# Patient Record
Sex: FEMALE | Race: WHITE | NOT HISPANIC OR LATINO | URBAN - METROPOLITAN AREA
[De-identification: names, ages, dates, MRNs, and addresses within clinical notes are randomized per-mention and may not be internally consistent; named-entity substitution may affect disease eponyms.]

---

## 2017-03-07 NOTE — ASU PATIENT PROFILE, ADULT - LEARNING ASSESSMENT (PATIENT) ADDITIONAL COMMENTS
telephone interview Pt resides in assisted living . Information from chart & family . [facility] at [facility] .

## 2017-03-07 NOTE — ASU PATIENT PROFILE, ADULT - ABLE TO REACH PT
[facility] medical coordinator [phone] [facility] medical coordinator [phone]/yes

## 2017-03-08 RX ORDER — SODIUM CHLORIDE 9 MG/ML
3 INJECTION INTRAMUSCULAR; INTRAVENOUS; SUBCUTANEOUS ONCE
Qty: 0 | Refills: 0 | Status: DISCONTINUED | OUTPATIENT
Start: 2017-03-09 | End: 2017-03-09

## 2017-03-09 ENCOUNTER — INPATIENT (INPATIENT)
Facility: HOSPITAL | Age: 82
LOS: 19 days | Discharge: NURSING, REHAB OR LONG-TERM CARE | DRG: 240 | End: 2017-03-29
Attending: INTERNAL MEDICINE | Admitting: SPECIALIST
Payer: COMMERCIAL

## 2017-03-09 ENCOUNTER — TRANSCRIPTION ENCOUNTER (OUTPATIENT)
Age: 82
End: 2017-03-09

## 2017-03-09 VITALS
SYSTOLIC BLOOD PRESSURE: 178 MMHG | TEMPERATURE: 98 F | DIASTOLIC BLOOD PRESSURE: 50 MMHG | RESPIRATION RATE: 16 BRPM | HEART RATE: 78 BPM | OXYGEN SATURATION: 100 % | WEIGHT: 166.89 LBS

## 2017-03-09 DIAGNOSIS — I70.222 ATHEROSCLEROSIS OF NATIVE ARTERIES OF EXTREMITIES WITH REST PAIN, LEFT LEG: ICD-10-CM

## 2017-03-09 DIAGNOSIS — Z90.49 ACQUIRED ABSENCE OF OTHER SPECIFIED PARTS OF DIGESTIVE TRACT: Chronic | ICD-10-CM

## 2017-03-09 DIAGNOSIS — I73.9 PERIPHERAL VASCULAR DISEASE, UNSPECIFIED: ICD-10-CM

## 2017-03-09 DIAGNOSIS — I25.10 ATHEROSCLEROTIC HEART DISEASE OF NATIVE CORONARY ARTERY WITHOUT ANGINA PECTORIS: ICD-10-CM

## 2017-03-09 DIAGNOSIS — Z98.62 PERIPHERAL VASCULAR ANGIOPLASTY STATUS: Chronic | ICD-10-CM

## 2017-03-09 DIAGNOSIS — E78.5 HYPERLIPIDEMIA, UNSPECIFIED: ICD-10-CM

## 2017-03-09 DIAGNOSIS — L03.90 CELLULITIS, UNSPECIFIED: ICD-10-CM

## 2017-03-09 DIAGNOSIS — E11.9 TYPE 2 DIABETES MELLITUS WITHOUT COMPLICATIONS: ICD-10-CM

## 2017-03-09 DIAGNOSIS — I16.0 HYPERTENSIVE URGENCY: ICD-10-CM

## 2017-03-09 DIAGNOSIS — I10 ESSENTIAL (PRIMARY) HYPERTENSION: ICD-10-CM

## 2017-03-09 DIAGNOSIS — I48.91 UNSPECIFIED ATRIAL FIBRILLATION: ICD-10-CM

## 2017-03-09 LAB
ANION GAP SERPL CALC-SCNC: 14 MMOL/L — SIGNIFICANT CHANGE UP (ref 5–17)
BASOPHILS # BLD AUTO: 0 K/UL — SIGNIFICANT CHANGE UP (ref 0–0.2)
BASOPHILS NFR BLD AUTO: 0.3 % — SIGNIFICANT CHANGE UP (ref 0–2)
BLD GP AB SCN SERPL QL: SIGNIFICANT CHANGE UP
BUN SERPL-MCNC: 20 MG/DL — SIGNIFICANT CHANGE UP (ref 8–20)
CALCIUM SERPL-MCNC: 9.3 MG/DL — SIGNIFICANT CHANGE UP (ref 8.6–10.2)
CHLORIDE SERPL-SCNC: 96 MMOL/L — LOW (ref 98–107)
CO2 SERPL-SCNC: 25 MMOL/L — SIGNIFICANT CHANGE UP (ref 22–29)
CREAT SERPL-MCNC: 0.8 MG/DL — SIGNIFICANT CHANGE UP (ref 0.5–1.3)
EOSINOPHIL # BLD AUTO: 0.2 K/UL — SIGNIFICANT CHANGE UP (ref 0–0.5)
EOSINOPHIL NFR BLD AUTO: 2.9 % — SIGNIFICANT CHANGE UP (ref 0–6)
GLUCOSE SERPL-MCNC: 176 MG/DL — HIGH (ref 70–115)
HCT VFR BLD CALC: 33 % — LOW (ref 37–47)
HGB BLD-MCNC: 10.7 G/DL — LOW (ref 12–16)
INR BLD: 1 RATIO — SIGNIFICANT CHANGE UP (ref 0.88–1.16)
LYMPHOCYTES # BLD AUTO: 2 K/UL — SIGNIFICANT CHANGE UP (ref 1–4.8)
LYMPHOCYTES # BLD AUTO: 25.2 % — SIGNIFICANT CHANGE UP (ref 20–55)
MAGNESIUM SERPL-MCNC: 1.8 MG/DL — SIGNIFICANT CHANGE UP (ref 1.8–2.5)
MCHC RBC-ENTMCNC: 27.9 PG — SIGNIFICANT CHANGE UP (ref 27–31)
MCHC RBC-ENTMCNC: 32.4 G/DL — SIGNIFICANT CHANGE UP (ref 32–36)
MCV RBC AUTO: 85.9 FL — SIGNIFICANT CHANGE UP (ref 81–99)
MONOCYTES # BLD AUTO: 0.8 K/UL — SIGNIFICANT CHANGE UP (ref 0–0.8)
MONOCYTES NFR BLD AUTO: 10.6 % — HIGH (ref 3–10)
NEUTROPHILS # BLD AUTO: 4.8 K/UL — SIGNIFICANT CHANGE UP (ref 1.8–8)
NEUTROPHILS NFR BLD AUTO: 60.7 % — SIGNIFICANT CHANGE UP (ref 37–73)
PHOSPHATE SERPL-MCNC: 3.8 MG/DL — SIGNIFICANT CHANGE UP (ref 2.4–4.7)
PLATELET # BLD AUTO: 252 K/UL — SIGNIFICANT CHANGE UP (ref 150–400)
POTASSIUM SERPL-MCNC: 4.3 MMOL/L — SIGNIFICANT CHANGE UP (ref 3.5–5.3)
POTASSIUM SERPL-SCNC: 4.3 MMOL/L — SIGNIFICANT CHANGE UP (ref 3.5–5.3)
PROTHROM AB SERPL-ACNC: 11 SEC — SIGNIFICANT CHANGE UP (ref 10–13.1)
RBC # BLD: 3.84 M/UL — LOW (ref 4.4–5.2)
RBC # FLD: 14.5 % — SIGNIFICANT CHANGE UP (ref 11–15.6)
SODIUM SERPL-SCNC: 135 MMOL/L — SIGNIFICANT CHANGE UP (ref 135–145)
TYPE + AB SCN PNL BLD: SIGNIFICANT CHANGE UP
WBC # BLD: 8 K/UL — SIGNIFICANT CHANGE UP (ref 4.8–10.8)
WBC # FLD AUTO: 8 K/UL — SIGNIFICANT CHANGE UP (ref 4.8–10.8)

## 2017-03-09 PROCEDURE — 93010 ELECTROCARDIOGRAM REPORT: CPT

## 2017-03-09 RX ORDER — DEXTROSE 50 % IN WATER 50 %
12.5 SYRINGE (ML) INTRAVENOUS ONCE
Qty: 0 | Refills: 0 | Status: DISCONTINUED | OUTPATIENT
Start: 2017-03-09 | End: 2017-03-14

## 2017-03-09 RX ORDER — LEVOTHYROXINE SODIUM 125 MCG
1 TABLET ORAL
Qty: 0 | Refills: 0 | COMMUNITY

## 2017-03-09 RX ORDER — SODIUM CHLORIDE 9 MG/ML
1000 INJECTION, SOLUTION INTRAVENOUS
Qty: 0 | Refills: 0 | Status: DISCONTINUED | OUTPATIENT
Start: 2017-03-09 | End: 2017-03-14

## 2017-03-09 RX ORDER — LEVOTHYROXINE SODIUM 125 MCG
75 TABLET ORAL DAILY
Qty: 0 | Refills: 0 | Status: DISCONTINUED | OUTPATIENT
Start: 2017-03-09 | End: 2017-03-14

## 2017-03-09 RX ORDER — HYDRALAZINE HCL 50 MG
2 TABLET ORAL
Qty: 0 | Refills: 0 | Status: COMPLETED | OUTPATIENT
Start: 2017-03-09 | End: 2017-03-09

## 2017-03-09 RX ORDER — BACITRACIN AND POLYMYXIN B SULFATE 500; 10000 [USP'U]/G; [USP'U]/G
1 OINTMENT TOPICAL DAILY
Qty: 0 | Refills: 0 | Status: DISCONTINUED | OUTPATIENT
Start: 2017-03-09 | End: 2017-03-14

## 2017-03-09 RX ORDER — SODIUM CHLORIDE 9 MG/ML
1000 INJECTION INTRAMUSCULAR; INTRAVENOUS; SUBCUTANEOUS
Qty: 0 | Refills: 0 | Status: DISCONTINUED | OUTPATIENT
Start: 2017-03-09 | End: 2017-03-11

## 2017-03-09 RX ORDER — DEXTROSE 50 % IN WATER 50 %
25 SYRINGE (ML) INTRAVENOUS ONCE
Qty: 0 | Refills: 0 | Status: DISCONTINUED | OUTPATIENT
Start: 2017-03-09 | End: 2017-03-14

## 2017-03-09 RX ORDER — PIPERACILLIN AND TAZOBACTAM 4; .5 G/20ML; G/20ML
3.38 INJECTION, POWDER, LYOPHILIZED, FOR SOLUTION INTRAVENOUS ONCE
Qty: 0 | Refills: 0 | Status: COMPLETED | OUTPATIENT
Start: 2017-03-09 | End: 2017-03-09

## 2017-03-09 RX ORDER — ATORVASTATIN CALCIUM 80 MG/1
40 TABLET, FILM COATED ORAL AT BEDTIME
Qty: 0 | Refills: 0 | Status: DISCONTINUED | OUTPATIENT
Start: 2017-03-09 | End: 2017-03-14

## 2017-03-09 RX ORDER — METOPROLOL TARTRATE 50 MG
50 TABLET ORAL EVERY 12 HOURS
Qty: 0 | Refills: 0 | Status: DISCONTINUED | OUTPATIENT
Start: 2017-03-09 | End: 2017-03-14

## 2017-03-09 RX ORDER — MULTIVIT-MIN/FERROUS GLUCONATE 9 MG/15 ML
1 LIQUID (ML) ORAL DAILY
Qty: 0 | Refills: 0 | Status: DISCONTINUED | OUTPATIENT
Start: 2017-03-09 | End: 2017-03-14

## 2017-03-09 RX ORDER — CLOPIDOGREL BISULFATE 75 MG/1
75 TABLET, FILM COATED ORAL DAILY
Qty: 0 | Refills: 0 | Status: DISCONTINUED | OUTPATIENT
Start: 2017-03-09 | End: 2017-03-14

## 2017-03-09 RX ORDER — INSULIN GLARGINE 100 [IU]/ML
10 INJECTION, SOLUTION SUBCUTANEOUS AT BEDTIME
Qty: 0 | Refills: 0 | Status: DISCONTINUED | OUTPATIENT
Start: 2017-03-09 | End: 2017-03-14

## 2017-03-09 RX ORDER — FENTANYL CITRATE 50 UG/ML
25 INJECTION INTRAVENOUS
Qty: 0 | Refills: 0 | Status: DISCONTINUED | OUTPATIENT
Start: 2017-03-09 | End: 2017-03-09

## 2017-03-09 RX ORDER — DEXTROSE 50 % IN WATER 50 %
1 SYRINGE (ML) INTRAVENOUS ONCE
Qty: 0 | Refills: 0 | Status: DISCONTINUED | OUTPATIENT
Start: 2017-03-09 | End: 2017-03-14

## 2017-03-09 RX ORDER — PANTOPRAZOLE SODIUM 20 MG/1
40 TABLET, DELAYED RELEASE ORAL
Qty: 0 | Refills: 0 | Status: DISCONTINUED | OUTPATIENT
Start: 2017-03-09 | End: 2017-03-14

## 2017-03-09 RX ORDER — FOLIC ACID 0.8 MG
1 TABLET ORAL DAILY
Qty: 0 | Refills: 0 | Status: DISCONTINUED | OUTPATIENT
Start: 2017-03-09 | End: 2017-03-14

## 2017-03-09 RX ORDER — ACETAMINOPHEN 500 MG
650 TABLET ORAL EVERY 6 HOURS
Qty: 0 | Refills: 0 | Status: DISCONTINUED | OUTPATIENT
Start: 2017-03-09 | End: 2017-03-14

## 2017-03-09 RX ORDER — SODIUM CHLORIDE 9 MG/ML
1000 INJECTION, SOLUTION INTRAVENOUS
Qty: 0 | Refills: 0 | Status: DISCONTINUED | OUTPATIENT
Start: 2017-03-09 | End: 2017-03-09

## 2017-03-09 RX ORDER — PIPERACILLIN AND TAZOBACTAM 4; .5 G/20ML; G/20ML
3.38 INJECTION, POWDER, LYOPHILIZED, FOR SOLUTION INTRAVENOUS EVERY 8 HOURS
Qty: 0 | Refills: 0 | Status: DISCONTINUED | OUTPATIENT
Start: 2017-03-09 | End: 2017-03-11

## 2017-03-09 RX ORDER — ALPRAZOLAM 0.25 MG
0.5 TABLET ORAL THREE TIMES A DAY
Qty: 0 | Refills: 0 | Status: DISCONTINUED | OUTPATIENT
Start: 2017-03-09 | End: 2017-03-14

## 2017-03-09 RX ORDER — OXYCODONE HYDROCHLORIDE 5 MG/1
5 TABLET ORAL EVERY 8 HOURS
Qty: 0 | Refills: 0 | Status: DISCONTINUED | OUTPATIENT
Start: 2017-03-09 | End: 2017-03-10

## 2017-03-09 RX ORDER — ACETAMINOPHEN 500 MG
1000 TABLET ORAL ONCE
Qty: 0 | Refills: 0 | Status: COMPLETED | OUTPATIENT
Start: 2017-03-09 | End: 2017-03-09

## 2017-03-09 RX ORDER — INSULIN LISPRO 100/ML
VIAL (ML) SUBCUTANEOUS
Qty: 0 | Refills: 0 | Status: DISCONTINUED | OUTPATIENT
Start: 2017-03-09 | End: 2017-03-14

## 2017-03-09 RX ORDER — GLUCAGON INJECTION, SOLUTION 0.5 MG/.1ML
1 INJECTION, SOLUTION SUBCUTANEOUS ONCE
Qty: 0 | Refills: 0 | Status: DISCONTINUED | OUTPATIENT
Start: 2017-03-09 | End: 2017-03-14

## 2017-03-09 RX ORDER — AMLODIPINE BESYLATE 2.5 MG/1
2.5 TABLET ORAL DAILY
Qty: 0 | Refills: 0 | Status: DISCONTINUED | OUTPATIENT
Start: 2017-03-09 | End: 2017-03-11

## 2017-03-09 RX ORDER — ONDANSETRON 8 MG/1
4 TABLET, FILM COATED ORAL ONCE
Qty: 0 | Refills: 0 | Status: DISCONTINUED | OUTPATIENT
Start: 2017-03-09 | End: 2017-03-09

## 2017-03-09 RX ORDER — GABAPENTIN 400 MG/1
600 CAPSULE ORAL AT BEDTIME
Qty: 0 | Refills: 0 | Status: DISCONTINUED | OUTPATIENT
Start: 2017-03-09 | End: 2017-03-14

## 2017-03-09 RX ORDER — BACITRACIN ZINC 500 UNIT/G
1 OINTMENT IN PACKET (EA) TOPICAL THREE TIMES A DAY
Qty: 0 | Refills: 0 | Status: DISCONTINUED | OUTPATIENT
Start: 2017-03-09 | End: 2017-03-10

## 2017-03-09 RX ADMIN — Medication 2 MILLIGRAM(S): at 18:10

## 2017-03-09 RX ADMIN — Medication 50 MILLIGRAM(S): at 19:55

## 2017-03-09 RX ADMIN — FENTANYL CITRATE 25 MICROGRAM(S): 50 INJECTION INTRAVENOUS at 18:10

## 2017-03-09 RX ADMIN — Medication 2 MILLIGRAM(S): at 18:00

## 2017-03-09 RX ADMIN — OXYCODONE HYDROCHLORIDE 5 MILLIGRAM(S): 5 TABLET ORAL at 23:09

## 2017-03-09 RX ADMIN — GABAPENTIN 600 MILLIGRAM(S): 400 CAPSULE ORAL at 19:04

## 2017-03-09 RX ADMIN — FENTANYL CITRATE 25 MICROGRAM(S): 50 INJECTION INTRAVENOUS at 18:40

## 2017-03-09 RX ADMIN — Medication 1000 MILLIGRAM(S): at 19:04

## 2017-03-09 RX ADMIN — CLOPIDOGREL BISULFATE 75 MILLIGRAM(S): 75 TABLET, FILM COATED ORAL at 19:55

## 2017-03-09 RX ADMIN — Medication 1 APPLICATION(S): at 22:00

## 2017-03-09 RX ADMIN — PIPERACILLIN AND TAZOBACTAM 200 GRAM(S): 4; .5 INJECTION, POWDER, LYOPHILIZED, FOR SOLUTION INTRAVENOUS at 19:51

## 2017-03-09 RX ADMIN — Medication 2 MILLIGRAM(S): at 18:05

## 2017-03-09 RX ADMIN — Medication 0.5 MILLIGRAM(S): at 19:00

## 2017-03-09 RX ADMIN — Medication 2 MILLIGRAM(S): at 17:55

## 2017-03-09 RX ADMIN — ATORVASTATIN CALCIUM 40 MILLIGRAM(S): 80 TABLET, FILM COATED ORAL at 22:00

## 2017-03-09 RX ADMIN — SODIUM CHLORIDE 100 MILLILITER(S): 9 INJECTION INTRAMUSCULAR; INTRAVENOUS; SUBCUTANEOUS at 19:57

## 2017-03-09 RX ADMIN — Medication 400 MILLIGRAM(S): at 18:50

## 2017-03-09 RX ADMIN — INSULIN GLARGINE 10 UNIT(S): 100 INJECTION, SOLUTION SUBCUTANEOUS at 22:00

## 2017-03-09 RX ADMIN — FENTANYL CITRATE 25 MICROGRAM(S): 50 INJECTION INTRAVENOUS at 18:00

## 2017-03-09 RX ADMIN — FENTANYL CITRATE 25 MICROGRAM(S): 50 INJECTION INTRAVENOUS at 18:20

## 2017-03-09 RX ADMIN — FENTANYL CITRATE 25 MICROGRAM(S): 50 INJECTION INTRAVENOUS at 18:25

## 2017-03-09 RX ADMIN — BACITRACIN AND POLYMYXIN B SULFATE 1 APPLICATION(S): 500; 10000 OINTMENT TOPICAL at 22:00

## 2017-03-09 NOTE — BRIEF OPERATIVE NOTE - PRE-OP DX
PAD (peripheral artery disease)  03/09/2017  b/l LE PAD with tissue loss left leg  Active  Uyen Mckeon

## 2017-03-09 NOTE — H&P ADULT - PMH
CAD (coronary artery disease)    Diabetes    Hyperlipidemia    Hypertension    PVD (peripheral vascular disease)

## 2017-03-09 NOTE — H&P ADULT - HISTORY OF PRESENT ILLNESS
85 yr old female with multiple medical problems CAD s/p stents, diabetes, hypertension, hyperlipidemia, anxiety, significant PVD s/p multiple stents was admitted today for elective LE angiogram. Patient was noted to have elevated BP post procedure, and hence admission requested by Dr Irwin. Per him, patient with occluded SFA, POP b/l with minimal outflow to b/l LE, and patient will likely need BKA next week. Patient seen in PACU, states she feels lousy. No chest pain, headaches, shortness of breath. Spoke with daughters at bedside, who states that patient lives in an assisted living, uses a walker to ambulate and has had multiple falls. Her last PCI was 10 yrs ago. History of smoking, quit 30 yrs ago. Daughter also states patient took her antihypertensives this am. She has been in assisted living since October 2016. She received IV hydralazine prior to my assessment and BP improved to 180. 85 yr old female with multiple medical problems CAD s/p stents, diabetes, hypertension, hyperlipidemia, anxiety, significant PVD s/p multiple stents, paroxysmal atrial fibrillation was admitted today for elective LE angiogram. Patient was noted to have elevated BP post procedure, and hence admission requested by Dr Irwin. Per him, patient with occluded SFA, POP b/l with minimal outflow to b/l LE, and patient will likely need BKA next week. Patient seen in PACU, states she feels lousy. No chest pain, headaches, shortness of breath. Spoke with daughters at bedside, who states that patient lives in an assisted living, uses a walker to ambulate and has had multiple falls. Her last PCI was 10 yrs ago. History of smoking, quit 30 yrs ago. Daughter also states patient took her antihypertensives this am. She has been in assisted living since October 2016. She received IV hydralazine prior to my assessment and BP improved to 180.

## 2017-03-09 NOTE — H&P ADULT - ASSESSMENT
85 yr old female with multiple medical problems CAD s/p stents, diabetes, hypertension, hyperlipidemia, anxiety, significant PVD s/p multiple stents, paroxysmal atrial fibrillation admitted for hypertensive urgency post LE angiogram and cellulitis. She will need BKA. Labs and EKG from 3/7/17 reviewed, normal renal function. Received hydralazine in PACU with some improvement in BP.

## 2017-03-09 NOTE — H&P ADULT - RS GEN PE MLT RESP DETAILS PC
breath sounds equal/good air movement/clear to auscultation bilaterally/respirations non-labored/airway patent

## 2017-03-09 NOTE — BRIEF OPERATIVE NOTE - PROCEDURE
Angiogram peripheral  03/09/2017  1. US guided, percutaneous access to the right CFA, 2. Aortogram, 3. B/L LE angiogram  Active  [facility]

## 2017-03-10 LAB
ANION GAP SERPL CALC-SCNC: 13 MMOL/L — SIGNIFICANT CHANGE UP (ref 5–17)
ANISOCYTOSIS BLD QL: SLIGHT — SIGNIFICANT CHANGE UP
BUN SERPL-MCNC: 28 MG/DL — HIGH (ref 8–20)
CALCIUM SERPL-MCNC: 8.5 MG/DL — LOW (ref 8.6–10.2)
CHLORIDE SERPL-SCNC: 96 MMOL/L — LOW (ref 98–107)
CHOLEST SERPL-MCNC: 127 MG/DL — SIGNIFICANT CHANGE UP (ref 110–199)
CO2 SERPL-SCNC: 25 MMOL/L — SIGNIFICANT CHANGE UP (ref 22–29)
CREAT SERPL-MCNC: 1.27 MG/DL — SIGNIFICANT CHANGE UP (ref 0.5–1.3)
EOSINOPHIL NFR BLD AUTO: 3 % — SIGNIFICANT CHANGE UP (ref 0–5)
GLUCOSE SERPL-MCNC: 269 MG/DL — HIGH (ref 70–115)
HBA1C BLD-MCNC: 8.7 — HIGH (ref 4–5.6)
HCT VFR BLD CALC: 29.4 % — LOW (ref 37–47)
HDLC SERPL-MCNC: 49 MG/DL — LOW
HGB BLD-MCNC: 9.3 G/DL — LOW (ref 12–16)
LIPID PNL WITH DIRECT LDL SERPL: 56 MG/DL — SIGNIFICANT CHANGE UP
LYMPHOCYTES # BLD AUTO: 6 % — LOW (ref 20–55)
MAGNESIUM SERPL-MCNC: 1.8 MG/DL — SIGNIFICANT CHANGE UP (ref 1.8–2.5)
MCHC RBC-ENTMCNC: 27.5 PG — SIGNIFICANT CHANGE UP (ref 27–31)
MCHC RBC-ENTMCNC: 31.6 G/DL — LOW (ref 32–36)
MCV RBC AUTO: 87 FL — SIGNIFICANT CHANGE UP (ref 81–99)
MONOCYTES NFR BLD AUTO: 4 % — SIGNIFICANT CHANGE UP (ref 3–10)
NEUTROPHILS NFR BLD AUTO: 84 % — HIGH (ref 37–73)
NEUTS BAND # BLD: 3 % — SIGNIFICANT CHANGE UP (ref 0–8)
OVALOCYTES BLD QL SMEAR: SLIGHT — SIGNIFICANT CHANGE UP
PHOSPHATE SERPL-MCNC: 4.1 MG/DL — SIGNIFICANT CHANGE UP (ref 2.4–4.7)
PLAT MORPH BLD: NORMAL — SIGNIFICANT CHANGE UP
PLATELET # BLD AUTO: 273 K/UL — SIGNIFICANT CHANGE UP (ref 150–400)
POTASSIUM SERPL-MCNC: 4.8 MMOL/L — SIGNIFICANT CHANGE UP (ref 3.5–5.3)
POTASSIUM SERPL-SCNC: 4.8 MMOL/L — SIGNIFICANT CHANGE UP (ref 3.5–5.3)
RBC # BLD: 3.38 M/UL — LOW (ref 4.4–5.2)
RBC # FLD: 14.7 % — SIGNIFICANT CHANGE UP (ref 11–15.6)
RBC BLD AUTO: PRESENT — SIGNIFICANT CHANGE UP
SODIUM SERPL-SCNC: 134 MMOL/L — LOW (ref 135–145)
TOTAL CHOLESTEROL/HDL RATIO MEASUREMENT: 3 RATIO — LOW (ref 3.3–7.1)
TRIGL SERPL-MCNC: 108 MG/DL — SIGNIFICANT CHANGE UP (ref 10–200)
WBC # BLD: 10.2 K/UL — SIGNIFICANT CHANGE UP (ref 4.8–10.8)
WBC # FLD AUTO: 10.2 K/UL — SIGNIFICANT CHANGE UP (ref 4.8–10.8)

## 2017-03-10 PROCEDURE — 93306 TTE W/DOPPLER COMPLETE: CPT | Mod: 26

## 2017-03-10 PROCEDURE — 99233 SBSQ HOSP IP/OBS HIGH 50: CPT

## 2017-03-10 RX ORDER — MORPHINE SULFATE 50 MG/1
2 CAPSULE, EXTENDED RELEASE ORAL ONCE
Qty: 0 | Refills: 0 | Status: DISCONTINUED | OUTPATIENT
Start: 2017-03-10 | End: 2017-03-10

## 2017-03-10 RX ORDER — OXYCODONE HYDROCHLORIDE 5 MG/1
5 TABLET ORAL EVERY 4 HOURS
Qty: 0 | Refills: 0 | Status: DISCONTINUED | OUTPATIENT
Start: 2017-03-10 | End: 2017-03-11

## 2017-03-10 RX ORDER — OXYCODONE HYDROCHLORIDE 5 MG/1
5 TABLET ORAL ONCE
Qty: 0 | Refills: 0 | Status: DISCONTINUED | OUTPATIENT
Start: 2017-03-10 | End: 2017-03-10

## 2017-03-10 RX ORDER — ENOXAPARIN SODIUM 100 MG/ML
40 INJECTION SUBCUTANEOUS DAILY
Qty: 0 | Refills: 0 | Status: DISCONTINUED | OUTPATIENT
Start: 2017-03-10 | End: 2017-03-11

## 2017-03-10 RX ADMIN — CLOPIDOGREL BISULFATE 75 MILLIGRAM(S): 75 TABLET, FILM COATED ORAL at 13:28

## 2017-03-10 RX ADMIN — Medication 6: at 08:41

## 2017-03-10 RX ADMIN — Medication 50 MILLIGRAM(S): at 17:27

## 2017-03-10 RX ADMIN — OXYCODONE HYDROCHLORIDE 5 MILLIGRAM(S): 5 TABLET ORAL at 21:56

## 2017-03-10 RX ADMIN — GABAPENTIN 600 MILLIGRAM(S): 400 CAPSULE ORAL at 21:39

## 2017-03-10 RX ADMIN — Medication 0.5 MILLIGRAM(S): at 21:51

## 2017-03-10 RX ADMIN — PIPERACILLIN AND TAZOBACTAM 25 GRAM(S): 4; .5 INJECTION, POWDER, LYOPHILIZED, FOR SOLUTION INTRAVENOUS at 13:48

## 2017-03-10 RX ADMIN — ATORVASTATIN CALCIUM 40 MILLIGRAM(S): 80 TABLET, FILM COATED ORAL at 21:39

## 2017-03-10 RX ADMIN — OXYCODONE HYDROCHLORIDE 5 MILLIGRAM(S): 5 TABLET ORAL at 21:38

## 2017-03-10 RX ADMIN — Medication 50 MILLIGRAM(S): at 06:39

## 2017-03-10 RX ADMIN — MORPHINE SULFATE 2 MILLIGRAM(S): 50 CAPSULE, EXTENDED RELEASE ORAL at 15:10

## 2017-03-10 RX ADMIN — INSULIN GLARGINE 10 UNIT(S): 100 INJECTION, SOLUTION SUBCUTANEOUS at 21:51

## 2017-03-10 RX ADMIN — OXYCODONE HYDROCHLORIDE 5 MILLIGRAM(S): 5 TABLET ORAL at 08:41

## 2017-03-10 RX ADMIN — Medication 1 MILLIGRAM(S): at 13:29

## 2017-03-10 RX ADMIN — PIPERACILLIN AND TAZOBACTAM 25 GRAM(S): 4; .5 INJECTION, POWDER, LYOPHILIZED, FOR SOLUTION INTRAVENOUS at 21:38

## 2017-03-10 RX ADMIN — Medication 1 APPLICATION(S): at 13:27

## 2017-03-10 RX ADMIN — AMLODIPINE BESYLATE 2.5 MILLIGRAM(S): 2.5 TABLET ORAL at 06:39

## 2017-03-10 RX ADMIN — BACITRACIN AND POLYMYXIN B SULFATE 1 APPLICATION(S): 500; 10000 OINTMENT TOPICAL at 17:26

## 2017-03-10 RX ADMIN — Medication 75 MICROGRAM(S): at 06:39

## 2017-03-10 RX ADMIN — OXYCODONE HYDROCHLORIDE 5 MILLIGRAM(S): 5 TABLET ORAL at 09:30

## 2017-03-10 RX ADMIN — MORPHINE SULFATE 2 MILLIGRAM(S): 50 CAPSULE, EXTENDED RELEASE ORAL at 14:44

## 2017-03-10 RX ADMIN — Medication 2: at 13:27

## 2017-03-10 RX ADMIN — Medication 6: at 17:35

## 2017-03-10 RX ADMIN — Medication 1 APPLICATION(S): at 06:39

## 2017-03-10 RX ADMIN — Medication 1 TABLET(S): at 13:28

## 2017-03-10 RX ADMIN — PIPERACILLIN AND TAZOBACTAM 25 GRAM(S): 4; .5 INJECTION, POWDER, LYOPHILIZED, FOR SOLUTION INTRAVENOUS at 06:41

## 2017-03-10 RX ADMIN — ENOXAPARIN SODIUM 40 MILLIGRAM(S): 100 INJECTION SUBCUTANEOUS at 21:51

## 2017-03-10 RX ADMIN — PANTOPRAZOLE SODIUM 40 MILLIGRAM(S): 20 TABLET, DELAYED RELEASE ORAL at 06:40

## 2017-03-10 RX ADMIN — OXYCODONE HYDROCHLORIDE 5 MILLIGRAM(S): 5 TABLET ORAL at 17:33

## 2017-03-10 NOTE — PROGRESS NOTE ADULT - PROBLEM SELECTOR PLAN 1
- will need left BKA next week  - IV Abx  - Medical/Cardiac optimization  - BP control  - DVT prophylaxis OK for SQ Lovenox   - Hold full AC with Eliquis   - Will follow along

## 2017-03-10 NOTE — PROGRESS NOTE ADULT - PROBLEM SELECTOR PLAN 2
Spoke with vascular surgery, likely left BKA next week.   c/w IV Abx  - DVT prophylaxis   - Hold full AC with Eliquis  Pain meds Spoke with vascular surgery, likely left BKA next week.   c/w IV Abx  - DVT prophylaxis   - Hold full AC with Eliquis  c/w plavix  Pain meds

## 2017-03-10 NOTE — CONSULT NOTE ADULT - ASSESSMENT
85 yr old female with multiple medical problems CAD s/p stents last coronary stent 1999, diabetes, hypertension, hyperlipidemia, anxiety, significant PVD s/p multiple stents, paroxysmal atrial fibrillation  for planned BKA based on RCRI  risk criteria for planned non-cardiac surgery patient is at least intermediate risk of MACE for planned intermediate risk surgery. The patient is currently optimized form a cardiac standpoint. Benefits of procedure outweigh risks.    CAD s/p remote stents (-) NST 2015 : TTE LVEF 65% no significant valvular heart disease. Recc: continue Plavix/Statin add ASA 325mg daily if ok from surgical standpoint.  PAF: Elliquist on hold ;[id number] =5  [facility] bridge over weekend goal PTT 60-80  HTN: Continue Norvasc/metoprolol 50 mg po bid , hydralazine prn   DM: RHISS  Jami-op ok to use IV lopressor 2.5mg -5mg prn for HR/BP control   Hemodynamic monitoring as per protocol.

## 2017-03-10 NOTE — PROGRESS NOTE ADULT - SUBJECTIVE AND OBJECTIVE BOX
POD # 1 s/p b/l LE angiogram for advanced PAD with LLE tissue loss. Pt with SBP > 200 postoperatively currently controlled. Pt is sleepy this morning but without complaints.     Vital Signs Last 24 Hrs  T(C): 37.2, Max: 37.2 (03-09 @ 21:24)  T(F): 99, Max: 99 (03-10 @ 06:37)  HR: 85 (76 - 94)  BP: 157/63 (147/53 - 218/81)  BP(mean): --  RR: 16 (10 - 16)  SpO2: 100% (100% - 100%)                          10.7   8.0   )-----------( 252      ( 09 Mar 2017 19:02 )             33.0       135    |  96     |  20.0   ----------------------------<  176    4.3     |  25.0   |  0.80     Ca    9.3        09 Mar 2017 19:02  Phos  3.8       09 Mar 2017 19:02  Mg     1.8       09 Mar 2017 19:02

## 2017-03-10 NOTE — CONSULT NOTE ADULT - SUBJECTIVE AND OBJECTIVE BOX
Patient is a 85y old  Female who presents with a chief complaint of hypertensive urgency (09 Mar 2017 18:35)      HPI:  85 yr old female with multiple medical problems CAD s/p stents last coronary stent , diabetes, hypertension, hyperlipidemia, anxiety, significant PVD s/p multiple stents, paroxysmal atrial fibrillation  on Elliquist was admitted with signs and symptoms consistent with  occluded SFA, POP b/l with minimal outflow to b/l LE. Patient tentatively being evaluated for  BKA next week.   Functional status: Limited ambulation due to leg pain ; no reported history of congestive heart failure. Patient reports negative stress test .   CV ROS: patient denies chest pain, orthopnea PND syncope or pre-syncope. BP improved on Hydralazine to 's          PAST MEDICAL & SURGICAL HISTORY:  Hyperlipidemia  Hypertension  Diabetes  PVD (peripheral vascular disease)  CAD (coronary artery disease)  History of cholecystectomy  H/O angioplasty      PREVIOUS DIAGNOSTIC TESTING:      ECHO  FINDINGS: Summary:   1. Normal biventricular systolic function. Left ventricular ejection   fraction, by visual estimation, is 60 to 65%.   2. There is mild concentric left ventricular hypertrophy.   3. Spectral Doppler shows impaired relaxation pattern of left   ventricular myocardial filling (Grade I diastolic dysfunction).   4. Mild tricuspidregurgitation.   5. Estimated PA systolic pressure = 32 mmHg (presuming RA pressure = 3   mmHg).   6. Aortic sclerosis without evidence for stenosis.   7. There is no evidence of pericardial effusion.   8. ** No prior echocardiograms available for comparison.          Allergies    Benadryl (Other)  Demerol HCl (Other)    Intolerances        MEDICATIONS  (STANDING):  sodium chloride 0.9%. 1000milliLiter(s) IV Continuous <Continuous>  BACItracin/polymyxin B Ointment 1Application(s) Topical daily  gabapentin 600milliGRAM(s) Oral at bedtime  atorvastatin 40milliGRAM(s) Oral at bedtime  clopidogrel Tablet 75milliGRAM(s) Oral daily  metoprolol 50milliGRAM(s) Oral every 12 hours  amLODIPine   Tablet 2.5milliGRAM(s) Oral daily  BACItracin   Ointment 1Application(s) Topical three times a day  pantoprazole    Tablet 40milliGRAM(s) Oral before breakfast  levothyroxine 75MICROGram(s) Oral daily  folic acid 1milliGRAM(s) Oral daily  multivitamin/minerals 1Tablet(s) Oral daily  piperacillin/tazobactam IVPB. 3.375Gram(s) IV Intermittent every 8 hours  insulin glargine Injectable (LANTUS) 10Unit(s) SubCutaneous at bedtime  insulin lispro (HumaLOG) corrective regimen sliding scale  SubCutaneous three times a day before meals  dextrose 5%. 1000milliLiter(s) IV Continuous <Continuous>  dextrose 50% Injectable 12.5Gram(s) IV Push once  dextrose 50% Injectable 25Gram(s) IV Push once  dextrose 50% Injectable 25Gram(s) IV Push once  enoxaparin Injectable 40milliGRAM(s) SubCutaneous daily    MEDICATIONS  (PRN):  acetaminophen   Tablet 650milliGRAM(s) Oral every 6 hours PRN For Temp greater than 38 C (100.4 F)  acetaminophen   Tablet. 650milliGRAM(s) Oral every 6 hours PRN Moderate Pain (4 - 6)  ALPRAZolam 0.5milliGRAM(s) Oral three times a day PRN anxiety  dextrose Gel 1Dose(s) Oral once PRN Blood Glucose LESS THAN 70 milliGRAM(s)/deciliter  glucagon  Injectable 1milliGRAM(s) IntraMuscular once PRN Glucose LESS THAN 70 milligrams/deciliter  oxyCODONE IR 5milliGRAM(s) Oral every 4 hours PRN Moderate Pain (4 - 6)      FAMILY HISTORY:  No pertinent family history in first degree relatives      SOCIAL HISTORY: Lives with family    CIGARETTES: denies    ALCOHOL: denies    REVIEW OF SYSTEMS:  CONSTITUTIONAL: No fever, weight loss, or fatigue  EYES: No eye pain, visual disturbances, or discharge  ENMT:  No difficulty hearing, tinnitus, vertigo; No sinus or throat pain  NECK: No pain or stiffness  RESPIRATORY: No cough, wheezing, chills or hemoptysis; No Shortness of Breath  CARDIOVASCULAR: No chest pain, palpitations, passing out, dizziness, or leg swelling  GASTROINTESTINAL: No abdominal or epigastric pain. No nausea, vomiting, or hematemesis; No diarrhea or constipation. No melena or hematochezia.  GENITOURINARY: No dysuria, frequency, hematuria, or incontinence  NEUROLOGICAL: No headaches, memory loss, loss of strength, numbness, or tremors  SKIN: No itching, burning, rashes, or lesions   LYMPH Nodes: No enlarged glands  ENDOCRINE: No heat or cold intolerance; No hair loss  MUSCULOSKELETAL: No joint pain or swelling; No muscle, back, or extremity pain  PSYCHIATRIC: No depression, anxiety, mood swings, or difficulty sleeping  HEME/LYMPH: No easy bruising, or bleeding gums  ALLERY AND IMMUNOLOGIC: No hives or eczema	    Vital Signs Last 24 Hrs  T(C): 36.6, Max: 37.2 (03-09 @ 21:24)  T(F): 97.8, Max: 99 (03-10 @ 06:37)  HR: 82 (76 - 94)  BP: 148/52 (147/53 - 218/81)  BP(mean): --  RR: 18 (10 - 18)  SpO2: 96% (96% - 100%)    Daily     Daily Weight in k (10 Mar 2017 05:35)    I&O's Detail    I & Os for current day (as of 10 Mar 2017 16:46)  =============================================  IN:    sodium chloride 0.9%.: 400 ml    Total IN: 400 ml  ---------------------------------------------  OUT:    Total OUT: 0 ml  ---------------------------------------------  Total NET: 400 ml      PHYSICAL EXAM:  Physical Exam:  · Constitutional	detailed exam	  · Constitutional Details	well-groomed; well-nourished; no distress	  · Eyes	detailed exam	  · Eyes Details	conjunctiva clear	  · ENMT	detailed exam	  · ENMT Details	mouth	  · Mouth	moist	  · Neck	detailed exam	  · Neck Details	supple	  · Respiratory	detailed exam	  · Respiratory Details	airway patent; breath sounds equal; good air movement; respirations non-labored; clear to auscultation bilaterally	  · Cardiovascular	detailed exam	  · Cardiovascular Details	regular rate and rhythm	  · Gastrointestinal	detailed exam	  · GI Normal	soft; nontender; bowel sounds normal	  · Gastrointestinal Comments	hernia	  · Extremities	detailed exam	  · Extremities Details	no pedal edema	  · Extremities Comments	erythema over left foot, + warmth	  · Neurological	detailed exam	  · Neurological Details	alert and oriented x 3; responds to pain; responds to verbal commands	  · Skin	detailed exam	  · Skin Details	erythema	  · Erythema Location	foot L	  · Psychiatric	detailed exam	  · Psychiatric Details	flat affect	        INTERPRETATION OF TELEMETRY:    ECG: SR with ns st twa    LABS:                        9.3    10.2  )-----------( 273      ( 10 Mar 2017 16:08 )             29.4     10 Mar 2017 15:58    134    |  96     |  28.0   ----------------------------<  269    4.8     |  25.0   |  1.27     Ca    8.5        10 Mar 2017 15:58  Phos  3.8       09 Mar 2017 19:02  Mg     1.8       09 Mar 2017 19:02          PT/INR - ( 09 Mar 2017 19:02 )   PT: 11.0 sec;   INR: 1.00 ratio             I&O's Summary    I & Os for current day (as of 10 Mar 2017 16:46)  =============================================  IN: 400 ml / OUT: 0 ml / NET: 400 ml    BNP  RADIOLOGY & ADDITIONAL STUDIES:

## 2017-03-10 NOTE — PROGRESS NOTE ADULT - SUBJECTIVE AND OBJECTIVE BOX
CHIEF COMPLAINT/INTERVAL HISTORY:    Patient is a 85y old  Female who presents with a chief complaint of hypertensive urgency (09 Mar 2017 18:35)      HPI:  85 yr old female with multiple medical problems CAD s/p stents, diabetes, hypertension, hyperlipidemia, anxiety, significant PVD s/p multiple stents, paroxysmal atrial fibrillation was admitted today for elective LE angiogram. Patient was noted to have elevated BP post procedure, and hence admission requested by Dr Irwin. Per him, patient with occluded SFA, POP b/l with minimal outflow to b/l LE, and patient will likely need BKA next week. Patient seen in PACU, states she feels lousy. No chest pain, headaches, shortness of breath. Spoke with daughters at bedside, who states that patient lives in an assisted living, uses a walker to ambulate and has had multiple falls. Her last PCI was 10 yrs ago. History of smoking, quit 30 yrs ago. Daughter also states patient took her antihypertensives this am. She has been in assisted living since October 2016. She received IV hydralazine prior to my assessment and BP improved to 180. (09 Mar 2017 18:35)      SUBJECTIVE & OBJECTIVE: Pt seen and examined at bedside. c/o worsening left foot pain after cell phone fell on foot    ICU Vital Signs Last 24 Hrs  T(C): 36.6, Max: 37.2 (03-09 @ 21:24)  T(F): 97.8, Max: 99 (03-10 @ 06:37)  HR: 82 (76 - 94)  BP: 148/50 (147/53 - 218/81)  BP(mean): --  ABP: --  ABP(mean): --  RR: 17 (10 - 17)  SpO2: 97% (97% - 100%)        MEDICATIONS  (STANDING):  sodium chloride 0.9%. 1000milliLiter(s) IV Continuous <Continuous>  BACItracin/polymyxin B Ointment 1Application(s) Topical daily  gabapentin 600milliGRAM(s) Oral at bedtime  atorvastatin 40milliGRAM(s) Oral at bedtime  clopidogrel Tablet 75milliGRAM(s) Oral daily  metoprolol 50milliGRAM(s) Oral every 12 hours  amLODIPine   Tablet 2.5milliGRAM(s) Oral daily  BACItracin   Ointment 1Application(s) Topical three times a day  pantoprazole    Tablet 40milliGRAM(s) Oral before breakfast  levothyroxine 75MICROGram(s) Oral daily  folic acid 1milliGRAM(s) Oral daily  multivitamin/minerals 1Tablet(s) Oral daily  piperacillin/tazobactam IVPB. 3.375Gram(s) IV Intermittent every 8 hours  insulin glargine Injectable (LANTUS) 10Unit(s) SubCutaneous at bedtime  insulin lispro (HumaLOG) corrective regimen sliding scale  SubCutaneous three times a day before meals  dextrose 5%. 1000milliLiter(s) IV Continuous <Continuous>  dextrose 50% Injectable 12.5Gram(s) IV Push once  dextrose 50% Injectable 25Gram(s) IV Push once  dextrose 50% Injectable 25Gram(s) IV Push once  enoxaparin Injectable 40milliGRAM(s) SubCutaneous daily  morphine  - Injectable 2milliGRAM(s) IV Push once    MEDICATIONS  (PRN):  acetaminophen   Tablet 650milliGRAM(s) Oral every 6 hours PRN For Temp greater than 38 C (100.4 F)  acetaminophen   Tablet. 650milliGRAM(s) Oral every 6 hours PRN Moderate Pain (4 - 6)  ALPRAZolam 0.5milliGRAM(s) Oral three times a day PRN anxiety  dextrose Gel 1Dose(s) Oral once PRN Blood Glucose LESS THAN 70 milliGRAM(s)/deciliter  glucagon  Injectable 1milliGRAM(s) IntraMuscular once PRN Glucose LESS THAN 70 milligrams/deciliter  oxyCODONE IR 5milliGRAM(s) Oral every 4 hours PRN Moderate Pain (4 - 6)      LABS:                        10.7   8.0   )-----------( 252      ( 09 Mar 2017 19:02 )             33.0     09 Mar 2017 19:02    135    |  96     |  20.0   ----------------------------<  176    4.3     |  25.0   |  0.80     Ca    9.3        09 Mar 2017 19:02  Phos  3.8       09 Mar 2017 19:02  Mg     1.8       09 Mar 2017 19:02      PT/INR - ( 09 Mar 2017 19:02 )   PT: 11.0 sec;   INR: 1.00 ratio               CAPILLARY BLOOD GLUCOSE  191 (10 Mar 2017 12:09)  279 (10 Mar 2017 08:36)  142 (09 Mar 2017 17:55)      RECENT CULTURES:      RADIOLOGY & ADDITIONAL TESTS:      PHYSICAL EXAM:    GENERAL: painful distress, well-groomed, well-developed  HEAD:  Atraumatic, Normocephalic  EYES: EOMI, PERRLA, conjunctiva and sclera clear  ENMT: Moist mucous membranes  NECK: Supple, No JVD  NERVOUS SYSTEM:  Alert & Oriented X3, Motor Strength 5/5 B/L upper and lower extremities; DTRs 2+ intact and symmetric  CHEST/LUNG: Clear to auscultation bilaterally; No rales, rhonchi, wheezing, or rubs  HEART: Regular rate and rhythm; No murmurs, rubs, or gallops  ABDOMEN: Soft, Nontender, Nondistended; Bowel sounds present  EXTREMITIES:  weak pulses b/l, LLE ulcers, warmth/erythema around ulcer

## 2017-03-11 DIAGNOSIS — N17.9 ACUTE KIDNEY FAILURE, UNSPECIFIED: ICD-10-CM

## 2017-03-11 LAB
ANION GAP SERPL CALC-SCNC: 11 MMOL/L — SIGNIFICANT CHANGE UP (ref 5–17)
BASOPHILS # BLD AUTO: 0 K/UL — SIGNIFICANT CHANGE UP (ref 0–0.2)
BASOPHILS NFR BLD AUTO: 0.1 % — SIGNIFICANT CHANGE UP (ref 0–2)
BUN SERPL-MCNC: 25 MG/DL — HIGH (ref 8–20)
CALCIUM SERPL-MCNC: 8.7 MG/DL — SIGNIFICANT CHANGE UP (ref 8.6–10.2)
CHLORIDE SERPL-SCNC: 96 MMOL/L — LOW (ref 98–107)
CO2 SERPL-SCNC: 27 MMOL/L — SIGNIFICANT CHANGE UP (ref 22–29)
CREAT SERPL-MCNC: 1.3 MG/DL — SIGNIFICANT CHANGE UP (ref 0.5–1.3)
EOSINOPHIL # BLD AUTO: 0.4 K/UL — SIGNIFICANT CHANGE UP (ref 0–0.5)
EOSINOPHIL NFR BLD AUTO: 5.6 % — SIGNIFICANT CHANGE UP (ref 0–6)
GLUCOSE SERPL-MCNC: 288 MG/DL — HIGH (ref 70–115)
HCT VFR BLD CALC: 29.1 % — LOW (ref 37–47)
HGB BLD-MCNC: 9.3 G/DL — LOW (ref 12–16)
LYMPHOCYTES # BLD AUTO: 1 K/UL — SIGNIFICANT CHANGE UP (ref 1–4.8)
LYMPHOCYTES # BLD AUTO: 13.1 % — LOW (ref 20–55)
MAGNESIUM SERPL-MCNC: 1.8 MG/DL — SIGNIFICANT CHANGE UP (ref 1.8–2.5)
MCHC RBC-ENTMCNC: 27.9 PG — SIGNIFICANT CHANGE UP (ref 27–31)
MCHC RBC-ENTMCNC: 32 G/DL — SIGNIFICANT CHANGE UP (ref 32–36)
MCV RBC AUTO: 87.4 FL — SIGNIFICANT CHANGE UP (ref 81–99)
MONOCYTES # BLD AUTO: 0.5 K/UL — SIGNIFICANT CHANGE UP (ref 0–0.8)
MONOCYTES NFR BLD AUTO: 7 % — SIGNIFICANT CHANGE UP (ref 3–10)
NEUTROPHILS # BLD AUTO: 5.5 K/UL — SIGNIFICANT CHANGE UP (ref 1.8–8)
NEUTROPHILS NFR BLD AUTO: 74.1 % — HIGH (ref 37–73)
PHOSPHATE SERPL-MCNC: 4 MG/DL — SIGNIFICANT CHANGE UP (ref 2.4–4.7)
PLATELET # BLD AUTO: 228 K/UL — SIGNIFICANT CHANGE UP (ref 150–400)
POTASSIUM SERPL-MCNC: 4.2 MMOL/L — SIGNIFICANT CHANGE UP (ref 3.5–5.3)
POTASSIUM SERPL-SCNC: 4.2 MMOL/L — SIGNIFICANT CHANGE UP (ref 3.5–5.3)
RBC # BLD: 3.33 M/UL — LOW (ref 4.4–5.2)
RBC # FLD: 14.6 % — SIGNIFICANT CHANGE UP (ref 11–15.6)
SODIUM SERPL-SCNC: 134 MMOL/L — LOW (ref 135–145)
WBC # BLD: 7.5 K/UL — SIGNIFICANT CHANGE UP (ref 4.8–10.8)
WBC # FLD AUTO: 7.5 K/UL — SIGNIFICANT CHANGE UP (ref 4.8–10.8)

## 2017-03-11 PROCEDURE — 99233 SBSQ HOSP IP/OBS HIGH 50: CPT

## 2017-03-11 RX ORDER — OXYCODONE HYDROCHLORIDE 5 MG/1
10 TABLET ORAL
Qty: 0 | Refills: 0 | Status: DISCONTINUED | OUTPATIENT
Start: 2017-03-11 | End: 2017-03-11

## 2017-03-11 RX ORDER — OXYCODONE HYDROCHLORIDE 5 MG/1
10 TABLET ORAL THREE TIMES A DAY
Qty: 0 | Refills: 0 | Status: DISCONTINUED | OUTPATIENT
Start: 2017-03-11 | End: 2017-03-12

## 2017-03-11 RX ORDER — AMLODIPINE BESYLATE 2.5 MG/1
5 TABLET ORAL ONCE
Qty: 0 | Refills: 0 | Status: COMPLETED | OUTPATIENT
Start: 2017-03-11 | End: 2017-03-11

## 2017-03-11 RX ORDER — LIDOCAINE 4 G/100G
1 CREAM TOPICAL DAILY
Qty: 0 | Refills: 0 | Status: DISCONTINUED | OUTPATIENT
Start: 2017-03-11 | End: 2017-03-14

## 2017-03-11 RX ORDER — AMLODIPINE BESYLATE 2.5 MG/1
10 TABLET ORAL DAILY
Qty: 0 | Refills: 0 | Status: DISCONTINUED | OUTPATIENT
Start: 2017-03-11 | End: 2017-03-14

## 2017-03-11 RX ORDER — MAGNESIUM HYDROXIDE 400 MG/1
30 TABLET, CHEWABLE ORAL DAILY
Qty: 0 | Refills: 0 | Status: DISCONTINUED | OUTPATIENT
Start: 2017-03-11 | End: 2017-03-14

## 2017-03-11 RX ORDER — OXYCODONE HYDROCHLORIDE 5 MG/1
5 TABLET ORAL
Qty: 0 | Refills: 0 | Status: DISCONTINUED | OUTPATIENT
Start: 2017-03-11 | End: 2017-03-12

## 2017-03-11 RX ORDER — POLYETHYLENE GLYCOL 3350 17 G/17G
17 POWDER, FOR SOLUTION ORAL DAILY
Qty: 0 | Refills: 0 | Status: DISCONTINUED | OUTPATIENT
Start: 2017-03-11 | End: 2017-03-14

## 2017-03-11 RX ORDER — SODIUM CHLORIDE 9 MG/ML
500 INJECTION INTRAMUSCULAR; INTRAVENOUS; SUBCUTANEOUS
Qty: 0 | Refills: 0 | Status: COMPLETED | OUTPATIENT
Start: 2017-03-11 | End: 2017-03-11

## 2017-03-11 RX ORDER — MORPHINE SULFATE 50 MG/1
2 CAPSULE, EXTENDED RELEASE ORAL EVERY 4 HOURS
Qty: 0 | Refills: 0 | Status: DISCONTINUED | OUTPATIENT
Start: 2017-03-11 | End: 2017-03-14

## 2017-03-11 RX ORDER — MORPHINE SULFATE 50 MG/1
2 CAPSULE, EXTENDED RELEASE ORAL EVERY 4 HOURS
Qty: 0 | Refills: 0 | Status: DISCONTINUED | OUTPATIENT
Start: 2017-03-11 | End: 2017-03-11

## 2017-03-11 RX ORDER — SENNA PLUS 8.6 MG/1
2 TABLET ORAL AT BEDTIME
Qty: 0 | Refills: 0 | Status: DISCONTINUED | OUTPATIENT
Start: 2017-03-11 | End: 2017-03-14

## 2017-03-11 RX ORDER — DOCUSATE SODIUM 100 MG
100 CAPSULE ORAL THREE TIMES A DAY
Qty: 0 | Refills: 0 | Status: DISCONTINUED | OUTPATIENT
Start: 2017-03-11 | End: 2017-03-14

## 2017-03-11 RX ORDER — ENOXAPARIN SODIUM 100 MG/ML
80 INJECTION SUBCUTANEOUS
Qty: 0 | Refills: 0 | Status: DISCONTINUED | OUTPATIENT
Start: 2017-03-11 | End: 2017-03-13

## 2017-03-11 RX ADMIN — Medication 100 MILLIGRAM(S): at 21:09

## 2017-03-11 RX ADMIN — Medication 0.5 MILLIGRAM(S): at 05:18

## 2017-03-11 RX ADMIN — CLOPIDOGREL BISULFATE 75 MILLIGRAM(S): 75 TABLET, FILM COATED ORAL at 12:12

## 2017-03-11 RX ADMIN — Medication 50 MILLIGRAM(S): at 05:17

## 2017-03-11 RX ADMIN — Medication 50 MILLIGRAM(S): at 17:51

## 2017-03-11 RX ADMIN — Medication 6: at 17:50

## 2017-03-11 RX ADMIN — AMLODIPINE BESYLATE 2.5 MILLIGRAM(S): 2.5 TABLET ORAL at 05:17

## 2017-03-11 RX ADMIN — OXYCODONE HYDROCHLORIDE 10 MILLIGRAM(S): 5 TABLET ORAL at 11:00

## 2017-03-11 RX ADMIN — OXYCODONE HYDROCHLORIDE 10 MILLIGRAM(S): 5 TABLET ORAL at 21:04

## 2017-03-11 RX ADMIN — Medication 100 MILLIGRAM(S): at 14:00

## 2017-03-11 RX ADMIN — Medication 6: at 08:23

## 2017-03-11 RX ADMIN — LIDOCAINE 1 PATCH: 4 CREAM TOPICAL at 14:00

## 2017-03-11 RX ADMIN — GABAPENTIN 600 MILLIGRAM(S): 400 CAPSULE ORAL at 21:09

## 2017-03-11 RX ADMIN — Medication 1 MILLIGRAM(S): at 12:12

## 2017-03-11 RX ADMIN — BACITRACIN AND POLYMYXIN B SULFATE 1 APPLICATION(S): 500; 10000 OINTMENT TOPICAL at 14:00

## 2017-03-11 RX ADMIN — ATORVASTATIN CALCIUM 40 MILLIGRAM(S): 80 TABLET, FILM COATED ORAL at 21:09

## 2017-03-11 RX ADMIN — OXYCODONE HYDROCHLORIDE 5 MILLIGRAM(S): 5 TABLET ORAL at 07:18

## 2017-03-11 RX ADMIN — SENNA PLUS 2 TABLET(S): 8.6 TABLET ORAL at 21:09

## 2017-03-11 RX ADMIN — OXYCODONE HYDROCHLORIDE 5 MILLIGRAM(S): 5 TABLET ORAL at 15:20

## 2017-03-11 RX ADMIN — SODIUM CHLORIDE 75 MILLILITER(S): 9 INJECTION INTRAMUSCULAR; INTRAVENOUS; SUBCUTANEOUS at 14:06

## 2017-03-11 RX ADMIN — OXYCODONE HYDROCHLORIDE 5 MILLIGRAM(S): 5 TABLET ORAL at 14:50

## 2017-03-11 RX ADMIN — OXYCODONE HYDROCHLORIDE 5 MILLIGRAM(S): 5 TABLET ORAL at 05:18

## 2017-03-11 RX ADMIN — Medication 75 MICROGRAM(S): at 05:17

## 2017-03-11 RX ADMIN — AMLODIPINE BESYLATE 5 MILLIGRAM(S): 2.5 TABLET ORAL at 12:12

## 2017-03-11 RX ADMIN — OXYCODONE HYDROCHLORIDE 10 MILLIGRAM(S): 5 TABLET ORAL at 10:42

## 2017-03-11 RX ADMIN — MORPHINE SULFATE 2 MILLIGRAM(S): 50 CAPSULE, EXTENDED RELEASE ORAL at 15:44

## 2017-03-11 RX ADMIN — PANTOPRAZOLE SODIUM 40 MILLIGRAM(S): 20 TABLET, DELAYED RELEASE ORAL at 08:23

## 2017-03-11 RX ADMIN — ENOXAPARIN SODIUM 80 MILLIGRAM(S): 100 INJECTION SUBCUTANEOUS at 17:50

## 2017-03-11 RX ADMIN — OXYCODONE HYDROCHLORIDE 10 MILLIGRAM(S): 5 TABLET ORAL at 22:49

## 2017-03-11 RX ADMIN — Medication 1 TABLET(S): at 12:12

## 2017-03-11 RX ADMIN — MORPHINE SULFATE 2 MILLIGRAM(S): 50 CAPSULE, EXTENDED RELEASE ORAL at 16:00

## 2017-03-11 RX ADMIN — INSULIN GLARGINE 10 UNIT(S): 100 INJECTION, SOLUTION SUBCUTANEOUS at 21:09

## 2017-03-11 RX ADMIN — POLYETHYLENE GLYCOL 3350 17 GRAM(S): 17 POWDER, FOR SOLUTION ORAL at 12:12

## 2017-03-11 RX ADMIN — Medication 2: at 12:11

## 2017-03-11 RX ADMIN — PIPERACILLIN AND TAZOBACTAM 25 GRAM(S): 4; .5 INJECTION, POWDER, LYOPHILIZED, FOR SOLUTION INTRAVENOUS at 05:18

## 2017-03-11 RX ADMIN — Medication 0.5 MILLIGRAM(S): at 15:44

## 2017-03-11 NOTE — PROGRESS NOTE ADULT - PROBLEM SELECTOR PLAN 2
no evidence of acute cellulitis, monitor for fever/leukocytosis  Hold full AC with Eliquis, switch to therapeutic lovenox  c/w plavix/ statin  Pain meds, bowel regimen

## 2017-03-11 NOTE — PROGRESS NOTE ADULT - ASSESSMENT
85 yr old female with multiple medical problems CAD s/p stents, diabetes, hypertension, hyperlipidemia, anxiety, significant PVD s/p multiple stents, paroxysmal atrial fibrillation admitted for hypertensive urgency post LE angiogram and cellulitis. She will need BKA. =

## 2017-03-11 NOTE — PROGRESS NOTE ADULT - SUBJECTIVE AND OBJECTIVE BOX
seen for HTN and PVD    complaining of LLE pain but improved on oxycodone  + constipation  no cp/abdomina pain/sob  ROS otherwise negative.     MEDICATIONS  (STANDING):  BACItracin/polymyxin B Ointment 1Application(s) Topical daily  gabapentin 600milliGRAM(s) Oral at bedtime  atorvastatin 40milliGRAM(s) Oral at bedtime  clopidogrel Tablet 75milliGRAM(s) Oral daily  metoprolol 50milliGRAM(s) Oral every 12 hours  pantoprazole    Tablet 40milliGRAM(s) Oral before breakfast  levothyroxine 75MICROGram(s) Oral daily  folic acid 1milliGRAM(s) Oral daily  multivitamin/minerals 1Tablet(s) Oral daily  insulin glargine Injectable (LANTUS) 10Unit(s) SubCutaneous at bedtime  insulin lispro (HumaLOG) corrective regimen sliding scale  SubCutaneous three times a day before meals  dextrose 5%. 1000milliLiter(s) IV Continuous <Continuous>  dextrose 50% Injectable 12.5Gram(s) IV Push once  dextrose 50% Injectable 25Gram(s) IV Push once  dextrose 50% Injectable 25Gram(s) IV Push once  amLODIPine   Tablet 10milliGRAM(s) Oral daily  enoxaparin Injectable 80milliGRAM(s) SubCutaneous two times a day  senna 2Tablet(s) Oral at bedtime  docusate sodium 100milliGRAM(s) Oral three times a day  polyethylene glycol 3350 17Gram(s) Oral daily  sodium chloride 0.9%. 500milliLiter(s) IV Continuous <Continuous>  amLODIPine   Tablet 5milliGRAM(s) Oral once    MEDICATIONS  (PRN):  acetaminophen   Tablet 650milliGRAM(s) Oral every 6 hours PRN For Temp greater than 38 C (100.4 F)  acetaminophen   Tablet. 650milliGRAM(s) Oral every 6 hours PRN Moderate Pain (4 - 6)  ALPRAZolam 0.5milliGRAM(s) Oral three times a day PRN anxiety  dextrose Gel 1Dose(s) Oral once PRN Blood Glucose LESS THAN 70 milliGRAM(s)/deciliter  glucagon  Injectable 1milliGRAM(s) IntraMuscular once PRN Glucose LESS THAN 70 milligrams/deciliter  oxyCODONE IR 5milliGRAM(s) Oral four times a day PRN mild to moderate pain  oxyCODONE IR 10milliGRAM(s) Oral three times a day PRN Severe Pain (7 - 10)  magnesium hydroxide Suspension 30milliLiter(s) Oral daily PRN Constipation      Allergies    Benadryl (Other)  Demerol HCl (Other)    Intolerances  Vital Signs Last 24 Hrs  T(C): 36.5, Max: 37 (03-11 @ 05:15)  T(F): 97.7, Max: 98.6 (03-11 @ 05:15)  HR: 71 (71 - 100)  BP: 164/68 (148/50 - 181/89)  BP(mean): --  RR: 16 (16 - 20)  SpO2: 100% (96% - 100%)    PHYSICAL EXAM:    GENERAL: NAD, seems sedated  CHEST/LUNG: ctab  HEART: Regular rate and rhythm; S1 S2; no murmurs noted  ABDOMEN: Soft, Nontender, Nondistended; Bowel sounds present  EXTREMITIES: no edema.  Left foot: erythematous with TTP  NERVOUS SYSTEM:  Alert & Oriented X3 nonfocal exam    LABS:                        9.3    7.5   )-----------( 228      ( 11 Mar 2017 05:04 )             29.1     11 Mar 2017 05:04    134    |  96     |  25.0   ----------------------------<  288    4.2     |  27.0   |  1.30     Ca    8.7        11 Mar 2017 05:04  Phos  4.0       11 Mar 2017 05:04  Mg     1.8       11 Mar 2017 05:04      PT/INR - ( 09 Mar 2017 19:02 )   PT: 11.0 sec;   INR: 1.00 ratio               CAPILLARY BLOOD GLUCOSE  274 (11 Mar 2017 08:06)  148 (10 Mar 2017 21:43)  265 (10 Mar 2017 17:27)  191 (10 Mar 2017 12:09)        RADIOLOGY & ADDITIONAL TESTS:

## 2017-03-11 NOTE — PROGRESS NOTE ADULT - SUBJECTIVE AND OBJECTIVE BOX
Patient resting comfortably. Somnolent after pain meds but responds appropriately.  Vss. Afebrile.    Tenderness in left lower leg which is cool to the touch  Scheduled for left BKA Tuesday with Dr. Mckeon

## 2017-03-12 LAB
ANION GAP SERPL CALC-SCNC: 13 MMOL/L — SIGNIFICANT CHANGE UP (ref 5–17)
BUN SERPL-MCNC: 21 MG/DL — HIGH (ref 8–20)
CALCIUM SERPL-MCNC: 8.9 MG/DL — SIGNIFICANT CHANGE UP (ref 8.6–10.2)
CHLORIDE SERPL-SCNC: 100 MMOL/L — SIGNIFICANT CHANGE UP (ref 98–107)
CO2 SERPL-SCNC: 24 MMOL/L — SIGNIFICANT CHANGE UP (ref 22–29)
CREAT SERPL-MCNC: 1.21 MG/DL — SIGNIFICANT CHANGE UP (ref 0.5–1.3)
GLUCOSE SERPL-MCNC: 195 MG/DL — HIGH (ref 70–115)
HCT VFR BLD CALC: 27.8 % — LOW (ref 37–47)
HGB BLD-MCNC: 9 G/DL — LOW (ref 12–16)
MCHC RBC-ENTMCNC: 28 PG — SIGNIFICANT CHANGE UP (ref 27–31)
MCHC RBC-ENTMCNC: 32.4 G/DL — SIGNIFICANT CHANGE UP (ref 32–36)
MCV RBC AUTO: 86.6 FL — SIGNIFICANT CHANGE UP (ref 81–99)
PLATELET # BLD AUTO: 224 K/UL — SIGNIFICANT CHANGE UP (ref 150–400)
POTASSIUM SERPL-MCNC: 4 MMOL/L — SIGNIFICANT CHANGE UP (ref 3.5–5.3)
POTASSIUM SERPL-SCNC: 4 MMOL/L — SIGNIFICANT CHANGE UP (ref 3.5–5.3)
RBC # BLD: 3.21 M/UL — LOW (ref 4.4–5.2)
RBC # FLD: 14.5 % — SIGNIFICANT CHANGE UP (ref 11–15.6)
SODIUM SERPL-SCNC: 137 MMOL/L — SIGNIFICANT CHANGE UP (ref 135–145)
WBC # BLD: 7.8 K/UL — SIGNIFICANT CHANGE UP (ref 4.8–10.8)
WBC # FLD AUTO: 7.8 K/UL — SIGNIFICANT CHANGE UP (ref 4.8–10.8)

## 2017-03-12 PROCEDURE — 99232 SBSQ HOSP IP/OBS MODERATE 35: CPT

## 2017-03-12 RX ORDER — TRAMADOL HYDROCHLORIDE 50 MG/1
25 TABLET ORAL EVERY 6 HOURS
Qty: 0 | Refills: 0 | Status: DISCONTINUED | OUTPATIENT
Start: 2017-03-12 | End: 2017-03-14

## 2017-03-12 RX ORDER — OXYCODONE HYDROCHLORIDE 5 MG/1
5 TABLET ORAL EVERY 4 HOURS
Qty: 0 | Refills: 0 | Status: DISCONTINUED | OUTPATIENT
Start: 2017-03-12 | End: 2017-03-14

## 2017-03-12 RX ADMIN — Medication 75 MICROGRAM(S): at 06:10

## 2017-03-12 RX ADMIN — PANTOPRAZOLE SODIUM 40 MILLIGRAM(S): 20 TABLET, DELAYED RELEASE ORAL at 06:10

## 2017-03-12 RX ADMIN — INSULIN GLARGINE 10 UNIT(S): 100 INJECTION, SOLUTION SUBCUTANEOUS at 22:47

## 2017-03-12 RX ADMIN — Medication 4: at 08:41

## 2017-03-12 RX ADMIN — LIDOCAINE 1 PATCH: 4 CREAM TOPICAL at 11:48

## 2017-03-12 RX ADMIN — AMLODIPINE BESYLATE 10 MILLIGRAM(S): 2.5 TABLET ORAL at 06:10

## 2017-03-12 RX ADMIN — BACITRACIN AND POLYMYXIN B SULFATE 1 APPLICATION(S): 500; 10000 OINTMENT TOPICAL at 11:51

## 2017-03-12 RX ADMIN — MORPHINE SULFATE 2 MILLIGRAM(S): 50 CAPSULE, EXTENDED RELEASE ORAL at 04:48

## 2017-03-12 RX ADMIN — OXYCODONE HYDROCHLORIDE 5 MILLIGRAM(S): 5 TABLET ORAL at 16:25

## 2017-03-12 RX ADMIN — ENOXAPARIN SODIUM 80 MILLIGRAM(S): 100 INJECTION SUBCUTANEOUS at 06:10

## 2017-03-12 RX ADMIN — Medication 1 MILLIGRAM(S): at 11:48

## 2017-03-12 RX ADMIN — Medication 4: at 11:48

## 2017-03-12 RX ADMIN — Medication 4: at 17:35

## 2017-03-12 RX ADMIN — Medication 0.5 MILLIGRAM(S): at 15:13

## 2017-03-12 RX ADMIN — ENOXAPARIN SODIUM 80 MILLIGRAM(S): 100 INJECTION SUBCUTANEOUS at 17:35

## 2017-03-12 RX ADMIN — Medication 50 MILLIGRAM(S): at 17:35

## 2017-03-12 RX ADMIN — POLYETHYLENE GLYCOL 3350 17 GRAM(S): 17 POWDER, FOR SOLUTION ORAL at 11:47

## 2017-03-12 RX ADMIN — Medication 100 MILLIGRAM(S): at 06:10

## 2017-03-12 RX ADMIN — MORPHINE SULFATE 2 MILLIGRAM(S): 50 CAPSULE, EXTENDED RELEASE ORAL at 03:46

## 2017-03-12 RX ADMIN — OXYCODONE HYDROCHLORIDE 5 MILLIGRAM(S): 5 TABLET ORAL at 15:13

## 2017-03-12 RX ADMIN — Medication 100 MILLIGRAM(S): at 13:51

## 2017-03-12 RX ADMIN — ATORVASTATIN CALCIUM 40 MILLIGRAM(S): 80 TABLET, FILM COATED ORAL at 22:48

## 2017-03-12 RX ADMIN — Medication 50 MILLIGRAM(S): at 06:10

## 2017-03-12 RX ADMIN — Medication 1 TABLET(S): at 11:48

## 2017-03-12 RX ADMIN — GABAPENTIN 600 MILLIGRAM(S): 400 CAPSULE ORAL at 22:48

## 2017-03-12 RX ADMIN — LIDOCAINE 1 PATCH: 4 CREAM TOPICAL at 03:43

## 2017-03-12 RX ADMIN — Medication 0.5 MILLIGRAM(S): at 22:47

## 2017-03-12 RX ADMIN — Medication 100 MILLIGRAM(S): at 22:48

## 2017-03-12 RX ADMIN — SENNA PLUS 2 TABLET(S): 8.6 TABLET ORAL at 22:48

## 2017-03-12 RX ADMIN — CLOPIDOGREL BISULFATE 75 MILLIGRAM(S): 75 TABLET, FILM COATED ORAL at 11:49

## 2017-03-12 NOTE — PROGRESS NOTE ADULT - ASSESSMENT
85 yr old female with multiple medical problems CAD s/p stents, diabetes, hypertension, hyperlipidemia, anxiety, significant PVD s/p multiple stents, paroxysmal atrial fibrillation admitted for hypertensive urgency post LE angiogram and cellulitis. BKA tentatively 3/14/17

## 2017-03-12 NOTE — PROGRESS NOTE ADULT - SUBJECTIVE AND OBJECTIVE BOX
seen for PVD    somnolent due to pain meds  + constipation  no cp/sob/abdominal pain  ROS otherwise negative     MEDICATIONS  (STANDING):  BACItracin/polymyxin B Ointment 1Application(s) Topical daily  gabapentin 600milliGRAM(s) Oral at bedtime  atorvastatin 40milliGRAM(s) Oral at bedtime  clopidogrel Tablet 75milliGRAM(s) Oral daily  metoprolol 50milliGRAM(s) Oral every 12 hours  pantoprazole    Tablet 40milliGRAM(s) Oral before breakfast  levothyroxine 75MICROGram(s) Oral daily  folic acid 1milliGRAM(s) Oral daily  multivitamin/minerals 1Tablet(s) Oral daily  insulin glargine Injectable (LANTUS) 10Unit(s) SubCutaneous at bedtime  insulin lispro (HumaLOG) corrective regimen sliding scale  SubCutaneous three times a day before meals  dextrose 5%. 1000milliLiter(s) IV Continuous <Continuous>  dextrose 50% Injectable 12.5Gram(s) IV Push once  dextrose 50% Injectable 25Gram(s) IV Push once  dextrose 50% Injectable 25Gram(s) IV Push once  amLODIPine   Tablet 10milliGRAM(s) Oral daily  enoxaparin Injectable 80milliGRAM(s) SubCutaneous two times a day  senna 2Tablet(s) Oral at bedtime  docusate sodium 100milliGRAM(s) Oral three times a day  polyethylene glycol 3350 17Gram(s) Oral daily  lidocaine   Patch 1Patch Transdermal daily    MEDICATIONS  (PRN):  acetaminophen   Tablet 650milliGRAM(s) Oral every 6 hours PRN For Temp greater than 38 C (100.4 F)  acetaminophen   Tablet. 650milliGRAM(s) Oral every 6 hours PRN Moderate Pain (4 - 6)  ALPRAZolam 0.5milliGRAM(s) Oral three times a day PRN anxiety  dextrose Gel 1Dose(s) Oral once PRN Blood Glucose LESS THAN 70 milliGRAM(s)/deciliter  glucagon  Injectable 1milliGRAM(s) IntraMuscular once PRN Glucose LESS THAN 70 milligrams/deciliter  magnesium hydroxide Suspension 30milliLiter(s) Oral daily PRN Constipation  morphine  - Injectable 2milliGRAM(s) IV Push every 4 hours PRN breakthrough pain  traMADol 25milliGRAM(s) Oral every 6 hours PRN mod pain  oxyCODONE IR 5milliGRAM(s) Oral every 4 hours PRN Severe Pain (7 - 10)      Allergies    Benadryl (Other)  Demerol HCl (Other)    Intolerances    Vital Signs Last 24 Hrs  T(C): 37.4, Max: 37.7 (03-12 @ 09:25)  T(F): 99.3, Max: 99.9 (03-12 @ 09:25)  HR: 91 (91 - 98)  BP: 142/60 (140/54 - 172/60)  BP(mean): --  RR: 16 (15 - 17)  SpO2: 100% (97% - 100%)    PHYSICAL EXAM:    GENERAL: NAD  CHEST/LUNG: Clear to percussion bilaterally  HEART: Regular rate and rhythm  ABDOMEN: Soft, Nontender, Nondistended; Bowel sounds present  EXTREMITIES:  no edema. left foot : erythema distally cool to touch  NERVOUS SYSTEM:  Alert & Oriented X3, nonfocal  PSYCH: somnolent    LABS:                        9.0    7.8   )-----------( 224      ( 12 Mar 2017 05:14 )             27.8     12 Mar 2017 05:10    137    |  100    |  21.0   ----------------------------<  195    4.0     |  24.0   |  1.21     Ca    8.9        12 Mar 2017 05:10  Phos  4.0       11 Mar 2017 05:04  Mg     1.8       11 Mar 2017 05:04            CAPILLARY BLOOD GLUCOSE  212 (12 Mar 2017 11:42)  231 (12 Mar 2017 08:27)  194 (11 Mar 2017 20:57)  297 (11 Mar 2017 17:47)        RADIOLOGY & ADDITIONAL TESTS:

## 2017-03-13 ENCOUNTER — RESULT REVIEW (OUTPATIENT)
Age: 82
End: 2017-03-13

## 2017-03-13 PROCEDURE — 99232 SBSQ HOSP IP/OBS MODERATE 35: CPT

## 2017-03-13 PROCEDURE — 93010 ELECTROCARDIOGRAM REPORT: CPT

## 2017-03-13 RX ORDER — SODIUM CHLORIDE 9 MG/ML
1000 INJECTION INTRAMUSCULAR; INTRAVENOUS; SUBCUTANEOUS
Qty: 0 | Refills: 0 | Status: DISCONTINUED | OUTPATIENT
Start: 2017-03-14 | End: 2017-03-14

## 2017-03-13 RX ADMIN — LIDOCAINE 1 PATCH: 4 CREAM TOPICAL at 23:00

## 2017-03-13 RX ADMIN — Medication 1 TABLET(S): at 11:28

## 2017-03-13 RX ADMIN — LIDOCAINE 1 PATCH: 4 CREAM TOPICAL at 02:00

## 2017-03-13 RX ADMIN — BACITRACIN AND POLYMYXIN B SULFATE 1 APPLICATION(S): 500; 10000 OINTMENT TOPICAL at 14:47

## 2017-03-13 RX ADMIN — MAGNESIUM HYDROXIDE 30 MILLILITER(S): 400 TABLET, CHEWABLE ORAL at 14:47

## 2017-03-13 RX ADMIN — PANTOPRAZOLE SODIUM 40 MILLIGRAM(S): 20 TABLET, DELAYED RELEASE ORAL at 06:00

## 2017-03-13 RX ADMIN — Medication 10: at 17:51

## 2017-03-13 RX ADMIN — Medication 75 MICROGRAM(S): at 06:00

## 2017-03-13 RX ADMIN — Medication 100 MILLIGRAM(S): at 13:20

## 2017-03-13 RX ADMIN — Medication 100 MILLIGRAM(S): at 06:00

## 2017-03-13 RX ADMIN — Medication 50 MILLIGRAM(S): at 04:28

## 2017-03-13 RX ADMIN — ATORVASTATIN CALCIUM 40 MILLIGRAM(S): 80 TABLET, FILM COATED ORAL at 22:30

## 2017-03-13 RX ADMIN — OXYCODONE HYDROCHLORIDE 5 MILLIGRAM(S): 5 TABLET ORAL at 19:01

## 2017-03-13 RX ADMIN — POLYETHYLENE GLYCOL 3350 17 GRAM(S): 17 POWDER, FOR SOLUTION ORAL at 11:28

## 2017-03-13 RX ADMIN — AMLODIPINE BESYLATE 10 MILLIGRAM(S): 2.5 TABLET ORAL at 06:00

## 2017-03-13 RX ADMIN — Medication 8: at 12:15

## 2017-03-13 RX ADMIN — SENNA PLUS 2 TABLET(S): 8.6 TABLET ORAL at 22:31

## 2017-03-13 RX ADMIN — Medication 0.5 MILLIGRAM(S): at 22:30

## 2017-03-13 RX ADMIN — INSULIN GLARGINE 10 UNIT(S): 100 INJECTION, SOLUTION SUBCUTANEOUS at 22:30

## 2017-03-13 RX ADMIN — OXYCODONE HYDROCHLORIDE 5 MILLIGRAM(S): 5 TABLET ORAL at 06:32

## 2017-03-13 RX ADMIN — CLOPIDOGREL BISULFATE 75 MILLIGRAM(S): 75 TABLET, FILM COATED ORAL at 11:28

## 2017-03-13 RX ADMIN — Medication 50 MILLIGRAM(S): at 17:51

## 2017-03-13 RX ADMIN — GABAPENTIN 600 MILLIGRAM(S): 400 CAPSULE ORAL at 22:30

## 2017-03-13 RX ADMIN — Medication 1 MILLIGRAM(S): at 11:28

## 2017-03-13 RX ADMIN — LIDOCAINE 1 PATCH: 4 CREAM TOPICAL at 11:28

## 2017-03-13 RX ADMIN — ENOXAPARIN SODIUM 80 MILLIGRAM(S): 100 INJECTION SUBCUTANEOUS at 06:01

## 2017-03-13 RX ADMIN — Medication 4: at 08:32

## 2017-03-13 RX ADMIN — OXYCODONE HYDROCHLORIDE 5 MILLIGRAM(S): 5 TABLET ORAL at 07:00

## 2017-03-13 RX ADMIN — Medication 100 MILLIGRAM(S): at 22:30

## 2017-03-13 NOTE — PROGRESS NOTE ADULT - PROBLEM SELECTOR PLAN 1
OR in AM for L BKA  Medical/Cardiac clearance appreciated  Hold AC  NPO after MN  2U PRBC on call to th OR  Risks and benefits of the procedure explained to the patient and daughters including, but not limited to bleeding, infection, need for higher amputation. Pt understands and is agreeable to proceed

## 2017-03-13 NOTE — PROGRESS NOTE ADULT - SUBJECTIVE AND OBJECTIVE BOX
Patient is a 85y old  Female who presents with a chief complaint of bilateral leg pain (11 Mar 2017 18:12)  She is scheduled to have a LEFT BELOW THE KNEE AMPUTATION.      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 (treated with "miscargin")    PAST SURGICAL HISTORY:  History of cholecystectomy  H/O angioplasty  Hx of 2 Cardiac stents placed 15 years ago  Hx 0f 5 C-sections  Hx of 7 angiograms of her legs 4 on the left and 3 on the right    MEDICATIONS  (STANDING):  BACItracin/polymyxin B Ointment 1Application(s) Topical daily  gabapentin 600milliGRAM(s) Oral at bedtime  atorvastatin 40milliGRAM(s) Oral at bedtime  clopidogrel Tablet 75milliGRAM(s) Oral daily  metoprolol 50milliGRAM(s) Oral every 12 hours  pantoprazole    Tablet 40milliGRAM(s) Oral before breakfast  levothyroxine 75MICROGram(s) Oral daily  folic acid 1milliGRAM(s) Oral daily  multivitamin/minerals 1Tablet(s) Oral daily  insulin glargine Injectable (LANTUS) 10Unit(s) SubCutaneous at bedtime  insulin lispro (HumaLOG) corrective regimen sliding scale  SubCutaneous three times a day before meals  dextrose 5%. 1000milliLiter(s) IV Continuous <Continuous>  dextrose 50% Injectable 12.5Gram(s) IV Push once  dextrose 50% Injectable 25Gram(s) IV Push once  dextrose 50% Injectable 25Gram(s) IV Push once  amLODIPine   Tablet 10milliGRAM(s) Oral daily  senna 2Tablet(s) Oral at bedtime  docusate sodium 100milliGRAM(s) Oral three times a day  polyethylene glycol 3350 17Gram(s) Oral daily  lidocaine   Patch 1Patch Transdermal daily    MEDICATIONS  (PRN):  acetaminophen   Tablet 650milliGRAM(s) Oral every 6 hours PRN For Temp greater than 38 C (100.4 F)  acetaminophen   Tablet. 650milliGRAM(s) Oral every 6 hours PRN Moderate Pain (4 - 6)  ALPRAZolam 0.5milliGRAM(s) Oral three times a day PRN anxiety  dextrose Gel 1Dose(s) Oral once PRN Blood Glucose LESS THAN 70 milliGRAM(s)/deciliter  glucagon  Injectable 1milliGRAM(s) IntraMuscular once PRN Glucose LESS THAN 70 milligrams/deciliter  magnesium hydroxide Suspension 30milliLiter(s) Oral daily PRN Constipation  morphine  - Injectable 2milliGRAM(s) IV Push every 4 hours PRN breakthrough pain  traMADol 25milliGRAM(s) Oral every 6 hours PRN mod pain  oxyCODONE IR 5milliGRAM(s) Oral every 4 hours PRN Severe Pain (7 - 10)      Allergies:  Benadryl (Made her hyperactive)                 Demerol HCl (Nausea & vomiting)    SOCIAL HISTORY:  The patient quit smoking 30 years ago and smoked 1/3 ppd x 16 years.  She would                                 take a glass of wine rarely and never used illicit drugs.                        9.0    7.8   )-----------( 224      ( 12 Mar 2017 05:14 )             27.8       PT/INR - ( 09 Mar 2017 19:02 )   PT: 11.0 sec;   INR: 1.00 ratio        12 Mar 2017 05:10    137    |  100    |  21.0   ----------------------------<  195    4.0     |  24.0   |  1.21     Ca    8.9        12 Mar 2017 05:10      EKG:   3/12/2017   Normal sinus rhythm  Possible Left atrial enlargement  Possible Inferior infarct , age undetermined  Abnormal ECG    TT ECHO:ECHO TRANSTHORACIC  -   Mar 10 2017    Summary:   1. Normal biventricular systolic function. Left ventricular ejection        fraction, by visual estimation, is 60 to 65%.   2. There is mild concentric left ventricular hypertrophy.   3. Spectral Doppler shows impaired relaxation pattern of left        ventricular myocardial filling (Grade I diastolic dysfunction).   4. Mild tricuspidregurgitation.   5. Estimated PA systolic pressure = 32 mmHg (presuming RA pressure = 3       mmHg).   6. Aortic sclerosis without evidence for stenosis.    ASA # = 3   Mallampati # =  3  (She has implants both upper and lower.)

## 2017-03-13 NOTE — PROGRESS NOTE ADULT - SUBJECTIVE AND OBJECTIVE BOX
84 y/o female with advanced, non- reconstructible PAD and LLE tissue loss. Pt c/o LLE rest pain. No acute events.     Vital Signs Last 24 Hrs  T(C): 36.8, Max: 37.7 (03-12 @ 09:25)  T(F): 98.2, Max: 99.9 (03-12 @ 09:25)  HR: 77 (77 - 144)  BP: 110/40 (110/40 - 154/58)  BP(mean): --  RR: 16 (16 - 17)  SpO2: 96% (96% - 100%)    PE:  L foot with 2nd/3rd toe interdigital ulcer, plantar and lateral leg superficial ulceration, erythema improved.                          9.0    7.8   )-----------( 224      ( 12 Mar 2017 05:14 )             27.8     12 Mar 2017 05:10    137    |  100    |  21.0   ----------------------------<  195    4.0     |  24.0   |  1.21     Ca    8.9        12 Mar 2017 05:10

## 2017-03-13 NOTE — PROGRESS NOTE ADULT - PROBLEM SELECTOR PLAN 2
no evidence of acute cellulitis, monitor for fever/leukocytosis  Hold full AC with Eliquis, switch to therapeutic lovenox  c/w plavix/ statin  Pain meds, bowel regimen  BKA in am per vascular surgery

## 2017-03-13 NOTE — PROGRESS NOTE ADULT - SUBJECTIVE AND OBJECTIVE BOX
seen for PVD    no pain but controlled on pain meds when presents  no cp/sob  ROS otherwise negative     MEDICATIONS  (STANDING):  BACItracin/polymyxin B Ointment 1Application(s) Topical daily  gabapentin 600milliGRAM(s) Oral at bedtime  atorvastatin 40milliGRAM(s) Oral at bedtime  clopidogrel Tablet 75milliGRAM(s) Oral daily  metoprolol 50milliGRAM(s) Oral every 12 hours  pantoprazole    Tablet 40milliGRAM(s) Oral before breakfast  levothyroxine 75MICROGram(s) Oral daily  folic acid 1milliGRAM(s) Oral daily  multivitamin/minerals 1Tablet(s) Oral daily  insulin glargine Injectable (LANTUS) 10Unit(s) SubCutaneous at bedtime  insulin lispro (HumaLOG) corrective regimen sliding scale  SubCutaneous three times a day before meals  dextrose 5%. 1000milliLiter(s) IV Continuous <Continuous>  dextrose 50% Injectable 12.5Gram(s) IV Push once  dextrose 50% Injectable 25Gram(s) IV Push once  dextrose 50% Injectable 25Gram(s) IV Push once  amLODIPine   Tablet 10milliGRAM(s) Oral daily  senna 2Tablet(s) Oral at bedtime  docusate sodium 100milliGRAM(s) Oral three times a day  polyethylene glycol 3350 17Gram(s) Oral daily  lidocaine   Patch 1Patch Transdermal daily    MEDICATIONS  (PRN):  acetaminophen   Tablet 650milliGRAM(s) Oral every 6 hours PRN For Temp greater than 38 C (100.4 F)  acetaminophen   Tablet. 650milliGRAM(s) Oral every 6 hours PRN Moderate Pain (4 - 6)  ALPRAZolam 0.5milliGRAM(s) Oral three times a day PRN anxiety  dextrose Gel 1Dose(s) Oral once PRN Blood Glucose LESS THAN 70 milliGRAM(s)/deciliter  glucagon  Injectable 1milliGRAM(s) IntraMuscular once PRN Glucose LESS THAN 70 milligrams/deciliter  magnesium hydroxide Suspension 30milliLiter(s) Oral daily PRN Constipation  morphine  - Injectable 2milliGRAM(s) IV Push every 4 hours PRN breakthrough pain  traMADol 25milliGRAM(s) Oral every 6 hours PRN mod pain  oxyCODONE IR 5milliGRAM(s) Oral every 4 hours PRN Severe Pain (7 - 10)      Allergies    Benadryl (Other)  Demerol HCl (Other)    Intolerances    Vital Signs Last 24 Hrs  T(C): 36.8, Max: 37.4 (03-12 @ 11:42)  T(F): 98.2, Max: 99.3 (03-12 @ 11:42)  HR: 77 (77 - 144)  BP: 110/40 (110/40 - 154/58)  BP(mean): --  RR: 16 (16 - 17)  SpO2: 96% (96% - 100%)    PHYSICAL EXAM:    GENERAL: NAD  CHEST/LUNG: Clear to percussion bilaterally  HEART: irreg irreg   ABDOMEN: Soft, Nontender, Nondistended; Bowel sounds present  EXTREMITIES: no edema   left foot erythema/cool to touch  NERVOUS SYSTEM:  Alert & Oriented X3, nonfocal  PSYCH: normal mood, appropriate response.    LABS:                        9.0    7.8   )-----------( 224      ( 12 Mar 2017 05:14 )             27.8     12 Mar 2017 05:10    137    |  100    |  21.0   ----------------------------<  195    4.0     |  24.0   |  1.21     Ca    8.9        12 Mar 2017 05:10            CAPILLARY BLOOD GLUCOSE  223 (13 Mar 2017 08:26)  262 (12 Mar 2017 22:37)  212 (12 Mar 2017 11:42)        RADIOLOGY & ADDITIONAL TESTS:

## 2017-03-14 LAB
BLD GP AB SCN SERPL QL: SIGNIFICANT CHANGE UP
TYPE + AB SCN PNL BLD: SIGNIFICANT CHANGE UP

## 2017-03-14 PROCEDURE — 88307 TISSUE EXAM BY PATHOLOGIST: CPT | Mod: 26

## 2017-03-14 PROCEDURE — 88311 DECALCIFY TISSUE: CPT | Mod: 26

## 2017-03-14 PROCEDURE — 27880 AMPUTATION OF LOWER LEG: CPT | Mod: AS,LT

## 2017-03-14 PROCEDURE — 99232 SBSQ HOSP IP/OBS MODERATE 35: CPT

## 2017-03-14 RX ORDER — METOPROLOL TARTRATE 50 MG
50 TABLET ORAL EVERY 12 HOURS
Qty: 0 | Refills: 0 | Status: DISCONTINUED | OUTPATIENT
Start: 2017-03-14 | End: 2017-03-29

## 2017-03-14 RX ORDER — FOLIC ACID 0.8 MG
1 TABLET ORAL DAILY
Qty: 0 | Refills: 0 | Status: DISCONTINUED | OUTPATIENT
Start: 2017-03-14 | End: 2017-03-29

## 2017-03-14 RX ORDER — DOCUSATE SODIUM 100 MG
100 CAPSULE ORAL THREE TIMES A DAY
Qty: 0 | Refills: 0 | Status: DISCONTINUED | OUTPATIENT
Start: 2017-03-14 | End: 2017-03-22

## 2017-03-14 RX ORDER — SODIUM CHLORIDE 9 MG/ML
1000 INJECTION, SOLUTION INTRAVENOUS
Qty: 0 | Refills: 0 | Status: DISCONTINUED | OUTPATIENT
Start: 2017-03-14 | End: 2017-03-14

## 2017-03-14 RX ORDER — ACETAMINOPHEN 500 MG
650 TABLET ORAL EVERY 6 HOURS
Qty: 0 | Refills: 0 | Status: DISCONTINUED | OUTPATIENT
Start: 2017-03-14 | End: 2017-03-25

## 2017-03-14 RX ORDER — ALPRAZOLAM 0.25 MG
0.5 TABLET ORAL THREE TIMES A DAY
Qty: 0 | Refills: 0 | Status: DISCONTINUED | OUTPATIENT
Start: 2017-03-14 | End: 2017-03-21

## 2017-03-14 RX ORDER — MORPHINE SULFATE 50 MG/1
2 CAPSULE, EXTENDED RELEASE ORAL EVERY 4 HOURS
Qty: 0 | Refills: 0 | Status: DISCONTINUED | OUTPATIENT
Start: 2017-03-14 | End: 2017-03-17

## 2017-03-14 RX ORDER — INSULIN GLARGINE 100 [IU]/ML
10 INJECTION, SOLUTION SUBCUTANEOUS AT BEDTIME
Qty: 0 | Refills: 0 | Status: DISCONTINUED | OUTPATIENT
Start: 2017-03-14 | End: 2017-03-17

## 2017-03-14 RX ORDER — OXYCODONE HYDROCHLORIDE 5 MG/1
10 TABLET ORAL
Qty: 0 | Refills: 0 | Status: DISCONTINUED | OUTPATIENT
Start: 2017-03-14 | End: 2017-03-15

## 2017-03-14 RX ORDER — MULTIVIT-MIN/FERROUS GLUCONATE 9 MG/15 ML
1 LIQUID (ML) ORAL DAILY
Qty: 0 | Refills: 0 | Status: DISCONTINUED | OUTPATIENT
Start: 2017-03-14 | End: 2017-03-29

## 2017-03-14 RX ORDER — GABAPENTIN 400 MG/1
600 CAPSULE ORAL AT BEDTIME
Qty: 0 | Refills: 0 | Status: DISCONTINUED | OUTPATIENT
Start: 2017-03-14 | End: 2017-03-26

## 2017-03-14 RX ORDER — OXYCODONE HYDROCHLORIDE 5 MG/1
5 TABLET ORAL
Qty: 0 | Refills: 0 | Status: DISCONTINUED | OUTPATIENT
Start: 2017-03-14 | End: 2017-03-15

## 2017-03-14 RX ORDER — AMLODIPINE BESYLATE 2.5 MG/1
10 TABLET ORAL DAILY
Qty: 0 | Refills: 0 | Status: DISCONTINUED | OUTPATIENT
Start: 2017-03-14 | End: 2017-03-29

## 2017-03-14 RX ORDER — FENTANYL CITRATE 50 UG/ML
50 INJECTION INTRAVENOUS
Qty: 0 | Refills: 0 | Status: DISCONTINUED | OUTPATIENT
Start: 2017-03-14 | End: 2017-03-17

## 2017-03-14 RX ORDER — DEXTROSE 50 % IN WATER 50 %
1 SYRINGE (ML) INTRAVENOUS ONCE
Qty: 0 | Refills: 0 | Status: DISCONTINUED | OUTPATIENT
Start: 2017-03-14 | End: 2017-03-18

## 2017-03-14 RX ORDER — INSULIN LISPRO 100/ML
VIAL (ML) SUBCUTANEOUS
Qty: 0 | Refills: 0 | Status: DISCONTINUED | OUTPATIENT
Start: 2017-03-14 | End: 2017-03-18

## 2017-03-14 RX ORDER — GLUCAGON INJECTION, SOLUTION 0.5 MG/.1ML
1 INJECTION, SOLUTION SUBCUTANEOUS ONCE
Qty: 0 | Refills: 0 | Status: DISCONTINUED | OUTPATIENT
Start: 2017-03-14 | End: 2017-03-18

## 2017-03-14 RX ORDER — PANTOPRAZOLE SODIUM 20 MG/1
40 TABLET, DELAYED RELEASE ORAL
Qty: 0 | Refills: 0 | Status: DISCONTINUED | OUTPATIENT
Start: 2017-03-14 | End: 2017-03-29

## 2017-03-14 RX ORDER — DEXTROSE 50 % IN WATER 50 %
25 SYRINGE (ML) INTRAVENOUS ONCE
Qty: 0 | Refills: 0 | Status: DISCONTINUED | OUTPATIENT
Start: 2017-03-14 | End: 2017-03-18

## 2017-03-14 RX ORDER — ONDANSETRON 8 MG/1
4 TABLET, FILM COATED ORAL ONCE
Qty: 0 | Refills: 0 | Status: COMPLETED | OUTPATIENT
Start: 2017-03-14 | End: 2017-03-14

## 2017-03-14 RX ORDER — ATORVASTATIN CALCIUM 80 MG/1
40 TABLET, FILM COATED ORAL AT BEDTIME
Qty: 0 | Refills: 0 | Status: DISCONTINUED | OUTPATIENT
Start: 2017-03-14 | End: 2017-03-29

## 2017-03-14 RX ORDER — LEVOTHYROXINE SODIUM 125 MCG
75 TABLET ORAL DAILY
Qty: 0 | Refills: 0 | Status: DISCONTINUED | OUTPATIENT
Start: 2017-03-14 | End: 2017-03-29

## 2017-03-14 RX ADMIN — ONDANSETRON 4 MILLIGRAM(S): 8 TABLET, FILM COATED ORAL at 17:20

## 2017-03-14 RX ADMIN — MORPHINE SULFATE 2 MILLIGRAM(S): 50 CAPSULE, EXTENDED RELEASE ORAL at 02:20

## 2017-03-14 RX ADMIN — MORPHINE SULFATE 2 MILLIGRAM(S): 50 CAPSULE, EXTENDED RELEASE ORAL at 06:44

## 2017-03-14 RX ADMIN — Medication 100 MILLIGRAM(S): at 06:18

## 2017-03-14 RX ADMIN — ATORVASTATIN CALCIUM 40 MILLIGRAM(S): 80 TABLET, FILM COATED ORAL at 21:54

## 2017-03-14 RX ADMIN — PANTOPRAZOLE SODIUM 40 MILLIGRAM(S): 20 TABLET, DELAYED RELEASE ORAL at 06:18

## 2017-03-14 RX ADMIN — GABAPENTIN 600 MILLIGRAM(S): 400 CAPSULE ORAL at 21:54

## 2017-03-14 RX ADMIN — OXYCODONE HYDROCHLORIDE 5 MILLIGRAM(S): 5 TABLET ORAL at 00:43

## 2017-03-14 RX ADMIN — AMLODIPINE BESYLATE 10 MILLIGRAM(S): 2.5 TABLET ORAL at 06:18

## 2017-03-14 RX ADMIN — Medication 2: at 18:21

## 2017-03-14 RX ADMIN — FENTANYL CITRATE 50 MICROGRAM(S): 50 INJECTION INTRAVENOUS at 18:25

## 2017-03-14 RX ADMIN — OXYCODONE HYDROCHLORIDE 10 MILLIGRAM(S): 5 TABLET ORAL at 22:17

## 2017-03-14 RX ADMIN — FENTANYL CITRATE 50 MICROGRAM(S): 50 INJECTION INTRAVENOUS at 18:10

## 2017-03-14 RX ADMIN — MORPHINE SULFATE 2 MILLIGRAM(S): 50 CAPSULE, EXTENDED RELEASE ORAL at 06:19

## 2017-03-14 RX ADMIN — INSULIN GLARGINE 10 UNIT(S): 100 INJECTION, SOLUTION SUBCUTANEOUS at 21:55

## 2017-03-14 RX ADMIN — Medication 0.5 MILLIGRAM(S): at 21:56

## 2017-03-14 RX ADMIN — SODIUM CHLORIDE 75 MILLILITER(S): 9 INJECTION INTRAMUSCULAR; INTRAVENOUS; SUBCUTANEOUS at 00:33

## 2017-03-14 RX ADMIN — OXYCODONE HYDROCHLORIDE 10 MILLIGRAM(S): 5 TABLET ORAL at 23:00

## 2017-03-14 RX ADMIN — OXYCODONE HYDROCHLORIDE 5 MILLIGRAM(S): 5 TABLET ORAL at 11:00

## 2017-03-14 RX ADMIN — MORPHINE SULFATE 2 MILLIGRAM(S): 50 CAPSULE, EXTENDED RELEASE ORAL at 01:43

## 2017-03-14 RX ADMIN — FENTANYL CITRATE 50 MICROGRAM(S): 50 INJECTION INTRAVENOUS at 17:25

## 2017-03-14 RX ADMIN — Medication 75 MICROGRAM(S): at 06:18

## 2017-03-14 RX ADMIN — FENTANYL CITRATE 50 MICROGRAM(S): 50 INJECTION INTRAVENOUS at 17:45

## 2017-03-14 RX ADMIN — Medication 100 MILLIGRAM(S): at 21:54

## 2017-03-14 RX ADMIN — OXYCODONE HYDROCHLORIDE 5 MILLIGRAM(S): 5 TABLET ORAL at 01:33

## 2017-03-14 RX ADMIN — Medication 50 MILLIGRAM(S): at 06:18

## 2017-03-14 RX ADMIN — OXYCODONE HYDROCHLORIDE 5 MILLIGRAM(S): 5 TABLET ORAL at 10:26

## 2017-03-14 NOTE — PROGRESS NOTE ADULT - SUBJECTIVE AND OBJECTIVE BOX
INTERVAL HPI/OVERNIGHT EVENTS: s/p BKA for PAD LLE, doing well, c/o back pain only.  Elevated BP initially post-op decreasing slowly without intervention.     STATUS POST: BKA LLE      POST OPERATIVE DAY #: 0    SUBJECTIVE:  Pain Control Adequate: [x ] YES [ ] NO  Nausea: [ ] YES [x ] NO            Vomiting: [ ] YES [x ] NO  Diarrhea: [ ] YES [x ] NO         Constipation: [ ] YES [x ] NO     Chest Pain: [ ] YES [x ] NO    SOB:  [ ] YES [x ] NO    MEDICATIONS  (STANDING):  lactated ringers. 1000milliLiter(s) IV Continuous <Continuous>  amLODIPine   Tablet 10milliGRAM(s) Oral daily  docusate sodium 100milliGRAM(s) Oral three times a day  gabapentin 600milliGRAM(s) Oral at bedtime  atorvastatin 40milliGRAM(s) Oral at bedtime  folic acid 1milliGRAM(s) Oral daily  insulin glargine Injectable (LANTUS) 10Unit(s) SubCutaneous at bedtime  insulin lispro (HumaLOG) corrective regimen sliding scale  SubCutaneous three times a day before meals  dextrose 50% Injectable 25Gram(s) IV Push once  metoprolol 50milliGRAM(s) Oral every 12 hours  pantoprazole    Tablet 40milliGRAM(s) Oral before breakfast  levothyroxine 75MICROGram(s) Oral daily  multivitamin/minerals 1Tablet(s) Oral daily    MEDICATIONS  (PRN):  fentaNYL    Injectable 50MICROGram(s) IV Push every 10 minutes PRN Moderate Pain (4 - 6)  oxyCODONE IR 10milliGRAM(s) Oral four times a day PRN mod to severe pain  oxyCODONE IR 5milliGRAM(s) Oral four times a day PRN mild to moderate pain  morphine  - Injectable 2milliGRAM(s) IV Push every 4 hours PRN breakthrough pain  acetaminophen   Tablet 650milliGRAM(s) Oral every 6 hours PRN For Temp greater than 38 C (100.4 F)  ALPRAZolam 0.5milliGRAM(s) Oral three times a day PRN anxiety  dextrose Gel 1Dose(s) Oral once PRN Blood Glucose LESS THAN 70 milliGRAM(s)/deciliter  glucagon  Injectable 1milliGRAM(s) IntraMuscular once PRN Glucose LESS THAN 70 milligrams/deciliter      Vital Signs Last 24 Hrs  T(C): 36.7, Max: 36.7 (03-13 @ 20:22)  T(F): 98.1, Max: 98.1 (03-13 @ 20:22)  HR: 106 (76 - 106)  BP: 183/49 (103/86 - 184/45)  BP(mean): --  RR: 15 (13 - 18)  SpO2: 100% (93% - 100%)    PHYSICAL EXAM:      Constitutional: AOx3, NAD    Respiratory: CTABL, normal work of breathing, good air exchange     Cardiovascular: RRR, S1S2    Gastrointestinal: Soft, NT/ND    Extremities: LLE dressing C/D/I, mildly TTP, sensation intact, FROM b/l        I&O's Detail  I & Os for 24h ending 14 Mar 2017 07:00  =============================================  IN:    Total IN: 0 ml  ---------------------------------------------  OUT:    Voided: 200 ml    Total OUT: 200 ml  ---------------------------------------------  Total NET: -200 ml    I & Os for current day (as of 14 Mar 2017 20:18)  =============================================  IN:    lactated ringers.: 300 ml    sodium chloride 0.9%: 225 ml    Total IN: 525 ml  ---------------------------------------------  OUT:    Voided: 550 ml    Indwelling Catheter - Urethral: 325 ml    Total OUT: 875 ml  ---------------------------------------------  Total NET: -350 ml

## 2017-03-14 NOTE — PROGRESS NOTE ADULT - PROBLEM SELECTOR PLAN 2
no evidence of acute cellulitis, monitor for fever/leukocytosis  Hold full AC with Eliquis, switch to therapeutic lovenox  c/w plavix/ statin  Pain meds, bowel regimen  BKA today per vascular surgery (d/w Dr Mckeon)

## 2017-03-14 NOTE — PROGRESS NOTE ADULT - ASSESSMENT
85F s/p above, no acute concerns at present  1. Monitor BP, call if increase noted  2. Bedrest tonight, OOB with PT tomorrow  3. Dressing to be left in place 3-4 days  4. Pain control PRN  5. Reg diet  6. VTE ppx

## 2017-03-14 NOTE — PROGRESS NOTE ADULT - SUBJECTIVE AND OBJECTIVE BOX
seen for LLE PVD/ HTN    awaiting OR today  no acute complaints. sleeping.    MEDICATIONS  (STANDING):  BACItracin/polymyxin B Ointment 1Application(s) Topical daily  gabapentin 600milliGRAM(s) Oral at bedtime  atorvastatin 40milliGRAM(s) Oral at bedtime  clopidogrel Tablet 75milliGRAM(s) Oral daily  metoprolol 50milliGRAM(s) Oral every 12 hours  pantoprazole    Tablet 40milliGRAM(s) Oral before breakfast  levothyroxine 75MICROGram(s) Oral daily  folic acid 1milliGRAM(s) Oral daily  multivitamin/minerals 1Tablet(s) Oral daily  insulin glargine Injectable (LANTUS) 10Unit(s) SubCutaneous at bedtime  insulin lispro (HumaLOG) corrective regimen sliding scale  SubCutaneous three times a day before meals  dextrose 5%. 1000milliLiter(s) IV Continuous <Continuous>  dextrose 50% Injectable 12.5Gram(s) IV Push once  dextrose 50% Injectable 25Gram(s) IV Push once  dextrose 50% Injectable 25Gram(s) IV Push once  amLODIPine   Tablet 10milliGRAM(s) Oral daily  senna 2Tablet(s) Oral at bedtime  docusate sodium 100milliGRAM(s) Oral three times a day  polyethylene glycol 3350 17Gram(s) Oral daily  lidocaine   Patch 1Patch Transdermal daily  sodium chloride 0.9%. 1000milliLiter(s) IV Continuous <Continuous>    MEDICATIONS  (PRN):  acetaminophen   Tablet 650milliGRAM(s) Oral every 6 hours PRN For Temp greater than 38 C (100.4 F)  acetaminophen   Tablet. 650milliGRAM(s) Oral every 6 hours PRN Moderate Pain (4 - 6)  ALPRAZolam 0.5milliGRAM(s) Oral three times a day PRN anxiety  dextrose Gel 1Dose(s) Oral once PRN Blood Glucose LESS THAN 70 milliGRAM(s)/deciliter  glucagon  Injectable 1milliGRAM(s) IntraMuscular once PRN Glucose LESS THAN 70 milligrams/deciliter  magnesium hydroxide Suspension 30milliLiter(s) Oral daily PRN Constipation  morphine  - Injectable 2milliGRAM(s) IV Push every 4 hours PRN breakthrough pain  traMADol 25milliGRAM(s) Oral every 6 hours PRN mod pain  oxyCODONE IR 5milliGRAM(s) Oral every 4 hours PRN Severe Pain (7 - 10)      Allergies    Benadryl (Other)  Demerol HCl (Other)    Intolerances    Vital Signs Last 24 Hrs  T(C): 36.6, Max: 36.8 (03-13 @ 17:45)  T(F): 97.8, Max: 98.3 (03-13 @ 17:45)  HR: 85 (75 - 85)  BP: 158/64 (122/48 - 158/64)  BP(mean): --  RR: 16 (16 - 16)  SpO2: 98% (96% - 98%)    PHYSICAL EXAM:    GENERAL: NAD  CHEST/LUNG: Clear to percussion bilaterally  HEART: Regular rate and rhythm  ABDOMEN: Soft, +BS  EXTREMITIES: no edema. left foot bandaged.  NERVOUS SYSTEM:  Alert & Oriented X3, nonfocal    LABS:                CAPILLARY BLOOD GLUCOSE  124 (14 Mar 2017 06:04)  266 (13 Mar 2017 20:22)  369 (13 Mar 2017 17:45)  328 (13 Mar 2017 12:05)        RADIOLOGY & ADDITIONAL TESTS:

## 2017-03-14 NOTE — BRIEF OPERATIVE NOTE - PRE-OP DX
PAD (peripheral artery disease)  03/09/2017  b/l LE PAD with tissue loss left leg  Active  Interfaces, RTIF

## 2017-03-15 ENCOUNTER — TRANSCRIPTION ENCOUNTER (OUTPATIENT)
Age: 82
End: 2017-03-15

## 2017-03-15 DIAGNOSIS — Z89.512 ACQUIRED ABSENCE OF LEFT LEG BELOW KNEE: ICD-10-CM

## 2017-03-15 LAB
ANION GAP SERPL CALC-SCNC: 13 MMOL/L — SIGNIFICANT CHANGE UP (ref 5–17)
BUN SERPL-MCNC: 22 MG/DL — HIGH (ref 8–20)
CALCIUM SERPL-MCNC: 8.7 MG/DL — SIGNIFICANT CHANGE UP (ref 8.6–10.2)
CHLORIDE SERPL-SCNC: 98 MMOL/L — SIGNIFICANT CHANGE UP (ref 98–107)
CO2 SERPL-SCNC: 25 MMOL/L — SIGNIFICANT CHANGE UP (ref 22–29)
CREAT SERPL-MCNC: 1.12 MG/DL — SIGNIFICANT CHANGE UP (ref 0.5–1.3)
GLUCOSE SERPL-MCNC: 252 MG/DL — HIGH (ref 70–115)
HCT VFR BLD CALC: 26.8 % — LOW (ref 37–47)
HGB BLD-MCNC: 8.6 G/DL — LOW (ref 12–16)
MCHC RBC-ENTMCNC: 27.6 PG — SIGNIFICANT CHANGE UP (ref 27–31)
MCHC RBC-ENTMCNC: 32.1 G/DL — SIGNIFICANT CHANGE UP (ref 32–36)
MCV RBC AUTO: 85.9 FL — SIGNIFICANT CHANGE UP (ref 81–99)
PLATELET # BLD AUTO: 251 K/UL — SIGNIFICANT CHANGE UP (ref 150–400)
POTASSIUM SERPL-MCNC: 4.7 MMOL/L — SIGNIFICANT CHANGE UP (ref 3.5–5.3)
POTASSIUM SERPL-SCNC: 4.7 MMOL/L — SIGNIFICANT CHANGE UP (ref 3.5–5.3)
RBC # BLD: 3.12 M/UL — LOW (ref 4.4–5.2)
RBC # FLD: 13.9 % — SIGNIFICANT CHANGE UP (ref 11–15.6)
SODIUM SERPL-SCNC: 136 MMOL/L — SIGNIFICANT CHANGE UP (ref 135–145)
WBC # BLD: 6.6 K/UL — SIGNIFICANT CHANGE UP (ref 4.8–10.8)
WBC # FLD AUTO: 6.6 K/UL — SIGNIFICANT CHANGE UP (ref 4.8–10.8)

## 2017-03-15 PROCEDURE — 99233 SBSQ HOSP IP/OBS HIGH 50: CPT

## 2017-03-15 PROCEDURE — 99222 1ST HOSP IP/OBS MODERATE 55: CPT

## 2017-03-15 RX ORDER — ONDANSETRON 8 MG/1
4 TABLET, FILM COATED ORAL ONCE
Qty: 0 | Refills: 0 | Status: COMPLETED | OUTPATIENT
Start: 2017-03-15 | End: 2017-03-15

## 2017-03-15 RX ORDER — ENOXAPARIN SODIUM 100 MG/ML
40 INJECTION SUBCUTANEOUS DAILY
Qty: 0 | Refills: 0 | Status: DISCONTINUED | OUTPATIENT
Start: 2017-03-16 | End: 2017-03-18

## 2017-03-15 RX ORDER — TRAMADOL HYDROCHLORIDE 50 MG/1
25 TABLET ORAL EVERY 6 HOURS
Qty: 0 | Refills: 0 | Status: DISCONTINUED | OUTPATIENT
Start: 2017-03-15 | End: 2017-03-17

## 2017-03-15 RX ORDER — GABAPENTIN 400 MG/1
300 CAPSULE ORAL DAILY
Qty: 0 | Refills: 0 | Status: DISCONTINUED | OUTPATIENT
Start: 2017-03-15 | End: 2017-03-23

## 2017-03-15 RX ORDER — OXYCODONE HYDROCHLORIDE 5 MG/1
5 TABLET ORAL EVERY 4 HOURS
Qty: 0 | Refills: 0 | Status: DISCONTINUED | OUTPATIENT
Start: 2017-03-15 | End: 2017-03-18

## 2017-03-15 RX ADMIN — PANTOPRAZOLE SODIUM 40 MILLIGRAM(S): 20 TABLET, DELAYED RELEASE ORAL at 05:41

## 2017-03-15 RX ADMIN — Medication 100 MILLIGRAM(S): at 13:13

## 2017-03-15 RX ADMIN — ATORVASTATIN CALCIUM 40 MILLIGRAM(S): 80 TABLET, FILM COATED ORAL at 21:54

## 2017-03-15 RX ADMIN — ONDANSETRON 4 MILLIGRAM(S): 8 TABLET, FILM COATED ORAL at 20:27

## 2017-03-15 RX ADMIN — Medication 50 MILLIGRAM(S): at 05:41

## 2017-03-15 RX ADMIN — OXYCODONE HYDROCHLORIDE 10 MILLIGRAM(S): 5 TABLET ORAL at 13:18

## 2017-03-15 RX ADMIN — AMLODIPINE BESYLATE 10 MILLIGRAM(S): 2.5 TABLET ORAL at 05:41

## 2017-03-15 RX ADMIN — Medication 4: at 09:32

## 2017-03-15 RX ADMIN — OXYCODONE HYDROCHLORIDE 10 MILLIGRAM(S): 5 TABLET ORAL at 10:18

## 2017-03-15 RX ADMIN — Medication 100 MILLIGRAM(S): at 05:41

## 2017-03-15 RX ADMIN — MORPHINE SULFATE 2 MILLIGRAM(S): 50 CAPSULE, EXTENDED RELEASE ORAL at 06:15

## 2017-03-15 RX ADMIN — Medication 6: at 13:14

## 2017-03-15 RX ADMIN — Medication 100 MILLIGRAM(S): at 21:54

## 2017-03-15 RX ADMIN — OXYCODONE HYDROCHLORIDE 5 MILLIGRAM(S): 5 TABLET ORAL at 21:15

## 2017-03-15 RX ADMIN — Medication 8: at 18:22

## 2017-03-15 RX ADMIN — Medication 75 MICROGRAM(S): at 05:41

## 2017-03-15 RX ADMIN — OXYCODONE HYDROCHLORIDE 5 MILLIGRAM(S): 5 TABLET ORAL at 14:55

## 2017-03-15 RX ADMIN — INSULIN GLARGINE 10 UNIT(S): 100 INJECTION, SOLUTION SUBCUTANEOUS at 21:54

## 2017-03-15 RX ADMIN — MORPHINE SULFATE 2 MILLIGRAM(S): 50 CAPSULE, EXTENDED RELEASE ORAL at 22:41

## 2017-03-15 RX ADMIN — OXYCODONE HYDROCHLORIDE 5 MILLIGRAM(S): 5 TABLET ORAL at 20:27

## 2017-03-15 RX ADMIN — Medication 1 TABLET(S): at 13:13

## 2017-03-15 RX ADMIN — Medication 50 MILLIGRAM(S): at 18:22

## 2017-03-15 RX ADMIN — Medication 1 MILLIGRAM(S): at 13:13

## 2017-03-15 RX ADMIN — OXYCODONE HYDROCHLORIDE 5 MILLIGRAM(S): 5 TABLET ORAL at 16:38

## 2017-03-15 RX ADMIN — MORPHINE SULFATE 2 MILLIGRAM(S): 50 CAPSULE, EXTENDED RELEASE ORAL at 05:42

## 2017-03-15 RX ADMIN — MORPHINE SULFATE 2 MILLIGRAM(S): 50 CAPSULE, EXTENDED RELEASE ORAL at 22:28

## 2017-03-15 RX ADMIN — GABAPENTIN 600 MILLIGRAM(S): 400 CAPSULE ORAL at 21:54

## 2017-03-15 NOTE — PHYSICAL THERAPY INITIAL EVALUATION ADULT - CRITERIA FOR SKILLED THERAPEUTIC INTERVENTIONS
anticipated discharge recommendation/impairments found/therapy frequency/predicted duration of therapy intervention/functional limitations in following categories/rehab potential

## 2017-03-15 NOTE — PROGRESS NOTE ADULT - PROBLEM SELECTOR PLAN 1
- OOB with PT WBAT RLE  - Pain control  - Chemical/Mechanical DVT prophylaxis  - Hold full AC  - Dressing change Friday

## 2017-03-15 NOTE — PROGRESS NOTE ADULT - SUBJECTIVE AND OBJECTIVE BOX
POD # 1 s/p L BKA. Pt's pain is controlled. No acute events.    Vital Signs Last 24 Hrs  T(C): 36.7, Max: 36.7 (03-14 @ 09:39)  T(F): 98, Max: 98.1 (03-14 @ 17:10)  HR: 95 (76 - 106)  BP: 158/68 (103/86 - 184/45)  BP(mean): --  RR: 16 (13 - 18)  SpO2: 95% (93% - 100%)

## 2017-03-15 NOTE — PROGRESS NOTE ADULT - PROBLEM SELECTOR PLAN 3
no evidence of acute cellulitis, monitor for fever/leukocytosis  Hold full AC with Eliquis  c/w plavix/ statin

## 2017-03-15 NOTE — PHYSICAL THERAPY INITIAL EVALUATION ADULT - ADDITIONAL COMMENTS
as per daughters and pt: pt was living at an assistive living facility, used a RW, was becoming more and more unsteady prior to arrival. no stairs to negotiate at home

## 2017-03-15 NOTE — CONSULT NOTE ADULT - SUBJECTIVE AND OBJECTIVE BOX
Patient is a 85y old  Female who presents with a chief complaint of b/l leg pain (11 Mar 2017 18:12)      HPI:  85 yr old female with multiple medical problems CAD s/p stents, diabetes, hypertension, hyperlipidemia, anxiety, significant PVD s/p multiple stents, paroxysmal atrial fibrillation was admitted today for elective LE angiogram. Patient was noted to have elevated BP post procedure, and hence admission requested by Dr Irwin. Per him, patient with occluded SFA, POP b/l with minimal outflow to b/l LE, and patient will likely need BKA next week. Patient seen in PACU, states she feels lousy. No chest pain, headaches, shortness of breath. Spoke with daughters at bedside, who states that patient lives in an assisted living, uses a walker to ambulate and has had multiple falls. Her last PCI was 10 yrs ago. History of smoking, quit 30 yrs ago. Daughter also states patient took her antihypertensives this am. She has been in assisted living since October 2016. She received IV hydralazine prior to my assessment and BP improved to 180. (09 Mar 2017 18:35)      PCP:     PT/OT EVALUATION:  BED MOBILITY:   TRANSFERS:   GAIT:   ADLS:     PAST MEDICAL & SURGICAL HISTORY:  Hyperlipidemia  Hypertension  Diabetes  PVD (peripheral vascular disease)  CAD (coronary artery disease)  History of cholecystectomy  H/O angioplasty      FAMILY HISTORY:  No pertinent family history in first degree relatives      SOCIAL HISTORY:  TOBACCO: denies history  ALCOHOL: denies abuse  IVDA: denies history    FUNCTIONAL, ENVIRONMENTAL HISTORY:  WORK HISTORY:   LIVES WITH:   HOME LAYOUT:   STAIRS TO ENTER:   STAIRS INSIDE:   FUNCTIONAL HISTORY: independent with ambulation and ADLs    Allergies    Benadryl (Other)  Demerol HCl (Other)    Intolerances        MEDICATIONS  (STANDING):  amLODIPine   Tablet 10milliGRAM(s) Oral daily  docusate sodium 100milliGRAM(s) Oral three times a day  gabapentin 600milliGRAM(s) Oral at bedtime  atorvastatin 40milliGRAM(s) Oral at bedtime  folic acid 1milliGRAM(s) Oral daily  insulin glargine Injectable (LANTUS) 10Unit(s) SubCutaneous at bedtime  insulin lispro (HumaLOG) corrective regimen sliding scale  SubCutaneous three times a day before meals  dextrose 50% Injectable 25Gram(s) IV Push once  metoprolol 50milliGRAM(s) Oral every 12 hours  pantoprazole    Tablet 40milliGRAM(s) Oral before breakfast  levothyroxine 75MICROGram(s) Oral daily  multivitamin/minerals 1Tablet(s) Oral daily  multivitamin 1Tablet(s) Oral daily    MEDICATIONS  (PRN):  fentaNYL    Injectable 50MICROGram(s) IV Push every 10 minutes PRN Moderate Pain (4 - 6)  morphine  - Injectable 2milliGRAM(s) IV Push every 4 hours PRN breakthrough pain  acetaminophen   Tablet 650milliGRAM(s) Oral every 6 hours PRN For Temp greater than 38 C (100.4 F)  ALPRAZolam 0.5milliGRAM(s) Oral three times a day PRN anxiety  dextrose Gel 1Dose(s) Oral once PRN Blood Glucose LESS THAN 70 milliGRAM(s)/deciliter  glucagon  Injectable 1milliGRAM(s) IntraMuscular once PRN Glucose LESS THAN 70 milligrams/deciliter  oxyCODONE IR 5milliGRAM(s) Oral every 4 hours PRN Severe Pain (7 - 10)  traMADol 25milliGRAM(s) Oral every 6 hours PRN Moderate Pain (4 - 6)      REVIEW OF SYSTEMS:    CONSTITUTIONAL: No fever  EYES: No visual disturbances  ENMT:  No difficulty hearing, No throat pain  RESPIRATORY: No cough, No shortness of breath  CARDIOVASCULAR: No chest pain, No palpitations  GASTROINTESTINAL: No abdominal pain, No nausea, No vomiting, No diarrhea, No constipation  GENITOURINARY: No dysuria, No frequency, No incontinence  NEUROLOGICAL: No headaches, No dizziness, No loss of strength, No numbness  SKIN: No itching, No rashes  MUSCULOSKELETAL: No joint/muscle pain; No back pain  PSYCHIATRIC: No depression, No anxiety    Vital Signs Last 24 Hrs  T(C): 36.7, Max: 36.7 (03-14 @ 17:10)  T(F): 98, Max: 98.1 (03-14 @ 17:10)  HR: 72 (72 - 106)  BP: 138/40 (103/86 - 184/45)  BP(mean): --  RR: 20 (13 - 20)  SpO2: 100% (93% - 100%)    PHYSICAL EXAM:    GENERAL: NAD, well-groomed, well-developed  HEENT:  Atraumatic, normocephalic, conjunctiva clear, moist mucous membranes  HEART: Regular rate and rhythm, No murmurs  CHEST/LUNG: Clear to auscultation bilaterally, normal respiratory effort  ABDOMEN: Soft, Nontender, Nondistended, Bowel sounds present  EXTREMITIES:  2+ Peripheral Pulses, No edema  SKIN: No rashes or lesions  NERVOUS SYSTEM:  Alert & Oriented X3, speech intact  CNs II-XII grossly intact  Motor Strength 5/5 B/L upper and lower extremities  Sensation intact to light touch symmetrically  DTRs 2+ intact and symmetric  FUNCTIONAL EXAM:      LABS:                        8.6    6.6   )-----------( 251      ( 15 Mar 2017 05:12 )             26.8     15 Mar 2017 05:12    136    |  98     |  22.0   ----------------------------<  252    4.7     |  25.0   |  1.12     Ca    8.7        15 Mar 2017 05:12            RADIOLOGY & ADDITIONAL STUDIES: Patient is a 85y old female who presents with a chief complaint of b/l leg pain     HPI:  85 yr old female with multiple medical problems including CAD s/p stents, diabetes, hypertension, hyperlipidemia, anxiety, significant PVD s/p multiple stents, paroxysmal atrial fibrillation was admitted 3/9/17 for elective LE angiogram. Patient was noted to have elevated BP post procedure, and hence admission requested by Dr. Irwin. Per him, patient with occluded SFA, POP b/l with minimal outflow to b/l LE, and patient will likely need BKA.    PCP:     PT/OT EVALUATION:  BED MOBILITY:   TRANSFERS:   GAIT:   ADLS:     PAST MEDICAL & SURGICAL HISTORY:  Hyperlipidemia  Hypertension  Diabetes  PVD (peripheral vascular disease)  CAD (coronary artery disease)  History of cholecystectomy  H/O angioplasty      FAMILY HISTORY:  No pertinent family history in first degree relatives      SOCIAL HISTORY:  TOBACCO: denies history  ALCOHOL: denies abuse  IVDA: denies history    FUNCTIONAL, ENVIRONMENTAL HISTORY:  lives in an assisted living since 2016, uses a walker to ambulate and has had multiple falls recently    FUNCTIONAL HISTORY: independent with ambulation and ADLs with walker    Allergies    Benadryl (Other)  Demerol HCl (Other)    MEDICATIONS  (STANDING):  amLODIPine   Tablet 10milliGRAM(s) Oral daily  docusate sodium 100milliGRAM(s) Oral three times a day  gabapentin 600milliGRAM(s) Oral at bedtime  atorvastatin 40milliGRAM(s) Oral at bedtime  folic acid 1milliGRAM(s) Oral daily  insulin glargine Injectable (LANTUS) 10Unit(s) SubCutaneous at bedtime  insulin lispro (HumaLOG) corrective regimen sliding scale  SubCutaneous three times a day before meals  dextrose 50% Injectable 25Gram(s) IV Push once  metoprolol 50milliGRAM(s) Oral every 12 hours  pantoprazole    Tablet 40milliGRAM(s) Oral before breakfast  levothyroxine 75MICROGram(s) Oral daily  multivitamin/minerals 1Tablet(s) Oral daily  multivitamin 1Tablet(s) Oral daily    MEDICATIONS  (PRN):  fentaNYL    Injectable 50MICROGram(s) IV Push every 10 minutes PRN Moderate Pain (4 - 6)  morphine  - Injectable 2milliGRAM(s) IV Push every 4 hours PRN breakthrough pain  acetaminophen   Tablet 650milliGRAM(s) Oral every 6 hours PRN For Temp greater than 38 C (100.4 F)  ALPRAZolam 0.5milliGRAM(s) Oral three times a day PRN anxiety  dextrose Gel 1Dose(s) Oral once PRN Blood Glucose LESS THAN 70 milliGRAM(s)/deciliter  glucagon  Injectable 1milliGRAM(s) IntraMuscular once PRN Glucose LESS THAN 70 milligrams/deciliter  oxyCODONE IR 5milliGRAM(s) Oral every 4 hours PRN Severe Pain (7 - 10)  traMADol 25milliGRAM(s) Oral every 6 hours PRN Moderate Pain (4 - 6)      REVIEW OF SYSTEMS:    CONSTITUTIONAL: No fever  EYES: No visual disturbances  ENMT:  No difficulty hearing, No throat pain  RESPIRATORY: No cough, No shortness of breath  CARDIOVASCULAR: No chest pain, No palpitations  GASTROINTESTINAL: No abdominal pain, No nausea, No vomiting, No diarrhea, No constipation  GENITOURINARY: No dysuria, No frequency, No incontinence  NEUROLOGICAL: No headaches, No dizziness, No loss of strength, No numbness  SKIN: No itching, No rashes  MUSCULOSKELETAL: No joint/muscle pain; No back pain  PSYCHIATRIC: No depression, No anxiety    Vital Signs Last 24 Hrs  T(C): 36.7, Max: 36.7 (03-14 @ 17:10)  T(F): 98, Max: 98.1 (03-14 @ 17:10)  HR: 72 (72 - 106)  BP: 138/40 (103/86 - 184/45)  BP(mean): --  RR: 20 (13 - 20)  SpO2: 100% (93% - 100%)    PHYSICAL EXAM:    GENERAL: NAD, well-groomed, well-developed  HEENT:  Atraumatic, normocephalic, conjunctiva clear, moist mucous membranes  HEART: Regular rate and rhythm, No murmurs  CHEST/LUNG: Clear to auscultation bilaterally, normal respiratory effort  ABDOMEN: Soft, Nontender, Nondistended, Bowel sounds present  EXTREMITIES:  2+ Peripheral Pulses, No edema  SKIN: No rashes or lesions  NERVOUS SYSTEM:  Alert & Oriented X3, speech intact  CNs II-XII grossly intact  Motor Strength 5/5 B/L upper and lower extremities  Sensation intact to light touch symmetrically  DTRs 2+ intact and symmetric  FUNCTIONAL EXAM:      LABS:                        8.6    6.6   )-----------( 251      ( 15 Mar 2017 05:12 )             26.8     15 Mar 2017 05:12    136    |  98     |  22.0   ----------------------------<  252    4.7     |  25.0   |  1.12     Ca    8.7        15 Mar 2017 05:12            RADIOLOGY & ADDITIONAL STUDIES: Patient is a 85y old female who presents with a chief complaint of b/l leg pain     HPI:  85 yr old female with multiple medical problems including CAD s/p stents, diabetes, hypertension, hyperlipidemia, anxiety, significant PVD s/p multiple stents, paroxysmal atrial fibrillation was admitted 3/9/17 for elective LE angiogram. Patient was noted to have elevated BP post procedure, and hence admission requested by Dr. Irwin. Per him, patient with occluded SFA, POP b/l with minimal outflow to b/l LE. Underwent     PCP:     PT/OT EVALUATION:  BED MOBILITY:   TRANSFERS:   GAIT:   ADLS:     PAST MEDICAL & SURGICAL HISTORY:  Hyperlipidemia  Hypertension  Diabetes  PVD (peripheral vascular disease)  CAD (coronary artery disease)  History of cholecystectomy  H/O angioplasty      FAMILY HISTORY:  No pertinent family history in first degree relatives      SOCIAL HISTORY:  TOBACCO: denies history  ALCOHOL: denies abuse  IVDA: denies history    FUNCTIONAL, ENVIRONMENTAL HISTORY:  lives in an assisted living since 2016, uses a walker to ambulate and has had multiple falls recently    FUNCTIONAL HISTORY: independent with ambulation and ADLs with walker    Allergies    Benadryl (Other)  Demerol HCl (Other)    MEDICATIONS  (STANDING):  amLODIPine   Tablet 10milliGRAM(s) Oral daily  docusate sodium 100milliGRAM(s) Oral three times a day  gabapentin 600milliGRAM(s) Oral at bedtime  atorvastatin 40milliGRAM(s) Oral at bedtime  folic acid 1milliGRAM(s) Oral daily  insulin glargine Injectable (LANTUS) 10Unit(s) SubCutaneous at bedtime  insulin lispro (HumaLOG) corrective regimen sliding scale  SubCutaneous three times a day before meals  dextrose 50% Injectable 25Gram(s) IV Push once  metoprolol 50milliGRAM(s) Oral every 12 hours  pantoprazole    Tablet 40milliGRAM(s) Oral before breakfast  levothyroxine 75MICROGram(s) Oral daily  multivitamin/minerals 1Tablet(s) Oral daily  multivitamin 1Tablet(s) Oral daily    MEDICATIONS  (PRN):  fentaNYL    Injectable 50MICROGram(s) IV Push every 10 minutes PRN Moderate Pain (4 - 6)  morphine  - Injectable 2milliGRAM(s) IV Push every 4 hours PRN breakthrough pain  acetaminophen   Tablet 650milliGRAM(s) Oral every 6 hours PRN For Temp greater than 38 C (100.4 F)  ALPRAZolam 0.5milliGRAM(s) Oral three times a day PRN anxiety  dextrose Gel 1Dose(s) Oral once PRN Blood Glucose LESS THAN 70 milliGRAM(s)/deciliter  glucagon  Injectable 1milliGRAM(s) IntraMuscular once PRN Glucose LESS THAN 70 milligrams/deciliter  oxyCODONE IR 5milliGRAM(s) Oral every 4 hours PRN Severe Pain (7 - 10)  traMADol 25milliGRAM(s) Oral every 6 hours PRN Moderate Pain (4 - 6)      REVIEW OF SYSTEMS:    CONSTITUTIONAL: No fever  EYES: No visual disturbances  ENMT:  No difficulty hearing, No throat pain  RESPIRATORY: No cough, No shortness of breath  CARDIOVASCULAR: No chest pain, No palpitations  GASTROINTESTINAL: No abdominal pain, No nausea, No vomiting, No diarrhea, No constipation  GENITOURINARY: No dysuria, No frequency, No incontinence  NEUROLOGICAL: No headaches, No dizziness, No loss of strength, No numbness  SKIN: No itching, No rashes  MUSCULOSKELETAL: No joint/muscle pain; No back pain  PSYCHIATRIC: No depression, No anxiety    Vital Signs Last 24 Hrs  T(C): 36.7, Max: 36.7 (03-14 @ 17:10)  T(F): 98, Max: 98.1 (03-14 @ 17:10)  HR: 72 (72 - 106)  BP: 138/40 (103/86 - 184/45)  BP(mean): --  RR: 20 (13 - 20)  SpO2: 100% (93% - 100%)    PHYSICAL EXAM:    GENERAL: NAD, well-groomed, well-developed  HEENT:  Atraumatic, normocephalic, conjunctiva clear, moist mucous membranes  HEART: Regular rate and rhythm, No murmurs  CHEST/LUNG: Clear to auscultation bilaterally, normal respiratory effort  ABDOMEN: Soft, Nontender, Nondistended, Bowel sounds present  EXTREMITIES:  2+ Peripheral Pulses, No edema  SKIN: No rashes or lesions  NERVOUS SYSTEM:  Alert & Oriented X3, speech intact  CNs II-XII grossly intact  Motor Strength 5/5 B/L upper and lower extremities  Sensation intact to light touch symmetrically  DTRs 2+ intact and symmetric  FUNCTIONAL EXAM:      LABS:                        8.6    6.6   )-----------( 251      ( 15 Mar 2017 05:12 )             26.8     15 Mar 2017 05:12    136    |  98     |  22.0   ----------------------------<  252    4.7     |  25.0   |  1.12     Ca    8.7        15 Mar 2017 05:12            RADIOLOGY & ADDITIONAL STUDIES: Patient is a 85y old female who presents with a chief complaint of b/l leg pain     HPI:  85 yr old female with multiple medical problems including CAD s/p stents, diabetes, hypertension, hyperlipidemia, anxiety, significant PVD s/p multiple stents, paroxysmal atrial fibrillation was admitted 3/9/17 for elective LE angiogram. Patient was noted to have elevated BP post procedure, and hence admission requested by Dr. Irwin. Per him, patient with occluded SFA, POP b/l with minimal outflow to b/l LE. 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 Doing well post-operatively.     PCP:     PT/OT EVALUATION:  BED MOBILITY:   TRANSFERS:   GAIT:   ADLS:     PAST MEDICAL & SURGICAL HISTORY:  Hyperlipidemia  Hypertension  Diabetes  PVD (peripheral vascular disease)  CAD (coronary artery disease)  History of cholecystectomy  H/O angioplasty      FAMILY HISTORY:  No pertinent family history in first degree relatives      SOCIAL HISTORY:  TOBACCO: denies history  ALCOHOL: denies abuse  IVDA: denies history    FUNCTIONAL, ENVIRONMENTAL HISTORY:  lives in an assisted living since 2016, uses a walker to ambulate and has had multiple falls recently    FUNCTIONAL HISTORY: independent with ambulation and ADLs with walker    Allergies    Benadryl (Other)  Demerol HCl (Other)    MEDICATIONS  (STANDING):  amLODIPine   Tablet 10milliGRAM(s) Oral daily  docusate sodium 100milliGRAM(s) Oral three times a day  gabapentin 600milliGRAM(s) Oral at bedtime  atorvastatin 40milliGRAM(s) Oral at bedtime  folic acid 1milliGRAM(s) Oral daily  insulin glargine Injectable (LANTUS) 10Unit(s) SubCutaneous at bedtime  insulin lispro (HumaLOG) corrective regimen sliding scale  SubCutaneous three times a day before meals  dextrose 50% Injectable 25Gram(s) IV Push once  metoprolol 50milliGRAM(s) Oral every 12 hours  pantoprazole    Tablet 40milliGRAM(s) Oral before breakfast  levothyroxine 75MICROGram(s) Oral daily  multivitamin/minerals 1Tablet(s) Oral daily  multivitamin 1Tablet(s) Oral daily    MEDICATIONS  (PRN):  fentaNYL    Injectable 50MICROGram(s) IV Push every 10 minutes PRN Moderate Pain (4 - 6)  morphine  - Injectable 2milliGRAM(s) IV Push every 4 hours PRN breakthrough pain  acetaminophen   Tablet 650milliGRAM(s) Oral every 6 hours PRN For Temp greater than 38 C (100.4 F)  ALPRAZolam 0.5milliGRAM(s) Oral three times a day PRN anxiety  dextrose Gel 1Dose(s) Oral once PRN Blood Glucose LESS THAN 70 milliGRAM(s)/deciliter  glucagon  Injectable 1milliGRAM(s) IntraMuscular once PRN Glucose LESS THAN 70 milligrams/deciliter  oxyCODONE IR 5milliGRAM(s) Oral every 4 hours PRN Severe Pain (7 - 10)  traMADol 25milliGRAM(s) Oral every 6 hours PRN Moderate Pain (4 - 6)      REVIEW OF SYSTEMS:    CONSTITUTIONAL: No fever  EYES: No visual disturbances  ENMT:  No difficulty hearing, No throat pain  RESPIRATORY: No cough, No shortness of breath  CARDIOVASCULAR: No chest pain, No palpitations  GASTROINTESTINAL: No abdominal pain, No nausea, No vomiting, No diarrhea, No constipation  GENITOURINARY: No dysuria, No frequency, No incontinence  NEUROLOGICAL: No headaches, No dizziness, No loss of strength, No numbness  SKIN: No itching, No rashes  MUSCULOSKELETAL: No joint/muscle pain; No back pain  PSYCHIATRIC: No depression, No anxiety    Vital Signs Last 24 Hrs  T(C): 36.7, Max: 36.7 (03-14 @ 17:10)  T(F): 98, Max: 98.1 (03-14 @ 17:10)  HR: 72 (72 - 106)  BP: 138/40 (103/86 - 184/45)  BP(mean): --  RR: 20 (13 - 20)  SpO2: 100% (93% - 100%)    PHYSICAL EXAM:    GENERAL: NAD, well-groomed, well-developed  HEENT:  Atraumatic, normocephalic, conjunctiva clear, moist mucous membranes  HEART: Regular rate and rhythm, No murmurs  CHEST/LUNG: Clear to auscultation bilaterally, normal respiratory effort  ABDOMEN: Soft, Nontender, Nondistended, Bowel sounds present  EXTREMITIES:  2+ Peripheral Pulses, No edema  SKIN: No rashes or lesions  NERVOUS SYSTEM:  Alert & Oriented X3, speech intact  CNs II-XII grossly intact  Motor Strength 5/5 B/L upper and lower extremities  Sensation intact to light touch symmetrically  DTRs 2+ intact and symmetric  FUNCTIONAL EXAM:      LABS:                        8.6    6.6   )-----------( 251      ( 15 Mar 2017 05:12 )             26.8     15 Mar 2017 05:12    136    |  98     |  22.0   ----------------------------<  252    4.7     |  25.0   |  1.12     Ca    8.7        15 Mar 2017 05:12            RADIOLOGY & ADDITIONAL STUDIES: Patient is a 85y old female who presents with a chief complaint of b/l leg pain     HPI:  85 yr old female with multiple medical problems including CAD s/p stents, diabetes, hypertension, hyperlipidemia, anxiety, significant PVD s/p multiple stents, paroxysmal atrial fibrillation was admitted 3/9/17 for elective LE angiogram. Patient was noted to have elevated BP post procedure, and hence admission requested by Dr. Irwin. Per him, patient with occluded SFA, POP b/l with minimal outflow to b/l LE. 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 Doing well post-operatively. Pain adequately controlled at this time. Lovenox for DVT ppx.     PCP:     PT/OT EVALUATION:  BED MOBILITY:   TRANSFERS:   GAIT:   ADLS:     PAST MEDICAL & SURGICAL HISTORY:  Hyperlipidemia  Hypertension  Diabetes  PVD (peripheral vascular disease)  CAD (coronary artery disease)  History of cholecystectomy  H/O angioplasty      FAMILY HISTORY:  No pertinent family history in first degree relatives      SOCIAL HISTORY:  TOBACCO: denies history  ALCOHOL: denies abuse  IVDA: denies history    FUNCTIONAL, ENVIRONMENTAL HISTORY:  lives in an assisted living since 2016, uses a walker to ambulate and has had multiple falls recently    FUNCTIONAL HISTORY: independent with ambulation and ADLs with walker    Allergies    Benadryl (Other)  Demerol HCl (Other)    MEDICATIONS  (STANDING):  amLODIPine   Tablet 10milliGRAM(s) Oral daily  docusate sodium 100milliGRAM(s) Oral three times a day  gabapentin 600milliGRAM(s) Oral at bedtime  atorvastatin 40milliGRAM(s) Oral at bedtime  folic acid 1milliGRAM(s) Oral daily  insulin glargine Injectable (LANTUS) 10Unit(s) SubCutaneous at bedtime  insulin lispro (HumaLOG) corrective regimen sliding scale  SubCutaneous three times a day before meals  dextrose 50% Injectable 25Gram(s) IV Push once  metoprolol 50milliGRAM(s) Oral every 12 hours  pantoprazole    Tablet 40milliGRAM(s) Oral before breakfast  levothyroxine 75MICROGram(s) Oral daily  multivitamin/minerals 1Tablet(s) Oral daily  multivitamin 1Tablet(s) Oral daily    MEDICATIONS  (PRN):  fentaNYL    Injectable 50MICROGram(s) IV Push every 10 minutes PRN Moderate Pain (4 - 6)  morphine  - Injectable 2milliGRAM(s) IV Push every 4 hours PRN breakthrough pain  acetaminophen   Tablet 650milliGRAM(s) Oral every 6 hours PRN For Temp greater than 38 C (100.4 F)  ALPRAZolam 0.5milliGRAM(s) Oral three times a day PRN anxiety  dextrose Gel 1Dose(s) Oral once PRN Blood Glucose LESS THAN 70 milliGRAM(s)/deciliter  glucagon  Injectable 1milliGRAM(s) IntraMuscular once PRN Glucose LESS THAN 70 milligrams/deciliter  oxyCODONE IR 5milliGRAM(s) Oral every 4 hours PRN Severe Pain (7 - 10)  traMADol 25milliGRAM(s) Oral every 6 hours PRN Moderate Pain (4 - 6)      REVIEW OF SYSTEMS:    CONSTITUTIONAL: No fever  RESPIRATORY: No cough, No shortness of breath  CARDIOVASCULAR: No chest pain, No palpitations  GASTROINTESTINAL: No abdominal pain, No nausea, No vomiting, No diarrhea, No constipation  GENITOURINARY: No dysuria, No frequency, No incontinence  NEUROLOGICAL: No headaches, No dizziness, No loss of strength, No numbness  SKIN: No itching, No rashes  MUSCULOSKELETAL: No joint/muscle pain; No back pain  PSYCHIATRIC: No depression, No anxiety    Vital Signs Last 24 Hrs  T(C): 36.7, Max: 36.7 (03-14 @ 17:10)  T(F): 98, Max: 98.1 (03-14 @ 17:10)  HR: 72 (72 - 106)  BP: 138/40 (103/86 - 184/45)  BP(mean): --  RR: 20 (13 - 20)  SpO2: 100% (93% - 100%)    PHYSICAL EXAM:    GENERAL: NAD  HEENT:  Atraumatic, normocephalic, conjunctiva clear, moist mucous membranes  HEART: Regular rate and rhythm, No murmurs  CHEST/LUNG: Clear to auscultation bilaterally, normal respiratory effort  ABDOMEN: Soft, Nontender, Nondistended, Bowel sounds present  EXTREMITIES:  2+ Peripheral Pulses, No edema, residual limb appears c/d/i, no active bleeding or discharge noted.  SKIN: No rashes or lesions  NERVOUS SYSTEM:  Alert & Oriented X3, speech intact  CNs II-XII grossly intact  Motor Strength 5/5 B/L upper and right LE  Sensation intact to light touch symmetrically  DTRs 2+ intact and symmetric     LABS:                        8.6    6.6   )-----------( 251      ( 15 Mar 2017 05:12 )             26.8     15 Mar 2017 05:12    136    |  98     |  22.0   ----------------------------<  252    4.7     |  25.0   |  1.12     Ca    8.7        15 Mar 2017 05:12      86 yo female with PVD s/p stents now s/p left BKA    - PT - Patient is a 85y old female who presents with a chief complaint of b/l leg pain     HPI:  85 yr old female with multiple medical problems including CAD s/p stents, diabetes, hypertension, hyperlipidemia, anxiety, significant PVD s/p multiple stents, paroxysmal atrial fibrillation was admitted 3/9/17 for elective LE angiogram. Patient was noted to have elevated BP post procedure, and hence admission requested by Dr. Irwin. Per him, patient with occluded SFA, POP b/l with minimal outflow to b/l LE. 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 Doing well post-operatively. Pain adequately controlled at this time. Lovenox for DVT ppx.     PCP:     PT/OT EVALUATION: pending     PAST MEDICAL & SURGICAL HISTORY:  Hyperlipidemia  Hypertension  Diabetes  PVD (peripheral vascular disease)  CAD (coronary artery disease)  History of cholecystectomy  H/O angioplasty      FAMILY HISTORY:  No pertinent family history in first degree relatives      SOCIAL HISTORY:  TOBACCO: quit 30 years ago  ALCOHOL: denies abuse  IVDA: denies history    FUNCTIONAL, ENVIRONMENTAL HISTORY:  lives in an assisted living since 2016, uses a walker to ambulate and has had multiple falls recently    FUNCTIONAL HISTORY: independent with ambulation and ADLs with walker    Allergies    Benadryl (Other)  Demerol HCl (Other)    MEDICATIONS  (STANDING):  amLODIPine   Tablet 10milliGRAM(s) Oral daily  docusate sodium 100milliGRAM(s) Oral three times a day  gabapentin 600milliGRAM(s) Oral at bedtime  atorvastatin 40milliGRAM(s) Oral at bedtime  folic acid 1milliGRAM(s) Oral daily  insulin glargine Injectable (LANTUS) 10Unit(s) SubCutaneous at bedtime  insulin lispro (HumaLOG) corrective regimen sliding scale  SubCutaneous three times a day before meals  dextrose 50% Injectable 25Gram(s) IV Push once  metoprolol 50milliGRAM(s) Oral every 12 hours  pantoprazole    Tablet 40milliGRAM(s) Oral before breakfast  levothyroxine 75MICROGram(s) Oral daily  multivitamin/minerals 1Tablet(s) Oral daily  multivitamin 1Tablet(s) Oral daily    MEDICATIONS  (PRN):  fentaNYL    Injectable 50MICROGram(s) IV Push every 10 minutes PRN Moderate Pain (4 - 6)  morphine  - Injectable 2milliGRAM(s) IV Push every 4 hours PRN breakthrough pain  acetaminophen   Tablet 650milliGRAM(s) Oral every 6 hours PRN For Temp greater than 38 C (100.4 F)  ALPRAZolam 0.5milliGRAM(s) Oral three times a day PRN anxiety  dextrose Gel 1Dose(s) Oral once PRN Blood Glucose LESS THAN 70 milliGRAM(s)/deciliter  glucagon  Injectable 1milliGRAM(s) IntraMuscular once PRN Glucose LESS THAN 70 milligrams/deciliter  oxyCODONE IR 5milliGRAM(s) Oral every 4 hours PRN Severe Pain (7 - 10)  traMADol 25milliGRAM(s) Oral every 6 hours PRN Moderate Pain (4 - 6)      REVIEW OF SYSTEMS:    CONSTITUTIONAL: No fever  RESPIRATORY: No cough, No shortness of breath  CARDIOVASCULAR: No chest pain, No palpitations  GASTROINTESTINAL: No abdominal pain, No nausea, No vomiting, No diarrhea, No constipation  GENITOURINARY: No dysuria, No frequency, No incontinence  NEUROLOGICAL: No headaches, No dizziness, No loss of strength, No numbness  SKIN: No itching, No rashes  MUSCULOSKELETAL: No joint/muscle pain; No back pain  PSYCHIATRIC: No depression, No anxiety    Vital Signs Last 24 Hrs  T(C): 36.7, Max: 36.7 (03-14 @ 17:10)  T(F): 98, Max: 98.1 (03-14 @ 17:10)  HR: 72 (72 - 106)  BP: 138/40 (103/86 - 184/45)  BP(mean): --  RR: 20 (13 - 20)  SpO2: 100% (93% - 100%)    PHYSICAL EXAM:    GENERAL: NAD  HEENT:  Atraumatic, normocephalic, conjunctiva clear, moist mucous membranes  HEART: Regular rate and rhythm, No murmurs  CHEST/LUNG: Clear to auscultation bilaterally, normal respiratory effort  ABDOMEN: Soft, Nontender, Nondistended, Bowel sounds present  EXTREMITIES:  2+ Peripheral Pulses, No edema, residual limb appears c/d/i, no active bleeding or discharge noted.  SKIN: No rashes or lesions  NERVOUS SYSTEM:  Alert & Oriented X3, speech intact  CNs II-XII grossly intact  Motor Strength 5/5 B/L upper and right LE  Sensation intact to light touch symmetrically  DTRs 2+ intact and symmetric     LABS:                        8.6    6.6   )-----------( 251      ( 15 Mar 2017 05:12 )             26.8     15 Mar 2017 05:12    136    |  98     |  22.0   ----------------------------<  252    4.7     |  25.0   |  1.12     Ca    8.7        15 Mar 2017 05:12      84 yo female with PVD s/p stents now s/p left BKA    - PT - eval; strength and mobility  - OT - ADLs, balance  - Prec - Fall  - DVT ppx. - Lovenox per primary team  - Dispo - Patient is a 85y old female who presents with a chief complaint of b/l leg pain     HPI:  85 yr old RH female with multiple medical problems including CAD s/p stents, diabetes, hypertension, hyperlipidemia, anxiety, significant PVD s/p multiple stents, paroxysmal atrial fibrillation was admitted 3/9/17 for elective LE angiogram. Patient was noted to have elevated BP post procedure, and hence admission requested by Dr. Irwin. Per him, patient with occluded SFA, POP b/l with minimal outflow to b/l LE. 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 Doing well post-operatively. Pain adequately controlled at this time. Lovenox for DVT ppx.     PT/OT EVALUATION:   Bed mobility - mod assist  Transfers - Max assist  Gait - Unable to perform    PAST MEDICAL & SURGICAL HISTORY:  Hyperlipidemia  Hypertension  Diabetes  PVD (peripheral vascular disease)  CAD (coronary artery disease)  History of cholecystectomy  H/O angioplasty      FAMILY HISTORY:  No pertinent family history in first degree relatives      SOCIAL HISTORY:  TOBACCO: quit 30 years ago  ALCOHOL: denies abuse  IVDA: denies history    FUNCTIONAL, ENVIRONMENTAL HISTORY:  lives in an assisted living since Oct. 2016, uses a walker to ambulate and has had multiple falls recently    FUNCTIONAL HISTORY: independent with ambulation and ADLs with walker    Allergies    Benadryl (Other)  Demerol HCl (Other)    MEDICATIONS  (STANDING):  amLODIPine   Tablet 10milliGRAM(s) Oral daily  docusate sodium 100milliGRAM(s) Oral three times a day  gabapentin 600milliGRAM(s) Oral at bedtime  atorvastatin 40milliGRAM(s) Oral at bedtime  folic acid 1milliGRAM(s) Oral daily  insulin glargine Injectable (LANTUS) 10Unit(s) SubCutaneous at bedtime  insulin lispro (HumaLOG) corrective regimen sliding scale  SubCutaneous three times a day before meals  dextrose 50% Injectable 25Gram(s) IV Push once  metoprolol 50milliGRAM(s) Oral every 12 hours  pantoprazole    Tablet 40milliGRAM(s) Oral before breakfast  levothyroxine 75MICROGram(s) Oral daily  multivitamin/minerals 1Tablet(s) Oral daily  multivitamin 1Tablet(s) Oral daily    MEDICATIONS  (PRN):  fentaNYL    Injectable 50MICROGram(s) IV Push every 10 minutes PRN Moderate Pain (4 - 6)  morphine  - Injectable 2milliGRAM(s) IV Push every 4 hours PRN breakthrough pain  acetaminophen   Tablet 650milliGRAM(s) Oral every 6 hours PRN For Temp greater than 38 C (100.4 F)  ALPRAZolam 0.5milliGRAM(s) Oral three times a day PRN anxiety  dextrose Gel 1Dose(s) Oral once PRN Blood Glucose LESS THAN 70 milliGRAM(s)/deciliter  glucagon  Injectable 1milliGRAM(s) IntraMuscular once PRN Glucose LESS THAN 70 milligrams/deciliter  oxyCODONE IR 5milliGRAM(s) Oral every 4 hours PRN Severe Pain (7 - 10)  traMADol 25milliGRAM(s) Oral every 6 hours PRN Moderate Pain (4 - 6)      REVIEW OF SYSTEMS:    CONSTITUTIONAL: No fever  RESPIRATORY: No cough, No shortness of breath  CARDIOVASCULAR: No chest pain, No palpitations  GASTROINTESTINAL: No abdominal pain, No nausea, No vomiting, No diarrhea, No constipation  GENITOURINARY: + Bhatt  NEUROLOGICAL: No headaches, No dizziness, No loss of strength, + numbness/tingling in residual limb/left foot  SKIN: No itching, No rashes  MUSCULOSKELETAL: No joint/muscle pain; No back pain  PSYCHIATRIC: No depression, No anxiety    Vital Signs Last 24 Hrs  T(C): 36.7, Max: 36.7 (03-14 @ 17:10)  T(F): 98, Max: 98.1 (03-14 @ 17:10)  HR: 72 (72 - 106)  BP: 138/40 (103/86 - 184/45)  BP(mean): --  RR: 20 (13 - 20)  SpO2: 100% (93% - 100%)    PHYSICAL EXAM:    GENERAL: NAD  HEENT:  Atraumatic, normocephalic, conjunctiva clear, moist mucous membranes  HEART: Regular rate and rhythm, No murmurs  CHEST/LUNG: Clear to auscultation bilaterally, normal respiratory effort  ABDOMEN: Soft, Nontender, Nondistended, Bowel sounds present  EXTREMITIES:  2+ Peripheral Pulses, No edema, residual limb appears c/d/i, no active bleeding or discharge noted.  SKIN: No rashes or lesions  NERVOUS SYSTEM:  Alert & Oriented X3, speech intact  CNs II-XII grossly intact  Motor Strength 5/5 B/L upper and right LE  Sensation intact to light touch symmetrically  DTRs 2+ intact and symmetric     LABS:                        8.6    6.6   )-----------( 251      ( 15 Mar 2017 05:12 )             26.8     15 Mar 2017 05:12    136    |  98     |  22.0   ----------------------------<  252    4.7     |  25.0   |  1.12     Ca    8.7        15 Mar 2017 05:12      86 yo female with PVD s/p stents now s/p left BKA    - PT - eval; strength and mobility  - OT - ADLs, balance  - Prec - Fall  - DVT ppx. - Lovenox per primary team  - Dispo - Patient is a 85y old female who presents with a chief complaint of b/l leg pain     HPI:  85 yr old RH female with multiple medical problems including CAD s/p stents, diabetes, hypertension, hyperlipidemia, anxiety, significant PVD s/p multiple stents, paroxysmal atrial fibrillation was admitted 3/9/17 for elective LE angiogram. Patient was noted to have elevated BP post procedure, and hence admission requested by Dr. Irwin. Per him, patient with occluded SFA, POP b/l with minimal outflow to b/l LE. 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 Doing well post-operatively. Pain adequately controlled at this time. Lovenox for DVT ppx.     PT/OT EVALUATION:   Bed mobility - mod assist  Transfers - Max assist  Gait - Unable to perform    PAST MEDICAL & SURGICAL HISTORY:  Hyperlipidemia  Hypertension  Diabetes  PVD (peripheral vascular disease)  CAD (coronary artery disease)  History of cholecystectomy  H/O angioplasty      FAMILY HISTORY:  No pertinent family history in first degree relatives      SOCIAL HISTORY:  TOBACCO: quit 30 years ago  ALCOHOL: denies abuse  IVDA: denies history    FUNCTIONAL, ENVIRONMENTAL HISTORY:  lives in an assisted living since Oct. 2016, uses a walker to ambulate and has had multiple falls recently    FUNCTIONAL HISTORY: independent with ambulation and ADLs with walker    Allergies    Benadryl (Other)  Demerol HCl (Other)    MEDICATIONS  (STANDING):  amLODIPine   Tablet 10milliGRAM(s) Oral daily  docusate sodium 100milliGRAM(s) Oral three times a day  gabapentin 600milliGRAM(s) Oral at bedtime  atorvastatin 40milliGRAM(s) Oral at bedtime  folic acid 1milliGRAM(s) Oral daily  insulin glargine Injectable (LANTUS) 10Unit(s) SubCutaneous at bedtime  insulin lispro (HumaLOG) corrective regimen sliding scale  SubCutaneous three times a day before meals  dextrose 50% Injectable 25Gram(s) IV Push once  metoprolol 50milliGRAM(s) Oral every 12 hours  pantoprazole    Tablet 40milliGRAM(s) Oral before breakfast  levothyroxine 75MICROGram(s) Oral daily  multivitamin/minerals 1Tablet(s) Oral daily  multivitamin 1Tablet(s) Oral daily    MEDICATIONS  (PRN):  fentaNYL    Injectable 50MICROGram(s) IV Push every 10 minutes PRN Moderate Pain (4 - 6)  morphine  - Injectable 2milliGRAM(s) IV Push every 4 hours PRN breakthrough pain  acetaminophen   Tablet 650milliGRAM(s) Oral every 6 hours PRN For Temp greater than 38 C (100.4 F)  ALPRAZolam 0.5milliGRAM(s) Oral three times a day PRN anxiety  dextrose Gel 1Dose(s) Oral once PRN Blood Glucose LESS THAN 70 milliGRAM(s)/deciliter  glucagon  Injectable 1milliGRAM(s) IntraMuscular once PRN Glucose LESS THAN 70 milligrams/deciliter  oxyCODONE IR 5milliGRAM(s) Oral every 4 hours PRN Severe Pain (7 - 10)  traMADol 25milliGRAM(s) Oral every 6 hours PRN Moderate Pain (4 - 6)      REVIEW OF SYSTEMS:    CONSTITUTIONAL: No fever  RESPIRATORY: No cough, No shortness of breath  CARDIOVASCULAR: No chest pain, No palpitations  GASTROINTESTINAL: No abdominal pain, No nausea, No vomiting, No diarrhea, No constipation  GENITOURINARY: + Bhatt  NEUROLOGICAL: No headaches, No dizziness, No loss of strength, + numbness/tingling in residual limb/left foot  SKIN: No itching, No rashes  MUSCULOSKELETAL: No joint/muscle pain; No back pain  PSYCHIATRIC: No depression, No anxiety    Vital Signs Last 24 Hrs  T(C): 36.7, Max: 36.7 (03-14 @ 17:10)  T(F): 98, Max: 98.1 (03-14 @ 17:10)  HR: 72 (72 - 106)  BP: 138/40 (103/86 - 184/45)  BP(mean): --  RR: 20 (13 - 20)  SpO2: 100% (93% - 100%)    PHYSICAL EXAM:    GENERAL: NAD  HEENT:  Atraumatic, normocephalic, conjunctiva clear, moist mucous membranes  HEART: Regular rate and rhythm, No murmurs  CHEST/LUNG: Clear to auscultation bilaterally, normal respiratory effort  ABDOMEN: Soft, Nontender, Nondistended, Bowel sounds present  EXTREMITIES:  2+ Peripheral Pulses, No edema, residual limb appears c/d/i, no active bleeding or discharge noted.  SKIN: No rashes or lesions  NERVOUS SYSTEM:  Alert & Oriented X3, speech intact  CNs II-XII grossly intact  Motor Strength 5/5 B/L upper and right LE  Sensation intact to light touch symmetrically  DTRs 2+ intact and symmetric     LABS:                        8.6    6.6   )-----------( 251      ( 15 Mar 2017 05:12 )             26.8     15 Mar 2017 05:12    136    |  98     |  22.0   ----------------------------<  252    4.7     |  25.0   |  1.12     Ca    8.7        15 Mar 2017 05:12      86 yo female with PVD s/p stents now s/p left BKA    - PT - eval; strength and mobility  - OT - ADLs, balance  - Prec - Fall  - DVT ppx. - Lovenox per primary team  - Dispo - Recommend acute rehab. Patient would benefit from prosthetic eval and training and can tolerate 3 hrs of therapy per day 5/6 days per week. Patient is a 85y old female who presents with a chief complaint of b/l leg pain     HPI:  85 yr old RH female with multiple medical problems including CAD s/p stents, diabetes, hypertension, hyperlipidemia, anxiety, significant PVD s/p multiple stents, paroxysmal atrial fibrillation was admitted 3/9/17 for elective LE angiogram. Patient was noted to have elevated BP post procedure, and hence admission requested by Dr. Irwin. Per him, patient with occluded SFA, POP b/l with minimal outflow to b/l LE. 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 Doing well post-operatively. Pain adequately controlled at this time. Lovenox for DVT ppx.     PT/OT EVALUATION:   Bed mobility - mod assist  Transfers - Max assist  Gait - Unable to perform    PAST MEDICAL & SURGICAL HISTORY:  Hyperlipidemia  Hypertension  Diabetes  PVD (peripheral vascular disease)  CAD (coronary artery disease)  History of cholecystectomy  H/O angioplasty      FAMILY HISTORY:  No pertinent family history in first degree relatives      SOCIAL HISTORY:  TOBACCO: quit 30 years ago  ALCOHOL: denies abuse  IVDA: denies history    FUNCTIONAL, ENVIRONMENTAL HISTORY:  lives in an assisted living since Oct. 2016, uses a walker to ambulate and has had multiple falls recently    FUNCTIONAL HISTORY: independent with ambulation and ADLs with walker    Allergies    Benadryl (Other)  Demerol HCl (Other)    MEDICATIONS  (STANDING):  amLODIPine   Tablet 10milliGRAM(s) Oral daily  docusate sodium 100milliGRAM(s) Oral three times a day  gabapentin 600milliGRAM(s) Oral at bedtime  atorvastatin 40milliGRAM(s) Oral at bedtime  folic acid 1milliGRAM(s) Oral daily  insulin glargine Injectable (LANTUS) 10Unit(s) SubCutaneous at bedtime  insulin lispro (HumaLOG) corrective regimen sliding scale  SubCutaneous three times a day before meals  dextrose 50% Injectable 25Gram(s) IV Push once  metoprolol 50milliGRAM(s) Oral every 12 hours  pantoprazole    Tablet 40milliGRAM(s) Oral before breakfast  levothyroxine 75MICROGram(s) Oral daily  multivitamin/minerals 1Tablet(s) Oral daily  multivitamin 1Tablet(s) Oral daily    MEDICATIONS  (PRN):  fentaNYL    Injectable 50MICROGram(s) IV Push every 10 minutes PRN Moderate Pain (4 - 6)  morphine  - Injectable 2milliGRAM(s) IV Push every 4 hours PRN breakthrough pain  acetaminophen   Tablet 650milliGRAM(s) Oral every 6 hours PRN For Temp greater than 38 C (100.4 F)  ALPRAZolam 0.5milliGRAM(s) Oral three times a day PRN anxiety  dextrose Gel 1Dose(s) Oral once PRN Blood Glucose LESS THAN 70 milliGRAM(s)/deciliter  glucagon  Injectable 1milliGRAM(s) IntraMuscular once PRN Glucose LESS THAN 70 milligrams/deciliter  oxyCODONE IR 5milliGRAM(s) Oral every 4 hours PRN Severe Pain (7 - 10)  traMADol 25milliGRAM(s) Oral every 6 hours PRN Moderate Pain (4 - 6)      REVIEW OF SYSTEMS:    CONSTITUTIONAL: No fever  HEENT:Denies HA/dizziness  RESPIRATORY: No cough, No shortness of breath  CARDIOVASCULAR: No chest pain, No palpitations  GASTROINTESTINAL: No abdominal pain,   GENITOURINARY: + Bhatt  NEUROLOGICAL: No headaches, No dizziness, No loss of strength, + numbness/tingling in residual limb/left foot  SKIN: No itching, No rashes  MUSCULOSKELETAL: No joint/muscle pain; No back pain  PSYCHIATRIC: No depression, No anxiety    Vital Signs Last 24 Hrs  T(C): 36.7, Max: 36.7 (03-14 @ 17:10)  T(F): 98, Max: 98.1 (03-14 @ 17:10)  HR: 72 (72 - 106)  BP: 138/40 (103/86 - 184/45)  BP(mean): --  RR: 20 (13 - 20)  SpO2: 100% (93% - 100%)    PHYSICAL EXAM:    GENERAL: NAD  HEENT:  Atraumatic, normocephalic  HEART: Regular rate and rhythm, No murmurs  CHEST/LUNG: Clear to auscultation bilaterally, normal respiratory effort  ABDOMEN: Soft, Nontender, Nondistended, Bowel sounds present  EXTREMITIES:  No edema in RLE, residual limb dressing+,  no active bleeding or discharge noted.  SKIN: No rashes or lesions  NERVOUS SYSTEM:  Alert & Oriented X3, speech intact  CNs II-XII grossly intact  Motor Strength 5/5 B/L upper and right LE  Sensation intact to light touch symmetrically      LABS:                        8.6    6.6   )-----------( 251      ( 15 Mar 2017 05:12 )             26.8     15 Mar 2017 05:12    136    |  98     |  22.0   ----------------------------<  252    4.7     |  25.0   |  1.12     Ca    8.7        15 Mar 2017 05:12      86 yo female with PVD s/p stents now s/p left BKA    - PT - strength and mobility  - OT - ADLs, balance  - Prec - Fall  - DVT ppx. - Lovenox per primary team  - Dispo - Recommend acute rehab. Patient would benefit from acute rehab for preprosthetic conditioning and training and most likely will be able to tolerate 3 hrs of therapy per day 5/6 days per week. will monitor progress with bedside therapy.

## 2017-03-15 NOTE — PHYSICAL THERAPY INITIAL EVALUATION ADULT - TRANSFER SAFETY CONCERNS NOTED: SIT/STAND, REHAB EVAL
unsteadiness throughout requiring assistance, verbal cues for sequencing, decreased upright posture, increased reji UE support

## 2017-03-15 NOTE — PROGRESS NOTE ADULT - SUBJECTIVE AND OBJECTIVE BOX
seen for left BKA    pain ok....concerned about dosage/intervals of narcotics...advised to ask for pain meds as needed and if needs more RN can call MD  no cp/sob  ROS negative otherwise.    MEDICATIONS  (STANDING):  amLODIPine   Tablet 10milliGRAM(s) Oral daily  docusate sodium 100milliGRAM(s) Oral three times a day  gabapentin 600milliGRAM(s) Oral at bedtime  atorvastatin 40milliGRAM(s) Oral at bedtime  folic acid 1milliGRAM(s) Oral daily  insulin glargine Injectable (LANTUS) 10Unit(s) SubCutaneous at bedtime  insulin lispro (HumaLOG) corrective regimen sliding scale  SubCutaneous three times a day before meals  dextrose 50% Injectable 25Gram(s) IV Push once  metoprolol 50milliGRAM(s) Oral every 12 hours  pantoprazole    Tablet 40milliGRAM(s) Oral before breakfast  levothyroxine 75MICROGram(s) Oral daily  multivitamin/minerals 1Tablet(s) Oral daily  multivitamin 1Tablet(s) Oral daily    MEDICATIONS  (PRN):  fentaNYL    Injectable 50MICROGram(s) IV Push every 10 minutes PRN Moderate Pain (4 - 6)  morphine  - Injectable 2milliGRAM(s) IV Push every 4 hours PRN breakthrough pain  acetaminophen   Tablet 650milliGRAM(s) Oral every 6 hours PRN For Temp greater than 38 C (100.4 F)  ALPRAZolam 0.5milliGRAM(s) Oral three times a day PRN anxiety  dextrose Gel 1Dose(s) Oral once PRN Blood Glucose LESS THAN 70 milliGRAM(s)/deciliter  glucagon  Injectable 1milliGRAM(s) IntraMuscular once PRN Glucose LESS THAN 70 milligrams/deciliter  oxyCODONE IR 5milliGRAM(s) Oral every 4 hours PRN Severe Pain (7 - 10)  traMADol 25milliGRAM(s) Oral every 6 hours PRN Moderate Pain (4 - 6)      Allergies    Benadryl (Other)  Demerol HCl (Other)    Intolerances    Vital Signs Last 24 Hrs  T(C): 36.7, Max: 36.7 (03-14 @ 17:10)  T(F): 98, Max: 98.1 (03-14 @ 17:10)  HR: 72 (72 - 106)  BP: 138/40 (103/86 - 184/45)  BP(mean): --  RR: 20 (13 - 20)  SpO2: 100% (93% - 100%)    PHYSICAL EXAM:    GENERAL: NAD, brushing her teeth  CHEST/LUNG: Clear to percussion bilaterally; No rales, rhonchi, wheezing, or rubs  HEART: irreg irreg rate and rhythm; S1 S2; no murmurs noted  ABDOMEN: Soft, +BS  EXTREMITIES:  left bka bandaged. no edema RLE  NERVOUS SYSTEM:  Alert & Oriented X3, nonfocal  PSYCH: normal mood, appropriate response.    LABS:                        8.6    6.6   )-----------( 251      ( 15 Mar 2017 05:12 )             26.8     15 Mar 2017 05:12    136    |  98     |  22.0   ----------------------------<  252    4.7     |  25.0   |  1.12     Ca    8.7        15 Mar 2017 05:12            CAPILLARY BLOOD GLUCOSE  271 (15 Mar 2017 12:21)  249 (15 Mar 2017 08:19)  186 (14 Mar 2017 20:55)  181 (14 Mar 2017 17:46)        RADIOLOGY & ADDITIONAL TESTS:

## 2017-03-15 NOTE — CONSULT NOTE ADULT - ATTENDING COMMENTS
85 year old female with h/o CAD, stents, PVD with multiple stents, DM-II, HTN, admitted to hospital with hypertensive urgency along with finding of occluded vasculature of Left LE. underwent Left BKA on 3/14/17. Patient reports prior history of feeling off balance and with falls. Used a walker at assisted living.  Would recommend acute rehab PT/OT 3 hrs/day 5-6 days/week as she needs medical oversight due to multiple medical comorbidities, recent surgery. She should be able to tolerate acute rehab program to focus on balance, transfers, UE strengthening, WC level of mobility and ADLs.    Thank you. d/w CCC

## 2017-03-16 DIAGNOSIS — D50.0 IRON DEFICIENCY ANEMIA SECONDARY TO BLOOD LOSS (CHRONIC): ICD-10-CM

## 2017-03-16 LAB
ANION GAP SERPL CALC-SCNC: 10 MMOL/L — SIGNIFICANT CHANGE UP (ref 5–17)
BUN SERPL-MCNC: 22 MG/DL — HIGH (ref 8–20)
CALCIUM SERPL-MCNC: 8.8 MG/DL — SIGNIFICANT CHANGE UP (ref 8.6–10.2)
CHLORIDE SERPL-SCNC: 96 MMOL/L — LOW (ref 98–107)
CO2 SERPL-SCNC: 27 MMOL/L — SIGNIFICANT CHANGE UP (ref 22–29)
CREAT SERPL-MCNC: 1.03 MG/DL — SIGNIFICANT CHANGE UP (ref 0.5–1.3)
GLUCOSE SERPL-MCNC: 297 MG/DL — HIGH (ref 70–115)
HCT VFR BLD CALC: 24.9 % — LOW (ref 37–47)
HGB BLD-MCNC: 8.1 G/DL — LOW (ref 12–16)
MCHC RBC-ENTMCNC: 27.8 PG — SIGNIFICANT CHANGE UP (ref 27–31)
MCHC RBC-ENTMCNC: 32.5 G/DL — SIGNIFICANT CHANGE UP (ref 32–36)
MCV RBC AUTO: 85.6 FL — SIGNIFICANT CHANGE UP (ref 81–99)
PLATELET # BLD AUTO: 257 K/UL — SIGNIFICANT CHANGE UP (ref 150–400)
POTASSIUM SERPL-MCNC: 4.5 MMOL/L — SIGNIFICANT CHANGE UP (ref 3.5–5.3)
POTASSIUM SERPL-SCNC: 4.5 MMOL/L — SIGNIFICANT CHANGE UP (ref 3.5–5.3)
RBC # BLD: 2.91 M/UL — LOW (ref 4.4–5.2)
RBC # FLD: 13.7 % — SIGNIFICANT CHANGE UP (ref 11–15.6)
SODIUM SERPL-SCNC: 133 MMOL/L — LOW (ref 135–145)
WBC # BLD: 6.3 K/UL — SIGNIFICANT CHANGE UP (ref 4.8–10.8)
WBC # FLD AUTO: 6.3 K/UL — SIGNIFICANT CHANGE UP (ref 4.8–10.8)

## 2017-03-16 PROCEDURE — 99233 SBSQ HOSP IP/OBS HIGH 50: CPT

## 2017-03-16 RX ORDER — ONDANSETRON 8 MG/1
4 TABLET, FILM COATED ORAL EVERY 6 HOURS
Qty: 0 | Refills: 0 | Status: DISCONTINUED | OUTPATIENT
Start: 2017-03-16 | End: 2017-03-29

## 2017-03-16 RX ORDER — FERROUS SULFATE 325(65) MG
325 TABLET ORAL EVERY 12 HOURS
Qty: 0 | Refills: 0 | Status: DISCONTINUED | OUTPATIENT
Start: 2017-03-16 | End: 2017-03-29

## 2017-03-16 RX ADMIN — Medication 1 TABLET(S): at 12:42

## 2017-03-16 RX ADMIN — Medication 50 MILLIGRAM(S): at 05:00

## 2017-03-16 RX ADMIN — INSULIN GLARGINE 10 UNIT(S): 100 INJECTION, SOLUTION SUBCUTANEOUS at 22:15

## 2017-03-16 RX ADMIN — Medication 100 MILLIGRAM(S): at 22:14

## 2017-03-16 RX ADMIN — TRAMADOL HYDROCHLORIDE 25 MILLIGRAM(S): 50 TABLET ORAL at 13:14

## 2017-03-16 RX ADMIN — OXYCODONE HYDROCHLORIDE 5 MILLIGRAM(S): 5 TABLET ORAL at 01:15

## 2017-03-16 RX ADMIN — Medication 100 MILLIGRAM(S): at 05:00

## 2017-03-16 RX ADMIN — GABAPENTIN 600 MILLIGRAM(S): 400 CAPSULE ORAL at 22:15

## 2017-03-16 RX ADMIN — TRAMADOL HYDROCHLORIDE 25 MILLIGRAM(S): 50 TABLET ORAL at 11:11

## 2017-03-16 RX ADMIN — OXYCODONE HYDROCHLORIDE 5 MILLIGRAM(S): 5 TABLET ORAL at 17:00

## 2017-03-16 RX ADMIN — GABAPENTIN 300 MILLIGRAM(S): 400 CAPSULE ORAL at 11:12

## 2017-03-16 RX ADMIN — Medication 100 MILLIGRAM(S): at 12:37

## 2017-03-16 RX ADMIN — OXYCODONE HYDROCHLORIDE 5 MILLIGRAM(S): 5 TABLET ORAL at 05:25

## 2017-03-16 RX ADMIN — ENOXAPARIN SODIUM 40 MILLIGRAM(S): 100 INJECTION SUBCUTANEOUS at 22:15

## 2017-03-16 RX ADMIN — Medication 8: at 12:43

## 2017-03-16 RX ADMIN — PANTOPRAZOLE SODIUM 40 MILLIGRAM(S): 20 TABLET, DELAYED RELEASE ORAL at 05:00

## 2017-03-16 RX ADMIN — ATORVASTATIN CALCIUM 40 MILLIGRAM(S): 80 TABLET, FILM COATED ORAL at 22:14

## 2017-03-16 RX ADMIN — ONDANSETRON 4 MILLIGRAM(S): 8 TABLET, FILM COATED ORAL at 13:17

## 2017-03-16 RX ADMIN — Medication 1 MILLIGRAM(S): at 11:12

## 2017-03-16 RX ADMIN — OXYCODONE HYDROCHLORIDE 5 MILLIGRAM(S): 5 TABLET ORAL at 04:54

## 2017-03-16 RX ADMIN — OXYCODONE HYDROCHLORIDE 5 MILLIGRAM(S): 5 TABLET ORAL at 00:45

## 2017-03-16 RX ADMIN — OXYCODONE HYDROCHLORIDE 5 MILLIGRAM(S): 5 TABLET ORAL at 16:13

## 2017-03-16 RX ADMIN — Medication 0.5 MILLIGRAM(S): at 12:42

## 2017-03-16 RX ADMIN — FENTANYL CITRATE 50 MICROGRAM(S): 50 INJECTION INTRAVENOUS at 00:10

## 2017-03-16 RX ADMIN — Medication 75 MICROGRAM(S): at 05:00

## 2017-03-16 RX ADMIN — Medication: at 18:06

## 2017-03-16 RX ADMIN — AMLODIPINE BESYLATE 10 MILLIGRAM(S): 2.5 TABLET ORAL at 05:00

## 2017-03-16 RX ADMIN — Medication 50 MILLIGRAM(S): at 18:07

## 2017-03-16 RX ADMIN — Medication 325 MILLIGRAM(S): at 18:05

## 2017-03-16 RX ADMIN — Medication 6: at 08:44

## 2017-03-16 NOTE — PROGRESS NOTE ADULT - SUBJECTIVE AND OBJECTIVE BOX
seen for left bka    complains of "confusion" but alert/oriented x 3  pain ok   no cp/sob  Right heel hurts  ROS otherwise negative     MEDICATIONS  (STANDING):  amLODIPine   Tablet 10milliGRAM(s) Oral daily  docusate sodium 100milliGRAM(s) Oral three times a day  gabapentin 600milliGRAM(s) Oral at bedtime  atorvastatin 40milliGRAM(s) Oral at bedtime  folic acid 1milliGRAM(s) Oral daily  insulin glargine Injectable (LANTUS) 10Unit(s) SubCutaneous at bedtime  insulin lispro (HumaLOG) corrective regimen sliding scale  SubCutaneous three times a day before meals  dextrose 50% Injectable 25Gram(s) IV Push once  metoprolol 50milliGRAM(s) Oral every 12 hours  pantoprazole    Tablet 40milliGRAM(s) Oral before breakfast  levothyroxine 75MICROGram(s) Oral daily  multivitamin/minerals 1Tablet(s) Oral daily  enoxaparin Injectable 40milliGRAM(s) SubCutaneous daily  multivitamin 1Tablet(s) Oral daily  gabapentin 300milliGRAM(s) Oral daily  ferrous    sulfate 325milliGRAM(s) Oral every 12 hours    MEDICATIONS  (PRN):  fentaNYL    Injectable 50MICROGram(s) IV Push every 10 minutes PRN Moderate Pain (4 - 6)  morphine  - Injectable 2milliGRAM(s) IV Push every 4 hours PRN breakthrough pain  acetaminophen   Tablet 650milliGRAM(s) Oral every 6 hours PRN For Temp greater than 38 C (100.4 F)  ALPRAZolam 0.5milliGRAM(s) Oral three times a day PRN anxiety  dextrose Gel 1Dose(s) Oral once PRN Blood Glucose LESS THAN 70 milliGRAM(s)/deciliter  glucagon  Injectable 1milliGRAM(s) IntraMuscular once PRN Glucose LESS THAN 70 milligrams/deciliter  oxyCODONE IR 5milliGRAM(s) Oral every 4 hours PRN Severe Pain (7 - 10)  traMADol 25milliGRAM(s) Oral every 6 hours PRN Moderate Pain (4 - 6)      Allergies    Benadryl (Other)  Demerol HCl (Other)    Intolerances        Vital Signs Last 24 Hrs  T(C): 37.2, Max: 37.4 (03-15 @ 22:23)  T(F): 99, Max: 99.3 (03-15 @ 22:23)  HR: 88 (75 - 88)  BP: 120/56 (108/40 - 158/50)  BP(mean): --  RR: 18 (18 - 21)  SpO2: 94% (94% - 100%)    PHYSICAL EXAM:    GENERAL: NAD  CHEST/LUNG: Clear to percussion bilaterally; No rales, rhonchi, wheezing, or rubs  HEART: Regular rate and rhythm; S1 S2; no murmurs noted  ABDOMEN: Soft, Nontender, Nondistended; Bowel sounds present  EXTREMITIES:  left leg bandaged    right heel bandaged in off loading boot  SKIN: No rashes or lesions  NERVOUS SYSTEM:  Alert & Oriented X3,  nonfocal exam  PSYCH: normal mood, appropriate response.    LABS:                        8.1    6.3   )-----------( 257      ( 16 Mar 2017 04:55 )             24.9     16 Mar 2017 04:54    133    |  96     |  22.0   ----------------------------<  297    4.5     |  27.0   |  1.03     Ca    8.8        16 Mar 2017 04:54            CAPILLARY BLOOD GLUCOSE  310 (15 Mar 2017 20:21)  325 (15 Mar 2017 17:22)  271 (15 Mar 2017 12:21)        RADIOLOGY & ADDITIONAL TESTS:

## 2017-03-16 NOTE — PROGRESS NOTE ADULT - ASSESSMENT
85 yr old female with multiple medical problems CAD s/p stents, diabetes, hypertension, hyperlipidemia, anxiety, significant PVD s/p multiple stents, paroxysmal atrial fibrillation admitted for hypertensive urgency post LE angiogram and cellulitis. s/p BKA 3/14/17. accepted to acute rehab.

## 2017-03-16 NOTE — PROGRESS NOTE ADULT - PROBLEM SELECTOR PLAN 1
OOB with PT  DVT prophylaxis  Monitor H/H  Offloading to the right heel  Dressing change tomorrow  D/C planning

## 2017-03-16 NOTE — PROGRESS NOTE ADULT - SUBJECTIVE AND OBJECTIVE BOX
POD # 2 s/p L LASHAY. Pt says that she is slightly confused. c/o Pain in the right heel.    Vital Signs Last 24 Hrs  T(C): 37.2, Max: 37.4 (03-15 @ 22:23)  T(F): 99, Max: 99.3 (03-15 @ 22:23)  HR: 88 (75 - 88)  BP: 120/56 (108/40 - 158/50)  BP(mean): --  RR: 18 (18 - 21)  SpO2: 94% (94% - 100%)                          8.1    6.3   )-----------( 257      ( 16 Mar 2017 04:55 )             24.9     16 Mar 2017 04:54    133    |  96     |  22.0   ----------------------------<  297    4.5     |  27.0   |  1.03     Ca    8.8        16 Mar 2017 04:54

## 2017-03-17 ENCOUNTER — TRANSCRIPTION ENCOUNTER (OUTPATIENT)
Age: 82
End: 2017-03-17

## 2017-03-17 PROBLEM — E78.5 HYPERLIPIDEMIA, UNSPECIFIED: Chronic | Status: ACTIVE | Noted: 2017-03-09

## 2017-03-17 PROBLEM — I73.9 PERIPHERAL VASCULAR DISEASE, UNSPECIFIED: Chronic | Status: ACTIVE | Noted: 2017-03-09

## 2017-03-17 PROBLEM — E11.9 TYPE 2 DIABETES MELLITUS WITHOUT COMPLICATIONS: Chronic | Status: ACTIVE | Noted: 2017-03-09

## 2017-03-17 PROBLEM — I25.10 ATHEROSCLEROTIC HEART DISEASE OF NATIVE CORONARY ARTERY WITHOUT ANGINA PECTORIS: Chronic | Status: ACTIVE | Noted: 2017-03-09

## 2017-03-17 PROBLEM — I10 ESSENTIAL (PRIMARY) HYPERTENSION: Chronic | Status: ACTIVE | Noted: 2017-03-09

## 2017-03-17 LAB
ANION GAP SERPL CALC-SCNC: 9 MMOL/L — SIGNIFICANT CHANGE UP (ref 5–17)
APPEARANCE UR: CLEAR — SIGNIFICANT CHANGE UP
BILIRUB UR-MCNC: NEGATIVE — SIGNIFICANT CHANGE UP
BLD GP AB SCN SERPL QL: SIGNIFICANT CHANGE UP
BUN SERPL-MCNC: 17 MG/DL — SIGNIFICANT CHANGE UP (ref 8–20)
CALCIUM SERPL-MCNC: 9.1 MG/DL — SIGNIFICANT CHANGE UP (ref 8.6–10.2)
CHLORIDE SERPL-SCNC: 96 MMOL/L — LOW (ref 98–107)
CO2 SERPL-SCNC: 29 MMOL/L — SIGNIFICANT CHANGE UP (ref 22–29)
COLOR SPEC: YELLOW — SIGNIFICANT CHANGE UP
CREAT SERPL-MCNC: 0.8 MG/DL — SIGNIFICANT CHANGE UP (ref 0.5–1.3)
DIFF PNL FLD: ABNORMAL
GLUCOSE SERPL-MCNC: 234 MG/DL — HIGH (ref 70–115)
GLUCOSE UR QL: 250 MG/DL
HCT VFR BLD CALC: 27.1 % — LOW (ref 37–47)
HGB BLD-MCNC: 8.7 G/DL — LOW (ref 12–16)
KETONES UR-MCNC: NEGATIVE — SIGNIFICANT CHANGE UP
LEUKOCYTE ESTERASE UR-ACNC: NEGATIVE — SIGNIFICANT CHANGE UP
MCHC RBC-ENTMCNC: 27.4 PG — SIGNIFICANT CHANGE UP (ref 27–31)
MCHC RBC-ENTMCNC: 32.1 G/DL — SIGNIFICANT CHANGE UP (ref 32–36)
MCV RBC AUTO: 85.2 FL — SIGNIFICANT CHANGE UP (ref 81–99)
NITRITE UR-MCNC: NEGATIVE — SIGNIFICANT CHANGE UP
PH UR: 7 — SIGNIFICANT CHANGE UP (ref 4.8–8)
PLATELET # BLD AUTO: 231 K/UL — SIGNIFICANT CHANGE UP (ref 150–400)
POTASSIUM SERPL-MCNC: 4.3 MMOL/L — SIGNIFICANT CHANGE UP (ref 3.5–5.3)
POTASSIUM SERPL-SCNC: 4.3 MMOL/L — SIGNIFICANT CHANGE UP (ref 3.5–5.3)
PROT UR-MCNC: 30 MG/DL
RBC # BLD: 3.18 M/UL — LOW (ref 4.4–5.2)
RBC # FLD: 13.8 % — SIGNIFICANT CHANGE UP (ref 11–15.6)
RBC CASTS # UR COMP ASSIST: >50 /HPF (ref 0–4)
SODIUM SERPL-SCNC: 134 MMOL/L — LOW (ref 135–145)
SP GR SPEC: 1 — LOW (ref 1.01–1.02)
TYPE + AB SCN PNL BLD: SIGNIFICANT CHANGE UP
UROBILINOGEN FLD QL: NEGATIVE MG/DL — SIGNIFICANT CHANGE UP
WBC # BLD: 7.6 K/UL — SIGNIFICANT CHANGE UP (ref 4.8–10.8)
WBC # FLD AUTO: 7.6 K/UL — SIGNIFICANT CHANGE UP (ref 4.8–10.8)
WBC UR QL: SIGNIFICANT CHANGE UP

## 2017-03-17 PROCEDURE — 99233 SBSQ HOSP IP/OBS HIGH 50: CPT

## 2017-03-17 RX ORDER — INSULIN LISPRO 100/ML
3 VIAL (ML) SUBCUTANEOUS ONCE
Qty: 0 | Refills: 0 | Status: COMPLETED | OUTPATIENT
Start: 2017-03-17 | End: 2017-03-17

## 2017-03-17 RX ORDER — ALPRAZOLAM 0.25 MG
0.5 TABLET ORAL AT BEDTIME
Qty: 0 | Refills: 0 | Status: DISCONTINUED | OUTPATIENT
Start: 2017-03-17 | End: 2017-03-18

## 2017-03-17 RX ORDER — TRAMADOL HYDROCHLORIDE 50 MG/1
50 TABLET ORAL EVERY 6 HOURS
Qty: 0 | Refills: 0 | Status: DISCONTINUED | OUTPATIENT
Start: 2017-03-17 | End: 2017-03-19

## 2017-03-17 RX ORDER — INSULIN GLARGINE 100 [IU]/ML
15 INJECTION, SOLUTION SUBCUTANEOUS AT BEDTIME
Qty: 0 | Refills: 0 | Status: DISCONTINUED | OUTPATIENT
Start: 2017-03-17 | End: 2017-03-18

## 2017-03-17 RX ADMIN — Medication 100 MILLIGRAM(S): at 06:35

## 2017-03-17 RX ADMIN — INSULIN GLARGINE 15 UNIT(S): 100 INJECTION, SOLUTION SUBCUTANEOUS at 23:13

## 2017-03-17 RX ADMIN — PANTOPRAZOLE SODIUM 40 MILLIGRAM(S): 20 TABLET, DELAYED RELEASE ORAL at 06:35

## 2017-03-17 RX ADMIN — Medication 50 MILLIGRAM(S): at 17:59

## 2017-03-17 RX ADMIN — Medication 1 TABLET(S): at 11:32

## 2017-03-17 RX ADMIN — Medication 8: at 11:32

## 2017-03-17 RX ADMIN — Medication 3 UNIT(S): at 23:13

## 2017-03-17 RX ADMIN — Medication 100 MILLIGRAM(S): at 22:26

## 2017-03-17 RX ADMIN — Medication 325 MILLIGRAM(S): at 06:35

## 2017-03-17 RX ADMIN — OXYCODONE HYDROCHLORIDE 5 MILLIGRAM(S): 5 TABLET ORAL at 17:30

## 2017-03-17 RX ADMIN — Medication 8: at 08:42

## 2017-03-17 RX ADMIN — ATORVASTATIN CALCIUM 40 MILLIGRAM(S): 80 TABLET, FILM COATED ORAL at 22:26

## 2017-03-17 RX ADMIN — GABAPENTIN 600 MILLIGRAM(S): 400 CAPSULE ORAL at 22:27

## 2017-03-17 RX ADMIN — Medication 100 MILLIGRAM(S): at 13:53

## 2017-03-17 RX ADMIN — Medication 1 MILLIGRAM(S): at 11:32

## 2017-03-17 RX ADMIN — Medication 4: at 17:59

## 2017-03-17 RX ADMIN — GABAPENTIN 300 MILLIGRAM(S): 400 CAPSULE ORAL at 11:32

## 2017-03-17 RX ADMIN — OXYCODONE HYDROCHLORIDE 5 MILLIGRAM(S): 5 TABLET ORAL at 01:45

## 2017-03-17 RX ADMIN — ENOXAPARIN SODIUM 40 MILLIGRAM(S): 100 INJECTION SUBCUTANEOUS at 22:27

## 2017-03-17 RX ADMIN — Medication 75 MICROGRAM(S): at 06:36

## 2017-03-17 RX ADMIN — Medication 0.5 MILLIGRAM(S): at 22:26

## 2017-03-17 RX ADMIN — Medication 325 MILLIGRAM(S): at 17:59

## 2017-03-17 RX ADMIN — Medication 50 MILLIGRAM(S): at 06:36

## 2017-03-17 RX ADMIN — AMLODIPINE BESYLATE 10 MILLIGRAM(S): 2.5 TABLET ORAL at 06:35

## 2017-03-17 RX ADMIN — OXYCODONE HYDROCHLORIDE 5 MILLIGRAM(S): 5 TABLET ORAL at 02:15

## 2017-03-17 RX ADMIN — OXYCODONE HYDROCHLORIDE 5 MILLIGRAM(S): 5 TABLET ORAL at 07:41

## 2017-03-17 RX ADMIN — MORPHINE SULFATE 2 MILLIGRAM(S): 50 CAPSULE, EXTENDED RELEASE ORAL at 11:20

## 2017-03-17 RX ADMIN — OXYCODONE HYDROCHLORIDE 5 MILLIGRAM(S): 5 TABLET ORAL at 16:57

## 2017-03-17 RX ADMIN — OXYCODONE HYDROCHLORIDE 5 MILLIGRAM(S): 5 TABLET ORAL at 06:36

## 2017-03-17 RX ADMIN — MORPHINE SULFATE 2 MILLIGRAM(S): 50 CAPSULE, EXTENDED RELEASE ORAL at 11:27

## 2017-03-17 NOTE — OCCUPATIONAL THERAPY INITIAL EVALUATION ADULT - ADDITIONAL COMMENTS
Pt has shower stall with curtain and grab bars; pt with handicap toilet and grab bars  Pt owns a RW, shower chair  Pt is right handed

## 2017-03-17 NOTE — PROGRESS NOTE ADULT - PROBLEM SELECTOR PLAN 1
daily dressing changes  d/c planning  patient is at risk for RLE limb loss if develops tissue loss based on angio done this admission.  will follow

## 2017-03-17 NOTE — DISCHARGE NOTE ADULT - MEDICATION SUMMARY - MEDICATIONS TO CHANGE
I will SWITCH the dose or number of times a day I take the medications listed below when I get home from the hospital:    amLODIPine 2.5 mg oral tablet  -- 1 tab(s) by mouth once a day    gabapentin enacarbil 600 mg oral tablet, extended release  -- 1 tab(s) by mouth once a day (at bedtime)    HumaLOG KwikPen 100 units/mL subcutaneous solution  --  subcutaneous   -- as per sliding scale    ALPRAZolam 0.5 mg oral tablet  -- 1 tab(s) by mouth 3 times a day, As Needed

## 2017-03-17 NOTE — DISCHARGE NOTE ADULT - MEDICATION SUMMARY - MEDICATIONS TO TAKE
I will START or STAY ON the medications listed below when I get home from the hospital:    acetaminophen 325 mg oral tablet  -- 2 tab(s) by mouth every 6 hours  -- Indication: For Pain    oxyCODONE 5 mg oral tablet  -- 1 tab(s) by mouth every 6 hours, As needed, Severe Pain (7 - 10)  -- Indication: For Pain    tamsulosin 0.4 mg oral capsule  -- 1 cap(s) by mouth once a day (at bedtime)  -- Indication: For Urinary retention    Eliquis 2.5 mg oral tablet  -- 1 tab(s) by mouth 2 times a day  -- Indication: For Atrial fibrillation    gabapentin 300 mg oral capsule  -- 1 cap(s) by mouth once a day am  -- Indication: For Pain    gabapentin  -- 900 milligram(s) by mouth once a day (at bedtime)  -- Indication: For Pain    insulin glargine  -- 30 unit(s) subcutaneous once a day (at bedtime)  -- Indication: For Diabetes    insulin lispro 100 units/mL subcutaneous solution  -- 5 unit(s) subcutaneous once a day (before a meal)  -- Indication: For Diabetes    insulin lispro 100 units/mL subcutaneous solution  -- 5 unit(s) subcutaneous once a day (before a meal)  -- Indication: For Diabetes    insulin lispro 100 units/mL subcutaneous solution  -- 5 unit(s) subcutaneous once a day (before a meal)  -- Indication: For Diabetes    insulin lispro 100 units/mL subcutaneous solution  --  subcutaneous 3 times a day (before meals); 2 Unit(s) if Glucose 151 - 200  4 Unit(s) if Glucose 201 - 250  6 Unit(s) if Glucose 251 - 300  8 Unit(s) if Glucose 301 - 350  10 Unit(s) if Glucose 351 - 400  12 Unit(s) if Glucose Greater Than 400  -- Indication: For Diabetes    Zofran 4 mg oral tablet  -- 1 tab(s) by mouth every 6 hours, As Needed  -- Indication: For nausea    atorvastatin 40 mg oral tablet  -- 1 tab(s) by mouth once a day  -- Indication: For 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    clopidogrel 75 mg oral tablet  -- 1 tab(s) by mouth once a day  -- Indication: For PVD (peripheral vascular disease)    ALPRAZolam 0.25 mg oral tablet  -- 1 tab(s) by mouth every 8 hours, As needed, anxiety  -- Indication: For Anxiety    metoprolol tartrate 50 mg oral tablet  -- 1 tab(s) by mouth 2 times a day  -- Indication: For Hypertension    amLODIPine 10 mg oral tablet  -- 1 tab(s) by mouth once a day  -- Indication: For Hypertension    ferrous gluconate 324 mg (38 mg elemental iron) oral tablet  -- 1 tab(s) by mouth once a day  -- Indication: For Anemia    docusate sodium 100 mg oral capsule  -- 1 cap(s) by mouth 2 times a day  -- Indication: For Constipation    polyethylene glycol 3350 oral powder for reconstitution  -- 17 gram(s) by mouth once a day  -- Indication: For Constipation    senna oral tablet  -- 2 tab(s) by mouth once a day (at bedtime)  -- Indication: For Constipation    sodium chloride 1 g oral tablet  -- 1 tab(s) by mouth 3 times a day (with meals)  -- Indication: For Hyponatremia    pantoprazole 40 mg oral delayed release tablet  -- 1 tab(s) by mouth once a day  -- Indication: For gerd    ciprofloxacin 500 mg oral tablet  -- 1 tab(s) by mouth every 12 hours x 5 days  -- Indication: For UTI (urinary tract infection)    Synthroid 75 mcg (0.075 mg) oral tablet  -- 1 tab(s) by mouth once a day  -- Indication: For Hypothyroid    Multiple Vitamins with Minerals oral tablet  -- 1 tab(s) by mouth once a day  -- Indication: For vitamin    folic acid 1 mg oral tablet  -- 1 tab(s) by mouth once a day  -- Indication: For vitamin I will START or STAY ON the medications listed below when I get home from the hospital:    acetaminophen 325 mg oral tablet  -- 2 tab(s) by mouth every 6 hours  -- Indication: For Pain    oxyCODONE 5 mg oral tablet  -- 1 tab(s) by mouth every 6 hours, As needed, Severe Pain (7 - 10)  -- Indication: For Pain    tamsulosin 0.4 mg oral capsule  -- 1 cap(s) by mouth once a day (at bedtime)  -- Indication: For Urinary retention    Eliquis 2.5 mg oral tablet  -- 1 tab(s) by mouth 2 times a day  -- Indication: For Atrial fibrillation    gabapentin 100 mg oral capsule  -- 2 cap(s) by mouth once a day in afternoon  -- Indication: For Pain    gabapentin 300 mg oral capsule  -- 1 cap(s) by mouth once a day am  -- Indication: For Pain    gabapentin  -- 900 milligram(s) by mouth once a day (at bedtime)  -- Indication: For Pain    insulin glargine  -- 30 unit(s) subcutaneous once a day (at bedtime)  -- Indication: For Diabetes    insulin lispro 100 units/mL subcutaneous solution  -- 5 unit(s) subcutaneous once a day (before a meal)  -- Indication: For Diabetes    insulin lispro 100 units/mL subcutaneous solution  -- 5 unit(s) subcutaneous once a day (before a meal)  -- Indication: For Diabetes    insulin lispro 100 units/mL subcutaneous solution  -- 5 unit(s) subcutaneous once a day (before a meal)  -- Indication: For Diabetes    insulin lispro 100 units/mL subcutaneous solution  --  subcutaneous 3 times a day (before meals); 2 Unit(s) if Glucose 151 - 200  4 Unit(s) if Glucose 201 - 250  6 Unit(s) if Glucose 251 - 300  8 Unit(s) if Glucose 301 - 350  10 Unit(s) if Glucose 351 - 400  12 Unit(s) if Glucose Greater Than 400  -- Indication: For Diabetes    Zofran 4 mg oral tablet  -- 1 tab(s) by mouth every 6 hours, As Needed  -- Indication: For nausea    atorvastatin 40 mg oral tablet  -- 1 tab(s) by mouth once a day  -- Indication: For 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    clopidogrel 75 mg oral tablet  -- 1 tab(s) by mouth once a day  -- Indication: For PVD (peripheral vascular disease)    ALPRAZolam 0.25 mg oral tablet  -- 1 tab(s) by mouth every 8 hours, As needed, anxiety  -- Indication: For Anxiety    metoprolol tartrate 50 mg oral tablet  -- 1 tab(s) by mouth 2 times a day  -- Indication: For Hypertension    amLODIPine 10 mg oral tablet  -- 1 tab(s) by mouth once a day  -- Indication: For Hypertension    ferrous gluconate 324 mg (38 mg elemental iron) oral tablet  -- 1 tab(s) by mouth once a day  -- Indication: For Anemia    docusate sodium 100 mg oral capsule  -- 1 cap(s) by mouth 2 times a day  -- Indication: For Constipation    polyethylene glycol 3350 oral powder for reconstitution  -- 17 gram(s) by mouth once a day  -- Indication: For Constipation    senna oral tablet  -- 2 tab(s) by mouth once a day (at bedtime)  -- Indication: For Constipation    sodium chloride 1 g oral tablet  -- 1 tab(s) by mouth 3 times a day (with meals)  -- Indication: For Hyponatremia    pantoprazole 40 mg oral delayed release tablet  -- 1 tab(s) by mouth once a day  -- Indication: For gerd    ciprofloxacin 500 mg oral tablet  -- 1 tab(s) by mouth every 12 hours x 5 days  -- Indication: For UTI (urinary tract infection)    Synthroid 75 mcg (0.075 mg) oral tablet  -- 1 tab(s) by mouth once a day  -- Indication: For Hypothyroid    Multiple Vitamins with Minerals oral tablet  -- 1 tab(s) by mouth once a day  -- Indication: For vitamin    folic acid 1 mg oral tablet  -- 1 tab(s) by mouth once a day  -- Indication: For vitamin

## 2017-03-17 NOTE — PROGRESS NOTE ADULT - SUBJECTIVE AND OBJECTIVE BOX
POD#3 s/p L BKA. Pt c/o some pain in the stump and RLE rest pain.    Vital Signs Last 24 Hrs  T(C): 36.6, Max: 37.5 (03-17 @ 06:27)  T(F): 97.8, Max: 99.5 (03-17 @ 06:27)  HR: 90 (82 - 92)  BP: 140/60 (140/60 - 158/60)  BP(mean): --  RR: 17 (16 - 18)  SpO2: 99% (93% - 100%)

## 2017-03-17 NOTE — OCCUPATIONAL THERAPY INITIAL EVALUATION ADULT - PLANNED THERAPY INTERVENTIONS, OT EVAL
transfer training/bed mobility training/strengthening/ADL retraining/balance training/motor coordination training/neuromuscular re-education/ROM

## 2017-03-17 NOTE — DISCHARGE NOTE ADULT - PATIENT PORTAL LINK FT
“You can access the [employer] Patient Portal, offered by [facility], by registering with the following website: [email or web address]”

## 2017-03-17 NOTE — PROGRESS NOTE ADULT - SUBJECTIVE AND OBJECTIVE BOX
seen for left BKA    patient has no acute complaints, mildly confused. denies any pain/cp/sob  states she is tired from moving with PT to bed   ROS otherwise negative   per family at bedside, patient has poor appetite, not asking for pain medications and hematuria in juarez bag.    MEDICATIONS  (STANDING):  amLODIPine   Tablet 10milliGRAM(s) Oral daily  docusate sodium 100milliGRAM(s) Oral three times a day  gabapentin 600milliGRAM(s) Oral at bedtime  atorvastatin 40milliGRAM(s) Oral at bedtime  folic acid 1milliGRAM(s) Oral daily  insulin lispro (HumaLOG) corrective regimen sliding scale  SubCutaneous three times a day before meals  dextrose 50% Injectable 25Gram(s) IV Push once  metoprolol 50milliGRAM(s) Oral every 12 hours  pantoprazole    Tablet 40milliGRAM(s) Oral before breakfast  levothyroxine 75MICROGram(s) Oral daily  multivitamin/minerals 1Tablet(s) Oral daily  enoxaparin Injectable 40milliGRAM(s) SubCutaneous daily  multivitamin 1Tablet(s) Oral daily  gabapentin 300milliGRAM(s) Oral daily  ferrous    sulfate 325milliGRAM(s) Oral every 12 hours  ALPRAZolam 0.5milliGRAM(s) Oral at bedtime  insulin glargine Injectable (LANTUS) 15Unit(s) SubCutaneous at bedtime    MEDICATIONS  (PRN):  morphine  - Injectable 2milliGRAM(s) IV Push every 4 hours PRN breakthrough pain  acetaminophen   Tablet 650milliGRAM(s) Oral every 6 hours PRN For Temp greater than 38 C (100.4 F)  ALPRAZolam 0.5milliGRAM(s) Oral three times a day PRN anxiety  dextrose Gel 1Dose(s) Oral once PRN Blood Glucose LESS THAN 70 milliGRAM(s)/deciliter  glucagon  Injectable 1milliGRAM(s) IntraMuscular once PRN Glucose LESS THAN 70 milligrams/deciliter  oxyCODONE IR 5milliGRAM(s) Oral every 4 hours PRN Severe Pain (7 - 10)  ondansetron Injectable 4milliGRAM(s) IV Push every 6 hours PRN Nausea  traMADol 50milliGRAM(s) Oral every 6 hours PRN Moderate Pain (4 - 6)      Allergies    Benadryl (Other)  Demerol HCl (Other)    Intolerance    Vital Signs Last 24 Hrs  T(C): 37.5, Max: 37.5 (03-17 @ 06:27)  T(F): 99.5, Max: 99.5 (03-17 @ 06:27)  HR: 91 (82 - 92)  BP: 158/56 (148/52 - 158/60)  BP(mean): --  RR: 18 (16 - 18)  SpO2: 99% (93% - 100%)    PHYSICAL EXAM:    GENERAL: NAD, frail.   CHEST/LUNG: Clear to percussion bilaterally  HEART: Regular rate and rhythm; S1 S2; no murmurs noted  ABDOMEN: soft +BS  EXTREMITIES: left bka bandaged.  NERVOUS SYSTEM:  Alert & Oriented nonfocal exam.  PSYCH: lethargic  : clear juarez bag with some red sediment.    LABS:                        8.7    7.6   )-----------( 231      ( 17 Mar 2017 05:35 )             27.1     17 Mar 2017 05:35    134    |  96     |  17.0   ----------------------------<  234    4.3     |  29.0   |  0.80     Ca    9.1        17 Mar 2017 05:35            CAPILLARY BLOOD GLUCOSE  310 (17 Mar 2017 08:43)  197 (16 Mar 2017 22:05)  317 (16 Mar 2017 17:30)  336 (16 Mar 2017 12:47)        RADIOLOGY & ADDITIONAL TESTS:

## 2017-03-17 NOTE — DISCHARGE NOTE ADULT - CARE PROVIDER_API CALL
Uyen Mckeon), Surgery; Vascular Surgery  [address] [place] [address]  Phone: [phone]  Fax: [phone]    Zachary Andres (MD; MPH), Cardiac Electrophysiology; Cardiovascular Disease; Internal Medicine  [address] [place] [address]  Phone: [phone]  Fax: [phone]

## 2017-03-17 NOTE — DISCHARGE NOTE ADULT - MEDICATION SUMMARY - MEDICATIONS TO STOP TAKING
I will STOP taking the medications listed below when I get home from the hospital:    lisinopril 20 mg oral tablet  -- 1 tab(s) by mouth once a day    metFORMIN 500 mg oral tablet  -- 1 tab(s) by mouth 2 times a day    Levemir 100 units/mL subcutaneous solution  -- 14 unit(s) subcutaneous once a day (at bedtime)    bacitracin topical  -- Apply on skin to affected area 3 times a day  -- to toe ulcer

## 2017-03-17 NOTE — DISCHARGE NOTE ADULT - PLAN OF CARE
increased mobility acute rehab.  pain control/bowel regimen. follow up with vascular surgery  Right leg in danger of tissue loss, monitor carefully restart eliquis home medications. stability home meds medications readjusted trial of void in acute rehab, finish course of cipro for UTI.

## 2017-03-17 NOTE — PROGRESS NOTE ADULT - ASSESSMENT
85 yr old female with multiple medical problems CAD s/p stents, diabetes 2, hypertension, hyperlipidemia, anxiety, significant PVD s/p multiple stents, paroxysmal atrial fibrillation on eliquis admitted for hypertensive urgency post LE angiogram. s/p KESHAWNA 3/14/17. accepted to acute rehab, awaiting insurance authorization.

## 2017-03-17 NOTE — DISCHARGE NOTE ADULT - SECONDARY DIAGNOSIS.
PVD (peripheral vascular disease) Atrial fibrillation CAD (coronary artery disease) Anemia due to blood loss Diabetes Essential hypertension Urinary retention with incomplete bladder emptying

## 2017-03-17 NOTE — DISCHARGE NOTE ADULT - HOSPITAL COURSE
85 yr old female with multiple medical problems CAD s/p stents, diabetes 2, hypertension, hyperlipidemia, anxiety, significant PVD s/p multiple stents, paroxysmal atrial fibrillation on eliquis admitted for hypertensive urgency post LE angiogram. s/p KESHAWNA 3/14/17. accepted to acute rehab, awaiting insurance authorization. 85 yr old female with multiple medical problems CAD s/p stents, diabetes 2, hypertension, hyperlipidemia, anxiety, significant PVD s/p multiple stents, paroxysmal atrial fibrillation on eliquis admitted for hypertensive urgency post LE angiogram. s/p BKA 3/14/17. accepted to acute rehab, course complicated by urinary retention with UTI (failed TOV), hyponatremia likely due to pain and improved with NACL (per nephrology) and pain medications. BP meds and insulin coverage titrated for better control.  stable for discharge to acute rehab.  d/c planning 45 min. 85 yr old female with multiple medical problems CAD s/p stents, diabetes 2, hypertension, hyperlipidemia, anxiety, significant PVD s/p multiple stents, paroxysmal atrial fibrillation on eliquis admitted for hypertensive urgency post LE angiogram. s/p BKA 3/14/17. accepted to acute rehab, course complicated by urinary retention with UTI (failed TOV), hyponatremia likely due to pain and improved with NACL (per nephrology) and pain medications. BP meds and insulin coverage titrated for better control.  stable for discharge to acute rehab.  d/c planning 45 min.     vss afebrile  no acute complaints,  intermittent phantom pain and right heel pain. aaox3, nad ctab rrr s1s2 soft +bs no edema RLE   LLE BKA in splint. nonfocal neuro

## 2017-03-17 NOTE — PROGRESS NOTE ADULT - ATTENDING COMMENTS
discussed plan of care with daughters, advised pain medications and recent surgery can be causing lethargy and patient is generally frail/weak.  check UA/cx and d/c juarez.   awaiting dc to acute rehab.  all questions answered.

## 2017-03-17 NOTE — DISCHARGE NOTE ADULT - CARE PLAN
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  pain control/bowel regimen.  Secondary Diagnosis:	PVD (peripheral vascular disease)  Instructions for follow-up, activity and diet:	follow up with vascular surgery  Right leg in danger of tissue loss, monitor carefully  Secondary Diagnosis:	Atrial fibrillation  Instructions for follow-up, activity and diet:	restart eliquis  Secondary Diagnosis:	CAD (coronary artery disease)  Instructions for follow-up, activity and diet:	home medications.  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  pain control/bowel regimen.  Secondary Diagnosis:	PVD (peripheral vascular disease)  Instructions for follow-up, activity and diet:	follow up with vascular surgery  Right leg in danger of tissue loss, monitor carefully  Secondary Diagnosis:	Atrial fibrillation  Instructions for follow-up, activity and diet:	restart eliquis  Secondary Diagnosis:	CAD (coronary artery disease)  Instructions for follow-up, activity and diet:	home medications.  Secondary Diagnosis:	Anemia due to blood loss  Instructions for follow-up, activity and diet:	stability  Secondary Diagnosis:	Essential hypertension  Instructions for follow-up, activity and diet:	home meds  Secondary Diagnosis:	Urinary retention with incomplete bladder emptying  Instructions for follow-up, activity and diet:	trial of void in acute rehab, finish course of cipro for UTI.

## 2017-03-18 DIAGNOSIS — I10 ESSENTIAL (PRIMARY) HYPERTENSION: ICD-10-CM

## 2017-03-18 LAB
CULTURE RESULTS: NO GROWTH — SIGNIFICANT CHANGE UP
SPECIMEN SOURCE: SIGNIFICANT CHANGE UP

## 2017-03-18 PROCEDURE — 99233 SBSQ HOSP IP/OBS HIGH 50: CPT

## 2017-03-18 RX ORDER — INSULIN LISPRO 100/ML
VIAL (ML) SUBCUTANEOUS
Qty: 0 | Refills: 0 | Status: DISCONTINUED | OUTPATIENT
Start: 2017-03-18 | End: 2017-03-19

## 2017-03-18 RX ORDER — DEXTROSE 50 % IN WATER 50 %
25 SYRINGE (ML) INTRAVENOUS ONCE
Qty: 0 | Refills: 0 | Status: DISCONTINUED | OUTPATIENT
Start: 2017-03-18 | End: 2017-03-19

## 2017-03-18 RX ORDER — INSULIN GLARGINE 100 [IU]/ML
18 INJECTION, SOLUTION SUBCUTANEOUS AT BEDTIME
Qty: 0 | Refills: 0 | Status: DISCONTINUED | OUTPATIENT
Start: 2017-03-18 | End: 2017-03-19

## 2017-03-18 RX ORDER — DEXTROSE 50 % IN WATER 50 %
1 SYRINGE (ML) INTRAVENOUS ONCE
Qty: 0 | Refills: 0 | Status: DISCONTINUED | OUTPATIENT
Start: 2017-03-18 | End: 2017-03-19

## 2017-03-18 RX ORDER — OXYCODONE HYDROCHLORIDE 5 MG/1
5 TABLET ORAL EVERY 8 HOURS
Qty: 0 | Refills: 0 | Status: DISCONTINUED | OUTPATIENT
Start: 2017-03-18 | End: 2017-03-20

## 2017-03-18 RX ORDER — SENNA PLUS 8.6 MG/1
2 TABLET ORAL AT BEDTIME
Qty: 0 | Refills: 0 | Status: DISCONTINUED | OUTPATIENT
Start: 2017-03-18 | End: 2017-03-22

## 2017-03-18 RX ORDER — SODIUM CHLORIDE 9 MG/ML
1000 INJECTION, SOLUTION INTRAVENOUS
Qty: 0 | Refills: 0 | Status: DISCONTINUED | OUTPATIENT
Start: 2017-03-18 | End: 2017-03-19

## 2017-03-18 RX ORDER — INSULIN LISPRO 100/ML
3 VIAL (ML) SUBCUTANEOUS
Qty: 0 | Refills: 0 | Status: DISCONTINUED | OUTPATIENT
Start: 2017-03-18 | End: 2017-03-19

## 2017-03-18 RX ORDER — DEXTROSE 50 % IN WATER 50 %
12.5 SYRINGE (ML) INTRAVENOUS ONCE
Qty: 0 | Refills: 0 | Status: DISCONTINUED | OUTPATIENT
Start: 2017-03-18 | End: 2017-03-19

## 2017-03-18 RX ORDER — GLUCAGON INJECTION, SOLUTION 0.5 MG/.1ML
1 INJECTION, SOLUTION SUBCUTANEOUS ONCE
Qty: 0 | Refills: 0 | Status: DISCONTINUED | OUTPATIENT
Start: 2017-03-18 | End: 2017-03-19

## 2017-03-18 RX ORDER — APIXABAN 2.5 MG/1
2.5 TABLET, FILM COATED ORAL
Qty: 0 | Refills: 0 | Status: DISCONTINUED | OUTPATIENT
Start: 2017-03-18 | End: 2017-03-29

## 2017-03-18 RX ORDER — MAGNESIUM HYDROXIDE 400 MG/1
30 TABLET, CHEWABLE ORAL DAILY
Qty: 0 | Refills: 0 | Status: DISCONTINUED | OUTPATIENT
Start: 2017-03-18 | End: 2017-03-22

## 2017-03-18 RX ADMIN — Medication 325 MILLIGRAM(S): at 05:17

## 2017-03-18 RX ADMIN — SENNA PLUS 2 TABLET(S): 8.6 TABLET ORAL at 22:02

## 2017-03-18 RX ADMIN — Medication 1 MILLIGRAM(S): at 12:39

## 2017-03-18 RX ADMIN — Medication 1 TABLET(S): at 12:39

## 2017-03-18 RX ADMIN — Medication 100 MILLIGRAM(S): at 22:02

## 2017-03-18 RX ADMIN — GABAPENTIN 300 MILLIGRAM(S): 400 CAPSULE ORAL at 12:39

## 2017-03-18 RX ADMIN — TRAMADOL HYDROCHLORIDE 50 MILLIGRAM(S): 50 TABLET ORAL at 19:15

## 2017-03-18 RX ADMIN — OXYCODONE HYDROCHLORIDE 5 MILLIGRAM(S): 5 TABLET ORAL at 05:59

## 2017-03-18 RX ADMIN — AMLODIPINE BESYLATE 10 MILLIGRAM(S): 2.5 TABLET ORAL at 05:17

## 2017-03-18 RX ADMIN — Medication 75 MICROGRAM(S): at 05:16

## 2017-03-18 RX ADMIN — APIXABAN 2.5 MILLIGRAM(S): 2.5 TABLET, FILM COATED ORAL at 17:25

## 2017-03-18 RX ADMIN — Medication 8: at 17:25

## 2017-03-18 RX ADMIN — OXYCODONE HYDROCHLORIDE 5 MILLIGRAM(S): 5 TABLET ORAL at 05:18

## 2017-03-18 RX ADMIN — Medication 3 UNIT(S): at 17:26

## 2017-03-18 RX ADMIN — Medication 100 MILLIGRAM(S): at 05:17

## 2017-03-18 RX ADMIN — ATORVASTATIN CALCIUM 40 MILLIGRAM(S): 80 TABLET, FILM COATED ORAL at 22:02

## 2017-03-18 RX ADMIN — INSULIN GLARGINE 18 UNIT(S): 100 INJECTION, SOLUTION SUBCUTANEOUS at 22:02

## 2017-03-18 RX ADMIN — Medication 50 MILLIGRAM(S): at 17:25

## 2017-03-18 RX ADMIN — Medication 100 MILLIGRAM(S): at 12:39

## 2017-03-18 RX ADMIN — Medication 325 MILLIGRAM(S): at 17:25

## 2017-03-18 RX ADMIN — Medication 10: at 12:39

## 2017-03-18 RX ADMIN — TRAMADOL HYDROCHLORIDE 50 MILLIGRAM(S): 50 TABLET ORAL at 18:55

## 2017-03-18 RX ADMIN — Medication 50 MILLIGRAM(S): at 05:16

## 2017-03-18 RX ADMIN — GABAPENTIN 600 MILLIGRAM(S): 400 CAPSULE ORAL at 22:02

## 2017-03-18 RX ADMIN — PANTOPRAZOLE SODIUM 40 MILLIGRAM(S): 20 TABLET, DELAYED RELEASE ORAL at 05:16

## 2017-03-18 RX ADMIN — Medication 6: at 08:50

## 2017-03-18 RX ADMIN — Medication 0.5 MILLIGRAM(S): at 22:44

## 2017-03-18 NOTE — PROGRESS NOTE ADULT - SUBJECTIVE AND OBJECTIVE BOX
SURGERY PROGRESS NOTE  Patient is a 85y old  Female who presents with a chief complaint of b/l leg pain (17 Mar 2017 16:46)        MEDICATIONS  (STANDING):  amLODIPine   Tablet 10milliGRAM(s) Oral daily  docusate sodium 100milliGRAM(s) Oral three times a day  gabapentin 600milliGRAM(s) Oral at bedtime  atorvastatin 40milliGRAM(s) Oral at bedtime  folic acid 1milliGRAM(s) Oral daily  metoprolol 50milliGRAM(s) Oral every 12 hours  pantoprazole    Tablet 40milliGRAM(s) Oral before breakfast  levothyroxine 75MICROGram(s) Oral daily  multivitamin/minerals 1Tablet(s) Oral daily  multivitamin 1Tablet(s) Oral daily  gabapentin 300milliGRAM(s) Oral daily  ferrous    sulfate 325milliGRAM(s) Oral every 12 hours  insulin glargine Injectable (LANTUS) 18Unit(s) SubCutaneous at bedtime  insulin lispro Injectable (HumaLOG) 3Unit(s) SubCutaneous before breakfast  insulin lispro Injectable (HumaLOG) 3Unit(s) SubCutaneous before lunch  insulin lispro Injectable (HumaLOG) 3Unit(s) SubCutaneous before dinner  insulin lispro (HumaLOG) corrective regimen sliding scale  SubCutaneous three times a day before meals  dextrose 5%. 1000milliLiter(s) IV Continuous <Continuous>  dextrose 50% Injectable 12.5Gram(s) IV Push once  dextrose 50% Injectable 25Gram(s) IV Push once  dextrose 50% Injectable 25Gram(s) IV Push once  senna 2Tablet(s) Oral at bedtime  apixaban 2.5milliGRAM(s) Oral two times a day    MEDICATIONS  (PRN):  morphine  - Injectable 2milliGRAM(s) IV Push every 4 hours PRN breakthrough pain  acetaminophen   Tablet 650milliGRAM(s) Oral every 6 hours PRN For Temp greater than 38 C (100.4 F)  ALPRAZolam 0.5milliGRAM(s) Oral three times a day PRN anxiety  ondansetron Injectable 4milliGRAM(s) IV Push every 6 hours PRN Nausea  traMADol 50milliGRAM(s) Oral every 6 hours PRN Moderate Pain (4 - 6)  oxyCODONE IR 5milliGRAM(s) Oral every 8 hours PRN Severe Pain (7 - 10)  dextrose Gel 1Dose(s) Oral once PRN Blood Glucose LESS THAN 70 milliGRAM(s)/deciliter  glucagon  Injectable 1milliGRAM(s) IntraMuscular once PRN Glucose LESS THAN 70 milligrams/deciliter  magnesium hydroxide Suspension 30milliLiter(s) Oral daily PRN Constipation      Allergies    Benadryl (Other)  Demerol HCl (Other)    Intolerances          LABS:                        8.7    7.6   )-----------( 231      ( 17 Mar 2017 05:35 )             27.1     17 Mar 2017 05:35    134    |  96     |  17.0   ----------------------------<  234    4.3     |  29.0   |  0.80     Ca    9.1        17 Mar 2017 05:35        Urinalysis Basic - ( 17 Mar 2017 12:36 )    Color: Yellow / Appearance: Clear / S.005 / pH: x  Gluc: x / Ketone: Negative  / Bili: Negative / Urobili: Negative mg/dL   Blood: x / Protein: 30 mg/dL / Nitrite: Negative   Leuk Esterase: Negative / RBC: >50 /HPF / WBC 0-2   Sq Epi: x / Non Sq Epi: x / Bacteria: x        RADIOLOGY & ADDITIONAL STUDIES:        SUBJECTIVE:    POD#4   s/p L BKA. Pt c/o some pain in the stump and RLE rest pain  taking reg diet    wbc 7600  hct 27%    OBJECTIVE:     Vital Signs Last 24 Hrs  T(C): 36.9, Max: 37.1 ( @ 22:17)  T(F): 98.5, Max: 98.8 ( @ 22:17)  HR: 87 (83 - 98)  BP: 158/50 (96/44 - 160/58)  BP(mean): --  RR: 16 (16 - 16)  SpO2: 93% (93% - 97%)    I&O's Detail  I & Os for 24h ending 18 Mar 2017 07:00  =============================================  IN:    Oral Fluid: 680 ml    Total IN: 680 ml  ---------------------------------------------  OUT:    Indwelling Catheter - Urethral: 2030 ml    Total OUT: 2030 ml  ---------------------------------------------  Total NET: -1350 ml    I & Os for current day (as of 18 Mar 2017 13:59)  =============================================  IN:    Oral Fluid: 240 ml    Total IN: 240 ml  ---------------------------------------------  OUT:    Total OUT: 0 ml  ---------------------------------------------  Total NET: 240 ml      afebrile   P 87  L heel early ischemia  stump wrapped    ASSESSMENT:   HEALTH ISSUES - PROBLEM Dx:  Essential hypertension: Essential hypertension  Anemia due to blood loss: Anemia due to blood loss  Status post 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

## 2017-03-18 NOTE — PROGRESS NOTE ADULT - SUBJECTIVE AND OBJECTIVE BOX
INTERVAL HPI/OVERNIGHT EVENTS:    CC: s/p left BKA, PVD, CAD, hypertension hyperlipidemia    Chart and course reviewed. No overnight events, family at bedside, feel she is occasionally confused. At this time, AOx3, denies pain.     Vital Signs Last 24 Hrs  T(C): 36.9, Max: 37.1 (03-17 @ 22:17)  T(F): 98.5, Max: 98.8 (03-17 @ 22:17)  HR: 87 (83 - 98)  BP: 158/50 (96/44 - 160/58)  BP(mean): --  RR: 16 (16 - 16)  SpO2: 93% (93% - 97%)    PHYSICAL EXAM:    GENERAL: Not in distress, alert  CHEST/LUNG: b/l air entry  HEART: Regular   ABDOMEN: Soft, BS+  EXTREMITIES: no edema right, left BKA    MEDICATIONS  (STANDING):  amLODIPine   Tablet 10milliGRAM(s) Oral daily  docusate sodium 100milliGRAM(s) Oral three times a day  gabapentin 600milliGRAM(s) Oral at bedtime  atorvastatin 40milliGRAM(s) Oral at bedtime  folic acid 1milliGRAM(s) Oral daily  insulin lispro (HumaLOG) corrective regimen sliding scale  SubCutaneous three times a day before meals  dextrose 50% Injectable 25Gram(s) IV Push once  metoprolol 50milliGRAM(s) Oral every 12 hours  pantoprazole    Tablet 40milliGRAM(s) Oral before breakfast  levothyroxine 75MICROGram(s) Oral daily  multivitamin/minerals 1Tablet(s) Oral daily  enoxaparin Injectable 40milliGRAM(s) SubCutaneous daily  multivitamin 1Tablet(s) Oral daily  gabapentin 300milliGRAM(s) Oral daily  ferrous    sulfate 325milliGRAM(s) Oral every 12 hours  ALPRAZolam 0.5milliGRAM(s) Oral at bedtime  insulin glargine Injectable (LANTUS) 15Unit(s) SubCutaneous at bedtime    MEDICATIONS  (PRN):  morphine  - Injectable 2milliGRAM(s) IV Push every 4 hours PRN breakthrough pain  acetaminophen   Tablet 650milliGRAM(s) Oral every 6 hours PRN For Temp greater than 38 C (100.4 F)  ALPRAZolam 0.5milliGRAM(s) Oral three times a day PRN anxiety  dextrose Gel 1Dose(s) Oral once PRN Blood Glucose LESS THAN 70 milliGRAM(s)/deciliter  glucagon  Injectable 1milliGRAM(s) IntraMuscular once PRN Glucose LESS THAN 70 milligrams/deciliter  oxyCODONE IR 5milliGRAM(s) Oral every 4 hours PRN Severe Pain (7 - 10)  ondansetron Injectable 4milliGRAM(s) IV Push every 6 hours PRN Nausea  traMADol 50milliGRAM(s) Oral every 6 hours PRN Moderate Pain (4 - 6)      Allergies    Benadryl (Other)  Demerol HCl (Other)    Intolerances          LABS:                          8.7    7.6   )-----------( 231      ( 17 Mar 2017 05:35 )             27.1     17 Mar 2017 05:35    134    |  96     |  17.0   ----------------------------<  234    4.3     |  29.0   |  0.80     Ca    9.1        17 Mar 2017 05:35        Urinalysis Basic - ( 17 Mar 2017 12:36 )    Color: Yellow / Appearance: Clear / S.005 / pH: x  Gluc: x / Ketone: Negative  / Bili: Negative / Urobili: Negative mg/dL   Blood: x / Protein: 30 mg/dL / Nitrite: Negative   Leuk Esterase: Negative / RBC: >50 /HPF / WBC 0-2   Sq Epi: x / Non Sq Epi: x / Bacteria: x        RADIOLOGY & ADDITIONAL TESTS:

## 2017-03-18 NOTE — PROGRESS NOTE ADULT - ASSESSMENT
85 yr old female with multiple medical problems CAD s/p stents, diabetes, hypertension, hyperlipidemia, anxiety, significant PVD s/p multiple stents, paroxysmal atrial fibrillation admitted for hypertensive urgency post LE angiogram and cellulitis. Given significant PVD and tissue loss, BKA was planned by vascular surgery. BP improved. She was started on antibiotics. Cardiology consulted for pre op evaluation. She also developed MAGI, now resolved. BKA was done on 3/14. Procedure was uneventful. She was evaluated by acute rehab and accepted for the same. Given urinary retention, juarez was maintained.

## 2017-03-18 NOTE — PROGRESS NOTE ADULT - PROBLEM SELECTOR PLAN 1
Needs acute rehab. Will discuss with vascular about resuming anticoagulation. ? Need for intervention of right LE. Pain medications adjusted to avoid lethargy. OOB to chair. Acute rehab when insurance auth approved. Bowel regimen.

## 2017-03-19 DIAGNOSIS — E87.1 HYPO-OSMOLALITY AND HYPONATREMIA: ICD-10-CM

## 2017-03-19 LAB
ANION GAP SERPL CALC-SCNC: 10 MMOL/L — SIGNIFICANT CHANGE UP (ref 5–17)
BUN SERPL-MCNC: 22 MG/DL — HIGH (ref 8–20)
CALCIUM SERPL-MCNC: 8.8 MG/DL — SIGNIFICANT CHANGE UP (ref 8.6–10.2)
CHLORIDE SERPL-SCNC: 92 MMOL/L — LOW (ref 98–107)
CO2 SERPL-SCNC: 27 MMOL/L — SIGNIFICANT CHANGE UP (ref 22–29)
CREAT SERPL-MCNC: 0.98 MG/DL — SIGNIFICANT CHANGE UP (ref 0.5–1.3)
GLUCOSE SERPL-MCNC: 311 MG/DL — HIGH (ref 70–115)
HCT VFR BLD CALC: 25.1 % — LOW (ref 37–47)
HGB BLD-MCNC: 8.4 G/DL — LOW (ref 12–16)
MCHC RBC-ENTMCNC: 28 PG — SIGNIFICANT CHANGE UP (ref 27–31)
MCHC RBC-ENTMCNC: 33.5 G/DL — SIGNIFICANT CHANGE UP (ref 32–36)
MCV RBC AUTO: 83.7 FL — SIGNIFICANT CHANGE UP (ref 81–99)
PLATELET # BLD AUTO: 280 K/UL — SIGNIFICANT CHANGE UP (ref 150–400)
POTASSIUM SERPL-MCNC: 4.3 MMOL/L — SIGNIFICANT CHANGE UP (ref 3.5–5.3)
POTASSIUM SERPL-SCNC: 4.3 MMOL/L — SIGNIFICANT CHANGE UP (ref 3.5–5.3)
RBC # BLD: 3 M/UL — LOW (ref 4.4–5.2)
RBC # FLD: 13.4 % — SIGNIFICANT CHANGE UP (ref 11–15.6)
SODIUM SERPL-SCNC: 129 MMOL/L — LOW (ref 135–145)
WBC # BLD: 9.4 K/UL — SIGNIFICANT CHANGE UP (ref 4.8–10.8)
WBC # FLD AUTO: 9.4 K/UL — SIGNIFICANT CHANGE UP (ref 4.8–10.8)

## 2017-03-19 PROCEDURE — 99233 SBSQ HOSP IP/OBS HIGH 50: CPT

## 2017-03-19 RX ORDER — INSULIN LISPRO 100/ML
5 VIAL (ML) SUBCUTANEOUS
Qty: 0 | Refills: 0 | Status: DISCONTINUED | OUTPATIENT
Start: 2017-03-19 | End: 2017-03-29

## 2017-03-19 RX ORDER — INSULIN LISPRO 100/ML
VIAL (ML) SUBCUTANEOUS
Qty: 0 | Refills: 0 | Status: DISCONTINUED | OUTPATIENT
Start: 2017-03-19 | End: 2017-03-29

## 2017-03-19 RX ORDER — DEXTROSE 50 % IN WATER 50 %
25 SYRINGE (ML) INTRAVENOUS ONCE
Qty: 0 | Refills: 0 | Status: DISCONTINUED | OUTPATIENT
Start: 2017-03-19 | End: 2017-03-29

## 2017-03-19 RX ORDER — DEXTROSE 50 % IN WATER 50 %
1 SYRINGE (ML) INTRAVENOUS ONCE
Qty: 0 | Refills: 0 | Status: DISCONTINUED | OUTPATIENT
Start: 2017-03-19 | End: 2017-03-29

## 2017-03-19 RX ORDER — DEXTROSE 50 % IN WATER 50 %
12.5 SYRINGE (ML) INTRAVENOUS ONCE
Qty: 0 | Refills: 0 | Status: DISCONTINUED | OUTPATIENT
Start: 2017-03-19 | End: 2017-03-29

## 2017-03-19 RX ORDER — INSULIN GLARGINE 100 [IU]/ML
22 INJECTION, SOLUTION SUBCUTANEOUS AT BEDTIME
Qty: 0 | Refills: 0 | Status: DISCONTINUED | OUTPATIENT
Start: 2017-03-19 | End: 2017-03-26

## 2017-03-19 RX ORDER — SODIUM CHLORIDE 9 MG/ML
1000 INJECTION, SOLUTION INTRAVENOUS
Qty: 0 | Refills: 0 | Status: DISCONTINUED | OUTPATIENT
Start: 2017-03-19 | End: 2017-03-29

## 2017-03-19 RX ORDER — TRAMADOL HYDROCHLORIDE 50 MG/1
50 TABLET ORAL EVERY 12 HOURS
Qty: 0 | Refills: 0 | Status: DISCONTINUED | OUTPATIENT
Start: 2017-03-19 | End: 2017-03-20

## 2017-03-19 RX ORDER — GLUCAGON INJECTION, SOLUTION 0.5 MG/.1ML
1 INJECTION, SOLUTION SUBCUTANEOUS ONCE
Qty: 0 | Refills: 0 | Status: DISCONTINUED | OUTPATIENT
Start: 2017-03-19 | End: 2017-03-29

## 2017-03-19 RX ORDER — SODIUM CHLORIDE 9 MG/ML
1000 INJECTION INTRAMUSCULAR; INTRAVENOUS; SUBCUTANEOUS
Qty: 0 | Refills: 0 | Status: DISCONTINUED | OUTPATIENT
Start: 2017-03-19 | End: 2017-03-20

## 2017-03-19 RX ADMIN — Medication 5 UNIT(S): at 17:18

## 2017-03-19 RX ADMIN — APIXABAN 2.5 MILLIGRAM(S): 2.5 TABLET, FILM COATED ORAL at 17:20

## 2017-03-19 RX ADMIN — Medication 1 TABLET(S): at 11:16

## 2017-03-19 RX ADMIN — Medication 1 MILLIGRAM(S): at 11:16

## 2017-03-19 RX ADMIN — ONDANSETRON 4 MILLIGRAM(S): 8 TABLET, FILM COATED ORAL at 12:52

## 2017-03-19 RX ADMIN — Medication 100 MILLIGRAM(S): at 22:03

## 2017-03-19 RX ADMIN — INSULIN GLARGINE 22 UNIT(S): 100 INJECTION, SOLUTION SUBCUTANEOUS at 22:00

## 2017-03-19 RX ADMIN — Medication 3 UNIT(S): at 08:41

## 2017-03-19 RX ADMIN — Medication 8: at 08:41

## 2017-03-19 RX ADMIN — GABAPENTIN 600 MILLIGRAM(S): 400 CAPSULE ORAL at 22:03

## 2017-03-19 RX ADMIN — Medication 325 MILLIGRAM(S): at 17:17

## 2017-03-19 RX ADMIN — PANTOPRAZOLE SODIUM 40 MILLIGRAM(S): 20 TABLET, DELAYED RELEASE ORAL at 08:40

## 2017-03-19 RX ADMIN — Medication 0.5 MILLIGRAM(S): at 23:28

## 2017-03-19 RX ADMIN — GABAPENTIN 300 MILLIGRAM(S): 400 CAPSULE ORAL at 12:53

## 2017-03-19 RX ADMIN — AMLODIPINE BESYLATE 10 MILLIGRAM(S): 2.5 TABLET ORAL at 06:24

## 2017-03-19 RX ADMIN — Medication 50 MILLIGRAM(S): at 06:24

## 2017-03-19 RX ADMIN — TRAMADOL HYDROCHLORIDE 50 MILLIGRAM(S): 50 TABLET ORAL at 08:41

## 2017-03-19 RX ADMIN — SODIUM CHLORIDE 70 MILLILITER(S): 9 INJECTION INTRAMUSCULAR; INTRAVENOUS; SUBCUTANEOUS at 11:00

## 2017-03-19 RX ADMIN — Medication 325 MILLIGRAM(S): at 06:25

## 2017-03-19 RX ADMIN — Medication 100 MILLIGRAM(S): at 17:14

## 2017-03-19 RX ADMIN — Medication 50 MILLIGRAM(S): at 17:17

## 2017-03-19 RX ADMIN — APIXABAN 2.5 MILLIGRAM(S): 2.5 TABLET, FILM COATED ORAL at 06:24

## 2017-03-19 RX ADMIN — SENNA PLUS 2 TABLET(S): 8.6 TABLET ORAL at 22:03

## 2017-03-19 RX ADMIN — Medication 6: at 12:54

## 2017-03-19 RX ADMIN — Medication 100 MILLIGRAM(S): at 06:25

## 2017-03-19 RX ADMIN — TRAMADOL HYDROCHLORIDE 50 MILLIGRAM(S): 50 TABLET ORAL at 09:40

## 2017-03-19 RX ADMIN — Medication 75 MICROGRAM(S): at 06:25

## 2017-03-19 RX ADMIN — ATORVASTATIN CALCIUM 40 MILLIGRAM(S): 80 TABLET, FILM COATED ORAL at 22:00

## 2017-03-19 RX ADMIN — Medication 5 UNIT(S): at 12:54

## 2017-03-19 RX ADMIN — Medication 4: at 17:18

## 2017-03-19 NOTE — PROGRESS NOTE ADULT - ADDITIONAL PE
LLE: all dressings/ace wrap CDI, knee at right angle, able to passively extend right knee to about 30 degrees    RLE: Z flow boot on
Right groin soft

## 2017-03-19 NOTE — PHARMACY COMMUNICATION NOTE - COMMENTS
Spoke to Dr Andrade concerning duplicate orders for severe pain. She will keep oxycodone/acetaminophen order and change oxycodone IR 5mg.

## 2017-03-19 NOTE — PROGRESS NOTE ADULT - SUBJECTIVE AND OBJECTIVE BOX
INTERVAL HPI/OVERNIGHT EVENTS:    CC: hyponatremia, s/p L BKA, Diabetes, CAD    No overnight events, more alert, denies pain. + bm yesterday and today. Tolerating diet    Vital Signs Last 24 Hrs  T(C): 37, Max: 37 (03-18 @ 19:35)  T(F): 98.6, Max: 98.6 (03-18 @ 19:35)  HR: 85 (80 - 93)  BP: 160/50 (150/52 - 160/50)  BP(mean): --  RR: 18 (16 - 18)  SpO2: 93% (93% - 94%)    PHYSICAL EXAM:    GENERAL: Not in distress, alert  CHEST/LUNG: b/l air entry  HEART: Regular   ABDOMEN: Soft, BS+  EXTREMITIES:  left bka, right no edema    MEDICATIONS  (STANDING):  amLODIPine   Tablet 10milliGRAM(s) Oral daily  docusate sodium 100milliGRAM(s) Oral three times a day  gabapentin 600milliGRAM(s) Oral at bedtime  atorvastatin 40milliGRAM(s) Oral at bedtime  folic acid 1milliGRAM(s) Oral daily  metoprolol 50milliGRAM(s) Oral every 12 hours  pantoprazole    Tablet 40milliGRAM(s) Oral before breakfast  levothyroxine 75MICROGram(s) Oral daily  multivitamin/minerals 1Tablet(s) Oral daily  multivitamin 1Tablet(s) Oral daily  gabapentin 300milliGRAM(s) Oral daily  ferrous    sulfate 325milliGRAM(s) Oral every 12 hours  senna 2Tablet(s) Oral at bedtime  apixaban 2.5milliGRAM(s) Oral two times a day  insulin glargine Injectable (LANTUS) 22Unit(s) SubCutaneous at bedtime  insulin lispro Injectable (HumaLOG) 5Unit(s) SubCutaneous before breakfast  insulin lispro Injectable (HumaLOG) 5Unit(s) SubCutaneous before lunch  insulin lispro Injectable (HumaLOG) 5Unit(s) SubCutaneous before dinner  insulin lispro (HumaLOG) corrective regimen sliding scale  SubCutaneous three times a day before meals  dextrose 5%. 1000milliLiter(s) IV Continuous <Continuous>  dextrose 50% Injectable 12.5Gram(s) IV Push once  dextrose 50% Injectable 25Gram(s) IV Push once  dextrose 50% Injectable 25Gram(s) IV Push once  sodium chloride 0.9%. 1000milliLiter(s) IV Continuous <Continuous>    MEDICATIONS  (PRN):  morphine  - Injectable 2milliGRAM(s) IV Push every 4 hours PRN breakthrough pain  acetaminophen   Tablet 650milliGRAM(s) Oral every 6 hours PRN For Temp greater than 38 C (100.4 F)  ALPRAZolam 0.5milliGRAM(s) Oral three times a day PRN anxiety  ondansetron Injectable 4milliGRAM(s) IV Push every 6 hours PRN Nausea  oxyCODONE IR 5milliGRAM(s) Oral every 8 hours PRN Severe Pain (7 - 10)  magnesium hydroxide Suspension 30milliLiter(s) Oral daily PRN Constipation  traMADol 50milliGRAM(s) Oral every 12 hours PRN Moderate Pain (4 - 6)  oxyCODONE  5 mG/acetaminophen 325 mG 1Tablet(s) Oral every 4 hours PRN Severe Pain (7 - 10)  dextrose Gel 1Dose(s) Oral once PRN Blood Glucose LESS THAN 70 milliGRAM(s)/deciliter  glucagon  Injectable 1milliGRAM(s) IntraMuscular once PRN Glucose LESS THAN 70 milligrams/deciliter      Allergies    Benadryl (Other)  Demerol HCl (Other)    Intolerances          LABS:                          8.4    9.4   )-----------( 280      ( 19 Mar 2017 05:15 )             25.1     19 Mar 2017 05:15    129    |  92     |  22.0   ----------------------------<  311    4.3     |  27.0   |  0.98     Ca    8.8        19 Mar 2017 05:15        Urinalysis Basic - ( 17 Mar 2017 12:36 )    Color: Yellow / Appearance: Clear / S.005 / pH: x  Gluc: x / Ketone: Negative  / Bili: Negative / Urobili: Negative mg/dL   Blood: x / Protein: 30 mg/dL / Nitrite: Negative   Leuk Esterase: Negative / RBC: >50 /HPF / WBC 0-2   Sq Epi: x / Non Sq Epi: x / Bacteria: x        RADIOLOGY & ADDITIONAL TESTS:

## 2017-03-19 NOTE — PROGRESS NOTE ADULT - PROBLEM SELECTOR PLAN 1
change of dressings of BKA per nursing (nurse states incision CDI without erythema), Med mgmnt per Hospitalist, plan per Dr Mckeon change of dressings of BKA per nursing (nurse states incision CDI without erythema), Med mgmnt per Hospitalist, plan per Dr Mckeon, ? needs L BKA posterior splint, PT change of dressings of BKA per nursing (nurse states incision CDI without erythema), Med mgmnt per Hospitalist, plan per Dr Mckeon,   needs L BKA posterior splint,   PT

## 2017-03-19 NOTE — PROGRESS NOTE ADULT - ASSESSMENT
85 yr old female with multiple medical problems CAD s/p stents, diabetes, hypertension, hyperlipidemia, anxiety, significant PVD s/p multiple stents, paroxysmal atrial fibrillation admitted for hypertensive urgency post LE angiogram and cellulitis. Given significant PVD and tissue loss, BKA was planned by vascular surgery. BP improved. She was started on antibiotics. Cardiology consulted for pre op evaluation. She also developed MAGI, now resolved. BKA was done on 3/14. Procedure was uneventful. She was evaluated by acute rehab and accepted for the same. Given urinary retention, juarez was maintained. Noted to have hyponatremia.

## 2017-03-19 NOTE — PROGRESS NOTE ADULT - SUBJECTIVE AND OBJECTIVE BOX
SURGICAL PA NOTE:     STATUS POST:  L BKA    POST OPERATIVE DAY #: 5    Vital Signs Last 24 Hrs  T(C): 37, Max: 37 (03-18 @ 19:35)  T(F): 98.6, Max: 98.6 (03-18 @ 19:35)  HR: 85 (80 - 93)  BP: 160/50 (150/52 - 160/50)  BP(mean): --  RR: 18 (16 - 18)  SpO2: 93% (93% - 94%)    HPI:  85 yr old female with multiple medical problems CAD s/p stents, diabetes, hypertension, hyperlipidemia, anxiety, significant PVD s/p multiple stents, paroxysmal atrial fibrillation was admitted today for elective LE angiogram. Patient was noted to have elevated BP post procedure, and hence admission requested by Dr Irwin. Per him, patient with occluded SFA, POP b/l with minimal outflow to b/l LE, and patient will likely need BKA next week. Patient seen in PACU, states she feels lousy. No chest pain, headaches, shortness of breath. Spoke with daughters at bedside, who states that patient lives in an assisted living, uses a walker to ambulate and has had multiple falls. Her last PCI was 10 yrs ago. History of smoking, quit 30 yrs ago. Daughter also states patient took her antihypertensives this am. She has been in assisted living since 2016. She received IV hydralazine prior to my assessment and BP improved to 180. (09 Mar 2017 18:35)      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  No h/o HF  Unknown h/o HF  Yes  Yes  No pertinent family history in first degree relatives  Handoff  MEWS Score  Hyperlipidemia  Hypertension  Diabetes  PVD (peripheral vascular disease)  CAD (coronary artery disease)  PAD (peripheral artery disease)  PAD (peripheral artery disease)  Status post below knee amputation of left lower extremity  Hyponatremia  Essential hypertension  Anemia due to blood loss  Status post below knee amputation of left lower extremity  MAGI (acute kidney injury)  Atrial fibrillation  Cellulitis  Diabetes mellitus  CAD (coronary artery disease)  Hypertension  Hyperlipidemia  PVD (peripheral vascular disease)  Hypertensive urgency  BKA (below-knee amputation)  Angiogram peripheral  History of cholecystectomy  H/O angioplasty  ATHSCL NATIVE ARTERIES OF EXTR  Essential hypertension  Diabetes  Anemia due to blood loss  CAD (coronary artery disease)  Atrial fibrillation  PVD (peripheral vascular disease)      SUBJECTIVE: Pt seen lying supine, arousable, comfortable, no c/o left lower limb pain, no c/o right foot/heel pain    SOB:  [ ] YES [x ] NO  Dyspnea: [ ]YES [xNO  Chest Discomfort: [ ] YES [x ] NO    Nausea: [ ] YES [x ] NO           Vomiting: [ ] YES [x ] NO  Flatus: [ ] YES [ ] NO             Bowel Movement: [ ] YES [ ] NO  Diarrhea: [ ] YES [ ] NO         Void: [ ]YES [ ]No  Constipation: [ ] YES [ ] NO     Pain (0-10):              Pain Control Adequate: [ ] YES [ ] NO  Bhatt: indwelling  NGT:      I&O's Detail    I & Os for current day (as of 19 Mar 2017 10:47)  =============================================  IN:    Oral Fluid: 600 ml    Total IN: 600 ml  ---------------------------------------------  OUT:    Indwelling Catheter - Urethral: 1500 ml    Total OUT: 1500 ml  ---------------------------------------------  Total NET: -900 ml    I&O's Summary    I & Os for current day (as of 19 Mar 2017 10:47)  =============================================  IN: 600 ml / OUT: 1500 ml / NET: -900 ml    I&O's Detail    I & Os for current day (as of 19 Mar 2017 10:47)  =============================================  IN:    Oral Fluid: 600 ml    Total IN: 600 ml  ---------------------------------------------  OUT:    Indwelling Catheter - Urethral: 1500 ml    Total OUT: 1500 ml  ---------------------------------------------  Total NET: -900 ml      MEDICATIONS  (STANDING):  amLODIPine   Tablet 10milliGRAM(s) Oral daily  docusate sodium 100milliGRAM(s) Oral three times a day  gabapentin 600milliGRAM(s) Oral at bedtime  atorvastatin 40milliGRAM(s) Oral at bedtime  folic acid 1milliGRAM(s) Oral daily  metoprolol 50milliGRAM(s) Oral every 12 hours  pantoprazole    Tablet 40milliGRAM(s) Oral before breakfast  levothyroxine 75MICROGram(s) Oral daily  multivitamin/minerals 1Tablet(s) Oral daily  multivitamin 1Tablet(s) Oral daily  gabapentin 300milliGRAM(s) Oral daily  ferrous    sulfate 325milliGRAM(s) Oral every 12 hours  senna 2Tablet(s) Oral at bedtime  apixaban 2.5milliGRAM(s) Oral two times a day  insulin glargine Injectable (LANTUS) 22Unit(s) SubCutaneous at bedtime  insulin lispro Injectable (HumaLOG) 5Unit(s) SubCutaneous before breakfast  insulin lispro Injectable (HumaLOG) 5Unit(s) SubCutaneous before lunch  insulin lispro Injectable (HumaLOG) 5Unit(s) SubCutaneous before dinner  insulin lispro (HumaLOG) corrective regimen sliding scale  SubCutaneous three times a day before meals  dextrose 5%. 1000milliLiter(s) IV Continuous <Continuous>  dextrose 50% Injectable 12.5Gram(s) IV Push once  dextrose 50% Injectable 25Gram(s) IV Push once  dextrose 50% Injectable 25Gram(s) IV Push once  sodium chloride 0.9%. 1000milliLiter(s) IV Continuous <Continuous>    MEDICATIONS  (PRN):  morphine  - Injectable 2milliGRAM(s) IV Push every 4 hours PRN breakthrough pain  acetaminophen   Tablet 650milliGRAM(s) Oral every 6 hours PRN For Temp greater than 38 C (100.4 F)  ALPRAZolam 0.5milliGRAM(s) Oral three times a day PRN anxiety  ondansetron Injectable 4milliGRAM(s) IV Push every 6 hours PRN Nausea  oxyCODONE IR 5milliGRAM(s) Oral every 8 hours PRN Severe Pain (7 - 10)  magnesium hydroxide Suspension 30milliLiter(s) Oral daily PRN Constipation  traMADol 50milliGRAM(s) Oral every 12 hours PRN Moderate Pain (4 - 6)  oxyCODONE  5 mG/acetaminophen 325 mG 1Tablet(s) Oral every 4 hours PRN Severe Pain (7 - 10)  dextrose Gel 1Dose(s) Oral once PRN Blood Glucose LESS THAN 70 milliGRAM(s)/deciliter  glucagon  Injectable 1milliGRAM(s) IntraMuscular once PRN Glucose LESS THAN 70 milligrams/deciliter      LABS:                        8.4    9.4   )-----------( 280      ( 19 Mar 2017 05:15 )             25.1     19 Mar 2017 05:15    129    |  92     |  22.0   ----------------------------<  311    4.3     |  27.0   |  0.98     Ca    8.8        19 Mar 2017 05:15        Urinalysis Basic - ( 17 Mar 2017 12:36 )    Color: Yellow / Appearance: Clear / S.005 / pH: x  Gluc: x / Ketone: Negative  / Bili: Negative / Urobili: Negative mg/dL   Blood: x / Protein: 30 mg/dL / Nitrite: Negative   Leuk Esterase: Negative / RBC: >50 /HPF / WBC 0-2   Sq Epi: x / Non Sq Epi: x / Bacteria: x          RADIOLOGY & ADDITIONAL STUDIES:

## 2017-03-20 LAB
ANION GAP SERPL CALC-SCNC: 11 MMOL/L — SIGNIFICANT CHANGE UP (ref 5–17)
BUN SERPL-MCNC: 22 MG/DL — HIGH (ref 8–20)
CALCIUM SERPL-MCNC: 8.7 MG/DL — SIGNIFICANT CHANGE UP (ref 8.6–10.2)
CHLORIDE SERPL-SCNC: 91 MMOL/L — LOW (ref 98–107)
CO2 SERPL-SCNC: 26 MMOL/L — SIGNIFICANT CHANGE UP (ref 22–29)
CREAT SERPL-MCNC: 0.92 MG/DL — SIGNIFICANT CHANGE UP (ref 0.5–1.3)
GLUCOSE SERPL-MCNC: 223 MG/DL — HIGH (ref 70–115)
MAGNESIUM SERPL-MCNC: 1.9 MG/DL — SIGNIFICANT CHANGE UP (ref 1.8–2.5)
POTASSIUM SERPL-MCNC: 4.4 MMOL/L — SIGNIFICANT CHANGE UP (ref 3.5–5.3)
POTASSIUM SERPL-SCNC: 4.4 MMOL/L — SIGNIFICANT CHANGE UP (ref 3.5–5.3)
SODIUM SERPL-SCNC: 128 MMOL/L — LOW (ref 135–145)

## 2017-03-20 PROCEDURE — 99232 SBSQ HOSP IP/OBS MODERATE 35: CPT

## 2017-03-20 PROCEDURE — 99233 SBSQ HOSP IP/OBS HIGH 50: CPT

## 2017-03-20 RX ORDER — OXYCODONE HYDROCHLORIDE 5 MG/1
5 TABLET ORAL EVERY 4 HOURS
Qty: 0 | Refills: 0 | Status: DISCONTINUED | OUTPATIENT
Start: 2017-03-20 | End: 2017-03-23

## 2017-03-20 RX ORDER — OXYCODONE HYDROCHLORIDE 5 MG/1
5 TABLET ORAL EVERY 6 HOURS
Qty: 0 | Refills: 0 | Status: DISCONTINUED | OUTPATIENT
Start: 2017-03-20 | End: 2017-03-20

## 2017-03-20 RX ORDER — SODIUM CHLORIDE 9 MG/ML
1000 INJECTION INTRAMUSCULAR; INTRAVENOUS; SUBCUTANEOUS
Qty: 0 | Refills: 0 | Status: DISCONTINUED | OUTPATIENT
Start: 2017-03-20 | End: 2017-03-22

## 2017-03-20 RX ADMIN — PANTOPRAZOLE SODIUM 40 MILLIGRAM(S): 20 TABLET, DELAYED RELEASE ORAL at 05:50

## 2017-03-20 RX ADMIN — Medication 2: at 08:06

## 2017-03-20 RX ADMIN — Medication 1 TABLET(S): at 11:18

## 2017-03-20 RX ADMIN — ONDANSETRON 4 MILLIGRAM(S): 8 TABLET, FILM COATED ORAL at 11:17

## 2017-03-20 RX ADMIN — Medication 0.5 MILLIGRAM(S): at 18:32

## 2017-03-20 RX ADMIN — Medication 5 UNIT(S): at 08:06

## 2017-03-20 RX ADMIN — APIXABAN 2.5 MILLIGRAM(S): 2.5 TABLET, FILM COATED ORAL at 10:55

## 2017-03-20 RX ADMIN — Medication 1 TABLET(S): at 12:55

## 2017-03-20 RX ADMIN — GABAPENTIN 300 MILLIGRAM(S): 400 CAPSULE ORAL at 11:17

## 2017-03-20 RX ADMIN — Medication 50 MILLIGRAM(S): at 05:50

## 2017-03-20 RX ADMIN — Medication 325 MILLIGRAM(S): at 05:50

## 2017-03-20 RX ADMIN — INSULIN GLARGINE 22 UNIT(S): 100 INJECTION, SOLUTION SUBCUTANEOUS at 21:57

## 2017-03-20 RX ADMIN — Medication 100 MILLIGRAM(S): at 12:56

## 2017-03-20 RX ADMIN — Medication 100 MILLIGRAM(S): at 05:55

## 2017-03-20 RX ADMIN — Medication 5 UNIT(S): at 13:00

## 2017-03-20 RX ADMIN — Medication 5 UNIT(S): at 17:40

## 2017-03-20 RX ADMIN — Medication 75 MICROGRAM(S): at 05:55

## 2017-03-20 RX ADMIN — SENNA PLUS 2 TABLET(S): 8.6 TABLET ORAL at 21:57

## 2017-03-20 RX ADMIN — Medication 4: at 13:00

## 2017-03-20 RX ADMIN — Medication 325 MILLIGRAM(S): at 16:21

## 2017-03-20 RX ADMIN — ATORVASTATIN CALCIUM 40 MILLIGRAM(S): 80 TABLET, FILM COATED ORAL at 21:57

## 2017-03-20 RX ADMIN — Medication 6: at 17:40

## 2017-03-20 RX ADMIN — SODIUM CHLORIDE 70 MILLILITER(S): 9 INJECTION INTRAMUSCULAR; INTRAVENOUS; SUBCUTANEOUS at 10:54

## 2017-03-20 RX ADMIN — APIXABAN 2.5 MILLIGRAM(S): 2.5 TABLET, FILM COATED ORAL at 21:57

## 2017-03-20 RX ADMIN — GABAPENTIN 600 MILLIGRAM(S): 400 CAPSULE ORAL at 21:57

## 2017-03-20 RX ADMIN — Medication 100 MILLIGRAM(S): at 21:58

## 2017-03-20 RX ADMIN — OXYCODONE HYDROCHLORIDE 5 MILLIGRAM(S): 5 TABLET ORAL at 16:21

## 2017-03-20 RX ADMIN — AMLODIPINE BESYLATE 10 MILLIGRAM(S): 2.5 TABLET ORAL at 05:50

## 2017-03-20 RX ADMIN — Medication 50 MILLIGRAM(S): at 17:40

## 2017-03-20 RX ADMIN — Medication 1 MILLIGRAM(S): at 11:17

## 2017-03-20 NOTE — PROGRESS NOTE ADULT - ATTENDING COMMENTS
Patient seen at bedside. Daughter present. She states that her mother is more awake and alert now compared to weekend, but also in more pain, including pain in right distal leg.    On exam, patient is easily arousable and followed simple commands.  Tenderness on palpation at achilles tendon region. heel no evidence of breakdown.    continue bedside therapy as tolerated. She would benefit from acute rehab as she needs monitoring of multiple medical comorbidities. Not stable for rehab yet. will f/u in am. Patient seen at bedside. Daughter present. She states that her mother is more awake and alert now compared to weekend, but also in more pain, including pain in right distal leg.    On exam, patient is easily arousable and followed simple commands.  Tenderness on palpation at achilles tendon region. heel no evidence of breakdown. Labs show mild worsening of hyponatremia    continue bedside therapy as tolerated. She would benefit from acute rehab as she needs monitoring of multiple medical comorbidities. Not stable for rehab yet. will f/u in am. Would prefer Sodium to be on a improving trend prior to transfer to rehab.

## 2017-03-20 NOTE — PROGRESS NOTE ADULT - PROBLEM SELECTOR PLAN 1
minimize narcotic use  daily dressing changes  glucose control  consider 1 unit PRBC  d/c planning to rehab

## 2017-03-20 NOTE — PROGRESS NOTE ADULT - SUBJECTIVE AND OBJECTIVE BOX
POD # 6 s/p L BKA. Pt more confused. C/O Stump pain, RLE pain, back pain.    Vital Signs Last 24 Hrs  T(C): 36.6, Max: 36.7 (03-19 @ 15:47)  T(F): 97.8, Max: 98 (03-19 @ 15:47)  HR: 88 (84 - 88)  BP: 150/50 (110/60 - 150/50)  BP(mean): --  RR: 19 (18 - 19)  SpO2: 91% (91% - 98%)                          8.4    9.4   )-----------( 280      ( 19 Mar 2017 05:15 )             25.1   20 Mar 2017 05:23    128    |  91     |  22.0   ----------------------------<  223    4.4     |  26.0   |  0.92     Ca    8.7        20 Mar 2017 05:23  Mg     1.9       20 Mar 2017 05:23

## 2017-03-20 NOTE — PROGRESS NOTE ADULT - SUBJECTIVE AND OBJECTIVE BOX
INTERVAL HPI/OVERNIGHT EVENTS:    CC: s/p left BKA, diabetes mellitus, PVD.    More alert today but pain is worse. No overnight events.     Vital Signs Last 24 Hrs  T(C): 36.6, Max: 36.7 (03-19 @ 15:47)  T(F): 97.8, Max: 98 (03-19 @ 15:47)  HR: 88 (85 - 88)  BP: 150/50 (120/85 - 150/50)  BP(mean): --  RR: 19 (18 - 19)  SpO2: 91% (91% - 98%)    PHYSICAL EXAM:    GENERAL: Not in distress, alert  CHEST/LUNG: b/l air entry  HEART: Regular   ABDOMEN: Soft, BS+  EXTREMITIES: No edema right, left BKA    MEDICATIONS  (STANDING):  amLODIPine   Tablet 10milliGRAM(s) Oral daily  docusate sodium 100milliGRAM(s) Oral three times a day  gabapentin 600milliGRAM(s) Oral at bedtime  atorvastatin 40milliGRAM(s) Oral at bedtime  folic acid 1milliGRAM(s) Oral daily  metoprolol 50milliGRAM(s) Oral every 12 hours  pantoprazole    Tablet 40milliGRAM(s) Oral before breakfast  levothyroxine 75MICROGram(s) Oral daily  multivitamin/minerals 1Tablet(s) Oral daily  multivitamin 1Tablet(s) Oral daily  gabapentin 300milliGRAM(s) Oral daily  ferrous    sulfate 325milliGRAM(s) Oral every 12 hours  senna 2Tablet(s) Oral at bedtime  apixaban 2.5milliGRAM(s) Oral two times a day  insulin glargine Injectable (LANTUS) 22Unit(s) SubCutaneous at bedtime  insulin lispro Injectable (HumaLOG) 5Unit(s) SubCutaneous before breakfast  insulin lispro Injectable (HumaLOG) 5Unit(s) SubCutaneous before lunch  insulin lispro Injectable (HumaLOG) 5Unit(s) SubCutaneous before dinner  insulin lispro (HumaLOG) corrective regimen sliding scale  SubCutaneous three times a day before meals  dextrose 5%. 1000milliLiter(s) IV Continuous <Continuous>  dextrose 50% Injectable 12.5Gram(s) IV Push once  dextrose 50% Injectable 25Gram(s) IV Push once  dextrose 50% Injectable 25Gram(s) IV Push once  sodium chloride 0.9%. 1000milliLiter(s) IV Continuous <Continuous>    MEDICATIONS  (PRN):  morphine  - Injectable 2milliGRAM(s) IV Push every 4 hours PRN breakthrough pain  acetaminophen   Tablet 650milliGRAM(s) Oral every 6 hours PRN For Temp greater than 38 C (100.4 F)  ALPRAZolam 0.5milliGRAM(s) Oral three times a day PRN anxiety  ondansetron Injectable 4milliGRAM(s) IV Push every 6 hours PRN Nausea  magnesium hydroxide Suspension 30milliLiter(s) Oral daily PRN Constipation  dextrose Gel 1Dose(s) Oral once PRN Blood Glucose LESS THAN 70 milliGRAM(s)/deciliter  glucagon  Injectable 1milliGRAM(s) IntraMuscular once PRN Glucose LESS THAN 70 milligrams/deciliter  oxyCODONE  5 mG/acetaminophen 325 mG 1Tablet(s) Oral every 4 hours PRN Mild Pain (1 - 3)  oxyCODONE IR 5milliGRAM(s) Oral every 6 hours PRN Severe Pain (7 - 10)      Allergies    Benadryl (Other)  Demerol HCl (Other)    Intolerances          LABS:                          8.4    9.4   )-----------( 280      ( 19 Mar 2017 05:15 )             25.1     20 Mar 2017 05:23    128    |  91     |  22.0   ----------------------------<  223    4.4     |  26.0   |  0.92     Ca    8.7        20 Mar 2017 05:23  Mg     1.9       20 Mar 2017 05:23            RADIOLOGY & ADDITIONAL TESTS:

## 2017-03-20 NOTE — PROGRESS NOTE ADULT - SUBJECTIVE AND OBJECTIVE BOX
INTERVAL SUBJECTIVE & REVIEW OF SYMPTOMS:    Patient seen and examined with daughter at bedside. No overnight events. Patient more lethargic this morning. Reports increased b/l LE pain. C/o right heel/posterior calf pain and LLE incisional and phantom pain. Now wearing LLE immobilizer per Dr. Irwin. Tolerating PO diet. Hyponatremia being addressed via IVF per medicine.    MEDICATIONS:  amLODIPine   Tablet 10milliGRAM(s) Oral daily  docusate sodium 100milliGRAM(s) Oral three times a day  morphine  - Injectable 2milliGRAM(s) IV Push every 4 hours PRN  acetaminophen   Tablet 650milliGRAM(s) Oral every 6 hours PRN  gabapentin 600milliGRAM(s) Oral at bedtime  atorvastatin 40milliGRAM(s) Oral at bedtime  ALPRAZolam 0.5milliGRAM(s) Oral three times a day PRN  folic acid 1milliGRAM(s) Oral daily  metoprolol 50milliGRAM(s) Oral every 12 hours  pantoprazole    Tablet 40milliGRAM(s) Oral before breakfast  levothyroxine 75MICROGram(s) Oral daily  multivitamin/minerals 1Tablet(s) Oral daily  multivitamin 1Tablet(s) Oral daily  gabapentin 300milliGRAM(s) Oral daily  ferrous    sulfate 325milliGRAM(s) Oral every 12 hours  ondansetron Injectable 4milliGRAM(s) IV Push every 6 hours PRN  oxyCODONE IR 5milliGRAM(s) Oral every 8 hours PRN  magnesium hydroxide Suspension 30milliLiter(s) Oral daily PRN  senna 2Tablet(s) Oral at bedtime  apixaban 2.5milliGRAM(s) Oral two times a day  insulin glargine Injectable (LANTUS) 22Unit(s) SubCutaneous at bedtime  insulin lispro Injectable (HumaLOG) 5Unit(s) SubCutaneous before breakfast  insulin lispro Injectable (HumaLOG) 5Unit(s) SubCutaneous before lunch  insulin lispro Injectable (HumaLOG) 5Unit(s) SubCutaneous before dinner  insulin lispro (HumaLOG) corrective regimen sliding scale  SubCutaneous three times a day before meals  dextrose 5%. 1000milliLiter(s) IV Continuous <Continuous>  dextrose Gel 1Dose(s) Oral once PRN  dextrose 50% Injectable 12.5Gram(s) IV Push once  dextrose 50% Injectable 25Gram(s) IV Push once  dextrose 50% Injectable 25Gram(s) IV Push once  glucagon  Injectable 1milliGRAM(s) IntraMuscular once PRN  sodium chloride 0.9%. 1000milliLiter(s) IV Continuous <Continuous>  oxyCODONE  5 mG/acetaminophen 325 mG 1Tablet(s) Oral every 4 hours PRN  sodium chloride 0.9%. 1000milliLiter(s) IV Continuous <Continuous>      REVIEW OF SYSTEMS  [   ] Constitutional WNL  [ X] Cardio WNL  [ X] Resp WNL  [ X] GI WNL  [   ]  WNL + Bhatt  [   ] Heme WNL  [   ] Endo WNL  [   ] Skin WNL  [   ] MSK WNL  [ X] Neuro WNL  [   ] Cognitive WNL  [   ] Psych WNL    VITAL SIGNS  Vital Signs Last 24 Hrs  T(C): 36.6, Max: 36.7 (03-19 @ 15:47)  T(F): 97.8, Max: 98 (03-19 @ 15:47)  HR: 88 (85 - 88)  BP: 150/50 (120/85 - 150/50)  BP(mean): --  RR: 19 (18 - 19)  SpO2: 91% (91% - 98%)    PHYSICAL EXAM  General: NAD, lethargic, but easily arousable, conversant  HEENT: NCAT  Cardio: +S1/S2  Resp: CTA b/l, no w/r/r  Abdomen: soft, NT, ND, + BS  Neuro: AAO x 2, CN 2-12 intact  Extrem: LLE BKA site appears c/d/i, wrapped in ACE w/immobilizer    RECENT LABS:                        8.4    9.4   )-----------( 280      ( 19 Mar 2017 05:15 )             25.1     20 Mar 2017 05:23    128    |  91     |  22.0   ----------------------------<  223    4.4     |  26.0   |  0.92     Ca    8.7        20 Mar 2017 05:23  Mg     1.9       20 Mar 2017 05:23 INTERVAL SUBJECTIVE & REVIEW OF SYMPTOMS:    Patient seen and examined with daughter at bedside. No overnight events. Patient more lethargic this morning. Reports increased b/l LE pain. C/o right heel/posterior calf pain and LLE incisional and phantom pain. Now wearing LLE immobilizer per Dr. Irwin. Tolerating PO diet. Hyponatremia being addressed via IVF per medicine.    MEDICATIONS:  amLODIPine   Tablet 10milliGRAM(s) Oral daily  docusate sodium 100milliGRAM(s) Oral three times a day  morphine  - Injectable 2milliGRAM(s) IV Push every 4 hours PRN  acetaminophen   Tablet 650milliGRAM(s) Oral every 6 hours PRN  gabapentin 600milliGRAM(s) Oral at bedtime  atorvastatin 40milliGRAM(s) Oral at bedtime  ALPRAZolam 0.5milliGRAM(s) Oral three times a day PRN  folic acid 1milliGRAM(s) Oral daily  metoprolol 50milliGRAM(s) Oral every 12 hours  pantoprazole    Tablet 40milliGRAM(s) Oral before breakfast  levothyroxine 75MICROGram(s) Oral daily  multivitamin/minerals 1Tablet(s) Oral daily  multivitamin 1Tablet(s) Oral daily  gabapentin 300milliGRAM(s) Oral daily  ferrous    sulfate 325milliGRAM(s) Oral every 12 hours  ondansetron Injectable 4milliGRAM(s) IV Push every 6 hours PRN  oxyCODONE IR 5milliGRAM(s) Oral every 8 hours PRN  magnesium hydroxide Suspension 30milliLiter(s) Oral daily PRN  senna 2Tablet(s) Oral at bedtime  apixaban 2.5milliGRAM(s) Oral two times a day  insulin glargine Injectable (LANTUS) 22Unit(s) SubCutaneous at bedtime  insulin lispro Injectable (HumaLOG) 5Unit(s) SubCutaneous before breakfast  insulin lispro Injectable (HumaLOG) 5Unit(s) SubCutaneous before lunch  insulin lispro Injectable (HumaLOG) 5Unit(s) SubCutaneous before dinner  insulin lispro (HumaLOG) corrective regimen sliding scale  SubCutaneous three times a day before meals  dextrose 5%. 1000milliLiter(s) IV Continuous <Continuous>  dextrose Gel 1Dose(s) Oral once PRN  dextrose 50% Injectable 12.5Gram(s) IV Push once  dextrose 50% Injectable 25Gram(s) IV Push once  dextrose 50% Injectable 25Gram(s) IV Push once  glucagon  Injectable 1milliGRAM(s) IntraMuscular once PRN  sodium chloride 0.9%. 1000milliLiter(s) IV Continuous <Continuous>  oxyCODONE  5 mG/acetaminophen 325 mG 1Tablet(s) Oral every 4 hours PRN  sodium chloride 0.9%. 1000milliLiter(s) IV Continuous <Continuous>      REVIEW OF SYSTEMS  [   ] Constitutional WNL  [ X] Cardio WNL  [ X] Resp WNL  [ X] GI WNL  [   ]  WNL + Bhatt  [   ] Heme WNL  [   ] Endo WNL  [   ] Skin WNL  [   ] MSK WNL  [ X] Neuro WNL  [   ] Cognitive WNL  [   ] Psych WNL    VITAL SIGNS  Vital Signs Last 24 Hrs  T(C): 36.6, Max: 36.7 (03-19 @ 15:47)  T(F): 97.8, Max: 98 (03-19 @ 15:47)  HR: 88 (85 - 88)  BP: 150/50 (120/85 - 150/50)  BP(mean): --  RR: 19 (18 - 19)  SpO2: 91% (91% - 98%)    PHYSICAL EXAM  General: NAD, lethargic, but easily arousable, conversant  HEENT: NCAT  Cardio: +S1/S2  Resp: CTA b/l, no w/r/r  Abdomen: soft, NT, ND, + BS  Neuro: AAO x 2, CN 2-12 intact  Extrem: LLE BKA site appears c/d/i, wrapped in ACE w/immobilizer    RECENT LABS:                        8.4    9.4   )-----------( 280      ( 19 Mar 2017 05:15 )             25.1     20 Mar 2017 05:23    128    |  91     |  22.0   ----------------------------<  223    4.4     |  26.0   |  0.92     Ca    8.7        20 Mar 2017 05:23  Mg     1.9       20 Mar 2017 05:23      84 yo female with PVD s/p stents now s/p left BKA    - PT - strength and mobility  - OT - ADLs, balance  - Prec - Fall  - DVT ppx. - Lovenox per primary team  - Dispo - Continue with bedside therapy. Will likely benefit from acute rehab, but will track progress and functional status as patient becomes medically optimized.

## 2017-03-20 NOTE — PROGRESS NOTE ADULT - ASSESSMENT
85 yr old female with multiple medical problems CAD s/p stents, diabetes, hypertension, hyperlipidemia, anxiety, significant PVD s/p multiple stents, paroxysmal atrial fibrillation admitted for hypertensive urgency post LE angiogram and cellulitis. Given significant PVD and tissue loss, BKA was planned by vascular surgery. BP improved. She was started on antibiotics. Cardiology consulted for pre op evaluation. She also developed MAGI, now resolved. BKA was done on 3/14. Procedure was uneventful. She was evaluated by acute rehab and accepted for the same. Given urinary retention, juarez was maintained. Noted to have hyponatremia, hence started on IV fluids. Tramadol discontinued.

## 2017-03-20 NOTE — PROGRESS NOTE ADULT - PROBLEM SELECTOR PLAN 1
Needs acute rehab. Pain control. Vascular follow up noted. On Eliquis. Monitor Hb, hemodynamically stable. Pain control optimized.

## 2017-03-21 DIAGNOSIS — D64.9 ANEMIA, UNSPECIFIED: ICD-10-CM

## 2017-03-21 LAB
ANION GAP SERPL CALC-SCNC: 8 MMOL/L — SIGNIFICANT CHANGE UP (ref 5–17)
BLD GP AB SCN SERPL QL: SIGNIFICANT CHANGE UP
BUN SERPL-MCNC: 18 MG/DL — SIGNIFICANT CHANGE UP (ref 8–20)
CALCIUM SERPL-MCNC: 8.4 MG/DL — LOW (ref 8.6–10.2)
CHLORIDE SERPL-SCNC: 94 MMOL/L — LOW (ref 98–107)
CO2 SERPL-SCNC: 27 MMOL/L — SIGNIFICANT CHANGE UP (ref 22–29)
CREAT SERPL-MCNC: 0.9 MG/DL — SIGNIFICANT CHANGE UP (ref 0.5–1.3)
GLUCOSE SERPL-MCNC: 159 MG/DL — HIGH (ref 70–115)
HCT VFR BLD CALC: 23.9 % — LOW (ref 37–47)
HGB BLD-MCNC: 7.8 G/DL — LOW (ref 12–16)
MAGNESIUM SERPL-MCNC: 1.9 MG/DL — SIGNIFICANT CHANGE UP (ref 1.8–2.5)
MCHC RBC-ENTMCNC: 27.6 PG — SIGNIFICANT CHANGE UP (ref 27–31)
MCHC RBC-ENTMCNC: 32.6 G/DL — SIGNIFICANT CHANGE UP (ref 32–36)
MCV RBC AUTO: 84.5 FL — SIGNIFICANT CHANGE UP (ref 81–99)
OB PNL STL: NEGATIVE — SIGNIFICANT CHANGE UP
PLATELET # BLD AUTO: 307 K/UL — SIGNIFICANT CHANGE UP (ref 150–400)
POTASSIUM SERPL-MCNC: 4.4 MMOL/L — SIGNIFICANT CHANGE UP (ref 3.5–5.3)
POTASSIUM SERPL-SCNC: 4.4 MMOL/L — SIGNIFICANT CHANGE UP (ref 3.5–5.3)
RBC # BLD: 2.83 M/UL — LOW (ref 4.4–5.2)
RBC # FLD: 13.7 % — SIGNIFICANT CHANGE UP (ref 11–15.6)
SODIUM SERPL-SCNC: 129 MMOL/L — LOW (ref 135–145)
TYPE + AB SCN PNL BLD: SIGNIFICANT CHANGE UP
WBC # BLD: 8.4 K/UL — SIGNIFICANT CHANGE UP (ref 4.8–10.8)
WBC # FLD AUTO: 8.4 K/UL — SIGNIFICANT CHANGE UP (ref 4.8–10.8)

## 2017-03-21 PROCEDURE — 99232 SBSQ HOSP IP/OBS MODERATE 35: CPT

## 2017-03-21 PROCEDURE — 99233 SBSQ HOSP IP/OBS HIGH 50: CPT

## 2017-03-21 RX ORDER — ALPRAZOLAM 0.25 MG
0.5 TABLET ORAL EVERY 6 HOURS
Qty: 0 | Refills: 0 | Status: DISCONTINUED | OUTPATIENT
Start: 2017-03-21 | End: 2017-03-24

## 2017-03-21 RX ADMIN — Medication 2: at 17:44

## 2017-03-21 RX ADMIN — Medication 75 MICROGRAM(S): at 07:01

## 2017-03-21 RX ADMIN — Medication 50 MILLIGRAM(S): at 07:01

## 2017-03-21 RX ADMIN — Medication 100 MILLIGRAM(S): at 21:41

## 2017-03-21 RX ADMIN — Medication 1 MILLIGRAM(S): at 11:18

## 2017-03-21 RX ADMIN — Medication 0.5 MILLIGRAM(S): at 11:17

## 2017-03-21 RX ADMIN — Medication 2: at 08:07

## 2017-03-21 RX ADMIN — Medication 325 MILLIGRAM(S): at 17:44

## 2017-03-21 RX ADMIN — Medication 325 MILLIGRAM(S): at 07:01

## 2017-03-21 RX ADMIN — PANTOPRAZOLE SODIUM 40 MILLIGRAM(S): 20 TABLET, DELAYED RELEASE ORAL at 07:01

## 2017-03-21 RX ADMIN — SODIUM CHLORIDE 70 MILLILITER(S): 9 INJECTION INTRAMUSCULAR; INTRAVENOUS; SUBCUTANEOUS at 21:42

## 2017-03-21 RX ADMIN — AMLODIPINE BESYLATE 10 MILLIGRAM(S): 2.5 TABLET ORAL at 07:07

## 2017-03-21 RX ADMIN — Medication 5 UNIT(S): at 08:07

## 2017-03-21 RX ADMIN — Medication 5 UNIT(S): at 12:31

## 2017-03-21 RX ADMIN — SENNA PLUS 2 TABLET(S): 8.6 TABLET ORAL at 21:41

## 2017-03-21 RX ADMIN — GABAPENTIN 600 MILLIGRAM(S): 400 CAPSULE ORAL at 21:41

## 2017-03-21 RX ADMIN — APIXABAN 2.5 MILLIGRAM(S): 2.5 TABLET, FILM COATED ORAL at 10:53

## 2017-03-21 RX ADMIN — Medication 2: at 12:31

## 2017-03-21 RX ADMIN — ATORVASTATIN CALCIUM 40 MILLIGRAM(S): 80 TABLET, FILM COATED ORAL at 21:41

## 2017-03-21 RX ADMIN — GABAPENTIN 300 MILLIGRAM(S): 400 CAPSULE ORAL at 11:26

## 2017-03-21 RX ADMIN — Medication 100 MILLIGRAM(S): at 07:07

## 2017-03-21 RX ADMIN — Medication 1 TABLET(S): at 11:19

## 2017-03-21 RX ADMIN — OXYCODONE HYDROCHLORIDE 5 MILLIGRAM(S): 5 TABLET ORAL at 21:40

## 2017-03-21 RX ADMIN — Medication 50 MILLIGRAM(S): at 17:44

## 2017-03-21 RX ADMIN — INSULIN GLARGINE 22 UNIT(S): 100 INJECTION, SOLUTION SUBCUTANEOUS at 21:39

## 2017-03-21 RX ADMIN — ONDANSETRON 4 MILLIGRAM(S): 8 TABLET, FILM COATED ORAL at 11:25

## 2017-03-21 RX ADMIN — APIXABAN 2.5 MILLIGRAM(S): 2.5 TABLET, FILM COATED ORAL at 21:41

## 2017-03-21 RX ADMIN — Medication 5 UNIT(S): at 17:45

## 2017-03-21 RX ADMIN — Medication 650 MILLIGRAM(S): at 18:41

## 2017-03-21 NOTE — PROGRESS NOTE ADULT - SUBJECTIVE AND OBJECTIVE BOX
INTERVAL HPI/OVERNIGHT EVENTS:    CC: anemia, hyponatremia, s/p l bka, CAD    Had episodes of anxiety yesterday, denies pain.    Vital Signs Last 24 Hrs  T(C): 36.9, Max: 37.3 (03-20 @ 21:51)  T(F): 98.4, Max: 99.1 (03-20 @ 21:51)  HR: 76 (72 - 85)  BP: 128/66 (128/66 - 158/60)  BP(mean): --  RR: 18 (18 - 19)  SpO2: 95% (91% - 95%)    PHYSICAL EXAM:    GENERAL: Not in distress, alert, sitting in chair  CHEST/LUNG: b/l air entry  HEART: Regular   ABDOMEN: Soft, BS+  EXTREMITIES: No edema, left BKA    MEDICATIONS  (STANDING):  amLODIPine   Tablet 10milliGRAM(s) Oral daily  docusate sodium 100milliGRAM(s) Oral three times a day  gabapentin 600milliGRAM(s) Oral at bedtime  atorvastatin 40milliGRAM(s) Oral at bedtime  folic acid 1milliGRAM(s) Oral daily  metoprolol 50milliGRAM(s) Oral every 12 hours  pantoprazole    Tablet 40milliGRAM(s) Oral before breakfast  levothyroxine 75MICROGram(s) Oral daily  multivitamin/minerals 1Tablet(s) Oral daily  multivitamin 1Tablet(s) Oral daily  gabapentin 300milliGRAM(s) Oral daily  ferrous    sulfate 325milliGRAM(s) Oral every 12 hours  senna 2Tablet(s) Oral at bedtime  apixaban 2.5milliGRAM(s) Oral two times a day  insulin glargine Injectable (LANTUS) 22Unit(s) SubCutaneous at bedtime  insulin lispro Injectable (HumaLOG) 5Unit(s) SubCutaneous before breakfast  insulin lispro Injectable (HumaLOG) 5Unit(s) SubCutaneous before lunch  insulin lispro Injectable (HumaLOG) 5Unit(s) SubCutaneous before dinner  insulin lispro (HumaLOG) corrective regimen sliding scale  SubCutaneous three times a day before meals  dextrose 5%. 1000milliLiter(s) IV Continuous <Continuous>  dextrose 50% Injectable 12.5Gram(s) IV Push once  dextrose 50% Injectable 25Gram(s) IV Push once  dextrose 50% Injectable 25Gram(s) IV Push once  sodium chloride 0.9%. 1000milliLiter(s) IV Continuous <Continuous>    MEDICATIONS  (PRN):  morphine  - Injectable 2milliGRAM(s) IV Push every 4 hours PRN breakthrough pain  acetaminophen   Tablet 650milliGRAM(s) Oral every 6 hours PRN For Temp greater than 38 C (100.4 F)  ALPRAZolam 0.5milliGRAM(s) Oral three times a day PRN anxiety  ondansetron Injectable 4milliGRAM(s) IV Push every 6 hours PRN Nausea  magnesium hydroxide Suspension 30milliLiter(s) Oral daily PRN Constipation  dextrose Gel 1Dose(s) Oral once PRN Blood Glucose LESS THAN 70 milliGRAM(s)/deciliter  glucagon  Injectable 1milliGRAM(s) IntraMuscular once PRN Glucose LESS THAN 70 milligrams/deciliter  oxyCODONE  5 mG/acetaminophen 325 mG 1Tablet(s) Oral every 6 hours PRN Mild Pain (1 - 3)  oxyCODONE IR 5milliGRAM(s) Oral every 4 hours PRN Severe Pain (7 - 10)      Allergies    Benadryl (Other)  Demerol HCl (Other)    Intolerances          LABS:                          7.8    8.4   )-----------( 307      ( 21 Mar 2017 05:54 )             23.9     21 Mar 2017 05:54    129    |  94     |  18.0   ----------------------------<  159    4.4     |  27.0   |  0.90     Ca    8.4        21 Mar 2017 05:54  Mg     1.9       21 Mar 2017 05:54            RADIOLOGY & ADDITIONAL TESTS:

## 2017-03-21 NOTE — PROGRESS NOTE ADULT - GASTROINTESTINAL
Soft, non-tender, no hepatosplenomegaly, normal bowel sounds

## 2017-03-21 NOTE — PROGRESS NOTE ADULT - ASSESSMENT
85 yr old female with multiple medical problems CAD s/p stents, diabetes, hypertension, hyperlipidemia, anxiety, significant PVD s/p multiple stents, paroxysmal atrial fibrillation admitted for hypertensive urgency post LE angiogram and cellulitis. Given significant PVD and tissue loss, BKA was planned by vascular surgery. BP improved. She was started on antibiotics. Cardiology consulted for pre op evaluation. She also developed MAGI, now resolved. BKA was done on 3/14. Procedure was uneventful. She was evaluated by acute rehab and accepted for the same. Given urinary retention, juarez was maintained. Noted to have hyponatremia, hence started on IV fluids. Tramadol discontinued. Noted to have Hb 7.8.

## 2017-03-21 NOTE — PROGRESS NOTE ADULT - SUBJECTIVE AND OBJECTIVE BOX
INTERVAL SUBJECTIVE & REVIEW OF SYMPTOMS:    Patient seen and examined.  Patient c/o feeling tired this morning. Denies significant left residual limb pain; pain occurs with activity.  Pt c/o persistent RLE pain which she has had since a previous fall.  Tolerating PO diet. Hyponatremia being addressed via IVF per medicine.    MEDICATIONS:  amLODIPine   Tablet 10milliGRAM(s) Oral daily  docusate sodium 100milliGRAM(s) Oral three times a day  morphine  - Injectable 2milliGRAM(s) IV Push every 4 hours PRN  acetaminophen   Tablet 650milliGRAM(s) Oral every 6 hours PRN  gabapentin 600milliGRAM(s) Oral at bedtime  atorvastatin 40milliGRAM(s) Oral at bedtime  ALPRAZolam 0.5milliGRAM(s) Oral three times a day PRN  folic acid 1milliGRAM(s) Oral daily  metoprolol 50milliGRAM(s) Oral every 12 hours  pantoprazole    Tablet 40milliGRAM(s) Oral before breakfast  levothyroxine 75MICROGram(s) Oral daily  multivitamin/minerals 1Tablet(s) Oral daily  multivitamin 1Tablet(s) Oral daily  gabapentin 300milliGRAM(s) Oral daily  ferrous    sulfate 325milliGRAM(s) Oral every 12 hours  ondansetron Injectable 4milliGRAM(s) IV Push every 6 hours PRN  oxyCODONE IR 5milliGRAM(s) Oral every 8 hours PRN  magnesium hydroxide Suspension 30milliLiter(s) Oral daily PRN  senna 2Tablet(s) Oral at bedtime  apixaban 2.5milliGRAM(s) Oral two times a day  insulin glargine Injectable (LANTUS) 22Unit(s) SubCutaneous at bedtime  insulin lispro Injectable (HumaLOG) 5Unit(s) SubCutaneous before breakfast  insulin lispro Injectable (HumaLOG) 5Unit(s) SubCutaneous before lunch  insulin lispro Injectable (HumaLOG) 5Unit(s) SubCutaneous before dinner  insulin lispro (HumaLOG) corrective regimen sliding scale  SubCutaneous three times a day before meals  dextrose 5%. 1000milliLiter(s) IV Continuous <Continuous>  dextrose Gel 1Dose(s) Oral once PRN  dextrose 50% Injectable 12.5Gram(s) IV Push once  dextrose 50% Injectable 25Gram(s) IV Push once  dextrose 50% Injectable 25Gram(s) IV Push once  glucagon  Injectable 1milliGRAM(s) IntraMuscular once PRN  sodium chloride 0.9%. 1000milliLiter(s) IV Continuous <Continuous>  oxyCODONE  5 mG/acetaminophen 325 mG 1Tablet(s) Oral every 4 hours PRN  sodium chloride 0.9%. 1000milliLiter(s) IV Continuous <Continuous>      REVIEW OF SYSTEMS  [   ] Constitutional WNL  [ X] Cardio WNL  [ X] Resp WNL  [ X] GI WNL  [   ]  WNL + Bhatt  [   ] Heme WNL  [   ] Endo WNL  [   ] Skin WNL  [   ] MSK WNL  [ X] Neuro WNL  [   ] Cognitive WNL  [   ] Psych WNL    VITAL SIGNS  Vital Signs Last 24 Hrs  T(C): 36.6, Max: 36.7 (03-19 @ 15:47)  T(F): 97.8, Max: 98 (03-19 @ 15:47)  HR: 88 (85 - 88)  BP: 150/50 (120/85 - 150/50)  BP(mean): --  RR: 19 (18 - 19)  SpO2: 91% (91% - 98%)    PHYSICAL EXAM  General: NAD, mildly lethargic, conversant  HEENT: NCAT  Cardio: +S1/S2  Resp: CTAB  Abdomen: soft, NT, ND  Neuro: AAO x 2, CN 2-12 intact  Extrem: LLE BKA site appears c/d/i, wrapped in ACE w/immobilizer    RECENT LABS:                        7.8    8.4   )-----------( 307      ( 21 Mar 2017 05:54 )             23.9   21 Mar 2017 05:54    129    |  94     |  18.0   ----------------------------<  159    4.4     |  27.0   |  0.90     Ca    8.4        21 Mar 2017 05:54  Mg     1.9       21 Mar 2017 05:54        20 Mar 2017 05:23    128    |  91     |  22.0   ----------------------------<  223    4.4     |  26.0   |  0.92     Ca    8.7        20 Mar 2017 05:23  Mg     1.9       20 Mar 2017 05:23      84 yo female with PVD s/p stents now s/p left BKA    - PT - strength and mobility  - OT - ADLs, balance  - Prec - Fall  - DVT ppx. - Lovenox per primary team  - Hyponatremia Na 129  On IVFs  - Acute blood loss anemia-transfusion PRBCs pending  - Dispo - Continue with bedside therapy. Will likely benefit from acute rehab, but will track progress and functional status as patient becomes medically optimized.

## 2017-03-21 NOTE — CONSULT NOTE ADULT - ASSESSMENT
Hyponatremia: pt appears euvolemic by exam  - check urine studies and TSH, cortisol  - oral fluid restrict for now  - repeat serum Na+ later and if sodium improving this would suggest a hypovolemic etiology

## 2017-03-21 NOTE — CONSULT NOTE ADULT - SUBJECTIVE AND OBJECTIVE BOX
Patient is a 85y old  Female who presents with a chief complaint of b/l leg pain (17 Mar 2017 16:46)      HPI:  85 yr old female with multiple medical problems CAD s/p stents, diabetes, hypertension, hyperlipidemia, anxiety, significant PVD s/p multiple stents, paroxysmal atrial fibrillation was admitted 3/17/17 for LE angiogram and found to have severe, non operable lesions and required L BKA. We are called regarding progressive hyponatremia over last several days.  The pt is a poor historian, confused; pt's daughter was present and denied excessive fluid intake.  The pt has had some nausea and pain. No sob, cp, diarrhea.      PAST MEDICAL & SURGICAL HISTORY:  Hyperlipidemia  Hypertension  Diabetes  PVD (peripheral vascular disease)  CAD (coronary artery disease)  History of cholecystectomy  H/O angioplasty      FAMILY HISTORY:  No pertinent family history in first degree relatives      Social History:  No recent tobacco, etoh nor drugs    MEDICATIONS  (STANDING):  amLODIPine   Tablet 10milliGRAM(s) Oral daily  docusate sodium 100milliGRAM(s) Oral three times a day  gabapentin 600milliGRAM(s) Oral at bedtime  atorvastatin 40milliGRAM(s) Oral at bedtime  folic acid 1milliGRAM(s) Oral daily  metoprolol 50milliGRAM(s) Oral every 12 hours  pantoprazole    Tablet 40milliGRAM(s) Oral before breakfast  levothyroxine 75MICROGram(s) Oral daily  multivitamin/minerals 1Tablet(s) Oral daily  multivitamin 1Tablet(s) Oral daily  gabapentin 300milliGRAM(s) Oral daily  ferrous    sulfate 325milliGRAM(s) Oral every 12 hours  senna 2Tablet(s) Oral at bedtime  apixaban 2.5milliGRAM(s) Oral two times a day  insulin glargine Injectable (LANTUS) 22Unit(s) SubCutaneous at bedtime  insulin lispro Injectable (HumaLOG) 5Unit(s) SubCutaneous before breakfast  insulin lispro Injectable (HumaLOG) 5Unit(s) SubCutaneous before lunch  insulin lispro Injectable (HumaLOG) 5Unit(s) SubCutaneous before dinner  insulin lispro (HumaLOG) corrective regimen sliding scale  SubCutaneous three times a day before meals  dextrose 5%. 1000milliLiter(s) IV Continuous <Continuous>  dextrose 50% Injectable 12.5Gram(s) IV Push once  dextrose 50% Injectable 25Gram(s) IV Push once  dextrose 50% Injectable 25Gram(s) IV Push once  sodium chloride 0.9%. 1000milliLiter(s) IV Continuous <Continuous>    MEDICATIONS  (PRN):  morphine  - Injectable 2milliGRAM(s) IV Push every 4 hours PRN breakthrough pain  acetaminophen   Tablet 650milliGRAM(s) Oral every 6 hours PRN For Temp greater than 38 C (100.4 F)  ondansetron Injectable 4milliGRAM(s) IV Push every 6 hours PRN Nausea  magnesium hydroxide Suspension 30milliLiter(s) Oral daily PRN Constipation  dextrose Gel 1Dose(s) Oral once PRN Blood Glucose LESS THAN 70 milliGRAM(s)/deciliter  glucagon  Injectable 1milliGRAM(s) IntraMuscular once PRN Glucose LESS THAN 70 milligrams/deciliter  oxyCODONE  5 mG/acetaminophen 325 mG 1Tablet(s) Oral every 6 hours PRN Mild Pain (1 - 3)  oxyCODONE IR 5milliGRAM(s) Oral every 4 hours PRN Severe Pain (7 - 10)  ALPRAZolam 0.5milliGRAM(s) Oral every 6 hours PRN anxiety      Allergies    Benadryl (Other)  Demerol HCl (Other)        Vital Signs Last 24 Hrs  T(C): 36.9, Max: 37.3 (03-20 @ 21:51)  T(F): 98.4, Max: 99.1 (03-20 @ 21:51)  HR: 76 (72 - 85)  BP: 128/66 (128/66 - 158/60)  BP(mean): --  RR: 18 (18 - 19)  SpO2: 95% (91% - 95%)    PHYSICAL EXAM:    GENERAL: Weak, tired  HEAD:  Atraumatic, Normocephalic  EYES: EOMI, PERRLA  NECK: Supple, No JVD  NERVOUS SYSTEM: Lethargic but arousable; somewhat confused  CHEST/LUNG: Clear to percussion bilaterally  HEART: Regular rate and rhythm; No rub  ABDOMEN: Soft, Nontender, Nondistended; Bowel sounds present  EXTREMITIES:   No edema; L BKA with dressing        LABS:                        7.8    8.4   )-----------( 307      ( 21 Mar 2017 05:54 )             23.9     21 Mar 2017 05:54    129    |  94     |  18.0   ----------------------------<  159    4.4     |  27.0   |  0.90     Ca    8.4        21 Mar 2017 05:54  Mg     1.9       21 Mar 2017 05:54    Sodium, Serum: 136 mmol/L (03.15.17 @ 05:12)          Magnesium, Serum: 1.9 mg/dL (03-21 @ 05:54)      RADIOLOGY & ADDITIONAL TESTS:

## 2017-03-21 NOTE — PROGRESS NOTE ADULT - EXTREMITIES COMMENTS
L BKA dressing C/D/I  R LE warm, heel without tissue injury
L BKA stump C/D/I  RLE pale, monophasic signals, no tissue loss
L BKA stump dressing C/D/I
LLE ulcer over the 2nd/3rd interdigital space with forefoot erythema
L BKA stump clean
L BKA C/D/I, flap perfused and viable  RLE warm, no tissue loss

## 2017-03-21 NOTE — PROGRESS NOTE ADULT - CARDIOVASCULAR
Regular rate & rhythm, normal S1, S2; no murmurs, gallops or rubs; no S3, S4

## 2017-03-21 NOTE — PROGRESS NOTE ADULT - RESPIRATORY
Breath Sounds equal & clear to percussion & auscultation, no accessory muscle use

## 2017-03-21 NOTE — PROGRESS NOTE ADULT - SUBJECTIVE AND OBJECTIVE BOX
s/p L BKA. Pt less confused this morning.    Vital Signs Last 24 Hrs  T(C): 36.8, Max: 37.3 (03-20 @ 21:51)  T(F): 98.3, Max: 99.1 (03-20 @ 21:51)  HR: 85 (72 - 85)  BP: 139/60 (132/65 - 158/60)  BP(mean): --  RR: 19 (18 - 19)  SpO2: 91% (91% - 94%)                          7.8    8.4   )-----------( 307      ( 21 Mar 2017 05:54 )             23.9   21 Mar 2017 05:54    129    |  94     |  18.0   ----------------------------<  159    4.4     |  27.0   |  0.90     Ca    8.4        21 Mar 2017 05:54  Mg     1.9       21 Mar 2017 05:54

## 2017-03-22 DIAGNOSIS — R50.9 FEVER, UNSPECIFIED: ICD-10-CM

## 2017-03-22 LAB
ANION GAP SERPL CALC-SCNC: 12 MMOL/L — SIGNIFICANT CHANGE UP (ref 5–17)
APPEARANCE UR: CLEAR — SIGNIFICANT CHANGE UP
BACTERIA # UR AUTO: ABNORMAL
BILIRUB UR-MCNC: NEGATIVE — SIGNIFICANT CHANGE UP
BUN SERPL-MCNC: 15 MG/DL — SIGNIFICANT CHANGE UP (ref 8–20)
CALCIUM SERPL-MCNC: 8.8 MG/DL — SIGNIFICANT CHANGE UP (ref 8.6–10.2)
CHLORIDE SERPL-SCNC: 96 MMOL/L — LOW (ref 98–107)
CO2 SERPL-SCNC: 24 MMOL/L — SIGNIFICANT CHANGE UP (ref 22–29)
COLOR SPEC: YELLOW — SIGNIFICANT CHANGE UP
COMMENT - URINE: SIGNIFICANT CHANGE UP
CORTIS AM PEAK SERPL-MCNC: 21.7 UG/DL — HIGH (ref 6–18.4)
CORTIS AM PEAK SERPL-MCNC: 31.1 UG/DL — HIGH (ref 6–18.4)
CREAT SERPL-MCNC: 0.81 MG/DL — SIGNIFICANT CHANGE UP (ref 0.5–1.3)
DIFF PNL FLD: ABNORMAL
EPI CELLS # UR: SIGNIFICANT CHANGE UP
GLUCOSE SERPL-MCNC: 155 MG/DL — HIGH (ref 70–115)
GLUCOSE UR QL: 100 MG/DL
HCT VFR BLD CALC: 27.5 % — LOW (ref 37–47)
HGB BLD-MCNC: 9 G/DL — LOW (ref 12–16)
KETONES UR-MCNC: NEGATIVE — SIGNIFICANT CHANGE UP
LEUKOCYTE ESTERASE UR-ACNC: ABNORMAL
MAGNESIUM SERPL-MCNC: 1.9 MG/DL — SIGNIFICANT CHANGE UP (ref 1.8–2.5)
MCHC RBC-ENTMCNC: 27.8 PG — SIGNIFICANT CHANGE UP (ref 27–31)
MCHC RBC-ENTMCNC: 32.7 G/DL — SIGNIFICANT CHANGE UP (ref 32–36)
MCV RBC AUTO: 84.9 FL — SIGNIFICANT CHANGE UP (ref 81–99)
NITRITE UR-MCNC: POSITIVE
PH UR: 7 — SIGNIFICANT CHANGE UP (ref 4.8–8)
PHOSPHATE SERPL-MCNC: 3.1 MG/DL — SIGNIFICANT CHANGE UP (ref 2.4–4.7)
PLATELET # BLD AUTO: 312 K/UL — SIGNIFICANT CHANGE UP (ref 150–400)
POTASSIUM SERPL-MCNC: 4.4 MMOL/L — SIGNIFICANT CHANGE UP (ref 3.5–5.3)
POTASSIUM SERPL-SCNC: 4.4 MMOL/L — SIGNIFICANT CHANGE UP (ref 3.5–5.3)
PROT UR-MCNC: 100 MG/DL
RBC # BLD: 3.24 M/UL — LOW (ref 4.4–5.2)
RBC # FLD: 14.2 % — SIGNIFICANT CHANGE UP (ref 11–15.6)
RBC CASTS # UR COMP ASSIST: ABNORMAL /HPF (ref 0–4)
SODIUM SERPL-SCNC: 132 MMOL/L — LOW (ref 135–145)
SP GR SPEC: 1 — LOW (ref 1.01–1.02)
TSH SERPL-MCNC: 3.88 UIU/ML — SIGNIFICANT CHANGE UP (ref 0.27–4.2)
UROBILINOGEN FLD QL: NEGATIVE MG/DL — SIGNIFICANT CHANGE UP
WBC # BLD: 10.1 K/UL — SIGNIFICANT CHANGE UP (ref 4.8–10.8)
WBC # FLD AUTO: 10.1 K/UL — SIGNIFICANT CHANGE UP (ref 4.8–10.8)
WBC UR QL: SIGNIFICANT CHANGE UP

## 2017-03-22 PROCEDURE — 71010: CPT | Mod: 26

## 2017-03-22 PROCEDURE — 99233 SBSQ HOSP IP/OBS HIGH 50: CPT

## 2017-03-22 RX ADMIN — INSULIN GLARGINE 22 UNIT(S): 100 INJECTION, SOLUTION SUBCUTANEOUS at 22:23

## 2017-03-22 RX ADMIN — ATORVASTATIN CALCIUM 40 MILLIGRAM(S): 80 TABLET, FILM COATED ORAL at 22:24

## 2017-03-22 RX ADMIN — Medication 1 TABLET(S): at 11:51

## 2017-03-22 RX ADMIN — AMLODIPINE BESYLATE 10 MILLIGRAM(S): 2.5 TABLET ORAL at 05:13

## 2017-03-22 RX ADMIN — PANTOPRAZOLE SODIUM 40 MILLIGRAM(S): 20 TABLET, DELAYED RELEASE ORAL at 05:13

## 2017-03-22 RX ADMIN — Medication: at 12:36

## 2017-03-22 RX ADMIN — Medication 1 TABLET(S): at 11:52

## 2017-03-22 RX ADMIN — OXYCODONE HYDROCHLORIDE 5 MILLIGRAM(S): 5 TABLET ORAL at 22:27

## 2017-03-22 RX ADMIN — GABAPENTIN 600 MILLIGRAM(S): 400 CAPSULE ORAL at 22:24

## 2017-03-22 RX ADMIN — Medication 325 MILLIGRAM(S): at 05:12

## 2017-03-22 RX ADMIN — OXYCODONE HYDROCHLORIDE 5 MILLIGRAM(S): 5 TABLET ORAL at 14:05

## 2017-03-22 RX ADMIN — OXYCODONE HYDROCHLORIDE 5 MILLIGRAM(S): 5 TABLET ORAL at 15:05

## 2017-03-22 RX ADMIN — Medication 6: at 18:04

## 2017-03-22 RX ADMIN — APIXABAN 2.5 MILLIGRAM(S): 2.5 TABLET, FILM COATED ORAL at 09:21

## 2017-03-22 RX ADMIN — Medication 100 MILLIGRAM(S): at 05:13

## 2017-03-22 RX ADMIN — OXYCODONE HYDROCHLORIDE 5 MILLIGRAM(S): 5 TABLET ORAL at 05:15

## 2017-03-22 RX ADMIN — APIXABAN 2.5 MILLIGRAM(S): 2.5 TABLET, FILM COATED ORAL at 22:24

## 2017-03-22 RX ADMIN — Medication 1 MILLIGRAM(S): at 11:50

## 2017-03-22 RX ADMIN — Medication 325 MILLIGRAM(S): at 17:07

## 2017-03-22 RX ADMIN — Medication 5 UNIT(S): at 18:05

## 2017-03-22 RX ADMIN — Medication 50 MILLIGRAM(S): at 05:12

## 2017-03-22 RX ADMIN — GABAPENTIN 300 MILLIGRAM(S): 400 CAPSULE ORAL at 11:51

## 2017-03-22 RX ADMIN — Medication 5 UNIT(S): at 12:36

## 2017-03-22 RX ADMIN — Medication 75 MICROGRAM(S): at 05:13

## 2017-03-22 RX ADMIN — Medication 5 UNIT(S): at 08:36

## 2017-03-22 RX ADMIN — Medication 2: at 08:35

## 2017-03-22 RX ADMIN — Medication 0.5 MILLIGRAM(S): at 14:56

## 2017-03-22 RX ADMIN — OXYCODONE HYDROCHLORIDE 5 MILLIGRAM(S): 5 TABLET ORAL at 23:37

## 2017-03-22 RX ADMIN — Medication 50 MILLIGRAM(S): at 17:07

## 2017-03-22 NOTE — PROGRESS NOTE ADULT - ASSESSMENT
85 yr old female with multiple medical problems CAD s/p stents, diabetes, hypertension, hyperlipidemia, anxiety, significant PVD s/p multiple stents, paroxysmal atrial fibrillation admitted for hypertensive urgency post LE angiogram and cellulitis. Given significant PVD and tissue loss, BKA was planned by vascular surgery. BP improved. She was started on antibiotics. Cardiology consulted for pre op evaluation. She also developed MAGI, now resolved. BKA was done on 3/14. Procedure was uneventful. She was evaluated by acute rehab and accepted for the same. Given urinary retention, juarez was maintained. Noted to have hyponatremia, hence started on IV fluids. Tramadol discontinued. Noted to have Hb 7.8. PRBC transfusion given but had a fever. Nephrology consulted for hyponatremia.

## 2017-03-22 NOTE — PROGRESS NOTE ADULT - SUBJECTIVE AND OBJECTIVE BOX
INTERVAL HPI/OVERNIGHT EVENTS:    CC: s/p left BKA, anemia, fever, PVD, hyponatremia    More alert today, per daughter had episodes of anxiety yesterday. Fever yesterday during transfusion, hence held.     Vital Signs Last 24 Hrs  T(C): 36.8, Max: 39.1 (03-21 @ 18:25)  T(F): 98.3, Max: 102.4 (03-21 @ 18:25)  HR: 85 (83 - 96)  BP: 144/71 (142/52 - 170/62)  BP(mean): --  RR: 18 (14 - 18)  SpO2: 97% (92% - 97%)    PHYSICAL EXAM:    GENERAL: Not in distress, alert, pleasant  CHEST/LUNG: b/l air entry, clear  HEART: Regular  ABDOMEN: Soft, BS+  EXTREMITIES:  No edema right, left BKA    MEDICATIONS  (STANDING):  amLODIPine   Tablet 10milliGRAM(s) Oral daily  docusate sodium 100milliGRAM(s) Oral three times a day  gabapentin 600milliGRAM(s) Oral at bedtime  atorvastatin 40milliGRAM(s) Oral at bedtime  folic acid 1milliGRAM(s) Oral daily  metoprolol 50milliGRAM(s) Oral every 12 hours  pantoprazole    Tablet 40milliGRAM(s) Oral before breakfast  levothyroxine 75MICROGram(s) Oral daily  multivitamin/minerals 1Tablet(s) Oral daily  multivitamin 1Tablet(s) Oral daily  gabapentin 300milliGRAM(s) Oral daily  ferrous    sulfate 325milliGRAM(s) Oral every 12 hours  senna 2Tablet(s) Oral at bedtime  apixaban 2.5milliGRAM(s) Oral two times a day  insulin glargine Injectable (LANTUS) 22Unit(s) SubCutaneous at bedtime  insulin lispro Injectable (HumaLOG) 5Unit(s) SubCutaneous before breakfast  insulin lispro Injectable (HumaLOG) 5Unit(s) SubCutaneous before lunch  insulin lispro Injectable (HumaLOG) 5Unit(s) SubCutaneous before dinner  insulin lispro (HumaLOG) corrective regimen sliding scale  SubCutaneous three times a day before meals  dextrose 5%. 1000milliLiter(s) IV Continuous <Continuous>  dextrose 50% Injectable 12.5Gram(s) IV Push once  dextrose 50% Injectable 25Gram(s) IV Push once  dextrose 50% Injectable 25Gram(s) IV Push once  sodium chloride 0.9%. 1000milliLiter(s) IV Continuous <Continuous>    MEDICATIONS  (PRN):  acetaminophen   Tablet 650milliGRAM(s) Oral every 6 hours PRN For Temp greater than 38 C (100.4 F)  ondansetron Injectable 4milliGRAM(s) IV Push every 6 hours PRN Nausea  magnesium hydroxide Suspension 30milliLiter(s) Oral daily PRN Constipation  dextrose Gel 1Dose(s) Oral once PRN Blood Glucose LESS THAN 70 milliGRAM(s)/deciliter  glucagon  Injectable 1milliGRAM(s) IntraMuscular once PRN Glucose LESS THAN 70 milligrams/deciliter  oxyCODONE  5 mG/acetaminophen 325 mG 1Tablet(s) Oral every 6 hours PRN Mild Pain (1 - 3)  oxyCODONE IR 5milliGRAM(s) Oral every 4 hours PRN Severe Pain (7 - 10)  ALPRAZolam 0.5milliGRAM(s) Oral every 6 hours PRN anxiety      Allergies    Benadryl (Other)  Demerol HCl (Other)    Intolerances          LABS:                          9.0    10.1  )-----------( 312      ( 22 Mar 2017 06:11 )             27.5     22 Mar 2017 06:11    132    |  96     |  15.0   ----------------------------<  155    4.4     |  24.0   |  0.81     Ca    8.8        22 Mar 2017 06:11  Phos  3.1       22 Mar 2017 06:11  Mg     1.9       22 Mar 2017 06:11            RADIOLOGY & ADDITIONAL TESTS:

## 2017-03-22 NOTE — PROGRESS NOTE ADULT - PROBLEM SELECTOR PLAN 1
- offloading to the right heel with Z-float boot and pillow to the calf  - daily dressing changes to the left BKA stump  - Pain control  - D/C planning

## 2017-03-22 NOTE — PROGRESS NOTE ADULT - ASSESSMENT
HypoVolemic Hyponatremia: pt appears euvolemic by exam  TSH WNL na is improving today 132  - Cont oral fluid restrict for now Na improving suggests hypovolemic etiology  - Cont to trend Na

## 2017-03-22 NOTE — PROGRESS NOTE ADULT - SUBJECTIVE AND OBJECTIVE BOX
NEPHROLOGY INTERVAL HPI/OVERNIGHT EVENTS:    Examined earlier  Looks comfortable    MEDICATIONS  (STANDING):  amLODIPine   Tablet 10milliGRAM(s) Oral daily  gabapentin 600milliGRAM(s) Oral at bedtime  atorvastatin 40milliGRAM(s) Oral at bedtime  folic acid 1milliGRAM(s) Oral daily  metoprolol 50milliGRAM(s) Oral every 12 hours  pantoprazole    Tablet 40milliGRAM(s) Oral before breakfast  levothyroxine 75MICROGram(s) Oral daily  multivitamin/minerals 1Tablet(s) Oral daily  multivitamin 1Tablet(s) Oral daily  gabapentin 300milliGRAM(s) Oral daily  ferrous    sulfate 325milliGRAM(s) Oral every 12 hours  apixaban 2.5milliGRAM(s) Oral two times a day  insulin glargine Injectable (LANTUS) 22Unit(s) SubCutaneous at bedtime  insulin lispro Injectable (HumaLOG) 5Unit(s) SubCutaneous before breakfast  insulin lispro Injectable (HumaLOG) 5Unit(s) SubCutaneous before lunch  insulin lispro Injectable (HumaLOG) 5Unit(s) SubCutaneous before dinner  insulin lispro (HumaLOG) corrective regimen sliding scale  SubCutaneous three times a day before meals  dextrose 5%. 1000milliLiter(s) IV Continuous <Continuous>  dextrose 50% Injectable 12.5Gram(s) IV Push once  dextrose 50% Injectable 25Gram(s) IV Push once  dextrose 50% Injectable 25Gram(s) IV Push once    MEDICATIONS  (PRN):  acetaminophen   Tablet 650milliGRAM(s) Oral every 6 hours PRN For Temp greater than 38 C (100.4 F)  ondansetron Injectable 4milliGRAM(s) IV Push every 6 hours PRN Nausea  dextrose Gel 1Dose(s) Oral once PRN Blood Glucose LESS THAN 70 milliGRAM(s)/deciliter  glucagon  Injectable 1milliGRAM(s) IntraMuscular once PRN Glucose LESS THAN 70 milligrams/deciliter  oxyCODONE  5 mG/acetaminophen 325 mG 1Tablet(s) Oral every 6 hours PRN Mild Pain (1 - 3)  oxyCODONE IR 5milliGRAM(s) Oral every 4 hours PRN Severe Pain (7 - 10)  ALPRAZolam 0.5milliGRAM(s) Oral every 6 hours PRN anxiety      Allergies    Benadryl (Other)  Demerol HCl (Other)    Intolerances        Vital Signs Last 24 Hrs  T(C): 36.8, Max: 39.1 (03-21 @ 18:25)  T(F): 98.3, Max: 102.4 (03-21 @ 18:25)  HR: 85 (83 - 96)  BP: 144/71 (142/52 - 170/62)  BP(mean): --  RR: 18 (14 - 18)  SpO2: 97% (92% - 97%)  Daily     Daily     PHYSICAL EXAM:  GENERAL: Weak, tired  HEAD:  Atraumatic, Normocephalic  EYES: EOMI, PERRLA  NECK: Supple, No JVD  NERVOUS SYSTEM: Lethargic but arousable; somewhat confused  CHEST/LUNG: Clear to percussion bilaterally  HEART: Regular rate and rhythm; No rub  ABDOMEN: Soft, Nontender, Nondistended; Bowel sounds present  EXTREMITIES:   No edema; L BKA with dressing            LABS:                        9.0    10.1  )-----------( 312      ( 22 Mar 2017 06:11 )             27.5     22 Mar 2017 06:11    132    |  96     |  15.0   ----------------------------<  155    4.4     |  24.0   |  0.81     Ca    8.8        22 Mar 2017 06:11  Phos  3.1       22 Mar 2017 06:11  Mg     1.9       22 Mar 2017 06:11          Magnesium, Serum: 1.9 mg/dL (03-22 @ 06:11)  Phosphorus Level, Serum: 3.1 mg/dL (03-22 @ 06:11)          RADIOLOGY & ADDITIONAL TESTS:

## 2017-03-22 NOTE — PROGRESS NOTE ADULT - PROBLEM SELECTOR PLAN 1
Continue local wound care, eventual acute rehab. Pain control. Adjust Xanax dose. Surgery follow up.

## 2017-03-23 DIAGNOSIS — Z51.5 ENCOUNTER FOR PALLIATIVE CARE: ICD-10-CM

## 2017-03-23 DIAGNOSIS — E11.9 TYPE 2 DIABETES MELLITUS WITHOUT COMPLICATIONS: ICD-10-CM

## 2017-03-23 LAB
ANION GAP SERPL CALC-SCNC: 12 MMOL/L — SIGNIFICANT CHANGE UP (ref 5–17)
BUN SERPL-MCNC: 13 MG/DL — SIGNIFICANT CHANGE UP (ref 8–20)
CALCIUM SERPL-MCNC: 8.4 MG/DL — LOW (ref 8.6–10.2)
CHLORIDE SERPL-SCNC: 95 MMOL/L — LOW (ref 98–107)
CO2 SERPL-SCNC: 25 MMOL/L — SIGNIFICANT CHANGE UP (ref 22–29)
CREAT SERPL-MCNC: 0.83 MG/DL — SIGNIFICANT CHANGE UP (ref 0.5–1.3)
GLUCOSE SERPL-MCNC: 174 MG/DL — HIGH (ref 70–115)
HCT VFR BLD CALC: 25.3 % — LOW (ref 37–47)
HGB BLD-MCNC: 8.3 G/DL — LOW (ref 12–16)
MAGNESIUM SERPL-MCNC: 1.9 MG/DL — SIGNIFICANT CHANGE UP (ref 1.8–2.5)
MCHC RBC-ENTMCNC: 27.8 PG — SIGNIFICANT CHANGE UP (ref 27–31)
MCHC RBC-ENTMCNC: 32.8 G/DL — SIGNIFICANT CHANGE UP (ref 32–36)
MCV RBC AUTO: 84.6 FL — SIGNIFICANT CHANGE UP (ref 81–99)
PHOSPHATE SERPL-MCNC: 3.1 MG/DL — SIGNIFICANT CHANGE UP (ref 2.4–4.7)
PLATELET # BLD AUTO: 341 K/UL — SIGNIFICANT CHANGE UP (ref 150–400)
POTASSIUM SERPL-MCNC: 4.2 MMOL/L — SIGNIFICANT CHANGE UP (ref 3.5–5.3)
POTASSIUM SERPL-SCNC: 4.2 MMOL/L — SIGNIFICANT CHANGE UP (ref 3.5–5.3)
RBC # BLD: 2.99 M/UL — LOW (ref 4.4–5.2)
RBC # FLD: 14.1 % — SIGNIFICANT CHANGE UP (ref 11–15.6)
SODIUM SERPL-SCNC: 132 MMOL/L — LOW (ref 135–145)
WBC # BLD: 9.3 K/UL — SIGNIFICANT CHANGE UP (ref 4.8–10.8)
WBC # FLD AUTO: 9.3 K/UL — SIGNIFICANT CHANGE UP (ref 4.8–10.8)

## 2017-03-23 PROCEDURE — 99222 1ST HOSP IP/OBS MODERATE 55: CPT

## 2017-03-23 PROCEDURE — 99223 1ST HOSP IP/OBS HIGH 75: CPT

## 2017-03-23 PROCEDURE — 99233 SBSQ HOSP IP/OBS HIGH 50: CPT

## 2017-03-23 RX ORDER — GABAPENTIN 400 MG/1
300 CAPSULE ORAL
Qty: 0 | Refills: 0 | Status: DISCONTINUED | OUTPATIENT
Start: 2017-03-24 | End: 2017-03-29

## 2017-03-23 RX ORDER — GABAPENTIN 400 MG/1
200 CAPSULE ORAL DAILY
Qty: 0 | Refills: 0 | Status: DISCONTINUED | OUTPATIENT
Start: 2017-03-23 | End: 2017-03-23

## 2017-03-23 RX ORDER — GABAPENTIN 400 MG/1
200 CAPSULE ORAL
Qty: 0 | Refills: 0 | Status: DISCONTINUED | OUTPATIENT
Start: 2017-03-23 | End: 2017-03-24

## 2017-03-23 RX ORDER — ACETAMINOPHEN 500 MG
1000 TABLET ORAL ONCE
Qty: 0 | Refills: 0 | Status: COMPLETED | OUTPATIENT
Start: 2017-03-23 | End: 2017-03-23

## 2017-03-23 RX ORDER — OXYCODONE HYDROCHLORIDE 5 MG/1
5 TABLET ORAL EVERY 6 HOURS
Qty: 0 | Refills: 0 | Status: DISCONTINUED | OUTPATIENT
Start: 2017-03-23 | End: 2017-03-24

## 2017-03-23 RX ORDER — OXYCODONE HYDROCHLORIDE 5 MG/1
7.5 TABLET ORAL EVERY 6 HOURS
Qty: 0 | Refills: 0 | Status: DISCONTINUED | OUTPATIENT
Start: 2017-03-23 | End: 2017-03-24

## 2017-03-23 RX ORDER — OXYCODONE HYDROCHLORIDE 5 MG/1
7.5 TABLET ORAL EVERY 4 HOURS
Qty: 0 | Refills: 0 | Status: DISCONTINUED | OUTPATIENT
Start: 2017-03-23 | End: 2017-03-23

## 2017-03-23 RX ORDER — OXYCODONE HYDROCHLORIDE 5 MG/1
5 TABLET ORAL EVERY 6 HOURS
Qty: 0 | Refills: 0 | Status: DISCONTINUED | OUTPATIENT
Start: 2017-03-23 | End: 2017-03-23

## 2017-03-23 RX ADMIN — Medication 1 TABLET(S): at 12:01

## 2017-03-23 RX ADMIN — Medication 325 MILLIGRAM(S): at 05:38

## 2017-03-23 RX ADMIN — Medication 50 MILLIGRAM(S): at 18:27

## 2017-03-23 RX ADMIN — Medication 1 TABLET(S): at 13:24

## 2017-03-23 RX ADMIN — OXYCODONE HYDROCHLORIDE 7.5 MILLIGRAM(S): 5 TABLET ORAL at 22:09

## 2017-03-23 RX ADMIN — Medication 400 MILLIGRAM(S): at 13:22

## 2017-03-23 RX ADMIN — GABAPENTIN 300 MILLIGRAM(S): 400 CAPSULE ORAL at 12:01

## 2017-03-23 RX ADMIN — APIXABAN 2.5 MILLIGRAM(S): 2.5 TABLET, FILM COATED ORAL at 12:26

## 2017-03-23 RX ADMIN — Medication 325 MILLIGRAM(S): at 18:27

## 2017-03-23 RX ADMIN — Medication 1 MILLIGRAM(S): at 12:01

## 2017-03-23 RX ADMIN — Medication 0.5 MILLIGRAM(S): at 20:15

## 2017-03-23 RX ADMIN — OXYCODONE HYDROCHLORIDE 7.5 MILLIGRAM(S): 5 TABLET ORAL at 19:36

## 2017-03-23 RX ADMIN — Medication 2: at 08:05

## 2017-03-23 RX ADMIN — Medication 50 MILLIGRAM(S): at 05:38

## 2017-03-23 RX ADMIN — OXYCODONE HYDROCHLORIDE 5 MILLIGRAM(S): 5 TABLET ORAL at 10:44

## 2017-03-23 RX ADMIN — Medication 5 UNIT(S): at 18:25

## 2017-03-23 RX ADMIN — OXYCODONE HYDROCHLORIDE 5 MILLIGRAM(S): 5 TABLET ORAL at 11:30

## 2017-03-23 RX ADMIN — Medication 4: at 12:01

## 2017-03-23 RX ADMIN — Medication 4: at 18:26

## 2017-03-23 RX ADMIN — AMLODIPINE BESYLATE 10 MILLIGRAM(S): 2.5 TABLET ORAL at 05:38

## 2017-03-23 RX ADMIN — Medication 75 MICROGRAM(S): at 05:38

## 2017-03-23 RX ADMIN — Medication 1000 MILLIGRAM(S): at 13:45

## 2017-03-23 RX ADMIN — INSULIN GLARGINE 22 UNIT(S): 100 INJECTION, SOLUTION SUBCUTANEOUS at 22:08

## 2017-03-23 RX ADMIN — APIXABAN 2.5 MILLIGRAM(S): 2.5 TABLET, FILM COATED ORAL at 21:52

## 2017-03-23 RX ADMIN — PANTOPRAZOLE SODIUM 40 MILLIGRAM(S): 20 TABLET, DELAYED RELEASE ORAL at 05:38

## 2017-03-23 RX ADMIN — GABAPENTIN 600 MILLIGRAM(S): 400 CAPSULE ORAL at 21:52

## 2017-03-23 RX ADMIN — Medication 5 UNIT(S): at 12:01

## 2017-03-23 RX ADMIN — ATORVASTATIN CALCIUM 40 MILLIGRAM(S): 80 TABLET, FILM COATED ORAL at 21:52

## 2017-03-23 RX ADMIN — Medication 5 UNIT(S): at 08:05

## 2017-03-23 NOTE — PROGRESS NOTE ADULT - SUBJECTIVE AND OBJECTIVE BOX
INTERVAL HPI/OVERNIGHT EVENTS:    CC: s/p Left BKA, fever, PVD, CAD    No overnight events, had pain yesterday per daughter, improved with pain medications. No fever overnight.    Vital Signs Last 24 Hrs  T(C): 36.7, Max: 36.7 (- @ 17:09)  T(F): 98, Max: 98.1 (- @ 17:09)  HR: 86 (76 - 89)  BP: 134/86 (129/59 - 184/74)  BP(mean): --  RR: 17 (15 - 18)  SpO2: 99% (94% - 99%)    PHYSICAL EXAM:    GENERAL: Not in distress, drowsy but arousable,  CHEST/LUNG: b/l air entry  HEART: Regular  ABDOMEN: Soft, BS+  EXTREMITIES:  No edema right, left BKA    MEDICATIONS  (STANDING):  amLODIPine   Tablet 10milliGRAM(s) Oral daily  gabapentin 600milliGRAM(s) Oral at bedtime  atorvastatin 40milliGRAM(s) Oral at bedtime  folic acid 1milliGRAM(s) Oral daily  metoprolol 50milliGRAM(s) Oral every 12 hours  pantoprazole    Tablet 40milliGRAM(s) Oral before breakfast  levothyroxine 75MICROGram(s) Oral daily  multivitamin/minerals 1Tablet(s) Oral daily  multivitamin 1Tablet(s) Oral daily  gabapentin 300milliGRAM(s) Oral daily  ferrous    sulfate 325milliGRAM(s) Oral every 12 hours  apixaban 2.5milliGRAM(s) Oral two times a day  insulin glargine Injectable (LANTUS) 22Unit(s) SubCutaneous at bedtime  insulin lispro Injectable (HumaLOG) 5Unit(s) SubCutaneous before breakfast  insulin lispro Injectable (HumaLOG) 5Unit(s) SubCutaneous before lunch  insulin lispro Injectable (HumaLOG) 5Unit(s) SubCutaneous before dinner  insulin lispro (HumaLOG) corrective regimen sliding scale  SubCutaneous three times a day before meals  dextrose 5%. 1000milliLiter(s) IV Continuous <Continuous>  dextrose 50% Injectable 12.5Gram(s) IV Push once  dextrose 50% Injectable 25Gram(s) IV Push once  dextrose 50% Injectable 25Gram(s) IV Push once    MEDICATIONS  (PRN):  acetaminophen   Tablet 650milliGRAM(s) Oral every 6 hours PRN For Temp greater than 38 C (100.4 F)  ondansetron Injectable 4milliGRAM(s) IV Push every 6 hours PRN Nausea  dextrose Gel 1Dose(s) Oral once PRN Blood Glucose LESS THAN 70 milliGRAM(s)/deciliter  glucagon  Injectable 1milliGRAM(s) IntraMuscular once PRN Glucose LESS THAN 70 milligrams/deciliter  oxyCODONE  5 mG/acetaminophen 325 mG 1Tablet(s) Oral every 6 hours PRN Mild Pain (1 - 3)  oxyCODONE IR 5milliGRAM(s) Oral every 4 hours PRN Severe Pain (7 - 10)  ALPRAZolam 0.5milliGRAM(s) Oral every 6 hours PRN anxiety      Allergies    Benadryl (Other)  Demerol HCl (Other)    Intolerances          LABS:                          8.3    9.3   )-----------( 341      ( 23 Mar 2017 05:15 )             25.3     23 Mar 2017 05:17    132    |  95     |  13.0   ----------------------------<  174    4.2     |  25.0   |  0.83     Ca    8.4        23 Mar 2017 05:17  Phos  3.1       23 Mar 2017 05:17  Mg     1.9       23 Mar 2017 05:17        Urinalysis Basic - ( 22 Mar 2017 16:40 )    Color: Yellow / Appearance: Clear / S.005 / pH: x  Gluc: x / Ketone: Negative  / Bili: Negative / Urobili: Negative mg/dL   Blood: x / Protein: 100 mg/dL / Nitrite: Positive   Leuk Esterase: Moderate / RBC: 11-25 /HPF / WBC 3-5   Sq Epi: x / Non Sq Epi: Occasional / Bacteria: Occasional        RADIOLOGY & ADDITIONAL TESTS:

## 2017-03-23 NOTE — PROGRESS NOTE ADULT - ASSESSMENT
85 yr old female with CAD s/p multiple stents, diabetes, hypertension, hyperlipidemia, anxiety, significant PVD s/p multiple stents and  paroxysmal atrial fibrillation is POD #9 s/p LLE BKA.  Currently patient is having pain at amputation site and has phantom limb pain. Patient's daughter is concerned about depression.  Family wishes for patient to return back to [facility] for rehab.

## 2017-03-23 NOTE — PROGRESS NOTE ADULT - NSHPATTENDINGPLANDISCUSS_GEN_ALL_CORE
daughter at bedside
patient and vascular surgery
patient's daughters at bedside and vascular surgery
patient/daughter at bedside and vascular surgery
patient/daughters at bedside
patient and daughters
patient's daughters at bedside
patient's daughters at bedside and vascular surgery
patient/daughters at bedside.  d/w vascular surgery
Dr. Marquez, Health Care Proxy Jasmine, Patient's RN Carmen

## 2017-03-23 NOTE — PROGRESS NOTE ADULT - SUBJECTIVE AND OBJECTIVE BOX
Palliative Medicine Initial Consultation Note    Consult requested by: Dr. Andrade   Reason for consult: pain control, social support     HPI:      PERTINENT PMH REVIEWED:  [ X] YES [ ] NO           SOCIAL HISTORY:  EtOH [ ] Yes  [X ] No                                    Drugs [ ] Yes [X ] No                                   Former smoker, quit 30 years ago                                    Admitted from: [facility]     Surrogate/HCP/Guardian: [X ] YES [ ] NO       Jasmine Bhatt                         Phone#: [phone]    FAMILY HISTORY:  No pertinent family history in first degree relatives      Baseline ADLs (prior to admission):  Independent [ X] moderately [ ] fully   Dependent   [ X] moderately [ ]fully    MEDICATIONS  (STANDING):  amLODIPine   Tablet 10milliGRAM(s) Oral daily  gabapentin 600milliGRAM(s) Oral at bedtime  atorvastatin 40milliGRAM(s) Oral at bedtime  folic acid 1milliGRAM(s) Oral daily  metoprolol 50milliGRAM(s) Oral every 12 hours  pantoprazole    Tablet 40milliGRAM(s) Oral before breakfast  levothyroxine 75MICROGram(s) Oral daily  multivitamin/minerals 1Tablet(s) Oral daily  multivitamin 1Tablet(s) Oral daily  gabapentin 300milliGRAM(s) Oral daily  ferrous    sulfate 325milliGRAM(s) Oral every 12 hours  apixaban 2.5milliGRAM(s) Oral two times a day  insulin glargine Injectable (LANTUS) 22Unit(s) SubCutaneous at bedtime  insulin lispro Injectable (HumaLOG) 5Unit(s) SubCutaneous before breakfast  insulin lispro Injectable (HumaLOG) 5Unit(s) SubCutaneous before lunch  insulin lispro Injectable (HumaLOG) 5Unit(s) SubCutaneous before dinner  insulin lispro (HumaLOG) corrective regimen sliding scale  SubCutaneous three times a day before meals  dextrose 5%. 1000milliLiter(s) IV Continuous <Continuous>  dextrose 50% Injectable 12.5Gram(s) IV Push once  dextrose 50% Injectable 25Gram(s) IV Push once  dextrose 50% Injectable 25Gram(s) IV Push once  acetaminophen  IVPB. 1000milliGRAM(s) IV Intermittent once    MEDICATIONS  (PRN):  acetaminophen   Tablet 650milliGRAM(s) Oral every 6 hours PRN For Temp greater than 38 C (100.4 F)  ondansetron Injectable 4milliGRAM(s) IV Push every 6 hours PRN Nausea  dextrose Gel 1Dose(s) Oral once PRN Blood Glucose LESS THAN 70 milliGRAM(s)/deciliter  glucagon  Injectable 1milliGRAM(s) IntraMuscular once PRN Glucose LESS THAN 70 milligrams/deciliter  ALPRAZolam 0.5milliGRAM(s) Oral every 6 hours PRN anxiety  oxyCODONE IR 7.5milliGRAM(s) Oral every 6 hours PRN Severe Pain (7 - 10)  oxyCODONE IR 5milliGRAM(s) Oral every 6 hours PRN Moderate Pain (4 - 6)      Allergies    Benadryl (Other)  Demerol HCl (Other)    Intolerances        REVIEW OF SYSTEMS       General: no fevers, no fatigue, no loss of appetite    Skin: no rashes, skin changes  	  Ophthalmologic: no eye pain or blurred vision  	  ENMT:	no sore throat, no ear pain    Respiratory and Thorax: no cough,  no  SOB 	    Cardiovascular:	no chest pain    Gastrointestinal:	+nasuea, no vomiting, no constipation      Genitourinary:	+juarez    Musculoskeletal: +LLE pain     Neurological: no seizures, no dizziness    Hematology/Lymphatics: no petechia or purpura	    Endocrine: no polyuria, no polydipsia	      Karnofsky Performance Score/Palliative Performance Status Version 2:   50      %    Vital Signs Last 24 Hrs  T(C): 36.7, Max: 36.7 ( @ 17:09)  T(F): 98, Max: 98.1 (- @ 17:09)  HR: 86 (76 - 89)  BP: 134/86 (129/59 - 184/74)  BP(mean): --  RR: 17 (15 - 18)  SpO2: 99% (94% - 99%)    PHYSICAL EXAM:    General: [ X] alert  [X ] oriented x _3    HEENT: [X ] normal      Lungs: [X ] comfortable     CV: [X ] normal      GI: [X ] normal, ND, +BS     : [X ] juarez, clear yellow urine     MSK: left BKA, bandaged and immobilized     Skin: [X ] normal    LABS:                        8.3    9.3   )-----------( 341      ( 23 Mar 2017 05:15 )             25.3     23 Mar 2017 05:17    132    |  95     |  13.0   ----------------------------<  174    4.2     |  25.0   |  0.83     Ca    8.4        23 Mar 2017 05:17  Phos  3.1       23 Mar 2017 05:17  Mg     1.9       23 Mar 2017 05:17        Urinalysis Basic - ( 22 Mar 2017 16:40 )    Color: Yellow / Appearance: Clear / S.005 / pH: x  Gluc: x / Ketone: Negative  / Bili: Negative / Urobili: Negative mg/dL   Blood: x / Protein: 100 mg/dL / Nitrite: Positive   Leuk Esterase: Moderate / RBC: 11-25 /HPF / WBC 3-5   Sq Epi: x / Non Sq Epi: Occasional / Bacteria: Occasional      I&O's Summary    I & Os for current day (as of 23 Mar 2017 12:59)  =============================================  IN: 810 ml / OUT: 2250 ml / NET: -1440 ml      RADIOLOGY & ADDITIONAL STUDIES:    ASSESSMENT and PLAN:    ADVANCE DIRECTIVES:  [ ] YES [ ] NO   DNR [ ] YES [ ] NO  Completed on:                     MOLST  [ ] YES [ ] NO   Completed on:  Living Will  [ ] YES [ ] NO   Completed on:    COUNSELING:  Advanced Care Planning Discussion - Time Spent ______Minutes.   More than 50% time spent in counseling and coordinating care. ______ Minutes.     Thank you for the opportunity to assist with the care of this patient.   [place] [facility] Consult Service [phone]. Palliative Medicine Initial Consultation Note    Consult requested by: Dr. Andrade   Reason for consult: pain control, social support     HPI:  85 yr old female with CAD s/p multiple stents, diabetes, hypertension, hyperlipidemia, anxiety, significant PVD s/p multiple stents and  paroxysmal atrial fibrillation is POD #9 s/p LLE BKA.  Patient became lethargic a few days after operations.  Currently she is in pain and c/o phantom limb pain.  Patient also has nausea.  As per daughter, patient has been less lethargic.  Patient has been living at [facility] since Oct 2016.  Patient has a health care proxy, living will and DNR.  Pt c/o nausea.  Denies any constipation.                PERTINENT PMH REVIEWED:  [ X] YES [ ] NO           SOCIAL HISTORY:  EtOH [ ] Yes  [X ] No                                    Drugs [ ] Yes [X ] No                                   Former smoker, quit 30 years ago                                    Admitted from: [facility]     Surrogate/HCP/Guardian: [X ] YES [ ] NO       Jasmine Bhatt                         Phone#: [phone]    FAMILY HISTORY:  No pertinent family history in first degree relatives      Baseline ADLs (prior to admission):  Independent [ X] moderately [ ] fully   Dependent   [ X] moderately [ ]fully    MEDICATIONS  (STANDING):  amLODIPine   Tablet 10milliGRAM(s) Oral daily  gabapentin 600milliGRAM(s) Oral at bedtime  atorvastatin 40milliGRAM(s) Oral at bedtime  folic acid 1milliGRAM(s) Oral daily  metoprolol 50milliGRAM(s) Oral every 12 hours  pantoprazole    Tablet 40milliGRAM(s) Oral before breakfast  levothyroxine 75MICROGram(s) Oral daily  multivitamin/minerals 1Tablet(s) Oral daily  multivitamin 1Tablet(s) Oral daily  gabapentin 300milliGRAM(s) Oral daily  ferrous    sulfate 325milliGRAM(s) Oral every 12 hours  apixaban 2.5milliGRAM(s) Oral two times a day  insulin glargine Injectable (LANTUS) 22Unit(s) SubCutaneous at bedtime  insulin lispro Injectable (HumaLOG) 5Unit(s) SubCutaneous before breakfast  insulin lispro Injectable (HumaLOG) 5Unit(s) SubCutaneous before lunch  insulin lispro Injectable (HumaLOG) 5Unit(s) SubCutaneous before dinner  insulin lispro (HumaLOG) corrective regimen sliding scale  SubCutaneous three times a day before meals  dextrose 5%. 1000milliLiter(s) IV Continuous <Continuous>  dextrose 50% Injectable 12.5Gram(s) IV Push once  dextrose 50% Injectable 25Gram(s) IV Push once  dextrose 50% Injectable 25Gram(s) IV Push once  acetaminophen  IVPB. 1000milliGRAM(s) IV Intermittent once    MEDICATIONS  (PRN):  acetaminophen   Tablet 650milliGRAM(s) Oral every 6 hours PRN For Temp greater than 38 C (100.4 F)  ondansetron Injectable 4milliGRAM(s) IV Push every 6 hours PRN Nausea  dextrose Gel 1Dose(s) Oral once PRN Blood Glucose LESS THAN 70 milliGRAM(s)/deciliter  glucagon  Injectable 1milliGRAM(s) IntraMuscular once PRN Glucose LESS THAN 70 milligrams/deciliter  ALPRAZolam 0.5milliGRAM(s) Oral every 6 hours PRN anxiety  oxyCODONE IR 7.5milliGRAM(s) Oral every 6 hours PRN Severe Pain (7 - 10)  oxyCODONE IR 5milliGRAM(s) Oral every 6 hours PRN Moderate Pain (4 - 6)      Allergies    Benadryl (Other)  Demerol HCl (Other)    Intolerances        REVIEW OF SYSTEMS       General: no fevers, no fatigue, no loss of appetite    Skin: no rashes, skin changes  	  Ophthalmologic: no eye pain or blurred vision  	  ENMT:	no sore throat, no ear pain    Respiratory and Thorax: no cough,  no  SOB 	    Cardiovascular:	no chest pain    Gastrointestinal:	+nasuea, no vomiting, no constipation      Genitourinary:	+juarez    Musculoskeletal: +LLE pain     Neurological: no seizures, no dizziness    Hematology/Lymphatics: no petechia or purpura	    Endocrine: no polyuria, no polydipsia	      Karnofsky Performance Score/Palliative Performance Status Version 2:   50      %    Vital Signs Last 24 Hrs  T(C): 36.7, Max: 36.7 (- @ 17:09)  T(F): 98, Max: 98.1 (- @ 17:09)  HR: 86 (76 - 89)  BP: 134/86 (129/59 - 184/74)  BP(mean): --  RR: 17 (15 - 18)  SpO2: 99% (94% - 99%)    PHYSICAL EXAM:    General: [ X] alert  [X ] oriented x _3    HEENT: [X ] normal      Lungs: [X ] comfortable     CV: [X ] normal      GI: [X ] normal, ND, +BS     : [X ] juarez, clear yellow urine     MSK: left BKA, bandaged and immobilized     Skin: [X ] normal    LABS:                        8.3    9.3   )-----------( 341      ( 23 Mar 2017 05:15 )             25.3     23 Mar 2017 05:17    132    |  95     |  13.0   ----------------------------<  174    4.2     |  25.0   |  0.83     Ca    8.4        23 Mar 2017 05:17  Phos  3.1       23 Mar 2017 05:17  Mg     1.9       23 Mar 2017 05:17        Urinalysis Basic - ( 22 Mar 2017 16:40 )    Color: Yellow / Appearance: Clear / S.005 / pH: x  Gluc: x / Ketone: Negative  / Bili: Negative / Urobili: Negative mg/dL   Blood: x / Protein: 100 mg/dL / Nitrite: Positive   Leuk Esterase: Moderate / RBC: 11-25 /HPF / WBC 3-5   Sq Epi: x / Non Sq Epi: Occasional / Bacteria: Occasional      I&O's Summary    I & Os for current day (as of 23 Mar 2017 12:59)  =============================================  IN: 810 ml / OUT: 2250 ml / NET: -1440 ml      RADIOLOGY & ADDITIONAL STUDIES:    Chest Xray 3/22: No evidence of acute cardiopulmonary disease.    TTE: Summary:   1. Normal biventricular systolic function. Left ventricular ejection   fraction, by visual estimation, is 60 to 65%.   2. There is mild concentric left ventricular hypertrophy.   3. Spectral Doppler shows impaired relaxation pattern of left   ventricular myocardial filling (Grade I diastolic dysfunction).   4. Mild tricuspid regurgitation.   5. Estimated PA systolic pressure = 32 mmHg (presuming RA pressure = 3   mmHg).   6. Aortic sclerosis without evidence for stenosis.   7. There is no evidence of pericardial effusion.      ASSESSMENT and PLAN:    ADVANCE DIRECTIVES:  [X ] YES [ ] NO   DNR [X ] YES [ ] NO                       MOLST  [ X] YES [ ] NO     Living Will  [X ] YES [ ] NO           Thank you for the opportunity to assist with the care of this patient.   [place] [facility]iative [facility] Consult Service [phone]. Palliative Medicine Initial Consultation Note    Consult requested by: Dr. Andrade   Reason for consult: pain control, social support     HPI:  85 yr old female with CAD s/p multiple stents, diabetes, hypertension, hyperlipidemia, anxiety, significant PVD s/p multiple stents and  paroxysmal atrial fibrillation is POD #9 s/p LLE BKA.  Patient became lethargic a few days after operations.  Currently she is in pain and c/o phantom limb pain.  States she had LLE burning and feels that it is "radiating to her toes".  Pain relief was 50% with previous opoid regimen.  Patient also has nausea.  As per daughter, patient has been less lethargic.  Patient has been living at [facility] since Oct 2016.  Patient has a health care proxy, living will and DNR.  Pt c/o nausea.  No vomiting  Pt is having BM.  No diarrhea.          PERTINENT PMH REVIEWED:  [ X] YES [ ] NO           SOCIAL HISTORY:  EtOH [ ] Yes  [X ] No                                    Drugs [ ] Yes [X ] No                                   Former smoker, quit 30 years ago                                    Admitted from: [facility]     Surrogate/HCP/Guardian: [X ] YES [ ] NO       Jasmine Bhatt                         Phone#: [phone]    FAMILY HISTORY:  No pertinent family history in first degree relatives      Baseline ADLs (prior to admission):  Independent [ X] moderately [ ] fully   Dependent   [ X] moderately [ ]fully    MEDICATIONS  (STANDING):  amLODIPine   Tablet 10milliGRAM(s) Oral daily  gabapentin 600milliGRAM(s) Oral at bedtime  atorvastatin 40milliGRAM(s) Oral at bedtime  folic acid 1milliGRAM(s) Oral daily  metoprolol 50milliGRAM(s) Oral every 12 hours  pantoprazole    Tablet 40milliGRAM(s) Oral before breakfast  levothyroxine 75MICROGram(s) Oral daily  multivitamin/minerals 1Tablet(s) Oral daily  multivitamin 1Tablet(s) Oral daily  gabapentin 300milliGRAM(s) Oral daily  ferrous    sulfate 325milliGRAM(s) Oral every 12 hours  apixaban 2.5milliGRAM(s) Oral two times a day  insulin glargine Injectable (LANTUS) 22Unit(s) SubCutaneous at bedtime  insulin lispro Injectable (HumaLOG) 5Unit(s) SubCutaneous before breakfast  insulin lispro Injectable (HumaLOG) 5Unit(s) SubCutaneous before lunch  insulin lispro Injectable (HumaLOG) 5Unit(s) SubCutaneous before dinner  insulin lispro (HumaLOG) corrective regimen sliding scale  SubCutaneous three times a day before meals  dextrose 5%. 1000milliLiter(s) IV Continuous <Continuous>  dextrose 50% Injectable 12.5Gram(s) IV Push once  dextrose 50% Injectable 25Gram(s) IV Push once  dextrose 50% Injectable 25Gram(s) IV Push once  acetaminophen  IVPB. 1000milliGRAM(s) IV Intermittent once    MEDICATIONS  (PRN):  acetaminophen   Tablet 650milliGRAM(s) Oral every 6 hours PRN For Temp greater than 38 C (100.4 F)  ondansetron Injectable 4milliGRAM(s) IV Push every 6 hours PRN Nausea  dextrose Gel 1Dose(s) Oral once PRN Blood Glucose LESS THAN 70 milliGRAM(s)/deciliter  glucagon  Injectable 1milliGRAM(s) IntraMuscular once PRN Glucose LESS THAN 70 milligrams/deciliter  ALPRAZolam 0.5milliGRAM(s) Oral every 6 hours PRN anxiety  oxyCODONE IR 7.5milliGRAM(s) Oral every 6 hours PRN Severe Pain (7 - 10)  oxyCODONE IR 5milliGRAM(s) Oral every 6 hours PRN Moderate Pain (4 - 6)      Allergies    Benadryl (Other)  Demerol HCl (Other)    Intolerances        REVIEW OF SYSTEMS       General: no fevers, no fatigue, no loss of appetite    Skin: no rashes, skin changes  	  Ophthalmologic: no eye pain or blurred vision  	  ENMT:	no sore throat, no ear pain    Respiratory and Thorax: no cough,  no  SOB 	    Cardiovascular:	no chest pain    Gastrointestinal:	+nasuea, no vomiting, no constipation      Genitourinary:	+juarez    Musculoskeletal: +LLE pain     Neurological: no seizures, no dizziness    Hematology/Lymphatics: no petechia or purpura	    Endocrine: no polyuria, no polydipsia	      Karnofsky Performance Score/Palliative Performance Status Version 2:   50      %    Vital Signs Last 24 Hrs  T(C): 36.7, Max: 36.7 ( @ 17:09)  T(F): 98, Max: 98.1 ( @ 17:09)  HR: 86 (76 - 89)  BP: 134/86 (129/59 - 184/74)  BP(mean): --  RR: 17 (15 - 18)  SpO2: 99% (94% - 99%)    PHYSICAL EXAM:    General: [ X] alert  [X ] oriented x _3    HEENT: [X ] normal      Lungs: [X ] comfortable     CV: [X ] normal      GI: [X ] normal, ND, +BS     : [X ] juarez, clear yellow urine     MSK: left BKA, bandaged and immobilized     Skin: [X ] normal    LABS:                        8.3    9.3   )-----------( 341      ( 23 Mar 2017 05:15 )             25.3     23 Mar 2017 05:17    132    |  95     |  13.0   ----------------------------<  174    4.2     |  25.0   |  0.83     Ca    8.4        23 Mar 2017 05:17  Phos  3.1       23 Mar 2017 05:17  Mg     1.9       23 Mar 2017 05:17        Urinalysis Basic - ( 22 Mar 2017 16:40 )    Color: Yellow / Appearance: Clear / S.005 / pH: x  Gluc: x / Ketone: Negative  / Bili: Negative / Urobili: Negative mg/dL   Blood: x / Protein: 100 mg/dL / Nitrite: Positive   Leuk Esterase: Moderate / RBC: 11-25 /HPF / WBC 3-5   Sq Epi: x / Non Sq Epi: Occasional / Bacteria: Occasional      I&O's Summary    I & Os for current day (as of 23 Mar 2017 12:59)  =============================================  IN: 810 ml / OUT: 2250 ml / NET: -1440 ml      RADIOLOGY & ADDITIONAL STUDIES:    Chest Xray 3/22: No evidence of acute cardiopulmonary disease.    TTE: Summary:   1. Normal biventricular systolic function. Left ventricular ejection   fraction, by visual estimation, is 60 to 65%.   2. There is mild concentric left ventricular hypertrophy.   3. Spectral Doppler shows impaired relaxation pattern of left   ventricular myocardial filling (Grade I diastolic dysfunction).   4. Mild tricuspid regurgitation.   5. Estimated PA systolic pressure = 32 mmHg (presuming RA pressure = 3   mmHg).   6. Aortic sclerosis without evidence for stenosis.   7. There is no evidence of pericardial effusion.      ASSESSMENT and PLAN:    ADVANCE DIRECTIVES:  [X ] YES [ ] NO   DNR [X ] YES [ ] NO                       MOLST  [ X] YES [ ] NO     Living Will  [X ] YES [ ] NO           Thank you for the opportunity to assist with the care of this patient.   [place] [facility] Consult Service [phone].

## 2017-03-23 NOTE — PROGRESS NOTE ADULT - PROBLEM SELECTOR PLAN 1
-Tylenol IV 100mg stat for pain  -Tylenol 650mg PO q6 hours mild pain   -Oxycodoneir 5mg po q6 prn for severe pain   -Oxycodoneir 7.5mg po q6 prn for severe pain  -continue gabapentin 300mg po qd, 600mg po qhs , adjust conservatively as concern for lethargy  - consult placed to evaluate for depression -Tylenol IV 100mg stat for pain  -Tylenol 650mg PO q6 hours mild pain   -Oxycodoneir 5mg po q6 prn for moderate pain   -Oxycodoneir 7.5mg po q6 prn for severe pain  -Neurontin 200mg po qd added midday for neuropathic pain   -consider adding back Tramadol for neuropathic pain once electrolytes WNL   - consult placed to evaluate for depression -Tylenol IV 100mg stat for pain  -Tylenol 650mg PO q6 hours mild pain   -Oxycodone IR 5mg po q6 prn for moderate pain   -Oxycodone IR 7.5mg po q6 prn for severe pain  -Neurontin 200mg po qd added midday for neuropathic pain   -consider adding back Tramadol for neuropathic pain once electrolytes WNL   - consult placed to evaluate for depression

## 2017-03-23 NOTE — PROGRESS NOTE ADULT - SUBJECTIVE AND OBJECTIVE BOX
NEPHROLOGY INTERVAL HPI/OVERNIGHT EVENTS:    Examined earlier  looks comfortable    MEDICATIONS  (STANDING):  amLODIPine   Tablet 10milliGRAM(s) Oral daily  gabapentin 600milliGRAM(s) Oral at bedtime  atorvastatin 40milliGRAM(s) Oral at bedtime  folic acid 1milliGRAM(s) Oral daily  metoprolol 50milliGRAM(s) Oral every 12 hours  pantoprazole    Tablet 40milliGRAM(s) Oral before breakfast  levothyroxine 75MICROGram(s) Oral daily  multivitamin/minerals 1Tablet(s) Oral daily  multivitamin 1Tablet(s) Oral daily  ferrous    sulfate 325milliGRAM(s) Oral every 12 hours  apixaban 2.5milliGRAM(s) Oral two times a day  insulin glargine Injectable (LANTUS) 22Unit(s) SubCutaneous at bedtime  insulin lispro Injectable (HumaLOG) 5Unit(s) SubCutaneous before breakfast  insulin lispro Injectable (HumaLOG) 5Unit(s) SubCutaneous before lunch  insulin lispro Injectable (HumaLOG) 5Unit(s) SubCutaneous before dinner  insulin lispro (HumaLOG) corrective regimen sliding scale  SubCutaneous three times a day before meals  dextrose 5%. 1000milliLiter(s) IV Continuous <Continuous>  dextrose 50% Injectable 12.5Gram(s) IV Push once  dextrose 50% Injectable 25Gram(s) IV Push once  dextrose 50% Injectable 25Gram(s) IV Push once  gabapentin 200milliGRAM(s) Oral <User Schedule>    MEDICATIONS  (PRN):  acetaminophen   Tablet 650milliGRAM(s) Oral every 6 hours PRN For Temp greater than 38 C (100.4 F)  ondansetron Injectable 4milliGRAM(s) IV Push every 6 hours PRN Nausea  dextrose Gel 1Dose(s) Oral once PRN Blood Glucose LESS THAN 70 milliGRAM(s)/deciliter  glucagon  Injectable 1milliGRAM(s) IntraMuscular once PRN Glucose LESS THAN 70 milligrams/deciliter  ALPRAZolam 0.5milliGRAM(s) Oral every 6 hours PRN anxiety  oxyCODONE IR 7.5milliGRAM(s) Oral every 6 hours PRN Severe Pain (7 - 10)  oxyCODONE IR 5milliGRAM(s) Oral every 6 hours PRN Moderate Pain (4 - 6)      Allergies    Benadryl (Other)  Demerol HCl (Other)    Intolerances        Vital Signs Last 24 Hrs  T(C): 36.7, Max: 36.7 ( @ 17:09)  T(F): 98, Max: 98.1 ( @ 17:09)  HR: 86 (76 - 89)  BP: 134/86 (129/59 - 184/74)  BP(mean): --  RR: 17 (15 - 18)  SpO2: 99% (94% - 99%)  Daily     Daily Weight in k.9 (23 Mar 2017 05:52)    PHYSICAL EXAM:  GENERAL: Weak, tired  HEAD:  Atraumatic, Normocephalic  EYES: EOMI, PERRLA  NECK: Supple, No JVD  NERVOUS SYSTEM: Lethargic but arousable; somewhat confused  CHEST/LUNG: Clear to percussion bilaterally  HEART: Regular rate and rhythm; No rub  ABDOMEN: Soft, Nontender, Nondistended; Bowel sounds present  EXTREMITIES:   No edema; L BKA with dressing        LABS:                        8.3    9.3   )-----------( 341      ( 23 Mar 2017 05:15 )             25.3     23 Mar 2017 05:17    132    |  95     |  13.0   ----------------------------<  174    4.2     |  25.0   |  0.83     Ca    8.4        23 Mar 2017 05:17  Phos  3.1       23 Mar 2017 05:17  Mg     1.9       23 Mar 2017 05:17        Urinalysis Basic - ( 22 Mar 2017 16:40 )    Color: Yellow / Appearance: Clear / S.005 / pH: x  Gluc: x / Ketone: Negative  / Bili: Negative / Urobili: Negative mg/dL   Blood: x / Protein: 100 mg/dL / Nitrite: Positive   Leuk Esterase: Moderate / RBC: 11-25 /HPF / WBC 3-5   Sq Epi: x / Non Sq Epi: Occasional / Bacteria: Occasional      Magnesium, Serum: 1.9 mg/dL ( @ 05:17)  Phosphorus Level, Serum: 3.1 mg/dL ( @ 05:17)          RADIOLOGY & ADDITIONAL TESTS:

## 2017-03-23 NOTE — PROGRESS NOTE ADULT - ATTENDING COMMENTS
85yr woman, hx CAD with stents, PVD, DM, HTN, HLD, PAF admitted with hypertensive urgency after LE angiogram. She is s/p left BKA on 3/14. Patient still experiencing significant pain, appears mainly neuropathic with phantom limb pain.  Tylenol infusion given for now. Adjusted pain regimen; added Neurontin in the midday, and increased opioid breakthrough slightly.  Consider readding Tramadol if pain continues and when sodium improved.  Met with  2 daughters who have been told most likely will need amputation of right leg. They do not feel this is something they want their mother to go through again but know it is up to her. They want to discuss with her when she is feeling better.

## 2017-03-23 NOTE — PROGRESS NOTE ADULT - PROBLEM SELECTOR PLAN 4
pt on Eliquis renally adjusted dose   since Cr has improved, increased dose to 5mg po bid pt on Eliquis renally adjusted dose

## 2017-03-23 NOTE — PROGRESS NOTE ADULT - SUBJECTIVE AND OBJECTIVE BOX
Subjective:  Pt offers no new complaints about the surgical site. Pain controlled.    STATUS POST:  BETH METZ    POST OPERATIVE DAY #: 9    MEDICATIONS  (STANDING):  amLODIPine   Tablet 10milliGRAM(s) Oral daily  gabapentin 600milliGRAM(s) Oral at bedtime  atorvastatin 40milliGRAM(s) Oral at bedtime  folic acid 1milliGRAM(s) Oral daily  metoprolol 50milliGRAM(s) Oral every 12 hours  pantoprazole    Tablet 40milliGRAM(s) Oral before breakfast  levothyroxine 75MICROGram(s) Oral daily  multivitamin/minerals 1Tablet(s) Oral daily  multivitamin 1Tablet(s) Oral daily  gabapentin 300milliGRAM(s) Oral daily  ferrous    sulfate 325milliGRAM(s) Oral every 12 hours  apixaban 2.5milliGRAM(s) Oral two times a day  insulin glargine Injectable (LANTUS) 22Unit(s) SubCutaneous at bedtime  insulin lispro Injectable (HumaLOG) 5Unit(s) SubCutaneous before breakfast  insulin lispro Injectable (HumaLOG) 5Unit(s) SubCutaneous before lunch  insulin lispro Injectable (HumaLOG) 5Unit(s) SubCutaneous before dinner  insulin lispro (HumaLOG) corrective regimen sliding scale  SubCutaneous three times a day before meals  dextrose 5%. 1000milliLiter(s) IV Continuous <Continuous>  dextrose 50% Injectable 12.5Gram(s) IV Push once  dextrose 50% Injectable 25Gram(s) IV Push once  dextrose 50% Injectable 25Gram(s) IV Push once    MEDICATIONS  (PRN):  acetaminophen   Tablet 650milliGRAM(s) Oral every 6 hours PRN For Temp greater than 38 C (100.4 F)  ondansetron Injectable 4milliGRAM(s) IV Push every 6 hours PRN Nausea  dextrose Gel 1Dose(s) Oral once PRN Blood Glucose LESS THAN 70 milliGRAM(s)/deciliter  glucagon  Injectable 1milliGRAM(s) IntraMuscular once PRN Glucose LESS THAN 70 milligrams/deciliter  oxyCODONE  5 mG/acetaminophen 325 mG 1Tablet(s) Oral every 6 hours PRN Mild Pain (1 - 3)  oxyCODONE IR 5milliGRAM(s) Oral every 4 hours PRN Severe Pain (7 - 10)  ALPRAZolam 0.5milliGRAM(s) Oral every 6 hours PRN anxiety      Vital Signs Last 24 Hrs  T(C): 36.7, Max: 36.8 ( @ 10:15)  T(F): 98.1, Max: 98.3 ( @ 10:15)  HR: 82 (76 - 89)  BP: 152/50 (129/59 - 184/74)  BP(mean): --  RR: 15 (15 - 18)  SpO2: 94% (94% - 97%)    Physical exam:  Gen: lying comfortably in bed, NAD  LLE: surgical site c/d/i      LABS:                        8.3    9.3   )-----------( 341      ( 23 Mar 2017 05:15 )             25.3     23 Mar 2017 05:17    132    |  95     |  13.0   ----------------------------<  174    4.2     |  25.0   |  0.83     Ca    8.4        23 Mar 2017 05:17  Phos  3.1       23 Mar 2017 05:17  Mg     1.9       23 Mar 2017 05:17        Urinalysis Basic - ( 22 Mar 2017 16:40 )    Color: Yellow / Appearance: Clear / S.005 / pH: x  Gluc: x / Ketone: Negative  / Bili: Negative / Urobili: Negative mg/dL   Blood: x / Protein: 100 mg/dL / Nitrite: Positive   Leuk Esterase: Moderate / RBC: 11-25 /HPF / WBC 3-5   Sq Epi: x / Non Sq Epi: Occasional / Bacteria: Occasional

## 2017-03-23 NOTE — PROGRESS NOTE ADULT - PROBLEM SELECTOR PLAN 3
s/p LLE BKA, as per family patient has had 6 stents in the LLE and the 7th sent failed leading to BKA.  Patient has known for over 10 years that she has PVD and may lead to amputation.  As per family, patient may have an amputation of right LE in the future. s/p LLE BKA, as per family patient has had 6 stents in the LLE and the 7th sent failed leading to BKA.  Patient has known for over 10 years that she has PVD and may lead to amputation.  As per family, patient may have an amputation of right LE in the future.  Currently family is undecided if that is the route that they wish to take at this point.

## 2017-03-23 NOTE — PROGRESS NOTE ADULT - ASSESSMENT
85 yr old female with multiple medical problems CAD s/p stents, diabetes, hypertension, hyperlipidemia, anxiety, significant PVD s/p multiple stents, paroxysmal atrial fibrillation admitted for hypertensive urgency post LE angiogram and cellulitis. Given significant PVD and tissue loss, BKA was planned by vascular surgery. BP improved. She was started on antibiotics. Cardiology consulted for pre op evaluation. She also developed MAGI, now resolved. BKA was done on 3/14. Procedure was uneventful. She was evaluated by acute rehab and accepted for the same. Given urinary retention, juarez was maintained. Noted to have hyponatremia, hence started on IV fluids. Tramadol discontinued. Noted to have Hb 7.8. PRBC transfusion given but had a fever. Nephrology consulted for hyponatremia, started on fluid restriction. Cultures sent given fever. Palliative care eval requested.

## 2017-03-23 NOTE — PROGRESS NOTE ADULT - PROBLEM SELECTOR PLAN 8
Met with health care proxy Jasmine and then with Binta.  Both daughters are aware that patient may need an amputation of the RLE in the future secondary to PVD but are undecided at this time if that is the direction that they want to take or if they would prefer comfort care.  We discussed that we are trying to adjust patient's pain regimen to make her more comfortable.  It seems like the pain is more neuropathic. Met with health care proxy Jasmine and then with Binta.  Both daughters are aware that patient may need an amputation of the RLE in the future secondary to PVD but are undecided at this time if that is the direction that they want to take or if they would prefer comfort care.  We discussed that we are trying to adjust patient's pain regimen to make her more comfortable.  It seems like the pain is more neuropathic.  Psychosocial support.

## 2017-03-24 DIAGNOSIS — N39.0 URINARY TRACT INFECTION, SITE NOT SPECIFIED: ICD-10-CM

## 2017-03-24 DIAGNOSIS — R41.0 DISORIENTATION, UNSPECIFIED: ICD-10-CM

## 2017-03-24 LAB
-  AMIKACIN: SIGNIFICANT CHANGE UP
-  AZTREONAM: SIGNIFICANT CHANGE UP
-  CEFEPIME: SIGNIFICANT CHANGE UP
-  CEFTAZIDIME: SIGNIFICANT CHANGE UP
-  CIPROFLOXACIN: SIGNIFICANT CHANGE UP
-  GENTAMICIN: SIGNIFICANT CHANGE UP
-  IMIPENEM: SIGNIFICANT CHANGE UP
-  LEVOFLOXACIN: SIGNIFICANT CHANGE UP
-  MEROPENEM: SIGNIFICANT CHANGE UP
-  PIPERACILLIN/TAZOBACTAM: SIGNIFICANT CHANGE UP
-  TOBRAMYCIN: SIGNIFICANT CHANGE UP
ANION GAP SERPL CALC-SCNC: 14 MMOL/L — SIGNIFICANT CHANGE UP (ref 5–17)
BUN SERPL-MCNC: 15 MG/DL — SIGNIFICANT CHANGE UP (ref 8–20)
CALCIUM SERPL-MCNC: 8.6 MG/DL — SIGNIFICANT CHANGE UP (ref 8.6–10.2)
CHLORIDE SERPL-SCNC: 92 MMOL/L — LOW (ref 98–107)
CO2 SERPL-SCNC: 24 MMOL/L — SIGNIFICANT CHANGE UP (ref 22–29)
CREAT SERPL-MCNC: 0.98 MG/DL — SIGNIFICANT CHANGE UP (ref 0.5–1.3)
CULTURE RESULTS: SIGNIFICANT CHANGE UP
GLUCOSE SERPL-MCNC: 216 MG/DL — HIGH (ref 70–115)
HCT VFR BLD CALC: 30.5 % — LOW (ref 37–47)
HGB BLD-MCNC: 9.8 G/DL — LOW (ref 12–16)
MAGNESIUM SERPL-MCNC: 2 MG/DL — SIGNIFICANT CHANGE UP (ref 1.8–2.5)
MCHC RBC-ENTMCNC: 27.5 PG — SIGNIFICANT CHANGE UP (ref 27–31)
MCHC RBC-ENTMCNC: 32.1 G/DL — SIGNIFICANT CHANGE UP (ref 32–36)
MCV RBC AUTO: 85.4 FL — SIGNIFICANT CHANGE UP (ref 81–99)
METHOD TYPE: SIGNIFICANT CHANGE UP
ORGANISM # SPEC MICROSCOPIC CNT: SIGNIFICANT CHANGE UP
ORGANISM # SPEC MICROSCOPIC CNT: SIGNIFICANT CHANGE UP
PHOSPHATE SERPL-MCNC: 3.2 MG/DL — SIGNIFICANT CHANGE UP (ref 2.4–4.7)
PLATELET # BLD AUTO: 438 K/UL — HIGH (ref 150–400)
POTASSIUM SERPL-MCNC: 4.5 MMOL/L — SIGNIFICANT CHANGE UP (ref 3.5–5.3)
POTASSIUM SERPL-SCNC: 4.5 MMOL/L — SIGNIFICANT CHANGE UP (ref 3.5–5.3)
RBC # BLD: 3.57 M/UL — LOW (ref 4.4–5.2)
RBC # FLD: 14.5 % — SIGNIFICANT CHANGE UP (ref 11–15.6)
SODIUM SERPL-SCNC: 130 MMOL/L — LOW (ref 135–145)
SPECIMEN SOURCE: SIGNIFICANT CHANGE UP
WBC # BLD: 13 K/UL — HIGH (ref 4.8–10.8)
WBC # FLD AUTO: 13 K/UL — HIGH (ref 4.8–10.8)

## 2017-03-24 PROCEDURE — 99233 SBSQ HOSP IP/OBS HIGH 50: CPT

## 2017-03-24 RX ORDER — CIPROFLOXACIN LACTATE 400MG/40ML
400 VIAL (ML) INTRAVENOUS EVERY 12 HOURS
Qty: 0 | Refills: 0 | Status: DISCONTINUED | OUTPATIENT
Start: 2017-03-25 | End: 2017-03-29

## 2017-03-24 RX ORDER — CIPROFLOXACIN LACTATE 400MG/40ML
400 VIAL (ML) INTRAVENOUS ONCE
Qty: 0 | Refills: 0 | Status: COMPLETED | OUTPATIENT
Start: 2017-03-24 | End: 2017-03-24

## 2017-03-24 RX ORDER — ALPRAZOLAM 0.25 MG
0.25 TABLET ORAL EVERY 8 HOURS
Qty: 0 | Refills: 0 | Status: DISCONTINUED | OUTPATIENT
Start: 2017-03-24 | End: 2017-03-24

## 2017-03-24 RX ORDER — OXYCODONE HYDROCHLORIDE 5 MG/1
5 TABLET ORAL EVERY 6 HOURS
Qty: 0 | Refills: 0 | Status: DISCONTINUED | OUTPATIENT
Start: 2017-03-24 | End: 2017-03-29

## 2017-03-24 RX ORDER — CEFTRIAXONE 500 MG/1
INJECTION, POWDER, FOR SOLUTION INTRAMUSCULAR; INTRAVENOUS
Qty: 0 | Refills: 0 | Status: DISCONTINUED | OUTPATIENT
Start: 2017-03-24 | End: 2017-03-24

## 2017-03-24 RX ORDER — ACETAMINOPHEN 500 MG
650 TABLET ORAL EVERY 6 HOURS
Qty: 0 | Refills: 0 | Status: DISCONTINUED | OUTPATIENT
Start: 2017-03-24 | End: 2017-03-29

## 2017-03-24 RX ORDER — LACTOBACILLUS ACIDOPHILUS 100MM CELL
1 CAPSULE ORAL
Qty: 0 | Refills: 0 | Status: DISCONTINUED | OUTPATIENT
Start: 2017-03-24 | End: 2017-03-29

## 2017-03-24 RX ORDER — OXYCODONE HYDROCHLORIDE 5 MG/1
2.5 TABLET ORAL ONCE
Qty: 0 | Refills: 0 | Status: DISCONTINUED | OUTPATIENT
Start: 2017-03-24 | End: 2017-03-24

## 2017-03-24 RX ORDER — SODIUM CHLORIDE 9 MG/ML
1 INJECTION INTRAMUSCULAR; INTRAVENOUS; SUBCUTANEOUS
Qty: 0 | Refills: 0 | Status: DISCONTINUED | OUTPATIENT
Start: 2017-03-24 | End: 2017-03-29

## 2017-03-24 RX ORDER — LIDOCAINE 4 G/100G
1 CREAM TOPICAL ONCE
Qty: 0 | Refills: 0 | Status: DISCONTINUED | OUTPATIENT
Start: 2017-03-24 | End: 2017-03-29

## 2017-03-24 RX ORDER — CEFTRIAXONE 500 MG/1
1 INJECTION, POWDER, FOR SOLUTION INTRAMUSCULAR; INTRAVENOUS ONCE
Qty: 0 | Refills: 0 | Status: COMPLETED | OUTPATIENT
Start: 2017-03-24 | End: 2017-03-24

## 2017-03-24 RX ORDER — CIPROFLOXACIN LACTATE 400MG/40ML
VIAL (ML) INTRAVENOUS
Qty: 0 | Refills: 0 | Status: DISCONTINUED | OUTPATIENT
Start: 2017-03-24 | End: 2017-03-29

## 2017-03-24 RX ORDER — ALPRAZOLAM 0.25 MG
0.25 TABLET ORAL EVERY 8 HOURS
Qty: 0 | Refills: 0 | Status: DISCONTINUED | OUTPATIENT
Start: 2017-03-25 | End: 2017-03-29

## 2017-03-24 RX ORDER — OXYCODONE HYDROCHLORIDE 5 MG/1
2.5 TABLET ORAL EVERY 6 HOURS
Qty: 0 | Refills: 0 | Status: DISCONTINUED | OUTPATIENT
Start: 2017-03-24 | End: 2017-03-29

## 2017-03-24 RX ADMIN — OXYCODONE HYDROCHLORIDE 2.5 MILLIGRAM(S): 5 TABLET ORAL at 20:10

## 2017-03-24 RX ADMIN — APIXABAN 2.5 MILLIGRAM(S): 2.5 TABLET, FILM COATED ORAL at 21:32

## 2017-03-24 RX ADMIN — Medication 1 TABLET(S): at 17:30

## 2017-03-24 RX ADMIN — AMLODIPINE BESYLATE 10 MILLIGRAM(S): 2.5 TABLET ORAL at 05:46

## 2017-03-24 RX ADMIN — PANTOPRAZOLE SODIUM 40 MILLIGRAM(S): 20 TABLET, DELAYED RELEASE ORAL at 05:46

## 2017-03-24 RX ADMIN — Medication 325 MILLIGRAM(S): at 17:30

## 2017-03-24 RX ADMIN — Medication 325 MILLIGRAM(S): at 05:46

## 2017-03-24 RX ADMIN — CEFTRIAXONE 100 GRAM(S): 500 INJECTION, POWDER, FOR SOLUTION INTRAMUSCULAR; INTRAVENOUS at 11:46

## 2017-03-24 RX ADMIN — APIXABAN 2.5 MILLIGRAM(S): 2.5 TABLET, FILM COATED ORAL at 09:25

## 2017-03-24 RX ADMIN — OXYCODONE HYDROCHLORIDE 7.5 MILLIGRAM(S): 5 TABLET ORAL at 10:00

## 2017-03-24 RX ADMIN — ATORVASTATIN CALCIUM 40 MILLIGRAM(S): 80 TABLET, FILM COATED ORAL at 21:32

## 2017-03-24 RX ADMIN — Medication 1 MILLIGRAM(S): at 11:39

## 2017-03-24 RX ADMIN — Medication 650 MILLIGRAM(S): at 18:31

## 2017-03-24 RX ADMIN — Medication 650 MILLIGRAM(S): at 17:31

## 2017-03-24 RX ADMIN — Medication 2: at 13:09

## 2017-03-24 RX ADMIN — Medication 1 TABLET(S): at 11:39

## 2017-03-24 RX ADMIN — GABAPENTIN 600 MILLIGRAM(S): 400 CAPSULE ORAL at 21:32

## 2017-03-24 RX ADMIN — Medication 5 UNIT(S): at 17:29

## 2017-03-24 RX ADMIN — SODIUM CHLORIDE 1 GRAM(S): 9 INJECTION INTRAMUSCULAR; INTRAVENOUS; SUBCUTANEOUS at 14:41

## 2017-03-24 RX ADMIN — Medication 650 MILLIGRAM(S): at 22:00

## 2017-03-24 RX ADMIN — OXYCODONE HYDROCHLORIDE 2.5 MILLIGRAM(S): 5 TABLET ORAL at 21:10

## 2017-03-24 RX ADMIN — OXYCODONE HYDROCHLORIDE 5 MILLIGRAM(S): 5 TABLET ORAL at 20:25

## 2017-03-24 RX ADMIN — GABAPENTIN 300 MILLIGRAM(S): 400 CAPSULE ORAL at 08:27

## 2017-03-24 RX ADMIN — OXYCODONE HYDROCHLORIDE 7.5 MILLIGRAM(S): 5 TABLET ORAL at 09:23

## 2017-03-24 RX ADMIN — OXYCODONE HYDROCHLORIDE 5 MILLIGRAM(S): 5 TABLET ORAL at 19:53

## 2017-03-24 RX ADMIN — Medication 50 MILLIGRAM(S): at 17:30

## 2017-03-24 RX ADMIN — SODIUM CHLORIDE 1 GRAM(S): 9 INJECTION INTRAMUSCULAR; INTRAVENOUS; SUBCUTANEOUS at 17:30

## 2017-03-24 RX ADMIN — INSULIN GLARGINE 22 UNIT(S): 100 INJECTION, SOLUTION SUBCUTANEOUS at 23:40

## 2017-03-24 RX ADMIN — Medication 50 MILLIGRAM(S): at 05:47

## 2017-03-24 RX ADMIN — Medication 10: at 17:29

## 2017-03-24 RX ADMIN — Medication 5 UNIT(S): at 13:09

## 2017-03-24 RX ADMIN — Medication 0.5 MILLIGRAM(S): at 10:06

## 2017-03-24 RX ADMIN — Medication 200 MILLIGRAM(S): at 14:53

## 2017-03-24 RX ADMIN — Medication 1 TABLET(S): at 13:14

## 2017-03-24 RX ADMIN — Medication 75 MICROGRAM(S): at 05:46

## 2017-03-24 RX ADMIN — Medication 5 UNIT(S): at 08:27

## 2017-03-24 NOTE — PROGRESS NOTE ADULT - SUBJECTIVE AND OBJECTIVE BOX
INTERVAL HPI/OVERNIGHT EVENTS:    CC: S/p left BKA, fever, ? UTI, atrial fibrillation., hyponatremia    More alert and comfortable this am. No fever. Denies pain at this time.     Vital Signs Last 24 Hrs  T(C): 36.5, Max: 36.7 (03-23 @ 18:00)  T(F): 97.7, Max: 98 (03-23 @ 18:00)  HR: 79 (77 - 82)  BP: 142/50 (132/48 - 142/50)  BP(mean): --  RR: 17 (16 - 18)  SpO2: 98% (93% - 98%)    PHYSICAL EXAM:    GENERAL: Not in distress, alert  CHEST/LUNG:b/l air entry  HEART: irregular   ABDOMEN: Soft, BS+  EXTREMITIES:  no edema right    MEDICATIONS  (STANDING):  amLODIPine   Tablet 10milliGRAM(s) Oral daily  gabapentin 600milliGRAM(s) Oral at bedtime  atorvastatin 40milliGRAM(s) Oral at bedtime  folic acid 1milliGRAM(s) Oral daily  metoprolol 50milliGRAM(s) Oral every 12 hours  pantoprazole    Tablet 40milliGRAM(s) Oral before breakfast  levothyroxine 75MICROGram(s) Oral daily  multivitamin/minerals 1Tablet(s) Oral daily  multivitamin 1Tablet(s) Oral daily  ferrous    sulfate 325milliGRAM(s) Oral every 12 hours  apixaban 2.5milliGRAM(s) Oral two times a day  insulin glargine Injectable (LANTUS) 22Unit(s) SubCutaneous at bedtime  insulin lispro Injectable (HumaLOG) 5Unit(s) SubCutaneous before breakfast  insulin lispro Injectable (HumaLOG) 5Unit(s) SubCutaneous before lunch  insulin lispro Injectable (HumaLOG) 5Unit(s) SubCutaneous before dinner  insulin lispro (HumaLOG) corrective regimen sliding scale  SubCutaneous three times a day before meals  dextrose 5%. 1000milliLiter(s) IV Continuous <Continuous>  dextrose 50% Injectable 12.5Gram(s) IV Push once  dextrose 50% Injectable 25Gram(s) IV Push once  dextrose 50% Injectable 25Gram(s) IV Push once  gabapentin 300milliGRAM(s) Oral <User Schedule>  gabapentin 200milliGRAM(s) Oral <User Schedule>    MEDICATIONS  (PRN):  acetaminophen   Tablet 650milliGRAM(s) Oral every 6 hours PRN For Temp greater than 38 C (100.4 F)  ondansetron Injectable 4milliGRAM(s) IV Push every 6 hours PRN Nausea  dextrose Gel 1Dose(s) Oral once PRN Blood Glucose LESS THAN 70 milliGRAM(s)/deciliter  glucagon  Injectable 1milliGRAM(s) IntraMuscular once PRN Glucose LESS THAN 70 milligrams/deciliter  ALPRAZolam 0.5milliGRAM(s) Oral every 6 hours PRN anxiety  oxyCODONE IR 7.5milliGRAM(s) Oral every 6 hours PRN Severe Pain (7 - 10)  oxyCODONE IR 5milliGRAM(s) Oral every 6 hours PRN Moderate Pain (4 - 6)      Allergies    Benadryl (Other)  Demerol HCl (Other)    Intolerances          LABS:                          8.3    9.3   )-----------( 341      ( 23 Mar 2017 05:15 )             25.3     23 Mar 2017 05:17    132    |  95     |  13.0   ----------------------------<  174    4.2     |  25.0   |  0.83     Ca    8.4        23 Mar 2017 05:17  Phos  3.1       23 Mar 2017 05:17  Mg     1.9       23 Mar 2017 05:17        Urinalysis Basic - ( 22 Mar 2017 16:40 )    Color: Yellow / Appearance: Clear / S.005 / pH: x  Gluc: x / Ketone: Negative  / Bili: Negative / Urobili: Negative mg/dL   Blood: x / Protein: 100 mg/dL / Nitrite: Positive   Leuk Esterase: Moderate / RBC: 11-25 /HPF / WBC 3-5   Sq Epi: x / Non Sq Epi: Occasional / Bacteria: Occasional        RADIOLOGY & ADDITIONAL TESTS:

## 2017-03-24 NOTE — PROGRESS NOTE ADULT - ASSESSMENT
85yr woman, hx CAD with stents, PVD, DM, HTN, HLD, PAF admitted with hypertensive urgency after LE angiogram. She is s/p left BKA

## 2017-03-24 NOTE — PROGRESS NOTE ADULT - SUBJECTIVE AND OBJECTIVE BOX
INTERVAL HPI/OVERNIGHT EVENTS:  Nursing reports patient slept well last night. Given Oxycodone with relief of symptoms  Seen patient earlier this am, daughter Cori at bedside. Reports pain is better.   Reported bouts of pain to amputation site, described as burning.   Seen again approx 12:45pm - little confused     PRESENT SYMPTOMS: SOURCE:  Patient/Family/Team    PAIN SCALE:  0 = none  1 = mild   2 = moderate  3 = severe    Pain: denies    Dyspnea:  [ ] YES [ x] NO  Anxiety:  [ x] YES [ ] NO  Fatigue: [x ] YES [ ] NO  Nausea: [ ] YES [x ] NO      MEDICATIONS  (STANDING):  amLODIPine   Tablet 10milliGRAM(s) Oral daily  gabapentin 600milliGRAM(s) Oral at bedtime  atorvastatin 40milliGRAM(s) Oral at bedtime  folic acid 1milliGRAM(s) Oral daily  metoprolol 50milliGRAM(s) Oral every 12 hours  pantoprazole    Tablet 40milliGRAM(s) Oral before breakfast  levothyroxine 75MICROGram(s) Oral daily  multivitamin/minerals 1Tablet(s) Oral daily  multivitamin 1Tablet(s) Oral daily  ferrous    sulfate 325milliGRAM(s) Oral every 12 hours  apixaban 2.5milliGRAM(s) Oral two times a day  insulin glargine Injectable (LANTUS) 22Unit(s) SubCutaneous at bedtime  insulin lispro Injectable (HumaLOG) 5Unit(s) SubCutaneous before breakfast  insulin lispro Injectable (HumaLOG) 5Unit(s) SubCutaneous before lunch  insulin lispro Injectable (HumaLOG) 5Unit(s) SubCutaneous before dinner  insulin lispro (HumaLOG) corrective regimen sliding scale  SubCutaneous three times a day before meals  dextrose 5%. 1000milliLiter(s) IV Continuous <Continuous>  dextrose 50% Injectable 12.5Gram(s) IV Push once  dextrose 50% Injectable 25Gram(s) IV Push once  dextrose 50% Injectable 25Gram(s) IV Push once  gabapentin 300milliGRAM(s) Oral <User Schedule>  cefTRIAXone   IVPB  IV Intermittent   sodium chloride 1Gram(s) Oral three times a day with meals  acetaminophen   Tablet. 650milliGRAM(s) Oral every 6 hours    MEDICATIONS  (PRN):  acetaminophen   Tablet 650milliGRAM(s) Oral every 6 hours PRN For Temp greater than 38 C (100.4 F)  ondansetron Injectable 4milliGRAM(s) IV Push every 6 hours PRN Nausea  dextrose Gel 1Dose(s) Oral once PRN Blood Glucose LESS THAN 70 milliGRAM(s)/deciliter  glucagon  Injectable 1milliGRAM(s) IntraMuscular once PRN Glucose LESS THAN 70 milligrams/deciliter  oxyCODONE IR 5milliGRAM(s) Oral every 6 hours PRN Severe Pain (7 - 10)  ALPRAZolam 0.25milliGRAM(s) Oral every 8 hours PRN anxiety  oxyCODONE IR 2.5milliGRAM(s) Oral every 6 hours PRN Moderate Pain (4 - 6)      Allergies    Benadryl (Other)  Demerol HCl (Other)    Intolerances    Karnofsky Performance Score/Palliative Performance Status Version 2:  30  %    Vital Signs Last 24 Hrs  T(C): 36.5, Max: 36.7 (03-23 @ 18:00)  T(F): 97.7, Max: 98 (03-23 @ 18:00)  HR: 89 (77 - 89)  BP: 132/54 (132/48 - 142/50)  BP(mean): --  RR: 16 (16 - 18)  SpO2: 97% (93% - 98%)    PHYSICAL EXAM:    General: [x ] alert slight confused    HEENT: [x ] normal  [ ] dry mouth  [ ] ET tube/trach    Lungs: [ x] comfortable [ ] tachypnea/labored breathing  [ ] excessive secretions    CV: [x ] normal  [ ] tachycardia    GI: [x ] normal  [ ] distended  [ ] tender  [ ] no BS               [ ] PEG/NG/OG tube    MSK: [ ] normal  [x ] weakness  [ ] edema  lower extremity  bandages clean and dry            [ ] ambulatory  [ ] bedbound/wheelchair bound    Skin: [ ] normal  [ ] pressure ulcers- Stage_____  [ x] no rash    LABS:                        9.8    13.0  )-----------( 438      ( 24 Mar 2017 11:44 )             30.5     24 Mar 2017 11:44    130    |  92     |  15.0   ----------------------------<  216    4.5     |  24.0   |  0.98     Ca    8.6        24 Mar 2017 11:44  Phos  3.2       24 Mar 2017 11:44  Mg     2.0       24 Mar 2017 11:44        Urinalysis Basic - ( 22 Mar 2017 16:40 )    Color: Yellow / Appearance: Clear / S.005 / pH: x  Gluc: x / Ketone: Negative  / Bili: Negative / Urobili: Negative mg/dL   Blood: x / Protein: 100 mg/dL / Nitrite: Positive   Leuk Esterase: Moderate / RBC: 11-25 /HPF / WBC 3-5   Sq Epi: x / Non Sq Epi: Occasional / Bacteria: Occasional      I&O's Summary  I & Os for 24h ending 24 Mar 2017 07:00  =============================================  IN: 600 ml / OUT: 1425 ml / NET: -825 ml    I & Os for current day (as of 24 Mar 2017 13:17)  =============================================  IN: 180 ml / OUT: 700 ml / NET: -520 ml      RADIOLOGY & ADDITIONAL STUDIES:    ASSESSMENT and PLAN:    ADVANCE DIRECTIVES:  [ ] YES [ ] NO    DNR: [ ] YES [ ] NO      [facility] Com[facility]                    [facility] YES [ ] NO   Date Completed:     COUNSELING:  Advanced Care Planning Discussion - Time Spent ______Minutes.   More than 50% time spent in counseling and coordinating care. ______ Minutes.     Thank you for the opportunity to assist with the care of this patient.   [facility] [phone].

## 2017-03-24 NOTE — PROGRESS NOTE ADULT - SUBJECTIVE AND OBJECTIVE BOX
s/p L BKA. Pt c/o intermittent pain. More alert    Vital Signs Last 24 Hrs  T(C): 36.5, Max: 36.7 (03-23 @ 09:11)  T(F): 97.7, Max: 98.1 (03-23 @ 09:11)  HR: 79 (77 - 86)  BP: 142/50 (132/48 - 142/50)  BP(mean): --  RR: 17 (16 - 18)  SpO2: 98% (93% - 99%)    AAO x 3 CTAB  S1S2  Soft NT/ND, + BS  L LE stump C/D/I  R hill pressure area improved with offloading                          8.3    9.3   )-----------( 341      ( 23 Mar 2017 05:15 )             25.3   23 Mar 2017 05:17    132    |  95     |  13.0   ----------------------------<  174    4.2     |  25.0   |  0.83     Ca    8.4        23 Mar 2017 05:17  Phos  3.1       23 Mar 2017 05:17  Mg     1.9       23 Mar 2017 05:17

## 2017-03-24 NOTE — PROGRESS NOTE ADULT - PROBLEM SELECTOR PLAN 1
- offloading to the RLE  - daily dressing changes  - D/C juarez as per hospitalist  - D/c to rehab once medically stable

## 2017-03-24 NOTE — PROGRESS NOTE ADULT - PROBLEM SELECTOR PLAN 2
Likely Multifactorial in etiology-  Hyponatremia, UTI, meds?   She took Neurontin, Oxycodone, and Xanax this am.   Xanax dose reduced.  But will  also advise nursing to hold on Oxycodone and Xanax for now until mentation improved.

## 2017-03-24 NOTE — PROGRESS NOTE ADULT - ASSESSMENT
85 yr old female with multiple medical problems CAD s/p stents, diabetes, hypertension, hyperlipidemia, anxiety, significant PVD s/p multiple stents, paroxysmal atrial fibrillation admitted for hypertensive urgency post LE angiogram and cellulitis. Given significant PVD and tissue loss, BKA was planned by vascular surgery. BP improved. She was started on antibiotics. Cardiology consulted for pre op evaluation. She also developed MAGI, now resolved. BKA was done on 3/14. Procedure was uneventful. She was evaluated by acute rehab and accepted for the same. Given urinary retention, juarez was maintained. Noted to have hyponatremia, hence started on IV fluids. Tramadol discontinued. Noted to have Hb 7.8. PRBC transfusion given but had a fever. Nephrology consulted for hyponatremia, started on fluid restriction. Cultures sent given fever. Palliative care eval requested. Urine culture growing gram negative rods, recent U culture showed no growth. Palliative care evaluation requested, medications were adjusted.

## 2017-03-24 NOTE — PROGRESS NOTE ADULT - PROBLEM SELECTOR PLAN 1
Patient experiencing much pain- neuropathic in origin. Pain improved with current regimen but now having some delirium. Multifactorial in etiology.  Will reduce Oxycodone to 2.5/5 mg for moderate to severe pain.  Ordered Tylenol ATC  D/C afternoon Gabapentin for now. Consider readding later

## 2017-03-25 LAB
ANION GAP SERPL CALC-SCNC: 14 MMOL/L — SIGNIFICANT CHANGE UP (ref 5–17)
BUN SERPL-MCNC: 19 MG/DL — SIGNIFICANT CHANGE UP (ref 8–20)
CALCIUM SERPL-MCNC: 8.4 MG/DL — LOW (ref 8.6–10.2)
CHLORIDE SERPL-SCNC: 94 MMOL/L — LOW (ref 98–107)
CO2 SERPL-SCNC: 25 MMOL/L — SIGNIFICANT CHANGE UP (ref 22–29)
CREAT SERPL-MCNC: 1.04 MG/DL — SIGNIFICANT CHANGE UP (ref 0.5–1.3)
FERRITIN SERPL-MCNC: 512 NG/ML — HIGH (ref 11–306)
GLUCOSE SERPL-MCNC: 227 MG/DL — HIGH (ref 70–115)
HCT VFR BLD CALC: 25.7 % — LOW (ref 37–47)
HGB BLD-MCNC: 8.3 G/DL — LOW (ref 12–16)
IRON SATN MFR SERPL: 11 % — LOW (ref 14–50)
IRON SATN MFR SERPL: 21 UG/DL — LOW (ref 37–145)
MAGNESIUM SERPL-MCNC: 2.1 MG/DL — SIGNIFICANT CHANGE UP (ref 1.8–2.5)
MCHC RBC-ENTMCNC: 27.6 PG — SIGNIFICANT CHANGE UP (ref 27–31)
MCHC RBC-ENTMCNC: 32.3 G/DL — SIGNIFICANT CHANGE UP (ref 32–36)
MCV RBC AUTO: 85.4 FL — SIGNIFICANT CHANGE UP (ref 81–99)
PHOSPHATE SERPL-MCNC: 3.8 MG/DL — SIGNIFICANT CHANGE UP (ref 2.4–4.7)
PLATELET # BLD AUTO: 358 K/UL — SIGNIFICANT CHANGE UP (ref 150–400)
POTASSIUM SERPL-MCNC: 4.3 MMOL/L — SIGNIFICANT CHANGE UP (ref 3.5–5.3)
POTASSIUM SERPL-SCNC: 4.3 MMOL/L — SIGNIFICANT CHANGE UP (ref 3.5–5.3)
RBC # BLD: 3.01 M/UL — LOW (ref 4.4–5.2)
RBC # FLD: 14.6 % — SIGNIFICANT CHANGE UP (ref 11–15.6)
SODIUM SERPL-SCNC: 133 MMOL/L — LOW (ref 135–145)
TIBC SERPL-MCNC: 186 UG/DL — LOW (ref 220–430)
TRANSFERRIN SERPL-MCNC: 130 MG/DL — LOW (ref 192–382)
WBC # BLD: 9.2 K/UL — SIGNIFICANT CHANGE UP (ref 4.8–10.8)
WBC # FLD AUTO: 9.2 K/UL — SIGNIFICANT CHANGE UP (ref 4.8–10.8)

## 2017-03-25 PROCEDURE — 99233 SBSQ HOSP IP/OBS HIGH 50: CPT

## 2017-03-25 RX ORDER — TAMSULOSIN HYDROCHLORIDE 0.4 MG/1
0.4 CAPSULE ORAL AT BEDTIME
Qty: 0 | Refills: 0 | Status: DISCONTINUED | OUTPATIENT
Start: 2017-03-25 | End: 2017-03-29

## 2017-03-25 RX ADMIN — OXYCODONE HYDROCHLORIDE 5 MILLIGRAM(S): 5 TABLET ORAL at 23:45

## 2017-03-25 RX ADMIN — Medication 50 MILLIGRAM(S): at 17:15

## 2017-03-25 RX ADMIN — Medication 1 MILLIGRAM(S): at 13:06

## 2017-03-25 RX ADMIN — APIXABAN 2.5 MILLIGRAM(S): 2.5 TABLET, FILM COATED ORAL at 10:40

## 2017-03-25 RX ADMIN — AMLODIPINE BESYLATE 10 MILLIGRAM(S): 2.5 TABLET ORAL at 06:36

## 2017-03-25 RX ADMIN — Medication 6: at 09:20

## 2017-03-25 RX ADMIN — Medication 5 UNIT(S): at 09:19

## 2017-03-25 RX ADMIN — OXYCODONE HYDROCHLORIDE 5 MILLIGRAM(S): 5 TABLET ORAL at 23:29

## 2017-03-25 RX ADMIN — Medication 325 MILLIGRAM(S): at 17:14

## 2017-03-25 RX ADMIN — Medication 650 MILLIGRAM(S): at 13:07

## 2017-03-25 RX ADMIN — Medication 5 UNIT(S): at 18:20

## 2017-03-25 RX ADMIN — Medication 200 MILLIGRAM(S): at 17:16

## 2017-03-25 RX ADMIN — OXYCODONE HYDROCHLORIDE 5 MILLIGRAM(S): 5 TABLET ORAL at 17:14

## 2017-03-25 RX ADMIN — Medication 325 MILLIGRAM(S): at 06:37

## 2017-03-25 RX ADMIN — Medication 1 TABLET(S): at 09:20

## 2017-03-25 RX ADMIN — SODIUM CHLORIDE 1 GRAM(S): 9 INJECTION INTRAMUSCULAR; INTRAVENOUS; SUBCUTANEOUS at 17:14

## 2017-03-25 RX ADMIN — Medication 5 UNIT(S): at 13:06

## 2017-03-25 RX ADMIN — SODIUM CHLORIDE 1 GRAM(S): 9 INJECTION INTRAMUSCULAR; INTRAVENOUS; SUBCUTANEOUS at 13:06

## 2017-03-25 RX ADMIN — PANTOPRAZOLE SODIUM 40 MILLIGRAM(S): 20 TABLET, DELAYED RELEASE ORAL at 06:36

## 2017-03-25 RX ADMIN — Medication 650 MILLIGRAM(S): at 06:36

## 2017-03-25 RX ADMIN — APIXABAN 2.5 MILLIGRAM(S): 2.5 TABLET, FILM COATED ORAL at 21:14

## 2017-03-25 RX ADMIN — Medication 200 MILLIGRAM(S): at 06:37

## 2017-03-25 RX ADMIN — INSULIN GLARGINE 22 UNIT(S): 100 INJECTION, SOLUTION SUBCUTANEOUS at 21:13

## 2017-03-25 RX ADMIN — Medication 650 MILLIGRAM(S): at 06:46

## 2017-03-25 RX ADMIN — OXYCODONE HYDROCHLORIDE 5 MILLIGRAM(S): 5 TABLET ORAL at 01:42

## 2017-03-25 RX ADMIN — ATORVASTATIN CALCIUM 40 MILLIGRAM(S): 80 TABLET, FILM COATED ORAL at 21:14

## 2017-03-25 RX ADMIN — GABAPENTIN 300 MILLIGRAM(S): 400 CAPSULE ORAL at 09:20

## 2017-03-25 RX ADMIN — Medication 2: at 13:06

## 2017-03-25 RX ADMIN — SODIUM CHLORIDE 1 GRAM(S): 9 INJECTION INTRAMUSCULAR; INTRAVENOUS; SUBCUTANEOUS at 09:20

## 2017-03-25 RX ADMIN — Medication 75 MICROGRAM(S): at 06:36

## 2017-03-25 RX ADMIN — GABAPENTIN 600 MILLIGRAM(S): 400 CAPSULE ORAL at 21:14

## 2017-03-25 RX ADMIN — Medication 1 TABLET(S): at 13:06

## 2017-03-25 RX ADMIN — Medication 1 TABLET(S): at 13:09

## 2017-03-25 RX ADMIN — Medication 650 MILLIGRAM(S): at 13:55

## 2017-03-25 RX ADMIN — Medication 6: at 18:20

## 2017-03-25 RX ADMIN — Medication 0.25 MILLIGRAM(S): at 00:00

## 2017-03-25 RX ADMIN — Medication 0.25 MILLIGRAM(S): at 21:13

## 2017-03-25 RX ADMIN — OXYCODONE HYDROCHLORIDE 5 MILLIGRAM(S): 5 TABLET ORAL at 10:46

## 2017-03-25 RX ADMIN — OXYCODONE HYDROCHLORIDE 5 MILLIGRAM(S): 5 TABLET ORAL at 01:12

## 2017-03-25 RX ADMIN — Medication 650 MILLIGRAM(S): at 21:14

## 2017-03-25 RX ADMIN — Medication 1 TABLET(S): at 17:15

## 2017-03-25 RX ADMIN — Medication 50 MILLIGRAM(S): at 06:36

## 2017-03-25 RX ADMIN — TAMSULOSIN HYDROCHLORIDE 0.4 MILLIGRAM(S): 0.4 CAPSULE ORAL at 21:14

## 2017-03-25 RX ADMIN — OXYCODONE HYDROCHLORIDE 5 MILLIGRAM(S): 5 TABLET ORAL at 18:15

## 2017-03-25 NOTE — PROGRESS NOTE ADULT - ATTENDING COMMENTS
Plan of care discussed with RN and daughter at length at bedside.    advised daughter that goal to manage pain is to maintain an acceptable level not 0 on 0-10 scale given patient propensity to be lethargic on medications.

## 2017-03-25 NOTE — PROGRESS NOTE ADULT - SUBJECTIVE AND OBJECTIVE BOX
Pt seen and examined at bedside.  NO acute events overnight  Denies fever,chills, nausea, vomiting or pain  VSS  LLE: dressing C/D/I  brace intack    Continue current therapy

## 2017-03-25 NOTE — PROGRESS NOTE ADULT - SUBJECTIVE AND OBJECTIVE BOX
seen for BKA/ PVD and urinary retention    no acute complaints. no cp/sob/ no leg pain  states she has "phantom pain" at times.  ROS otherwise negative.    MEDICATIONS  (STANDING):  amLODIPine   Tablet 10milliGRAM(s) Oral daily  gabapentin 600milliGRAM(s) Oral at bedtime  atorvastatin 40milliGRAM(s) Oral at bedtime  folic acid 1milliGRAM(s) Oral daily  metoprolol 50milliGRAM(s) Oral every 12 hours  pantoprazole    Tablet 40milliGRAM(s) Oral before breakfast  levothyroxine 75MICROGram(s) Oral daily  multivitamin/minerals 1Tablet(s) Oral daily  multivitamin 1Tablet(s) Oral daily  ferrous    sulfate 325milliGRAM(s) Oral every 12 hours  apixaban 2.5milliGRAM(s) Oral two times a day  insulin glargine Injectable (LANTUS) 22Unit(s) SubCutaneous at bedtime  insulin lispro Injectable (HumaLOG) 5Unit(s) SubCutaneous before breakfast  insulin lispro Injectable (HumaLOG) 5Unit(s) SubCutaneous before lunch  insulin lispro Injectable (HumaLOG) 5Unit(s) SubCutaneous before dinner  insulin lispro (HumaLOG) corrective regimen sliding scale  SubCutaneous three times a day before meals  dextrose 5%. 1000milliLiter(s) IV Continuous <Continuous>  dextrose 50% Injectable 12.5Gram(s) IV Push once  dextrose 50% Injectable 25Gram(s) IV Push once  dextrose 50% Injectable 25Gram(s) IV Push once  gabapentin 300milliGRAM(s) Oral <User Schedule>  sodium chloride 1Gram(s) Oral three times a day with meals  acetaminophen   Tablet. 650milliGRAM(s) Oral every 6 hours  ciprofloxacin   IVPB 400milliGRAM(s) IV Intermittent every 12 hours  ciprofloxacin   IVPB  IV Intermittent   lactobacillus acidophilus 1Tablet(s) Oral two times a day with meals  lidocaine 2% Gel 1Application(s) Topical once  tamsulosin 0.4milliGRAM(s) Oral at bedtime    MEDICATIONS  (PRN):  acetaminophen   Tablet 650milliGRAM(s) Oral every 6 hours PRN For Temp greater than 38 C (100.4 F)  ondansetron Injectable 4milliGRAM(s) IV Push every 6 hours PRN Nausea  dextrose Gel 1Dose(s) Oral once PRN Blood Glucose LESS THAN 70 milliGRAM(s)/deciliter  glucagon  Injectable 1milliGRAM(s) IntraMuscular once PRN Glucose LESS THAN 70 milligrams/deciliter  oxyCODONE IR 5milliGRAM(s) Oral every 6 hours PRN Severe Pain (7 - 10)  oxyCODONE IR 2.5milliGRAM(s) Oral every 6 hours PRN Moderate Pain (4 - 6)  ALPRAZolam 0.25milliGRAM(s) Oral every 8 hours PRN anxiety      Allergies    Benadryl (Other)  Demerol HCl (Other)    Intolerances      Vital Signs Last 24 Hrs  T(C): 37.2, Max: 37.2 (03-25 @ 11:05)  T(F): 98.9, Max: 98.9 (03-25 @ 11:05)  HR: 70 (70 - 91)  BP: 138/54 (138/54 - 150/50)  BP(mean): --  RR: 17 (15 - 17)  SpO2: 93% (93% - 98%)    PHYSICAL EXAM:    GENERAL: NAD, frail  CHEST/LUNG: Clear to percussion bilaterally  HEART: Regular rate and rhythm; S1 S2; no murmurs noted  ABDOMEN: Soft, Nontender, Nondistended; Bowel sounds present  EXTREMITIES:  left bka in brace  NERVOUS SYSTEM:  Alert & Oriented X3,nonfocal.  ?mild confusion  PSYCH: normal mood, appropriate response.    LABS:                        8.3    9.2   )-----------( 358      ( 25 Mar 2017 06:47 )             25.7     25 Mar 2017 06:49    133    |  94     |  19.0   ----------------------------<  227    4.3     |  25.0   |  1.04     Ca    8.4        25 Mar 2017 06:49  Phos  3.8       25 Mar 2017 06:49  Mg     2.1       25 Mar 2017 06:49            CAPILLARY BLOOD GLUCOSE  197 (25 Mar 2017 12:41)  258 (25 Mar 2017 09:16)  192 (24 Mar 2017 21:28)  369 (24 Mar 2017 17:25)        RADIOLOGY & ADDITIONAL TESTS:

## 2017-03-25 NOTE — PROGRESS NOTE ADULT - ASSESSMENT
85 yr old female with multiple medical problems CAD s/p stents, diabetes, hypertension, hyperlipidemia, anxiety, significant PVD s/p multiple stents, paroxysmal atrial fibrillation admitted for hypertensive urgency post LE angiogram and cellulitis. Given significant PVD and tissue loss, BKA was planned by vascular surgery. BP improved. She was started on antibiotics. Cardiology consulted for pre op evaluation. She also developed MAGI, now resolved. BKA was done on 3/14. Procedure was uneventful. She was evaluated by acute rehab and accepted for the same. Given urinary retention, juarez was maintained. Noted to have hyponatremia, hence started on IV fluids. Tramadol discontinued. Noted to have Hb 7.8. PRBC transfusion given but had a fever. Nephrology consulted for hyponatremia, started on fluid restriction. Cultures sent given fever. Palliative care eval requested. Urine culture growing gram negative rods,ucx with pan sensitive pseudomonas Palliative care evaluation requested, medications were adjusted.

## 2017-03-26 LAB
ALBUMIN SERPL ELPH-MCNC: 2.7 G/DL — LOW (ref 3.3–5.2)
ALP SERPL-CCNC: 96 U/L — SIGNIFICANT CHANGE UP (ref 40–120)
ALT FLD-CCNC: 100 U/L — HIGH
ANION GAP SERPL CALC-SCNC: 12 MMOL/L — SIGNIFICANT CHANGE UP (ref 5–17)
AST SERPL-CCNC: 50 U/L — HIGH
BILIRUB DIRECT SERPL-MCNC: 0.1 MG/DL — SIGNIFICANT CHANGE UP (ref 0–0.3)
BILIRUB INDIRECT FLD-MCNC: 0.2 MG/DL — SIGNIFICANT CHANGE UP (ref 0.2–1)
BILIRUB SERPL-MCNC: 0.3 MG/DL — LOW (ref 0.4–2)
BUN SERPL-MCNC: 16 MG/DL — SIGNIFICANT CHANGE UP (ref 8–20)
CALCIUM SERPL-MCNC: 8.6 MG/DL — SIGNIFICANT CHANGE UP (ref 8.6–10.2)
CHLORIDE SERPL-SCNC: 96 MMOL/L — LOW (ref 98–107)
CO2 SERPL-SCNC: 24 MMOL/L — SIGNIFICANT CHANGE UP (ref 22–29)
CREAT SERPL-MCNC: 0.85 MG/DL — SIGNIFICANT CHANGE UP (ref 0.5–1.3)
GLUCOSE SERPL-MCNC: 201 MG/DL — HIGH (ref 70–115)
HCT VFR BLD CALC: 25.8 % — LOW (ref 37–47)
HGB BLD-MCNC: 8.4 G/DL — LOW (ref 12–16)
MCHC RBC-ENTMCNC: 27.2 PG — SIGNIFICANT CHANGE UP (ref 27–31)
MCHC RBC-ENTMCNC: 32.6 G/DL — SIGNIFICANT CHANGE UP (ref 32–36)
MCV RBC AUTO: 83.5 FL — SIGNIFICANT CHANGE UP (ref 81–99)
PLATELET # BLD AUTO: 377 K/UL — SIGNIFICANT CHANGE UP (ref 150–400)
POTASSIUM SERPL-MCNC: 4.5 MMOL/L — SIGNIFICANT CHANGE UP (ref 3.5–5.3)
POTASSIUM SERPL-SCNC: 4.5 MMOL/L — SIGNIFICANT CHANGE UP (ref 3.5–5.3)
PROT SERPL-MCNC: 5.9 G/DL — LOW (ref 6.6–8.7)
RBC # BLD: 3.09 M/UL — LOW (ref 4.4–5.2)
RBC # FLD: 14.4 % — SIGNIFICANT CHANGE UP (ref 11–15.6)
SODIUM SERPL-SCNC: 132 MMOL/L — LOW (ref 135–145)
WBC # BLD: 10.5 K/UL — SIGNIFICANT CHANGE UP (ref 4.8–10.8)
WBC # FLD AUTO: 10.5 K/UL — SIGNIFICANT CHANGE UP (ref 4.8–10.8)

## 2017-03-26 PROCEDURE — 99232 SBSQ HOSP IP/OBS MODERATE 35: CPT

## 2017-03-26 RX ORDER — DOCUSATE SODIUM 100 MG
100 CAPSULE ORAL
Qty: 0 | Refills: 0 | Status: DISCONTINUED | OUTPATIENT
Start: 2017-03-26 | End: 2017-03-29

## 2017-03-26 RX ORDER — POLYETHYLENE GLYCOL 3350 17 G/17G
17 POWDER, FOR SOLUTION ORAL DAILY
Qty: 0 | Refills: 0 | Status: DISCONTINUED | OUTPATIENT
Start: 2017-03-26 | End: 2017-03-29

## 2017-03-26 RX ORDER — INSULIN LISPRO 100/ML
2 VIAL (ML) SUBCUTANEOUS ONCE
Qty: 0 | Refills: 0 | Status: COMPLETED | OUTPATIENT
Start: 2017-03-26 | End: 2017-03-26

## 2017-03-26 RX ORDER — INSULIN GLARGINE 100 [IU]/ML
30 INJECTION, SOLUTION SUBCUTANEOUS AT BEDTIME
Qty: 0 | Refills: 0 | Status: DISCONTINUED | OUTPATIENT
Start: 2017-03-26 | End: 2017-03-29

## 2017-03-26 RX ORDER — GABAPENTIN 400 MG/1
900 CAPSULE ORAL AT BEDTIME
Qty: 0 | Refills: 0 | Status: DISCONTINUED | OUTPATIENT
Start: 2017-03-26 | End: 2017-03-29

## 2017-03-26 RX ORDER — SENNA PLUS 8.6 MG/1
2 TABLET ORAL AT BEDTIME
Qty: 0 | Refills: 0 | Status: DISCONTINUED | OUTPATIENT
Start: 2017-03-26 | End: 2017-03-29

## 2017-03-26 RX ADMIN — Medication 75 MICROGRAM(S): at 06:01

## 2017-03-26 RX ADMIN — Medication 200 MILLIGRAM(S): at 18:45

## 2017-03-26 RX ADMIN — Medication 100 MILLIGRAM(S): at 18:44

## 2017-03-26 RX ADMIN — POLYETHYLENE GLYCOL 3350 17 GRAM(S): 17 POWDER, FOR SOLUTION ORAL at 13:48

## 2017-03-26 RX ADMIN — Medication 0.25 MILLIGRAM(S): at 09:24

## 2017-03-26 RX ADMIN — GABAPENTIN 300 MILLIGRAM(S): 400 CAPSULE ORAL at 08:52

## 2017-03-26 RX ADMIN — AMLODIPINE BESYLATE 10 MILLIGRAM(S): 2.5 TABLET ORAL at 06:01

## 2017-03-26 RX ADMIN — Medication 1 MILLIGRAM(S): at 12:49

## 2017-03-26 RX ADMIN — SODIUM CHLORIDE 1 GRAM(S): 9 INJECTION INTRAMUSCULAR; INTRAVENOUS; SUBCUTANEOUS at 12:49

## 2017-03-26 RX ADMIN — SENNA PLUS 2 TABLET(S): 8.6 TABLET ORAL at 21:41

## 2017-03-26 RX ADMIN — Medication 50 MILLIGRAM(S): at 06:02

## 2017-03-26 RX ADMIN — Medication 1 TABLET(S): at 08:52

## 2017-03-26 RX ADMIN — SODIUM CHLORIDE 1 GRAM(S): 9 INJECTION INTRAMUSCULAR; INTRAVENOUS; SUBCUTANEOUS at 18:44

## 2017-03-26 RX ADMIN — Medication 650 MILLIGRAM(S): at 23:24

## 2017-03-26 RX ADMIN — Medication 650 MILLIGRAM(S): at 06:18

## 2017-03-26 RX ADMIN — SODIUM CHLORIDE 1 GRAM(S): 9 INJECTION INTRAMUSCULAR; INTRAVENOUS; SUBCUTANEOUS at 08:52

## 2017-03-26 RX ADMIN — GABAPENTIN 900 MILLIGRAM(S): 400 CAPSULE ORAL at 21:41

## 2017-03-26 RX ADMIN — Medication 1 TABLET(S): at 13:08

## 2017-03-26 RX ADMIN — Medication 650 MILLIGRAM(S): at 12:49

## 2017-03-26 RX ADMIN — OXYCODONE HYDROCHLORIDE 5 MILLIGRAM(S): 5 TABLET ORAL at 13:45

## 2017-03-26 RX ADMIN — OXYCODONE HYDROCHLORIDE 5 MILLIGRAM(S): 5 TABLET ORAL at 06:23

## 2017-03-26 RX ADMIN — PANTOPRAZOLE SODIUM 40 MILLIGRAM(S): 20 TABLET, DELAYED RELEASE ORAL at 06:01

## 2017-03-26 RX ADMIN — OXYCODONE HYDROCHLORIDE 5 MILLIGRAM(S): 5 TABLET ORAL at 07:34

## 2017-03-26 RX ADMIN — Medication 4: at 08:51

## 2017-03-26 RX ADMIN — Medication 5 UNIT(S): at 08:51

## 2017-03-26 RX ADMIN — INSULIN GLARGINE 30 UNIT(S): 100 INJECTION, SOLUTION SUBCUTANEOUS at 21:40

## 2017-03-26 RX ADMIN — Medication 200 MILLIGRAM(S): at 06:18

## 2017-03-26 RX ADMIN — Medication 5 UNIT(S): at 12:49

## 2017-03-26 RX ADMIN — Medication 4: at 18:45

## 2017-03-26 RX ADMIN — TAMSULOSIN HYDROCHLORIDE 0.4 MILLIGRAM(S): 0.4 CAPSULE ORAL at 21:40

## 2017-03-26 RX ADMIN — Medication 1 TABLET(S): at 18:44

## 2017-03-26 RX ADMIN — Medication 5 UNIT(S): at 18:45

## 2017-03-26 RX ADMIN — Medication 650 MILLIGRAM(S): at 13:00

## 2017-03-26 RX ADMIN — Medication 50 MILLIGRAM(S): at 18:45

## 2017-03-26 RX ADMIN — Medication 650 MILLIGRAM(S): at 06:01

## 2017-03-26 RX ADMIN — OXYCODONE HYDROCHLORIDE 5 MILLIGRAM(S): 5 TABLET ORAL at 14:45

## 2017-03-26 RX ADMIN — ATORVASTATIN CALCIUM 40 MILLIGRAM(S): 80 TABLET, FILM COATED ORAL at 21:41

## 2017-03-26 RX ADMIN — Medication 650 MILLIGRAM(S): at 18:45

## 2017-03-26 RX ADMIN — Medication 325 MILLIGRAM(S): at 06:01

## 2017-03-26 RX ADMIN — Medication 1 TABLET(S): at 12:49

## 2017-03-26 RX ADMIN — Medication 325 MILLIGRAM(S): at 18:44

## 2017-03-26 RX ADMIN — Medication 2: at 12:48

## 2017-03-26 RX ADMIN — Medication 2 UNIT(S): at 23:23

## 2017-03-26 RX ADMIN — APIXABAN 2.5 MILLIGRAM(S): 2.5 TABLET, FILM COATED ORAL at 09:25

## 2017-03-26 RX ADMIN — APIXABAN 2.5 MILLIGRAM(S): 2.5 TABLET, FILM COATED ORAL at 21:41

## 2017-03-26 NOTE — PROGRESS NOTE ADULT - SUBJECTIVE AND OBJECTIVE BOX
Pt seen and examined at bedside.  NO acute events overnight  Denies fever,chills, nausea, vomiting complains of phantom limb pain  tolerating diet, dressing changed earlier  VSS  LLE: dressing C/D/I  brace intact    Continue current therapy  Gabapentin dose increased

## 2017-03-26 NOTE — PROGRESS NOTE ADULT - ATTENDING COMMENTS
Plan of care discussed with RN and daughters at length at bedside.    dc planning if continues to be stable.

## 2017-03-26 NOTE — PROGRESS NOTE ADULT - SUBJECTIVE AND OBJECTIVE BOX
seen for bka    patient has no cp/sob. + phantom pain. "bad night last night"  ROS otherwise negative.    MEDICATIONS  (STANDING):  amLODIPine   Tablet 10milliGRAM(s) Oral daily  atorvastatin 40milliGRAM(s) Oral at bedtime  folic acid 1milliGRAM(s) Oral daily  metoprolol 50milliGRAM(s) Oral every 12 hours  pantoprazole    Tablet 40milliGRAM(s) Oral before breakfast  levothyroxine 75MICROGram(s) Oral daily  multivitamin/minerals 1Tablet(s) Oral daily  multivitamin 1Tablet(s) Oral daily  ferrous    sulfate 325milliGRAM(s) Oral every 12 hours  apixaban 2.5milliGRAM(s) Oral two times a day  insulin lispro Injectable (HumaLOG) 5Unit(s) SubCutaneous before breakfast  insulin lispro Injectable (HumaLOG) 5Unit(s) SubCutaneous before lunch  insulin lispro Injectable (HumaLOG) 5Unit(s) SubCutaneous before dinner  insulin lispro (HumaLOG) corrective regimen sliding scale  SubCutaneous three times a day before meals  dextrose 5%. 1000milliLiter(s) IV Continuous <Continuous>  dextrose 50% Injectable 12.5Gram(s) IV Push once  dextrose 50% Injectable 25Gram(s) IV Push once  dextrose 50% Injectable 25Gram(s) IV Push once  gabapentin 300milliGRAM(s) Oral <User Schedule>  sodium chloride 1Gram(s) Oral three times a day with meals  acetaminophen   Tablet. 650milliGRAM(s) Oral every 6 hours  ciprofloxacin   IVPB 400milliGRAM(s) IV Intermittent every 12 hours  ciprofloxacin   IVPB  IV Intermittent   lactobacillus acidophilus 1Tablet(s) Oral two times a day with meals  lidocaine 2% Gel 1Application(s) Topical once  tamsulosin 0.4milliGRAM(s) Oral at bedtime  insulin glargine Injectable (LANTUS) 30Unit(s) SubCutaneous at bedtime  gabapentin 900milliGRAM(s) Oral at bedtime    MEDICATIONS  (PRN):  ondansetron Injectable 4milliGRAM(s) IV Push every 6 hours PRN Nausea  dextrose Gel 1Dose(s) Oral once PRN Blood Glucose LESS THAN 70 milliGRAM(s)/deciliter  glucagon  Injectable 1milliGRAM(s) IntraMuscular once PRN Glucose LESS THAN 70 milligrams/deciliter  oxyCODONE IR 5milliGRAM(s) Oral every 6 hours PRN Severe Pain (7 - 10)  oxyCODONE IR 2.5milliGRAM(s) Oral every 6 hours PRN Moderate Pain (4 - 6)  ALPRAZolam 0.25milliGRAM(s) Oral every 8 hours PRN anxiety      Allergies    Benadryl (Other)  Demerol HCl (Other)    Intolerances    Vital Signs Last 24 Hrs  T(C): 36.7, Max: 36.7 (03-25 @ 21:11)  T(F): 98, Max: 98 (03-25 @ 21:11)  HR: 90 (79 - 90)  BP: 166/57 (148/50 - 166/57)  BP(mean): --  RR: 16 (16 - 18)  SpO2: 98% (95% - 98%)    PHYSICAL EXAM:    GENERAL: NAD  CHEST/LUNG: Clear to percussion bilaterally; No rales, rhonchi, wheezing, or rubs  HEART: Regular rate and rhythm; S1 S2; no murmurs noted  ABDOMEN: Soft, Nontender, Nondistended; Bowel sounds present  EXTREMITIES:  no edema RLE.  left bka in brace  NERVOUS SYSTEM:  Alert & Oriented X3, nonfocal  PSYCH: normal mood, appropriate response.    LABS:                        8.4    10.5  )-----------( 377      ( 26 Mar 2017 06:45 )             25.8     26 Mar 2017 06:42    132    |  96     |  16.0   ----------------------------<  201    4.5     |  24.0   |  0.85     Ca    8.6        26 Mar 2017 06:42  Phos  3.8       25 Mar 2017 06:49  Mg     2.1       25 Mar 2017 06:49    TPro  5.9    /  Alb  2.7    /  TBili  0.3    /  DBili  0.1    /  AST  50     /  ALT  100    /  AlkPhos  96     26 Mar 2017 06:42          CAPILLARY BLOOD GLUCOSE  228 (26 Mar 2017 08:46)  245 (25 Mar 2017 21:11)  298 (25 Mar 2017 18:14)  197 (25 Mar 2017 12:41)        RADIOLOGY & ADDITIONAL TESTS:

## 2017-03-27 DIAGNOSIS — N39.0 URINARY TRACT INFECTION, SITE NOT SPECIFIED: ICD-10-CM

## 2017-03-27 LAB
ALBUMIN SERPL ELPH-MCNC: 2.8 G/DL — LOW (ref 3.3–5.2)
ALP SERPL-CCNC: 99 U/L — SIGNIFICANT CHANGE UP (ref 40–120)
ALT FLD-CCNC: 88 U/L — HIGH
ANION GAP SERPL CALC-SCNC: 12 MMOL/L — SIGNIFICANT CHANGE UP (ref 5–17)
AST SERPL-CCNC: 42 U/L — HIGH
BILIRUB SERPL-MCNC: 0.2 MG/DL — LOW (ref 0.4–2)
BUN SERPL-MCNC: 14 MG/DL — SIGNIFICANT CHANGE UP (ref 8–20)
CALCIUM SERPL-MCNC: 8.6 MG/DL — SIGNIFICANT CHANGE UP (ref 8.6–10.2)
CHLORIDE SERPL-SCNC: 97 MMOL/L — LOW (ref 98–107)
CO2 SERPL-SCNC: 23 MMOL/L — SIGNIFICANT CHANGE UP (ref 22–29)
CREAT SERPL-MCNC: 0.86 MG/DL — SIGNIFICANT CHANGE UP (ref 0.5–1.3)
GLUCOSE SERPL-MCNC: 252 MG/DL — HIGH (ref 70–115)
POTASSIUM SERPL-MCNC: 4.1 MMOL/L — SIGNIFICANT CHANGE UP (ref 3.5–5.3)
POTASSIUM SERPL-SCNC: 4.1 MMOL/L — SIGNIFICANT CHANGE UP (ref 3.5–5.3)
PROT SERPL-MCNC: 6 G/DL — LOW (ref 6.6–8.7)
SODIUM SERPL-SCNC: 132 MMOL/L — LOW (ref 135–145)

## 2017-03-27 PROCEDURE — 99232 SBSQ HOSP IP/OBS MODERATE 35: CPT

## 2017-03-27 PROCEDURE — 99233 SBSQ HOSP IP/OBS HIGH 50: CPT

## 2017-03-27 RX ORDER — OXYCODONE HYDROCHLORIDE 5 MG/1
1 TABLET ORAL
Qty: 0 | Refills: 0 | COMMUNITY
Start: 2017-03-27

## 2017-03-27 RX ORDER — TAMSULOSIN HYDROCHLORIDE 0.4 MG/1
1 CAPSULE ORAL
Qty: 0 | Refills: 0 | COMMUNITY
Start: 2017-03-27

## 2017-03-27 RX ORDER — INSULIN LISPRO 100/ML
5 VIAL (ML) SUBCUTANEOUS
Qty: 0 | Refills: 0 | COMMUNITY
Start: 2017-03-27

## 2017-03-27 RX ORDER — OXYCODONE HYDROCHLORIDE 5 MG/1
1 TABLET ORAL
Qty: 0 | Refills: 0 | COMMUNITY

## 2017-03-27 RX ORDER — ACETAMINOPHEN 500 MG
2 TABLET ORAL
Qty: 0 | Refills: 0 | COMMUNITY
Start: 2017-03-27

## 2017-03-27 RX ORDER — ALPRAZOLAM 0.25 MG
1 TABLET ORAL
Qty: 0 | Refills: 0 | COMMUNITY
Start: 2017-03-27

## 2017-03-27 RX ORDER — METFORMIN HYDROCHLORIDE 850 MG/1
1 TABLET ORAL
Qty: 0 | Refills: 0 | COMMUNITY

## 2017-03-27 RX ORDER — DOCUSATE SODIUM 100 MG
1 CAPSULE ORAL
Qty: 0 | Refills: 0 | COMMUNITY
Start: 2017-03-27

## 2017-03-27 RX ORDER — SORBITOL SOLUTION 70 %
30 SOLUTION, ORAL MISCELLANEOUS ONCE
Qty: 0 | Refills: 0 | Status: COMPLETED | OUTPATIENT
Start: 2017-03-27 | End: 2017-03-27

## 2017-03-27 RX ORDER — OXYCODONE HYDROCHLORIDE 5 MG/1
5 TABLET ORAL ONCE
Qty: 0 | Refills: 0 | Status: DISCONTINUED | OUTPATIENT
Start: 2017-03-27 | End: 2017-03-27

## 2017-03-27 RX ORDER — INSULIN LISPRO 100/ML
0 VIAL (ML) SUBCUTANEOUS
Qty: 0 | Refills: 0 | COMMUNITY

## 2017-03-27 RX ORDER — MULTIVIT-MIN/FERROUS GLUCONATE 9 MG/15 ML
1 LIQUID (ML) ORAL
Qty: 0 | Refills: 0 | COMMUNITY

## 2017-03-27 RX ORDER — AMLODIPINE BESYLATE 2.5 MG/1
1 TABLET ORAL
Qty: 0 | Refills: 0 | COMMUNITY
Start: 2017-03-27

## 2017-03-27 RX ORDER — MULTIVIT-MIN/FERROUS GLUCONATE 9 MG/15 ML
1 LIQUID (ML) ORAL
Qty: 0 | Refills: 0 | COMMUNITY
Start: 2017-03-27

## 2017-03-27 RX ORDER — CLOPIDOGREL BISULFATE 75 MG/1
1 TABLET, FILM COATED ORAL
Qty: 0 | Refills: 0 | COMMUNITY

## 2017-03-27 RX ORDER — GABAPENTIN 400 MG/1
900 CAPSULE ORAL
Qty: 0 | Refills: 0 | COMMUNITY
Start: 2017-03-27

## 2017-03-27 RX ORDER — ALPRAZOLAM 0.25 MG
1 TABLET ORAL
Qty: 0 | Refills: 0 | COMMUNITY

## 2017-03-27 RX ORDER — INSULIN GLARGINE 100 [IU]/ML
30 INJECTION, SOLUTION SUBCUTANEOUS
Qty: 0 | Refills: 0 | COMMUNITY
Start: 2017-03-27

## 2017-03-27 RX ORDER — GABAPENTIN 400 MG/1
1 CAPSULE ORAL
Qty: 0 | Refills: 0 | COMMUNITY

## 2017-03-27 RX ORDER — INSULIN LISPRO 100/ML
0 VIAL (ML) SUBCUTANEOUS
Qty: 0 | Refills: 0 | COMMUNITY
Start: 2017-03-27

## 2017-03-27 RX ORDER — POLYETHYLENE GLYCOL 3350 17 G/17G
17 POWDER, FOR SOLUTION ORAL
Qty: 0 | Refills: 0 | COMMUNITY
Start: 2017-03-27

## 2017-03-27 RX ORDER — LISINOPRIL 2.5 MG/1
1 TABLET ORAL
Qty: 0 | Refills: 0 | COMMUNITY

## 2017-03-27 RX ORDER — INSULIN DETEMIR 100/ML (3)
14 INSULIN PEN (ML) SUBCUTANEOUS
Qty: 0 | Refills: 0 | COMMUNITY

## 2017-03-27 RX ORDER — GABAPENTIN 400 MG/1
1 CAPSULE ORAL
Qty: 0 | Refills: 0 | COMMUNITY
Start: 2017-03-27

## 2017-03-27 RX ORDER — CLOPIDOGREL BISULFATE 75 MG/1
1 TABLET, FILM COATED ORAL
Qty: 0 | Refills: 0 | COMMUNITY
Start: 2017-03-27

## 2017-03-27 RX ORDER — SODIUM CHLORIDE 9 MG/ML
1 INJECTION INTRAMUSCULAR; INTRAVENOUS; SUBCUTANEOUS
Qty: 0 | Refills: 0 | COMMUNITY
Start: 2017-03-27

## 2017-03-27 RX ORDER — SENNA PLUS 8.6 MG/1
2 TABLET ORAL
Qty: 0 | Refills: 0 | COMMUNITY
Start: 2017-03-27

## 2017-03-27 RX ADMIN — Medication 1 TABLET(S): at 17:27

## 2017-03-27 RX ADMIN — Medication 75 MICROGRAM(S): at 05:04

## 2017-03-27 RX ADMIN — Medication 650 MILLIGRAM(S): at 18:00

## 2017-03-27 RX ADMIN — Medication 100 MILLIGRAM(S): at 17:27

## 2017-03-27 RX ADMIN — Medication 30 MILLILITER(S): at 15:42

## 2017-03-27 RX ADMIN — ATORVASTATIN CALCIUM 40 MILLIGRAM(S): 80 TABLET, FILM COATED ORAL at 21:23

## 2017-03-27 RX ADMIN — Medication 1 MILLIGRAM(S): at 11:37

## 2017-03-27 RX ADMIN — Medication 5 UNIT(S): at 17:28

## 2017-03-27 RX ADMIN — GABAPENTIN 300 MILLIGRAM(S): 400 CAPSULE ORAL at 10:07

## 2017-03-27 RX ADMIN — Medication 1 TABLET(S): at 08:42

## 2017-03-27 RX ADMIN — AMLODIPINE BESYLATE 10 MILLIGRAM(S): 2.5 TABLET ORAL at 05:04

## 2017-03-27 RX ADMIN — Medication 2: at 17:28

## 2017-03-27 RX ADMIN — OXYCODONE HYDROCHLORIDE 5 MILLIGRAM(S): 5 TABLET ORAL at 21:24

## 2017-03-27 RX ADMIN — Medication 1 APPLICATION(S): at 06:50

## 2017-03-27 RX ADMIN — Medication 650 MILLIGRAM(S): at 17:27

## 2017-03-27 RX ADMIN — SODIUM CHLORIDE 1 GRAM(S): 9 INJECTION INTRAMUSCULAR; INTRAVENOUS; SUBCUTANEOUS at 09:49

## 2017-03-27 RX ADMIN — Medication 325 MILLIGRAM(S): at 17:27

## 2017-03-27 RX ADMIN — TAMSULOSIN HYDROCHLORIDE 0.4 MILLIGRAM(S): 0.4 CAPSULE ORAL at 21:23

## 2017-03-27 RX ADMIN — SODIUM CHLORIDE 1 GRAM(S): 9 INJECTION INTRAMUSCULAR; INTRAVENOUS; SUBCUTANEOUS at 11:37

## 2017-03-27 RX ADMIN — OXYCODONE HYDROCHLORIDE 5 MILLIGRAM(S): 5 TABLET ORAL at 16:30

## 2017-03-27 RX ADMIN — APIXABAN 2.5 MILLIGRAM(S): 2.5 TABLET, FILM COATED ORAL at 21:23

## 2017-03-27 RX ADMIN — Medication 650 MILLIGRAM(S): at 12:30

## 2017-03-27 RX ADMIN — Medication 4: at 08:42

## 2017-03-27 RX ADMIN — Medication 650 MILLIGRAM(S): at 11:37

## 2017-03-27 RX ADMIN — OXYCODONE HYDROCHLORIDE 5 MILLIGRAM(S): 5 TABLET ORAL at 22:00

## 2017-03-27 RX ADMIN — Medication 650 MILLIGRAM(S): at 05:05

## 2017-03-27 RX ADMIN — Medication 2: at 12:36

## 2017-03-27 RX ADMIN — Medication 0.25 MILLIGRAM(S): at 20:16

## 2017-03-27 RX ADMIN — Medication 50 MILLIGRAM(S): at 05:04

## 2017-03-27 RX ADMIN — Medication 5 UNIT(S): at 08:43

## 2017-03-27 RX ADMIN — Medication 100 MILLIGRAM(S): at 05:07

## 2017-03-27 RX ADMIN — SENNA PLUS 2 TABLET(S): 8.6 TABLET ORAL at 21:23

## 2017-03-27 RX ADMIN — SODIUM CHLORIDE 1 GRAM(S): 9 INJECTION INTRAMUSCULAR; INTRAVENOUS; SUBCUTANEOUS at 17:26

## 2017-03-27 RX ADMIN — OXYCODONE HYDROCHLORIDE 5 MILLIGRAM(S): 5 TABLET ORAL at 03:48

## 2017-03-27 RX ADMIN — Medication 650 MILLIGRAM(S): at 01:20

## 2017-03-27 RX ADMIN — Medication 50 MILLIGRAM(S): at 17:27

## 2017-03-27 RX ADMIN — GABAPENTIN 900 MILLIGRAM(S): 400 CAPSULE ORAL at 23:14

## 2017-03-27 RX ADMIN — OXYCODONE HYDROCHLORIDE 5 MILLIGRAM(S): 5 TABLET ORAL at 05:46

## 2017-03-27 RX ADMIN — Medication 200 MILLIGRAM(S): at 17:27

## 2017-03-27 RX ADMIN — Medication 0.25 MILLIGRAM(S): at 04:46

## 2017-03-27 RX ADMIN — POLYETHYLENE GLYCOL 3350 17 GRAM(S): 17 POWDER, FOR SOLUTION ORAL at 11:37

## 2017-03-27 RX ADMIN — Medication 200 MILLIGRAM(S): at 05:06

## 2017-03-27 RX ADMIN — Medication 325 MILLIGRAM(S): at 05:07

## 2017-03-27 RX ADMIN — OXYCODONE HYDROCHLORIDE 5 MILLIGRAM(S): 5 TABLET ORAL at 15:42

## 2017-03-27 RX ADMIN — Medication 650 MILLIGRAM(S): at 05:47

## 2017-03-27 RX ADMIN — Medication 650 MILLIGRAM(S): at 23:13

## 2017-03-27 RX ADMIN — Medication 5 UNIT(S): at 12:36

## 2017-03-27 RX ADMIN — PANTOPRAZOLE SODIUM 40 MILLIGRAM(S): 20 TABLET, DELAYED RELEASE ORAL at 05:05

## 2017-03-27 RX ADMIN — Medication 1 TABLET(S): at 11:37

## 2017-03-27 RX ADMIN — APIXABAN 2.5 MILLIGRAM(S): 2.5 TABLET, FILM COATED ORAL at 11:37

## 2017-03-27 RX ADMIN — INSULIN GLARGINE 30 UNIT(S): 100 INJECTION, SOLUTION SUBCUTANEOUS at 23:13

## 2017-03-27 NOTE — PROGRESS NOTE ADULT - SUBJECTIVE AND OBJECTIVE BOX
INTERVAL HPI/OVERNIGHT EVENTS:  Daughter reports pain was bad few nights ago, little better last night.  Gabapentin increased to 900 mg qhs      PRESENT SYMPTOMS: SOURCE:  Patient/Family/Team    PAIN SCALE:  0 = none  1 = mild   2 = moderate  3 = severe    Pain: 3- limb pain, phantom pain, just comes on     Dyspnea:  [ ] YES [ x] NO  Anxiety:  [x ] YES [ ] NO  Fatigue: [x ] YES [ ] NO  Nausea: [ ] YES [x ] NO  Loss of Appetite: [x ] YES [ ] NO  Other symptoms: __________    MEDICATIONS  (STANDING):  amLODIPine   Tablet 10milliGRAM(s) Oral daily  atorvastatin 40milliGRAM(s) Oral at bedtime  folic acid 1milliGRAM(s) Oral daily  metoprolol 50milliGRAM(s) Oral every 12 hours  pantoprazole    Tablet 40milliGRAM(s) Oral before breakfast  levothyroxine 75MICROGram(s) Oral daily  multivitamin/minerals 1Tablet(s) Oral daily  multivitamin 1Tablet(s) Oral daily  ferrous    sulfate 325milliGRAM(s) Oral every 12 hours  apixaban 2.5milliGRAM(s) Oral two times a day  insulin lispro Injectable (HumaLOG) 5Unit(s) SubCutaneous before breakfast  insulin lispro Injectable (HumaLOG) 5Unit(s) SubCutaneous before lunch  insulin lispro Injectable (HumaLOG) 5Unit(s) SubCutaneous before dinner  insulin lispro (HumaLOG) corrective regimen sliding scale  SubCutaneous three times a day before meals  dextrose 5%. 1000milliLiter(s) IV Continuous <Continuous>  dextrose 50% Injectable 12.5Gram(s) IV Push once  dextrose 50% Injectable 25Gram(s) IV Push once  dextrose 50% Injectable 25Gram(s) IV Push once  gabapentin 300milliGRAM(s) Oral <User Schedule>  sodium chloride 1Gram(s) Oral three times a day with meals  acetaminophen   Tablet. 650milliGRAM(s) Oral every 6 hours  ciprofloxacin   IVPB 400milliGRAM(s) IV Intermittent every 12 hours  ciprofloxacin   IVPB  IV Intermittent   lactobacillus acidophilus 1Tablet(s) Oral two times a day with meals  lidocaine 2% Gel 1Application(s) Topical once  tamsulosin 0.4milliGRAM(s) Oral at bedtime  insulin glargine Injectable (LANTUS) 30Unit(s) SubCutaneous at bedtime  gabapentin 900milliGRAM(s) Oral at bedtime  senna 2Tablet(s) Oral at bedtime  polyethylene glycol 3350 17Gram(s) Oral daily  docusate sodium 100milliGRAM(s) Oral two times a day  clotrimazole 1% Cream 1Application(s) Topical daily  sorbitol 70% Solution 30milliLiter(s) Oral once    MEDICATIONS  (PRN):  ondansetron Injectable 4milliGRAM(s) IV Push every 6 hours PRN Nausea  dextrose Gel 1Dose(s) Oral once PRN Blood Glucose LESS THAN 70 milliGRAM(s)/deciliter  glucagon  Injectable 1milliGRAM(s) IntraMuscular once PRN Glucose LESS THAN 70 milligrams/deciliter  oxyCODONE IR 5milliGRAM(s) Oral every 6 hours PRN Severe Pain (7 - 10)  oxyCODONE IR 2.5milliGRAM(s) Oral every 6 hours PRN Moderate Pain (4 - 6)  ALPRAZolam 0.25milliGRAM(s) Oral every 8 hours PRN anxiety      Allergies    Benadryl (Other)  Demerol HCl (Other)    Intolerances    Karnofsky Performance Score/Palliative Performance Status Version 2:  30 %    Vital Signs Last 24 Hrs  T(C): 36.6, Max: 36.8 (03-26 @ 21:27)  T(F): 97.9, Max: 98.3 (03-26 @ 21:27)  HR: 73 (73 - 102)  BP: 100/60 (100/60 - 174/60)  BP(mean): --  RR: 16 (15 - 16)  SpO2: 97% (96% - 98%)    PHYSICAL EXAM:    General: [ ] alert  [ ] oriented x ____ [ ] lethargic [ ] agitated                  [ ] cachexia  [ ] nonverbal  [ ] coma    HEENT: [ ] normal  [ ] dry mouth  [ ] ET tube/trach    Lungs: [ ] comfortable [ ] tachypnea/labored breathing  [ ] excessive secretions    CV: [ ] normal  [ ] tachycardia    GI: [ ] normal  [ ] distended  [ ] tender  [ ] no BS               [ ] PEG/NG/OG tube    : [ ] normal  [ ] incontinent  [ ] oliguria/anuria  [ ] juarez    MSK: [ ] normal  [ ] weakness  [ ] edema             [ ] ambulatory  [ ] bedbound/wheelchair bound    Skin: [ ] normal  [ ] pressure ulcers- Stage_____  [ ] no rash    LABS:                        8.4    10.5  )-----------( 377      ( 26 Mar 2017 06:45 )             25.8     27 Mar 2017 06:21    132    |  97     |  14.0   ----------------------------<  252    4.1     |  23.0   |  0.86     Ca    8.6        27 Mar 2017 06:21    TPro  6.0    /  Alb  2.8    /  TBili  0.2    /  DBili  x      /  AST  42     /  ALT  88     /  AlkPhos  99     27 Mar 2017 06:21        I&O's Summary  I & Os for 24h ending 27 Mar 2017 07:00  =============================================  IN: 0 ml / OUT: 2600 ml / NET: -2600 ml    I & Os for current day (as of 27 Mar 2017 12:46)  =============================================  IN: 240 ml / OUT: 0 ml / NET: 240 ml      Thank you for the opportunity to assist with the care of this patient.   [place] [facility] Consult [facility] [phone]. INTERVAL HPI/OVERNIGHT EVENTS:  Daughter reports pain was bad few nights ago, little better last night.  Gabapentin increased to 900 mg qhs      PRESENT SYMPTOMS: SOURCE:  Patient/Family/Team    PAIN SCALE:  0 = none  1 = mild   2 = moderate  3 = severe    Pain: 3- limb pain, phantom pain, just comes on     Dyspnea:  [ ] YES [ x] NO  Anxiety:  [x ] YES [ ] NO  Fatigue: [x ] YES [ ] NO  Nausea: [ ] YES [x ] NO  Loss of Appetite: [x ] YES [ ] NO  Other symptoms: __________    MEDICATIONS  (STANDING):  amLODIPine   Tablet 10milliGRAM(s) Oral daily  atorvastatin 40milliGRAM(s) Oral at bedtime  folic acid 1milliGRAM(s) Oral daily  metoprolol 50milliGRAM(s) Oral every 12 hours  pantoprazole    Tablet 40milliGRAM(s) Oral before breakfast  levothyroxine 75MICROGram(s) Oral daily  multivitamin/minerals 1Tablet(s) Oral daily  multivitamin 1Tablet(s) Oral daily  ferrous    sulfate 325milliGRAM(s) Oral every 12 hours  apixaban 2.5milliGRAM(s) Oral two times a day  insulin lispro Injectable (HumaLOG) 5Unit(s) SubCutaneous before breakfast  insulin lispro Injectable (HumaLOG) 5Unit(s) SubCutaneous before lunch  insulin lispro Injectable (HumaLOG) 5Unit(s) SubCutaneous before dinner  insulin lispro (HumaLOG) corrective regimen sliding scale  SubCutaneous three times a day before meals  dextrose 5%. 1000milliLiter(s) IV Continuous <Continuous>  dextrose 50% Injectable 12.5Gram(s) IV Push once  dextrose 50% Injectable 25Gram(s) IV Push once  dextrose 50% Injectable 25Gram(s) IV Push once  gabapentin 300milliGRAM(s) Oral <User Schedule>  sodium chloride 1Gram(s) Oral three times a day with meals  acetaminophen   Tablet. 650milliGRAM(s) Oral every 6 hours  ciprofloxacin   IVPB 400milliGRAM(s) IV Intermittent every 12 hours  ciprofloxacin   IVPB  IV Intermittent   lactobacillus acidophilus 1Tablet(s) Oral two times a day with meals  lidocaine 2% Gel 1Application(s) Topical once  tamsulosin 0.4milliGRAM(s) Oral at bedtime  insulin glargine Injectable (LANTUS) 30Unit(s) SubCutaneous at bedtime  gabapentin 900milliGRAM(s) Oral at bedtime  senna 2Tablet(s) Oral at bedtime  polyethylene glycol 3350 17Gram(s) Oral daily  docusate sodium 100milliGRAM(s) Oral two times a day  clotrimazole 1% Cream 1Application(s) Topical daily  sorbitol 70% Solution 30milliLiter(s) Oral once    MEDICATIONS  (PRN):  ondansetron Injectable 4milliGRAM(s) IV Push every 6 hours PRN Nausea  dextrose Gel 1Dose(s) Oral once PRN Blood Glucose LESS THAN 70 milliGRAM(s)/deciliter  glucagon  Injectable 1milliGRAM(s) IntraMuscular once PRN Glucose LESS THAN 70 milligrams/deciliter  oxyCODONE IR 5milliGRAM(s) Oral every 6 hours PRN Severe Pain (7 - 10)  oxyCODONE IR 2.5milliGRAM(s) Oral every 6 hours PRN Moderate Pain (4 - 6)  ALPRAZolam 0.25milliGRAM(s) Oral every 8 hours PRN anxiety      Allergies    Benadryl (Other)  Demerol HCl (Other)    Intolerances    Karnofsky Performance Score/Palliative Performance Status Version 2:  30 %    Vital Signs Last 24 Hrs  T(C): 36.6, Max: 36.8 (03-26 @ 21:27)  T(F): 97.9, Max: 98.3 (03-26 @ 21:27)  HR: 73 (73 - 102)  BP: 100/60 (100/60 - 174/60)  BP(mean): --  RR: 16 (15 - 16)  SpO2: 97% (96% - 98%)    PHYSICAL EXAM: Elderly woman    General: [x ] alert  [ ] oriented x ____ [ ] lethargic [ ] agitated                  [ ] cachexia  [ ] nonverbal  [ ] coma    HEENT: [x] normal  [ ] dry mouth  [ ] ET tube/trach    Lungs: [x ] comfortable [ ] tachypnea/labored breathing  [ ] excessive secretions    CV: [ x] normal  [ ] tachycardia    GI: [ x] normal  [ ] distended  [ ] tender  [ ] no BS               [ ] PEG/NG/OG tube    : [x ] normal  [ ] incontinent  [ ] oliguria/anuria  [ ] juarez    MSK: [ ] normal  [x ] weakness  [ ] edema             [ ] ambulatory  [ ] bedbound/wheelchair bound    Skin: [ ] normal  [ ] pressure ulcers- Stage_____  [ ] no rash  left leg dressings clean and dry    LABS:                        8.4    10.5  )-----------( 377      ( 26 Mar 2017 06:45 )             25.8     27 Mar 2017 06:21    132    |  97     |  14.0   ----------------------------<  252    4.1     |  23.0   |  0.86     Ca    8.6        27 Mar 2017 06:21    TPro  6.0    /  Alb  2.8    /  TBili  0.2    /  DBili  x      /  AST  42     /  ALT  88     /  AlkPhos  99     27 Mar 2017 06:21        I&O's Summary  I & Os for 24h ending 27 Mar 2017 07:00  =============================================  IN: 0 ml / OUT: 2600 ml / NET: -2600 ml    I & Os for current day (as of 27 Mar 2017 12:46)  =============================================  IN: 240 ml / OUT: 0 ml / NET: 240 ml      Thank you for the opportunity to assist with the care of this patient.   [place] [facility] [phone].

## 2017-03-27 NOTE — PROGRESS NOTE ADULT - SUBJECTIVE AND OBJECTIVE BOX
INTERVAL SUBJECTIVE & REVIEW OF SYMPTOMS:    Patient seen and examined.  Patient c/o being constipated; no BM x 4 days.  Sorbitol requested.  +juarez due to urinary retention.  Pt c/o left residual limb pain which is improved in the duration of pain is less; pt also c/o phantom pain  Tolerating PO diet.  Denies dyspnea or SOB    MEDICATIONS:  amLODIPine   Tablet 10milliGRAM(s) Oral daily  docusate sodium 100milliGRAM(s) Oral three times a day  morphine  - Injectable 2milliGRAM(s) IV Push every 4 hours PRN  acetaminophen   Tablet 650milliGRAM(s) Oral every 6 hours PRN  gabapentin 600milliGRAM(s) Oral at bedtime  atorvastatin 40milliGRAM(s) Oral at bedtime  ALPRAZolam 0.5milliGRAM(s) Oral three times a day PRN  folic acid 1milliGRAM(s) Oral daily  metoprolol 50milliGRAM(s) Oral every 12 hours  pantoprazole    Tablet 40milliGRAM(s) Oral before breakfast  levothyroxine 75MICROGram(s) Oral daily  multivitamin/minerals 1Tablet(s) Oral daily  multivitamin 1Tablet(s) Oral daily  gabapentin 300milliGRAM(s) Oral daily  ferrous    sulfate 325milliGRAM(s) Oral every 12 hours  ondansetron Injectable 4milliGRAM(s) IV Push every 6 hours PRN  oxyCODONE IR 5milliGRAM(s) Oral every 8 hours PRN  magnesium hydroxide Suspension 30milliLiter(s) Oral daily PRN  senna 2Tablet(s) Oral at bedtime  apixaban 2.5milliGRAM(s) Oral two times a day  insulin glargine Injectable (LANTUS) 22Unit(s) SubCutaneous at bedtime  insulin lispro Injectable (HumaLOG) 5Unit(s) SubCutaneous before breakfast  insulin lispro Injectable (HumaLOG) 5Unit(s) SubCutaneous before lunch  insulin lispro Injectable (HumaLOG) 5Unit(s) SubCutaneous before dinner  insulin lispro (HumaLOG) corrective regimen sliding scale  SubCutaneous three times a day before meals  dextrose 5%. 1000milliLiter(s) IV Continuous <Continuous>  dextrose Gel 1Dose(s) Oral once PRN  dextrose 50% Injectable 12.5Gram(s) IV Push once  dextrose 50% Injectable 25Gram(s) IV Push once  dextrose 50% Injectable 25Gram(s) IV Push once  glucagon  Injectable 1milliGRAM(s) IntraMuscular once PRN  sodium chloride 0.9%. 1000milliLiter(s) IV Continuous <Continuous>  oxyCODONE  5 mG/acetaminophen 325 mG 1Tablet(s) Oral every 4 hours PRN  sodium chloride 0.9%. 1000milliLiter(s) IV Continuous <Continuous>      REVIEW OF SYSTEMS  [   ] Constitutional WNL  [ X] Cardio WNL  [ X] Resp WNL  [ X] GI WNL  [   ]  WNL + Juarez  [   ] Heme WNL  [   ] Endo WNL  [   ] Skin WNL  [   ] MSK WNL  [ X] Neuro WNL  [   ] Cognitive WNL  [   ] Psych WNL    VITAL SIGNS  Vital Signs Last 24 Hrs  T(C): 36.6, Max: 36.7 (03-19 @ 15:47)  T(F): 97.8, Max: 98 (03-19 @ 15:47)  HR: 88 (85 - 88)  BP: 150/50 (120/85 - 150/50)  BP(mean): --  RR: 19 (18 - 19)  SpO2: 91% (91% - 98%)    PHYSICAL EXAM  General: NAD, conversant  Cardio: +S1/S2  Resp: CTAB  Abdomen: soft, NT, ND  Neuro: AAO x 2, CN 2-12 intact  Extrem: LLE BKA site wrapped in ACE .      Functional status:  Bed mobilty: max assist x 2  Transfers: Max assist x 1, mod assist x 2  ADLs: Bathing max assist, UE dress min assist, LE dress total assist, grooming sup    RECENT LABS:                           8.4    10.5  )-----------( 377      ( 26 Mar 2017 06:45 )             25.8   27 Mar 2017 06:21    132    |  97     |  14.0   ----------------------------<  252    4.1     |  23.0   |  0.86     Ca    8.6        27 Mar 2017 06:21    TPro  6.0    /  Alb  2.8    /  TBili  0.2    /  DBili  x      /  AST  42     /  ALT  88     /  AlkPhos  99     27 Mar 2017 06:21      86 yo female with PVD s/p stents now s/p left BKA    - Cont PT - strength and mobility  - Cont OT - ADLs, balance  - Prec - Fall  - DVT ppx. - Lovenox per primary team  - Pain- Neurontin and OxyIR prn  - Hyponatremia Improved Na 132    - Acute blood loss anemia-Stable s/p transfusion PRBCs   - Urinary retention/UTI-Juarez, Cipro, Flomax.  Consider voiding trial when more functional  - Constipation-Add sorbitol x 1.  cont miralax, senna, colace  - Dispo - Continue with bedside therapy. Will likely benefit from acute rehab; pt may tolerate >3hrs rehab daily, >5days weekly

## 2017-03-27 NOTE — PROGRESS NOTE ADULT - PROBLEM SELECTOR PLAN 1
- daily dressing changes  - urology evaluation for failure to void  - d/c planning to inpatient rehab  - will follow along

## 2017-03-27 NOTE — PROGRESS NOTE ADULT - PROBLEM SELECTOR PLAN 1
Pain, mainly neuropathic in etiology. Seems increase of Gabapentin at night helped. Would consider adding Neurontin 200mg in the afternoon if still problematic.  For eventual rehab Pain, mainly neuropathic in etiology. Seems increase of Gabapentin at night helped. Would consider adding Neurontin 200mg in the afternoon if still problematic. Monitor  For eventual rehab

## 2017-03-27 NOTE — PROGRESS NOTE ADULT - ASSESSMENT
85yr woman, hx CAD with stents, PVD, DM, HTN, HLD, PAF admitted with hypertensive urgency after LE angiogram. She is s/p left BKA 85yr woman, hx CAD with stents, PVD, DM, HTN, HLD, PAF admitted with hypertensive urgency after LE angiogram. She is s/p left BKA, UTI with urinary retention.

## 2017-03-27 NOTE — PROGRESS NOTE ADULT - SUBJECTIVE AND OBJECTIVE BOX
S/P L BKA/ Pt c/o of fantom pain in the LLE. Denies RLE rest pain. Pt unable to void and juarez re-inserted again.     Vital Signs Last 24 Hrs  T(C): 36.6, Max: 36.8 (03-26 @ 21:27)  T(F): 97.9, Max: 98.3 (03-26 @ 21:27)  HR: 102 (79 - 102)  BP: 174/60 (140/46 - 174/60)  BP(mean): --  RR: 16 (14 - 16)  SpO2: 96% (95% - 98%)    PE:   RLE cool, doppler pedal signals, no tissue loss, right heel stable   L BKA stump C/D/I,                           8.4    10.5  )-----------( 377      ( 26 Mar 2017 06:45 )             25.8   27 Mar 2017 06:21    132    |  97     |  14.0   ----------------------------<  252    4.1     |  23.0   |  0.86     Ca    8.6        27 Mar 2017 06:21    TPro  6.0    /  Alb  2.8    /  TBili  0.2    /  DBili  x      /  AST  42     /  ALT  88     /  AlkPhos  99     27 Mar 2017 06:21

## 2017-03-27 NOTE — PROGRESS NOTE ADULT - PROBLEM SELECTOR PLAN 4
Vascular has discussed with daughters the  likely need for eventual right amputation in the future. From my last conversation with them, they were not decided. They are seeing how she recovers from this current procedure.

## 2017-03-27 NOTE — PROGRESS NOTE ADULT - SUBJECTIVE AND OBJECTIVE BOX
seen for bka, urinary retention    no acute complaints. had severe pain at 3 am  ROS otherwise negative.     MEDICATIONS  (STANDING):  amLODIPine   Tablet 10milliGRAM(s) Oral daily  atorvastatin 40milliGRAM(s) Oral at bedtime  folic acid 1milliGRAM(s) Oral daily  metoprolol 50milliGRAM(s) Oral every 12 hours  pantoprazole    Tablet 40milliGRAM(s) Oral before breakfast  levothyroxine 75MICROGram(s) Oral daily  multivitamin/minerals 1Tablet(s) Oral daily  multivitamin 1Tablet(s) Oral daily  ferrous    sulfate 325milliGRAM(s) Oral every 12 hours  apixaban 2.5milliGRAM(s) Oral two times a day  insulin lispro Injectable (HumaLOG) 5Unit(s) SubCutaneous before breakfast  insulin lispro Injectable (HumaLOG) 5Unit(s) SubCutaneous before lunch  insulin lispro Injectable (HumaLOG) 5Unit(s) SubCutaneous before dinner  insulin lispro (HumaLOG) corrective regimen sliding scale  SubCutaneous three times a day before meals  dextrose 5%. 1000milliLiter(s) IV Continuous <Continuous>  dextrose 50% Injectable 12.5Gram(s) IV Push once  dextrose 50% Injectable 25Gram(s) IV Push once  dextrose 50% Injectable 25Gram(s) IV Push once  gabapentin 300milliGRAM(s) Oral <User Schedule>  sodium chloride 1Gram(s) Oral three times a day with meals  acetaminophen   Tablet. 650milliGRAM(s) Oral every 6 hours  ciprofloxacin   IVPB 400milliGRAM(s) IV Intermittent every 12 hours  ciprofloxacin   IVPB  IV Intermittent   lactobacillus acidophilus 1Tablet(s) Oral two times a day with meals  lidocaine 2% Gel 1Application(s) Topical once  tamsulosin 0.4milliGRAM(s) Oral at bedtime  insulin glargine Injectable (LANTUS) 30Unit(s) SubCutaneous at bedtime  gabapentin 900milliGRAM(s) Oral at bedtime  senna 2Tablet(s) Oral at bedtime  polyethylene glycol 3350 17Gram(s) Oral daily  docusate sodium 100milliGRAM(s) Oral two times a day  clotrimazole 1% Cream 1Application(s) Topical daily    MEDICATIONS  (PRN):  ondansetron Injectable 4milliGRAM(s) IV Push every 6 hours PRN Nausea  dextrose Gel 1Dose(s) Oral once PRN Blood Glucose LESS THAN 70 milliGRAM(s)/deciliter  glucagon  Injectable 1milliGRAM(s) IntraMuscular once PRN Glucose LESS THAN 70 milligrams/deciliter  oxyCODONE IR 5milliGRAM(s) Oral every 6 hours PRN Severe Pain (7 - 10)  oxyCODONE IR 2.5milliGRAM(s) Oral every 6 hours PRN Moderate Pain (4 - 6)  ALPRAZolam 0.25milliGRAM(s) Oral every 8 hours PRN anxiety      Allergies    Benadryl (Other)  Demerol HCl (Other)    Intolerances    Vital Signs Last 24 Hrs  T(C): 36.7, Max: 36.8 (03-26 @ 21:27)  T(F): 98.1, Max: 98.3 (03-26 @ 21:27)  HR: 82 (73 - 102)  BP: 122/62 (100/60 - 174/60)  BP(mean): --  RR: 16 (15 - 16)  SpO2: 92% (92% - 98%)    PHYSICAL EXAM:    GENERAL: NAD frail  CHEST/LUNG: Clear to percussion bilaterally; No rales, rhonchi, wheezing, or rubs  HEART: Regular rate and rhythm; S1 S2; no murmurs noted  ABDOMEN: Soft, Nontender, Nondistended; Bowel sounds present  EXTREMITIES:  left bka  NERVOUS SYSTEM:  Alert & Oriented X3, nonfocal  : juarez  PSYCH: normal mood, appropriate response.    LABS:                        8.4    10.5  )-----------( 377      ( 26 Mar 2017 06:45 )             25.8     27 Mar 2017 06:21    132    |  97     |  14.0   ----------------------------<  252    4.1     |  23.0   |  0.86     Ca    8.6        27 Mar 2017 06:21    TPro  6.0    /  Alb  2.8    /  TBili  0.2    /  DBili  x      /  AST  42     /  ALT  88     /  AlkPhos  99     27 Mar 2017 06:21          CAPILLARY BLOOD GLUCOSE  163 (27 Mar 2017 12:39)  214 (27 Mar 2017 08:30)  289 (26 Mar 2017 21:27)  248 (26 Mar 2017 17:40)        RADIOLOGY & ADDITIONAL TESTS:

## 2017-03-28 DIAGNOSIS — T83.511D INFECTION AND INFLAMMATORY REACTION DUE TO INDWELLING URETHRAL CATHETER, SUBSEQUENT ENCOUNTER: ICD-10-CM

## 2017-03-28 DIAGNOSIS — R52 PAIN, UNSPECIFIED: ICD-10-CM

## 2017-03-28 PROCEDURE — 99232 SBSQ HOSP IP/OBS MODERATE 35: CPT

## 2017-03-28 PROCEDURE — 99233 SBSQ HOSP IP/OBS HIGH 50: CPT

## 2017-03-28 RX ORDER — GABAPENTIN 400 MG/1
2 CAPSULE ORAL
Qty: 0 | Refills: 0 | COMMUNITY
Start: 2017-03-28

## 2017-03-28 RX ORDER — GABAPENTIN 400 MG/1
200 CAPSULE ORAL DAILY
Qty: 0 | Refills: 0 | Status: DISCONTINUED | OUTPATIENT
Start: 2017-03-28 | End: 2017-03-29

## 2017-03-28 RX ADMIN — Medication 200 MILLIGRAM(S): at 05:34

## 2017-03-28 RX ADMIN — Medication 1 TABLET(S): at 10:01

## 2017-03-28 RX ADMIN — ATORVASTATIN CALCIUM 40 MILLIGRAM(S): 80 TABLET, FILM COATED ORAL at 21:47

## 2017-03-28 RX ADMIN — Medication 650 MILLIGRAM(S): at 11:36

## 2017-03-28 RX ADMIN — Medication 5 UNIT(S): at 10:00

## 2017-03-28 RX ADMIN — Medication 1 TABLET(S): at 11:36

## 2017-03-28 RX ADMIN — Medication 4: at 13:14

## 2017-03-28 RX ADMIN — Medication 1 APPLICATION(S): at 13:41

## 2017-03-28 RX ADMIN — INSULIN GLARGINE 30 UNIT(S): 100 INJECTION, SOLUTION SUBCUTANEOUS at 21:46

## 2017-03-28 RX ADMIN — Medication 50 MILLIGRAM(S): at 17:38

## 2017-03-28 RX ADMIN — APIXABAN 2.5 MILLIGRAM(S): 2.5 TABLET, FILM COATED ORAL at 21:49

## 2017-03-28 RX ADMIN — GABAPENTIN 900 MILLIGRAM(S): 400 CAPSULE ORAL at 21:52

## 2017-03-28 RX ADMIN — GABAPENTIN 200 MILLIGRAM(S): 400 CAPSULE ORAL at 13:41

## 2017-03-28 RX ADMIN — Medication 650 MILLIGRAM(S): at 00:00

## 2017-03-28 RX ADMIN — Medication 325 MILLIGRAM(S): at 17:37

## 2017-03-28 RX ADMIN — Medication 650 MILLIGRAM(S): at 05:34

## 2017-03-28 RX ADMIN — SENNA PLUS 2 TABLET(S): 8.6 TABLET ORAL at 21:47

## 2017-03-28 RX ADMIN — TAMSULOSIN HYDROCHLORIDE 0.4 MILLIGRAM(S): 0.4 CAPSULE ORAL at 21:49

## 2017-03-28 RX ADMIN — Medication 200 MILLIGRAM(S): at 17:37

## 2017-03-28 RX ADMIN — GABAPENTIN 300 MILLIGRAM(S): 400 CAPSULE ORAL at 10:01

## 2017-03-28 RX ADMIN — SODIUM CHLORIDE 1 GRAM(S): 9 INJECTION INTRAMUSCULAR; INTRAVENOUS; SUBCUTANEOUS at 17:38

## 2017-03-28 RX ADMIN — SODIUM CHLORIDE 1 GRAM(S): 9 INJECTION INTRAMUSCULAR; INTRAVENOUS; SUBCUTANEOUS at 10:01

## 2017-03-28 RX ADMIN — Medication 75 MICROGRAM(S): at 05:34

## 2017-03-28 RX ADMIN — SODIUM CHLORIDE 1 GRAM(S): 9 INJECTION INTRAMUSCULAR; INTRAVENOUS; SUBCUTANEOUS at 11:36

## 2017-03-28 RX ADMIN — APIXABAN 2.5 MILLIGRAM(S): 2.5 TABLET, FILM COATED ORAL at 10:01

## 2017-03-28 RX ADMIN — Medication 650 MILLIGRAM(S): at 05:35

## 2017-03-28 RX ADMIN — Medication 1 MILLIGRAM(S): at 11:36

## 2017-03-28 RX ADMIN — AMLODIPINE BESYLATE 10 MILLIGRAM(S): 2.5 TABLET ORAL at 05:34

## 2017-03-28 RX ADMIN — PANTOPRAZOLE SODIUM 40 MILLIGRAM(S): 20 TABLET, DELAYED RELEASE ORAL at 05:34

## 2017-03-28 RX ADMIN — Medication 650 MILLIGRAM(S): at 17:38

## 2017-03-28 RX ADMIN — Medication 1 TABLET(S): at 17:38

## 2017-03-28 RX ADMIN — Medication 50 MILLIGRAM(S): at 05:34

## 2017-03-28 RX ADMIN — Medication 5 UNIT(S): at 18:08

## 2017-03-28 RX ADMIN — Medication 325 MILLIGRAM(S): at 05:34

## 2017-03-28 RX ADMIN — Medication 650 MILLIGRAM(S): at 17:37

## 2017-03-28 RX ADMIN — Medication 650 MILLIGRAM(S): at 11:38

## 2017-03-28 RX ADMIN — Medication 5 UNIT(S): at 13:14

## 2017-03-28 RX ADMIN — Medication 4: at 18:08

## 2017-03-28 RX ADMIN — OXYCODONE HYDROCHLORIDE 5 MILLIGRAM(S): 5 TABLET ORAL at 11:38

## 2017-03-28 RX ADMIN — Medication 100 MILLIGRAM(S): at 05:34

## 2017-03-28 RX ADMIN — OXYCODONE HYDROCHLORIDE 5 MILLIGRAM(S): 5 TABLET ORAL at 10:55

## 2017-03-28 NOTE — PROGRESS NOTE ADULT - PROBLEM SELECTOR PROBLEM 3
Hyperlipidemia
MAGI (acute kidney injury)
PVD (peripheral vascular disease)
Hyponatremia
PVD (peripheral vascular disease)
Urinary tract infection associated with catheterization of urinary tract, unspecified indwelling urinary catheter type, subsequent encounter
Urinary tract infection with hematuria, site unspecified

## 2017-03-28 NOTE — PROGRESS NOTE ADULT - SUBJECTIVE AND OBJECTIVE BOX
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  + BM    PRESENT SYMPTOMS: SOURCE:  Patient/Family/Team    PAIN SCALE:  0 = none  1 = mild   2 = moderate  3 = severe    Pain: 3-  burning shooting, intermittent, just comes on    Dyspnea:  [ ] YES [x ] NO  Anxiety:  [ x] YES [ ] NO  Fatigue: [x ] YES [ ] NO  Nausea: [ ] YES [ x] NO  Loss of Appetite: [x YES- slight  ] NO  Other symptoms: __________    MEDICATIONS  (STANDING):  amLODIPine   Tablet 10milliGRAM(s) Oral daily  atorvastatin 40milliGRAM(s) Oral at bedtime  folic acid 1milliGRAM(s) Oral daily  metoprolol 50milliGRAM(s) Oral every 12 hours  pantoprazole    Tablet 40milliGRAM(s) Oral before breakfast  levothyroxine 75MICROGram(s) Oral daily  multivitamin/minerals 1Tablet(s) Oral daily  multivitamin 1Tablet(s) Oral daily  ferrous    sulfate 325milliGRAM(s) Oral every 12 hours  apixaban 2.5milliGRAM(s) Oral two times a day  insulin lispro Injectable (HumaLOG) 5Unit(s) SubCutaneous before breakfast  insulin lispro Injectable (HumaLOG) 5Unit(s) SubCutaneous before lunch  insulin lispro Injectable (HumaLOG) 5Unit(s) SubCutaneous before dinner  insulin lispro (HumaLOG) corrective regimen sliding scale  SubCutaneous three times a day before meals  dextrose 5%. 1000milliLiter(s) IV Continuous <Continuous>  dextrose 50% Injectable 12.5Gram(s) IV Push once  dextrose 50% Injectable 25Gram(s) IV Push once  dextrose 50% Injectable 25Gram(s) IV Push once  gabapentin 300milliGRAM(s) Oral <User Schedule>  sodium chloride 1Gram(s) Oral three times a day with meals  acetaminophen   Tablet. 650milliGRAM(s) Oral every 6 hours  ciprofloxacin   IVPB 400milliGRAM(s) IV Intermittent every 12 hours  ciprofloxacin   IVPB  IV Intermittent   lactobacillus acidophilus 1Tablet(s) Oral two times a day with meals  lidocaine 2% Gel 1Application(s) Topical once  tamsulosin 0.4milliGRAM(s) Oral at bedtime  insulin glargine Injectable (LANTUS) 30Unit(s) SubCutaneous at bedtime  gabapentin 900milliGRAM(s) Oral at bedtime  senna 2Tablet(s) Oral at bedtime  polyethylene glycol 3350 17Gram(s) Oral daily  docusate sodium 100milliGRAM(s) Oral two times a day  clotrimazole 1% Cream 1Application(s) Topical daily  gabapentin 200milliGRAM(s) Oral daily    MEDICATIONS  (PRN):  ondansetron Injectable 4milliGRAM(s) IV Push every 6 hours PRN Nausea  dextrose Gel 1Dose(s) Oral once PRN Blood Glucose LESS THAN 70 milliGRAM(s)/deciliter  glucagon  Injectable 1milliGRAM(s) IntraMuscular once PRN Glucose LESS THAN 70 milligrams/deciliter  oxyCODONE IR 5milliGRAM(s) Oral every 6 hours PRN Severe Pain (7 - 10)  oxyCODONE IR 2.5milliGRAM(s) Oral every 6 hours PRN Moderate Pain (4 - 6)  ALPRAZolam 0.25milliGRAM(s) Oral every 8 hours PRN anxiety      Allergies    Benadryl (Other)  Demerol HCl (Other)    Intolerances      Karnofsky Performance Score/Palliative Performance Status Version 2:  30  %    Vital Signs Last 24 Hrs  T(C): 36.7, Max: 36.8 (03-27 @ 20:13)  T(F): 98, Max: 98.3 (03-27 @ 20:13)  HR: 75 (75 - 87)  BP: 132/62 (122/56 - 142/50)  BP(mean): --  RR: 17 (15 - 17)  SpO2: 98% (92% - 98%)    PHYSICAL EXAM: Elderly woman NAD    General: [x] alert  [ ] oriented x ____ [ ] lethargic [ ] agitated                  [ ] cachexia  [ ] nonverbal  [ ] coma    HEENT: [ x] normal  [ ] dry mouth  [ ] ET tube/trach    Lungs: [ x] comfortable [ ] tachypnea/labored breathing  [ ] excessive secretions    CV: [x ] normal  [ ] tachycardia    GI: [x] normal  [ ] distended  [ ] tender  [ ] no BS               [ ] PEG/NG/OG tube    : [x ] normal  [ ] incontinent  [ ] oliguria/anuria  [ ] juarez    MSK: [ ] normal  [ x] weakness  [ ] edema             [ ] ambulatory  [ ] bedbound/wheelchair bound    Skin: [ ] normal  [ ] pressure ulcers- Stage_____  [ ] no rash - bandages clean and dry    LABS:    27 Mar 2017 06:21    132    |  97     |  14.0   ----------------------------<  252    4.1     |  23.0   |  0.86     Ca    8.6        27 Mar 2017 06:21    TPro  6.0    /  Alb  2.8    /  TBili  0.2    /  DBili  x      /  AST  42     /  ALT  88     /  AlkPhos  99     27 Mar 2017 06:21        I&O's Summary    I & Os for current day (as of 28 Mar 2017 13:07)  =============================================  IN: 1040 ml / OUT: 2600 ml / NET: -1560 ml       [facility] [phone].

## 2017-03-28 NOTE — PROGRESS NOTE ADULT - ATTENDING COMMENTS
Plan of care discussed with RN and daughters at length at bedside.    acute rehab dc pending bed availability

## 2017-03-28 NOTE — PROGRESS NOTE ADULT - PROBLEM SELECTOR PROBLEM 2
Essential hypertension
Hypertensive urgency
PVD (peripheral vascular disease)
Anemia due to blood loss
Delirium
Pain
Urinary tract infection without hematuria, site unspecified

## 2017-03-28 NOTE — PROGRESS NOTE ADULT - PROBLEM SELECTOR PROBLEM 6
Atrial fibrillation
Diabetes mellitus
Hyponatremia
Diabetes

## 2017-03-28 NOTE — PROGRESS NOTE ADULT - PROBLEM SELECTOR PROBLEM 4
Atrial fibrillation
CAD (coronary artery disease)
Hypertension
MAGI (acute kidney injury)
Atrial fibrillation
Hyponatremia
PVD (peripheral vascular disease)

## 2017-03-28 NOTE — PROGRESS NOTE ADULT - PROBLEM SELECTOR PROBLEM 8
Anemia due to blood loss
Anemia due to blood loss
Atrial fibrillation
Fever
Fever
UTI (urinary tract infection)
Palliative care encounter

## 2017-03-28 NOTE — PROGRESS NOTE ADULT - ASSESSMENT
85yr woman, hx CAD with stents, PVD, DM, HTN, HLD, PAF admitted with hypertensive urgency after LE angiogram. She is s/p left BKA, UTI with urinary retention.

## 2017-03-28 NOTE — PROGRESS NOTE ADULT - PROBLEM SELECTOR PLAN 7
Continue Metoprolol, hold Eliquis per vascular surgery.
Continue Metoprolol, hold Eliquis per vascular surgery.
Hb improved, will monitor.
Will start Zosyn, blood cultures.
PRBC transfusion today, check stool for occult blood.
continue to monitor BP  amlodipine 10mg po qd  metoprolol 50mg po q12 hrs
Continue Metoprolol, hold Eliquis per vascular surgery.

## 2017-03-28 NOTE — PROGRESS NOTE ADULT - PROBLEM SELECTOR PLAN 5
Continue Lantus 22 units and premeal 5 units/meals. Monitor FS.
Continue Lantus, accucheck, sliding scale coverage. Lantus dose adjusted, pre meal insulin added.
Continue plavix, statin, beta blocker. f/u ECHO
Continue srinath Lai, sliding scale coverage.
Increased Lantus 22 units and premeal 5 units/meals. Monitor FS.
c/w medical mgmt
increase Lantus to 30 u  and premeal 5 units/meals. Monitor FS.
-renal following   -hyponatremia improving
Improved. Was likely multifactorial - UTI, hyponatremia, urinary retention, meds. continue to monitor

## 2017-03-28 NOTE — PROGRESS NOTE ADULT - PROBLEM SELECTOR PLAN 6
Continue IVF, monitor sodium. If no improvement in 24 hrs, will consider nephrology evaluation.
Continue Metoprolol, hold Eliquis per vascular surgery.
Continue srinath Lai, sliding scale coverage.
Monitor sodium. Off IVF
Monitor sodium. Off IVF
Nephrology brendan noted, continue IVF.
Nephrology evaluation, continue IVF, monitor sodium.
Sodium improving, fluid restriction.
resolved  on nacl
-diabetic diet   -ISS   -22units lantus qhs, humalog 5U premeal
Start gentle IVF, monitor sodium, ? medication related. Decrease dose of Tramadol.

## 2017-03-28 NOTE — PROGRESS NOTE ADULT - SUBJECTIVE AND OBJECTIVE BOX
seen for bka    no acute complaints  phantom pain intermittent  ROS otherwise negative.     MEDICATIONS  (STANDING):  amLODIPine   Tablet 10milliGRAM(s) Oral daily  atorvastatin 40milliGRAM(s) Oral at bedtime  folic acid 1milliGRAM(s) Oral daily  metoprolol 50milliGRAM(s) Oral every 12 hours  pantoprazole    Tablet 40milliGRAM(s) Oral before breakfast  levothyroxine 75MICROGram(s) Oral daily  multivitamin/minerals 1Tablet(s) Oral daily  multivitamin 1Tablet(s) Oral daily  ferrous    sulfate 325milliGRAM(s) Oral every 12 hours  apixaban 2.5milliGRAM(s) Oral two times a day  insulin lispro Injectable (HumaLOG) 5Unit(s) SubCutaneous before breakfast  insulin lispro Injectable (HumaLOG) 5Unit(s) SubCutaneous before lunch  insulin lispro Injectable (HumaLOG) 5Unit(s) SubCutaneous before dinner  insulin lispro (HumaLOG) corrective regimen sliding scale  SubCutaneous three times a day before meals  dextrose 5%. 1000milliLiter(s) IV Continuous <Continuous>  dextrose 50% Injectable 12.5Gram(s) IV Push once  dextrose 50% Injectable 25Gram(s) IV Push once  dextrose 50% Injectable 25Gram(s) IV Push once  gabapentin 300milliGRAM(s) Oral <User Schedule>  sodium chloride 1Gram(s) Oral three times a day with meals  acetaminophen   Tablet. 650milliGRAM(s) Oral every 6 hours  ciprofloxacin   IVPB 400milliGRAM(s) IV Intermittent every 12 hours  ciprofloxacin   IVPB  IV Intermittent   lactobacillus acidophilus 1Tablet(s) Oral two times a day with meals  lidocaine 2% Gel 1Application(s) Topical once  tamsulosin 0.4milliGRAM(s) Oral at bedtime  insulin glargine Injectable (LANTUS) 30Unit(s) SubCutaneous at bedtime  gabapentin 900milliGRAM(s) Oral at bedtime  senna 2Tablet(s) Oral at bedtime  polyethylene glycol 3350 17Gram(s) Oral daily  docusate sodium 100milliGRAM(s) Oral two times a day  clotrimazole 1% Cream 1Application(s) Topical daily    MEDICATIONS  (PRN):  ondansetron Injectable 4milliGRAM(s) IV Push every 6 hours PRN Nausea  dextrose Gel 1Dose(s) Oral once PRN Blood Glucose LESS THAN 70 milliGRAM(s)/deciliter  glucagon  Injectable 1milliGRAM(s) IntraMuscular once PRN Glucose LESS THAN 70 milligrams/deciliter  oxyCODONE IR 5milliGRAM(s) Oral every 6 hours PRN Severe Pain (7 - 10)  oxyCODONE IR 2.5milliGRAM(s) Oral every 6 hours PRN Moderate Pain (4 - 6)  ALPRAZolam 0.25milliGRAM(s) Oral every 8 hours PRN anxiety      Allergies    Benadryl (Other)  Demerol HCl (Other)    Intolerances  Vital Signs Last 24 Hrs  T(C): 36.5, Max: 36.8 (03-27 @ 20:13)  T(F): 97.7, Max: 98.3 (03-27 @ 20:13)  HR: 76 (73 - 87)  BP: 142/50 (100/60 - 142/50)  BP(mean): --  RR: 17 (15 - 17)  SpO2: 95% (92% - 97%)    PHYSICAL EXAM:    GENERAL: NAD  CHEST/LUNG: Clear to percussion bilaterally; No rales, rhonchi, wheezing, or rubs  HEART: Regular rate and rhythm; S1 S2; no murmurs noted  ABDOMEN: Soft, Nontender, Nondistended; Bowel sounds present  EXTREMITIES: left bka in brace  NERVOUS SYSTEM:  Alert & Oriented X3 nonfocal  PSYCH: normal mood, appropriate response.    LABS:    27 Mar 2017 06:21    132    |  97     |  14.0   ----------------------------<  252    4.1     |  23.0   |  0.86     Ca    8.6        27 Mar 2017 06:21    TPro  6.0    /  Alb  2.8    /  TBili  0.2    /  DBili  x      /  AST  42     /  ALT  88     /  AlkPhos  99     27 Mar 2017 06:21          CAPILLARY BLOOD GLUCOSE  147 (28 Mar 2017 09:55)  166 (27 Mar 2017 20:13)  165 (27 Mar 2017 17:38)  163 (27 Mar 2017 12:39)        RADIOLOGY & ADDITIONAL TESTS:

## 2017-03-29 ENCOUNTER — INPATIENT (INPATIENT)
Facility: HOSPITAL | Age: 82
LOS: 14 days | Discharge: ANOTHER HOSPITAL | DRG: 300 | End: 2017-04-13
Attending: PHYSICAL MEDICINE & REHABILITATION | Admitting: PHYSICAL MEDICINE & REHABILITATION
Payer: COMMERCIAL

## 2017-03-29 VITALS
SYSTOLIC BLOOD PRESSURE: 152 MMHG | DIASTOLIC BLOOD PRESSURE: 71 MMHG | HEART RATE: 86 BPM | RESPIRATION RATE: 18 BRPM | WEIGHT: 167.11 LBS | TEMPERATURE: 98 F | OXYGEN SATURATION: 95 %

## 2017-03-29 VITALS — HEART RATE: 86 BPM | SYSTOLIC BLOOD PRESSURE: 110 MMHG | DIASTOLIC BLOOD PRESSURE: 40 MMHG

## 2017-03-29 DIAGNOSIS — Z90.49 ACQUIRED ABSENCE OF OTHER SPECIFIED PARTS OF DIGESTIVE TRACT: Chronic | ICD-10-CM

## 2017-03-29 DIAGNOSIS — Z51.89 ENCOUNTER FOR OTHER SPECIFIED AFTERCARE: ICD-10-CM

## 2017-03-29 DIAGNOSIS — Z98.62 PERIPHERAL VASCULAR ANGIOPLASTY STATUS: Chronic | ICD-10-CM

## 2017-03-29 PROCEDURE — 86920 COMPATIBILITY TEST SPIN: CPT

## 2017-03-29 PROCEDURE — 93306 TTE W/DOPPLER COMPLETE: CPT

## 2017-03-29 PROCEDURE — 82272 OCCULT BLD FECES 1-3 TESTS: CPT

## 2017-03-29 PROCEDURE — 71045 X-RAY EXAM CHEST 1 VIEW: CPT

## 2017-03-29 PROCEDURE — 83735 ASSAY OF MAGNESIUM: CPT

## 2017-03-29 PROCEDURE — 84100 ASSAY OF PHOSPHORUS: CPT

## 2017-03-29 PROCEDURE — 82728 ASSAY OF FERRITIN: CPT

## 2017-03-29 PROCEDURE — 97167 OT EVAL HIGH COMPLEX 60 MIN: CPT

## 2017-03-29 PROCEDURE — 81001 URINALYSIS AUTO W/SCOPE: CPT

## 2017-03-29 PROCEDURE — 36430 TRANSFUSION BLD/BLD COMPNT: CPT

## 2017-03-29 PROCEDURE — 93005 ELECTROCARDIOGRAM TRACING: CPT

## 2017-03-29 PROCEDURE — 80076 HEPATIC FUNCTION PANEL: CPT

## 2017-03-29 PROCEDURE — 97163 PT EVAL HIGH COMPLEX 45 MIN: CPT

## 2017-03-29 PROCEDURE — 88311 DECALCIFY TISSUE: CPT

## 2017-03-29 PROCEDURE — 85610 PROTHROMBIN TIME: CPT

## 2017-03-29 PROCEDURE — 80053 COMPREHEN METABOLIC PANEL: CPT

## 2017-03-29 PROCEDURE — 97116 GAIT TRAINING THERAPY: CPT

## 2017-03-29 PROCEDURE — 85027 COMPLETE CBC AUTOMATED: CPT

## 2017-03-29 PROCEDURE — C1887: CPT

## 2017-03-29 PROCEDURE — 83935 ASSAY OF URINE OSMOLALITY: CPT

## 2017-03-29 PROCEDURE — 84443 ASSAY THYROID STIM HORMONE: CPT

## 2017-03-29 PROCEDURE — 83550 IRON BINDING TEST: CPT

## 2017-03-29 PROCEDURE — C1889: CPT

## 2017-03-29 PROCEDURE — 84466 ASSAY OF TRANSFERRIN: CPT

## 2017-03-29 PROCEDURE — 36415 COLL VENOUS BLD VENIPUNCTURE: CPT

## 2017-03-29 PROCEDURE — 80048 BASIC METABOLIC PNL TOTAL CA: CPT

## 2017-03-29 PROCEDURE — 86900 BLOOD TYPING SEROLOGIC ABO: CPT

## 2017-03-29 PROCEDURE — 86850 RBC ANTIBODY SCREEN: CPT

## 2017-03-29 PROCEDURE — 88307 TISSUE EXAM BY PATHOLOGIST: CPT

## 2017-03-29 PROCEDURE — C1894: CPT

## 2017-03-29 PROCEDURE — 87040 BLOOD CULTURE FOR BACTERIA: CPT

## 2017-03-29 PROCEDURE — 97110 THERAPEUTIC EXERCISES: CPT

## 2017-03-29 PROCEDURE — 99239 HOSP IP/OBS DSCHRG MGMT >30: CPT

## 2017-03-29 PROCEDURE — 87086 URINE CULTURE/COLONY COUNT: CPT

## 2017-03-29 PROCEDURE — 97530 THERAPEUTIC ACTIVITIES: CPT

## 2017-03-29 PROCEDURE — 80061 LIPID PANEL: CPT

## 2017-03-29 PROCEDURE — 87186 SC STD MICRODIL/AGAR DIL: CPT

## 2017-03-29 PROCEDURE — 97535 SELF CARE MNGMENT TRAINING: CPT

## 2017-03-29 PROCEDURE — 82533 TOTAL CORTISOL: CPT

## 2017-03-29 PROCEDURE — 83036 HEMOGLOBIN GLYCOSYLATED A1C: CPT

## 2017-03-29 PROCEDURE — 86901 BLOOD TYPING SEROLOGIC RH(D): CPT

## 2017-03-29 PROCEDURE — 84300 ASSAY OF URINE SODIUM: CPT

## 2017-03-29 PROCEDURE — P9016: CPT

## 2017-03-29 RX ORDER — TAMSULOSIN HYDROCHLORIDE 0.4 MG/1
0.4 CAPSULE ORAL AT BEDTIME
Qty: 0 | Refills: 0 | Status: DISCONTINUED | OUTPATIENT
Start: 2017-03-29 | End: 2017-04-13

## 2017-03-29 RX ORDER — ACETAMINOPHEN 500 MG
650 TABLET ORAL EVERY 6 HOURS
Qty: 0 | Refills: 0 | Status: DISCONTINUED | OUTPATIENT
Start: 2017-03-29 | End: 2017-03-31

## 2017-03-29 RX ORDER — POLYETHYLENE GLYCOL 3350 17 G/17G
17 POWDER, FOR SOLUTION ORAL DAILY
Qty: 0 | Refills: 0 | Status: DISCONTINUED | OUTPATIENT
Start: 2017-03-29 | End: 2017-03-30

## 2017-03-29 RX ORDER — METOPROLOL TARTRATE 50 MG
50 TABLET ORAL
Qty: 0 | Refills: 0 | Status: DISCONTINUED | OUTPATIENT
Start: 2017-03-29 | End: 2017-03-31

## 2017-03-29 RX ORDER — GABAPENTIN 400 MG/1
300 CAPSULE ORAL
Qty: 0 | Refills: 0 | Status: DISCONTINUED | OUTPATIENT
Start: 2017-03-29 | End: 2017-04-05

## 2017-03-29 RX ORDER — ALPRAZOLAM 0.25 MG
0.25 TABLET ORAL EVERY 8 HOURS
Qty: 0 | Refills: 0 | Status: DISCONTINUED | OUTPATIENT
Start: 2017-03-29 | End: 2017-04-05

## 2017-03-29 RX ORDER — INSULIN LISPRO 100/ML
5 VIAL (ML) SUBCUTANEOUS
Qty: 0 | Refills: 0 | Status: DISCONTINUED | OUTPATIENT
Start: 2017-03-29 | End: 2017-04-07

## 2017-03-29 RX ORDER — ATORVASTATIN CALCIUM 80 MG/1
40 TABLET, FILM COATED ORAL AT BEDTIME
Qty: 0 | Refills: 0 | Status: DISCONTINUED | OUTPATIENT
Start: 2017-03-29 | End: 2017-04-13

## 2017-03-29 RX ORDER — SACCHAROMYCES BOULARDII 250 MG
250 POWDER IN PACKET (EA) ORAL
Qty: 0 | Refills: 0 | Status: COMPLETED | OUTPATIENT
Start: 2017-03-29 | End: 2017-04-03

## 2017-03-29 RX ORDER — INSULIN LISPRO 100/ML
VIAL (ML) SUBCUTANEOUS AT BEDTIME
Qty: 0 | Refills: 0 | Status: DISCONTINUED | OUTPATIENT
Start: 2017-03-29 | End: 2017-04-07

## 2017-03-29 RX ORDER — GABAPENTIN 400 MG/1
200 CAPSULE ORAL
Qty: 0 | Refills: 0 | Status: DISCONTINUED | OUTPATIENT
Start: 2017-03-29 | End: 2017-03-29

## 2017-03-29 RX ORDER — OXYCODONE HYDROCHLORIDE 5 MG/1
2.5 TABLET ORAL EVERY 4 HOURS
Qty: 0 | Refills: 0 | Status: DISCONTINUED | OUTPATIENT
Start: 2017-03-29 | End: 2017-03-31

## 2017-03-29 RX ORDER — ONDANSETRON 8 MG/1
4 TABLET, FILM COATED ORAL EVERY 8 HOURS
Qty: 0 | Refills: 0 | Status: DISCONTINUED | OUTPATIENT
Start: 2017-03-29 | End: 2017-04-13

## 2017-03-29 RX ORDER — DOCUSATE SODIUM 100 MG
100 CAPSULE ORAL THREE TIMES A DAY
Qty: 0 | Refills: 0 | Status: DISCONTINUED | OUTPATIENT
Start: 2017-03-29 | End: 2017-04-04

## 2017-03-29 RX ORDER — AMLODIPINE BESYLATE 2.5 MG/1
10 TABLET ORAL DAILY
Qty: 0 | Refills: 0 | Status: DISCONTINUED | OUTPATIENT
Start: 2017-03-29 | End: 2017-04-13

## 2017-03-29 RX ORDER — INSULIN LISPRO 100/ML
VIAL (ML) SUBCUTANEOUS
Qty: 0 | Refills: 0 | Status: DISCONTINUED | OUTPATIENT
Start: 2017-03-29 | End: 2017-04-13

## 2017-03-29 RX ORDER — DEXTROSE 50 % IN WATER 50 %
12.5 SYRINGE (ML) INTRAVENOUS ONCE
Qty: 0 | Refills: 0 | Status: DISCONTINUED | OUTPATIENT
Start: 2017-03-29 | End: 2017-04-13

## 2017-03-29 RX ORDER — CIPROFLOXACIN LACTATE 400MG/40ML
500 VIAL (ML) INTRAVENOUS EVERY 12 HOURS
Qty: 0 | Refills: 0 | Status: COMPLETED | OUTPATIENT
Start: 2017-03-29 | End: 2017-04-03

## 2017-03-29 RX ORDER — ACETAMINOPHEN 500 MG
650 TABLET ORAL EVERY 6 HOURS
Qty: 0 | Refills: 0 | Status: DISCONTINUED | OUTPATIENT
Start: 2017-03-29 | End: 2017-04-13

## 2017-03-29 RX ORDER — DEXTROSE 50 % IN WATER 50 %
25 SYRINGE (ML) INTRAVENOUS ONCE
Qty: 0 | Refills: 0 | Status: DISCONTINUED | OUTPATIENT
Start: 2017-03-29 | End: 2017-04-13

## 2017-03-29 RX ORDER — GLUCAGON INJECTION, SOLUTION 0.5 MG/.1ML
1 INJECTION, SOLUTION SUBCUTANEOUS ONCE
Qty: 0 | Refills: 0 | Status: DISCONTINUED | OUTPATIENT
Start: 2017-03-29 | End: 2017-04-13

## 2017-03-29 RX ORDER — APIXABAN 2.5 MG/1
2.5 TABLET, FILM COATED ORAL
Qty: 0 | Refills: 0 | Status: DISCONTINUED | OUTPATIENT
Start: 2017-03-29 | End: 2017-04-10

## 2017-03-29 RX ORDER — LEVOTHYROXINE SODIUM 125 MCG
75 TABLET ORAL DAILY
Qty: 0 | Refills: 0 | Status: DISCONTINUED | OUTPATIENT
Start: 2017-03-29 | End: 2017-04-10

## 2017-03-29 RX ORDER — FOLIC ACID 0.8 MG
1 TABLET ORAL DAILY
Qty: 0 | Refills: 0 | Status: DISCONTINUED | OUTPATIENT
Start: 2017-03-29 | End: 2017-04-13

## 2017-03-29 RX ORDER — CLOPIDOGREL BISULFATE 75 MG/1
75 TABLET, FILM COATED ORAL DAILY
Qty: 0 | Refills: 0 | Status: DISCONTINUED | OUTPATIENT
Start: 2017-03-29 | End: 2017-03-29

## 2017-03-29 RX ORDER — FERROUS SULFATE 325(65) MG
325 TABLET ORAL DAILY
Qty: 0 | Refills: 0 | Status: DISCONTINUED | OUTPATIENT
Start: 2017-03-29 | End: 2017-04-13

## 2017-03-29 RX ORDER — GABAPENTIN 400 MG/1
200 CAPSULE ORAL
Qty: 0 | Refills: 0 | Status: DISCONTINUED | OUTPATIENT
Start: 2017-03-29 | End: 2017-04-05

## 2017-03-29 RX ORDER — OXYCODONE HYDROCHLORIDE 5 MG/1
5 TABLET ORAL EVERY 4 HOURS
Qty: 0 | Refills: 0 | Status: DISCONTINUED | OUTPATIENT
Start: 2017-03-29 | End: 2017-03-31

## 2017-03-29 RX ORDER — INSULIN GLARGINE 100 [IU]/ML
30 INJECTION, SOLUTION SUBCUTANEOUS AT BEDTIME
Qty: 0 | Refills: 0 | Status: DISCONTINUED | OUTPATIENT
Start: 2017-03-29 | End: 2017-04-05

## 2017-03-29 RX ORDER — SODIUM CHLORIDE 9 MG/ML
1 INJECTION INTRAMUSCULAR; INTRAVENOUS; SUBCUTANEOUS THREE TIMES A DAY
Qty: 0 | Refills: 0 | Status: DISCONTINUED | OUTPATIENT
Start: 2017-03-29 | End: 2017-03-30

## 2017-03-29 RX ORDER — GABAPENTIN 400 MG/1
300 CAPSULE ORAL
Qty: 0 | Refills: 0 | Status: DISCONTINUED | OUTPATIENT
Start: 2017-03-29 | End: 2017-03-29

## 2017-03-29 RX ORDER — DEXTROSE 50 % IN WATER 50 %
1 SYRINGE (ML) INTRAVENOUS ONCE
Qty: 0 | Refills: 0 | Status: DISCONTINUED | OUTPATIENT
Start: 2017-03-29 | End: 2017-04-13

## 2017-03-29 RX ORDER — GABAPENTIN 400 MG/1
900 CAPSULE ORAL AT BEDTIME
Qty: 0 | Refills: 0 | Status: DISCONTINUED | OUTPATIENT
Start: 2017-03-29 | End: 2017-04-02

## 2017-03-29 RX ORDER — PANTOPRAZOLE SODIUM 20 MG/1
40 TABLET, DELAYED RELEASE ORAL
Qty: 0 | Refills: 0 | Status: DISCONTINUED | OUTPATIENT
Start: 2017-03-29 | End: 2017-04-13

## 2017-03-29 RX ORDER — SODIUM CHLORIDE 9 MG/ML
1000 INJECTION, SOLUTION INTRAVENOUS
Qty: 0 | Refills: 0 | Status: DISCONTINUED | OUTPATIENT
Start: 2017-03-29 | End: 2017-04-13

## 2017-03-29 RX ORDER — SENNA PLUS 8.6 MG/1
2 TABLET ORAL AT BEDTIME
Qty: 0 | Refills: 0 | Status: DISCONTINUED | OUTPATIENT
Start: 2017-03-29 | End: 2017-04-04

## 2017-03-29 RX ADMIN — APIXABAN 2.5 MILLIGRAM(S): 2.5 TABLET, FILM COATED ORAL at 18:55

## 2017-03-29 RX ADMIN — Medication 650 MILLIGRAM(S): at 01:48

## 2017-03-29 RX ADMIN — SODIUM CHLORIDE 1 GRAM(S): 9 INJECTION INTRAMUSCULAR; INTRAVENOUS; SUBCUTANEOUS at 22:43

## 2017-03-29 RX ADMIN — SENNA PLUS 2 TABLET(S): 8.6 TABLET ORAL at 22:42

## 2017-03-29 RX ADMIN — SODIUM CHLORIDE 1 GRAM(S): 9 INJECTION INTRAMUSCULAR; INTRAVENOUS; SUBCUTANEOUS at 11:53

## 2017-03-29 RX ADMIN — Medication 650 MILLIGRAM(S): at 00:48

## 2017-03-29 RX ADMIN — Medication 200 MILLIGRAM(S): at 05:28

## 2017-03-29 RX ADMIN — PANTOPRAZOLE SODIUM 40 MILLIGRAM(S): 20 TABLET, DELAYED RELEASE ORAL at 05:30

## 2017-03-29 RX ADMIN — INSULIN GLARGINE 30 UNIT(S): 100 INJECTION, SOLUTION SUBCUTANEOUS at 22:42

## 2017-03-29 RX ADMIN — Medication 1 TABLET(S): at 11:54

## 2017-03-29 RX ADMIN — Medication 0.25 MILLIGRAM(S): at 22:38

## 2017-03-29 RX ADMIN — Medication 100 MILLIGRAM(S): at 22:40

## 2017-03-29 RX ADMIN — Medication 650 MILLIGRAM(S): at 11:52

## 2017-03-29 RX ADMIN — ATORVASTATIN CALCIUM 40 MILLIGRAM(S): 80 TABLET, FILM COATED ORAL at 22:42

## 2017-03-29 RX ADMIN — Medication 75 MICROGRAM(S): at 05:31

## 2017-03-29 RX ADMIN — TAMSULOSIN HYDROCHLORIDE 0.4 MILLIGRAM(S): 0.4 CAPSULE ORAL at 22:43

## 2017-03-29 RX ADMIN — Medication 250 MILLIGRAM(S): at 19:00

## 2017-03-29 RX ADMIN — Medication 100 MILLIGRAM(S): at 05:31

## 2017-03-29 RX ADMIN — Medication 650 MILLIGRAM(S): at 22:38

## 2017-03-29 RX ADMIN — OXYCODONE HYDROCHLORIDE 5 MILLIGRAM(S): 5 TABLET ORAL at 21:50

## 2017-03-29 RX ADMIN — Medication 2: at 18:56

## 2017-03-29 RX ADMIN — Medication 650 MILLIGRAM(S): at 13:00

## 2017-03-29 RX ADMIN — Medication 5 UNIT(S): at 18:57

## 2017-03-29 RX ADMIN — OXYCODONE HYDROCHLORIDE 5 MILLIGRAM(S): 5 TABLET ORAL at 20:51

## 2017-03-29 RX ADMIN — Medication 650 MILLIGRAM(S): at 23:38

## 2017-03-29 RX ADMIN — OXYCODONE HYDROCHLORIDE 5 MILLIGRAM(S): 5 TABLET ORAL at 06:00

## 2017-03-29 RX ADMIN — Medication 50 MILLIGRAM(S): at 05:30

## 2017-03-29 RX ADMIN — Medication 650 MILLIGRAM(S): at 05:28

## 2017-03-29 RX ADMIN — Medication 500 MILLIGRAM(S): at 18:54

## 2017-03-29 RX ADMIN — AMLODIPINE BESYLATE 10 MILLIGRAM(S): 2.5 TABLET ORAL at 05:33

## 2017-03-29 RX ADMIN — Medication 50 MILLIGRAM(S): at 19:00

## 2017-03-29 RX ADMIN — POLYETHYLENE GLYCOL 3350 17 GRAM(S): 17 POWDER, FOR SOLUTION ORAL at 11:52

## 2017-03-29 RX ADMIN — Medication 2: at 08:32

## 2017-03-29 RX ADMIN — Medication 5 UNIT(S): at 12:55

## 2017-03-29 RX ADMIN — GABAPENTIN 300 MILLIGRAM(S): 400 CAPSULE ORAL at 09:56

## 2017-03-29 RX ADMIN — Medication 650 MILLIGRAM(S): at 06:00

## 2017-03-29 RX ADMIN — Medication 325 MILLIGRAM(S): at 05:30

## 2017-03-29 RX ADMIN — APIXABAN 2.5 MILLIGRAM(S): 2.5 TABLET, FILM COATED ORAL at 09:56

## 2017-03-29 RX ADMIN — Medication 5 UNIT(S): at 08:33

## 2017-03-29 RX ADMIN — GABAPENTIN 900 MILLIGRAM(S): 400 CAPSULE ORAL at 22:39

## 2017-03-29 RX ADMIN — OXYCODONE HYDROCHLORIDE 5 MILLIGRAM(S): 5 TABLET ORAL at 05:01

## 2017-03-29 RX ADMIN — Medication 1 TABLET(S): at 09:56

## 2017-03-29 RX ADMIN — SODIUM CHLORIDE 1 GRAM(S): 9 INJECTION INTRAMUSCULAR; INTRAVENOUS; SUBCUTANEOUS at 09:57

## 2017-03-29 RX ADMIN — Medication 1 MILLIGRAM(S): at 11:53

## 2017-03-29 NOTE — PROGRESS NOTE ADULT - SUBJECTIVE AND OBJECTIVE BOX
NEPHROLOGY INTERVAL HPI/OVERNIGHT EVENTS:  pt resting comfortably  no new complaints  no cp, sob, n/v/d  juarez with clear urine    MEDICATIONS  (STANDING):  amLODIPine   Tablet 10milliGRAM(s) Oral daily  atorvastatin 40milliGRAM(s) Oral at bedtime  folic acid 1milliGRAM(s) Oral daily  metoprolol 50milliGRAM(s) Oral every 12 hours  pantoprazole    Tablet 40milliGRAM(s) Oral before breakfast  levothyroxine 75MICROGram(s) Oral daily  multivitamin/minerals 1Tablet(s) Oral daily  multivitamin 1Tablet(s) Oral daily  ferrous    sulfate 325milliGRAM(s) Oral every 12 hours  apixaban 2.5milliGRAM(s) Oral two times a day  insulin lispro Injectable (HumaLOG) 5Unit(s) SubCutaneous before breakfast  insulin lispro Injectable (HumaLOG) 5Unit(s) SubCutaneous before lunch  insulin lispro Injectable (HumaLOG) 5Unit(s) SubCutaneous before dinner  insulin lispro (HumaLOG) corrective regimen sliding scale  SubCutaneous three times a day before meals  dextrose 5%. 1000milliLiter(s) IV Continuous <Continuous>  dextrose 50% Injectable 12.5Gram(s) IV Push once  dextrose 50% Injectable 25Gram(s) IV Push once  dextrose 50% Injectable 25Gram(s) IV Push once  gabapentin 300milliGRAM(s) Oral <User Schedule>  sodium chloride 1Gram(s) Oral three times a day with meals  acetaminophen   Tablet. 650milliGRAM(s) Oral every 6 hours  ciprofloxacin   IVPB 400milliGRAM(s) IV Intermittent every 12 hours  ciprofloxacin   IVPB  IV Intermittent   lactobacillus acidophilus 1Tablet(s) Oral two times a day with meals  lidocaine 2% Gel 1Application(s) Topical once  tamsulosin 0.4milliGRAM(s) Oral at bedtime  insulin glargine Injectable (LANTUS) 30Unit(s) SubCutaneous at bedtime  gabapentin 900milliGRAM(s) Oral at bedtime  senna 2Tablet(s) Oral at bedtime  polyethylene glycol 3350 17Gram(s) Oral daily  docusate sodium 100milliGRAM(s) Oral two times a day  clotrimazole 1% Cream 1Application(s) Topical daily  gabapentin 200milliGRAM(s) Oral daily    MEDICATIONS  (PRN):  ondansetron Injectable 4milliGRAM(s) IV Push every 6 hours PRN Nausea  dextrose Gel 1Dose(s) Oral once PRN Blood Glucose LESS THAN 70 milliGRAM(s)/deciliter  glucagon  Injectable 1milliGRAM(s) IntraMuscular once PRN Glucose LESS THAN 70 milligrams/deciliter  oxyCODONE IR 5milliGRAM(s) Oral every 6 hours PRN Severe Pain (7 - 10)  oxyCODONE IR 2.5milliGRAM(s) Oral every 6 hours PRN Moderate Pain (4 - 6)  ALPRAZolam 0.25milliGRAM(s) Oral every 8 hours PRN anxiety      Allergies    Benadryl (Other)  Demerol HCl (Other)    Intolerances            Vital Signs Last 24 Hrs  T(C): 36.7, Max: 36.9 (03-28 @ 21:43)  T(F): 98.1, Max: 98.4 (03-28 @ 21:43)  HR: 72 (72 - 86)  BP: 130/50 (128/78 - 132/62)  BP(mean): --  RR: 16 (16 - 17)  SpO2: 98% (97% - 98%)    PHYSICAL EXAM:  GENERAL: weakness  HEAD: nc/at    NECK: no jvd  NERVOUS SYSTEM: AAO x 3 non focal  CHEST/LUNG: no rhonchi  HEART: no rub  ABDOMEN: not tender +BS  EXTREMITIES: no edema      LABS:  Sodium, Serum: 132 mmol/L (03.27.17 @ 06:21)    Creatinine, Serum: 0.86 mg/dL (03.27.17 @ 06:21)    Osmolality, Random Urine: 400 mosm/kg (03.22.17 @ 16:39)                    RADIOLOGY & ADDITIONAL TESTS:   EXAM:  CHEST SINGLE VIEW FRONTAL                          PROCEDURE DATE:  03/22/2017        INTERPRETATION:  CHEST AP PORTABLE:    History: r/o infiltrate. Fever.    Date and time of exam: 3/22/2017 2:31 PM.    Technique: A single AP view of the chest was obtained.    Comparison exam: No prior exam.    Findings:  The lungs are clear. The heart and mediastinum unremarkable. No hilar   abnormality. No evidence of a pleural effusion. There are degenerative   changes in the spine..    Impression:  No evidence of acute cardiopulmonary disease..

## 2017-03-29 NOTE — PROGRESS NOTE ADULT - ASSESSMENT
Hyponatremia: consider SIADH  - oral fluid restrict and NaCl tabs  - avoid excessive hypotonic fluids  - re check urine studies

## 2017-03-30 DIAGNOSIS — I10 ESSENTIAL (PRIMARY) HYPERTENSION: ICD-10-CM

## 2017-03-30 DIAGNOSIS — Z29.9 ENCOUNTER FOR PROPHYLACTIC MEASURES, UNSPECIFIED: ICD-10-CM

## 2017-03-30 DIAGNOSIS — E11.9 TYPE 2 DIABETES MELLITUS WITHOUT COMPLICATIONS: ICD-10-CM

## 2017-03-30 DIAGNOSIS — I73.9 PERIPHERAL VASCULAR DISEASE, UNSPECIFIED: ICD-10-CM

## 2017-03-30 DIAGNOSIS — D62 ACUTE POSTHEMORRHAGIC ANEMIA: ICD-10-CM

## 2017-03-30 DIAGNOSIS — E87.1 HYPO-OSMOLALITY AND HYPONATREMIA: ICD-10-CM

## 2017-03-30 DIAGNOSIS — I25.10 ATHEROSCLEROTIC HEART DISEASE OF NATIVE CORONARY ARTERY WITHOUT ANGINA PECTORIS: ICD-10-CM

## 2017-03-30 DIAGNOSIS — N39.0 URINARY TRACT INFECTION, SITE NOT SPECIFIED: ICD-10-CM

## 2017-03-30 DIAGNOSIS — R33.9 RETENTION OF URINE, UNSPECIFIED: ICD-10-CM

## 2017-03-30 DIAGNOSIS — I48.0 PAROXYSMAL ATRIAL FIBRILLATION: ICD-10-CM

## 2017-03-30 LAB
ANION GAP SERPL CALC-SCNC: 14 MMOL/L — SIGNIFICANT CHANGE UP (ref 5–17)
BUN SERPL-MCNC: 16 MG/DL — SIGNIFICANT CHANGE UP (ref 8–20)
CALCIUM SERPL-MCNC: 8.7 MG/DL — SIGNIFICANT CHANGE UP (ref 8.6–10.2)
CHLORIDE SERPL-SCNC: 99 MMOL/L — SIGNIFICANT CHANGE UP (ref 98–107)
CO2 SERPL-SCNC: 21 MMOL/L — LOW (ref 22–29)
CREAT SERPL-MCNC: 0.92 MG/DL — SIGNIFICANT CHANGE UP (ref 0.5–1.3)
GLUCOSE SERPL-MCNC: 132 MG/DL — HIGH (ref 70–115)
HCT VFR BLD CALC: 24.5 % — LOW (ref 37–47)
HGB BLD-MCNC: 7.9 G/DL — LOW (ref 12–16)
MCHC RBC-ENTMCNC: 27.6 PG — SIGNIFICANT CHANGE UP (ref 27–31)
MCHC RBC-ENTMCNC: 32.2 G/DL — SIGNIFICANT CHANGE UP (ref 32–36)
MCV RBC AUTO: 85.7 FL — SIGNIFICANT CHANGE UP (ref 81–99)
PLATELET # BLD AUTO: 363 K/UL — SIGNIFICANT CHANGE UP (ref 150–400)
POTASSIUM SERPL-MCNC: 4.1 MMOL/L — SIGNIFICANT CHANGE UP (ref 3.5–5.3)
POTASSIUM SERPL-SCNC: 4.1 MMOL/L — SIGNIFICANT CHANGE UP (ref 3.5–5.3)
PREALB SERPL-MCNC: 11 MG/DL — LOW (ref 18–38)
RBC # BLD: 2.86 M/UL — LOW (ref 4.4–5.2)
RBC # FLD: 14.7 % — SIGNIFICANT CHANGE UP (ref 11–15.6)
SODIUM SERPL-SCNC: 134 MMOL/L — LOW (ref 135–145)
WBC # BLD: 7.7 K/UL — SIGNIFICANT CHANGE UP (ref 4.8–10.8)
WBC # FLD AUTO: 7.7 K/UL — SIGNIFICANT CHANGE UP (ref 4.8–10.8)

## 2017-03-30 PROCEDURE — 99233 SBSQ HOSP IP/OBS HIGH 50: CPT

## 2017-03-30 PROCEDURE — 99222 1ST HOSP IP/OBS MODERATE 55: CPT | Mod: AI

## 2017-03-30 RX ORDER — SOD,AMMONIUM,POTASSIUM LACTATE
1 CREAM (GRAM) TOPICAL
Qty: 0 | Refills: 0 | Status: DISCONTINUED | OUTPATIENT
Start: 2017-03-30 | End: 2017-04-07

## 2017-03-30 RX ORDER — SODIUM CHLORIDE 9 MG/ML
1 INJECTION INTRAMUSCULAR; INTRAVENOUS; SUBCUTANEOUS DAILY
Qty: 0 | Refills: 0 | Status: DISCONTINUED | OUTPATIENT
Start: 2017-03-30 | End: 2017-03-31

## 2017-03-30 RX ORDER — FENTANYL CITRATE 50 UG/ML
1 INJECTION INTRAVENOUS
Qty: 0 | Refills: 0 | Status: DISCONTINUED | OUTPATIENT
Start: 2017-03-30 | End: 2017-04-02

## 2017-03-30 RX ORDER — ASCORBIC ACID 60 MG
500 TABLET,CHEWABLE ORAL DAILY
Qty: 0 | Refills: 0 | Status: DISCONTINUED | OUTPATIENT
Start: 2017-03-30 | End: 2017-04-13

## 2017-03-30 RX ORDER — ZINC SULFATE TAB 220 MG (50 MG ZINC EQUIVALENT) 220 (50 ZN) MG
220 TAB ORAL DAILY
Qty: 0 | Refills: 0 | Status: DISCONTINUED | OUTPATIENT
Start: 2017-03-30 | End: 2017-04-13

## 2017-03-30 RX ADMIN — OXYCODONE HYDROCHLORIDE 5 MILLIGRAM(S): 5 TABLET ORAL at 16:24

## 2017-03-30 RX ADMIN — ATORVASTATIN CALCIUM 40 MILLIGRAM(S): 80 TABLET, FILM COATED ORAL at 21:15

## 2017-03-30 RX ADMIN — Medication 5 UNIT(S): at 17:22

## 2017-03-30 RX ADMIN — GABAPENTIN 300 MILLIGRAM(S): 400 CAPSULE ORAL at 07:48

## 2017-03-30 RX ADMIN — APIXABAN 2.5 MILLIGRAM(S): 2.5 TABLET, FILM COATED ORAL at 06:26

## 2017-03-30 RX ADMIN — Medication 1 TABLET(S): at 12:21

## 2017-03-30 RX ADMIN — Medication 325 MILLIGRAM(S): at 12:19

## 2017-03-30 RX ADMIN — OXYCODONE HYDROCHLORIDE 5 MILLIGRAM(S): 5 TABLET ORAL at 11:10

## 2017-03-30 RX ADMIN — INSULIN GLARGINE 30 UNIT(S): 100 INJECTION, SOLUTION SUBCUTANEOUS at 21:22

## 2017-03-30 RX ADMIN — Medication 250 MILLIGRAM(S): at 06:26

## 2017-03-30 RX ADMIN — Medication 0.25 MILLIGRAM(S): at 21:13

## 2017-03-30 RX ADMIN — PANTOPRAZOLE SODIUM 40 MILLIGRAM(S): 20 TABLET, DELAYED RELEASE ORAL at 06:25

## 2017-03-30 RX ADMIN — FENTANYL CITRATE 1 PATCH: 50 INJECTION INTRAVENOUS at 15:24

## 2017-03-30 RX ADMIN — Medication 5 UNIT(S): at 12:18

## 2017-03-30 RX ADMIN — SODIUM CHLORIDE 1 GRAM(S): 9 INJECTION INTRAMUSCULAR; INTRAVENOUS; SUBCUTANEOUS at 17:21

## 2017-03-30 RX ADMIN — GABAPENTIN 900 MILLIGRAM(S): 400 CAPSULE ORAL at 21:14

## 2017-03-30 RX ADMIN — Medication 50 MILLIGRAM(S): at 06:25

## 2017-03-30 RX ADMIN — Medication 500 MILLIGRAM(S): at 17:22

## 2017-03-30 RX ADMIN — Medication 5 UNIT(S): at 09:23

## 2017-03-30 RX ADMIN — Medication 100 MILLIGRAM(S): at 06:25

## 2017-03-30 RX ADMIN — APIXABAN 2.5 MILLIGRAM(S): 2.5 TABLET, FILM COATED ORAL at 17:22

## 2017-03-30 RX ADMIN — Medication 75 MICROGRAM(S): at 06:25

## 2017-03-30 RX ADMIN — Medication 500 MILLIGRAM(S): at 17:21

## 2017-03-30 RX ADMIN — ONDANSETRON 4 MILLIGRAM(S): 8 TABLET, FILM COATED ORAL at 17:26

## 2017-03-30 RX ADMIN — Medication 650 MILLIGRAM(S): at 11:10

## 2017-03-30 RX ADMIN — SODIUM CHLORIDE 1 GRAM(S): 9 INJECTION INTRAMUSCULAR; INTRAVENOUS; SUBCUTANEOUS at 06:25

## 2017-03-30 RX ADMIN — Medication 1 APPLICATION(S): at 23:00

## 2017-03-30 RX ADMIN — Medication 50 MILLIGRAM(S): at 17:23

## 2017-03-30 RX ADMIN — AMLODIPINE BESYLATE 10 MILLIGRAM(S): 2.5 TABLET ORAL at 06:25

## 2017-03-30 RX ADMIN — GABAPENTIN 200 MILLIGRAM(S): 400 CAPSULE ORAL at 13:09

## 2017-03-30 RX ADMIN — OXYCODONE HYDROCHLORIDE 5 MILLIGRAM(S): 5 TABLET ORAL at 10:10

## 2017-03-30 RX ADMIN — OXYCODONE HYDROCHLORIDE 5 MILLIGRAM(S): 5 TABLET ORAL at 15:24

## 2017-03-30 RX ADMIN — Medication 100 MILLIGRAM(S): at 21:14

## 2017-03-30 RX ADMIN — Medication 1 MILLIGRAM(S): at 12:20

## 2017-03-30 RX ADMIN — TAMSULOSIN HYDROCHLORIDE 0.4 MILLIGRAM(S): 0.4 CAPSULE ORAL at 21:15

## 2017-03-30 RX ADMIN — Medication 250 MILLIGRAM(S): at 17:23

## 2017-03-30 RX ADMIN — Medication 650 MILLIGRAM(S): at 10:11

## 2017-03-30 RX ADMIN — Medication 500 MILLIGRAM(S): at 06:25

## 2017-03-30 RX ADMIN — SENNA PLUS 2 TABLET(S): 8.6 TABLET ORAL at 21:13

## 2017-03-30 RX ADMIN — Medication 2: at 17:21

## 2017-03-30 RX ADMIN — ZINC SULFATE TAB 220 MG (50 MG ZINC EQUIVALENT) 220 MILLIGRAM(S): 220 (50 ZN) TAB at 17:21

## 2017-03-30 NOTE — PROGRESS NOTE ADULT - SUBJECTIVE AND OBJECTIVE BOX
HISTORY OF PRESENT ILLNESS  85 yr old RH female with multiple medical problems including CAD s/p stents, diabetes, hypertension, hyperlipidemia, anxiety, significant PVD s/p multiple stents, paroxysmal atrial fibrillation was admitted 3/9/17 for elective LE angiogram. Patient was noted to have elevated BP post procedure, and hence admission requested by Dr. Irwin. Per him, patient with occluded SFA, POP b/l with minimal outflow to b/l LE. 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   Hospital course complicated by:  -  pain control management, patient being followed by palliative care.  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, being followed by nephrology   - UTI and urinary retention- currently on IV cipro, flomax, juarez catheter     NB: check with vascular surgery/cardiology about need for plavix [ ]    PMHx -   Hyperlipidemia, Hypertension, Diabetes, PVD (peripheral vascular disease), CAD (coronary artery disease)  History of cholecystectomy, H/O angioplasty      TODAY'S SUBJECTIVE & REVIEW OF SYMPTOMS  [X] Constitutional WNL     [X] Cardio WNL            [X] Resp WNL           [X] GI WNL                          [ ]  WNL                   [X] Heme WNL              [X] Endo WNL                     [ ] Skin WNL                 [ ] MSK WNL            [X] Neuro WNL                   [X] Cognitive WNL        [X] Psych WNL    No acute events overnight. Patient slept well, pain is controlled with meds, although when they wear off the severity can be 10/10. As per nursing, patient had some stool "ooze" (miralax discontinued).    VITALS  Vital Signs Last 24 Hrs  T(C): 35.6, Max: 36.9 (03-29 @ 21:08)  T(F): 96, Max: 98.5 (03-29 @ 21:08)  HR: 84 (82 - 86)  BP: 133/65 (133/65 - 152/71)  BP(mean): --  RR: 17 (17 - 18)  SpO2: 97% (95% - 97%)    PHYSICAL EXAM  Constitutional - NAD, Comfortable  HEENT - NCAT, EOMI  Neck - Supple, No limited ROM  Chest - CTA bilaterally, No wheeze, No rhonchi, No crackles  Cardiovascular - RRR, S1S2, No murmurs  Abdomen - BS+, Soft, NTND  Extremities - No C/C/E, No calf tenderness   Neurologic Exam -                    Cognitive - Awake, Alert, AAO to self, place, date, year, situation     Communication - Fluent, No dysarthria     Cranial Nerves - CN 2-12 intact    Psychiatric - Mood stable, Affect WNL  Skin - moisture dermatitis in intergluteal folds, right heel DTI  Wounds - left BKA residual limb with staples- erythematous, serous drainage centrally (reviewed by vascular surg)    FUNCTIONAL PROGRESS  to be evaluated, max assist on admission     RECENT LABS                        7.9    7.7   )-----------( 363      ( 30 Mar 2017 07:01 )             24.5     30 Mar 2017 07:01    134    |  99     |  16.0   ----------------------------<  132    4.1     |  21.0   |  0.92     Ca    8.7        30 Mar 2017 07:01    CAPILLARY BLOOD GLUCOSE  144 (30 Mar 2017 12:09)  97 (30 Mar 2017 08:11)  233 (29 Mar 2017 21:08)      MEDICATIONS  (STANDING):  multivitamin 1Tablet(s) Oral daily  senna 2Tablet(s) Oral at bedtime  docusate sodium 100milliGRAM(s) Oral three times a day  tamsulosin 0.4milliGRAM(s) Oral at bedtime  apixaban 2.5milliGRAM(s) Oral two times a day  gabapentin 900milliGRAM(s) Oral at bedtime  insulin glargine Injectable (LANTUS) 30Unit(s) SubCutaneous at bedtime  insulin lispro Injectable (HumaLOG) 5Unit(s) SubCutaneous before breakfast  insulin lispro Injectable (HumaLOG) 5Unit(s) SubCutaneous before lunch  insulin lispro Injectable (HumaLOG) 5Unit(s) SubCutaneous before dinner  insulin lispro (HumaLOG) corrective regimen sliding scale  SubCutaneous three times a day before meals  insulin lispro (HumaLOG) corrective regimen sliding scale  SubCutaneous at bedtime  dextrose 5%. 1000milliLiter(s) IV Continuous <Continuous>  dextrose 50% Injectable 12.5Gram(s) IV Push once  dextrose 50% Injectable 25Gram(s) IV Push once  dextrose 50% Injectable 25Gram(s) IV Push once  atorvastatin 40milliGRAM(s) Oral at bedtime  metoprolol 50milliGRAM(s) Oral two times a day  amLODIPine   Tablet 10milliGRAM(s) Oral daily  ferrous    sulfate 325milliGRAM(s) Oral daily  sodium chloride 1Gram(s) Oral three times a day  pantoprazole    Tablet 40milliGRAM(s) Oral before breakfast  ciprofloxacin     Tablet 500milliGRAM(s) Oral every 12 hours  levothyroxine 75MICROGram(s) Oral daily  folic acid 1milliGRAM(s) Oral daily  saccharomyces boulardii 250milliGRAM(s) Oral two times a day  gabapentin 200milliGRAM(s) Oral <User Schedule>  gabapentin 300milliGRAM(s) Oral <User Schedule>  zinc sulfate 220milliGRAM(s) Oral daily  ascorbic acid 500milliGRAM(s) Oral daily    MEDICATIONS  (PRN):  acetaminophen   Tablet 650milliGRAM(s) Oral every 6 hours PRN For Temp greater than 38 C (100.4 F)  acetaminophen   Tablet. 650milliGRAM(s) Oral every 6 hours PRN Mild Pain (1 - 3)  aluminum hydroxide/magnesium hydroxide/simethicone Suspension 30milliLiter(s) Oral every 4 hours PRN Dyspepsia  oxyCODONE IR 5milliGRAM(s) Oral every 4 hours PRN Severe Pain (7 - 10)  dextrose Gel 1Dose(s) Oral once PRN Blood Glucose LESS THAN 70 milliGRAM(s)/deciLiter  glucagon  Injectable 1milliGRAM(s) IntraMuscular once PRN Glucose <70 milliGRAM(s)/deciLiter  ondansetron   Disintegrating Tablet 4milliGRAM(s) Oral every 8 hours PRN Nausea and/or Vomiting  ALPRAZolam 0.25milliGRAM(s) Oral every 8 hours PRN anxiety  oxyCODONE IR 2.5milliGRAM(s) Oral every 4 hours PRN Moderate Pain (4 - 6)        ASSESSMENT & PLAN    84yo female with DM, CAD, PVD s/p right BKA with gait and functional impairments    Post-op pain control, phantom pain, anxiety: tylenol, oxycodone, gabapentin TID, xanax   Acute blood loss anemia: 7.9/25.5, continue ferrous sulphate and folic acid, repeat CBC tomorrow. No suspicion for active bleeding at this time.  UTI: 5 day course of cirpo BID and florastar  Hyponatremia: resolving, Na+ 134, continue sodium tabs.  BKA wound healing: MVI, vit C, zinc, dressing change daily  right heel DTI: z loretta boot and pillow for off-loading heel, lac-hydrin  DMT2: lantus, premeal insulin, SSI AC HS  pAFib/CAD: Eliquis (also DVT ppx), statin, metoprolol  urinary retention: maintain Juarez catheter care, trial of void to be determined     Continue comprehensive acute rehab program consisting of 3hrs/day of OT/PT HISTORY OF PRESENT ILLNESS  85 yr old RH female with multiple medical problems including CAD s/p stents, diabetes, hypertension, hyperlipidemia, anxiety, significant PVD s/p multiple stents, paroxysmal atrial fibrillation was admitted 3/9/17 for elective LE angiogram. Patient was noted to have elevated BP post procedure, and hence admission requested by Dr. Irwin. Per him, patient with occluded SFA, POP b/l with minimal outflow to b/l LE. 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   Hospital course complicated by:  -  pain control management, patient being followed by palliative care.  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, being followed by nephrology   - UTI and urinary retention- currently on IV cipro, flomax, jaurez catheter     NB: check with vascular surgery/cardiology about need for plavix [ ]    PMHx -   Hyperlipidemia, Hypertension, Diabetes, PVD (peripheral vascular disease), CAD (coronary artery disease)  History of cholecystectomy, H/O angioplasty      TODAY'S SUBJECTIVE & REVIEW OF SYMPTOMS  [X] Constitutional WNL     [X] Cardio WNL            [X] Resp WNL           [X] GI WNL                          [ ]  WNL                   [X] Heme WNL              [X] Endo WNL                     [ ] Skin WNL                 [ ] MSK WNL            [X] Neuro WNL                   [X] Cognitive WNL        [X] Psych WNL    No acute events overnight. Patient slept well, pain is controlled with meds, although when they wear off the severity can be 10/10. As per nursing, patient had some stool "ooze" (miralax discontinued).    VITALS  Vital Signs Last 24 Hrs  T(C): 35.6, Max: 36.9 (03-29 @ 21:08)  T(F): 96, Max: 98.5 (03-29 @ 21:08)  HR: 84 (82 - 86)  BP: 133/65 (133/65 - 152/71)  BP(mean): --  RR: 17 (17 - 18)  SpO2: 97% (95% - 97%)    PHYSICAL EXAM  Constitutional - NAD, Comfortable  HEENT - NCAT, EOMI  Neck - Supple, No limited ROM  Chest - CTA bilaterally, No wheeze, No rhonchi, No crackles  Cardiovascular - RRR, S1S2, No murmurs  Abdomen - BS+, Soft, NTND  Extremities - No C/C/E, No calf tenderness   Neurologic Exam -                    Cognitive - Awake, Alert, AAO to self, place, date, year, situation     Communication - Fluent, No dysarthria     Cranial Nerves - CN 2-12 intact    Psychiatric - Mood stable, Affect WNL  Skin - moisture dermatitis in intergluteal folds, right heel DTI  Wounds - left BKA residual limb with staples- erythematous, serous drainage centrally (reviewed by vascular surg)    FUNCTIONAL PROGRESS  to be evaluated, max assist on admission     RECENT LABS                        7.9    7.7   )-----------( 363      ( 30 Mar 2017 07:01 )             24.5     30 Mar 2017 07:01    134    |  99     |  16.0   ----------------------------<  132    4.1     |  21.0   |  0.92     Ca    8.7        30 Mar 2017 07:01    CAPILLARY BLOOD GLUCOSE  144 (30 Mar 2017 12:09)  97 (30 Mar 2017 08:11)  233 (29 Mar 2017 21:08)      MEDICATIONS  (STANDING):  multivitamin 1Tablet(s) Oral daily  senna 2Tablet(s) Oral at bedtime  docusate sodium 100milliGRAM(s) Oral three times a day  tamsulosin 0.4milliGRAM(s) Oral at bedtime  apixaban 2.5milliGRAM(s) Oral two times a day  gabapentin 900milliGRAM(s) Oral at bedtime  insulin glargine Injectable (LANTUS) 30Unit(s) SubCutaneous at bedtime  insulin lispro Injectable (HumaLOG) 5Unit(s) SubCutaneous before breakfast  insulin lispro Injectable (HumaLOG) 5Unit(s) SubCutaneous before lunch  insulin lispro Injectable (HumaLOG) 5Unit(s) SubCutaneous before dinner  insulin lispro (HumaLOG) corrective regimen sliding scale  SubCutaneous three times a day before meals  insulin lispro (HumaLOG) corrective regimen sliding scale  SubCutaneous at bedtime  dextrose 5%. 1000milliLiter(s) IV Continuous <Continuous>  dextrose 50% Injectable 12.5Gram(s) IV Push once  dextrose 50% Injectable 25Gram(s) IV Push once  dextrose 50% Injectable 25Gram(s) IV Push once  atorvastatin 40milliGRAM(s) Oral at bedtime  metoprolol 50milliGRAM(s) Oral two times a day  amLODIPine   Tablet 10milliGRAM(s) Oral daily  ferrous    sulfate 325milliGRAM(s) Oral daily  sodium chloride 1Gram(s) Oral three times a day  pantoprazole    Tablet 40milliGRAM(s) Oral before breakfast  ciprofloxacin     Tablet 500milliGRAM(s) Oral every 12 hours  levothyroxine 75MICROGram(s) Oral daily  folic acid 1milliGRAM(s) Oral daily  saccharomyces boulardii 250milliGRAM(s) Oral two times a day  gabapentin 200milliGRAM(s) Oral <User Schedule>  gabapentin 300milliGRAM(s) Oral <User Schedule>  zinc sulfate 220milliGRAM(s) Oral daily  ascorbic acid 500milliGRAM(s) Oral daily    MEDICATIONS  (PRN):  acetaminophen   Tablet 650milliGRAM(s) Oral every 6 hours PRN For Temp greater than 38 C (100.4 F)  acetaminophen   Tablet. 650milliGRAM(s) Oral every 6 hours PRN Mild Pain (1 - 3)  aluminum hydroxide/magnesium hydroxide/simethicone Suspension 30milliLiter(s) Oral every 4 hours PRN Dyspepsia  oxyCODONE IR 5milliGRAM(s) Oral every 4 hours PRN Severe Pain (7 - 10)  dextrose Gel 1Dose(s) Oral once PRN Blood Glucose LESS THAN 70 milliGRAM(s)/deciLiter  glucagon  Injectable 1milliGRAM(s) IntraMuscular once PRN Glucose <70 milliGRAM(s)/deciLiter  ondansetron   Disintegrating Tablet 4milliGRAM(s) Oral every 8 hours PRN Nausea and/or Vomiting  ALPRAZolam 0.25milliGRAM(s) Oral every 8 hours PRN anxiety  oxyCODONE IR 2.5milliGRAM(s) Oral every 4 hours PRN Moderate Pain (4 - 6)        ASSESSMENT & PLAN    86yo female with DM, CAD, PVD s/p right BKA with gait and functional impairments    Post-op pain control, phantom pain, anxiety: tylenol, oxycodone, gabapentin TID, xanax   Acute blood loss anemia: 7.9/25.5, continue ferrous sulphate and folic acid, repeat CBC tomorrow. No suspicion for active bleeding at this time. Monitor closely as patient's on Eliquis  UTI: 5 day course of cipro BID and florastar  Hyponatremia: resolving, Na+ 134, continue sodium tabs, appreciate recs from nephrology  BKA wound healing: MVI, vit C, zinc, dressing change daily  right heel DTI: z loretta boot and pillow for off-loading heel, lac-hydrin  DMT2: lantus, premeal insulin, SSI AC HS  pAFib/CAD: Eliquis (also DVT ppx), statin, metoprolol  urinary retention: maintain Juarez catheter care, trial of void to be determined     Continue comprehensive acute rehab program consisting of 3hrs/day of OT/PT

## 2017-03-30 NOTE — PROGRESS NOTE ADULT - ASSESSMENT
Hyponatremia: consider SIADH  - oral fluid restrict and NaCl tabs  - avoid excessive hypotonic fluids  - monitor labs

## 2017-03-30 NOTE — PROGRESS NOTE ADULT - ASSESSMENT
85 yr old female with PMH CAD s/p stents, DM2, hypertension, hyperlipidemia, anxiety, significant PVD s/p multiple stents, paroxysmal atrial fibrillation on Eliquis admitted for hypertensive urgency post LE angiogram. 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 Hospital course complicated by urinary retention with UTI (failed TOV), hyponatremia likely due to pain and improved with NaCl (per nephrology) and pain medications. BP meds and insulin coverage titrated for better control.  Patient stable for discharge to acute rehab.

## 2017-03-30 NOTE — PROGRESS NOTE ADULT - SUBJECTIVE AND OBJECTIVE BOX
NEPHROLOGY INTERVAL HPI/OVERNIGHT EVENTS:  pt clinically stable  no new complaints  no cp, sob, n/v    MEDICATIONS  (STANDING):  multivitamin 1Tablet(s) Oral daily  senna 2Tablet(s) Oral at bedtime  docusate sodium 100milliGRAM(s) Oral three times a day  polyethylene glycol 3350 17Gram(s) Oral daily  tamsulosin 0.4milliGRAM(s) Oral at bedtime  apixaban 2.5milliGRAM(s) Oral two times a day  gabapentin 900milliGRAM(s) Oral at bedtime  insulin glargine Injectable (LANTUS) 30Unit(s) SubCutaneous at bedtime  insulin lispro Injectable (HumaLOG) 5Unit(s) SubCutaneous before breakfast  insulin lispro Injectable (HumaLOG) 5Unit(s) SubCutaneous before lunch  insulin lispro Injectable (HumaLOG) 5Unit(s) SubCutaneous before dinner  insulin lispro (HumaLOG) corrective regimen sliding scale  SubCutaneous three times a day before meals  insulin lispro (HumaLOG) corrective regimen sliding scale  SubCutaneous at bedtime  dextrose 5%. 1000milliLiter(s) IV Continuous <Continuous>  dextrose 50% Injectable 12.5Gram(s) IV Push once  dextrose 50% Injectable 25Gram(s) IV Push once  dextrose 50% Injectable 25Gram(s) IV Push once  atorvastatin 40milliGRAM(s) Oral at bedtime  metoprolol 50milliGRAM(s) Oral two times a day  amLODIPine   Tablet 10milliGRAM(s) Oral daily  ferrous    sulfate 325milliGRAM(s) Oral daily  sodium chloride 1Gram(s) Oral three times a day  pantoprazole    Tablet 40milliGRAM(s) Oral before breakfast  ciprofloxacin     Tablet 500milliGRAM(s) Oral every 12 hours  levothyroxine 75MICROGram(s) Oral daily  folic acid 1milliGRAM(s) Oral daily  saccharomyces boulardii 250milliGRAM(s) Oral two times a day  gabapentin 200milliGRAM(s) Oral <User Schedule>  gabapentin 300milliGRAM(s) Oral <User Schedule>    MEDICATIONS  (PRN):  acetaminophen   Tablet 650milliGRAM(s) Oral every 6 hours PRN For Temp greater than 38 C (100.4 F)  acetaminophen   Tablet. 650milliGRAM(s) Oral every 6 hours PRN Mild Pain (1 - 3)  aluminum hydroxide/magnesium hydroxide/simethicone Suspension 30milliLiter(s) Oral every 4 hours PRN Dyspepsia  oxyCODONE IR 5milliGRAM(s) Oral every 4 hours PRN Severe Pain (7 - 10)  dextrose Gel 1Dose(s) Oral once PRN Blood Glucose LESS THAN 70 milliGRAM(s)/deciLiter  glucagon  Injectable 1milliGRAM(s) IntraMuscular once PRN Glucose <70 milliGRAM(s)/deciLiter  ondansetron   Disintegrating Tablet 4milliGRAM(s) Oral every 8 hours PRN Nausea and/or Vomiting  ALPRAZolam 0.25milliGRAM(s) Oral every 8 hours PRN anxiety  oxyCODONE IR 2.5milliGRAM(s) Oral every 4 hours PRN Moderate Pain (4 - 6)      Allergies    Benadryl (Other)  Demerol HCl (Other)          Vital Signs Last 24 Hrs  T(C): 35.6, Max: 36.9 (03-29 @ 21:08)  T(F): 96, Max: 98.5 (03-29 @ 21:08)  HR: 84 (82 - 86)  BP: 133/65 (133/65 - 152/71)  BP(mean): --  RR: 17 (17 - 18)  SpO2: 97% (95% - 97%)    PHYSICAL EXAM:    GENERAL: weakness  HEAD: nc/at    NECK: no jvd  NERVOUS SYSTEM: AAO x 3 non focal  CHEST/LUNG: no rhonchi  HEART: no rub  ABDOMEN: not tender +BS  EXTREMITIES: no edema  LABS:                        7.9    7.7   )-----------( 363      ( 30 Mar 2017 07:01 )             24.5                   RADIOLOGY & ADDITIONAL TESTS:

## 2017-03-30 NOTE — DIETITIAN INITIAL EVALUATION ADULT. - OTHER INFO
Pt s/p left BKA, now with UTI and hypertensive episode. Pt tolerating cons carb + Glucerna TID, fair ~50% PO intake. Food preferences obtained (grilled cheese, PB &J). Pt declined diet edu at this time, made aware RD remains available.

## 2017-03-30 NOTE — PROGRESS NOTE ADULT - SUBJECTIVE AND OBJECTIVE BOX
Patient known to Hospitalist service.    CC: b/l leg pain     HPI: 85 yr old female with PMH CAD s/p stents, DM2, hypertension, hyperlipidemia, anxiety, significant PVD s/p multiple stents, paroxysmal atrial fibrillation on Eliquis admitted for hypertensive urgency post LE angiogram. 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 Hospital course complicated by urinary retention with UTI (failed TOV), hyponatremia likely due to pain and improved with NaCl (per nephrology) and pain medications. BP meds and insulin coverage titrated for better control.  Patient stable for discharge to acute rehab.    INTERVAL HPI/OVERNIGHT EVENTS: Patient seen and examined lying in bed, complaining of left LE pain.  Patient also complaining of gas/bloating.  Last BM today.  Patient denies any headache, dizziness, SOB, CP, nausea.  Other ROS reviewed and are negative.  Daughter at bedside.    Vital Signs Last 24 Hrs  T(C): 35.6, Max: 36.9 (03-29 @ 21:08)  T(F): 96, Max: 98.5 (03-29 @ 21:08)  HR: 84 (82 - 86)  BP: 133/65 (133/65 - 152/71)  BP(mean): --  RR: 17 (17 - 18)  SpO2: 97% (95% - 97%)  I&O's Detail  I & Os for 24h ending 30 Mar 2017 07:00  =============================================  IN:    Total IN: 0 ml  ---------------------------------------------  OUT:    Indwelling Catheter - Urethral: 550 ml    Voided: 550 ml    Stool: 2 ml    Total OUT: 1102 ml  ---------------------------------------------  Total NET: -1102 ml    I & Os for current day (as of 30 Mar 2017 10:37)  =============================================  IN:    Oral Fluid: 240 ml    Total IN: 240 ml  ---------------------------------------------  OUT:    Total OUT: 0 ml  ---------------------------------------------  Total NET: 240 ml      PHYSICAL EXAM:  GENERAL: NAD, well-groomed, well-developed  HEAD:  Atraumatic, Normocephalic  EYES: EOMI, PERRLA, conjunctiva and sclera clear  NECK: Supple, No JVD, Normal thyroid  NERVOUS SYSTEM:  Alert & Oriented X3, Good concentration; Motor Strength 5/5 B/L upper and lower extremities  CHEST/LUNG: Clear to auscultation bilaterally; No rales, rhonchi, wheezing, or rubs  HEART: Regular rate and rhythm; No murmurs, rubs, or gallops  ABDOMEN: Soft, Nontender, Nondistended; Bowel sounds present,  (+) juarez  EXTREMITIES:  2+ Peripheral Pulses, No clubbing, cyanosis, or edema; Left BKA site with dressing in place.  SKIN: No rashes or lesions                            7.9    7.7   )-----------( 363      ( 30 Mar 2017 07:01 )             24.5     30 Mar 2017 07:01    134    |  99     |  16.0   ----------------------------<  132    4.1     |  21.0   |  0.92     Ca    8.7        30 Mar 2017 07:01        CAPILLARY BLOOD GLUCOSE  233 (29 Mar 2017 21:08)  149 (29 Mar 2017 11:57)          MEDICATIONS  (STANDING):  multivitamin 1Tablet(s) Oral daily  senna 2Tablet(s) Oral at bedtime  docusate sodium 100milliGRAM(s) Oral three times a day  polyethylene glycol 3350 17Gram(s) Oral daily  tamsulosin 0.4milliGRAM(s) Oral at bedtime  apixaban 2.5milliGRAM(s) Oral two times a day  gabapentin 900milliGRAM(s) Oral at bedtime  insulin glargine Injectable (LANTUS) 30Unit(s) SubCutaneous at bedtime  insulin lispro Injectable (HumaLOG) 5Unit(s) SubCutaneous before breakfast  insulin lispro Injectable (HumaLOG) 5Unit(s) SubCutaneous before lunch  insulin lispro Injectable (HumaLOG) 5Unit(s) SubCutaneous before dinner  insulin lispro (HumaLOG) corrective regimen sliding scale  SubCutaneous three times a day before meals  insulin lispro (HumaLOG) corrective regimen sliding scale  SubCutaneous at bedtime  dextrose 5%. 1000milliLiter(s) IV Continuous <Continuous>  dextrose 50% Injectable 12.5Gram(s) IV Push once  dextrose 50% Injectable 25Gram(s) IV Push once  dextrose 50% Injectable 25Gram(s) IV Push once  atorvastatin 40milliGRAM(s) Oral at bedtime  metoprolol 50milliGRAM(s) Oral two times a day  amLODIPine   Tablet 10milliGRAM(s) Oral daily  ferrous    sulfate 325milliGRAM(s) Oral daily  sodium chloride 1Gram(s) Oral three times a day  pantoprazole    Tablet 40milliGRAM(s) Oral before breakfast  ciprofloxacin     Tablet 500milliGRAM(s) Oral every 12 hours  levothyroxine 75MICROGram(s) Oral daily  folic acid 1milliGRAM(s) Oral daily  saccharomyces boulardii 250milliGRAM(s) Oral two times a day  gabapentin 200milliGRAM(s) Oral <User Schedule>  gabapentin 300milliGRAM(s) Oral <User Schedule>    MEDICATIONS  (PRN):  acetaminophen   Tablet 650milliGRAM(s) Oral every 6 hours PRN For Temp greater than 38 C (100.4 F)  acetaminophen   Tablet. 650milliGRAM(s) Oral every 6 hours PRN Mild Pain (1 - 3)  aluminum hydroxide/magnesium hydroxide/simethicone Suspension 30milliLiter(s) Oral every 4 hours PRN Dyspepsia  oxyCODONE IR 5milliGRAM(s) Oral every 4 hours PRN Severe Pain (7 - 10)  dextrose Gel 1Dose(s) Oral once PRN Blood Glucose LESS THAN 70 milliGRAM(s)/deciLiter  glucagon  Injectable 1milliGRAM(s) IntraMuscular once PRN Glucose <70 milliGRAM(s)/deciLiter  ondansetron   Disintegrating Tablet 4milliGRAM(s) Oral every 8 hours PRN Nausea and/or Vomiting  ALPRAZolam 0.25milliGRAM(s) Oral every 8 hours PRN anxiety  oxyCODONE IR 2.5milliGRAM(s) Oral every 4 hours PRN Moderate Pain (4 - 6)

## 2017-03-30 NOTE — PROGRESS NOTE ADULT - PROBLEM SELECTOR PROBLEM 1
Hypertensive urgency
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
PVD (peripheral vascular disease)
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
PVD (peripheral vascular disease)
PVD (peripheral vascular disease)
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
PVD (peripheral vascular disease)

## 2017-03-31 LAB
ANION GAP SERPL CALC-SCNC: 11 MMOL/L — SIGNIFICANT CHANGE UP (ref 5–17)
BUN SERPL-MCNC: 17 MG/DL — SIGNIFICANT CHANGE UP (ref 8–20)
CALCIUM SERPL-MCNC: 8.7 MG/DL — SIGNIFICANT CHANGE UP (ref 8.6–10.2)
CHLORIDE SERPL-SCNC: 101 MMOL/L — SIGNIFICANT CHANGE UP (ref 98–107)
CO2 SERPL-SCNC: 24 MMOL/L — SIGNIFICANT CHANGE UP (ref 22–29)
CREAT SERPL-MCNC: 1 MG/DL — SIGNIFICANT CHANGE UP (ref 0.5–1.3)
GLUCOSE SERPL-MCNC: 98 MG/DL — SIGNIFICANT CHANGE UP (ref 70–115)
HCT VFR BLD CALC: 25.3 % — LOW (ref 37–47)
HCT VFR BLD CALC: 26.5 % — LOW (ref 37–47)
HGB BLD-MCNC: 8.2 G/DL — LOW (ref 12–16)
HGB BLD-MCNC: 8.5 G/DL — LOW (ref 12–16)
MCHC RBC-ENTMCNC: 27.4 PG — SIGNIFICANT CHANGE UP (ref 27–31)
MCHC RBC-ENTMCNC: 32.4 G/DL — SIGNIFICANT CHANGE UP (ref 32–36)
MCV RBC AUTO: 84.6 FL — SIGNIFICANT CHANGE UP (ref 81–99)
PLATELET # BLD AUTO: 368 K/UL — SIGNIFICANT CHANGE UP (ref 150–400)
POTASSIUM SERPL-MCNC: 4.4 MMOL/L — SIGNIFICANT CHANGE UP (ref 3.5–5.3)
POTASSIUM SERPL-SCNC: 4.4 MMOL/L — SIGNIFICANT CHANGE UP (ref 3.5–5.3)
RBC # BLD: 2.99 M/UL — LOW (ref 4.4–5.2)
RBC # FLD: 14.6 % — SIGNIFICANT CHANGE UP (ref 11–15.6)
SODIUM SERPL-SCNC: 136 MMOL/L — SIGNIFICANT CHANGE UP (ref 135–145)
SURGICAL PATHOLOGY FINAL REPORT - CH: SIGNIFICANT CHANGE UP
WBC # BLD: 8.7 K/UL — SIGNIFICANT CHANGE UP (ref 4.8–10.8)
WBC # FLD AUTO: 8.7 K/UL — SIGNIFICANT CHANGE UP (ref 4.8–10.8)

## 2017-03-31 PROCEDURE — 99232 SBSQ HOSP IP/OBS MODERATE 35: CPT | Mod: GC

## 2017-03-31 PROCEDURE — 99233 SBSQ HOSP IP/OBS HIGH 50: CPT

## 2017-03-31 RX ORDER — METOPROLOL TARTRATE 50 MG
50 TABLET ORAL
Qty: 0 | Refills: 0 | Status: DISCONTINUED | OUTPATIENT
Start: 2017-03-31 | End: 2017-04-13

## 2017-03-31 RX ORDER — OXYCODONE HYDROCHLORIDE 5 MG/1
5 TABLET ORAL EVERY 4 HOURS
Qty: 0 | Refills: 0 | Status: DISCONTINUED | OUTPATIENT
Start: 2017-03-31 | End: 2017-04-05

## 2017-03-31 RX ORDER — ACETAMINOPHEN 500 MG
650 TABLET ORAL EVERY 6 HOURS
Qty: 0 | Refills: 0 | Status: DISCONTINUED | OUTPATIENT
Start: 2017-03-31 | End: 2017-04-10

## 2017-03-31 RX ADMIN — Medication 650 MILLIGRAM(S): at 12:36

## 2017-03-31 RX ADMIN — OXYCODONE HYDROCHLORIDE 2.5 MILLIGRAM(S): 5 TABLET ORAL at 15:05

## 2017-03-31 RX ADMIN — AMLODIPINE BESYLATE 10 MILLIGRAM(S): 2.5 TABLET ORAL at 05:19

## 2017-03-31 RX ADMIN — Medication 650 MILLIGRAM(S): at 09:26

## 2017-03-31 RX ADMIN — Medication 0.25 MILLIGRAM(S): at 15:19

## 2017-03-31 RX ADMIN — Medication 5 UNIT(S): at 12:34

## 2017-03-31 RX ADMIN — Medication 500 MILLIGRAM(S): at 12:36

## 2017-03-31 RX ADMIN — Medication 50 MILLIGRAM(S): at 05:19

## 2017-03-31 RX ADMIN — Medication 1 APPLICATION(S): at 05:21

## 2017-03-31 RX ADMIN — Medication 0.25 MILLIGRAM(S): at 05:21

## 2017-03-31 RX ADMIN — Medication 75 MICROGRAM(S): at 05:19

## 2017-03-31 RX ADMIN — ONDANSETRON 4 MILLIGRAM(S): 8 TABLET, FILM COATED ORAL at 15:06

## 2017-03-31 RX ADMIN — Medication 100 MILLIGRAM(S): at 05:19

## 2017-03-31 RX ADMIN — OXYCODONE HYDROCHLORIDE 5 MILLIGRAM(S): 5 TABLET ORAL at 04:28

## 2017-03-31 RX ADMIN — ATORVASTATIN CALCIUM 40 MILLIGRAM(S): 80 TABLET, FILM COATED ORAL at 20:44

## 2017-03-31 RX ADMIN — APIXABAN 2.5 MILLIGRAM(S): 2.5 TABLET, FILM COATED ORAL at 18:05

## 2017-03-31 RX ADMIN — Medication 250 MILLIGRAM(S): at 18:05

## 2017-03-31 RX ADMIN — Medication 500 MILLIGRAM(S): at 05:20

## 2017-03-31 RX ADMIN — Medication 5 UNIT(S): at 18:02

## 2017-03-31 RX ADMIN — GABAPENTIN 900 MILLIGRAM(S): 400 CAPSULE ORAL at 20:43

## 2017-03-31 RX ADMIN — PANTOPRAZOLE SODIUM 40 MILLIGRAM(S): 20 TABLET, DELAYED RELEASE ORAL at 05:49

## 2017-03-31 RX ADMIN — SENNA PLUS 2 TABLET(S): 8.6 TABLET ORAL at 20:44

## 2017-03-31 RX ADMIN — Medication 325 MILLIGRAM(S): at 12:36

## 2017-03-31 RX ADMIN — Medication 650 MILLIGRAM(S): at 18:03

## 2017-03-31 RX ADMIN — OXYCODONE HYDROCHLORIDE 5 MILLIGRAM(S): 5 TABLET ORAL at 20:32

## 2017-03-31 RX ADMIN — OXYCODONE HYDROCHLORIDE 2.5 MILLIGRAM(S): 5 TABLET ORAL at 10:26

## 2017-03-31 RX ADMIN — Medication 250 MILLIGRAM(S): at 05:19

## 2017-03-31 RX ADMIN — Medication 650 MILLIGRAM(S): at 10:26

## 2017-03-31 RX ADMIN — Medication 1 TABLET(S): at 12:09

## 2017-03-31 RX ADMIN — INSULIN GLARGINE 30 UNIT(S): 100 INJECTION, SOLUTION SUBCUTANEOUS at 20:43

## 2017-03-31 RX ADMIN — APIXABAN 2.5 MILLIGRAM(S): 2.5 TABLET, FILM COATED ORAL at 05:19

## 2017-03-31 RX ADMIN — Medication 100 MILLIGRAM(S): at 20:44

## 2017-03-31 RX ADMIN — Medication 500 MILLIGRAM(S): at 18:05

## 2017-03-31 RX ADMIN — OXYCODONE HYDROCHLORIDE 5 MILLIGRAM(S): 5 TABLET ORAL at 21:51

## 2017-03-31 RX ADMIN — OXYCODONE HYDROCHLORIDE 5 MILLIGRAM(S): 5 TABLET ORAL at 03:28

## 2017-03-31 RX ADMIN — ZINC SULFATE TAB 220 MG (50 MG ZINC EQUIVALENT) 220 MILLIGRAM(S): 220 (50 ZN) TAB at 12:37

## 2017-03-31 RX ADMIN — Medication 2: at 18:01

## 2017-03-31 RX ADMIN — Medication 1 MILLIGRAM(S): at 12:36

## 2017-03-31 RX ADMIN — TAMSULOSIN HYDROCHLORIDE 0.4 MILLIGRAM(S): 0.4 CAPSULE ORAL at 20:44

## 2017-03-31 RX ADMIN — Medication 2: at 12:34

## 2017-03-31 RX ADMIN — OXYCODONE HYDROCHLORIDE 2.5 MILLIGRAM(S): 5 TABLET ORAL at 16:05

## 2017-03-31 RX ADMIN — OXYCODONE HYDROCHLORIDE 2.5 MILLIGRAM(S): 5 TABLET ORAL at 09:26

## 2017-03-31 NOTE — PROGRESS NOTE ADULT - SUBJECTIVE AND OBJECTIVE BOX
Patient seen and examined    Feels upset, has ? phantom pains or BKA site  no c/o CP SOB NV   No swelling R side    Vital Signs Last 24 Hrs  T(C): 36.3, Max: 36.3 (03-31 @ 10:26)  T(F): 97.3, Max: 97.3 (03-31 @ 10:26)  HR: 64 (64 - 83)  BP: 116/50 (88/52 - 116/50)  BP(mean): --  RR: 16 (16 - 18)  SpO2: 96% (95% - 96%)  Awake/alert; NAD  chest Clear  No JVD  No murmer; irreg  abd soft, NT BS +  No edema R/ BKA site in ace wrap      31 Mar 2017 06:46    136    |  101    |  17.0   ----------------------------<  98     4.4     |  24.0   |  1.00     Ca    8.7        31 Mar 2017 06:46                            8.5    x     )-----------( x        ( 31 Mar 2017 12:39 )             26.5       Impression  patient stable  Na increased to136    Continue same treatment

## 2017-03-31 NOTE — PROGRESS NOTE ADULT - SUBJECTIVE AND OBJECTIVE BOX
CC: b/l leg pain     HPI: 85 yr old female with PMH CAD s/p stents, DM2, hypertension, hyperlipidemia, anxiety, significant PVD s/p multiple stents, paroxysmal atrial fibrillation on Eliquis admitted for hypertensive urgency post LE angiogram. 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 Hospital course complicated by urinary retention with UTI (failed TOV), hyponatremia likely due to pain and improved with NaCl (per nephrology) and pain medications. BP meds and insulin coverage titrated for better control.  Patient stable for discharge to acute rehab.    INTERVAL HPI/OVERNIGHT EVENTS: Patient complains of difficulty sleeping last night secondary to phantom pain.  Patient denies any headache, dizziness, SOB, CP, abdominal pain.  Patient states she had nausea yesterday afternoon improved with Zofran.  Other ROS reviewed and are negative.    Vital Signs Last 24 Hrs  T(C): 36.2, Max: 36.8 (03-30 @ 12:09)  T(F): 97.1, Max: 98.2 (03-30 @ 12:09)  HR: 83 (80 - 83)  BP: 115/54 (115/54 - 130/76)  BP(mean): --  RR: 18 (16 - 18)  SpO2: 95% (95% - 97%)  I&O's Detail    I & Os for current day (as of 31 Mar 2017 09:30)  =============================================  IN:    Oral Fluid: 1160 ml    Total IN: 1160 ml  ---------------------------------------------  OUT:    Indwelling Catheter - Urethral: 1800 ml    Stool: 3 ml    Total OUT: 1803 ml  ---------------------------------------------  Total NET: -643 ml    PHYSICAL EXAM:  GENERAL: NAD, well-groomed, well-developed  HEAD:  Atraumatic, Normocephalic  EYES: EOMI, PERRLA, conjunctiva and sclera clear  NECK: Supple, No JVD, Normal thyroid  NERVOUS SYSTEM:  Alert & Oriented X3, Good concentration; Motor Strength 5/5 B/L upper and lower extremities  CHEST/LUNG: Clear to auscultation bilaterally; No rales, rhonchi, wheezing, or rubs  HEART: Regular rate and rhythm; No murmurs, rubs, or gallops  ABDOMEN: Soft, Nontender, Nondistended; Bowel sounds present, (+) juarez  EXTREMITIES:  2+ Peripheral Pulses, No clubbing, cyanosis, or edema; Left BKA site with ACE wrap  SKIN: Pale, No rashes or lesions                              8.2    8.7   )-----------( 368      ( 31 Mar 2017 06:46 )             25.3     31 Mar 2017 06:46    136    |  101    |  17.0   ----------------------------<  98     4.4     |  24.0   |  1.00     Ca    8.7        31 Mar 2017 06:46        CAPILLARY BLOOD GLUCOSE  74 (31 Mar 2017 07:42)  185 (30 Mar 2017 22:00)  184 (30 Mar 2017 17:01)  144 (30 Mar 2017 12:09)          MEDICATIONS  (STANDING):  multivitamin 1Tablet(s) Oral daily  senna 2Tablet(s) Oral at bedtime  docusate sodium 100milliGRAM(s) Oral three times a day  tamsulosin 0.4milliGRAM(s) Oral at bedtime  apixaban 2.5milliGRAM(s) Oral two times a day  gabapentin 900milliGRAM(s) Oral at bedtime  insulin glargine Injectable (LANTUS) 30Unit(s) SubCutaneous at bedtime  insulin lispro Injectable (HumaLOG) 5Unit(s) SubCutaneous before breakfast  insulin lispro Injectable (HumaLOG) 5Unit(s) SubCutaneous before lunch  insulin lispro Injectable (HumaLOG) 5Unit(s) SubCutaneous before dinner  insulin lispro (HumaLOG) corrective regimen sliding scale  SubCutaneous three times a day before meals  insulin lispro (HumaLOG) corrective regimen sliding scale  SubCutaneous at bedtime  dextrose 5%. 1000milliLiter(s) IV Continuous <Continuous>  dextrose 50% Injectable 12.5Gram(s) IV Push once  dextrose 50% Injectable 25Gram(s) IV Push once  dextrose 50% Injectable 25Gram(s) IV Push once  atorvastatin 40milliGRAM(s) Oral at bedtime  metoprolol 50milliGRAM(s) Oral two times a day  amLODIPine   Tablet 10milliGRAM(s) Oral daily  ferrous    sulfate 325milliGRAM(s) Oral daily  pantoprazole    Tablet 40milliGRAM(s) Oral before breakfast  ciprofloxacin     Tablet 500milliGRAM(s) Oral every 12 hours  levothyroxine 75MICROGram(s) Oral daily  folic acid 1milliGRAM(s) Oral daily  saccharomyces boulardii 250milliGRAM(s) Oral two times a day  gabapentin 200milliGRAM(s) Oral <User Schedule>  gabapentin 300milliGRAM(s) Oral <User Schedule>  zinc sulfate 220milliGRAM(s) Oral daily  ascorbic acid 500milliGRAM(s) Oral daily  ammonium lactate 12% Lotion 1Application(s) Topical two times a day  fentaNYL   Patch  12 MICROgram(s)/Hr 1Patch Transdermal every 72 hours  sodium chloride 1Gram(s) Oral daily  acetaminophen   Tablet 650milliGRAM(s) Oral every 6 hours    MEDICATIONS  (PRN):  acetaminophen   Tablet 650milliGRAM(s) Oral every 6 hours PRN For Temp greater than 38 C (100.4 F)  acetaminophen   Tablet. 650milliGRAM(s) Oral every 6 hours PRN Mild Pain (1 - 3)  aluminum hydroxide/magnesium hydroxide/simethicone Suspension 30milliLiter(s) Oral every 4 hours PRN Dyspepsia  dextrose Gel 1Dose(s) Oral once PRN Blood Glucose LESS THAN 70 milliGRAM(s)/deciLiter  glucagon  Injectable 1milliGRAM(s) IntraMuscular once PRN Glucose <70 milliGRAM(s)/deciLiter  ondansetron   Disintegrating Tablet 4milliGRAM(s) Oral every 8 hours PRN Nausea and/or Vomiting  ALPRAZolam 0.25milliGRAM(s) Oral every 8 hours PRN anxiety  oxyCODONE IR 2.5milliGRAM(s) Oral every 4 hours PRN Moderate Pain (4 - 6)

## 2017-03-31 NOTE — PROGRESS NOTE ADULT - SUBJECTIVE AND OBJECTIVE BOX
HISTORY OF PRESENT ILLNESS  85 yr old RH female with multiple medical problems including CAD s/p stents, diabetes, hypertension, hyperlipidemia, anxiety, significant PVD s/p multiple stents, paroxysmal atrial fibrillation was admitted 3/9/17 for elective LE angiogram. Patient was noted to have elevated BP post procedure, and hence admission requested by Dr. Irwin. Per him, patient with occluded SFA, POP b/l with minimal outflow to b/l LE. 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   Hospital course complicated by:  -  pain control management, patient being followed by palliative care.  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, being followed by nephrology   - UTI and urinary retention- currently on IV cipro, flomax, juarez catheter     NB: check with vascular surgery/cardiology about need for plavix [ ]    PMHx -   Hyperlipidemia, Hypertension, Diabetes, PVD (peripheral vascular disease), CAD (coronary artery disease)  History of cholecystectomy, H/O angioplasty      TODAY'S SUBJECTIVE & REVIEW OF SYMPTOMS  [X] Constitutional WNL     [X] Cardio WNL            [X] Resp WNL           [X] GI WNL                          [ ]  WNL                   [X] Heme WNL              [X] Endo WNL                     [ ] Skin WNL                 [ ] MSK WNL            [X] Neuro WNL                   [X] Cognitive WNL        [X] Psych WNL    No acute events overnight. Long discussion with patient regarding pain control and medications. BM (+) this morning     Vital Signs Last 24 Hrs  T(C): 36.1, Max: 36.8 (03-30 @ 12:09)  T(F): 97, Max: 98.2 (03-30 @ 12:09)  HR: 83 (80 - 83)  BP: 88/52 (88/52 - 130/76)  BP(mean): --  RR: 16 (16 - 18)  SpO2: 96% (95% - 97%)    PHYSICAL EXAM  Constitutional - NAD, Comfortable  HEENT - NCAT, EOMI  Neck - Supple, No limited ROM  Chest - CTA bilaterally, No wheeze, No rhonchi, No crackles  Cardiovascular - RRR, S1S2, No murmurs  Abdomen - BS+, Soft, NTND  Extremities - No C/C/E, No calf tenderness   Neurologic Exam -                    Cognitive - Awake, Alert, AAO to self, place, date, year, situation     Communication - Fluent, No dysarthria     Cranial Nerves - CN 2-12 intact    Psychiatric - Mood stable, Affect WNL  Skin - moisture dermatitis in intergluteal folds, right heel DTI  Wounds - left BKA residual limb with staples- erythematous, serous drainage centrally     FUNCTIONAL PROGRESS  unable to hop yet  wheelchair mobility 20' Salomon  sit <--> supine modA    RECENT LABS                          8.2    8.7   )-----------( 368      ( 31 Mar 2017 06:46 )             25.3     31 Mar 2017 06:46    136    |  101    |  17.0   ----------------------------<  98     4.4     |  24.0   |  1.00     Ca    8.7        31 Mar 2017 06:46    CAPILLARY BLOOD GLUCOSE  74 (31 Mar 2017 07:42)  185 (30 Mar 2017 22:00)  184 (30 Mar 2017 17:01)  144 (30 Mar 2017 12:09)    MEDICATIONS  (STANDING):  multivitamin 1Tablet(s) Oral daily  senna 2Tablet(s) Oral at bedtime  docusate sodium 100milliGRAM(s) Oral three times a day  tamsulosin 0.4milliGRAM(s) Oral at bedtime  apixaban 2.5milliGRAM(s) Oral two times a day  gabapentin 900milliGRAM(s) Oral at bedtime  insulin glargine Injectable (LANTUS) 30Unit(s) SubCutaneous at bedtime  insulin lispro Injectable (HumaLOG) 5Unit(s) SubCutaneous before breakfast  insulin lispro Injectable (HumaLOG) 5Unit(s) SubCutaneous before lunch  insulin lispro Injectable (HumaLOG) 5Unit(s) SubCutaneous before dinner  insulin lispro (HumaLOG) corrective regimen sliding scale  SubCutaneous three times a day before meals  insulin lispro (HumaLOG) corrective regimen sliding scale  SubCutaneous at bedtime  dextrose 5%. 1000milliLiter(s) IV Continuous <Continuous>  dextrose 50% Injectable 12.5Gram(s) IV Push once  dextrose 50% Injectable 25Gram(s) IV Push once  dextrose 50% Injectable 25Gram(s) IV Push once  atorvastatin 40milliGRAM(s) Oral at bedtime  metoprolol 50milliGRAM(s) Oral two times a day  amLODIPine   Tablet 10milliGRAM(s) Oral daily  ferrous    sulfate 325milliGRAM(s) Oral daily  pantoprazole    Tablet 40milliGRAM(s) Oral before breakfast  ciprofloxacin     Tablet 500milliGRAM(s) Oral every 12 hours  levothyroxine 75MICROGram(s) Oral daily  folic acid 1milliGRAM(s) Oral daily  saccharomyces boulardii 250milliGRAM(s) Oral two times a day  gabapentin 200milliGRAM(s) Oral <User Schedule>  gabapentin 300milliGRAM(s) Oral <User Schedule>  zinc sulfate 220milliGRAM(s) Oral daily  ascorbic acid 500milliGRAM(s) Oral daily  ammonium lactate 12% Lotion 1Application(s) Topical two times a day  fentaNYL   Patch  12 MICROgram(s)/Hr 1Patch Transdermal every 72 hours  acetaminophen   Tablet 650milliGRAM(s) Oral every 6 hours    MEDICATIONS  (PRN):  acetaminophen   Tablet 650milliGRAM(s) Oral every 6 hours PRN For Temp greater than 38 C (100.4 F)  aluminum hydroxide/magnesium hydroxide/simethicone Suspension 30milliLiter(s) Oral every 4 hours PRN Dyspepsia  dextrose Gel 1Dose(s) Oral once PRN Blood Glucose LESS THAN 70 milliGRAM(s)/deciLiter  glucagon  Injectable 1milliGRAM(s) IntraMuscular once PRN Glucose <70 milliGRAM(s)/deciLiter  ondansetron   Disintegrating Tablet 4milliGRAM(s) Oral every 8 hours PRN Nausea and/or Vomiting  ALPRAZolam 0.25milliGRAM(s) Oral every 8 hours PRN anxiety  oxyCODONE IR 2.5milliGRAM(s) Oral every 4 hours PRN Moderate Pain (4 - 6)      ASSESSMENT & PLAN    86yo female with DM, CAD, PVD s/p right BKA with gait and functional impairments    Post-op pain control, phantom pain, anxiety: tylenol standing, oxycodone 2.5 PRN, gabapentin TID, fentanyl patch, xanax   Acute blood loss anemia: 8.2/25.3, continue ferrous sulphate and folic acid, repeat H&H Sat. No suspicion for active bleeding at this time. Monitor closely as patient's on Eliquis  UTI: 5 day course of cipro BID and florastar  Hyponatremia: resolved, Na+ 136, sodium tabs dc'ed, appreciate recs from nephrology  BKA wound healing: MVI, vit C, zinc, dressing change daily  right heel DTI: z loretta boot and pillow for off-loading heel, lac-hydrin  DMT2: lantus, premeal insulin, SSI AC HS  pAFib/CAD: Eliquis (also DVT ppx), statin, metoprolol  urinary retention: maintain Juarez catheter care, trial of void Mon 4/3/17    Continue comprehensive acute rehab program consisting of 3hrs/day of OT/PT HISTORY OF PRESENT ILLNESS  85 yr old RH female with multiple medical problems including CAD s/p stents, diabetes, hypertension, hyperlipidemia, anxiety, significant PVD s/p multiple stents, paroxysmal atrial fibrillation was admitted 3/9/17 for elective LE angiogram. Patient was noted to have elevated BP post procedure, and hence admission requested by Dr. Irwin. Per him, patient with occluded SFA, POP b/l with minimal outflow to b/l LE. 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   Hospital course complicated by:  -  pain control management, patient being followed by palliative care.  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, being followed by nephrology   - UTI and urinary retention- currently on IV cipro, flomax, juarez catheter     NB: check with vascular surgery/cardiology about need for plavix [ ]    PMHx -   Hyperlipidemia, Hypertension, Diabetes, PVD (peripheral vascular disease), CAD (coronary artery disease)  History of cholecystectomy, H/O angioplasty      TODAY'S SUBJECTIVE & REVIEW OF SYMPTOMS  [X] Constitutional WNL     [X] Cardio WNL            [X] Resp WNL           [X] GI WNL                          [ ]  WNL                   [X] Heme WNL              [X] Endo WNL                     [ ] Skin WNL                 [ ] MSK WNL            [X] Neuro WNL                   [X] Cognitive WNL        [X] Psych WNL    No acute events overnight. Long discussion with patient regarding pain control and medications. BM (+) this morning     Vital Signs Last 24 Hrs  T(C): 36.1, Max: 36.8 (03-30 @ 12:09)  T(F): 97, Max: 98.2 (03-30 @ 12:09)  HR: 83 (80 - 83)  BP: 88/52 (88/52 - 130/76)  BP(mean): --  RR: 16 (16 - 18)  SpO2: 96% (95% - 97%)    PHYSICAL EXAM  Constitutional - NAD, Comfortable  HEENT - NCAT, EOMI  Neck - Supple, No limited ROM  Chest - CTA bilaterally, No wheeze, No rhonchi, No crackles  Cardiovascular - RRR, S1S2, No murmurs  Abdomen - BS+, Soft, NTND  Extremities - No C/C/E, No calf tenderness   Neurologic Exam -                    Cognitive - Awake, Alert, AAO to self, place, date, year, situation     Communication - Fluent, No dysarthria     Cranial Nerves - CN 2-12 intact    Psychiatric - Mood stable, Affect WNL  Skin - moisture dermatitis in intergluteal folds, right heel DTI  Wounds - left BKA residual limb with staples- erythematous, serous drainage centrally     FUNCTIONAL PROGRESS  unable to hop yet  transfers modA  wheelchair mobility 20' Salomon  sit <--> supine modA    RECENT LABS                          8.2    8.7   )-----------( 368      ( 31 Mar 2017 06:46 )             25.3     31 Mar 2017 06:46    136    |  101    |  17.0   ----------------------------<  98     4.4     |  24.0   |  1.00     Ca    8.7        31 Mar 2017 06:46    CAPILLARY BLOOD GLUCOSE  74 (31 Mar 2017 07:42)  185 (30 Mar 2017 22:00)  184 (30 Mar 2017 17:01)  144 (30 Mar 2017 12:09)    MEDICATIONS  (STANDING):  multivitamin 1Tablet(s) Oral daily  senna 2Tablet(s) Oral at bedtime  docusate sodium 100milliGRAM(s) Oral three times a day  tamsulosin 0.4milliGRAM(s) Oral at bedtime  apixaban 2.5milliGRAM(s) Oral two times a day  gabapentin 900milliGRAM(s) Oral at bedtime  insulin glargine Injectable (LANTUS) 30Unit(s) SubCutaneous at bedtime  insulin lispro Injectable (HumaLOG) 5Unit(s) SubCutaneous before breakfast  insulin lispro Injectable (HumaLOG) 5Unit(s) SubCutaneous before lunch  insulin lispro Injectable (HumaLOG) 5Unit(s) SubCutaneous before dinner  insulin lispro (HumaLOG) corrective regimen sliding scale  SubCutaneous three times a day before meals  insulin lispro (HumaLOG) corrective regimen sliding scale  SubCutaneous at bedtime  dextrose 5%. 1000milliLiter(s) IV Continuous <Continuous>  dextrose 50% Injectable 12.5Gram(s) IV Push once  dextrose 50% Injectable 25Gram(s) IV Push once  dextrose 50% Injectable 25Gram(s) IV Push once  atorvastatin 40milliGRAM(s) Oral at bedtime  metoprolol 50milliGRAM(s) Oral two times a day  amLODIPine   Tablet 10milliGRAM(s) Oral daily  ferrous    sulfate 325milliGRAM(s) Oral daily  pantoprazole    Tablet 40milliGRAM(s) Oral before breakfast  ciprofloxacin     Tablet 500milliGRAM(s) Oral every 12 hours  levothyroxine 75MICROGram(s) Oral daily  folic acid 1milliGRAM(s) Oral daily  saccharomyces boulardii 250milliGRAM(s) Oral two times a day  gabapentin 200milliGRAM(s) Oral <User Schedule>  gabapentin 300milliGRAM(s) Oral <User Schedule>  zinc sulfate 220milliGRAM(s) Oral daily  ascorbic acid 500milliGRAM(s) Oral daily  ammonium lactate 12% Lotion 1Application(s) Topical two times a day  fentaNYL   Patch  12 MICROgram(s)/Hr 1Patch Transdermal every 72 hours  acetaminophen   Tablet 650milliGRAM(s) Oral every 6 hours    MEDICATIONS  (PRN):  acetaminophen   Tablet 650milliGRAM(s) Oral every 6 hours PRN For Temp greater than 38 C (100.4 F)  aluminum hydroxide/magnesium hydroxide/simethicone Suspension 30milliLiter(s) Oral every 4 hours PRN Dyspepsia  dextrose Gel 1Dose(s) Oral once PRN Blood Glucose LESS THAN 70 milliGRAM(s)/deciLiter  glucagon  Injectable 1milliGRAM(s) IntraMuscular once PRN Glucose <70 milliGRAM(s)/deciLiter  ondansetron   Disintegrating Tablet 4milliGRAM(s) Oral every 8 hours PRN Nausea and/or Vomiting  ALPRAZolam 0.25milliGRAM(s) Oral every 8 hours PRN anxiety  oxyCODONE IR 2.5milliGRAM(s) Oral every 4 hours PRN Moderate Pain (4 - 6)      ASSESSMENT & PLAN    84yo female with DM, CAD, PVD s/p right BKA with gait and functional impairments    Post-op pain control, phantom pain, anxiety: tylenol standing, oxycodone 2.5 PRN, gabapentin TID, fentanyl patch, xanax   Acute blood loss anemia: 8.2/25.3, continue ferrous sulphate and folic acid, repeat H&H Sat. No suspicion for active bleeding at this time. Monitor closely as patient's on Eliquis  UTI: 5 day course of cipro BID and florastar  Hyponatremia: resolved, Na+ 136, sodium tabs dc'ed, appreciate recs from nephrology  BKA wound healing: MVI, vit C, zinc, dressing change daily  right heel DTI: z loretta boot and pillow for off-loading heel, lac-hydrin  DMT2: lantus, premeal insulin, SSI AC HS  pAFib/CAD: Eliquis (also DVT ppx), statin, metoprolol  urinary retention: maintain Juarez catheter care, trial of void Mon 4/3/17    Continue comprehensive acute rehab program consisting of 3hrs/day of OT/PT

## 2017-04-01 PROCEDURE — 99232 SBSQ HOSP IP/OBS MODERATE 35: CPT

## 2017-04-01 RX ORDER — SODIUM CHLORIDE 9 MG/ML
1000 INJECTION INTRAMUSCULAR; INTRAVENOUS; SUBCUTANEOUS
Qty: 0 | Refills: 0 | Status: DISCONTINUED | OUTPATIENT
Start: 2017-04-01 | End: 2017-04-03

## 2017-04-01 RX ORDER — LIDOCAINE 4 G/100G
1 CREAM TOPICAL DAILY
Qty: 0 | Refills: 0 | Status: DISCONTINUED | OUTPATIENT
Start: 2017-04-01 | End: 2017-04-13

## 2017-04-01 RX ORDER — SODIUM CHLORIDE 9 MG/ML
500 INJECTION INTRAMUSCULAR; INTRAVENOUS; SUBCUTANEOUS ONCE
Qty: 0 | Refills: 0 | Status: COMPLETED | OUTPATIENT
Start: 2017-04-01 | End: 2017-04-01

## 2017-04-01 RX ADMIN — Medication 100 MILLIGRAM(S): at 23:08

## 2017-04-01 RX ADMIN — Medication 50 MILLIGRAM(S): at 05:48

## 2017-04-01 RX ADMIN — OXYCODONE HYDROCHLORIDE 5 MILLIGRAM(S): 5 TABLET ORAL at 11:56

## 2017-04-01 RX ADMIN — APIXABAN 2.5 MILLIGRAM(S): 2.5 TABLET, FILM COATED ORAL at 17:41

## 2017-04-01 RX ADMIN — Medication 500 MILLIGRAM(S): at 11:50

## 2017-04-01 RX ADMIN — TAMSULOSIN HYDROCHLORIDE 0.4 MILLIGRAM(S): 0.4 CAPSULE ORAL at 23:08

## 2017-04-01 RX ADMIN — ZINC SULFATE TAB 220 MG (50 MG ZINC EQUIVALENT) 220 MILLIGRAM(S): 220 (50 ZN) TAB at 11:51

## 2017-04-01 RX ADMIN — APIXABAN 2.5 MILLIGRAM(S): 2.5 TABLET, FILM COATED ORAL at 05:50

## 2017-04-01 RX ADMIN — Medication 500 MILLIGRAM(S): at 05:48

## 2017-04-01 RX ADMIN — SENNA PLUS 2 TABLET(S): 8.6 TABLET ORAL at 23:08

## 2017-04-01 RX ADMIN — SODIUM CHLORIDE 500 MILLILITER(S): 9 INJECTION INTRAMUSCULAR; INTRAVENOUS; SUBCUTANEOUS at 11:46

## 2017-04-01 RX ADMIN — Medication 650 MILLIGRAM(S): at 17:39

## 2017-04-01 RX ADMIN — ATORVASTATIN CALCIUM 40 MILLIGRAM(S): 80 TABLET, FILM COATED ORAL at 23:08

## 2017-04-01 RX ADMIN — Medication 0.25 MILLIGRAM(S): at 11:46

## 2017-04-01 RX ADMIN — Medication 75 MICROGRAM(S): at 05:48

## 2017-04-01 RX ADMIN — Medication 650 MILLIGRAM(S): at 11:50

## 2017-04-01 RX ADMIN — GABAPENTIN 300 MILLIGRAM(S): 400 CAPSULE ORAL at 09:05

## 2017-04-01 RX ADMIN — ONDANSETRON 4 MILLIGRAM(S): 8 TABLET, FILM COATED ORAL at 11:51

## 2017-04-01 RX ADMIN — Medication 2: at 17:40

## 2017-04-01 RX ADMIN — Medication 250 MILLIGRAM(S): at 18:20

## 2017-04-01 RX ADMIN — OXYCODONE HYDROCHLORIDE 5 MILLIGRAM(S): 5 TABLET ORAL at 10:20

## 2017-04-01 RX ADMIN — SODIUM CHLORIDE 75 MILLILITER(S): 9 INJECTION INTRAMUSCULAR; INTRAVENOUS; SUBCUTANEOUS at 11:46

## 2017-04-01 RX ADMIN — Medication 5 UNIT(S): at 09:05

## 2017-04-01 RX ADMIN — Medication 650 MILLIGRAM(S): at 23:09

## 2017-04-01 RX ADMIN — Medication 1 APPLICATION(S): at 17:41

## 2017-04-01 RX ADMIN — LIDOCAINE 1 PATCH: 4 CREAM TOPICAL at 17:39

## 2017-04-01 RX ADMIN — Medication 1 MILLIGRAM(S): at 11:51

## 2017-04-01 RX ADMIN — INSULIN GLARGINE 30 UNIT(S): 100 INJECTION, SOLUTION SUBCUTANEOUS at 23:20

## 2017-04-01 RX ADMIN — PANTOPRAZOLE SODIUM 40 MILLIGRAM(S): 20 TABLET, DELAYED RELEASE ORAL at 05:51

## 2017-04-01 RX ADMIN — Medication 500 MILLIGRAM(S): at 17:42

## 2017-04-01 RX ADMIN — Medication 50 MILLIGRAM(S): at 17:41

## 2017-04-01 RX ADMIN — Medication 100 MILLIGRAM(S): at 05:49

## 2017-04-01 RX ADMIN — Medication 650 MILLIGRAM(S): at 05:49

## 2017-04-01 RX ADMIN — GABAPENTIN 200 MILLIGRAM(S): 400 CAPSULE ORAL at 13:10

## 2017-04-01 RX ADMIN — Medication 5 UNIT(S): at 17:40

## 2017-04-01 RX ADMIN — AMLODIPINE BESYLATE 10 MILLIGRAM(S): 2.5 TABLET ORAL at 05:49

## 2017-04-01 RX ADMIN — Medication 1 TABLET(S): at 11:49

## 2017-04-01 RX ADMIN — Medication 250 MILLIGRAM(S): at 05:48

## 2017-04-01 RX ADMIN — Medication 325 MILLIGRAM(S): at 11:52

## 2017-04-01 RX ADMIN — GABAPENTIN 900 MILLIGRAM(S): 400 CAPSULE ORAL at 23:08

## 2017-04-01 RX ADMIN — Medication 100 MILLIGRAM(S): at 13:10

## 2017-04-01 RX ADMIN — Medication 5 UNIT(S): at 13:09

## 2017-04-01 NOTE — PROGRESS NOTE ADULT - SUBJECTIVE AND OBJECTIVE BOX
HISTORY OF PRESENT ILLNESS  85 yr old RH female with multiple medical problems including CAD s/p stents, diabetes, hypertension, hyperlipidemia, anxiety, significant PVD s/p multiple stents, paroxysmal atrial fibrillation was admitted 3/9/17 for elective LE angiogram. Patient was noted to have elevated BP post procedure, and hence admission requested by Dr. Irwin. Per him, patient with occluded SFA, POP b/l with minimal outflow to b/l LE. 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   Hospital course complicated by:  -  pain control management, patient being followed by palliative care.  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, being followed by nephrology   - UTI and urinary retention- currently on IV cipro, flomax, juarez catheter     NB: check with vascular surgery/cardiology about need for plavix [ ]    PMHx -   Hyperlipidemia, Hypertension, Diabetes, PVD (peripheral vascular disease), CAD (coronary artery disease)  History of cholecystectomy, H/O angioplasty      TODAY'S SUBJECTIVE & REVIEW OF SYMPTOMS  [X] Constitutional WNL     [X] Cardio WNL            [X] Resp WNL           [X] GI WNL                          [ ]  WNL                   [X] Heme WNL              [X] Endo WNL                     [ ] Skin WNL                 [ ] MSK WNL            [X] Neuro WNL                   [X] Cognitive WNL        [X] Psych WNL    No acute events overnight.  Pt c/o intermittent moderately severe pain left residual limb especially the knee.  +BM  + Juarez    Vital Signs Last 24 Hrs  T(C): 36.1, Max: 36.8 (03-30 @ 12:09)  T(F): 97, Max: 98.2 (03-30 @ 12:09)  HR: 83 (80 - 83)  BP: 88/52 (88/52 - 130/76)  BP(mean): --  RR: 16 (16 - 18)  SpO2: 96% (95% - 97%)    PHYSICAL EXAM  Constitutional - NAD, Comfortable  HEENT - NCAT, EOMI  Neck - Supple, No limited ROM  Chest - CTA bilaterally, No wheeze, No rhonchi, No crackles  Cardiovascular - RRR, S1S2, No murmurs  Abdomen - BS+, Soft, NTND  Extremities -  No calf tenderness Mild left LE edema  +knee flexion contracture  Neurologic Exam -                    Cognitive - Awake, Alert, AAO to self, place, date, year, situation     Communication - Fluent, No dysarthria     Cranial Nerves - CN 2-12 intact    Psychiatric - Mood stable, Affect WNL  Skin - moisture dermatitis in intergluteal folds, right heel DTI  Wounds - left BKA residual limb with staples- erythematous, sero-sanguinous drainage centrally with slight "dusky" discoloration proximal to incision    FUNCTIONAL PROGRESS  unable to hop yet  transfers modA  wheelchair mobility 20' Salomon  sit <--> supine modA  bed mobility min assist    RECENT LABS                          8.5    x     )-----------( x        ( 31 Mar 2017 12:39 )             26.5                         8.2    8.7   )-----------( 368      ( 31 Mar 2017 06:46 )             25.3     31 Mar 2017 06:46    136    |  101    |  17.0   ----------------------------<  98     4.4     |  24.0   |  1.00     Ca    8.7        31 Mar 2017 06:46      CAPILLARY BLOOD GLUCOSE  70 (01 Apr 2017 08:31)  176 (31 Mar 2017 20:45)  167 (31 Mar 2017 12:30)  102 (31 Mar 2017 10:26)      MEDICATIONS  (STANDING):  multivitamin 1Tablet(s) Oral daily  senna 2Tablet(s) Oral at bedtime  docusate sodium 100milliGRAM(s) Oral three times a day  tamsulosin 0.4milliGRAM(s) Oral at bedtime  apixaban 2.5milliGRAM(s) Oral two times a day  gabapentin 900milliGRAM(s) Oral at bedtime  insulin glargine Injectable (LANTUS) 30Unit(s) SubCutaneous at bedtime  insulin lispro Injectable (HumaLOG) 5Unit(s) SubCutaneous before breakfast  insulin lispro Injectable (HumaLOG) 5Unit(s) SubCutaneous before lunch  insulin lispro Injectable (HumaLOG) 5Unit(s) SubCutaneous before dinner  insulin lispro (HumaLOG) corrective regimen sliding scale  SubCutaneous three times a day before meals  insulin lispro (HumaLOG) corrective regimen sliding scale  SubCutaneous at bedtime  dextrose 5%. 1000milliLiter(s) IV Continuous <Continuous>  dextrose 50% Injectable 12.5Gram(s) IV Push once  dextrose 50% Injectable 25Gram(s) IV Push once  dextrose 50% Injectable 25Gram(s) IV Push once  atorvastatin 40milliGRAM(s) Oral at bedtime  metoprolol 50milliGRAM(s) Oral two times a day  amLODIPine   Tablet 10milliGRAM(s) Oral daily  ferrous    sulfate 325milliGRAM(s) Oral daily  pantoprazole    Tablet 40milliGRAM(s) Oral before breakfast  ciprofloxacin     Tablet 500milliGRAM(s) Oral every 12 hours  levothyroxine 75MICROGram(s) Oral daily  folic acid 1milliGRAM(s) Oral daily  saccharomyces boulardii 250milliGRAM(s) Oral two times a day  gabapentin 200milliGRAM(s) Oral <User Schedule>  gabapentin 300milliGRAM(s) Oral <User Schedule>  zinc sulfate 220milliGRAM(s) Oral daily  ascorbic acid 500milliGRAM(s) Oral daily  ammonium lactate 12% Lotion 1Application(s) Topical two times a day  fentaNYL   Patch  12 MICROgram(s)/Hr 1Patch Transdermal every 72 hours  acetaminophen   Tablet 650milliGRAM(s) Oral every 6 hours    MEDICATIONS  (PRN):  acetaminophen   Tablet 650milliGRAM(s) Oral every 6 hours PRN For Temp greater than 38 C (100.4 F)  aluminum hydroxide/magnesium hydroxide/simethicone Suspension 30milliLiter(s) Oral every 4 hours PRN Dyspepsia  dextrose Gel 1Dose(s) Oral once PRN Blood Glucose LESS THAN 70 milliGRAM(s)/deciLiter  glucagon  Injectable 1milliGRAM(s) IntraMuscular once PRN Glucose <70 milliGRAM(s)/deciLiter  ondansetron   Disintegrating Tablet 4milliGRAM(s) Oral every 8 hours PRN Nausea and/or Vomiting  ALPRAZolam 0.25milliGRAM(s) Oral every 8 hours PRN anxiety  oxyCODONE IR 2.5milliGRAM(s) Oral every 4 hours PRN Moderate Pain (4 - 6)      ASSESSMENT & PLAN    84yo female with DM, CAD, PVD s/p right BKA with gait and functional impairments    Post-op pain control, phantom pain, anxiety: tylenol standing, oxycodone 2.5 PRN, gabapentin TID, fentanyl patch, xanax  Add lidoderm patch left knee daily.  Rx Betadine swab to incision with dsd changes daily  Acute blood loss anemia: 8.2/25.3, continue ferrous sulphate and folic acid, repeat H&H today improved. No suspicion for active bleeding at this time. Monitor closely as patient's on Eliquis  UTI: 5 day course of cipro BID and florastar  Hyponatremia: resolved, Na+ 136, sodium tabs dc'ed, appreciate recs from nephrology  BKA wound healing: MVI, vit C, zinc, dressing change daily  right heel DTI: z loretta boot and pillow for off-loading heel, lac-hydrin  DMT2: lantus, premeal insulin, SSI AC HS  pAFib/CAD: Eliquis (also DVT ppx), statin, metoprolol  urinary retention: maintain Juarez catheter care, trial of void Mon 4/3/17    Continue comprehensive acute rehab program consisting of 3hrs/day of OT/PT HISTORY OF PRESENT ILLNESS  85 yr old RH female with multiple medical problems including CAD s/p stents, diabetes, hypertension, hyperlipidemia, anxiety, significant PVD s/p multiple stents, paroxysmal atrial fibrillation was admitted 3/9/17 for elective LE angiogram. Patient was noted to have elevated BP post procedure, and hence admission requested by Dr. Irwin. Per him, patient with occluded SFA, POP b/l with minimal outflow to b/l LE. 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   Hospital course complicated by:  -  pain control management, patient being followed by palliative care.  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, being followed by nephrology   - UTI and urinary retention- currently on IV cipro, flomax, juarez catheter     NB: check with vascular surgery/cardiology about need for plavix [ ]    PMHx -   Hyperlipidemia, Hypertension, Diabetes, PVD (peripheral vascular disease), CAD (coronary artery disease)  History of cholecystectomy, H/O angioplasty      TODAY'S SUBJECTIVE & REVIEW OF SYMPTOMS  [X] Constitutional WNL     [X] Cardio WNL            [X] Resp WNL           [X] GI WNL                          [ ]  WNL                   [X] Heme WNL              [X] Endo WNL                     [ ] Skin WNL                 [ ] MSK WNL            [X] Neuro WNL                   [X] Cognitive WNL        [X] Psych WNL    No acute events overnight.  Pt c/o intermittent moderately severe pain left residual limb especially the knee.  +BM  + Juarez    Vital Signs Last 24 Hrs  T(C): 36.1, Max: 36.8 (03-30 @ 12:09)  T(F): 97, Max: 98.2 (03-30 @ 12:09)  HR: 83 (80 - 83)  BP: 88/52 (88/52 - 130/76)  BP(mean): --  RR: 16 (16 - 18)  SpO2: 96% (95% - 97%)    PHYSICAL EXAM  Constitutional - NAD, Comfortable  HEENT - NCAT, EOMI  Neck - Supple, No limited ROM  Chest - CTA bilaterally, No wheeze, No rhonchi, No crackles  Cardiovascular - RRR, S1S2, No murmurs  Abdomen - BS+, Soft, NTND  Extremities -  No calf tenderness Mild left LE edema  +knee flexion contracture  Neurologic Exam -                    Cognitive - Awake, Alert, AAO to self, place, date, year, situation     Communication - Fluent, No dysarthria     Cranial Nerves - CN 2-12 intact    Psychiatric - Mood stable, Affect WNL  Skin - moisture dermatitis in intergluteal folds, right heel DTI  Wounds - left BKA residual limb with staples- erythematous, sero-sanguinous drainage centrally with slight "dusky" discoloration proximal to incision    FUNCTIONAL PROGRESS  unable to hop yet  transfers modA  wheelchair mobility 20' Salomon  sit <--> supine modA  bed mobility min assist    RECENT LABS                          8.5    x     )-----------( x        ( 31 Mar 2017 12:39 )             26.5                         8.2    8.7   )-----------( 368      ( 31 Mar 2017 06:46 )             25.3     31 Mar 2017 06:46    136    |  101    |  17.0   ----------------------------<  98     4.4     |  24.0   |  1.00     Ca    8.7        31 Mar 2017 06:46      CAPILLARY BLOOD GLUCOSE  70 (01 Apr 2017 08:31)  176 (31 Mar 2017 20:45)  167 (31 Mar 2017 12:30)  102 (31 Mar 2017 10:26)      MEDICATIONS  (STANDING):  multivitamin 1Tablet(s) Oral daily  senna 2Tablet(s) Oral at bedtime  docusate sodium 100milliGRAM(s) Oral three times a day  tamsulosin 0.4milliGRAM(s) Oral at bedtime  apixaban 2.5milliGRAM(s) Oral two times a day  gabapentin 900milliGRAM(s) Oral at bedtime  insulin glargine Injectable (LANTUS) 30Unit(s) SubCutaneous at bedtime  insulin lispro Injectable (HumaLOG) 5Unit(s) SubCutaneous before breakfast  insulin lispro Injectable (HumaLOG) 5Unit(s) SubCutaneous before lunch  insulin lispro Injectable (HumaLOG) 5Unit(s) SubCutaneous before dinner  insulin lispro (HumaLOG) corrective regimen sliding scale  SubCutaneous three times a day before meals  insulin lispro (HumaLOG) corrective regimen sliding scale  SubCutaneous at bedtime  dextrose 5%. 1000milliLiter(s) IV Continuous <Continuous>  dextrose 50% Injectable 12.5Gram(s) IV Push once  dextrose 50% Injectable 25Gram(s) IV Push once  dextrose 50% Injectable 25Gram(s) IV Push once  atorvastatin 40milliGRAM(s) Oral at bedtime  metoprolol 50milliGRAM(s) Oral two times a day  amLODIPine   Tablet 10milliGRAM(s) Oral daily  ferrous    sulfate 325milliGRAM(s) Oral daily  pantoprazole    Tablet 40milliGRAM(s) Oral before breakfast  ciprofloxacin     Tablet 500milliGRAM(s) Oral every 12 hours  levothyroxine 75MICROGram(s) Oral daily  folic acid 1milliGRAM(s) Oral daily  saccharomyces boulardii 250milliGRAM(s) Oral two times a day  gabapentin 200milliGRAM(s) Oral <User Schedule>  gabapentin 300milliGRAM(s) Oral <User Schedule>  zinc sulfate 220milliGRAM(s) Oral daily  ascorbic acid 500milliGRAM(s) Oral daily  ammonium lactate 12% Lotion 1Application(s) Topical two times a day  fentaNYL   Patch  12 MICROgram(s)/Hr 1Patch Transdermal every 72 hours  acetaminophen   Tablet 650milliGRAM(s) Oral every 6 hours    MEDICATIONS  (PRN):  acetaminophen   Tablet 650milliGRAM(s) Oral every 6 hours PRN For Temp greater than 38 C (100.4 F)  aluminum hydroxide/magnesium hydroxide/simethicone Suspension 30milliLiter(s) Oral every 4 hours PRN Dyspepsia  dextrose Gel 1Dose(s) Oral once PRN Blood Glucose LESS THAN 70 milliGRAM(s)/deciLiter  glucagon  Injectable 1milliGRAM(s) IntraMuscular once PRN Glucose <70 milliGRAM(s)/deciLiter  ondansetron   Disintegrating Tablet 4milliGRAM(s) Oral every 8 hours PRN Nausea and/or Vomiting  ALPRAZolam 0.25milliGRAM(s) Oral every 8 hours PRN anxiety  oxyCODONE IR 2.5milliGRAM(s) Oral every 4 hours PRN Moderate Pain (4 - 6)      ASSESSMENT & PLAN    84yo female with DM, CAD, PVD s/p right BKA with gait and functional impairments    Post-op pain control, phantom pain, anxiety: tylenol standing, oxycodone 2.5 PRN, gabapentin TID, fentanyl patch, xanax  Add lidoderm patch left knee daily.  Rx Betadine swab to incision with dsd changes daily  Acute blood loss anemia: 8.2/25.3, continue ferrous sulphate and folic acid, repeat H&H today improved. No suspicion for active bleeding at this time. Monitor closely as patient's on Eliquis  UTI: 5 day course of cipro BID and florastar  Hyponatremia: resolved, Na+ 136, sodium tabs dc'ed, appreciate recs from nephrology  BKA wound healing: MVI, vit C, zinc, dressing change daily  right heel DTI: z loretta boot and pillow for off-loading heel, lac-hydrin  DMT2: lantus, premeal insulin, SSI AC HS  pAFib/CAD: Eliquis (also DVT ppx), statin, metoprolol  urinary retention: maintain Juarez catheter care, trial of void Mon 4/3/17  Orthostatic hypotention:  BP 70/40 sitting, 120/40 lying.  Rx NS 500cc bolus and then 75cc/hr    Continue comprehensive acute rehab program consisting of 3hrs/day of OT/PT

## 2017-04-01 NOTE — PROGRESS NOTE ADULT - SUBJECTIVE AND OBJECTIVE BOX
Patient seen and examined    Feels fine; OOOB/Ch  no c/o CP SOB NV   No swelling LE  daughter present    Vital Signs Last 24 Hrs  T(C): 36.7, Max: 36.7 (04-01 @ 05:32)  T(F): 98, Max: 98 (04-01 @ 05:32)  HR: 97 (90 - 97)  BP: 140/65 (127/59 - 140/65)  BP(mean): --  RR: 17 (16 - 17)  SpO2: 97% (96% - 97%)  Awake/alert; NAD  chest Clear  No JVD  No murmer  abd soft, NT BS +  No edema; R BKA       31 Mar 2017 06:46    136    |  101    |  17.0   ----------------------------<  98     4.4     |  24.0   |  1.00     Ca    8.7        31 Mar 2017 06:46                            8.5    x     )-----------( x        ( 31 Mar 2017 12:39 )             26.5       Impression  patient stable  Na normal now    Continue same treatment

## 2017-04-01 NOTE — PROGRESS NOTE ADULT - SUBJECTIVE AND OBJECTIVE BOX
CC: left knee pain today   chart reviewed , pt is seen and examined    HPI: 85 yr old female with PMH CAD s/p stents, DM2, hypertension, hyperlipidemia, anxiety, significant PVD s/p multiple stents, paroxysmal atrial fibrillation on Eliquis admitted for hypertensive urgency post LE angiogram. 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 Hospital course complicated by urinary retention with UTI (failed TOV), hyponatremia likely due to pain and improved with NaCl (per nephrology) and pain medications. BP meds and insulin coverage titrated for better control.  Patient stable for discharge to acute rehab.    INTERVAL HPI/OVERNIGHT EVENTS: no overnight events     PHYSICAL EXAM:  GENERAL: NAD, well-groomed, well-developed  CHEST/LUNG: Clear to auscultation bilaterally; No rales, rhonchi, wheezing, or rubs  HEART: Regular rate and rhythm; No murmurs, rubs, or gallops  ABDOMEN: Soft, Nontender, Nondistended; Bowel sounds present, (+) juarez  EXTREMITIES:  2+ Peripheral Pulses, No clubbing, cyanosis, or edema; Left BKA site with ACE wrap, immobilizer in place                                   8.5    x     )-----------( x        ( 31 Mar 2017 12:39 )  31 Mar 2017 06:46    136    |  101    |  17.0   ----------------------------<  98     4.4     |  24.0   |  1.00     Ca    8.7        31 Mar 2017 06:46            CAPILLARY BLOOD GLUCOSE  70 (01 Apr 2017 08:31)  176 (31 Mar 2017 20:45)  167 (31 Mar 2017 12:30)      MEDICATIONS  (STANDING):  multivitamin 1Tablet(s) Oral daily  senna 2Tablet(s) Oral at bedtime  docusate sodium 100milliGRAM(s) Oral three times a day  tamsulosin 0.4milliGRAM(s) Oral at bedtime  apixaban 2.5milliGRAM(s) Oral two times a day  gabapentin 900milliGRAM(s) Oral at bedtime  insulin glargine Injectable (LANTUS) 30Unit(s) SubCutaneous at bedtime  insulin lispro Injectable (HumaLOG) 5Unit(s) SubCutaneous before breakfast  insulin lispro Injectable (HumaLOG) 5Unit(s) SubCutaneous before lunch  insulin lispro Injectable (HumaLOG) 5Unit(s) SubCutaneous before dinner  insulin lispro (HumaLOG) corrective regimen sliding scale  SubCutaneous three times a day before meals  insulin lispro (HumaLOG) corrective regimen sliding scale  SubCutaneous at bedtime  dextrose 5%. 1000milliLiter(s) IV Continuous <Continuous>  dextrose 50% Injectable 12.5Gram(s) IV Push once  dextrose 50% Injectable 25Gram(s) IV Push once  dextrose 50% Injectable 25Gram(s) IV Push once  atorvastatin 40milliGRAM(s) Oral at bedtime  metoprolol 50milliGRAM(s) Oral two times a day  amLODIPine   Tablet 10milliGRAM(s) Oral daily  ferrous    sulfate 325milliGRAM(s) Oral daily  pantoprazole    Tablet 40milliGRAM(s) Oral before breakfast  ciprofloxacin     Tablet 500milliGRAM(s) Oral every 12 hours  levothyroxine 75MICROGram(s) Oral daily  folic acid 1milliGRAM(s) Oral daily  saccharomyces boulardii 250milliGRAM(s) Oral two times a day  gabapentin 200milliGRAM(s) Oral <User Schedule>  gabapentin 300milliGRAM(s) Oral <User Schedule>  zinc sulfate 220milliGRAM(s) Oral daily  ascorbic acid 500milliGRAM(s) Oral daily  ammonium lactate 12% Lotion 1Application(s) Topical two times a day  fentaNYL   Patch  12 MICROgram(s)/Hr 1Patch Transdermal every 72 hours  sodium chloride 1Gram(s) Oral daily  acetaminophen   Tablet 650milliGRAM(s) Oral every 6 hours    MEDICATIONS  (PRN):  acetaminophen   Tablet 650milliGRAM(s) Oral every 6 hours PRN For Temp greater than 38 C (100.4 F)  acetaminophen   Tablet. 650milliGRAM(s) Oral every 6 hours PRN Mild Pain (1 - 3)  aluminum hydroxide/magnesium hydroxide/simethicone Suspension 30milliLiter(s) Oral every 4 hours PRN Dyspepsia  dextrose Gel 1Dose(s) Oral once PRN Blood Glucose LESS THAN 70 milliGRAM(s)/deciLiter  glucagon  Injectable 1milliGRAM(s) IntraMuscular once PRN Glucose <70 milliGRAM(s)/deciLiter  ondansetron   Disintegrating Tablet 4milliGRAM(s) Oral every 8 hours PRN Nausea and/or Vomiting  ALPRAZolam 0.25milliGRAM(s) Oral every 8 hours PRN anxiety  oxyCODONE IR 2.5milliGRAM(s) Oral every 4 hours PRN Moderate Pain (4 - 6)

## 2017-04-02 PROCEDURE — 99232 SBSQ HOSP IP/OBS MODERATE 35: CPT

## 2017-04-02 PROCEDURE — 99232 SBSQ HOSP IP/OBS MODERATE 35: CPT | Mod: GC

## 2017-04-02 RX ORDER — ACETAMINOPHEN 500 MG
1000 TABLET ORAL ONCE
Qty: 0 | Refills: 0 | Status: COMPLETED | OUTPATIENT
Start: 2017-04-02 | End: 2017-04-02

## 2017-04-02 RX ORDER — GABAPENTIN 400 MG/1
600 CAPSULE ORAL AT BEDTIME
Qty: 0 | Refills: 0 | Status: DISCONTINUED | OUTPATIENT
Start: 2017-04-02 | End: 2017-04-05

## 2017-04-02 RX ADMIN — Medication 400 MILLIGRAM(S): at 10:41

## 2017-04-02 RX ADMIN — Medication 75 MICROGRAM(S): at 06:15

## 2017-04-02 RX ADMIN — Medication 500 MILLIGRAM(S): at 11:32

## 2017-04-02 RX ADMIN — PANTOPRAZOLE SODIUM 40 MILLIGRAM(S): 20 TABLET, DELAYED RELEASE ORAL at 06:15

## 2017-04-02 RX ADMIN — ATORVASTATIN CALCIUM 40 MILLIGRAM(S): 80 TABLET, FILM COATED ORAL at 20:50

## 2017-04-02 RX ADMIN — Medication 650 MILLIGRAM(S): at 06:15

## 2017-04-02 RX ADMIN — LIDOCAINE 1 PATCH: 4 CREAM TOPICAL at 06:16

## 2017-04-02 RX ADMIN — FENTANYL CITRATE 1 PATCH: 50 INJECTION INTRAVENOUS at 15:00

## 2017-04-02 RX ADMIN — Medication 5 UNIT(S): at 11:32

## 2017-04-02 RX ADMIN — OXYCODONE HYDROCHLORIDE 5 MILLIGRAM(S): 5 TABLET ORAL at 20:51

## 2017-04-02 RX ADMIN — GABAPENTIN 600 MILLIGRAM(S): 400 CAPSULE ORAL at 21:00

## 2017-04-02 RX ADMIN — APIXABAN 2.5 MILLIGRAM(S): 2.5 TABLET, FILM COATED ORAL at 06:15

## 2017-04-02 RX ADMIN — Medication 650 MILLIGRAM(S): at 23:35

## 2017-04-02 RX ADMIN — Medication 1 APPLICATION(S): at 17:06

## 2017-04-02 RX ADMIN — Medication 100 MILLIGRAM(S): at 21:00

## 2017-04-02 RX ADMIN — GABAPENTIN 300 MILLIGRAM(S): 400 CAPSULE ORAL at 08:05

## 2017-04-02 RX ADMIN — Medication 325 MILLIGRAM(S): at 11:32

## 2017-04-02 RX ADMIN — Medication 50 MILLIGRAM(S): at 17:05

## 2017-04-02 RX ADMIN — SENNA PLUS 2 TABLET(S): 8.6 TABLET ORAL at 20:52

## 2017-04-02 RX ADMIN — LIDOCAINE 1 PATCH: 4 CREAM TOPICAL at 11:32

## 2017-04-02 RX ADMIN — GABAPENTIN 200 MILLIGRAM(S): 400 CAPSULE ORAL at 15:08

## 2017-04-02 RX ADMIN — Medication 250 MILLIGRAM(S): at 17:05

## 2017-04-02 RX ADMIN — Medication 1 APPLICATION(S): at 06:14

## 2017-04-02 RX ADMIN — AMLODIPINE BESYLATE 10 MILLIGRAM(S): 2.5 TABLET ORAL at 06:15

## 2017-04-02 RX ADMIN — Medication 1 MILLIGRAM(S): at 11:32

## 2017-04-02 RX ADMIN — Medication 1 TABLET(S): at 11:32

## 2017-04-02 RX ADMIN — OXYCODONE HYDROCHLORIDE 5 MILLIGRAM(S): 5 TABLET ORAL at 00:17

## 2017-04-02 RX ADMIN — Medication 100 MILLIGRAM(S): at 06:15

## 2017-04-02 RX ADMIN — Medication 500 MILLIGRAM(S): at 17:05

## 2017-04-02 RX ADMIN — ZINC SULFATE TAB 220 MG (50 MG ZINC EQUIVALENT) 220 MILLIGRAM(S): 220 (50 ZN) TAB at 11:32

## 2017-04-02 RX ADMIN — Medication 5 UNIT(S): at 17:04

## 2017-04-02 RX ADMIN — Medication 650 MILLIGRAM(S): at 17:06

## 2017-04-02 RX ADMIN — Medication 50 MILLIGRAM(S): at 06:14

## 2017-04-02 RX ADMIN — INSULIN GLARGINE 30 UNIT(S): 100 INJECTION, SOLUTION SUBCUTANEOUS at 21:00

## 2017-04-02 RX ADMIN — Medication 1000 MILLIGRAM(S): at 13:10

## 2017-04-02 RX ADMIN — APIXABAN 2.5 MILLIGRAM(S): 2.5 TABLET, FILM COATED ORAL at 17:05

## 2017-04-02 RX ADMIN — Medication 500 MILLIGRAM(S): at 06:15

## 2017-04-02 RX ADMIN — Medication 5 UNIT(S): at 08:06

## 2017-04-02 RX ADMIN — TAMSULOSIN HYDROCHLORIDE 0.4 MILLIGRAM(S): 0.4 CAPSULE ORAL at 20:51

## 2017-04-02 RX ADMIN — Medication 4: at 17:04

## 2017-04-02 RX ADMIN — Medication 100 MILLIGRAM(S): at 17:05

## 2017-04-02 RX ADMIN — Medication 250 MILLIGRAM(S): at 06:16

## 2017-04-02 RX ADMIN — ONDANSETRON 4 MILLIGRAM(S): 8 TABLET, FILM COATED ORAL at 20:21

## 2017-04-02 NOTE — PROGRESS NOTE ADULT - ASSESSMENT
85 yr old female with PMH CAD s/p stents, DM2, hypertension, hyperlipidemia, anxiety, significant PVD s/p multiple stents, paroxysmal atrial fibrillation on Eliquis admitted for hypertensive urgency post LE angiogram. 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 Hospital course complicated by urinary retention with UTI (failed TOV), hyponatremia likely due to pain and improved with NaCl (per nephrology) and pain medications. BP meds and insulin coverage titrated for better control.  Patient was transfered to acute rehab

## 2017-04-02 NOTE — PROGRESS NOTE ADULT - SUBJECTIVE AND OBJECTIVE BOX
CC: left leg pain over knee  on the thigh    pt is seen and examined    HPI: 85 yr old female with PMH CAD s/p stents, DM2, hypertension, hyperlipidemia, anxiety, significant PVD s/p multiple stents, paroxysmal atrial fibrillation on Eliquis admitted for hypertensive urgency post LE angiogram. 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 Hospital course complicated by urinary retention with UTI (failed TOV), hyponatremia likely due to pain and improved with NaCl (per nephrology) and pain medications. BP meds and insulin coverage titrated for better control.  Patient stable for discharge to acute rehab.    INTERVAL HPI/OVERNIGHT EVENTS: no overnight events   Vital Signs Last 24 Hrs  T(C): 36.1, Max: 36.2 (04-01 @ 20:21)  T(F): 97, Max: 97.2 (04-01 @ 20:21)  HR: 92 (84 - 92)  BP: 132/58 (121/59 - 132/58)  BP(mean): --  RR: 16 (16 - 16)  SpO2: 95% (95% - 96%)  PHYSICAL EXAM:  GENERAL: NAD, well-groomed, well-developed  CHEST/LUNG: Clear to auscultation bilaterally; No rales, rhonchi, wheezing, or rubs  HEART: Regular rate and rhythm; No murmurs, rubs, or gallops  ABDOMEN: Soft, Nontender, Nondistended; Bowel sounds present, (+) juarez  EXTREMITIES:  2+ Peripheral Pulses, No clubbing, cyanosis, or edema; Left BKA site with ACE wrap, immobilizer in place                               8.5    x     )-----------( x        ( 31 Mar 2017 12:39 )             26.5   medications reviewed

## 2017-04-02 NOTE — PROGRESS NOTE ADULT - SUBJECTIVE AND OBJECTIVE BOX
HISTORY OF PRESENT ILLNESS  85 yr old RH female with multiple medical problems including CAD s/p stents, diabetes, hypertension, hyperlipidemia, anxiety, significant PVD s/p multiple stents, paroxysmal atrial fibrillation was admitted 3/9/17 for elective LE angiogram. Patient was noted to have elevated BP post procedure, and hence admission requested by Dr. Irwin. Per him, patient with occluded SFA, POP b/l with minimal outflow to b/l LE. 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   Hospital course complicated by:  -  pain control management, patient being followed by palliative care.  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, being followed by nephrology   - UTI and urinary retention- currently on IV cipro, flomax, juarez catheter     NB: check with vascular surgery/cardiology about need for plavix [ ]    PMHx -   Hyperlipidemia, Hypertension, Diabetes, PVD (peripheral vascular disease), CAD (coronary artery disease)  History of cholecystectomy, H/O angioplasty      TODAY'S SUBJECTIVE & REVIEW OF SYMPTOMS  [X] Constitutional WNL     [X] Cardio WNL            [X] Resp WNL           [X] GI WNL                          [ ]  WNL                   [X] Heme WNL              [X] Endo WNL                     [ ] Skin WNL                 [ ] MSK WNL            [X] Neuro WNL                   [X] Cognitive WNL        [X] Psych WNL    Juarez removed by patient overnight. Voided 50 cc this AM,  cc. This morning states she feels "tired." Complains of burning pain in left knee, phantom pain in LLE. Has not had a BM today.     ICU Vital Signs Last 24 Hrs  T(C): 36.1, Max: 36.2 (04-01 @ 20:21)  T(F): 97, Max: 97.2 (04-01 @ 20:21)  HR: 92 (84 - 92)  BP: 132/58 (121/59 - 132/58)  BP(mean): --  ABP: --  ABP(mean): --  RR: 16 (16 - 16)  SpO2: 95% (95% - 96%)    PHYSICAL EXAM  Constitutional - NAD, Comfortable, somewhat drowsy  HEENT - NCAT, EOMI  Neck - Supple, No limited ROM  Chest - CTA bilaterally, No wheeze, No rhonchi, No crackles  Cardiovascular - RRR, S1S2, No murmurs  Abdomen - BS+, Soft, NTND  Extremities -  No calf tenderness Mild left LE edema  +knee flexion contracture  Neurologic Exam -                    Cognitive - Awake, Alert, AAO to self, place, date, year, situation     Communication - Fluent, No dysarthria     Cranial Nerves - CN 2-12 intact    Psychiatric - Mood stable, Affect WNL  Skin - moisture dermatitis in intergluteal folds, right heel DTI  Wounds - left BKA residual limb with staples, bandaged.    FUNCTIONAL PROGRESS  unable to hop yet  transfers modA  wheelchair mobility 20' Salomon  sit <--> supine modA  bed mobility min assist                                   8.5    x     )-----------( x        ( 31 Mar 2017 12:39 )             26.5    CAPILLARY BLOOD GLUCOSE  122 (02 Apr 2017 08:00)  181 (01 Apr 2017 23:06)  173 (01 Apr 2017 17:14)  143 (01 Apr 2017 12:30)      MEDICATIONS  (STANDING):  multivitamin 1Tablet(s) Oral daily  senna 2Tablet(s) Oral at bedtime  docusate sodium 100milliGRAM(s) Oral three times a day  tamsulosin 0.4milliGRAM(s) Oral at bedtime  apixaban 2.5milliGRAM(s) Oral two times a day  gabapentin 900milliGRAM(s) Oral at bedtime  insulin glargine Injectable (LANTUS) 30Unit(s) SubCutaneous at bedtime  insulin lispro Injectable (HumaLOG) 5Unit(s) SubCutaneous before breakfast  insulin lispro Injectable (HumaLOG) 5Unit(s) SubCutaneous before lunch  insulin lispro Injectable (HumaLOG) 5Unit(s) SubCutaneous before dinner  insulin lispro (HumaLOG) corrective regimen sliding scale  SubCutaneous three times a day before meals  insulin lispro (HumaLOG) corrective regimen sliding scale  SubCutaneous at bedtime  dextrose 5%. 1000milliLiter(s) IV Continuous <Continuous>  dextrose 50% Injectable 12.5Gram(s) IV Push once  dextrose 50% Injectable 25Gram(s) IV Push once  dextrose 50% Injectable 25Gram(s) IV Push once  atorvastatin 40milliGRAM(s) Oral at bedtime  amLODIPine   Tablet 10milliGRAM(s) Oral daily  ferrous    sulfate 325milliGRAM(s) Oral daily  pantoprazole    Tablet 40milliGRAM(s) Oral before breakfast  ciprofloxacin     Tablet 500milliGRAM(s) Oral every 12 hours  levothyroxine 75MICROGram(s) Oral daily  folic acid 1milliGRAM(s) Oral daily  saccharomyces boulardii 250milliGRAM(s) Oral two times a day  gabapentin 200milliGRAM(s) Oral <User Schedule>  gabapentin 300milliGRAM(s) Oral <User Schedule>  zinc sulfate 220milliGRAM(s) Oral daily  ascorbic acid 500milliGRAM(s) Oral daily  ammonium lactate 12% Lotion 1Application(s) Topical two times a day  fentaNYL   Patch  12 MICROgram(s)/Hr 1Patch Transdermal every 72 hours  acetaminophen   Tablet 650milliGRAM(s) Oral every 6 hours  metoprolol 50milliGRAM(s) Oral two times a day  lidocaine   Patch 1Patch Transdermal daily  sodium chloride 0.9%. 1000milliLiter(s) IV Continuous <Continuous>    MEDICATIONS  (PRN):  acetaminophen   Tablet 650milliGRAM(s) Oral every 6 hours PRN For Temp greater than 38 C (100.4 F)  aluminum hydroxide/magnesium hydroxide/simethicone Suspension 30milliLiter(s) Oral every 4 hours PRN Dyspepsia  dextrose Gel 1Dose(s) Oral once PRN Blood Glucose LESS THAN 70 milliGRAM(s)/deciLiter  glucagon  Injectable 1milliGRAM(s) IntraMuscular once PRN Glucose <70 milliGRAM(s)/deciLiter  ondansetron   Disintegrating Tablet 4milliGRAM(s) Oral every 8 hours PRN Nausea and/or Vomiting  ALPRAZolam 0.25milliGRAM(s) Oral every 8 hours PRN anxiety  oxyCODONE IR 5milliGRAM(s) Oral every 4 hours PRN Moderate Pain (4 - 6)      ASSESSMENT & PLAN    86yo female with DM, CAD, PVD s/p right BKA with gait and functional impairments    Urinary retention: Juarez removed by patient. Minimal spontaneous voiding thus far. Continue PVR/bladder scans/IC and monitor. May consider Flomax if BP stable.     Post-op pain control, phantom pain, anxiety: tylenol standing, oxycodone PRN, gabapentin TID, fentanyl patch, xanax  Add lidoderm patch left knee daily.  Rx Betadine swab to incision with dsd changes daily    Acute blood loss anemia: 8.5/26.5, continue ferrous sulphate and folic acid. No suspicion for active bleeding at this time. Monitor closely as patient's on Eliquis    UTI: 5 day course of cipro BID and florastar    Hyponatremia: resolved, Na+ 136, sodium tabs dc'ed, appreciate recs from nephrology    BKA wound healing: MVI, vit C, zinc, dressing change daily    right heel DTI: z loretta boot and pillow for off-loading heel, lac-hydrin    DMT2: lantus, premeal insulin, SSI AC HS    pAFib/CAD: Eliquis (also DVT ppx), statin, metoprolol    Orthostatic hypotension: BP stable.  On IVF @ 75 cc/hr.     Continue comprehensive acute rehab program consisting of 3hrs/day of OT/PT

## 2017-04-02 NOTE — CHART NOTE - NSCHARTNOTEFT_GEN_A_CORE
Asked by RN to evaluate pain control as patient drowsy today. Discussed with patient and daughter at bedside. Patient states she continues to have burning pain in LLE. Does not believe fentanyl patch providing much relief. Had relief from IV Tylenol earlier today. Patient also noted to be drowsy intermittently during encounter but answering questions appropriately.     Discussed with Dr. Chavez--will discontinue fentanyl patch and decrease bedtime gabapentin to 600 mg.   Consider pain management consult as necessary. Asked by RN to evaluate pain control as patient drowsy today. Discussed with patient and daughter at bedside. Patient states she continues to have burning pain in LLE. Does not believe fentanyl patch providing much relief. Had relief from IV Tylenol earlier today. Patient also noted to be drowsy intermittently during encounter but answering questions appropriately.     Discussed with Dr. Chavez--will discontinue fentanyl patch (started 3/30/17) and decrease bedtime gabapentin to 600 mg.   Consider pain management consult as necessary.

## 2017-04-03 LAB
ALBUMIN SERPL ELPH-MCNC: 2.7 G/DL — LOW (ref 3.3–5.2)
ALP SERPL-CCNC: 82 U/L — SIGNIFICANT CHANGE UP (ref 40–120)
ALT FLD-CCNC: 34 U/L — HIGH
ANION GAP SERPL CALC-SCNC: 10 MMOL/L — SIGNIFICANT CHANGE UP (ref 5–17)
AST SERPL-CCNC: 24 U/L — SIGNIFICANT CHANGE UP
BILIRUB DIRECT SERPL-MCNC: 0.1 MG/DL — SIGNIFICANT CHANGE UP (ref 0–0.3)
BILIRUB INDIRECT FLD-MCNC: 0.1 MG/DL — LOW (ref 0.2–1)
BILIRUB SERPL-MCNC: 0.2 MG/DL — LOW (ref 0.4–2)
BUN SERPL-MCNC: 15 MG/DL — SIGNIFICANT CHANGE UP (ref 8–20)
CALCIUM SERPL-MCNC: 8.9 MG/DL — SIGNIFICANT CHANGE UP (ref 8.6–10.2)
CHLORIDE SERPL-SCNC: 102 MMOL/L — SIGNIFICANT CHANGE UP (ref 98–107)
CO2 SERPL-SCNC: 25 MMOL/L — SIGNIFICANT CHANGE UP (ref 22–29)
CREAT SERPL-MCNC: 0.87 MG/DL — SIGNIFICANT CHANGE UP (ref 0.5–1.3)
GLUCOSE SERPL-MCNC: 62 MG/DL — LOW (ref 70–115)
HCT VFR BLD CALC: 24.1 % — LOW (ref 37–47)
HGB BLD-MCNC: 7.6 G/DL — LOW (ref 12–16)
MCHC RBC-ENTMCNC: 27.1 PG — SIGNIFICANT CHANGE UP (ref 27–31)
MCHC RBC-ENTMCNC: 31.5 G/DL — LOW (ref 32–36)
MCV RBC AUTO: 86.1 FL — SIGNIFICANT CHANGE UP (ref 81–99)
PLATELET # BLD AUTO: 358 K/UL — SIGNIFICANT CHANGE UP (ref 150–400)
POTASSIUM SERPL-MCNC: 4.1 MMOL/L — SIGNIFICANT CHANGE UP (ref 3.5–5.3)
POTASSIUM SERPL-SCNC: 4.1 MMOL/L — SIGNIFICANT CHANGE UP (ref 3.5–5.3)
PROT SERPL-MCNC: 5.9 G/DL — LOW (ref 6.6–8.7)
RBC # BLD: 2.8 M/UL — LOW (ref 4.4–5.2)
RBC # FLD: 14.5 % — SIGNIFICANT CHANGE UP (ref 11–15.6)
SODIUM SERPL-SCNC: 137 MMOL/L — SIGNIFICANT CHANGE UP (ref 135–145)
WBC # BLD: 7.1 K/UL — SIGNIFICANT CHANGE UP (ref 4.8–10.8)
WBC # FLD AUTO: 7.1 K/UL — SIGNIFICANT CHANGE UP (ref 4.8–10.8)

## 2017-04-03 PROCEDURE — 99232 SBSQ HOSP IP/OBS MODERATE 35: CPT | Mod: GC

## 2017-04-03 PROCEDURE — 99232 SBSQ HOSP IP/OBS MODERATE 35: CPT

## 2017-04-03 RX ORDER — SODIUM CHLORIDE 9 MG/ML
1000 INJECTION INTRAMUSCULAR; INTRAVENOUS; SUBCUTANEOUS
Qty: 0 | Refills: 0 | Status: DISCONTINUED | OUTPATIENT
Start: 2017-04-03 | End: 2017-04-03

## 2017-04-03 RX ORDER — IRON SUCROSE 20 MG/ML
200 INJECTION, SOLUTION INTRAVENOUS DAILY
Qty: 0 | Refills: 0 | Status: COMPLETED | OUTPATIENT
Start: 2017-04-03 | End: 2017-04-05

## 2017-04-03 RX ADMIN — GABAPENTIN 200 MILLIGRAM(S): 400 CAPSULE ORAL at 13:38

## 2017-04-03 RX ADMIN — OXYCODONE HYDROCHLORIDE 5 MILLIGRAM(S): 5 TABLET ORAL at 23:38

## 2017-04-03 RX ADMIN — LIDOCAINE 1 PATCH: 4 CREAM TOPICAL at 12:41

## 2017-04-03 RX ADMIN — Medication 650 MILLIGRAM(S): at 05:51

## 2017-04-03 RX ADMIN — APIXABAN 2.5 MILLIGRAM(S): 2.5 TABLET, FILM COATED ORAL at 05:52

## 2017-04-03 RX ADMIN — Medication 500 MILLIGRAM(S): at 05:53

## 2017-04-03 RX ADMIN — Medication 2: at 17:15

## 2017-04-03 RX ADMIN — Medication 5 UNIT(S): at 17:15

## 2017-04-03 RX ADMIN — OXYCODONE HYDROCHLORIDE 5 MILLIGRAM(S): 5 TABLET ORAL at 21:45

## 2017-04-03 RX ADMIN — Medication 500 MILLIGRAM(S): at 12:41

## 2017-04-03 RX ADMIN — ATORVASTATIN CALCIUM 40 MILLIGRAM(S): 80 TABLET, FILM COATED ORAL at 21:46

## 2017-04-03 RX ADMIN — Medication 1 APPLICATION(S): at 05:52

## 2017-04-03 RX ADMIN — Medication 0.25 MILLIGRAM(S): at 22:38

## 2017-04-03 RX ADMIN — GABAPENTIN 600 MILLIGRAM(S): 400 CAPSULE ORAL at 22:37

## 2017-04-03 RX ADMIN — Medication 75 MICROGRAM(S): at 05:52

## 2017-04-03 RX ADMIN — GABAPENTIN 300 MILLIGRAM(S): 400 CAPSULE ORAL at 10:04

## 2017-04-03 RX ADMIN — Medication 1 APPLICATION(S): at 17:07

## 2017-04-03 RX ADMIN — AMLODIPINE BESYLATE 10 MILLIGRAM(S): 2.5 TABLET ORAL at 05:52

## 2017-04-03 RX ADMIN — APIXABAN 2.5 MILLIGRAM(S): 2.5 TABLET, FILM COATED ORAL at 17:08

## 2017-04-03 RX ADMIN — ONDANSETRON 4 MILLIGRAM(S): 8 TABLET, FILM COATED ORAL at 05:53

## 2017-04-03 RX ADMIN — Medication 50 MILLIGRAM(S): at 17:07

## 2017-04-03 RX ADMIN — TAMSULOSIN HYDROCHLORIDE 0.4 MILLIGRAM(S): 0.4 CAPSULE ORAL at 22:38

## 2017-04-03 RX ADMIN — Medication 650 MILLIGRAM(S): at 23:01

## 2017-04-03 RX ADMIN — Medication 1 MILLIGRAM(S): at 12:40

## 2017-04-03 RX ADMIN — Medication 5 UNIT(S): at 12:41

## 2017-04-03 RX ADMIN — Medication 100 MILLIGRAM(S): at 05:53

## 2017-04-03 RX ADMIN — ZINC SULFATE TAB 220 MG (50 MG ZINC EQUIVALENT) 220 MILLIGRAM(S): 220 (50 ZN) TAB at 12:40

## 2017-04-03 RX ADMIN — Medication 325 MILLIGRAM(S): at 12:40

## 2017-04-03 RX ADMIN — INSULIN GLARGINE 30 UNIT(S): 100 INJECTION, SOLUTION SUBCUTANEOUS at 22:38

## 2017-04-03 RX ADMIN — ONDANSETRON 4 MILLIGRAM(S): 8 TABLET, FILM COATED ORAL at 16:51

## 2017-04-03 RX ADMIN — IRON SUCROSE 110 MILLIGRAM(S): 20 INJECTION, SOLUTION INTRAVENOUS at 12:46

## 2017-04-03 RX ADMIN — Medication 250 MILLIGRAM(S): at 05:51

## 2017-04-03 RX ADMIN — Medication 650 MILLIGRAM(S): at 12:40

## 2017-04-03 RX ADMIN — Medication 1 TABLET(S): at 12:41

## 2017-04-03 RX ADMIN — Medication 2: at 12:42

## 2017-04-03 RX ADMIN — Medication 650 MILLIGRAM(S): at 17:08

## 2017-04-03 RX ADMIN — Medication 50 MILLIGRAM(S): at 05:52

## 2017-04-03 RX ADMIN — PANTOPRAZOLE SODIUM 40 MILLIGRAM(S): 20 TABLET, DELAYED RELEASE ORAL at 05:54

## 2017-04-03 NOTE — PROGRESS NOTE ADULT - SUBJECTIVE AND OBJECTIVE BOX
HISTORY OF PRESENT ILLNESS  85 yr old RH female with multiple medical problems including CAD s/p stents, diabetes, hypertension, hyperlipidemia, anxiety, significant PVD s/p multiple stents, paroxysmal atrial fibrillation was admitted 3/9/17 for elective LE angiogram. Patient was noted to have elevated BP post procedure, and hence admission requested by Dr. Irwin. Per him, patient with occluded SFA, POP b/l with minimal outflow to b/l LE. 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   Hospital course complicated by:  -  pain control management, patient being followed by palliative care.  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, being followed by nephrology   - UTI and urinary retention- currently on IV cipro, flomax, juarez catheter     NB: check with vascular surgery/cardiology about need for plavix [ ]    PMHx -   Hyperlipidemia, Hypertension, Diabetes, PVD (peripheral vascular disease), CAD (coronary artery disease)  History of cholecystectomy, H/O angioplasty      TODAY'S SUBJECTIVE & REVIEW OF SYMPTOMS  [X] Constitutional WNL     [X] Cardio WNL            [X] Resp WNL           [X] GI WNL                          [ ]  WNL                   [X] Heme WNL              [X] Endo WNL                     [ ] Skin WNL                 [ ] MSK WNL            [X] Neuro WNL                   [X] Cognitive WNL        [X] Psych WNL    No acute events overnight. Increased spontaneous voiding. Reports LE pain improved. + BM this AM. + right low back pain in therapy.     ICU Vital Signs Last 24 Hrs  T(C): 36.6, Max: 36.6 (04-03 @ 05:05)  T(F): 97.9, Max: 97.9 (04-03 @ 05:05)  HR: 88 (76 - 88)  BP: 140/50 (127/69 - 140/50)  BP(mean): --  ABP: --  ABP(mean): --  RR: 18 (16 - 18)  SpO2: 92% (92% - 97%)    PHYSICAL EXAM  Constitutional - NAD, Comfortable, alert  HEENT - NCAT, EOMI  Neck - Supple, No limited ROM  Chest - CTA bilaterally, No wheeze, No rhonchi, No crackles  Cardiovascular - RRR, S1S2, No murmurs  Abdomen - BS+, Soft, NTND  Extremities -  No calf tenderness Mild left LE edema  +knee flexion contracture  Neurologic Exam -                    Cognitive - Awake, Alert, AAO to self, place, date, year, situation     Communication - Fluent, No dysarthria     Cranial Nerves - CN 2-12 intact    Psychiatric - Mood stable, Affect WNL  Skin - moisture dermatitis in intergluteal folds, right heel DTI  Wounds - left BKA residual limb with staples, erythematous, mild serosanguinous drainage, slight "dusky" discoloration proximal to incision    FUNCTIONAL PROGRESS  unable to hop yet  transfers modA  wheelchair mobility 20' Salomon  sit <--> supine modA  bed mobility min assist    RECENT LABS:                        7.6    7.1   )-----------( 358      ( 03 Apr 2017 08:09 )             24.1     03 Apr 2017 08:09    137    |  102    |  15.0   ----------------------------<  62     4.1     |  25.0   |  0.87     Ca    8.9        03 Apr 2017 08:09    TPro  5.9    /  Alb  2.7    /  TBili  0.2    /  DBili  0.1    /  AST  24     /  ALT  34     /  AlkPhos  82     03 Apr 2017 08:09    CAPILLARY BLOOD GLUCOSE  120 (02 Apr 2017 20:30)  210 (02 Apr 2017 18:07)    MEDICATIONS  (STANDING):  multivitamin 1Tablet(s) Oral daily  senna 2Tablet(s) Oral at bedtime  docusate sodium 100milliGRAM(s) Oral three times a day  tamsulosin 0.4milliGRAM(s) Oral at bedtime  apixaban 2.5milliGRAM(s) Oral two times a day  insulin glargine Injectable (LANTUS) 30Unit(s) SubCutaneous at bedtime  insulin lispro Injectable (HumaLOG) 5Unit(s) SubCutaneous before breakfast  insulin lispro Injectable (HumaLOG) 5Unit(s) SubCutaneous before lunch  insulin lispro Injectable (HumaLOG) 5Unit(s) SubCutaneous before dinner  insulin lispro (HumaLOG) corrective regimen sliding scale  SubCutaneous three times a day before meals  insulin lispro (HumaLOG) corrective regimen sliding scale  SubCutaneous at bedtime  dextrose 5%. 1000milliLiter(s) IV Continuous <Continuous>  dextrose 50% Injectable 12.5Gram(s) IV Push once  dextrose 50% Injectable 25Gram(s) IV Push once  dextrose 50% Injectable 25Gram(s) IV Push once  atorvastatin 40milliGRAM(s) Oral at bedtime  amLODIPine   Tablet 10milliGRAM(s) Oral daily  ferrous    sulfate 325milliGRAM(s) Oral daily  pantoprazole    Tablet 40milliGRAM(s) Oral before breakfast  levothyroxine 75MICROGram(s) Oral daily  folic acid 1milliGRAM(s) Oral daily  gabapentin 200milliGRAM(s) Oral <User Schedule>  gabapentin 300milliGRAM(s) Oral <User Schedule>  zinc sulfate 220milliGRAM(s) Oral daily  ascorbic acid 500milliGRAM(s) Oral daily  ammonium lactate 12% Lotion 1Application(s) Topical two times a day  acetaminophen   Tablet 650milliGRAM(s) Oral every 6 hours  metoprolol 50milliGRAM(s) Oral two times a day  lidocaine   Patch 1Patch Transdermal daily  gabapentin 600milliGRAM(s) Oral at bedtime  iron sucrose IVPB 200milliGRAM(s) IV Intermittent daily    MEDICATIONS  (PRN):  acetaminophen   Tablet 650milliGRAM(s) Oral every 6 hours PRN For Temp greater than 38 C (100.4 F)  aluminum hydroxide/magnesium hydroxide/simethicone Suspension 30milliLiter(s) Oral every 4 hours PRN Dyspepsia  dextrose Gel 1Dose(s) Oral once PRN Blood Glucose LESS THAN 70 milliGRAM(s)/deciLiter  glucagon  Injectable 1milliGRAM(s) IntraMuscular once PRN Glucose <70 milliGRAM(s)/deciLiter  ondansetron   Disintegrating Tablet 4milliGRAM(s) Oral every 8 hours PRN Nausea and/or Vomiting  ALPRAZolam 0.25milliGRAM(s) Oral every 8 hours PRN anxiety  oxyCODONE IR 5milliGRAM(s) Oral every 4 hours PRN Moderate Pain (4 - 6)      ASSESSMENT & PLAN    84yo female with DM, CAD, PVD s/p right BKA with gait and functional impairments    Urinary retention: Improved spontaneous voiding.  Continue PVR/bladder scans/IC prn and monitor.     Post-op pain control, phantom pain, anxiety: tylenol standing, oxycodone PRN, gabapentin TID, Discontinued fentanyl patch, xanax, lidoderm patch left knee daily.  Rx Betadine swab to incision with dsd changes daily. Add heat prn.     Acute blood loss anemia: 7.6/24.1, Continue ferrous sulphate and folic acid. No suspicion for active bleeding at this time. Monitor closely as patient's on Eliquis.    UTI: Completed 5 day course of cipro BID and florastar    BKA wound healing: MVI, vit C, zinc, dressing change daily. Vascular following.     Right heel DTI: z loretta boot and pillow for off-loading heel, lac-hydrin    DMT2: lantus, premeal insulin, SSI AC HS    pAFib/CAD: Eliquis (also DVT ppx), statin, metoprolol    Orthostatic hypotension: BP stable. Continue IVF @ 75 cc/hr overnight.     Hyponatremia: resolved, Na+ 136, sodium tabs dc'ed, appreciate recs from nephrology    Continue comprehensive acute rehab program consisting of 3hrs/day of OT/PT HISTORY OF PRESENT ILLNESS  85 yr old RH female with multiple medical problems including CAD s/p stents, diabetes, hypertension, hyperlipidemia, anxiety, significant PVD s/p multiple stents, paroxysmal atrial fibrillation was admitted 3/9/17 for elective LE angiogram. Patient was noted to have elevated BP post procedure, and hence admission requested by Dr. Irwin. Per him, patient with occluded SFA, POP b/l with minimal outflow to b/l LE. 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   Hospital course complicated by:  -  pain control management, patient being followed by palliative care.  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, being followed by nephrology   - UTI and urinary retention- currently on IV cipro, flomax, juarez catheter     NB: check with vascular surgery/cardiology about need for plavix [ ]    PMHx -   Hyperlipidemia, Hypertension, Diabetes, PVD (peripheral vascular disease), CAD (coronary artery disease)  History of cholecystectomy, H/O angioplasty      TODAY'S SUBJECTIVE & REVIEW OF SYMPTOMS  [X] Constitutional WNL     [X] Cardio WNL            [X] Resp WNL           [X] GI WNL                          [ ]  WNL                   [X] Heme WNL              [X] Endo WNL                     [ ] Skin WNL                 [ ] MSK WNL            [X] Neuro WNL                   [X] Cognitive WNL        [X] Psych WNL    No acute events overnight. Increased spontaneous voiding. Reports LE pain improved. + BM this AM. + right low back pain in therapy.     ICU Vital Signs Last 24 Hrs  T(C): 36.6, Max: 36.6 (04-03 @ 05:05)  T(F): 97.9, Max: 97.9 (04-03 @ 05:05)  HR: 88 (76 - 88)  BP: 140/50 (127/69 - 140/50)  BP(mean): --  ABP: --  ABP(mean): --  RR: 18 (16 - 18)  SpO2: 92% (92% - 97%)    PHYSICAL EXAM  Constitutional - NAD, Comfortable, alert  HEENT - NCAT, EOMI  Neck - Supple, No limited ROM  Chest - CTA bilaterally, No wheeze, No rhonchi, No crackles  Cardiovascular - RRR, S1S2, No murmurs  Abdomen - BS+, Soft, NTND  Extremities -  No calf tenderness Mild left LE edema  +knee flexion contracture  Neurologic Exam -                    Cognitive - Awake, Alert, AAO to self, place, date, year, situation     Communication - Fluent, No dysarthria     Cranial Nerves - CN 2-12 intact    Psychiatric - Mood stable, Affect WNL  Skin - moisture dermatitis in intergluteal folds, right heel DTI  Wounds - left BKA residual limb with staples, erythematous, mild serosanguinous drainage, slight "dusky" discoloration proximal to incision improved    FUNCTIONAL PROGRESS  unable to hop yet  transfers modA  wheelchair mobility 20' Salomon  sit <--> supine modA  bed mobility min assist    RECENT LABS:                        7.6    7.1   )-----------( 358      ( 03 Apr 2017 08:09 )             24.1     03 Apr 2017 08:09    137    |  102    |  15.0   ----------------------------<  62     4.1     |  25.0   |  0.87     Ca    8.9        03 Apr 2017 08:09    TPro  5.9    /  Alb  2.7    /  TBili  0.2    /  DBili  0.1    /  AST  24     /  ALT  34     /  AlkPhos  82     03 Apr 2017 08:09    CAPILLARY BLOOD GLUCOSE  120 (02 Apr 2017 20:30)  210 (02 Apr 2017 18:07)    MEDICATIONS  (STANDING):  multivitamin 1Tablet(s) Oral daily  senna 2Tablet(s) Oral at bedtime  docusate sodium 100milliGRAM(s) Oral three times a day  tamsulosin 0.4milliGRAM(s) Oral at bedtime  apixaban 2.5milliGRAM(s) Oral two times a day  insulin glargine Injectable (LANTUS) 30Unit(s) SubCutaneous at bedtime  insulin lispro Injectable (HumaLOG) 5Unit(s) SubCutaneous before breakfast  insulin lispro Injectable (HumaLOG) 5Unit(s) SubCutaneous before lunch  insulin lispro Injectable (HumaLOG) 5Unit(s) SubCutaneous before dinner  insulin lispro (HumaLOG) corrective regimen sliding scale  SubCutaneous three times a day before meals  insulin lispro (HumaLOG) corrective regimen sliding scale  SubCutaneous at bedtime  dextrose 5%. 1000milliLiter(s) IV Continuous <Continuous>  dextrose 50% Injectable 12.5Gram(s) IV Push once  dextrose 50% Injectable 25Gram(s) IV Push once  dextrose 50% Injectable 25Gram(s) IV Push once  atorvastatin 40milliGRAM(s) Oral at bedtime  amLODIPine   Tablet 10milliGRAM(s) Oral daily  ferrous    sulfate 325milliGRAM(s) Oral daily  pantoprazole    Tablet 40milliGRAM(s) Oral before breakfast  levothyroxine 75MICROGram(s) Oral daily  folic acid 1milliGRAM(s) Oral daily  gabapentin 200milliGRAM(s) Oral <User Schedule>  gabapentin 300milliGRAM(s) Oral <User Schedule>  zinc sulfate 220milliGRAM(s) Oral daily  ascorbic acid 500milliGRAM(s) Oral daily  ammonium lactate 12% Lotion 1Application(s) Topical two times a day  acetaminophen   Tablet 650milliGRAM(s) Oral every 6 hours  metoprolol 50milliGRAM(s) Oral two times a day  lidocaine   Patch 1Patch Transdermal daily  gabapentin 600milliGRAM(s) Oral at bedtime  iron sucrose IVPB 200milliGRAM(s) IV Intermittent daily    MEDICATIONS  (PRN):  acetaminophen   Tablet 650milliGRAM(s) Oral every 6 hours PRN For Temp greater than 38 C (100.4 F)  aluminum hydroxide/magnesium hydroxide/simethicone Suspension 30milliLiter(s) Oral every 4 hours PRN Dyspepsia  dextrose Gel 1Dose(s) Oral once PRN Blood Glucose LESS THAN 70 milliGRAM(s)/deciLiter  glucagon  Injectable 1milliGRAM(s) IntraMuscular once PRN Glucose <70 milliGRAM(s)/deciLiter  ondansetron   Disintegrating Tablet 4milliGRAM(s) Oral every 8 hours PRN Nausea and/or Vomiting  ALPRAZolam 0.25milliGRAM(s) Oral every 8 hours PRN anxiety  oxyCODONE IR 5milliGRAM(s) Oral every 4 hours PRN Moderate Pain (4 - 6)      ASSESSMENT & PLAN    84yo female with DM, CAD, PVD s/p right BKA with gait and functional impairments    Urinary retention: Improved spontaneous voiding.  Continue PVR/bladder scans/IC prn and monitor.     Post-op pain control, phantom pain, anxiety: tylenol standing, oxycodone PRN, gabapentin TID, Discontinued fentanyl patch, xanax, lidoderm patch left knee daily.  Rx Betadine swab to incision with dsd changes daily. Add heat prn and stretching     Acute blood loss anemia: 7.6/24.1, Continue ferrous sulphate and folic acid. No suspicion for active bleeding at this time. Monitor closely as patient's on Eliquis.    UTI: Completed 5 day course of cipro BID and florastar    BKA wound healing: MVI, vit C, zinc, dressing change daily. Vascular following.     Right heel DTI: z loretta boot and pillow for off-loading heel, lac-hydrin    DMT2: lantus, premeal insulin, SSI AC HS    pAFib/CAD: Eliquis (also DVT ppx), statin, metoprolol    Orthostatic hypotension: BP stable. Continue IVF @ 75 cc/hr-change to overnight only and encourage po liquid intake.     Hyponatremia: resolved, Na+ 136, sodium tabs dc'ed, appreciate recs from nephrology    Elevated LFTs: improved    Continue comprehensive acute rehab program consisting of 3hrs/day of OT/PT

## 2017-04-03 NOTE — PROGRESS NOTE ADULT - SUBJECTIVE AND OBJECTIVE BOX
s/p L BKA. Pt c/o stump pain. Denies F/C    Vital Signs Last 24 Hrs  T(C): 36.6, Max: 36.6 (04-03 @ 05:05)  T(F): 97.9, Max: 97.9 (04-03 @ 05:05)  HR: 88 (76 - 88)  BP: 140/50 (127/69 - 140/50)  BP(mean): --  RR: 18 (16 - 18)  SpO2: 92% (92% - 97%)    L BKA warm, mid incision dehiscence with serous drainage.  Knee contracture developing

## 2017-04-03 NOTE — PROGRESS NOTE ADULT - SUBJECTIVE AND OBJECTIVE BOX
CC: left leg pain over knee  on the thigh better controlled today    daughter at the bedside concern about poor participation during PT     HPI: 85 yr old female with PMH CAD s/p stents, DM2, hypertension, hyperlipidemia, anxiety, significant PVD s/p multiple stents, paroxysmal atrial fibrillation on Eliquis admitted for hypertensive urgency post LE angiogram. 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 Hospital course complicated by urinary retention with UTI (failed TOV), hyponatremia likely due to pain and improved with NaCl (per nephrology) and pain medications. BP meds and insulin coverage titrated for better control.  Patient stable for discharge to acute rehab.      Vital Signs Last 24 Hrs  T(C): 36.6, Max: 36.6 (04-03 @ 05:05)  T(F): 97.9, Max: 97.9 (04-03 @ 05:05)  HR: 88 (76 - 88)  BP: 140/50 (127/69 - 140/50)  BP(mean): --  RR: 18 (16 - 18)  SpO2: 92% (92% - 97%)  PHYSICAL EXAM:  GENERAL: NAD, well-groomed, well-developed  CHEST/LUNG: Clear to auscultation bilaterally; No rales, rhonchi, wheezing, or rubs  HEART: Regular rate and rhythm; No murmurs, rubs, or gallops  ABDOMEN: Soft, Nontender, Nondistended; Bowel sounds present, (+) juarez  EXTREMITIES:  2+ Peripheral Pulses, No clubbing, cyanosis, or edema; Left BKA site with ACE wrap +                                    7.6    7.1   )-----------( 358      ( 03 Apr 2017 08:09 )             24.1   03 Apr 2017 08:09    137    |  102    |  15.0   ----------------------------<  62     4.1     |  25.0   |  0.87     Ca    8.9        03 Apr 2017 08:09    TPro  5.9    /  Alb  2.7    /  TBili  0.2    /  DBili  0.1    /  AST  24     /  ALT  34     /  AlkPhos  82     03 Apr 2017 08:09

## 2017-04-03 NOTE — PROGRESS NOTE ADULT - ASSESSMENT
85 yr old female with PMH CAD s/p stents, DM2, hypertension, hyperlipidemia, anxiety, significant PVD s/p multiple stents, paroxysmal atrial fibrillation on Eliquis admitted for hypertensive urgency post LE angiogram. 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 Hospital course complicated by urinary retention with UTI (failed TOV), hyponatremia likely due to pain and improved with NaCl (per nephrology) and pain medications. BP meds and insulin coverage titrated for better control.  Patient was   transferred  to acute rehab , pain poorly controlled , duragesic  patch discontinued due to lethargy ,

## 2017-04-04 DIAGNOSIS — E11.649 TYPE 2 DIABETES MELLITUS WITH HYPOGLYCEMIA WITHOUT COMA: ICD-10-CM

## 2017-04-04 PROCEDURE — 99233 SBSQ HOSP IP/OBS HIGH 50: CPT

## 2017-04-04 PROCEDURE — 99232 SBSQ HOSP IP/OBS MODERATE 35: CPT | Mod: GC

## 2017-04-04 RX ORDER — SODIUM CHLORIDE 9 MG/ML
1000 INJECTION INTRAMUSCULAR; INTRAVENOUS; SUBCUTANEOUS
Qty: 0 | Refills: 0 | Status: DISCONTINUED | OUTPATIENT
Start: 2017-04-04 | End: 2017-04-07

## 2017-04-04 RX ORDER — SENNA PLUS 8.6 MG/1
2 TABLET ORAL AT BEDTIME
Qty: 0 | Refills: 0 | Status: DISCONTINUED | OUTPATIENT
Start: 2017-04-04 | End: 2017-04-13

## 2017-04-04 RX ORDER — DOCUSATE SODIUM 100 MG
100 CAPSULE ORAL
Qty: 0 | Refills: 0 | Status: DISCONTINUED | OUTPATIENT
Start: 2017-04-04 | End: 2017-04-07

## 2017-04-04 RX ADMIN — IRON SUCROSE 110 MILLIGRAM(S): 20 INJECTION, SOLUTION INTRAVENOUS at 12:12

## 2017-04-04 RX ADMIN — Medication 75 MICROGRAM(S): at 06:04

## 2017-04-04 RX ADMIN — Medication 6: at 17:22

## 2017-04-04 RX ADMIN — GABAPENTIN 600 MILLIGRAM(S): 400 CAPSULE ORAL at 21:51

## 2017-04-04 RX ADMIN — Medication 1 MILLIGRAM(S): at 12:11

## 2017-04-04 RX ADMIN — LIDOCAINE 1 PATCH: 4 CREAM TOPICAL at 01:00

## 2017-04-04 RX ADMIN — AMLODIPINE BESYLATE 10 MILLIGRAM(S): 2.5 TABLET ORAL at 06:04

## 2017-04-04 RX ADMIN — OXYCODONE HYDROCHLORIDE 5 MILLIGRAM(S): 5 TABLET ORAL at 05:52

## 2017-04-04 RX ADMIN — ATORVASTATIN CALCIUM 40 MILLIGRAM(S): 80 TABLET, FILM COATED ORAL at 21:51

## 2017-04-04 RX ADMIN — TAMSULOSIN HYDROCHLORIDE 0.4 MILLIGRAM(S): 0.4 CAPSULE ORAL at 21:51

## 2017-04-04 RX ADMIN — Medication 650 MILLIGRAM(S): at 17:22

## 2017-04-04 RX ADMIN — SODIUM CHLORIDE 75 MILLILITER(S): 9 INJECTION INTRAMUSCULAR; INTRAVENOUS; SUBCUTANEOUS at 19:50

## 2017-04-04 RX ADMIN — GABAPENTIN 300 MILLIGRAM(S): 400 CAPSULE ORAL at 08:05

## 2017-04-04 RX ADMIN — PANTOPRAZOLE SODIUM 40 MILLIGRAM(S): 20 TABLET, DELAYED RELEASE ORAL at 06:04

## 2017-04-04 RX ADMIN — APIXABAN 2.5 MILLIGRAM(S): 2.5 TABLET, FILM COATED ORAL at 17:22

## 2017-04-04 RX ADMIN — Medication 50 MILLIGRAM(S): at 06:04

## 2017-04-04 RX ADMIN — Medication 1 APPLICATION(S): at 06:05

## 2017-04-04 RX ADMIN — APIXABAN 2.5 MILLIGRAM(S): 2.5 TABLET, FILM COATED ORAL at 06:57

## 2017-04-04 RX ADMIN — Medication 0.25 MILLIGRAM(S): at 19:40

## 2017-04-04 RX ADMIN — Medication 1 TABLET(S): at 12:11

## 2017-04-04 RX ADMIN — Medication 500 MILLIGRAM(S): at 12:10

## 2017-04-04 RX ADMIN — Medication 5 UNIT(S): at 17:23

## 2017-04-04 RX ADMIN — Medication 100 MILLIGRAM(S): at 06:04

## 2017-04-04 RX ADMIN — Medication 650 MILLIGRAM(S): at 12:11

## 2017-04-04 RX ADMIN — GABAPENTIN 200 MILLIGRAM(S): 400 CAPSULE ORAL at 16:07

## 2017-04-04 RX ADMIN — Medication 50 MILLIGRAM(S): at 17:22

## 2017-04-04 RX ADMIN — OXYCODONE HYDROCHLORIDE 5 MILLIGRAM(S): 5 TABLET ORAL at 18:59

## 2017-04-04 RX ADMIN — Medication 325 MILLIGRAM(S): at 12:10

## 2017-04-04 RX ADMIN — ZINC SULFATE TAB 220 MG (50 MG ZINC EQUIVALENT) 220 MILLIGRAM(S): 220 (50 ZN) TAB at 12:11

## 2017-04-04 RX ADMIN — OXYCODONE HYDROCHLORIDE 5 MILLIGRAM(S): 5 TABLET ORAL at 20:38

## 2017-04-04 RX ADMIN — OXYCODONE HYDROCHLORIDE 5 MILLIGRAM(S): 5 TABLET ORAL at 04:45

## 2017-04-04 RX ADMIN — LIDOCAINE 1 PATCH: 4 CREAM TOPICAL at 12:11

## 2017-04-04 RX ADMIN — Medication 650 MILLIGRAM(S): at 06:04

## 2017-04-04 RX ADMIN — Medication 1 APPLICATION(S): at 17:23

## 2017-04-04 RX ADMIN — INSULIN GLARGINE 30 UNIT(S): 100 INJECTION, SOLUTION SUBCUTANEOUS at 21:51

## 2017-04-04 NOTE — PROGRESS NOTE ADULT - SUBJECTIVE AND OBJECTIVE BOX
CC: left leg pain intermittent getting better   per nurse BC 60's this am , she denies dizziness    HPI: 85 yr old female with PMH CAD s/p stents, DM2, hypertension, hyperlipidemia, anxiety, significant PVD s/p multiple stents, paroxysmal atrial fibrillation on Eliquis admitted for hypertensive urgency post LE angiogram. 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 Hospital course complicated by urinary retention with UTI (failed TOV), hyponatremia likely due to pain and improved with NaCl (per nephrology) and pain medications. BP meds and insulin coverage titrated for better control.  Patient stable for discharge to acute rehab.  Vital Signs Last 24 Hrs  T(C): 36.7, Max: 36.9 (04-03 @ 21:48)  T(F): 98, Max: 98.5 (04-03 @ 21:48)  HR: 90 (80 - 90)  BP: 124/63 (124/63 - 148/58)  BP(mean): --  RR: 17 (17 - 18)  SpO2: 92% (92% - 97%)    PHYSICAL EXAM:  GENERAL: NAD, well-groomed, well-developed  CHEST/LUNG: Clear to auscultation bilaterally; No rales, rhonchi, wheezing, or rubs  HEART: Regular rate and rhythm; No murmurs, rubs, or gallops  ABDOMEN: Soft, Nontender, Nondistended; Bowel sounds present, (+) juarez  EXTREMITIES:  2+ Peripheral Pulses, No clubbing, cyanosis, or edema; Left BKA site with ACE wrap +

## 2017-04-04 NOTE — PROGRESS NOTE ADULT - SUBJECTIVE AND OBJECTIVE BOX
HISTORY OF PRESENT ILLNESS  85 yr old RH female with multiple medical problems including CAD s/p stents, diabetes, hypertension, hyperlipidemia, anxiety, significant PVD s/p multiple stents, paroxysmal atrial fibrillation was admitted 3/9/17 for elective LE angiogram. Patient was noted to have elevated BP post procedure, and hence admission requested by Dr. Irwin. Per him, patient with occluded SFA, POP b/l with minimal outflow to b/l LE. 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   Hospital course complicated by:  -  pain control management, patient being followed by palliative care.  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, being followed by nephrology   - UTI and urinary retention- currently on IV cipro, flomax, juarez catheter     NB: check with vascular surgery/cardiology about need for plavix [ ]    PMHx -   Hyperlipidemia, Hypertension, Diabetes, PVD (peripheral vascular disease), CAD (coronary artery disease)  History of cholecystectomy, H/O angioplasty      TODAY'S SUBJECTIVE & REVIEW OF SYMPTOMS  [X] Constitutional WNL     [X] Cardio WNL            [X] Resp WNL           [X] GI WNL                          [ ]  WNL                   [X] Heme WNL              [X] Endo WNL                     [ ] Skin WNL                 [ ] MSK WNL            [X] Neuro WNL                   [X] Cognitive WNL        [X] Psych WNL    No acute events overnight. Some spontaneous voiding (150-200 cc) but required SC last night and this AM. Hypoglycemic this morning--BG 60; 79 after apple juice. Denies complaints.     ICU Vital Signs Last 24 Hrs  T(C): 36.7, Max: 36.9 (04-03 @ 21:48)  T(F): 98, Max: 98.5 (04-03 @ 21:48)  HR: 90 (80 - 90)  BP: 124/63 (124/63 - 148/58)  BP(mean): --  ABP: --  ABP(mean): --  RR: 17 (17 - 18)  SpO2: 92% (92% - 97%)      PHYSICAL EXAM  Constitutional - NAD, Comfortable, alert  HEENT - NCAT, EOMI  Neck - Supple, No limited ROM  Chest - CTA bilaterally, No wheeze, No rhonchi, No crackles  Cardiovascular - RRR, S1S2, No murmurs  Abdomen - BS+, Soft, NTND  Extremities -  No calf tenderness. Mild left LE edema  +knee flexion contracture  Neurologic Exam -                    Cognitive - Awake, Alert, AAO to self, place, date, year, situation     Communication - Fluent, No dysarthria     Cranial Nerves - CN 2-12 intact    Psychiatric - Mood stable, Affect WNL  Skin - moisture dermatitis in intergluteal folds, right heel DTI  Wounds - left BKA residual limb with staples, erythematous, mild serosanguinous drainage, slight "dusky" discoloration proximal to incision improved    FUNCTIONAL PROGRESS  unable to hop yet  Transfers: Min-Mod A  Wheelchair mobility: 100' Salomon  ADLs: upper body independent, total assist lower body dressing    RECENT LABS:                        7.6    7.1   )-----------( 358      ( 03 Apr 2017 08:09 )             24.1     03 Apr 2017 08:09    137    |  102    |  15.0   ----------------------------<  62     4.1     |  25.0   |  0.87     Ca    8.9        03 Apr 2017 08:09    TPro  5.9    /  Alb  2.7    /  TBili  0.2    /  DBili  0.1    /  AST  24     /  ALT  34     /  AlkPhos  82     03 Apr 2017 08:09    CAPILLARY BLOOD GLUCOSE  142 (03 Apr 2017 21:48)  185 (03 Apr 2017 16:59)    MEDICATIONS  (STANDING):  multivitamin 1Tablet(s) Oral daily  senna 2Tablet(s) Oral at bedtime  docusate sodium 100milliGRAM(s) Oral three times a day  tamsulosin 0.4milliGRAM(s) Oral at bedtime  apixaban 2.5milliGRAM(s) Oral two times a day  insulin glargine Injectable (LANTUS) 30Unit(s) SubCutaneous at bedtime  insulin lispro Injectable (HumaLOG) 5Unit(s) SubCutaneous before breakfast  insulin lispro Injectable (HumaLOG) 5Unit(s) SubCutaneous before lunch  insulin lispro Injectable (HumaLOG) 5Unit(s) SubCutaneous before dinner  insulin lispro (HumaLOG) corrective regimen sliding scale  SubCutaneous three times a day before meals  insulin lispro (HumaLOG) corrective regimen sliding scale  SubCutaneous at bedtime  dextrose 5%. 1000milliLiter(s) IV Continuous <Continuous>  dextrose 50% Injectable 12.5Gram(s) IV Push once  dextrose 50% Injectable 25Gram(s) IV Push once  dextrose 50% Injectable 25Gram(s) IV Push once  atorvastatin 40milliGRAM(s) Oral at bedtime  amLODIPine   Tablet 10milliGRAM(s) Oral daily  ferrous    sulfate 325milliGRAM(s) Oral daily  pantoprazole    Tablet 40milliGRAM(s) Oral before breakfast  levothyroxine 75MICROGram(s) Oral daily  folic acid 1milliGRAM(s) Oral daily  gabapentin 200milliGRAM(s) Oral <User Schedule>  gabapentin 300milliGRAM(s) Oral <User Schedule>  zinc sulfate 220milliGRAM(s) Oral daily  ascorbic acid 500milliGRAM(s) Oral daily  ammonium lactate 12% Lotion 1Application(s) Topical two times a day  acetaminophen   Tablet 650milliGRAM(s) Oral every 6 hours  metoprolol 50milliGRAM(s) Oral two times a day  lidocaine   Patch 1Patch Transdermal daily  gabapentin 600milliGRAM(s) Oral at bedtime  iron sucrose IVPB 200milliGRAM(s) IV Intermittent daily    MEDICATIONS  (PRN):  acetaminophen   Tablet 650milliGRAM(s) Oral every 6 hours PRN For Temp greater than 38 C (100.4 F)  aluminum hydroxide/magnesium hydroxide/simethicone Suspension 30milliLiter(s) Oral every 4 hours PRN Dyspepsia  dextrose Gel 1Dose(s) Oral once PRN Blood Glucose LESS THAN 70 milliGRAM(s)/deciLiter  glucagon  Injectable 1milliGRAM(s) IntraMuscular once PRN Glucose <70 milliGRAM(s)/deciLiter  ondansetron   Disintegrating Tablet 4milliGRAM(s) Oral every 8 hours PRN Nausea and/or Vomiting  ALPRAZolam 0.25milliGRAM(s) Oral every 8 hours PRN anxiety  oxyCODONE IR 5milliGRAM(s) Oral every 4 hours PRN Moderate Pain (4 - 6)        ASSESSMENT & PLAN    84yo female with DM, CAD, PVD s/p right BKA with gait and functional impairments    Urinary retention: Some spontaneous voiding. Continue PVR/bladder scans/IC prn and monitor.     Post-op pain control, phantom pain, anxiety: tylenol standing, oxycodone PRN, gabapentin TID,  xanax, lidoderm patch left knee daily.  Rx Betadine swab to incision with dsd changes daily.  Heat prn and stretching.    Acute blood loss anemia: 7.6/24.1 4/4/17, Continue ferrous sulphate and folic acid. No suspicion for active bleeding at this time. Monitor closely as patient's on Eliquis.    UTI: Completed 5 day course of cipro BID and florastar    BKA wound healing: MVI, vit C, zinc, dressing change with iodoform BID. Vascular following. Prosthetic eval.     Right heel DTI: z loretta boot and pillow for off-loading heel, lac-hydrin    DMT2: lantus, premeal insulin, SSI AC HS    pAFib/CAD: Eliquis (also DVT ppx), statin, metoprolol    Orthostatic hypotension: BP stable. Continue IVF @ 75 cc/hr-overnight only and encourage po liquid intake.     Hyponatremia: resolved, Na+ 136, sodium tabs dc'ed, appreciate recs from nephrology    Elevated LFTs: improved    Continue comprehensive acute rehab program consisting of 3hrs/day of OT/PT HISTORY OF PRESENT ILLNESS  85 yr old RH female with multiple medical problems including CAD s/p stents, diabetes, hypertension, hyperlipidemia, anxiety, significant PVD s/p multiple stents, paroxysmal atrial fibrillation was admitted 3/9/17 for elective LE angiogram. Patient was noted to have elevated BP post procedure, and hence admission requested by Dr. Irwin. Per him, patient with occluded SFA, POP b/l with minimal outflow to b/l LE. 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   Hospital course complicated by:  -  pain control management, patient being followed by palliative care.  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, being followed by nephrology   - UTI and urinary retention- currently on IV cipro, flomax, juarez catheter     NB: check with vascular surgery/cardiology about need for plavix [ ]    PMHx -   Hyperlipidemia, Hypertension, Diabetes, PVD (peripheral vascular disease), CAD (coronary artery disease)  History of cholecystectomy, H/O angioplasty      TODAY'S SUBJECTIVE & REVIEW OF SYMPTOMS  [X] Constitutional WNL     [X] Cardio WNL            [X] Resp WNL           [X] GI WNL                          [ ]  WNL                   [X] Heme WNL              [X] Endo WNL                     [ ] Skin WNL                 [ ] MSK WNL            [X] Neuro WNL                   [X] Cognitive WNL        [X] Psych WNL    No acute events overnight. Some spontaneous voiding (150-200 cc) but required SC last night and this AM. Hypoglycemic this morning--BG 60; 79 after apple juice.  Multiple BMs yesterday which she attributes to bowel meds.  Denies complaints.     ICU Vital Signs Last 24 Hrs  T(C): 36.7, Max: 36.9 (04-03 @ 21:48)  T(F): 98, Max: 98.5 (04-03 @ 21:48)  HR: 90 (80 - 90)  BP: 124/63 (124/63 - 148/58)  BP(mean): --  ABP: --  ABP(mean): --  RR: 17 (17 - 18)  SpO2: 92% (92% - 97%)      PHYSICAL EXAM  Constitutional - NAD, Comfortable, alert  HEENT - NCAT, EOMI  Neck - Supple, No limited ROM  Chest - CTA bilaterally, No wheeze, No rhonchi, No crackles  Cardiovascular - RRR, S1S2, No murmurs  Abdomen - BS+, Soft, NTND  Extremities -  No calf tenderness. Mild left LE edema  +knee flexion contracture  Neurologic Exam -                    Cognitive - Awake, Alert, AAO to self, place, date, year, situation     Communication - Fluent, No dysarthria     Cranial Nerves - CN 2-12 intact    Psychiatric - Mood stable, Affect WNL  Skin - moisture dermatitis in intergluteal folds, right heel DTI  Wounds - left BKA residual limb with staples, erythematous, mild serosanguinous drainage, slight "dusky" discoloration proximal to incision improved    FUNCTIONAL PROGRESS  Bed mobility: Min/mod assist  Gait: unable  Transfers: Min-Mod A  Wheelchair mobility: 100' Salomon  ADLs: upper body independent, total assist lower body dressing    RECENT LABS:                        7.6    7.1   )-----------( 358      ( 03 Apr 2017 08:09 )             24.1     03 Apr 2017 08:09    137    |  102    |  15.0   ----------------------------<  62     4.1     |  25.0   |  0.87     Ca    8.9        03 Apr 2017 08:09    TPro  5.9    /  Alb  2.7    /  TBili  0.2    /  DBili  0.1    /  AST  24     /  ALT  34     /  AlkPhos  82     03 Apr 2017 08:09    CAPILLARY BLOOD GLUCOSE    122 (04 Apr 2017 12:00)  60 (04 Apr 2017 08:00)  142 (03 Apr 2017 21:48)  185 (03 Apr 2017 16:59)    142 (03 Apr 2017 21:48)  185 (03 Apr 2017 16:59)    MEDICATIONS  (STANDING):  multivitamin 1Tablet(s) Oral daily  senna 2Tablet(s) Oral at bedtime  docusate sodium 100milliGRAM(s) Oral three times a day  tamsulosin 0.4milliGRAM(s) Oral at bedtime  apixaban 2.5milliGRAM(s) Oral two times a day  insulin glargine Injectable (LANTUS) 30Unit(s) SubCutaneous at bedtime  insulin lispro Injectable (HumaLOG) 5Unit(s) SubCutaneous before breakfast  insulin lispro Injectable (HumaLOG) 5Unit(s) SubCutaneous before lunch  insulin lispro Injectable (HumaLOG) 5Unit(s) SubCutaneous before dinner  insulin lispro (HumaLOG) corrective regimen sliding scale  SubCutaneous three times a day before meals  insulin lispro (HumaLOG) corrective regimen sliding scale  SubCutaneous at bedtime  dextrose 5%. 1000milliLiter(s) IV Continuous <Continuous>  dextrose 50% Injectable 12.5Gram(s) IV Push once  dextrose 50% Injectable 25Gram(s) IV Push once  dextrose 50% Injectable 25Gram(s) IV Push once  atorvastatin 40milliGRAM(s) Oral at bedtime  amLODIPine   Tablet 10milliGRAM(s) Oral daily  ferrous    sulfate 325milliGRAM(s) Oral daily  pantoprazole    Tablet 40milliGRAM(s) Oral before breakfast  levothyroxine 75MICROGram(s) Oral daily  folic acid 1milliGRAM(s) Oral daily  gabapentin 200milliGRAM(s) Oral <User Schedule>  gabapentin 300milliGRAM(s) Oral <User Schedule>  zinc sulfate 220milliGRAM(s) Oral daily  ascorbic acid 500milliGRAM(s) Oral daily  ammonium lactate 12% Lotion 1Application(s) Topical two times a day  acetaminophen   Tablet 650milliGRAM(s) Oral every 6 hours  metoprolol 50milliGRAM(s) Oral two times a day  lidocaine   Patch 1Patch Transdermal daily  gabapentin 600milliGRAM(s) Oral at bedtime  iron sucrose IVPB 200milliGRAM(s) IV Intermittent daily    MEDICATIONS  (PRN):  acetaminophen   Tablet 650milliGRAM(s) Oral every 6 hours PRN For Temp greater than 38 C (100.4 F)  aluminum hydroxide/magnesium hydroxide/simethicone Suspension 30milliLiter(s) Oral every 4 hours PRN Dyspepsia  dextrose Gel 1Dose(s) Oral once PRN Blood Glucose LESS THAN 70 milliGRAM(s)/deciLiter  glucagon  Injectable 1milliGRAM(s) IntraMuscular once PRN Glucose <70 milliGRAM(s)/deciLiter  ondansetron   Disintegrating Tablet 4milliGRAM(s) Oral every 8 hours PRN Nausea and/or Vomiting  ALPRAZolam 0.25milliGRAM(s) Oral every 8 hours PRN anxiety  oxyCODONE IR 5milliGRAM(s) Oral every 4 hours PRN Moderate Pain (4 - 6)        ASSESSMENT & PLAN    86yo female with DM, CAD, PVD s/p right BKA with gait and functional impairments    Urinary retention: Some spontaneous voiding. Continue PVR/bladder scans/IC prn, Flomax and monitor.     Post-op pain control, phantom pain, anxiety: tylenol standing, oxycodone PRN, gabapentin TID,  xanax, lidoderm patch left knee daily.  Heat to left knee prn and stretching.    Acute blood loss anemia: 7.6/24.1 4/4/17, Continue ferrous sulphate and folic acid. No suspicion for active bleeding at this time. Monitor closely as patient's on Eliquis.  Venofer x 3 days    UTI: Completed 5 day course of cipro BID and florastar    BKA wound healing: MVI, vit C, zinc, dressing change with iodoform BID. Vascular following. Prosthetic eval.     Right heel DTI: z loretta boot and pillow for off-loading heel, lac-hydrin    DMT2: lantus, premeal insulin, SSI AC HS    pAFib/CAD: Eliquis (also DVT ppx), statin, metoprolol    Orthostatic hypotension: BP stable. Continue IVF @ 75 cc/hr-overnight only and encourage po liquid intake.     Hyponatremia: resolved, Na+ 136, sodium tabs dc'ed, appreciate recs from nephrology    Elevated LFTs: improved    Continue comprehensive acute rehab program consisting of 3hrs/day of OT/PT

## 2017-04-04 NOTE — PROGRESS NOTE ADULT - ASSESSMENT
85 yr old female with PMH CAD s/p stents, DM2, hypertension, hyperlipidemia, anxiety, significant PVD s/p multiple stents, paroxysmal atrial fibrillation on Eliquis admitted for hypertensive urgency post LE angiogram. 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 Hospital course complicated by urinary retention with UTI (failed TOV), hyponatremia likely due to pain and improved with NaCl (per nephrology) and pain medications. BP meds and insulin coverage titrated for better control.  Patient was   transferred  to acute rehab , pain poorly controlled , duragesic  patch discontinued due to lethargy ,feels better low blood sugar this am

## 2017-04-04 NOTE — PROGRESS NOTE ADULT - SUBJECTIVE AND OBJECTIVE BOX
s/p L BKA. No acute events.      Vital Signs Last 24 Hrs  T(C): 36.7, Max: 36.9 (04-03 @ 21:48)  T(F): 98, Max: 98.5 (04-03 @ 21:48)  HR: 90 (80 - 90)  BP: 124/63 (124/63 - 148/58)  BP(mean): --  RR: 17 (17 - 18)  SpO2: 92% (92% - 97%)    PE L BKA with central dehiscence. Staples removed centrally and wound packed with 1/2 " iodoform packing.

## 2017-04-05 LAB
APPEARANCE UR: CLEAR — SIGNIFICANT CHANGE UP
BACTERIA # UR AUTO: ABNORMAL
BILIRUB UR-MCNC: NEGATIVE — SIGNIFICANT CHANGE UP
COLOR SPEC: YELLOW — SIGNIFICANT CHANGE UP
DIFF PNL FLD: NEGATIVE — SIGNIFICANT CHANGE UP
EPI CELLS # UR: SIGNIFICANT CHANGE UP
GLUCOSE UR QL: NEGATIVE MG/DL — SIGNIFICANT CHANGE UP
KETONES UR-MCNC: NEGATIVE — SIGNIFICANT CHANGE UP
LEUKOCYTE ESTERASE UR-ACNC: NEGATIVE — SIGNIFICANT CHANGE UP
NITRITE UR-MCNC: POSITIVE
PH UR: 7 — SIGNIFICANT CHANGE UP (ref 4.8–8)
PROT UR-MCNC: 30 MG/DL
RBC CASTS # UR COMP ASSIST: NEGATIVE /HPF — SIGNIFICANT CHANGE UP (ref 0–4)
SP GR SPEC: 1.01 — SIGNIFICANT CHANGE UP (ref 1.01–1.02)
UROBILINOGEN FLD QL: NEGATIVE MG/DL — SIGNIFICANT CHANGE UP
WBC UR QL: NEGATIVE — SIGNIFICANT CHANGE UP

## 2017-04-05 PROCEDURE — 99233 SBSQ HOSP IP/OBS HIGH 50: CPT

## 2017-04-05 PROCEDURE — 99232 SBSQ HOSP IP/OBS MODERATE 35: CPT

## 2017-04-05 RX ORDER — GABAPENTIN 400 MG/1
300 CAPSULE ORAL AT BEDTIME
Qty: 0 | Refills: 0 | Status: DISCONTINUED | OUTPATIENT
Start: 2017-04-05 | End: 2017-04-05

## 2017-04-05 RX ORDER — SACCHAROMYCES BOULARDII 250 MG
250 POWDER IN PACKET (EA) ORAL EVERY 12 HOURS
Qty: 0 | Refills: 0 | Status: COMPLETED | OUTPATIENT
Start: 2017-04-05 | End: 2017-04-10

## 2017-04-05 RX ORDER — GABAPENTIN 400 MG/1
200 CAPSULE ORAL EVERY 8 HOURS
Qty: 0 | Refills: 0 | Status: DISCONTINUED | OUTPATIENT
Start: 2017-04-05 | End: 2017-04-08

## 2017-04-05 RX ORDER — INSULIN GLARGINE 100 [IU]/ML
26 INJECTION, SOLUTION SUBCUTANEOUS
Qty: 0 | Refills: 0 | Status: DISCONTINUED | OUTPATIENT
Start: 2017-04-05 | End: 2017-04-06

## 2017-04-05 RX ORDER — OXYCODONE HYDROCHLORIDE 5 MG/1
2.5 TABLET ORAL EVERY 4 HOURS
Qty: 0 | Refills: 0 | Status: DISCONTINUED | OUTPATIENT
Start: 2017-04-05 | End: 2017-04-06

## 2017-04-05 RX ORDER — CIPROFLOXACIN LACTATE 400MG/40ML
500 VIAL (ML) INTRAVENOUS EVERY 12 HOURS
Qty: 0 | Refills: 0 | Status: DISCONTINUED | OUTPATIENT
Start: 2017-04-05 | End: 2017-04-07

## 2017-04-05 RX ADMIN — Medication 6: at 12:47

## 2017-04-05 RX ADMIN — Medication 650 MILLIGRAM(S): at 05:32

## 2017-04-05 RX ADMIN — Medication 0.25 MILLIGRAM(S): at 20:15

## 2017-04-05 RX ADMIN — Medication 650 MILLIGRAM(S): at 00:15

## 2017-04-05 RX ADMIN — Medication 1 MILLIGRAM(S): at 12:49

## 2017-04-05 RX ADMIN — Medication 650 MILLIGRAM(S): at 12:48

## 2017-04-05 RX ADMIN — SODIUM CHLORIDE 75 MILLILITER(S): 9 INJECTION INTRAMUSCULAR; INTRAVENOUS; SUBCUTANEOUS at 23:39

## 2017-04-05 RX ADMIN — APIXABAN 2.5 MILLIGRAM(S): 2.5 TABLET, FILM COATED ORAL at 05:31

## 2017-04-05 RX ADMIN — GABAPENTIN 200 MILLIGRAM(S): 400 CAPSULE ORAL at 21:41

## 2017-04-05 RX ADMIN — OXYCODONE HYDROCHLORIDE 2.5 MILLIGRAM(S): 5 TABLET ORAL at 22:22

## 2017-04-05 RX ADMIN — Medication 1 TABLET(S): at 12:48

## 2017-04-05 RX ADMIN — Medication 100 MILLIGRAM(S): at 12:48

## 2017-04-05 RX ADMIN — Medication 250 MILLIGRAM(S): at 22:26

## 2017-04-05 RX ADMIN — ATORVASTATIN CALCIUM 40 MILLIGRAM(S): 80 TABLET, FILM COATED ORAL at 21:42

## 2017-04-05 RX ADMIN — Medication 1 APPLICATION(S): at 05:31

## 2017-04-05 RX ADMIN — LIDOCAINE 1 PATCH: 4 CREAM TOPICAL at 23:49

## 2017-04-05 RX ADMIN — INSULIN GLARGINE 26 UNIT(S): 100 INJECTION, SOLUTION SUBCUTANEOUS at 21:41

## 2017-04-05 RX ADMIN — GABAPENTIN 300 MILLIGRAM(S): 400 CAPSULE ORAL at 08:55

## 2017-04-05 RX ADMIN — Medication 325 MILLIGRAM(S): at 12:48

## 2017-04-05 RX ADMIN — OXYCODONE HYDROCHLORIDE 2.5 MILLIGRAM(S): 5 TABLET ORAL at 17:08

## 2017-04-05 RX ADMIN — Medication 500 MILLIGRAM(S): at 22:26

## 2017-04-05 RX ADMIN — APIXABAN 2.5 MILLIGRAM(S): 2.5 TABLET, FILM COATED ORAL at 18:26

## 2017-04-05 RX ADMIN — LIDOCAINE 1 PATCH: 4 CREAM TOPICAL at 00:29

## 2017-04-05 RX ADMIN — Medication 75 MICROGRAM(S): at 05:31

## 2017-04-05 RX ADMIN — IRON SUCROSE 110 MILLIGRAM(S): 20 INJECTION, SOLUTION INTRAVENOUS at 12:48

## 2017-04-05 RX ADMIN — Medication 50 MILLIGRAM(S): at 17:19

## 2017-04-05 RX ADMIN — Medication 650 MILLIGRAM(S): at 23:39

## 2017-04-05 RX ADMIN — Medication 650 MILLIGRAM(S): at 17:07

## 2017-04-05 RX ADMIN — Medication 5 UNIT(S): at 12:46

## 2017-04-05 RX ADMIN — TAMSULOSIN HYDROCHLORIDE 0.4 MILLIGRAM(S): 0.4 CAPSULE ORAL at 21:42

## 2017-04-05 RX ADMIN — OXYCODONE HYDROCHLORIDE 2.5 MILLIGRAM(S): 5 TABLET ORAL at 17:58

## 2017-04-05 RX ADMIN — AMLODIPINE BESYLATE 10 MILLIGRAM(S): 2.5 TABLET ORAL at 05:32

## 2017-04-05 RX ADMIN — Medication 500 MILLIGRAM(S): at 12:49

## 2017-04-05 RX ADMIN — Medication 1 APPLICATION(S): at 17:19

## 2017-04-05 RX ADMIN — LIDOCAINE 1 PATCH: 4 CREAM TOPICAL at 12:49

## 2017-04-05 RX ADMIN — ZINC SULFATE TAB 220 MG (50 MG ZINC EQUIVALENT) 220 MILLIGRAM(S): 220 (50 ZN) TAB at 12:48

## 2017-04-05 RX ADMIN — SODIUM CHLORIDE 75 MILLILITER(S): 9 INJECTION INTRAMUSCULAR; INTRAVENOUS; SUBCUTANEOUS at 20:09

## 2017-04-05 RX ADMIN — Medication 50 MILLIGRAM(S): at 05:31

## 2017-04-05 RX ADMIN — OXYCODONE HYDROCHLORIDE 2.5 MILLIGRAM(S): 5 TABLET ORAL at 21:41

## 2017-04-05 RX ADMIN — Medication 5 UNIT(S): at 17:06

## 2017-04-05 RX ADMIN — Medication 2: at 17:06

## 2017-04-05 RX ADMIN — PANTOPRAZOLE SODIUM 40 MILLIGRAM(S): 20 TABLET, DELAYED RELEASE ORAL at 05:32

## 2017-04-05 RX ADMIN — GABAPENTIN 200 MILLIGRAM(S): 400 CAPSULE ORAL at 14:15

## 2017-04-05 NOTE — PROGRESS NOTE ADULT - SUBJECTIVE AND OBJECTIVE BOX
HISTORY OF PRESENT ILLNESS  85 yr old RH female with multiple medical problems including CAD s/p stents, diabetes, hypertension, hyperlipidemia, anxiety, significant PVD s/p multiple stents, paroxysmal atrial fibrillation was admitted 3/9/17 for elective LE angiogram. Patient was noted to have elevated BP post procedure, and hence admission requested by Dr. Irwin. Per him, patient with occluded SFA, POP b/l with minimal outflow to b/l LE. 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   Hospital course complicated by:  -  pain control management, patient being followed by palliative care.  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, being followed by nephrology   - UTI and urinary retention- currently on IV cipro, flomax, juarez catheter     NB: check with vascular surgery/cardiology about need for plavix [ ]    PMHx -   Hyperlipidemia, Hypertension, Diabetes, PVD (peripheral vascular disease), CAD (coronary artery disease)  History of cholecystectomy, H/O angioplasty      TODAY'S SUBJECTIVE & REVIEW OF SYMPTOMS  [X] Constitutional WNL     [X] Cardio WNL            [X] Resp WNL           [X] GI WNL                          [ ]  WNL                   [X] Heme WNL              [X] Endo WNL                     [ ] Skin WNL                 [ ] MSK WNL            [X] Neuro WNL                   [X] Cognitive WNL        [X] Psych WNL    No acute events overnight. Some spontaneous voiding at only 250 and 100cc and straight cath'd 698, 694 and 719cc.   Hypoglycemic again this morning--FS 74; given juice.  +BM yesterday.  Pt reports pain is intermittent and can reach a peak of 10    ICU Vital Signs Last 24 Hrs  T(C): 36.7, Max: 36.9 (04-03 @ 21:48)  T(F): 98, Max: 98.5 (04-03 @ 21:48)  HR: 90 (80 - 90)  BP: 124/63 (124/63 - 148/58)  BP(mean): --  ABP: --  ABP(mean): --  RR: 17 (17 - 18)  SpO2: 92% (92% - 97%)      PHYSICAL EXAM  Constitutional - NAD, lethargic  HEENT - NCAT, EOMI  Neck - Supple, No limited ROM  Chest - CTA bilaterally, No wheeze, No rhonchi, No crackles  Cardiovascular - RRR, S1S2, No murmurs  Abdomen - BS+, Soft, NTND  Extremities -  No calf tenderness. Mild left LE edema  +knee flexion contracture  Neurologic Exam -                    Cognitive - Awake, Alert, AAO to self, place, date, year, situation     Communication - Fluent, No dysarthria     Cranial Nerves - CN 2-12 intact    Psychiatric - Mood stable, Affect WNL  Skin - moisture dermatitis in intergluteal folds, right heel DTI  Wounds - left BKA residual limb with staples except central portion which is packed.  CDI     FUNCTIONAL PROGRESS  Bed mobility: Min assist  Gait: unable  Transfers: Min-Mod A  Wheelchair mobility: 100' Salomon  ADLs: upper body independent, max assist lower body dressing    RECENT LABS:                        7.6    7.1   )-----------( 358      ( 03 Apr 2017 08:09 )             24.1     03 Apr 2017 08:09    137    |  102    |  15.0   ----------------------------<  62     4.1     |  25.0   |  0.87     Ca    8.9        03 Apr 2017 08:09    TPro  5.9    /  Alb  2.7    /  TBili  0.2    /  DBili  0.1    /  AST  24     /  ALT  34     /  AlkPhos  82     03 Apr 2017 08:09    CAPILLARY BLOOD GLUCOSE    122 (04 Apr 2017 12:00)  60 (04 Apr 2017 08:00)  142 (03 Apr 2017 21:48)  185 (03 Apr 2017 16:59)    142 (03 Apr 2017 21:48)  185 (03 Apr 2017 16:59)    MEDICATIONS  (STANDING):  multivitamin 1Tablet(s) Oral daily  senna 2Tablet(s) Oral at bedtime  docusate sodium 100milliGRAM(s) Oral three times a day  tamsulosin 0.4milliGRAM(s) Oral at bedtime  apixaban 2.5milliGRAM(s) Oral two times a day  insulin glargine Injectable (LANTUS) 30Unit(s) SubCutaneous at bedtime  insulin lispro Injectable (HumaLOG) 5Unit(s) SubCutaneous before breakfast  insulin lispro Injectable (HumaLOG) 5Unit(s) SubCutaneous before lunch  insulin lispro Injectable (HumaLOG) 5Unit(s) SubCutaneous before dinner  insulin lispro (HumaLOG) corrective regimen sliding scale  SubCutaneous three times a day before meals  insulin lispro (HumaLOG) corrective regimen sliding scale  SubCutaneous at bedtime  dextrose 5%. 1000milliLiter(s) IV Continuous <Continuous>  dextrose 50% Injectable 12.5Gram(s) IV Push once  dextrose 50% Injectable 25Gram(s) IV Push once  dextrose 50% Injectable 25Gram(s) IV Push once  atorvastatin 40milliGRAM(s) Oral at bedtime  amLODIPine   Tablet 10milliGRAM(s) Oral daily  ferrous    sulfate 325milliGRAM(s) Oral daily  pantoprazole    Tablet 40milliGRAM(s) Oral before breakfast  levothyroxine 75MICROGram(s) Oral daily  folic acid 1milliGRAM(s) Oral daily  gabapentin 200milliGRAM(s) Oral <User Schedule>  gabapentin 300milliGRAM(s) Oral <User Schedule>  zinc sulfate 220milliGRAM(s) Oral daily  ascorbic acid 500milliGRAM(s) Oral daily  ammonium lactate 12% Lotion 1Application(s) Topical two times a day  acetaminophen   Tablet 650milliGRAM(s) Oral every 6 hours  metoprolol 50milliGRAM(s) Oral two times a day  lidocaine   Patch 1Patch Transdermal daily  gabapentin 600milliGRAM(s) Oral at bedtime  iron sucrose IVPB 200milliGRAM(s) IV Intermittent daily    MEDICATIONS  (PRN):  acetaminophen   Tablet 650milliGRAM(s) Oral every 6 hours PRN For Temp greater than 38 C (100.4 F)  aluminum hydroxide/magnesium hydroxide/simethicone Suspension 30milliLiter(s) Oral every 4 hours PRN Dyspepsia  dextrose Gel 1Dose(s) Oral once PRN Blood Glucose LESS THAN 70 milliGRAM(s)/deciLiter  glucagon  Injectable 1milliGRAM(s) IntraMuscular once PRN Glucose <70 milliGRAM(s)/deciLiter  ondansetron   Disintegrating Tablet 4milliGRAM(s) Oral every 8 hours PRN Nausea and/or Vomiting  ALPRAZolam 0.25milliGRAM(s) Oral every 8 hours PRN anxiety  oxyCODONE IR 5milliGRAM(s) Oral every 4 hours PRN Moderate Pain (4 - 6)        ASSESSMENT & PLAN    86yo female with DM, CAD, PVD s/p right BKA with gait and functional impairments    Urinary retention: Some spontaneous voiding. Continue PVR/bladder scans/IC prn, Flomax and monitor.  Recheck UA and urine C&S    UTI: Completed 5 day course of cipro BID and florastar    Post-op pain control, phantom pain, anxiety: tylenol standing, oxycodone PRN-decrease from 5mg to 2.5 mg q4hrs prn, gabapentin TID-decrease am and pm dose to =200mg q8hrs,  xanax, lidoderm patch left knee daily.  Heat to left knee prn and stretching.    Acute blood loss anemia: 7.6/24.1 4/4/17, Continue ferrous sulphate and folic acid. No suspicion for active bleeding at this time. Monitor closely as patient's on Eliquis.  Venofer x 3 days completed.  F/U CBC in a.m    BKA wound healing: MVI, vit C, zinc, dressing change with iodoform BID. Vascular following. Prosthetic eval.     Right heel DTI: z loretta boot and pillow for off-loading heel, lac-hydrin    DMT2: lantus-decreased dose from 30u to 26u today due to am hypoglycemia, premeal insulin, SSI AC HS    pAFib/CAD: Eliquis (also DVT ppx), statin, metoprolol    Orthostatic hypotension: BP stable. Continue IVF @ 75 cc/hr-overnight only and encourage po liquid intake.     Hyponatremia: resolved, Na+ 136, sodium tabs dc'ed, appreciate recs from nephrology    Elevated LFTs: improved    Continue comprehensive acute rehab program consisting of 3hrs/day of OT/PT

## 2017-04-05 NOTE — PROGRESS NOTE ADULT - PROBLEM SELECTOR PROBLEM 9
Acute blood loss anemia
Prophylactic measure
Acute blood loss anemia

## 2017-04-05 NOTE — PROGRESS NOTE ADULT - ASSESSMENT
85 yr old female with PMH CAD s/p stents, DM2, hypertension, hyperlipidemia, anxiety, significant PVD s/p multiple stents, paroxysmal atrial fibrillation on Eliquis admitted for hypertensive urgency post LE angiogram. 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 Hospital course complicated by urinary retention with UTI (failed TOV), hyponatremia likely due to pain and improved with NaCl (per nephrology) and pain medications. BP meds and insulin coverage titrated for better control.  Patient was   transferred  to acute rehab , pain poorly controlled , duragesic  patch discontinued due to lethargy ,feels better low blood sugar this am , lethargy

## 2017-04-05 NOTE — PROGRESS NOTE ADULT - SUBJECTIVE AND OBJECTIVE BOX
CC: left leg pain controlled now, sleepy , had BS 70 this am   per nurse family requesting to start her on antidepressant   Pt is awake to verbal stimuli , no complaints     HPI: 85 yr old female with PMH CAD s/p stents, DM2, hypertension, hyperlipidemia, anxiety, significant PVD s/p multiple stents, paroxysmal atrial fibrillation on Eliquis admitted for hypertensive urgency post LE angiogram. 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 Hospital course complicated by urinary retention with UTI (failed TOV), hyponatremia likely due to pain and improved with NaCl (per nephrology) and pain medications. BP meds and insulin coverage titrated for better control.  Patient stable for discharge to acute rehab.    ROS : low blood sugar , sleepy this am post pain medication   Vital Signs Last 24 Hrs  T(C): 36.6, Max: 36.7 (04-05 @ 05:17)  T(F): 97.8, Max: 98 (04-05 @ 05:17)  HR: 72 (72 - 91)  BP: 128/60 (127/55 - 154/64)  BP(mean): --  RR: 16 (16 - 18)  SpO2: 95% (94% - 96%)  CAPILLARY BLOOD GLUCOSE  74 (05 Apr 2017 07:41)  188 (04 Apr 2017 21:43)  278 (04 Apr 2017 17:00)  122 (04 Apr 2017 12:00)    PHYSICAL EXAM:  GENERAL: NAD, well-groomed, well-developed  CHEST/LUNG: Clear to auscultation bilaterally; No rales, rhonchi, wheezing, or rubs  HEART: Regular rate and rhythm; No murmurs, rubs, or gallops  ABDOMEN: Soft, Nontender, Nondistended; Bowel sounds present, (+) juarez  EXTREMITIES:  2+ Peripheral Pulses, No clubbing, cyanosis, or edema; Left BKA site with ACE wrap +

## 2017-04-05 NOTE — PROGRESS NOTE ADULT - PROBLEM SELECTOR PLAN 9
Hgb 8.2.  Asymptomatic.  Monitor CBC closely while on Eliquis.
on Eliquis for A fib
Hgb 7.9.  Asymptomatic.  Monitor CBC closely while on Eliquis.

## 2017-04-06 LAB
CULTURE RESULTS: NO GROWTH — SIGNIFICANT CHANGE UP
HCT VFR BLD CALC: 24.5 % — LOW (ref 37–47)
HGB BLD-MCNC: 7.9 G/DL — LOW (ref 12–16)
MCHC RBC-ENTMCNC: 27.1 PG — SIGNIFICANT CHANGE UP (ref 27–31)
MCHC RBC-ENTMCNC: 32.2 G/DL — SIGNIFICANT CHANGE UP (ref 32–36)
MCV RBC AUTO: 84.2 FL — SIGNIFICANT CHANGE UP (ref 81–99)
PLATELET # BLD AUTO: 353 K/UL — SIGNIFICANT CHANGE UP (ref 150–400)
RBC # BLD: 2.91 M/UL — LOW (ref 4.4–5.2)
RBC # FLD: 15 % — SIGNIFICANT CHANGE UP (ref 11–15.6)
SPECIMEN SOURCE: SIGNIFICANT CHANGE UP
WBC # BLD: 7 K/UL — SIGNIFICANT CHANGE UP (ref 4.8–10.8)
WBC # FLD AUTO: 7 K/UL — SIGNIFICANT CHANGE UP (ref 4.8–10.8)

## 2017-04-06 PROCEDURE — 99232 SBSQ HOSP IP/OBS MODERATE 35: CPT

## 2017-04-06 RX ORDER — TRAMADOL HYDROCHLORIDE 50 MG/1
25 TABLET ORAL EVERY 4 HOURS
Qty: 0 | Refills: 0 | Status: DISCONTINUED | OUTPATIENT
Start: 2017-04-06 | End: 2017-04-10

## 2017-04-06 RX ORDER — INSULIN GLARGINE 100 [IU]/ML
23 INJECTION, SOLUTION SUBCUTANEOUS
Qty: 0 | Refills: 0 | Status: DISCONTINUED | OUTPATIENT
Start: 2017-04-06 | End: 2017-04-07

## 2017-04-06 RX ORDER — TRAMADOL HYDROCHLORIDE 50 MG/1
50 TABLET ORAL EVERY 6 HOURS
Qty: 0 | Refills: 0 | Status: DISCONTINUED | OUTPATIENT
Start: 2017-04-06 | End: 2017-04-10

## 2017-04-06 RX ADMIN — TRAMADOL HYDROCHLORIDE 50 MILLIGRAM(S): 50 TABLET ORAL at 18:10

## 2017-04-06 RX ADMIN — Medication 4: at 17:52

## 2017-04-06 RX ADMIN — GABAPENTIN 200 MILLIGRAM(S): 400 CAPSULE ORAL at 05:22

## 2017-04-06 RX ADMIN — OXYCODONE HYDROCHLORIDE 2.5 MILLIGRAM(S): 5 TABLET ORAL at 05:36

## 2017-04-06 RX ADMIN — Medication 50 MILLIGRAM(S): at 17:54

## 2017-04-06 RX ADMIN — Medication 5 UNIT(S): at 17:53

## 2017-04-06 RX ADMIN — Medication 500 MILLIGRAM(S): at 11:19

## 2017-04-06 RX ADMIN — Medication 500 MILLIGRAM(S): at 12:38

## 2017-04-06 RX ADMIN — GABAPENTIN 200 MILLIGRAM(S): 400 CAPSULE ORAL at 21:08

## 2017-04-06 RX ADMIN — APIXABAN 2.5 MILLIGRAM(S): 2.5 TABLET, FILM COATED ORAL at 05:22

## 2017-04-06 RX ADMIN — TRAMADOL HYDROCHLORIDE 50 MILLIGRAM(S): 50 TABLET ORAL at 23:07

## 2017-04-06 RX ADMIN — Medication 1 APPLICATION(S): at 17:54

## 2017-04-06 RX ADMIN — Medication 650 MILLIGRAM(S): at 05:22

## 2017-04-06 RX ADMIN — ATORVASTATIN CALCIUM 40 MILLIGRAM(S): 80 TABLET, FILM COATED ORAL at 21:08

## 2017-04-06 RX ADMIN — ZINC SULFATE TAB 220 MG (50 MG ZINC EQUIVALENT) 220 MILLIGRAM(S): 220 (50 ZN) TAB at 14:12

## 2017-04-06 RX ADMIN — INSULIN GLARGINE 23 UNIT(S): 100 INJECTION, SOLUTION SUBCUTANEOUS at 21:51

## 2017-04-06 RX ADMIN — LIDOCAINE 1 PATCH: 4 CREAM TOPICAL at 12:38

## 2017-04-06 RX ADMIN — AMLODIPINE BESYLATE 10 MILLIGRAM(S): 2.5 TABLET ORAL at 05:23

## 2017-04-06 RX ADMIN — TRAMADOL HYDROCHLORIDE 50 MILLIGRAM(S): 50 TABLET ORAL at 17:01

## 2017-04-06 RX ADMIN — GABAPENTIN 200 MILLIGRAM(S): 400 CAPSULE ORAL at 14:13

## 2017-04-06 RX ADMIN — LIDOCAINE 1 PATCH: 4 CREAM TOPICAL at 23:36

## 2017-04-06 RX ADMIN — Medication 325 MILLIGRAM(S): at 12:38

## 2017-04-06 RX ADMIN — Medication 500 MILLIGRAM(S): at 21:08

## 2017-04-06 RX ADMIN — APIXABAN 2.5 MILLIGRAM(S): 2.5 TABLET, FILM COATED ORAL at 17:54

## 2017-04-06 RX ADMIN — Medication 650 MILLIGRAM(S): at 23:09

## 2017-04-06 RX ADMIN — Medication 650 MILLIGRAM(S): at 12:38

## 2017-04-06 RX ADMIN — Medication 1 TABLET(S): at 12:38

## 2017-04-06 RX ADMIN — Medication 650 MILLIGRAM(S): at 17:54

## 2017-04-06 RX ADMIN — Medication 50 MILLIGRAM(S): at 05:26

## 2017-04-06 RX ADMIN — Medication 250 MILLIGRAM(S): at 11:19

## 2017-04-06 RX ADMIN — Medication 5 UNIT(S): at 12:39

## 2017-04-06 RX ADMIN — SODIUM CHLORIDE 75 MILLILITER(S): 9 INJECTION INTRAMUSCULAR; INTRAVENOUS; SUBCUTANEOUS at 20:02

## 2017-04-06 RX ADMIN — Medication 100 MILLIGRAM(S): at 17:03

## 2017-04-06 RX ADMIN — PANTOPRAZOLE SODIUM 40 MILLIGRAM(S): 20 TABLET, DELAYED RELEASE ORAL at 05:27

## 2017-04-06 RX ADMIN — TAMSULOSIN HYDROCHLORIDE 0.4 MILLIGRAM(S): 0.4 CAPSULE ORAL at 21:08

## 2017-04-06 RX ADMIN — Medication 250 MILLIGRAM(S): at 21:10

## 2017-04-06 RX ADMIN — Medication 1 APPLICATION(S): at 05:23

## 2017-04-06 RX ADMIN — Medication 1 MILLIGRAM(S): at 12:38

## 2017-04-06 RX ADMIN — OXYCODONE HYDROCHLORIDE 2.5 MILLIGRAM(S): 5 TABLET ORAL at 06:03

## 2017-04-06 RX ADMIN — Medication 2: at 12:38

## 2017-04-06 RX ADMIN — Medication 75 MICROGRAM(S): at 05:22

## 2017-04-06 NOTE — PROGRESS NOTE ADULT - SUBJECTIVE AND OBJECTIVE BOX
HISTORY OF PRESENT ILLNESS  85 yr old RH female with multiple medical problems including CAD s/p stents, diabetes, hypertension, hyperlipidemia, anxiety, significant PVD s/p multiple stents, paroxysmal atrial fibrillation was admitted 3/9/17 for elective LE angiogram. Patient was noted to have elevated BP post procedure, and hence admission requested by Dr. Irwin. Per him, patient with occluded SFA, POP b/l with minimal outflow to b/l LE. 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   Hospital course complicated by:  -  pain control management, patient being followed by palliative care.  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, being followed by nephrology   - UTI and urinary retention- currently on IV cipro, flomax, juarez catheter     NB: check with vascular surgery/cardiology about need for plavix [ ]    PMHx -   Hyperlipidemia, Hypertension, Diabetes, PVD (peripheral vascular disease), CAD (coronary artery disease)  History of cholecystectomy, H/O angioplasty      TODAY'S SUBJECTIVE & REVIEW OF SYMPTOMS  [X] Constitutional WNL     [X] Cardio WNL            [X] Resp WNL           [X] GI WNL                          [ ]  WNL                   [X] Heme WNL              [X] Endo WNL                     [ ] Skin WNL                 [ ] MSK WNL            [X] Neuro WNL                   [X] Cognitive WNL        [X] Psych WNL    No acute events overnight. Some spontaneous voiding at only 250 and 100cc and straight cath'd 698, 694 and 719cc.   Hypoglycemic again this morning--FS 74; given juice.  +BM yesterday.  Pt reports pain is intermittent and can reach a peak of 10    ICU Vital Signs Last 24 Hrs  T(C): 36.7, Max: 36.9 (04-03 @ 21:48)  T(F): 98, Max: 98.5 (04-03 @ 21:48)  HR: 90 (80 - 90)  BP: 124/63 (124/63 - 148/58)  BP(mean): --  ABP: --  ABP(mean): --  RR: 17 (17 - 18)  SpO2: 92% (92% - 97%)      PHYSICAL EXAM  Constitutional - NAD, lethargic  HEENT - NCAT, EOMI  Neck - Supple, No limited ROM  Chest - CTA bilaterally, No wheeze, No rhonchi, No crackles  Cardiovascular - RRR, S1S2, No murmurs  Abdomen - BS+, Soft, NTND  Extremities -  No calf tenderness. Mild left LE edema  +knee flexion contracture  Neurologic Exam -                    Cognitive - Awake, Alert, AAO to self, place, date, year, situation     Communication - Fluent, No dysarthria     Cranial Nerves - CN 2-12 intact    Psychiatric - Mood stable, Affect WNL  Skin - moisture dermatitis in intergluteal folds, right heel DTI  Wounds - left BKA residual limb with staples except central portion which is packed.  CDI     FUNCTIONAL PROGRESS  Bed mobility: Min assist  Gait: unable  Transfers: Min-Mod A  Wheelchair mobility: 100' Salomon  ADLs: upper body independent, max assist lower body dressing    RECENT LABS:                        7.6    7.1   )-----------( 358      ( 03 Apr 2017 08:09 )             24.1     03 Apr 2017 08:09    137    |  102    |  15.0   ----------------------------<  62     4.1     |  25.0   |  0.87     Ca    8.9        03 Apr 2017 08:09    TPro  5.9    /  Alb  2.7    /  TBili  0.2    /  DBili  0.1    /  AST  24     /  ALT  34     /  AlkPhos  82     03 Apr 2017 08:09    CAPILLARY BLOOD GLUCOSE    122 (04 Apr 2017 12:00)  60 (04 Apr 2017 08:00)  142 (03 Apr 2017 21:48)  185 (03 Apr 2017 16:59)    142 (03 Apr 2017 21:48)  185 (03 Apr 2017 16:59)    MEDICATIONS  (STANDING):  multivitamin 1Tablet(s) Oral daily  senna 2Tablet(s) Oral at bedtime  docusate sodium 100milliGRAM(s) Oral three times a day  tamsulosin 0.4milliGRAM(s) Oral at bedtime  apixaban 2.5milliGRAM(s) Oral two times a day  insulin glargine Injectable (LANTUS) 30Unit(s) SubCutaneous at bedtime  insulin lispro Injectable (HumaLOG) 5Unit(s) SubCutaneous before breakfast  insulin lispro Injectable (HumaLOG) 5Unit(s) SubCutaneous before lunch  insulin lispro Injectable (HumaLOG) 5Unit(s) SubCutaneous before dinner  insulin lispro (HumaLOG) corrective regimen sliding scale  SubCutaneous three times a day before meals  insulin lispro (HumaLOG) corrective regimen sliding scale  SubCutaneous at bedtime  dextrose 5%. 1000milliLiter(s) IV Continuous <Continuous>  dextrose 50% Injectable 12.5Gram(s) IV Push once  dextrose 50% Injectable 25Gram(s) IV Push once  dextrose 50% Injectable 25Gram(s) IV Push once  atorvastatin 40milliGRAM(s) Oral at bedtime  amLODIPine   Tablet 10milliGRAM(s) Oral daily  ferrous    sulfate 325milliGRAM(s) Oral daily  pantoprazole    Tablet 40milliGRAM(s) Oral before breakfast  levothyroxine 75MICROGram(s) Oral daily  folic acid 1milliGRAM(s) Oral daily  gabapentin 200milliGRAM(s) Oral <User Schedule>  gabapentin 300milliGRAM(s) Oral <User Schedule>  zinc sulfate 220milliGRAM(s) Oral daily  ascorbic acid 500milliGRAM(s) Oral daily  ammonium lactate 12% Lotion 1Application(s) Topical two times a day  acetaminophen   Tablet 650milliGRAM(s) Oral every 6 hours  metoprolol 50milliGRAM(s) Oral two times a day  lidocaine   Patch 1Patch Transdermal daily  gabapentin 600milliGRAM(s) Oral at bedtime  iron sucrose IVPB 200milliGRAM(s) IV Intermittent daily    MEDICATIONS  (PRN):  acetaminophen   Tablet 650milliGRAM(s) Oral every 6 hours PRN For Temp greater than 38 C (100.4 F)  aluminum hydroxide/magnesium hydroxide/simethicone Suspension 30milliLiter(s) Oral every 4 hours PRN Dyspepsia  dextrose Gel 1Dose(s) Oral once PRN Blood Glucose LESS THAN 70 milliGRAM(s)/deciLiter  glucagon  Injectable 1milliGRAM(s) IntraMuscular once PRN Glucose <70 milliGRAM(s)/deciLiter  ondansetron   Disintegrating Tablet 4milliGRAM(s) Oral every 8 hours PRN Nausea and/or Vomiting  ALPRAZolam 0.25milliGRAM(s) Oral every 8 hours PRN anxiety  oxyCODONE IR 5milliGRAM(s) Oral every 4 hours PRN Moderate Pain (4 - 6)        ASSESSMENT & PLAN    84yo female with DM, CAD, PVD s/p right BKA with gait and functional impairments    Urinary retention: Some spontaneous voiding. Continue PVR/bladder scans/IC prn, Flomax and monitor.  Recheck UA and urine C&S    UTI: Completed 5 day course of cipro BID and florastar    Post-op pain control, phantom pain, anxiety: tylenol standing, oxycodone PRN-decrease from 5mg to 2.5 mg q4hrs prn, gabapentin TID-decrease am and pm dose to =200mg q8hrs,  xanax, lidoderm patch left knee daily.  Heat to left knee prn and stretching.    Acute blood loss anemia: 7.6/24.1 4/4/17, Continue ferrous sulphate and folic acid. No suspicion for active bleeding at this time. Monitor closely as patient's on Eliquis.  Venofer x 3 days completed.  F/U CBC in a.m    BKA wound healing: MVI, vit C, zinc, dressing change with iodoform BID. Vascular following. Prosthetic eval.     Right heel DTI: z loretta boot and pillow for off-loading heel, lac-hydrin    DMT2: lantus-decreased dose from 30u to 26u today due to am hypoglycemia, premeal insulin, SSI AC HS    pAFib/CAD: Eliquis (also DVT ppx), statin, metoprolol    Orthostatic hypotension: BP stable. Continue IVF @ 75 cc/hr-overnight only and encourage po liquid intake.     Hyponatremia: resolved, Na+ 136, sodium tabs dc'ed, appreciate recs from nephrology    Elevated LFTs: improved    Continue comprehensive acute rehab program consisting of 3hrs/day of OT/PT HISTORY OF PRESENT ILLNESS  85 yr old RH female with multiple medical problems including CAD s/p stents, diabetes, hypertension, hyperlipidemia, anxiety, significant PVD s/p multiple stents, paroxysmal atrial fibrillation was admitted 3/9/17 for elective LE angiogram. Patient was noted to have elevated BP post procedure, and hence admission requested by Dr. Irwin. Per him, patient with occluded SFA, POP b/l with minimal outflow to b/l LE. 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   Hospital course complicated by:  -  pain control management, patient being followed by palliative care.  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, being followed by nephrology   - UTI and urinary retention- currently on IV cipro, flomax, juarez catheter     NB: check with vascular surgery/cardiology about need for plavix [ ]    PMHx -   Hyperlipidemia, Hypertension, Diabetes, PVD (peripheral vascular disease), CAD (coronary artery disease)  History of cholecystectomy, H/O angioplasty      TODAY'S SUBJECTIVE & REVIEW OF SYMPTOMS  [X] Constitutional WNL     [X] Cardio WNL            [X] Resp WNL           [X] GI WNL                          [ ]  WNL                   [X] Heme WNL              [X] Endo WNL                     [ ] Skin WNL                 [ ] MSK WNL            [X] Neuro WNL                   [X] Cognitive WNL        [X] Psych WNL    Started on cipro overnight, UA + bacteria, nitrites. Pt afebrile. Voided 300 last PM, bladder scan 375. Voided 400 this AM,  cc. Also hypoglycemic this AM, BG 59, 84 on recheck. At bedside complains of continued LLE pain. + BM.     ICU Vital Signs Last 24 Hrs  T(C): 36.1, Max: 36.5 (04-05 @ 20:02)  T(F): 97, Max: 97.7 (04-05 @ 20:02)  HR: 91 (82 - 91)  BP: 135/56 (122/62 - 135/56)  BP(mean): --  ABP: --  ABP(mean): --  RR: 16 (16 - 18)  SpO2: 94% (94% - 97%)    PHYSICAL EXAM  Constitutional - NAD, lethargic  HEENT - NCAT, EOMI  Neck - Supple, No limited ROM  Chest - CTA bilaterally, No wheeze, No rhonchi, No crackles  Cardiovascular - RRR, S1S2, No murmurs  Abdomen - BS+, Soft, NTND  Extremities -  No calf tenderness. Mild left LE edema  +knee flexion contracture  Neurologic Exam -                    Cognitive - Awake, Alert, AAO to self, place, date, year, situation     Communication - Fluent, No dysarthria     Cranial Nerves - CN 2-12 intact    Psychiatric - Mood stable, Affect WNL  Skin - moisture dermatitis in intergluteal folds, right heel DTI  Wounds - left BKA residual limb with staples except central portion with serosanguinous drainage. CDI     FUNCTIONAL PROGRESS  Bed mobility: Min assist  Gait: unable  Transfers: Min-Mod A  Wheelchair mobility: ' Salomon  ADLs: upper body supervision, max assist lower body dressing    RECENT LABS:                           7.9    7.0   )-----------( 353      ( 2017 07:34 )             24.5      Urinalysis Basic - ( 2017 19:26 )    Color: Yellow / Appearance: Clear / S.010 / pH: x  Gluc: x / Ketone: Negative  / Bili: Negative / Urobili: Negative mg/dL   Blood: x / Protein: 30 mg/dL / Nitrite: Positive   Leuk Esterase: Negative / RBC: Negative /HPF / WBC Negative   Sq Epi: x / Non Sq Epi: Occasional / Bacteria: TNTC    CAPILLARY BLOOD GLUCOSE  84 (2017 07:30)  135 (2017 20:17)    MEDICATIONS  (STANDING):  multivitamin 1Tablet(s) Oral daily  tamsulosin 0.4milliGRAM(s) Oral at bedtime  apixaban 2.5milliGRAM(s) Oral two times a day  insulin lispro Injectable (HumaLOG) 5Unit(s) SubCutaneous before breakfast  insulin lispro Injectable (HumaLOG) 5Unit(s) SubCutaneous before lunch  insulin lispro Injectable (HumaLOG) 5Unit(s) SubCutaneous before dinner  insulin lispro (HumaLOG) corrective regimen sliding scale  SubCutaneous three times a day before meals  insulin lispro (HumaLOG) corrective regimen sliding scale  SubCutaneous at bedtime  dextrose 5%. 1000milliLiter(s) IV Continuous <Continuous>  dextrose 50% Injectable 12.5Gram(s) IV Push once  dextrose 50% Injectable 25Gram(s) IV Push once  dextrose 50% Injectable 25Gram(s) IV Push once  atorvastatin 40milliGRAM(s) Oral at bedtime  amLODIPine   Tablet 10milliGRAM(s) Oral daily  ferrous    sulfate 325milliGRAM(s) Oral daily  pantoprazole    Tablet 40milliGRAM(s) Oral before breakfast  levothyroxine 75MICROGram(s) Oral daily  folic acid 1milliGRAM(s) Oral daily  zinc sulfate 220milliGRAM(s) Oral daily  ascorbic acid 500milliGRAM(s) Oral daily  ammonium lactate 12% Lotion 1Application(s) Topical two times a day  acetaminophen   Tablet 650milliGRAM(s) Oral every 6 hours  metoprolol 50milliGRAM(s) Oral two times a day  lidocaine   Patch 1Patch Transdermal daily  sodium chloride 0.9%. 1000milliLiter(s) IV Continuous <Continuous>  gabapentin 200milliGRAM(s) Oral every 8 hours  ciprofloxacin     Tablet 500milliGRAM(s) Oral every 12 hours  saccharomyces boulardii 250milliGRAM(s) Oral every 12 hours  insulin glargine Injectable (LANTUS) 23Unit(s) SubCutaneous <User Schedule>    MEDICATIONS  (PRN):  acetaminophen   Tablet 650milliGRAM(s) Oral every 6 hours PRN For Temp greater than 38 C (100.4 F)  aluminum hydroxide/magnesium hydroxide/simethicone Suspension 30milliLiter(s) Oral every 4 hours PRN Dyspepsia  dextrose Gel 1Dose(s) Oral once PRN Blood Glucose LESS THAN 70 milliGRAM(s)/deciLiter  glucagon  Injectable 1milliGRAM(s) IntraMuscular once PRN Glucose <70 milliGRAM(s)/deciLiter  ondansetron   Disintegrating Tablet 4milliGRAM(s) Oral every 8 hours PRN Nausea and/or Vomiting  senna 2Tablet(s) Oral at bedtime PRN Constipation  docusate sodium 100milliGRAM(s) Oral two times a day PRN Constipation  oxyCODONE IR 2.5milliGRAM(s) Oral every 4 hours PRN pain      ASSESSMENT & PLAN    86yo female with DM, CAD, PVD s/p right BKA with gait and functional impairments    Urinary retention: Some spontaneous voiding. Continue PVR/bladder scans/IC prn, Flomax and monitor.      UTI: UA + nitrites, bacteria TNTC. Started cipro BID and florstar. Pending urine culture. (Completed 5 day course of cipro BID and florastar for prior UTI)    Post-op pain control, phantom pain, anxiety: tylenol standing, oxycodone PRN-decrease from 5mg to 2.5 mg q4hrs prn, gabapentin TID-decrease to 200 mg TID, xanax, lidoderm patch left knee daily.  Heat to left knee prn and stretching.    Acute blood loss anemia: 7.9/24.5 today, Continue ferrous sulphate and folic acid. No suspicion for active bleeding at this time. Monitor closely as patient's on Eliquis.  Venofer x 3 days completed.     BKA wound healing: MVI, vit C, zinc, dressing change with iodoform BID. Vascular following. Prosthetic eval.     Right heel DTI: z loretta boot and pillow for off-loading heel, lac-hydrin    DMT2: lantus-decreased dose from 26units to 23 units due to continued am hypoglycemia, premeal insulin, SSI AC HS    pAFib/CAD: Eliquis (also DVT ppx), statin, metoprolol    Orthostatic hypotension: BP stable. Continue IVF @ 75 cc/hr-overnight only and encourage po liquid intake.     Hyponatremia: resolved, Na+ 136, sodium tabs dc'ed, appreciate recs from nephrology    Elevated LFTs: improved    Continue comprehensive acute rehab program consisting of 3hrs/day of OT/PT HISTORY OF PRESENT ILLNESS  85 yr old RH female with multiple medical problems including CAD s/p stents, diabetes, hypertension, hyperlipidemia, anxiety, significant PVD s/p multiple stents, paroxysmal atrial fibrillation was admitted 3/9/17 for elective LE angiogram. Patient was noted to have elevated BP post procedure, and hence admission requested by Dr. Irwin. Per him, patient with occluded SFA, POP b/l with minimal outflow to b/l LE. 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   Hospital course complicated by:  -  pain control management, patient being followed by palliative care.  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, being followed by nephrology   - UTI and urinary retention- currently on IV cipro, flomax, juarez catheter     NB: check with vascular surgery/cardiology about need for plavix [ ]    PMHx -   Hyperlipidemia, Hypertension, Diabetes, PVD (peripheral vascular disease), CAD (coronary artery disease)  History of cholecystectomy, H/O angioplasty      TODAY'S SUBJECTIVE & REVIEW OF SYMPTOMS  [X] Constitutional WNL     [X] Cardio WNL            [X] Resp WNL           [X] GI WNL                          [ ]  WNL                   [X] Heme WNL              [X] Endo WNL                     [ ] Skin WNL                 [ ] MSK WNL            [X] Neuro WNL                   [X] Cognitive WNL        [X] Psych WNL    Started on cipro overnight, UA + bacteria, nitrites. Pt afebrile. Voided 300 last PM, bladder scan 375. Voided 400 this AM,  cc. Also hypoglycemic this AM, BG 59, 84 on recheck. At bedside complains of continued LLE pain. + BM.     ICU Vital Signs Last 24 Hrs  T(C): 36.1, Max: 36.5 (04-05 @ 20:02)  T(F): 97, Max: 97.7 (04-05 @ 20:02)  HR: 91 (82 - 91)  BP: 135/56 (122/62 - 135/56)  BP(mean): --  ABP: --  ABP(mean): --  RR: 16 (16 - 18)  SpO2: 94% (94% - 97%)    PHYSICAL EXAM  Constitutional - NAD, lethargic  HEENT - NCAT, EOMI  Neck - Supple, No limited ROM  Chest - CTA bilaterally, No wheeze, No rhonchi, No crackles  Cardiovascular - RRR, S1S2, No murmurs  Abdomen - BS+, Soft, NTND  Extremities -  No calf tenderness. Mild left LE edema  +knee flexion contracture  Neurologic Exam -                    Cognitive - Awake, Alert, AAO to self, place, date, year, situation     Communication - Fluent, No dysarthria     Cranial Nerves - CN 2-12 intact    Psychiatric - Mood stable, Affect WNL  Skin - moisture dermatitis in intergluteal folds, right heel DTI  Wounds - left BKA residual limb with staples except central portion with serosanguinous drainage. CDI     FUNCTIONAL PROGRESS  Bed mobility: Min assist  Gait: unable  Transfers: Min-Mod A  Wheelchair mobility: ' Salomon  ADLs: upper body supervision, max assist lower body dressing    RECENT LABS:                           7.9    7.0   )-----------( 353      ( 2017 07:34 )             24.5      Urinalysis Basic - ( 2017 19:26 )    Color: Yellow / Appearance: Clear / S.010 / pH: x  Gluc: x / Ketone: Negative  / Bili: Negative / Urobili: Negative mg/dL   Blood: x / Protein: 30 mg/dL / Nitrite: Positive   Leuk Esterase: Negative / RBC: Negative /HPF / WBC Negative   Sq Epi: x / Non Sq Epi: Occasional / Bacteria: TNTC    CAPILLARY BLOOD GLUCOSE  84 (2017 07:30)  135 (2017 20:17)    MEDICATIONS  (STANDING):  multivitamin 1Tablet(s) Oral daily  tamsulosin 0.4milliGRAM(s) Oral at bedtime  apixaban 2.5milliGRAM(s) Oral two times a day  insulin lispro Injectable (HumaLOG) 5Unit(s) SubCutaneous before breakfast  insulin lispro Injectable (HumaLOG) 5Unit(s) SubCutaneous before lunch  insulin lispro Injectable (HumaLOG) 5Unit(s) SubCutaneous before dinner  insulin lispro (HumaLOG) corrective regimen sliding scale  SubCutaneous three times a day before meals  insulin lispro (HumaLOG) corrective regimen sliding scale  SubCutaneous at bedtime  dextrose 5%. 1000milliLiter(s) IV Continuous <Continuous>  dextrose 50% Injectable 12.5Gram(s) IV Push once  dextrose 50% Injectable 25Gram(s) IV Push once  dextrose 50% Injectable 25Gram(s) IV Push once  atorvastatin 40milliGRAM(s) Oral at bedtime  amLODIPine   Tablet 10milliGRAM(s) Oral daily  ferrous    sulfate 325milliGRAM(s) Oral daily  pantoprazole    Tablet 40milliGRAM(s) Oral before breakfast  levothyroxine 75MICROGram(s) Oral daily  folic acid 1milliGRAM(s) Oral daily  zinc sulfate 220milliGRAM(s) Oral daily  ascorbic acid 500milliGRAM(s) Oral daily  ammonium lactate 12% Lotion 1Application(s) Topical two times a day  acetaminophen   Tablet 650milliGRAM(s) Oral every 6 hours  metoprolol 50milliGRAM(s) Oral two times a day  lidocaine   Patch 1Patch Transdermal daily  sodium chloride 0.9%. 1000milliLiter(s) IV Continuous <Continuous>  gabapentin 200milliGRAM(s) Oral every 8 hours  ciprofloxacin     Tablet 500milliGRAM(s) Oral every 12 hours  saccharomyces boulardii 250milliGRAM(s) Oral every 12 hours  insulin glargine Injectable (LANTUS) 23Unit(s) SubCutaneous <User Schedule>    MEDICATIONS  (PRN):  acetaminophen   Tablet 650milliGRAM(s) Oral every 6 hours PRN For Temp greater than 38 C (100.4 F)  aluminum hydroxide/magnesium hydroxide/simethicone Suspension 30milliLiter(s) Oral every 4 hours PRN Dyspepsia  dextrose Gel 1Dose(s) Oral once PRN Blood Glucose LESS THAN 70 milliGRAM(s)/deciLiter  glucagon  Injectable 1milliGRAM(s) IntraMuscular once PRN Glucose <70 milliGRAM(s)/deciLiter  ondansetron   Disintegrating Tablet 4milliGRAM(s) Oral every 8 hours PRN Nausea and/or Vomiting  senna 2Tablet(s) Oral at bedtime PRN Constipation  docusate sodium 100milliGRAM(s) Oral two times a day PRN Constipation  oxyCODONE IR 2.5milliGRAM(s) Oral every 4 hours PRN pain      ASSESSMENT & PLAN    84yo female with DM, CAD, PVD s/p right BKA with gait and functional impairments    Urinary retention: Some spontaneous voiding. Continue PVR/bladder scans/IC prn, Flomax and monitor.      UTI: UA + nitrites, bacteria TNTC. Restarted cipro BID and florstar. Pending urine culture. (Completed 5 day course of cipro BID and florastar for prior UTI)    Post-op pain control, phantom pain, anxiety: tylenol standing, oxycodone PRN-decrease from 5mg to 2.5 mg q4hrs prn, gabapentin TID-decrease to 200 mg TID, xanax, lidoderm patch left knee daily.  Heat to left knee prn and stretching.    Acute blood loss anemia: 7.9/24.5 today, Continue ferrous sulphate and folic acid. No suspicion for active bleeding at this time. Monitor closely as patient's on Eliquis.  Venofer x 3 days completed.     BKA wound healing: MVI, vit C, zinc, dressing change with iodoform packing BID. Vascular following. Prosthetic eval.     Right heel DTI: z loretta boot and pillow for off-loading heel, lac-hydrin    DMT2: lantus-decreased dose further from 26units to 23 units due to continued am hypoglycemia, premeal insulin, SSI AC HS    pAFib/CAD: Eliquis (also DVT ppx), statin, metoprolol    Orthostatic hypotension: BP stable. Continue IVF @ 75 cc/hr-overnight only and encourage po liquid intake.     Hyponatremia: resolved, Na+ 136, sodium tabs dc'ed, appreciate recs from nephrology    Elevated LFTs: improved    Continue comprehensive acute rehab program consisting of 3hrs/day of OT/PT

## 2017-04-07 DIAGNOSIS — K59.00 CONSTIPATION, UNSPECIFIED: ICD-10-CM

## 2017-04-07 PROCEDURE — 99232 SBSQ HOSP IP/OBS MODERATE 35: CPT

## 2017-04-07 PROCEDURE — 99233 SBSQ HOSP IP/OBS HIGH 50: CPT

## 2017-04-07 RX ORDER — MIRTAZAPINE 45 MG/1
7.5 TABLET, ORALLY DISINTEGRATING ORAL AT BEDTIME
Qty: 0 | Refills: 0 | Status: DISCONTINUED | OUTPATIENT
Start: 2017-04-07 | End: 2017-04-13

## 2017-04-07 RX ORDER — DOCUSATE SODIUM 100 MG
100 CAPSULE ORAL
Qty: 0 | Refills: 0 | Status: DISCONTINUED | OUTPATIENT
Start: 2017-04-07 | End: 2017-04-13

## 2017-04-07 RX ORDER — POLYETHYLENE GLYCOL 3350 17 G/17G
17 POWDER, FOR SOLUTION ORAL DAILY
Qty: 0 | Refills: 0 | Status: DISCONTINUED | OUTPATIENT
Start: 2017-04-08 | End: 2017-04-13

## 2017-04-07 RX ORDER — INSULIN GLARGINE 100 [IU]/ML
15 INJECTION, SOLUTION SUBCUTANEOUS AT BEDTIME
Qty: 0 | Refills: 0 | Status: DISCONTINUED | OUTPATIENT
Start: 2017-04-07 | End: 2017-04-08

## 2017-04-07 RX ADMIN — ONDANSETRON 4 MILLIGRAM(S): 8 TABLET, FILM COATED ORAL at 04:09

## 2017-04-07 RX ADMIN — INSULIN GLARGINE 15 UNIT(S): 100 INJECTION, SOLUTION SUBCUTANEOUS at 21:28

## 2017-04-07 RX ADMIN — TRAMADOL HYDROCHLORIDE 50 MILLIGRAM(S): 50 TABLET ORAL at 20:47

## 2017-04-07 RX ADMIN — TRAMADOL HYDROCHLORIDE 50 MILLIGRAM(S): 50 TABLET ORAL at 05:48

## 2017-04-07 RX ADMIN — TRAMADOL HYDROCHLORIDE 50 MILLIGRAM(S): 50 TABLET ORAL at 13:00

## 2017-04-07 RX ADMIN — TRAMADOL HYDROCHLORIDE 50 MILLIGRAM(S): 50 TABLET ORAL at 06:38

## 2017-04-07 RX ADMIN — ATORVASTATIN CALCIUM 40 MILLIGRAM(S): 80 TABLET, FILM COATED ORAL at 21:28

## 2017-04-07 RX ADMIN — Medication 50 MILLIGRAM(S): at 17:28

## 2017-04-07 RX ADMIN — Medication 1 MILLIGRAM(S): at 12:22

## 2017-04-07 RX ADMIN — Medication 250 MILLIGRAM(S): at 21:28

## 2017-04-07 RX ADMIN — Medication 100 MILLIGRAM(S): at 17:28

## 2017-04-07 RX ADMIN — Medication 5 UNIT(S): at 08:56

## 2017-04-07 RX ADMIN — TRAMADOL HYDROCHLORIDE 50 MILLIGRAM(S): 50 TABLET ORAL at 21:34

## 2017-04-07 RX ADMIN — GABAPENTIN 200 MILLIGRAM(S): 400 CAPSULE ORAL at 05:43

## 2017-04-07 RX ADMIN — Medication 5 MILLIGRAM(S): at 12:22

## 2017-04-07 RX ADMIN — GABAPENTIN 200 MILLIGRAM(S): 400 CAPSULE ORAL at 14:00

## 2017-04-07 RX ADMIN — APIXABAN 2.5 MILLIGRAM(S): 2.5 TABLET, FILM COATED ORAL at 05:43

## 2017-04-07 RX ADMIN — APIXABAN 2.5 MILLIGRAM(S): 2.5 TABLET, FILM COATED ORAL at 17:28

## 2017-04-07 RX ADMIN — TAMSULOSIN HYDROCHLORIDE 0.4 MILLIGRAM(S): 0.4 CAPSULE ORAL at 21:28

## 2017-04-07 RX ADMIN — Medication 650 MILLIGRAM(S): at 17:28

## 2017-04-07 RX ADMIN — TRAMADOL HYDROCHLORIDE 50 MILLIGRAM(S): 50 TABLET ORAL at 00:01

## 2017-04-07 RX ADMIN — ZINC SULFATE TAB 220 MG (50 MG ZINC EQUIVALENT) 220 MILLIGRAM(S): 220 (50 ZN) TAB at 12:22

## 2017-04-07 RX ADMIN — MIRTAZAPINE 7.5 MILLIGRAM(S): 45 TABLET, ORALLY DISINTEGRATING ORAL at 21:28

## 2017-04-07 RX ADMIN — GABAPENTIN 200 MILLIGRAM(S): 400 CAPSULE ORAL at 21:28

## 2017-04-07 RX ADMIN — Medication 250 MILLIGRAM(S): at 12:21

## 2017-04-07 RX ADMIN — Medication 50 MILLIGRAM(S): at 05:43

## 2017-04-07 RX ADMIN — Medication 75 MICROGRAM(S): at 05:43

## 2017-04-07 RX ADMIN — Medication 1 TABLET(S): at 12:22

## 2017-04-07 RX ADMIN — TRAMADOL HYDROCHLORIDE 50 MILLIGRAM(S): 50 TABLET ORAL at 12:22

## 2017-04-07 RX ADMIN — Medication 650 MILLIGRAM(S): at 05:44

## 2017-04-07 RX ADMIN — Medication 1 APPLICATION(S): at 05:43

## 2017-04-07 RX ADMIN — LIDOCAINE 1 PATCH: 4 CREAM TOPICAL at 12:23

## 2017-04-07 RX ADMIN — Medication 2: at 17:27

## 2017-04-07 RX ADMIN — AMLODIPINE BESYLATE 10 MILLIGRAM(S): 2.5 TABLET ORAL at 05:43

## 2017-04-07 RX ADMIN — Medication 500 MILLIGRAM(S): at 12:22

## 2017-04-07 RX ADMIN — SODIUM CHLORIDE 75 MILLILITER(S): 9 INJECTION INTRAMUSCULAR; INTRAVENOUS; SUBCUTANEOUS at 20:56

## 2017-04-07 RX ADMIN — PANTOPRAZOLE SODIUM 40 MILLIGRAM(S): 20 TABLET, DELAYED RELEASE ORAL at 05:45

## 2017-04-07 RX ADMIN — Medication 650 MILLIGRAM(S): at 12:21

## 2017-04-07 RX ADMIN — Medication 325 MILLIGRAM(S): at 12:22

## 2017-04-07 NOTE — PROGRESS NOTE ADULT - PROBLEM SELECTOR PLAN 5
Bhatt removed  voiding
Bhatt removed  voiding
Continue Lantus, pre-meals and sliding scale coverage.
Continue Lantus, pre-meals and sliding scale coverage.  bedtime snack
Continue Metoprolol and Lipitor.
Continue Lantus, pre-meals and sliding scale coverage.

## 2017-04-07 NOTE — PROGRESS NOTE ADULT - SUBJECTIVE AND OBJECTIVE BOX
CC: left leg pain severe now   Pt is awake alert also c/o constipation , voiding     HPI: 85 yr old female with PMH CAD s/p stents, DM2, hypertension, hyperlipidemia, anxiety, significant PVD s/p multiple stents, paroxysmal atrial fibrillation on Eliquis admitted for hypertensive urgency post LE angiogram. 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 Hospital course complicated by urinary retention with UTI (failed TOV), hyponatremia likely due to pain and improved with NaCl (per nephrology) and pain medications. BP meds and insulin coverage titrated for better control.  Patient stable for discharge to acute rehab.      Vital Signs Last 24 Hrs  T(C): 36.6, Max: 37 (04-06 @ 12:15)  T(F): 97.9, Max: 98.6 (04-06 @ 12:15)  HR: 87 (80 - 92)  BP: 156/69 (130/57 - 156/69)  BP(mean): --  RR: 18 (17 - 18)  SpO2: 94% (94% - 96%)PHYSICAL EXAM:  GENERAL: NAD, well-groomed, well-developed  CHEST/LUNG: Clear to auscultation bilaterally; No rales, rhonchi, wheezing, or rubs  HEART: Regular rate and rhythm; No murmurs, rubs, or gallops  ABDOMEN: Soft, Nontender, Nondistended; Bowel sounds present, (+) juarez  EXTREMITIES:  2+ Peripheral Pulses, No clubbing, cyanosis, or edema; Left BKA site with ACE wrap +    CAPILLARY BLOOD GLUCOSE  111 (07 Apr 2017 07:54)  101 (07 Apr 2017 04:35)  57 (07 Apr 2017 04:15)    149 (06 Apr 2017 20:03)  217 (06 Apr 2017 17:04)  173 (06 Apr 2017 12:15)                          7.9    7.0   )-----------( 353      ( 06 Apr 2017 07:34 )             24.5

## 2017-04-07 NOTE — PROGRESS NOTE ADULT - PROBLEM SELECTOR PROBLEM 6
CAD (coronary artery disease)
Essential hypertension
CAD (coronary artery disease)

## 2017-04-07 NOTE — PROGRESS NOTE ADULT - PROBLEM SELECTOR PROBLEM 4
Acute blood loss anemia
Hyponatremia
Urinary retention
Urinary tract infection, site unspecified
Urinary tract infection, site unspecified
Hyponatremia

## 2017-04-07 NOTE — PROGRESS NOTE ADULT - PROBLEM SELECTOR PROBLEM 5
CAD (coronary artery disease)
Diabetes
Urinary retention
Urinary retention
Diabetes
Diabetes

## 2017-04-07 NOTE — PROGRESS NOTE ADULT - PROBLEM SELECTOR PLAN 3
Bhatt in place void trial , If patient fails TOV, would recommend Urology consult.
Bhatt in place void trial , If patient fails TOV, would recommend Urology consult.
Bhatt in place.  Would consider TOV after antibiotics completed.  If patient fails TOV, would recommend Urology consult.
Complete antibiotics regimen
Hgb drop , asymptomatic   will order iv venofer 200 mg daily for 3 days   Monitor CBC closely
Hgb drop , asymptomatic   will order iv venofer 200 mg daily for 3 days   Monitor CBC closely
stool softener on colace and senna    laxative as needed, dulcolax prn , start miralax daily
Bhatt in place.  Would consider TOV after antibiotics completed.  If patient fails TOV, would recommend Urology consult.

## 2017-04-07 NOTE — PROGRESS NOTE ADULT - SUBJECTIVE AND OBJECTIVE BOX
HISTORY OF PRESENT ILLNESS  85 yr old RH female with multiple medical problems including CAD s/p stents, diabetes, hypertension, hyperlipidemia, anxiety, significant PVD s/p multiple stents, paroxysmal atrial fibrillation was admitted 3/9/17 for elective LE angiogram. Patient was noted to have elevated BP post procedure, and hence admission requested by Dr. Irwin. Per him, patient with occluded SFA, POP b/l with minimal outflow to b/l LE. 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   Hospital course complicated by:  -  pain control management, patient being followed by palliative care.  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, being followed by nephrology   - UTI and urinary retention- currently on IV cipro, flomax, juarez catheter     NB: check with vascular surgery/cardiology about need for plavix [ ]    PMHx -   Hyperlipidemia, Hypertension, Diabetes, PVD (peripheral vascular disease), CAD (coronary artery disease)  History of cholecystectomy, H/O angioplasty      TODAY'S SUBJECTIVE & REVIEW OF SYMPTOMS  [X] Constitutional WNL     [X] Cardio WNL            [X] Resp WNL           [X] GI WNL                          [ ]  WNL                   [X] Heme WNL              [X] Endo WNL                     [ ] Skin WNL                 [ ] MSK WNL            [X] Neuro WNL                   [X] Cognitive WNL        [X] Psych WNL    Pt reports that she woke up early am due to hypoglycemia even though she had 1/2 of peanut butter and jelly sandwich and also a slice of rye bread as snack last night.  Pt c/o constipation x 2 days.  Denies urinary complaints although she still is requiring straight cath.  In addition, pt reports that she has been depressed since admission-low mood, mild anorexia and difficulty sleeping-agreeable to being on an antidepressant    ICU Vital Signs Last 24 Hrs  T(C): 36.1, Max: 36.5 (04-05 @ 20:02)  T(F): 97, Max: 97.7 (04-05 @ 20:02)  HR: 91 (82 - 91)  BP: 135/56 (122/62 - 135/56)  BP(mean): --  ABP: --  ABP(mean): --  RR: 16 (16 - 18)  SpO2: 94% (94% - 97%)    PHYSICAL EXAM  Constitutional - NAD, awake, alert  HEENT - NCAT, EOMI  Neck - Supple, No limited ROM  Chest - CTA bilaterally, No wheeze, No rhonchi, No crackles  Cardiovascular - RRR, S1S2, No murmurs  Abdomen - BS+, Soft, NTND  Extremities -  No calf tenderness. Mild left LE edema  +knee flexion contracture  Neurologic Exam -                    Cognitive - Awake, Alert, AAO to self, place, date, year, situation     Communication - Fluent, No dysarthria     Cranial Nerves - CN 2-12 intact    Psychiatric - Mood stable, Affect WNL  Skin - moisture dermatitis in intergluteal folds, right heel DTI  Wounds - left BKA residual limb with staples except central portion with serosanguinous drainage packed with xeroform. CDI  No erythema    FUNCTIONAL PROGRESS    Gait: unable  Transfers: Min-Mod A  Wheelchair mobility: ' Salomon  ADLs: grooming and upper body dressing supervision, mod assist lower body dressing  Functional transfers: Toilet and bed transfers mod assist, bed mobility min assist    RECENT LABS:                                        7.9    7.0   )-----------( 353      ( 2017 07:34 )             24.5       Urinalysis Basic - ( 2017 19:26 )    Color: Yellow / Appearance: Clear / S.010 / pH: x  Gluc: x / Ketone: Negative  / Bili: Negative / Urobili: Negative mg/dL   Blood: x / Protein: 30 mg/dL / Nitrite: Positive   Leuk Esterase: Negative / RBC: Negative /HPF / WBC Negative   Sq Epi: x / Non Sq Epi: Occasional / Bacteria: TNTC    CAPILLARY BLOOD GLUCOSE  84 (2017 07:30)  135 (2017 20:17)    MEDICATIONS  (STANDING):  multivitamin 1Tablet(s) Oral daily  tamsulosin 0.4milliGRAM(s) Oral at bedtime  apixaban 2.5milliGRAM(s) Oral two times a day  insulin lispro Injectable (HumaLOG) 5Unit(s) SubCutaneous before breakfast  insulin lispro Injectable (HumaLOG) 5Unit(s) SubCutaneous before lunch  insulin lispro Injectable (HumaLOG) 5Unit(s) SubCutaneous before dinner  insulin lispro (HumaLOG) corrective regimen sliding scale  SubCutaneous three times a day before meals  insulin lispro (HumaLOG) corrective regimen sliding scale  SubCutaneous at bedtime  dextrose 5%. 1000milliLiter(s) IV Continuous <Continuous>  dextrose 50% Injectable 12.5Gram(s) IV Push once  dextrose 50% Injectable 25Gram(s) IV Push once  dextrose 50% Injectable 25Gram(s) IV Push once  atorvastatin 40milliGRAM(s) Oral at bedtime  amLODIPine   Tablet 10milliGRAM(s) Oral daily  ferrous    sulfate 325milliGRAM(s) Oral daily  pantoprazole    Tablet 40milliGRAM(s) Oral before breakfast  levothyroxine 75MICROGram(s) Oral daily  folic acid 1milliGRAM(s) Oral daily  zinc sulfate 220milliGRAM(s) Oral daily  ascorbic acid 500milliGRAM(s) Oral daily  ammonium lactate 12% Lotion 1Application(s) Topical two times a day  acetaminophen   Tablet 650milliGRAM(s) Oral every 6 hours  metoprolol 50milliGRAM(s) Oral two times a day  lidocaine   Patch 1Patch Transdermal daily  sodium chloride 0.9%. 1000milliLiter(s) IV Continuous <Continuous>  gabapentin 200milliGRAM(s) Oral every 8 hours  ciprofloxacin     Tablet 500milliGRAM(s) Oral every 12 hours  saccharomyces boulardii 250milliGRAM(s) Oral every 12 hours  insulin glargine Injectable (LANTUS) 23Unit(s) SubCutaneous <User Schedule>    MEDICATIONS  (PRN):  acetaminophen   Tablet 650milliGRAM(s) Oral every 6 hours PRN For Temp greater than 38 C (100.4 F)  aluminum hydroxide/magnesium hydroxide/simethicone Suspension 30milliLiter(s) Oral every 4 hours PRN Dyspepsia  dextrose Gel 1Dose(s) Oral once PRN Blood Glucose LESS THAN 70 milliGRAM(s)/deciLiter  glucagon  Injectable 1milliGRAM(s) IntraMuscular once PRN Glucose <70 milliGRAM(s)/deciLiter  ondansetron   Disintegrating Tablet 4milliGRAM(s) Oral every 8 hours PRN Nausea and/or Vomiting  senna 2Tablet(s) Oral at bedtime PRN Constipation  docusate sodium 100milliGRAM(s) Oral two times a day PRN Constipation  oxyCODONE IR 2.5milliGRAM(s) Oral every 4 hours PRN pain      ASSESSMENT & PLAN    86yo female with DM, CAD, PVD s/p right BKA with gait and functional impairments    Urinary retention: Some spontaneous voiding. Continue PVR/bladder scans/IC prn, Flomax and monitor.      UTI: UA + nitrites, bacteria TNTC. Restarted cipro BID and florstar. Urine culture NG-D/C Cipro. (Completed 5 day course of cipro BID and florastar for prior UTI)    Post-op pain control, phantom pain, anxiety: tylenol standing, changed oxycodone PRN to tramadol 25 and 50mg prn due to lethargy with oxy, gabapentin TID-decreased to 200 mg TID, xanax, lidoderm patch left knee daily.  Heat to left knee prn and stretching.    Acute blood loss anemia: 7.9/24.5 17, Continue ferrous sulphate and folic acid. No suspicion for active bleeding at this time. Monitor closely as patient's on Eliquis.  Venofer x 3 days completed.     BKA wound healing: MVI, vit C, zinc, dressing change with iodoform packing BID. Vascular following. Prosthetic eval.     Right heel DTI: z loretta boot and pillow for off-loading heel, No sting barrier film    DMT2: lantus-decreased dose further from 26units to 23 units due to continued am hypoglycemia .  Further decrease to 15U QHS (Pt reports that she was on 14U Levemir PTA), premeal insulin, SSI AC HS    pAFib/CAD: Eliquis (also DVT ppx), statin, metoprolol    Orthostatic hypotension: BP stable. Continue IVF @ 75 cc/hr-overnight only and encourage po liquid intake.     Hyponatremia: resolved, Na+ 136, sodium tabs dc'ed, appreciate recs from nephrology    Elevated LFTs: improved    Depression: Low mood, anorexia and insomnia-Add Remeron 7.5mg qhs with plan to increase to 15mg in 1 week if well tolerated    Continue comprehensive acute rehab program consisting of 3hrs/day of OT/PT

## 2017-04-07 NOTE — PROGRESS NOTE ADULT - PROBLEM SELECTOR PLAN 8
Continue Metoprolol and Eliquis.
on Eliquis for A fib
Continue Metoprolol and Eliquis.

## 2017-04-07 NOTE — PROGRESS NOTE ADULT - PROBLEM SELECTOR PLAN 7
Continue Metoprolol and Eliquis.
Controlled with Norvasc and Metoprolol.

## 2017-04-07 NOTE — PROGRESS NOTE ADULT - PROBLEM SELECTOR PROBLEM 7
Essential hypertension
PAF (paroxysmal atrial fibrillation)
Essential hypertension

## 2017-04-07 NOTE — PROGRESS NOTE ADULT - PROBLEM SELECTOR PROBLEM 8
PAF (paroxysmal atrial fibrillation)
Prophylactic measure
PAF (paroxysmal atrial fibrillation)

## 2017-04-07 NOTE — PROGRESS NOTE ADULT - PROBLEM SELECTOR PLAN 6
Continue Metoprolol and Lipitor.
Controlled with Norvasc and Metoprolol.
Continue Metoprolol and Lipitor.

## 2017-04-07 NOTE — PROGRESS NOTE ADULT - PROBLEM SELECTOR PROBLEM 3
Acute blood loss anemia
Acute blood loss anemia
Constipation, unspecified constipation type
Urinary retention
Urinary tract infection, site unspecified
Urinary retention

## 2017-04-08 PROCEDURE — 99232 SBSQ HOSP IP/OBS MODERATE 35: CPT | Mod: GC

## 2017-04-08 PROCEDURE — 99233 SBSQ HOSP IP/OBS HIGH 50: CPT

## 2017-04-08 RX ORDER — GABAPENTIN 400 MG/1
300 CAPSULE ORAL
Qty: 0 | Refills: 0 | Status: DISCONTINUED | OUTPATIENT
Start: 2017-04-08 | End: 2017-04-13

## 2017-04-08 RX ORDER — NYSTATIN CREAM 100000 [USP'U]/G
1 CREAM TOPICAL
Qty: 0 | Refills: 0 | Status: DISCONTINUED | OUTPATIENT
Start: 2017-04-08 | End: 2017-04-13

## 2017-04-08 RX ORDER — GABAPENTIN 400 MG/1
200 CAPSULE ORAL
Qty: 0 | Refills: 0 | Status: DISCONTINUED | OUTPATIENT
Start: 2017-04-08 | End: 2017-04-13

## 2017-04-08 RX ORDER — INSULIN GLARGINE 100 [IU]/ML
10 INJECTION, SOLUTION SUBCUTANEOUS AT BEDTIME
Qty: 0 | Refills: 0 | Status: DISCONTINUED | OUTPATIENT
Start: 2017-04-08 | End: 2017-04-11

## 2017-04-08 RX ORDER — LISINOPRIL 2.5 MG/1
2.5 TABLET ORAL DAILY
Qty: 0 | Refills: 0 | Status: DISCONTINUED | OUTPATIENT
Start: 2017-04-09 | End: 2017-04-11

## 2017-04-08 RX ORDER — GABAPENTIN 400 MG/1
300 CAPSULE ORAL EVERY 8 HOURS
Qty: 0 | Refills: 0 | Status: DISCONTINUED | OUTPATIENT
Start: 2017-04-08 | End: 2017-04-08

## 2017-04-08 RX ORDER — LISINOPRIL 2.5 MG/1
2.5 TABLET ORAL DAILY
Qty: 0 | Refills: 0 | Status: DISCONTINUED | OUTPATIENT
Start: 2017-04-08 | End: 2017-04-08

## 2017-04-08 RX ADMIN — TRAMADOL HYDROCHLORIDE 50 MILLIGRAM(S): 50 TABLET ORAL at 13:55

## 2017-04-08 RX ADMIN — GABAPENTIN 200 MILLIGRAM(S): 400 CAPSULE ORAL at 12:16

## 2017-04-08 RX ADMIN — TRAMADOL HYDROCHLORIDE 25 MILLIGRAM(S): 50 TABLET ORAL at 23:22

## 2017-04-08 RX ADMIN — APIXABAN 2.5 MILLIGRAM(S): 2.5 TABLET, FILM COATED ORAL at 05:56

## 2017-04-08 RX ADMIN — GABAPENTIN 200 MILLIGRAM(S): 400 CAPSULE ORAL at 05:56

## 2017-04-08 RX ADMIN — Medication 500 MILLIGRAM(S): at 12:16

## 2017-04-08 RX ADMIN — GABAPENTIN 300 MILLIGRAM(S): 400 CAPSULE ORAL at 22:23

## 2017-04-08 RX ADMIN — Medication 100 MILLIGRAM(S): at 05:56

## 2017-04-08 RX ADMIN — TAMSULOSIN HYDROCHLORIDE 0.4 MILLIGRAM(S): 0.4 CAPSULE ORAL at 22:22

## 2017-04-08 RX ADMIN — INSULIN GLARGINE 10 UNIT(S): 100 INJECTION, SOLUTION SUBCUTANEOUS at 22:23

## 2017-04-08 RX ADMIN — APIXABAN 2.5 MILLIGRAM(S): 2.5 TABLET, FILM COATED ORAL at 17:19

## 2017-04-08 RX ADMIN — Medication 50 MILLIGRAM(S): at 17:20

## 2017-04-08 RX ADMIN — Medication 5 MILLIGRAM(S): at 12:16

## 2017-04-08 RX ADMIN — TRAMADOL HYDROCHLORIDE 25 MILLIGRAM(S): 50 TABLET ORAL at 02:59

## 2017-04-08 RX ADMIN — AMLODIPINE BESYLATE 10 MILLIGRAM(S): 2.5 TABLET ORAL at 05:56

## 2017-04-08 RX ADMIN — Medication 250 MILLIGRAM(S): at 22:22

## 2017-04-08 RX ADMIN — Medication 4: at 17:18

## 2017-04-08 RX ADMIN — TRAMADOL HYDROCHLORIDE 25 MILLIGRAM(S): 50 TABLET ORAL at 01:59

## 2017-04-08 RX ADMIN — LIDOCAINE 1 PATCH: 4 CREAM TOPICAL at 00:44

## 2017-04-08 RX ADMIN — TRAMADOL HYDROCHLORIDE 50 MILLIGRAM(S): 50 TABLET ORAL at 20:07

## 2017-04-08 RX ADMIN — Medication 650 MILLIGRAM(S): at 00:59

## 2017-04-08 RX ADMIN — Medication 75 MICROGRAM(S): at 05:55

## 2017-04-08 RX ADMIN — ZINC SULFATE TAB 220 MG (50 MG ZINC EQUIVALENT) 220 MILLIGRAM(S): 220 (50 ZN) TAB at 12:16

## 2017-04-08 RX ADMIN — Medication 2: at 12:17

## 2017-04-08 RX ADMIN — Medication 50 MILLIGRAM(S): at 05:56

## 2017-04-08 RX ADMIN — Medication 325 MILLIGRAM(S): at 12:16

## 2017-04-08 RX ADMIN — TRAMADOL HYDROCHLORIDE 25 MILLIGRAM(S): 50 TABLET ORAL at 22:22

## 2017-04-08 RX ADMIN — Medication 1 MILLIGRAM(S): at 14:01

## 2017-04-08 RX ADMIN — LIDOCAINE 1 PATCH: 4 CREAM TOPICAL at 12:15

## 2017-04-08 RX ADMIN — POLYETHYLENE GLYCOL 3350 17 GRAM(S): 17 POWDER, FOR SOLUTION ORAL at 12:16

## 2017-04-08 RX ADMIN — Medication 1 TABLET(S): at 12:17

## 2017-04-08 RX ADMIN — Medication 100 MILLIGRAM(S): at 17:19

## 2017-04-08 RX ADMIN — TRAMADOL HYDROCHLORIDE 50 MILLIGRAM(S): 50 TABLET ORAL at 14:20

## 2017-04-08 RX ADMIN — Medication 650 MILLIGRAM(S): at 17:19

## 2017-04-08 RX ADMIN — ATORVASTATIN CALCIUM 40 MILLIGRAM(S): 80 TABLET, FILM COATED ORAL at 22:22

## 2017-04-08 RX ADMIN — NYSTATIN CREAM 1 APPLICATION(S): 100000 CREAM TOPICAL at 17:21

## 2017-04-08 RX ADMIN — Medication 650 MILLIGRAM(S): at 05:57

## 2017-04-08 RX ADMIN — MIRTAZAPINE 7.5 MILLIGRAM(S): 45 TABLET, ORALLY DISINTEGRATING ORAL at 22:22

## 2017-04-08 RX ADMIN — PANTOPRAZOLE SODIUM 40 MILLIGRAM(S): 20 TABLET, DELAYED RELEASE ORAL at 05:57

## 2017-04-08 RX ADMIN — TRAMADOL HYDROCHLORIDE 50 MILLIGRAM(S): 50 TABLET ORAL at 21:00

## 2017-04-08 RX ADMIN — Medication 650 MILLIGRAM(S): at 12:16

## 2017-04-08 RX ADMIN — Medication 250 MILLIGRAM(S): at 09:30

## 2017-04-08 NOTE — PROGRESS NOTE ADULT - ASSESSMENT
85 yr old female with PMH CAD s/p stents, DM2, hypertension, hyperlipidemia, anxiety, significant PVD s/p multiple stents, paroxysmal atrial fibrillation on Eliquis admitted for hypertensive urgency post LE angiogram. 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 Hospital course complicated by urinary retention with UTI (failed TOV), hyponatremia likely due to pain and improved with NaCl (per nephrology) and pain medications. BP meds and insulin coverage titrated for better control.  Patient was   transferred  to acute rehab , pain poorly controlled , duragesic  patch discontinued due to lethargy ,feels better low blood sugar this am , lethargy     Problem/Plan - 1:  ·  Problem: Type 2 diabetes mellitus with hypoglycemia without coma, with long-term current use of insulin.  Plan: discontinue premeals humolog cont sliding , agree with dropping insulin dose blood sugar low in am; monitor closely.     Problem/Plan - 2:  ·  Problem: PVD (peripheral vascular disease).  Plan: s/p left BKA  Pain control  on tramadol and gabapentin  , continue rehab program    PT/ OT , rehab program     Problem/Plan - 3:  ·  Problem: Constipation, unspecified constipation type.  Plan: stool softener on colace and senna    laxative as needed, dulcolax prn , start miralax daily.     Problem/Plan - 4:  ·  Problem: Acute blood loss anemia.  Plan: asymptomatic ;   iv venofer 200 mg daily for 3 days   Monitor CBC closely, transfuse if needed.     Problem/Plan - 5:  ·  Problem: CAD (coronary artery disease).  Plan: Continue Metoprolol and Lipitor.     Problem/Plan - 6:  Problem: Essential hypertension. Plan: Controlled with Norvasc and Metoprolol.    Problem/Plan - 7:  ·  Problem: PAF (paroxysmal atrial fibrillation).  Plan: Continue Metoprolol and Eliquis.     Problem/Plan - 8:  ·  Problem: Prophylactic measure.  Plan: on Eliquis for A fib. 85 yr old female with PMH CAD s/p stents, DM2, hypertension, hyperlipidemia, anxiety, significant PVD s/p multiple stents, paroxysmal atrial fibrillation on Eliquis admitted for hypertensive urgency post LE angiogram. 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 Hospital course complicated by urinary retention with UTI (failed TOV), hyponatremia likely due to pain and improved with NaCl (per nephrology) and pain medications. BP meds and insulin coverage titrated for better control.  Patient was   transferred  to acute rehab , pain poorly controlled , duragesic  patch discontinued due to lethargy ,feels better low blood sugar this am     Problem/Plan - 1:  ·  Problem: Type 2 diabetes mellitus with hypoglycemia without coma, with long-term current use of insulin.  Plan: discontinue premeals humolog cont sliding , agree with dropping insulin dose blood sugar low in am; monitor closely , night snack recommended .    Problem/Plan - 2:  ·  Problem: PVD (peripheral vascular disease).  Plan: s/p left BKA  Pain control  on tramadol and gabapentin  , continue rehab program    PT/ OT , rehab program . Restart statins . Will restart Plavix if OK with vascular surgery , continue Eliquis .    Problem/Plan - 3:  ·  Problem: Constipation, unspecified constipation type.  Plan: stool softener on colace and senna    laxative as needed, dulcolax prn , start miralax daily.     Problem/Plan - 4:  ·  Problem: Acute blood loss anemia.  Plan: asymptomatic ;   iv venofer 200 mg daily for 3 days   Monitor CBC closely, transfuse if needed.     Problem/Plan - 5:  ·  Problem: CAD (coronary artery disease).  Plan: Continue Metoprolol and Lipitor , will restart Plavix if OK with vascular , d/w rehab resident . Patient with Hx of cardiac stents .    Problem/Plan - 6:  Problem: Essential hypertension. Plan: Controlled with Norvasc and Metoprolol.    Problem/Plan - 7:  ·  Problem: PAF (paroxysmal atrial fibrillation).  Plan: Continue Metoprolol and Eliquis.     Problem/Plan - 8:  ·  Problem: Prophylactic measure.  Plan: on Eliquis for A fib.     Problem / Plan - 9 : Hx of Hypothyroidism - restart Synthroid , recheck TSH .    Problem / Plan - 10: Hypoglycemia , insulin decreased , pre meal insulin d/martin , will observe closely     Problem /Plan - 11: Urinary retention - resolved , no need for straight  cath since yesterday , on Flomax     Problem / Plan -12: UTI -  treated     Problem / Plan - 13 : Hyponatremia - resolved , follow labs on Monday .

## 2017-04-08 NOTE — PROGRESS NOTE ADULT - SUBJECTIVE AND OBJECTIVE BOX
Patient seen and examined   CC: left leg pain severe now   Pt is awake alert also c/o constipation , voiding     HPI: 85 yr old female with PMH CAD s/p stents, DM2, hypertension, hyperlipidemia, anxiety, significant PVD s/p multiple stents, paroxysmal atrial fibrillation on Eliquis admitted for hypertensive urgency post LE angiogram. 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 Hospital course complicated by urinary retention with UTI (failed TOV), hyponatremia likely due to pain and improved with NaCl (per nephrology) and pain medications. BP meds and insulin coverage titrated for better control.  Patient stable for discharge to acute rehab.    PAST MEDICAL & SURGICAL HISTORY:  Hyperlipidemia  Hypertension  Diabetes  PVD (peripheral vascular disease)  CAD (coronary artery disease)  History of cholecystectomy  H/O angioplasty      MEDICATIONS  (STANDING):  multivitamin 1Tablet(s) Oral daily  tamsulosin 0.4milliGRAM(s) Oral at bedtime  apixaban 2.5milliGRAM(s) Oral two times a day  insulin lispro (HumaLOG) corrective regimen sliding scale  SubCutaneous three times a day before meals  dextrose 5%. 1000milliLiter(s) IV Continuous <Continuous>  dextrose 50% Injectable 12.5Gram(s) IV Push once  dextrose 50% Injectable 25Gram(s) IV Push once  dextrose 50% Injectable 25Gram(s) IV Push once  atorvastatin 40milliGRAM(s) Oral at bedtime  amLODIPine   Tablet 10milliGRAM(s) Oral daily  ferrous    sulfate 325milliGRAM(s) Oral daily  pantoprazole    Tablet 40milliGRAM(s) Oral before breakfast  levothyroxine 75MICROGram(s) Oral daily  folic acid 1milliGRAM(s) Oral daily  zinc sulfate 220milliGRAM(s) Oral daily  ascorbic acid 500milliGRAM(s) Oral daily  acetaminophen   Tablet 650milliGRAM(s) Oral every 6 hours  metoprolol 50milliGRAM(s) Oral two times a day  lidocaine   Patch 1Patch Transdermal daily  saccharomyces boulardii 250milliGRAM(s) Oral every 12 hours  insulin glargine Injectable (LANTUS) 15Unit(s) SubCutaneous at bedtime  docusate sodium 100milliGRAM(s) Oral two times a day  mirtazapine 7.5milliGRAM(s) Oral at bedtime  polyethylene glycol 3350 17Gram(s) Oral daily  gabapentin 200milliGRAM(s) Oral <User Schedule>  gabapentin 300milliGRAM(s) Oral <User Schedule>  nystatin Powder 1Application(s) Topical two times a day  lisinopril 2.5milliGRAM(s) Oral daily    MEDICATIONS  (PRN):  acetaminophen   Tablet 650milliGRAM(s) Oral every 6 hours PRN For Temp greater than 38 C (100.4 F)  aluminum hydroxide/magnesium hydroxide/simethicone Suspension 30milliLiter(s) Oral every 4 hours PRN Dyspepsia  dextrose Gel 1Dose(s) Oral once PRN Blood Glucose LESS THAN 70 milliGRAM(s)/deciLiter  glucagon  Injectable 1milliGRAM(s) IntraMuscular once PRN Glucose <70 milliGRAM(s)/deciLiter  ondansetron   Disintegrating Tablet 4milliGRAM(s) Oral every 8 hours PRN Nausea and/or Vomiting  senna 2Tablet(s) Oral at bedtime PRN Constipation  traMADol 25milliGRAM(s) Oral every 4 hours PRN Moderate Pain (4 - 6)  traMADol 50milliGRAM(s) Oral every 6 hours PRN Severe Pain (7 - 10)  bisacodyl 5milliGRAM(s) Oral every 12 hours PRN Constipation          REVIEW OF SYSTEMS:    CONSTITUTIONAL: No fever, weight loss, or fatigue  EYES: No eye pain, visual disturbances, or discharge  ENMT:  No difficulty hearing, tinnitus, vertigo; No sinus or throat pain  NECK: No pain or stiffness  RESPIRATORY: No cough, wheezing, chills or hemoptysis; No shortness of breath  CARDIOVASCULAR: No chest pain, palpitations, dizziness, or leg swelling  GASTROINTESTINAL: No abdominal or epigastric pain. No nausea, vomiting, or hematemesis; No diarrhea or constipation. No melena or hematochezia.  GENITOURINARY: No dysuria, frequency, hematuria, or incontinence  NEUROLOGICAL: No headaches, memory loss, loss of strength, numbness, or tremors  SKIN: No itching, burning, rashes, or lesions   LYMPH NODES: No enlarged glands  ENDOCRINE: No heat or cold intolerance; No hair loss  MUSCULOSKELETAL:   PSYCHIATRIC: No depression, anxiety, mood swings, or difficulty sleeping  HEME/LYMPH: No easy bruising, or bleeding gums  ALLERGY AND IMMUNOLOGIC: No hives or eczema    Vital Signs Last 24 Hrs  T(C): 37, Max: 37.7 (04-08 @ 05:54)  T(F): 98.6, Max: 99.8 (04-08 @ 05:54)  HR: 78 (78 - 99)  BP: 130/70 (130/70 - 163/65)  BP(mean): --  RR: 16 (16 - 18)  SpO2: 95% (92% - 97%)  PHYSICAL EXAM:    GENERAL: NAD, well-groomed, well-developed  HEAD:  Atraumatic, Normocephalic  EYES: EOMI, PERRLA, conjunctiva and sclera clear  NECK: Supple, No JVD, Normal thyroid  NERVOUS SYSTEM:  Alert & Oriented X3, no focal deficit  CHEST/LUNG: CTA b/l ,  no  rales, rhonchi, wheezing, or rubs  HEART: Regular rate and rhythm; No murmurs, rubs, or gallops  ABDOMEN: Soft, Nontender, Nondistended; Bowel sounds present  EXTREMITIES:  2+ Peripheral Pulses, No clubbing, cyanosis, or edema  LYMPH: No lymphadenopathy noted  SKIN: No rashes or lesions Patient seen and examined . C/O LLE stump pain , well controlled with pain meds , daughter at bed side .     CC: left leg pain  , no other complaints .      HPI: 85 yr old female with PMH CAD s/p stents x 2 , DM2, hypertension, hyperlipidemia, anxiety, significant PVD s/p multiple stents, paroxysmal atrial fibrillation on Eliquis admitted for hypertensive urgency post LE angiogram. 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 Hospital course complicated by urinary retention with UTI (failed TOV), hyponatremia likely due to pain and improved with NaCl (per nephrology) and pain medications. BP meds and insulin coverage titrated for better control.  Patient stable for discharge to acute rehab.    PAST MEDICAL & SURGICAL HISTORY:  Hyperlipidemia  Hypertension  Diabetes  PVD (peripheral vascular disease)  CAD (coronary artery disease)  History of cholecystectomy  H/O angioplasty      MEDICATIONS  (STANDING):  multivitamin 1Tablet(s) Oral daily  tamsulosin 0.4milliGRAM(s) Oral at bedtime  apixaban 2.5milliGRAM(s) Oral two times a day  insulin lispro (HumaLOG) corrective regimen sliding scale  SubCutaneous three times a day before meals  dextrose 5%. 1000milliLiter(s) IV Continuous <Continuous>  dextrose 50% Injectable 12.5Gram(s) IV Push once  dextrose 50% Injectable 25Gram(s) IV Push once  dextrose 50% Injectable 25Gram(s) IV Push once  atorvastatin 40milliGRAM(s) Oral at bedtime  amLODIPine   Tablet 10milliGRAM(s) Oral daily  ferrous    sulfate 325milliGRAM(s) Oral daily  pantoprazole    Tablet 40milliGRAM(s) Oral before breakfast  levothyroxine 75MICROGram(s) Oral daily  folic acid 1milliGRAM(s) Oral daily  zinc sulfate 220milliGRAM(s) Oral daily  ascorbic acid 500milliGRAM(s) Oral daily  acetaminophen   Tablet 650milliGRAM(s) Oral every 6 hours  metoprolol 50milliGRAM(s) Oral two times a day  lidocaine   Patch 1Patch Transdermal daily  saccharomyces boulardii 250milliGRAM(s) Oral every 12 hours  insulin glargine Injectable (LANTUS) 15Unit(s) SubCutaneous at bedtime  docusate sodium 100milliGRAM(s) Oral two times a day  mirtazapine 7.5milliGRAM(s) Oral at bedtime  polyethylene glycol 3350 17Gram(s) Oral daily  gabapentin 200milliGRAM(s) Oral <User Schedule>  gabapentin 300milliGRAM(s) Oral <User Schedule>  nystatin Powder 1Application(s) Topical two times a day  lisinopril 2.5milliGRAM(s) Oral daily    MEDICATIONS  (PRN):  acetaminophen   Tablet 650milliGRAM(s) Oral every 6 hours PRN For Temp greater than 38 C (100.4 F)  aluminum hydroxide/magnesium hydroxide/simethicone Suspension 30milliLiter(s) Oral every 4 hours PRN Dyspepsia  dextrose Gel 1Dose(s) Oral once PRN Blood Glucose LESS THAN 70 milliGRAM(s)/deciLiter  glucagon  Injectable 1milliGRAM(s) IntraMuscular once PRN Glucose <70 milliGRAM(s)/deciLiter  ondansetron   Disintegrating Tablet 4milliGRAM(s) Oral every 8 hours PRN Nausea and/or Vomiting  senna 2Tablet(s) Oral at bedtime PRN Constipation  traMADol 25milliGRAM(s) Oral every 4 hours PRN Moderate Pain (4 - 6)  traMADol 50milliGRAM(s) Oral every 6 hours PRN Severe Pain (7 - 10)  bisacodyl 5milliGRAM(s) Oral every 12 hours PRN Constipation          REVIEW OF SYSTEMS:    CONSTITUTIONAL: No fever, weight loss, or fatigue  EYES: No eye pain, visual disturbances, or discharge  ENMT:  No difficulty hearing, tinnitus, vertigo; No sinus or throat pain  NECK: No pain or stiffness  RESPIRATORY: No cough, wheezing, chills or hemoptysis; No shortness of breath  CARDIOVASCULAR: No chest pain, palpitations, dizziness, or leg swelling  GASTROINTESTINAL: No abdominal or epigastric pain. No nausea, vomiting, or hematemesis; No diarrhea or constipation. No melena or hematochezia.  GENITOURINARY: No dysuria, frequency, hematuria, or incontinence  NEUROLOGICAL: No headaches, memory loss, loss of strength, numbness, or tremors  SKIN: No itching, burning, rashes, or lesions   LYMPH NODES: No enlarged glands  ENDOCRINE: No heat or cold intolerance; No hair loss  MUSCULOSKELETAL: LLE stump pain , well controlled with pain meds   PSYCHIATRIC: No depression, anxiety, mood swings, or difficulty sleeping  HEME/LYMPH: No easy bruising, or bleeding gums  ALLERGY AND IMMUNOLOGIC: No hives or eczema    Vital Signs Last 24 Hrs  T(C): 37, Max: 37.7 (04-08 @ 05:54)  T(F): 98.6, Max: 99.8 (04-08 @ 05:54)  HR: 78 (78 - 99)  BP: 130/70 (130/70 - 163/65)  BP(mean): --  RR: 16 (16 - 18)  SpO2: 95% (92% - 97%)  PHYSICAL EXAM:    GENERAL: NAD, well-groomed, well-developed  HEAD:  Atraumatic, Normocephalic  EYES: EOMI, PERRLA, conjunctiva and sclera clear  NECK: Supple, No JVD, Normal thyroid  NERVOUS SYSTEM:  Alert & Oriented X3, no focal deficit  CHEST/LUNG: CTA b/l ,  no  rales, rhonchi, wheezing, or rubs  HEART: Regular rate and rhythm; No murmurs, rubs, or gallops  ABDOMEN: Soft, Nontender, Nondistended; Bowel sounds present  EXTREMITIES:  2+ Peripheral Pulses, No clubbing, cyanosis, or edema , LLE stump ACE wrap + , clean and dry   LYMPH: No lymphadenopathy noted  SKIN: No rashes or lesions

## 2017-04-08 NOTE — PROGRESS NOTE ADULT - SUBJECTIVE AND OBJECTIVE BOX
HISTORY OF PRESENT ILLNESS  85 yr old RH female with multiple medical problems including CAD s/p stents, diabetes, hypertension, hyperlipidemia, anxiety, significant PVD s/p multiple stents, paroxysmal atrial fibrillation was admitted 3/9/17 for elective LE angiogram. Patient was noted to have elevated BP post procedure, and hence admission requested by Dr. Irwin. Per him, patient with occluded SFA, POP b/l with minimal outflow to b/l LE. 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   Hospital course complicated by:  -  pain control management, patient being followed by palliative care.  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, being followed by nephrology   - UTI and urinary retention- currently on IV cipro, flomax, juarez catheter     NB: check with vascular surgery/cardiology about need for plavix [ ]    PMHx -   Hyperlipidemia, Hypertension, Diabetes, PVD (peripheral vascular disease), CAD (coronary artery disease)  History of cholecystectomy, H/O angioplasty    TODAY'S SUBJECTIVE & REVIEW OF SYMPTOMS  [X] Constitutional WNL     [X] Cardio WNL            [X] Resp WNL           [X] GI WNL                          [ ]  WNL                   [X] Heme WNL              [X] Endo WNL                     [ ] Skin WNL                 [ ] MSK WNL            [X] Neuro WNL                   [X] Cognitive WNL        [X] Psych WNL    Patient seen and examined. Per nursing, hypoglycemia this morning, given apple juice and improved slightly. Patient currently eating breakfast, to recheck blood glucose after. Monitor and adjust Lantus PRN. Reports feeling wiped out. Slept well. With posterior left knee burning pain. Has intermittent phantom pain couple times of day. Increase Gabapentin to 300mg q8hrs    VITALS  T(C): 37.7  T(F): 99.8, Max: 99.8 (04-08 @ 05:54)  HR: 99 (80 - 99)  BP: 148/64 (120/59 - 163/65)  RR:  (16 - 18)  SpO2:  (92% - 97%)  Wt(kg): --    PHYSICAL EXAM  Constitutional - NAD, awake, alert  HEENT - NCAT, EOMI  Neck - Supple, No limited ROM  Chest - CTA bilaterally, No wheeze, No rhonchi, No crackles  Cardiovascular - RRR, S1S2, No murmurs  Abdomen - BS+, Soft, NTND  Extremities -  No calf tenderness. Mild left LE edema  +knee flexion contracture  Neurologic Exam -                    Cognitive - Awake, Alert, AAO to self, place, date, year, situation     Communication - Fluent, No dysarthria     Cranial Nerves - CN 2-12 intact    Psychiatric - Mood stable, Affect WNL  Skin - moisture dermatitis in intergluteal folds, right heel DTI  Wounds - left BKA residual limb with staples except central portion with serosanguinous drainage packed with xeroform. CDI  No erythema    FUNCTIONAL PROGRESS  Gait: unable  Transfers: Min-Mod A  Wheelchair mobility: ' Salomon  ADLs: grooming and upper body dressing supervision, mod assist lower body dressing  Functional transfers: Toilet and bed transfers mod assist, bed mobility min assist    RECENT LABS:             7.9    7.0   )-----------( 353      ( 06 Apr 2017 07:34 )             24.5     U/A 4/5: negative    CAPILLARY BLOOD GLUCOSE  63 (08 Apr 2017 08:30)  50 (08 Apr 2017 08:12)  156 (07 Apr 2017 20:45)  200 (07 Apr 2017 16:32)  72 (07 Apr 2017 11:30)    CURRENT MEDICATIONS  MEDICATIONS  (STANDING):  multivitamin 1Tablet(s) Oral daily  tamsulosin 0.4milliGRAM(s) Oral at bedtime  apixaban 2.5milliGRAM(s) Oral two times a day  insulin lispro (HumaLOG) corrective regimen sliding scale  SubCutaneous three times a day before meals  atorvastatin 40milliGRAM(s) Oral at bedtime  amLODIPine   Tablet 10milliGRAM(s) Oral daily  ferrous    sulfate 325milliGRAM(s) Oral daily  pantoprazole    Tablet 40milliGRAM(s) Oral before breakfast  levothyroxine 75MICROGram(s) Oral daily  folic acid 1milliGRAM(s) Oral daily  zinc sulfate 220milliGRAM(s) Oral daily  ascorbic acid 500milliGRAM(s) Oral daily  acetaminophen   Tablet 650milliGRAM(s) Oral every 6 hours  metoprolol 50milliGRAM(s) Oral two times a day  lidocaine   Patch 1Patch Transdermal daily  saccharomyces boulardii 250milliGRAM(s) Oral every 12 hours  insulin glargine Injectable (LANTUS) 15Unit(s) SubCutaneous at bedtime  docusate sodium 100milliGRAM(s) Oral two times a day  mirtazapine 7.5milliGRAM(s) Oral at bedtime  polyethylene glycol 3350 17Gram(s) Oral daily  gabapentin 300milliGRAM(s) Oral every 8 hours    MEDICATIONS  (PRN):  acetaminophen   Tablet 650milliGRAM(s) Oral every 6 hours PRN For Temp greater than 38 C (100.4 F)  aluminum hydroxide/magnesium hydroxide/simethicone Suspension 30milliLiter(s) Oral every 4 hours PRN Dyspepsia  dextrose Gel 1Dose(s) Oral once PRN Blood Glucose LESS THAN 70 milliGRAM(s)/deciLiter  glucagon  Injectable 1milliGRAM(s) IntraMuscular once PRN Glucose <70 milliGRAM(s)/deciLiter  ondansetron   Disintegrating Tablet 4milliGRAM(s) Oral every 8 hours PRN Nausea and/or Vomiting  senna 2Tablet(s) Oral at bedtime PRN Constipation  traMADol 25milliGRAM(s) Oral every 4 hours PRN Moderate Pain (4 - 6)  traMADol 50milliGRAM(s) Oral every 6 hours PRN Severe Pain (7 - 10)  bisacodyl 5milliGRAM(s) Oral every 12 hours PRN Constipation    ASSESSMENT & PLAN    84yo female with DM, CAD, PVD s/p right BKA with gait and functional impairments    Urinary retention: Some spontaneous voiding. Continue PVR/bladder scans/IC prn, Flomax and monitor.      UTI: UA + nitrites, bacteria TNTC. Restarted cipro BID and florstar. Urine culture NG-D/C Cipro. (Completed 5 day course of cipro BID and florastar for prior UTI)    Post-op pain control, phantom pain, anxiety: tylenol standing, changed oxycodone PRN to tramadol 25 and 50mg prn due to lethargy with oxy, gabapentin TID-decreased to 200 mg TID, xanax, lidoderm patch left knee daily.  Heat to left knee prn and stretching.    Acute blood loss anemia: Continue ferrous sulfate and folic acid. Monitor closely as patient's on Eliquis.  Venofer x 3 days completed.     BKA wound healing: MVI, vit C, zinc, dressing change with iodoform packing BID. Vascular following. Prosthetic eval.     Right heel DTI: z loretta boot and pillow for off-loading heel, No sting barrier film    DMT2: Lantus 15units, SSI AC HS    pAFib/CAD: Eliquis, statin, metoprolol    Orthostatic hypotension: BP improved    Hyponatremia: Resolved    Elevated LFTs: improved    Depression with poor appetite: Remeron 7.5mg qhs with plan to increase to 15mg in 1 week if well tolerated    DVT PPx: Eliquid    Continue comprehensive acute rehab program consisting of 3hrs/day of OT/PT HISTORY OF PRESENT ILLNESS  85 yr old RH female with multiple medical problems including CAD s/p stents, diabetes, hypertension, hyperlipidemia, anxiety, significant PVD s/p multiple stents, paroxysmal atrial fibrillation was admitted 3/9/17 for elective LE angiogram. Patient was noted to have elevated BP post procedure, and hence admission requested by Dr. Irwin. Per him, patient with occluded SFA, POP b/l with minimal outflow to b/l LE. 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   Hospital course complicated by:  -  pain control management, patient being followed by palliative care.  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, being followed by nephrology   - UTI and urinary retention- currently on IV cipro, flomax, juarez catheter     NB: check with vascular surgery/cardiology about need for plavix [ ]    PMHx -   Hyperlipidemia, Hypertension, Diabetes, PVD (peripheral vascular disease), CAD (coronary artery disease)  History of cholecystectomy, H/O angioplasty    TODAY'S SUBJECTIVE & REVIEW OF SYMPTOMS  [X] Constitutional WNL     [X] Cardio WNL            [X] Resp WNL           [X] GI WNL                          [ ]  WNL                   [X] Heme WNL              [X] Endo WNL                     [ ] Skin WNL                 [ ] MSK WNL            [X] Neuro WNL                   [X] Cognitive WNL        [X] Psych WNL    Patient seen and examined. Per nursing, hypoglycemia this morning, given apple juice and improved slightly. Patient currently eating breakfast, to recheck blood glucose after. Monitor and adjust Lantus PRN. Reports feeling wiped out. Slept well. With posterior left knee burning pain. Has intermittent phantom pain couple times of day. Increase Gabapentin to 300mg q8hrs    VITALS  T(C): 37.7  T(F): 99.8, Max: 99.8 (04-08 @ 05:54)  HR: 99 (80 - 99)  BP: 148/64 (120/59 - 163/65)  RR:  (16 - 18)  SpO2:  (92% - 97%)  Wt(kg): --    PHYSICAL EXAM  Constitutional - NAD, awake, alert  HEENT - NCAT, EOMI  Neck - Supple, No limited ROM  Chest - CTA bilaterally, No wheeze, No rhonchi, No crackles  Cardiovascular - RRR, S1S2, No murmurs  Abdomen - BS+, Soft, NTND  Extremities -  No calf tenderness. Mild left LE edema  +knee flexion contracture  Neurologic Exam -                    Cognitive - Awake, Alert, AAO to self, place, date, year, situation     Communication - Fluent, No dysarthria     Cranial Nerves - CN 2-12 intact    Psychiatric - Mood stable, Affect WNL  Skin - moisture dermatitis in intergluteal folds, right heel DTI  Wounds - left BKA residual limb with staples except central portion with serosanguinous drainage packed with xeroform. CDI  No erythema    FUNCTIONAL PROGRESS  Gait: unable  Transfers: Min-Mod A  Wheelchair mobility: ' Salomon  ADLs: grooming and upper body dressing supervision, mod assist lower body dressing  Functional transfers: Toilet and bed transfers mod assist, bed mobility min assist    RECENT LABS:             7.9    7.0   )-----------( 353      ( 06 Apr 2017 07:34 )             24.5     U/A 4/5: negative    CAPILLARY BLOOD GLUCOSE  63 (08 Apr 2017 08:30)  50 (08 Apr 2017 08:12)  156 (07 Apr 2017 20:45)  200 (07 Apr 2017 16:32)  72 (07 Apr 2017 11:30)    CURRENT MEDICATIONS  MEDICATIONS  (STANDING):  multivitamin 1Tablet(s) Oral daily  tamsulosin 0.4milliGRAM(s) Oral at bedtime  apixaban 2.5milliGRAM(s) Oral two times a day  insulin lispro (HumaLOG) corrective regimen sliding scale  SubCutaneous three times a day before meals  atorvastatin 40milliGRAM(s) Oral at bedtime  amLODIPine   Tablet 10milliGRAM(s) Oral daily  ferrous    sulfate 325milliGRAM(s) Oral daily  pantoprazole    Tablet 40milliGRAM(s) Oral before breakfast  levothyroxine 75MICROGram(s) Oral daily  folic acid 1milliGRAM(s) Oral daily  zinc sulfate 220milliGRAM(s) Oral daily  ascorbic acid 500milliGRAM(s) Oral daily  acetaminophen   Tablet 650milliGRAM(s) Oral every 6 hours  metoprolol 50milliGRAM(s) Oral two times a day  lidocaine   Patch 1Patch Transdermal daily  saccharomyces boulardii 250milliGRAM(s) Oral every 12 hours  insulin glargine Injectable (LANTUS) 15Unit(s) SubCutaneous at bedtime  docusate sodium 100milliGRAM(s) Oral two times a day  mirtazapine 7.5milliGRAM(s) Oral at bedtime  polyethylene glycol 3350 17Gram(s) Oral daily  gabapentin 300milliGRAM(s) Oral every 8 hours    MEDICATIONS  (PRN):  acetaminophen   Tablet 650milliGRAM(s) Oral every 6 hours PRN For Temp greater than 38 C (100.4 F)  aluminum hydroxide/magnesium hydroxide/simethicone Suspension 30milliLiter(s) Oral every 4 hours PRN Dyspepsia  dextrose Gel 1Dose(s) Oral once PRN Blood Glucose LESS THAN 70 milliGRAM(s)/deciLiter  glucagon  Injectable 1milliGRAM(s) IntraMuscular once PRN Glucose <70 milliGRAM(s)/deciLiter  ondansetron   Disintegrating Tablet 4milliGRAM(s) Oral every 8 hours PRN Nausea and/or Vomiting  senna 2Tablet(s) Oral at bedtime PRN Constipation  traMADol 25milliGRAM(s) Oral every 4 hours PRN Moderate Pain (4 - 6)  traMADol 50milliGRAM(s) Oral every 6 hours PRN Severe Pain (7 - 10)  bisacodyl 5milliGRAM(s) Oral every 12 hours PRN Constipation    ASSESSMENT & PLAN    84yo female with DM, CAD, PVD s/p right BKA with gait and functional impairments    Urinary retention: PVR/bladder scans/IC prn, Flomax.      UTI: Restarted cipro BID and florstar. Urine culture NG-D/C Cipro. (Completed 5 day course of cipro BID and florastar for prior UTI)    Post-op pain control, phantom pain, anxiety: tylenol standing, changed oxycodone PRN to tramadol PRN due to lethargy, gabapentin TID, xanax, lidoderm patch left knee daily.  Heat to left knee prn and stretching.    Acute blood loss anemia: Continue ferrous sulfate and folic acid. Monitor closely as patient's on Eliquis. Venofer x 3 days completed.     BKA wound healing: MVI, vit C, zinc, dressing change with iodoform packing BID. Vascular following. Prosthetic eval.     Right heel DTI: z loretta boot and pillow for off-loading heel, No sting barrier film    DMT2: Lantus 15units, SSI AC HS    pAFib/CAD: Eliquis, statin, metoprolol    Orthostatic hypotension: BP improved    Hyponatremia: Resolved    Elevated LFTs: improved    Depression with poor appetite: Remeron 7.5mg qhs with plan to increase to 15mg in 1 week if well tolerated    DVT PPx: Eliquid    Continue comprehensive acute rehab program consisting of 3hrs/day of OT/PT HISTORY OF PRESENT ILLNESS  85 yr old RH female with multiple medical problems including CAD s/p stents, diabetes, hypertension, hyperlipidemia, anxiety, significant PVD s/p multiple stents, paroxysmal atrial fibrillation was admitted 3/9/17 for elective LE angiogram. Patient was noted to have elevated BP post procedure, and hence admission requested by Dr. Irwin. Per him, patient with occluded SFA, POP b/l with minimal outflow to b/l LE. 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   Hospital course complicated by:  -  pain control management, patient being followed by palliative care.  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, being followed by nephrology   - UTI and urinary retention- currently on IV cipro, flomax, juarez catheter     NB: check with vascular surgery/cardiology about need for plavix [ ]    PMHx -   Hyperlipidemia, Hypertension, Diabetes, PVD (peripheral vascular disease), CAD (coronary artery disease)  History of cholecystectomy, H/O angioplasty    TODAY'S SUBJECTIVE & REVIEW OF SYMPTOMS  [X] Constitutional WNL     [X] Cardio WNL            [X] Resp WNL           [X] GI WNL                          [ ]  WNL                   [X] Heme WNL              [X] Endo WNL                     [ ] Skin WNL                 [ ] MSK WNL            [X] Neuro WNL                   [X] Cognitive WNL        [X] Psych WNL    Patient seen and examined. Per nursing, hypoglycemia this morning, given apple juice and improved slightly. Patient currently eating breakfast, to recheck blood glucose after. Monitor and adjust Lantus PRN. Reports feeling wiped out. Slept well. With posterior left knee burning pain. Has intermittent phantom pain couple times of day. Increase Gabapentin to 300mg q8hrs    VITALS  T(C): 37.7  T(F): 99.8, Max: 99.8 (04-08 @ 05:54)  HR: 99 (80 - 99)  BP: 148/64 (120/59 - 163/65)  RR:  (16 - 18)  SpO2:  (92% - 97%)  Wt(kg): --    PHYSICAL EXAM  Constitutional - NAD, awake, alert  HEENT - NCAT, EOMI  Neck - Supple, No limited ROM  Chest - CTA bilaterally, No wheeze, No rhonchi, No crackles  Cardiovascular - RRR, S1S2, No murmurs  Abdomen - BS+, Soft, NTND  Extremities -  No calf tenderness. Mild left LE edema  +knee flexion contracture  Neurologic Exam -                    Cognitive - Awake, Alert, AAO to self, place, date, year, situation     Communication - Fluent, No dysarthria     Cranial Nerves - CN 2-12 intact    Psychiatric - Mood stable, Affect WNL  Skin - moisture dermatitis in intergluteal folds, right heel DTI  Wounds - left BKA residual limb with staples except central portion with serosanguinous drainage packed with xeroform. CDI  No erythema    FUNCTIONAL PROGRESS  Gait: unable  Transfers: Min-Mod A  Wheelchair mobility: ' Salomon  ADLs: grooming and upper body dressing supervision, mod assist lower body dressing  Functional transfers: Toilet and bed transfers mod assist, bed mobility min assist    RECENT LABS:             7.9    7.0   )-----------( 353      ( 06 Apr 2017 07:34 )             24.5     U/A 4/5: negative    CAPILLARY BLOOD GLUCOSE  63 (08 Apr 2017 08:30)  50 (08 Apr 2017 08:12)  156 (07 Apr 2017 20:45)  200 (07 Apr 2017 16:32)  72 (07 Apr 2017 11:30)    CURRENT MEDICATIONS  MEDICATIONS  (STANDING):  multivitamin 1Tablet(s) Oral daily  tamsulosin 0.4milliGRAM(s) Oral at bedtime  apixaban 2.5milliGRAM(s) Oral two times a day  insulin lispro (HumaLOG) corrective regimen sliding scale  SubCutaneous three times a day before meals  atorvastatin 40milliGRAM(s) Oral at bedtime  amLODIPine   Tablet 10milliGRAM(s) Oral daily  ferrous    sulfate 325milliGRAM(s) Oral daily  pantoprazole    Tablet 40milliGRAM(s) Oral before breakfast  levothyroxine 75MICROGram(s) Oral daily  folic acid 1milliGRAM(s) Oral daily  zinc sulfate 220milliGRAM(s) Oral daily  ascorbic acid 500milliGRAM(s) Oral daily  acetaminophen   Tablet 650milliGRAM(s) Oral every 6 hours  metoprolol 50milliGRAM(s) Oral two times a day  lidocaine   Patch 1Patch Transdermal daily  saccharomyces boulardii 250milliGRAM(s) Oral every 12 hours  insulin glargine Injectable (LANTUS) 15Unit(s) SubCutaneous at bedtime  docusate sodium 100milliGRAM(s) Oral two times a day  mirtazapine 7.5milliGRAM(s) Oral at bedtime  polyethylene glycol 3350 17Gram(s) Oral daily  gabapentin 300milliGRAM(s) Oral every 8 hours    MEDICATIONS  (PRN):  acetaminophen   Tablet 650milliGRAM(s) Oral every 6 hours PRN For Temp greater than 38 C (100.4 F)  aluminum hydroxide/magnesium hydroxide/simethicone Suspension 30milliLiter(s) Oral every 4 hours PRN Dyspepsia  dextrose Gel 1Dose(s) Oral once PRN Blood Glucose LESS THAN 70 milliGRAM(s)/deciLiter  glucagon  Injectable 1milliGRAM(s) IntraMuscular once PRN Glucose <70 milliGRAM(s)/deciLiter  ondansetron   Disintegrating Tablet 4milliGRAM(s) Oral every 8 hours PRN Nausea and/or Vomiting  senna 2Tablet(s) Oral at bedtime PRN Constipation  traMADol 25milliGRAM(s) Oral every 4 hours PRN Moderate Pain (4 - 6)  traMADol 50milliGRAM(s) Oral every 6 hours PRN Severe Pain (7 - 10)  bisacodyl 5milliGRAM(s) Oral every 12 hours PRN Constipation    ASSESSMENT & PLAN    86yo female with DM, CAD, PVD s/p right BKA with gait and functional impairments    Urinary retention: PVR/bladder scans/IC prn, Flomax.      UTI: Restarted cipro BID and florstar. Urine culture NG-D/C Cipro. (Completed 5 day course of cipro BID and florastar for prior UTI)    Post-op pain control, phantom pain, anxiety: tylenol standing, changed oxycodone PRN to tramadol PRN due to lethargy, gabapentin TID, xanax, lidoderm patch left knee daily.  Heat to left knee prn and stretching.    Acute blood loss anemia: Continue ferrous sulfate and folic acid. Monitor closely as patient's on Eliquis. Venofer x 3 days completed.     BKA wound healing: MVI, vit C, zinc, dressing change with iodoform packing BID. Vascular following. Prosthetic eval.     Right heel DTI: z loretta boot and pillow for off-loading heel, No sting barrier film    DMT2: Lantus 15units, SSI AC HS    pAFib/CAD: Eliquis, statin, metoprolol    Orthostatic hypotension: BP improved    Hyponatremia: Resolved    Elevated LFTs: improved    Depression with poor appetite: Remeron 7.5mg qhs with plan to increase to 15mg in 1 week if well tolerated    DVT PPx: Eliquis    Continue comprehensive acute rehab program consisting of 3hrs/day of OT/PT

## 2017-04-09 PROCEDURE — 99233 SBSQ HOSP IP/OBS HIGH 50: CPT | Mod: GC

## 2017-04-09 RX ADMIN — Medication 250 MILLIGRAM(S): at 21:37

## 2017-04-09 RX ADMIN — Medication 100 MILLIGRAM(S): at 17:22

## 2017-04-09 RX ADMIN — LIDOCAINE 1 PATCH: 4 CREAM TOPICAL at 00:13

## 2017-04-09 RX ADMIN — TRAMADOL HYDROCHLORIDE 50 MILLIGRAM(S): 50 TABLET ORAL at 06:38

## 2017-04-09 RX ADMIN — Medication 50 MILLIGRAM(S): at 05:30

## 2017-04-09 RX ADMIN — ATORVASTATIN CALCIUM 40 MILLIGRAM(S): 80 TABLET, FILM COATED ORAL at 21:36

## 2017-04-09 RX ADMIN — TRAMADOL HYDROCHLORIDE 50 MILLIGRAM(S): 50 TABLET ORAL at 21:39

## 2017-04-09 RX ADMIN — TRAMADOL HYDROCHLORIDE 50 MILLIGRAM(S): 50 TABLET ORAL at 14:05

## 2017-04-09 RX ADMIN — TRAMADOL HYDROCHLORIDE 50 MILLIGRAM(S): 50 TABLET ORAL at 21:01

## 2017-04-09 RX ADMIN — AMLODIPINE BESYLATE 10 MILLIGRAM(S): 2.5 TABLET ORAL at 05:33

## 2017-04-09 RX ADMIN — APIXABAN 2.5 MILLIGRAM(S): 2.5 TABLET, FILM COATED ORAL at 05:33

## 2017-04-09 RX ADMIN — Medication 2: at 08:55

## 2017-04-09 RX ADMIN — GABAPENTIN 300 MILLIGRAM(S): 400 CAPSULE ORAL at 21:36

## 2017-04-09 RX ADMIN — Medication 650 MILLIGRAM(S): at 17:22

## 2017-04-09 RX ADMIN — INSULIN GLARGINE 10 UNIT(S): 100 INJECTION, SOLUTION SUBCUTANEOUS at 21:36

## 2017-04-09 RX ADMIN — ONDANSETRON 4 MILLIGRAM(S): 8 TABLET, FILM COATED ORAL at 14:05

## 2017-04-09 RX ADMIN — PANTOPRAZOLE SODIUM 40 MILLIGRAM(S): 20 TABLET, DELAYED RELEASE ORAL at 05:30

## 2017-04-09 RX ADMIN — Medication 250 MILLIGRAM(S): at 12:34

## 2017-04-09 RX ADMIN — TAMSULOSIN HYDROCHLORIDE 0.4 MILLIGRAM(S): 0.4 CAPSULE ORAL at 21:37

## 2017-04-09 RX ADMIN — LISINOPRIL 2.5 MILLIGRAM(S): 2.5 TABLET ORAL at 05:30

## 2017-04-09 RX ADMIN — MIRTAZAPINE 7.5 MILLIGRAM(S): 45 TABLET, ORALLY DISINTEGRATING ORAL at 21:37

## 2017-04-09 RX ADMIN — Medication 75 MICROGRAM(S): at 05:30

## 2017-04-09 RX ADMIN — Medication 650 MILLIGRAM(S): at 23:43

## 2017-04-09 RX ADMIN — TRAMADOL HYDROCHLORIDE 50 MILLIGRAM(S): 50 TABLET ORAL at 07:30

## 2017-04-09 RX ADMIN — NYSTATIN CREAM 1 APPLICATION(S): 100000 CREAM TOPICAL at 05:34

## 2017-04-09 RX ADMIN — LIDOCAINE 1 PATCH: 4 CREAM TOPICAL at 13:21

## 2017-04-09 RX ADMIN — Medication 50 MILLIGRAM(S): at 17:23

## 2017-04-09 RX ADMIN — Medication 100 MILLIGRAM(S): at 05:33

## 2017-04-09 RX ADMIN — GABAPENTIN 200 MILLIGRAM(S): 400 CAPSULE ORAL at 05:33

## 2017-04-09 RX ADMIN — Medication 650 MILLIGRAM(S): at 05:30

## 2017-04-09 RX ADMIN — APIXABAN 2.5 MILLIGRAM(S): 2.5 TABLET, FILM COATED ORAL at 17:23

## 2017-04-09 RX ADMIN — NYSTATIN CREAM 1 APPLICATION(S): 100000 CREAM TOPICAL at 17:26

## 2017-04-09 RX ADMIN — TRAMADOL HYDROCHLORIDE 50 MILLIGRAM(S): 50 TABLET ORAL at 15:00

## 2017-04-09 RX ADMIN — Medication 8: at 17:21

## 2017-04-09 NOTE — PROGRESS NOTE ADULT - SUBJECTIVE AND OBJECTIVE BOX
HISTORY OF PRESENT ILLNESS  85 yr old RH female with multiple medical problems including CAD s/p stents, diabetes, hypertension, hyperlipidemia, anxiety, significant PVD s/p multiple stents, paroxysmal atrial fibrillation was admitted 3/9/17 for elective LE angiogram. Patient was noted to have elevated BP post procedure, and hence admission requested by Dr. Irwin. Per him, patient with occluded SFA, POP b/l with minimal outflow to b/l LE. 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   Hospital course complicated by:  -  pain control management, patient being followed by palliative care.  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, being followed by nephrology   - UTI and urinary retention- currently on IV cipro, flomax, juarez catheter     NB: check with vascular surgery/cardiology about need for plavix [ ]    PMHx -   Hyperlipidemia, Hypertension, Diabetes, PVD (peripheral vascular disease), CAD (coronary artery disease)  History of cholecystectomy, H/O angioplasty    TODAY'S SUBJECTIVE & REVIEW OF SYMPTOMS  [X] Constitutional WNL     [X] Cardio WNL            [X] Resp WNL           [X] GI WNL                          [ ]  WNL                   [X] Heme WNL              [X] Endo WNL                     [ ] Skin WNL                 [ ] MSK WNL            [X] Neuro WNL                   [X] Cognitive WNL        [X] Psych WNL    Patient seen and examined. No overnight events. Dressing changed at bedside this morning, wound appears to be tunneling. Looks worse compared to yesterday per Dr. Schafer and RN. Spoke with vascular surgeon covering today, Dr. Valencia. Vascular PA will evaluate wound today. Pt reports pain controlled, slept well.     VITALS  Vital Signs Last 24 Hrs  T(C): 36.9, Max: 37 (04-08 @ 12:00)  T(F): 98.5, Max: 98.6 (04-08 @ 12:00)  HR: 78 (78 - 78)  BP: 136/65 (130/70 - 145/60)  BP(mean): --  RR: 18 (16 - 18)  SpO2: 96% (93% - 96%)      PHYSICAL EXAM  Constitutional - NAD, awake, sleepy  HEENT - NCAT, EOMI  Neck - Supple, No limited ROM  Chest - CTA bilaterally,   Cardiovascular - RRR, S1S2  Abdomen - BS+, Soft, NTND  Extremities -  No calf tenderness. Mild left LE edema  +knee flexion contracture  Neurologic Exam -                    Cognitive - Awake, Alert, AAO to self, place, date, year, situation     Communication - Fluent, No dysarthria     Cranial Nerves - CN 2-12 intact    Psychiatric - Mood stable, Affect WNL  Skin - moisture dermatitis in intergluteal folds, right heel DTI  Wounds - left BKA residual limb with staples and sutures, area of central dehiscence with thick serosanguinous drainage, tunneling at medial aspect of wound    FUNCTIONAL PROGRESS  Gait: unable  Transfers: Min-Mod A  Wheelchair mobility: ' Salomon  ADLs: grooming and upper body dressing supervision, mod assist lower body dressing  Functional transfers: Toilet and bed transfers mod assist, bed mobility min assist    RECENT LABS:             7.9    7.0   )-----------( 353      ( 06 Apr 2017 07:34 )             24.5     U/A 4/5: negative    CAPILLARY BLOOD GLUCOSE  197 (09 Apr 2017 08:00)  165 (08 Apr 2017 20:02)  63 (08 Apr 2017 08:30)    CURRENT MEDICATIONS  MEDICATIONS  (STANDING):  multivitamin 1Tablet(s) Oral daily  tamsulosin 0.4milliGRAM(s) Oral at bedtime  apixaban 2.5milliGRAM(s) Oral two times a day  insulin lispro (HumaLOG) corrective regimen sliding scale  SubCutaneous three times a day before meals  atorvastatin 40milliGRAM(s) Oral at bedtime  amLODIPine   Tablet 10milliGRAM(s) Oral daily  ferrous    sulfate 325milliGRAM(s) Oral daily  pantoprazole    Tablet 40milliGRAM(s) Oral before breakfast  levothyroxine 75MICROGram(s) Oral daily  folic acid 1milliGRAM(s) Oral daily  zinc sulfate 220milliGRAM(s) Oral daily  ascorbic acid 500milliGRAM(s) Oral daily  acetaminophen   Tablet 650milliGRAM(s) Oral every 6 hours  metoprolol 50milliGRAM(s) Oral two times a day  lidocaine   Patch 1Patch Transdermal daily  saccharomyces boulardii 250milliGRAM(s) Oral every 12 hours  docusate sodium 100milliGRAM(s) Oral two times a day  mirtazapine 7.5milliGRAM(s) Oral at bedtime  polyethylene glycol 3350 17Gram(s) Oral daily  gabapentin 200milliGRAM(s) Oral <User Schedule>  gabapentin 300milliGRAM(s) Oral <User Schedule>  nystatin Powder 1Application(s) Topical two times a day  lisinopril 2.5milliGRAM(s) Oral daily  insulin glargine Injectable (LANTUS) 10Unit(s) SubCutaneous at bedtime    MEDICATIONS  (PRN):  acetaminophen   Tablet 650milliGRAM(s) Oral every 6 hours PRN For Temp greater than 38 C (100.4 F)  aluminum hydroxide/magnesium hydroxide/simethicone Suspension 30milliLiter(s) Oral every 4 hours PRN Dyspepsia  ondansetron   Disintegrating Tablet 4milliGRAM(s) Oral every 8 hours PRN Nausea and/or Vomiting  senna 2Tablet(s) Oral at bedtime PRN Constipation  traMADol 25milliGRAM(s) Oral every 4 hours PRN Moderate Pain (4 - 6)  traMADol 50milliGRAM(s) Oral every 6 hours PRN Severe Pain (7 - 10)  bisacodyl 5milliGRAM(s) Oral every 12 hours PRN Constipation        ASSESSMENT & PLAN    86yo female with DM, CAD, PVD s/p right BKA with gait and functional impairments    Urinary retention: PVR/bladder scans/IC prn, Flomax.      Post-op pain control, phantom pain, anxiety: tylenol standing, tramadol PRN, gabapentin TID, xanax, lidoderm patch left knee daily.  Heat to left knee prn and stretching.    Acute blood loss anemia: Continue ferrous sulfate and folic acid.     BKA wound healing: MVI, vit C, zinc, dressing change with iodoform packing BID. Vascular follow-up today.    Right heel DTI: z loretta boot and pillow for off-loading heel, No sting barrier film    DMT2: Lantus 10units, SSI AC HS    pAFib/CAD: Eliquis, statin, metoprolol    HTN: metoprolol, norvasc, lisinopril    Depression with poor appetite: Remeron 7.5mg qhs     DVT PPx: Eliquis    Continue comprehensive acute rehab program consisting of 3hrs/day of OT/PT

## 2017-04-10 LAB
24R-OH-CALCIDIOL SERPL-MCNC: 42.3 NG/ML — SIGNIFICANT CHANGE UP (ref 30–100)
ALBUMIN SERPL ELPH-MCNC: 2.9 G/DL — LOW (ref 3.3–5.2)
ALP SERPL-CCNC: 89 U/L — SIGNIFICANT CHANGE UP (ref 40–120)
ALT FLD-CCNC: 21 U/L — SIGNIFICANT CHANGE UP
ANION GAP SERPL CALC-SCNC: 11 MMOL/L — SIGNIFICANT CHANGE UP (ref 5–17)
AST SERPL-CCNC: 15 U/L — SIGNIFICANT CHANGE UP
BILIRUB SERPL-MCNC: 0.2 MG/DL — LOW (ref 0.4–2)
BLD GP AB SCN SERPL QL: SIGNIFICANT CHANGE UP
BUN SERPL-MCNC: 20 MG/DL — SIGNIFICANT CHANGE UP (ref 8–20)
CALCIUM SERPL-MCNC: 9.1 MG/DL — SIGNIFICANT CHANGE UP (ref 8.6–10.2)
CHLORIDE SERPL-SCNC: 97 MMOL/L — LOW (ref 98–107)
CO2 SERPL-SCNC: 26 MMOL/L — SIGNIFICANT CHANGE UP (ref 22–29)
CREAT SERPL-MCNC: 0.97 MG/DL — SIGNIFICANT CHANGE UP (ref 0.5–1.3)
GLUCOSE SERPL-MCNC: 221 MG/DL — HIGH (ref 70–115)
HCT VFR BLD CALC: 25.5 % — LOW (ref 37–47)
HGB BLD-MCNC: 8.1 G/DL — LOW (ref 12–16)
MCHC RBC-ENTMCNC: 27 PG — SIGNIFICANT CHANGE UP (ref 27–31)
MCHC RBC-ENTMCNC: 31.8 G/DL — LOW (ref 32–36)
MCV RBC AUTO: 85 FL — SIGNIFICANT CHANGE UP (ref 81–99)
PLATELET # BLD AUTO: 347 K/UL — SIGNIFICANT CHANGE UP (ref 150–400)
POTASSIUM SERPL-MCNC: 4.3 MMOL/L — SIGNIFICANT CHANGE UP (ref 3.5–5.3)
POTASSIUM SERPL-SCNC: 4.3 MMOL/L — SIGNIFICANT CHANGE UP (ref 3.5–5.3)
PREALB SERPL-MCNC: 13 MG/DL — LOW (ref 18–38)
PROT SERPL-MCNC: 5.9 G/DL — LOW (ref 6.6–8.7)
RBC # BLD: 3 M/UL — LOW (ref 4.4–5.2)
RBC # FLD: 15.6 % — SIGNIFICANT CHANGE UP (ref 11–15.6)
SODIUM SERPL-SCNC: 134 MMOL/L — LOW (ref 135–145)
TSH SERPL-MCNC: 4.76 UIU/ML — HIGH (ref 0.27–4.2)
TYPE + AB SCN PNL BLD: SIGNIFICANT CHANGE UP
WBC # BLD: 6.7 K/UL — SIGNIFICANT CHANGE UP (ref 4.8–10.8)
WBC # FLD AUTO: 6.7 K/UL — SIGNIFICANT CHANGE UP (ref 4.8–10.8)

## 2017-04-10 PROCEDURE — 93010 ELECTROCARDIOGRAM REPORT: CPT

## 2017-04-10 PROCEDURE — 99233 SBSQ HOSP IP/OBS HIGH 50: CPT

## 2017-04-10 RX ORDER — ENOXAPARIN SODIUM 100 MG/ML
75 INJECTION SUBCUTANEOUS
Qty: 0 | Refills: 0 | Status: DISCONTINUED | OUTPATIENT
Start: 2017-04-10 | End: 2017-04-13

## 2017-04-10 RX ORDER — ALPRAZOLAM 0.25 MG
0.25 TABLET ORAL
Qty: 0 | Refills: 0 | Status: DISCONTINUED | OUTPATIENT
Start: 2017-04-10 | End: 2017-04-13

## 2017-04-10 RX ORDER — TRAMADOL HYDROCHLORIDE 50 MG/1
25 TABLET ORAL EVERY 4 HOURS
Qty: 0 | Refills: 0 | Status: DISCONTINUED | OUTPATIENT
Start: 2017-04-10 | End: 2017-04-12

## 2017-04-10 RX ORDER — TRAMADOL HYDROCHLORIDE 50 MG/1
25 TABLET ORAL EVERY 4 HOURS
Qty: 0 | Refills: 0 | Status: DISCONTINUED | OUTPATIENT
Start: 2017-04-10 | End: 2017-04-10

## 2017-04-10 RX ORDER — ALPRAZOLAM 0.25 MG
0.25 TABLET ORAL
Qty: 0 | Refills: 0 | Status: DISCONTINUED | OUTPATIENT
Start: 2017-04-10 | End: 2017-04-10

## 2017-04-10 RX ORDER — ALPRAZOLAM 0.25 MG
0.25 TABLET ORAL EVERY 8 HOURS
Qty: 0 | Refills: 0 | Status: DISCONTINUED | OUTPATIENT
Start: 2017-04-10 | End: 2017-04-10

## 2017-04-10 RX ORDER — LEVOTHYROXINE SODIUM 125 MCG
88 TABLET ORAL DAILY
Qty: 0 | Refills: 0 | Status: DISCONTINUED | OUTPATIENT
Start: 2017-04-10 | End: 2017-04-13

## 2017-04-10 RX ORDER — ACETAMINOPHEN 500 MG
650 TABLET ORAL EVERY 6 HOURS
Qty: 0 | Refills: 0 | Status: DISCONTINUED | OUTPATIENT
Start: 2017-04-10 | End: 2017-04-13

## 2017-04-10 RX ADMIN — NYSTATIN CREAM 1 APPLICATION(S): 100000 CREAM TOPICAL at 06:53

## 2017-04-10 RX ADMIN — GABAPENTIN 200 MILLIGRAM(S): 400 CAPSULE ORAL at 06:50

## 2017-04-10 RX ADMIN — TRAMADOL HYDROCHLORIDE 25 MILLIGRAM(S): 50 TABLET ORAL at 21:14

## 2017-04-10 RX ADMIN — Medication 0.25 MILLIGRAM(S): at 21:38

## 2017-04-10 RX ADMIN — Medication 4: at 08:33

## 2017-04-10 RX ADMIN — Medication 75 MICROGRAM(S): at 06:50

## 2017-04-10 RX ADMIN — Medication 0.25 MILLIGRAM(S): at 15:33

## 2017-04-10 RX ADMIN — GABAPENTIN 300 MILLIGRAM(S): 400 CAPSULE ORAL at 21:38

## 2017-04-10 RX ADMIN — ZINC SULFATE TAB 220 MG (50 MG ZINC EQUIVALENT) 220 MILLIGRAM(S): 220 (50 ZN) TAB at 12:13

## 2017-04-10 RX ADMIN — Medication 650 MILLIGRAM(S): at 17:43

## 2017-04-10 RX ADMIN — NYSTATIN CREAM 1 APPLICATION(S): 100000 CREAM TOPICAL at 19:47

## 2017-04-10 RX ADMIN — LISINOPRIL 2.5 MILLIGRAM(S): 2.5 TABLET ORAL at 06:49

## 2017-04-10 RX ADMIN — TRAMADOL HYDROCHLORIDE 25 MILLIGRAM(S): 50 TABLET ORAL at 13:32

## 2017-04-10 RX ADMIN — Medication 1 TABLET(S): at 12:12

## 2017-04-10 RX ADMIN — Medication 100 MILLIGRAM(S): at 17:40

## 2017-04-10 RX ADMIN — TRAMADOL HYDROCHLORIDE 25 MILLIGRAM(S): 50 TABLET ORAL at 17:41

## 2017-04-10 RX ADMIN — TAMSULOSIN HYDROCHLORIDE 0.4 MILLIGRAM(S): 0.4 CAPSULE ORAL at 21:39

## 2017-04-10 RX ADMIN — Medication 250 MILLIGRAM(S): at 12:11

## 2017-04-10 RX ADMIN — Medication 1 MILLIGRAM(S): at 12:12

## 2017-04-10 RX ADMIN — TRAMADOL HYDROCHLORIDE 50 MILLIGRAM(S): 50 TABLET ORAL at 06:50

## 2017-04-10 RX ADMIN — Medication 650 MILLIGRAM(S): at 06:50

## 2017-04-10 RX ADMIN — Medication 50 MILLIGRAM(S): at 17:40

## 2017-04-10 RX ADMIN — Medication 6: at 17:42

## 2017-04-10 RX ADMIN — LIDOCAINE 1 PATCH: 4 CREAM TOPICAL at 01:14

## 2017-04-10 RX ADMIN — TRAMADOL HYDROCHLORIDE 25 MILLIGRAM(S): 50 TABLET ORAL at 22:50

## 2017-04-10 RX ADMIN — MIRTAZAPINE 7.5 MILLIGRAM(S): 45 TABLET, ORALLY DISINTEGRATING ORAL at 21:38

## 2017-04-10 RX ADMIN — PANTOPRAZOLE SODIUM 40 MILLIGRAM(S): 20 TABLET, DELAYED RELEASE ORAL at 06:50

## 2017-04-10 RX ADMIN — TRAMADOL HYDROCHLORIDE 25 MILLIGRAM(S): 50 TABLET ORAL at 20:14

## 2017-04-10 RX ADMIN — Medication 50 MILLIGRAM(S): at 06:50

## 2017-04-10 RX ADMIN — INSULIN GLARGINE 10 UNIT(S): 100 INJECTION, SOLUTION SUBCUTANEOUS at 21:39

## 2017-04-10 RX ADMIN — ONDANSETRON 4 MILLIGRAM(S): 8 TABLET, FILM COATED ORAL at 10:26

## 2017-04-10 RX ADMIN — ENOXAPARIN SODIUM 75 MILLIGRAM(S): 100 INJECTION SUBCUTANEOUS at 17:41

## 2017-04-10 RX ADMIN — GABAPENTIN 200 MILLIGRAM(S): 400 CAPSULE ORAL at 12:12

## 2017-04-10 RX ADMIN — ATORVASTATIN CALCIUM 40 MILLIGRAM(S): 80 TABLET, FILM COATED ORAL at 21:38

## 2017-04-10 RX ADMIN — Medication 100 MILLIGRAM(S): at 06:51

## 2017-04-10 RX ADMIN — Medication 325 MILLIGRAM(S): at 12:11

## 2017-04-10 RX ADMIN — LIDOCAINE 1 PATCH: 4 CREAM TOPICAL at 12:12

## 2017-04-10 RX ADMIN — APIXABAN 2.5 MILLIGRAM(S): 2.5 TABLET, FILM COATED ORAL at 06:50

## 2017-04-10 RX ADMIN — Medication 500 MILLIGRAM(S): at 12:11

## 2017-04-10 RX ADMIN — POLYETHYLENE GLYCOL 3350 17 GRAM(S): 17 POWDER, FOR SOLUTION ORAL at 12:12

## 2017-04-10 RX ADMIN — AMLODIPINE BESYLATE 10 MILLIGRAM(S): 2.5 TABLET ORAL at 06:50

## 2017-04-10 RX ADMIN — TRAMADOL HYDROCHLORIDE 25 MILLIGRAM(S): 50 TABLET ORAL at 23:45

## 2017-04-10 NOTE — PROGRESS NOTE ADULT - ASSESSMENT
85 yr old female with PMH CAD s/p stents, DM2, hypertension, hyperlipidemia, anxiety, significant PVD s/p multiple stents, paroxysmal atrial fibrillation on Eliquis admitted for hypertensive urgency post LE angiogram. 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 Hospital course complicated by urinary retention with UTI (failed TOV), hyponatremia likely due to pain and improved with NaCl (per nephrology) and pain medications. BP meds and insulin coverage titrated for better control.  Patient was   transferred  to acute rehab , pain poorly controlled , duragesic  patch discontinued due to lethargy ,feels better low blood sugar this am . Revaluated by vascular surgery , found to have     Problem/Plan - 1:  ·  Problem: Type 2 diabetes mellitus with hypoglycemia without coma, with long-term current use of insulin.  Plan: discontinue premeals humolog cont sliding , agree with dropping insulin dose blood sugar low in am; monitor closely , night snack recommended .    Problem/Plan - 2:  ·  Problem: PVD (peripheral vascular disease).  Plan: s/p left BKA  Now  with  L stump dehiscence, contracture of the left knee as per vascular   - L AKA Thursday or Friday  - Continue BID wound care  - ID  consult     Problem/Plan - 3:  ·  Problem: Constipation, unspecified constipation type.  Plan: stool softener on colace and senna    laxative as needed, dulcolax prn , start miralax daily.     Problem/Plan - 4:  ·  Problem: Acute blood loss anemia.  Plan: asymptomatic ; will check type and screen , transfuse to Hg > 10    iv venofer 200 mg daily for 3 days     Problem/Plan - 5:  ·  Problem: CAD (coronary artery disease).  Plan: Continue Metoprolol and Lipitor . Patient with Hx of cardiac stents . Will need to restart Plavix after surgery once OK with vascular .   Will transfuse to HG > 10     Problem/Plan - 6:  Problem: Essential hypertension. Plan: Controlled with Norvasc and Metoprolol.    Problem/Plan - 7:  ·  Problem: PAF (paroxysmal atrial fibrillation).  Plan: Continue Metoprolol and Eliquis.  Will get EKG for clearance , cardiology called for pre surgical clearance     Problem/Plan - 8:  ·  Problem: Prophylactic measure.  Plan: on Eliquis for A fib.     Problem / Plan - 9 : Hx of Hypothyroidism - restart Synthroid , recheck TSH .    Problem / Plan - 10: Hypoglycemia , insulin decreased , pre meal insulin d/martin , will observe closely     Problem /Plan - 11: Urinary retention - resolved , no need for straight  cath since yesterday , on Flomax     Problem / Plan -12: UTI -  treated     Problem / Plan - 13 : Hyponatremia - resolved , follow labs on Monday . 85 yr old female with PMH CAD s/p stents, DM2, hypertension, hyperlipidemia, anxiety, significant PVD s/p multiple stents, paroxysmal atrial fibrillation on Eliquis admitted for hypertensive urgency post LE angiogram. 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 Hospital course complicated by urinary retention with UTI (failed TOV), hyponatremia likely due to pain and improved with NaCl (per nephrology) and pain medications. BP meds and insulin coverage titrated for better control.  Patient was   transferred  to acute rehab , pain poorly controlled , duragesic  patch discontinued due to lethargy ,feels better low blood sugar this am . Revaluated by vascular surgery , found to have     Problem/Plan - 1:  ·  Problem: Type 2 diabetes mellitus with hypoglycemia without coma, with long-term current use of insulin.  Plan: discontinue premeals humolog cont sliding , agree with dropping insulin , observe    Problem/Plan - 2:  ·  Problem: PVD (peripheral vascular disease).  Plan: s/p left BKA  Now  with  L stump dehiscence, contracture of the left knee ,  as per vascular   - L AKA Thursday or Friday,   - Continue BID wound care  - ID  consult     Will not restart Plavix at this time , hold oral AC , start full dose Lovenox     Problem/Plan - 3:  ·  Problem: Constipation, unspecified constipation type.  Plan: stool softener on colace and senna    laxative as needed, dulcolax prn , start miralax daily.     Problem/Plan - 4:  ·  Problem: Acute blood loss anemia , likely on anemia of chronic disease   Plan: will check type and screen , transfuse to Hg > 10       Problem/Plan - 5:  ·  Problem: CAD (coronary artery disease).  Plan: Continue Metoprolol and Lipitor . Patient with Hx of cardiac stents . Will need to restart Plavix after surgery once OK with vascular .   Will transfuse to HG > 10 . Cardiology consult called for presurgical clearance . Patient state she was cleared by her own cardiologist Dr Joe Freeman ( [phone] ) prior L BKA .    Problem/Plan - 6:  Problem: Essential hypertension. Plan: Controlled with Norvasc and Metoprolol.    Problem/Plan - 7:  ·  Problem: PAF (paroxysmal atrial fibrillation).  Plan: Continue Metoprolol , hold  Eliquis.  Will get EKG for clearance , cardiology called for pre surgical clearance     Problem/Plan - 8:  ·  Problem: Prophylactic measure.  Plan: on Eliquis for A fib.     Problem / Plan - 9 : Hx of Hypothyroidism -  Synthroid restarted     Problem / Plan - 10: Episode of  Hypoglycemia , insulin decreased , pre meal insulin d/martin , will observe closely     Problem /Plan - 11: Urinary retention - resolved , no need for straight ,  on Flomax     Problem / Plan -12: UTI -  treated     Problem / Plan - 13 : Hyponatremia - resolved , follow up BMP in 2-3 days .

## 2017-04-10 NOTE — PROGRESS NOTE ADULT - SUBJECTIVE AND OBJECTIVE BOX
S/p L BKA. Pt c/o stump pain and developed a wound last week that being packed BID.     Vital Signs Last 24 Hrs  T(C): 36.9, Max: 37 (04-09 @ 11:35)  T(F): 98.5, Max: 98.6 (04-09 @ 11:35)  HR: 76 (70 - 80)  BP: 128/62 (120/46 - 140/60)  BP(mean): --  RR: 18 (16 - 18)  SpO2: 96% (93% - 96%)    L BKA with large central dehiscence with necrotic tissue at the base. Developing severe contracture at the knee. No cellulitis    A/P L stump dehiscence, contracture of the left knee  - L AKA Thursday or friday  - Hold Anticoagulation  - Transfuse PRBC  - Will need medical and cardiac clearance for OR  - Continue BID wound care  - ID f/u for ABx  - will follow along

## 2017-04-10 NOTE — PROGRESS NOTE ADULT - SUBJECTIVE AND OBJECTIVE BOX
Patient seen and examined . C/O LLE stump pain , well controlled with pain meds , daughter at bed side .     CC: left leg pain  , no other complaints .      HPI: 85 yr old female with PMH CAD s/p stents x 2 , DM2, hypertension, hyperlipidemia, anxiety, significant PVD s/p multiple stents, paroxysmal atrial fibrillation on Eliquis admitted for hypertensive urgency post LE angiogram. 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 Hospital course complicated by urinary retention with UTI (failed TOV), hyponatremia likely due to pain and improved with NaCl (per nephrology) and pain medications. BP meds and insulin coverage titrated for better control.  Patient stable for discharge to acute rehab.    PAST MEDICAL & SURGICAL HISTORY:  Hyperlipidemia  Hypertension  Diabetes  PVD (peripheral vascular disease)  CAD (coronary artery disease)  History of cholecystectomy  H/O angioplasty      MEDICATIONS  (STANDING):  multivitamin 1Tablet(s) Oral daily  tamsulosin 0.4milliGRAM(s) Oral at bedtime  insulin lispro (HumaLOG) corrective regimen sliding scale  SubCutaneous three times a day before meals  dextrose 5%. 1000milliLiter(s) IV Continuous <Continuous>  dextrose 50% Injectable 12.5Gram(s) IV Push once  dextrose 50% Injectable 25Gram(s) IV Push once  dextrose 50% Injectable 25Gram(s) IV Push once  atorvastatin 40milliGRAM(s) Oral at bedtime  amLODIPine   Tablet 10milliGRAM(s) Oral daily  ferrous    sulfate 325milliGRAM(s) Oral daily  pantoprazole    Tablet 40milliGRAM(s) Oral before breakfast  folic acid 1milliGRAM(s) Oral daily  zinc sulfate 220milliGRAM(s) Oral daily  ascorbic acid 500milliGRAM(s) Oral daily  metoprolol 50milliGRAM(s) Oral two times a day  lidocaine   Patch 1Patch Transdermal daily  saccharomyces boulardii 250milliGRAM(s) Oral every 12 hours  docusate sodium 100milliGRAM(s) Oral two times a day  mirtazapine 7.5milliGRAM(s) Oral at bedtime  polyethylene glycol 3350 17Gram(s) Oral daily  gabapentin 200milliGRAM(s) Oral <User Schedule>  gabapentin 300milliGRAM(s) Oral <User Schedule>  nystatin Powder 1Application(s) Topical two times a day  lisinopril 2.5milliGRAM(s) Oral daily  insulin glargine Injectable (LANTUS) 10Unit(s) SubCutaneous at bedtime  levothyroxine 88MICROGram(s) Oral daily  traMADol 25milliGRAM(s) Oral every 4 hours  enoxaparin Injectable 75milliGRAM(s) SubCutaneous two times a day    MEDICATIONS  (PRN):  acetaminophen   Tablet 650milliGRAM(s) Oral every 6 hours PRN For Temp greater than 38 C (100.4 F)  aluminum hydroxide/magnesium hydroxide/simethicone Suspension 30milliLiter(s) Oral every 4 hours PRN Dyspepsia  dextrose Gel 1Dose(s) Oral once PRN Blood Glucose LESS THAN 70 milliGRAM(s)/deciLiter  glucagon  Injectable 1milliGRAM(s) IntraMuscular once PRN Glucose <70 milliGRAM(s)/deciLiter  ondansetron   Disintegrating Tablet 4milliGRAM(s) Oral every 8 hours PRN Nausea and/or Vomiting  senna 2Tablet(s) Oral at bedtime PRN Constipation  bisacodyl 5milliGRAM(s) Oral every 12 hours PRN Constipation  traMADol 25milliGRAM(s) Oral every 4 hours PRN Severe Pain (7 - 10)  acetaminophen   Tablet 650milliGRAM(s) Oral every 6 hours PRN moderate pain      LABS:                          8.1    6.7   )-----------( 347      ( 10 Apr 2017 06:49 )             25.5     04-10    134<L>  |  97<L>  |  20.0  ----------------------------<  221<H>  4.3   |  26.0  |  0.97    Ca    9.1      10 Apr 2017 06:49    TPro  5.9<L>  /  Alb  2.9<L>  /  TBili  0.2<L>  /  DBili  x   /  AST  15  /  ALT  21  /  AlkPhos  89  04-10          RADIOLOGY & ADDITIONAL TESTS:      REVIEW OF SYSTEMS:    CONSTITUTIONAL: No fever, weight loss, or fatigue  EYES: No eye pain, visual disturbances, or discharge  ENMT:  No difficulty hearing, tinnitus, vertigo; No sinus or throat pain  NECK: No pain or stiffness  RESPIRATORY: No cough, wheezing, chills or hemoptysis; No shortness of breath  CARDIOVASCULAR: No chest pain, palpitations, dizziness, or leg swelling  GASTROINTESTINAL: No abdominal or epigastric pain. No nausea, vomiting, or hematemesis; No diarrhea or constipation. No melena or hematochezia.  GENITOURINARY: No dysuria, frequency, hematuria, or incontinence  NEUROLOGICAL: No headaches, memory loss, loss of strength, numbness, or tremors  SKIN: No itching, burning, rashes, or lesions   LYMPH NODES: No enlarged glands  ENDOCRINE: No heat or cold intolerance; No hair loss  MUSCULOSKELETAL:   PSYCHIATRIC: No depression, anxiety, mood swings, or difficulty sleeping  HEME/LYMPH: No easy bruising, or bleeding gums  ALLERGY AND IMMUNOLOGIC: No hives or eczema    Vital Signs Last 24 Hrs  T(C): 36.9, Max: 37 (04-09 @ 19:47)  T(F): 98.5, Max: 98.6 (04-09 @ 19:47)  HR: 76 (76 - 80)  BP: 128/62 (120/46 - 140/60)  BP(mean): --  RR: 18 (16 - 18)  SpO2: 96% (93% - 96%)  PHYSICAL EXAM:    GENERAL: NAD, well-groomed, well-developed  HEAD:  Atraumatic, Normocephalic  EYES: EOMI, PERRLA, conjunctiva and sclera clear  NECK: Supple, No JVD, Normal thyroid  NERVOUS SYSTEM:  Alert & Oriented X3, no focal deficit  CHEST/LUNG: CTA b/l ,  no  rales, rhonchi, wheezing, or rubs  HEART: Regular rate and rhythm; No murmurs, rubs, or gallops  ABDOMEN: Soft, Nontender, Nondistended; Bowel sounds present  EXTREMITIES:  2+ Peripheral Pulses, No clubbing, cyanosis, or edema  LYMPH: No lymphadenopathy noted  SKIN: No rashes or lesions Patient seen and examined . C/O LLE stump pain , well controlled with pain meds ,  also c/o left sided chest tenderness for few days since was accidentally hit by NA head , no sob , no chest pain , no other  complaints , daughter Emperatriz  at bed side .     CC: left leg pain  ,  Left sided chest tenderness ,no other complaints .      HPI: 85 yr old female with PMH CAD s/p stents x 2 , DM2, hypertension, hyperlipidemia, anxiety, significant PVD s/p multiple stents, paroxysmal atrial fibrillation on Eliquis admitted for hypertensive urgency post LE angiogram. 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 Hospital course complicated by urinary retention with UTI (failed TOV), hyponatremia likely due to pain and improved with NaCl (per nephrology) and pain medications. BP meds and insulin coverage titrated for better control.  Patient stable for discharge to acute rehab.    PAST MEDICAL & SURGICAL HISTORY:  Hyperlipidemia  Hypertension  Diabetes  PVD (peripheral vascular disease)  CAD (coronary artery disease)  History of cholecystectomy  H/O angioplasty      MEDICATIONS  (STANDING):  multivitamin 1Tablet(s) Oral daily  tamsulosin 0.4milliGRAM(s) Oral at bedtime  insulin lispro (HumaLOG) corrective regimen sliding scale  SubCutaneous three times a day before meals  dextrose 5%. 1000milliLiter(s) IV Continuous <Continuous>  dextrose 50% Injectable 12.5Gram(s) IV Push once  dextrose 50% Injectable 25Gram(s) IV Push once  dextrose 50% Injectable 25Gram(s) IV Push once  atorvastatin 40milliGRAM(s) Oral at bedtime  amLODIPine   Tablet 10milliGRAM(s) Oral daily  ferrous    sulfate 325milliGRAM(s) Oral daily  pantoprazole    Tablet 40milliGRAM(s) Oral before breakfast  folic acid 1milliGRAM(s) Oral daily  zinc sulfate 220milliGRAM(s) Oral daily  ascorbic acid 500milliGRAM(s) Oral daily  metoprolol 50milliGRAM(s) Oral two times a day  lidocaine   Patch 1Patch Transdermal daily  saccharomyces boulardii 250milliGRAM(s) Oral every 12 hours  docusate sodium 100milliGRAM(s) Oral two times a day  mirtazapine 7.5milliGRAM(s) Oral at bedtime  polyethylene glycol 3350 17Gram(s) Oral daily  gabapentin 200milliGRAM(s) Oral <User Schedule>  gabapentin 300milliGRAM(s) Oral <User Schedule>  nystatin Powder 1Application(s) Topical two times a day  lisinopril 2.5milliGRAM(s) Oral daily  insulin glargine Injectable (LANTUS) 10Unit(s) SubCutaneous at bedtime  levothyroxine 88MICROGram(s) Oral daily  traMADol 25milliGRAM(s) Oral every 4 hours  enoxaparin Injectable 75milliGRAM(s) SubCutaneous two times a day    MEDICATIONS  (PRN):  acetaminophen   Tablet 650milliGRAM(s) Oral every 6 hours PRN For Temp greater than 38 C (100.4 F)  aluminum hydroxide/magnesium hydroxide/simethicone Suspension 30milliLiter(s) Oral every 4 hours PRN Dyspepsia  dextrose Gel 1Dose(s) Oral once PRN Blood Glucose LESS THAN 70 milliGRAM(s)/deciLiter  glucagon  Injectable 1milliGRAM(s) IntraMuscular once PRN Glucose <70 milliGRAM(s)/deciLiter  ondansetron   Disintegrating Tablet 4milliGRAM(s) Oral every 8 hours PRN Nausea and/or Vomiting  senna 2Tablet(s) Oral at bedtime PRN Constipation  bisacodyl 5milliGRAM(s) Oral every 12 hours PRN Constipation  traMADol 25milliGRAM(s) Oral every 4 hours PRN Severe Pain (7 - 10)  acetaminophen   Tablet 650milliGRAM(s) Oral every 6 hours PRN moderate pain      LABS:                          8.1    6.7   )-----------( 347      ( 10 Apr 2017 06:49 )             25.5     04-10    134<L>  |  97<L>  |  20.0  ----------------------------<  221<H>  4.3   |  26.0  |  0.97    Ca    9.1      10 Apr 2017 06:49    TPro  5.9<L>  /  Alb  2.9<L>  /  TBili  0.2<L>  /  DBili  x   /  AST  15  /  ALT  21  /  AlkPhos  89  04-10    REVIEW OF SYSTEMS:    CONSTITUTIONAL: No fever, weight loss, or fatigue  EYES: No eye pain, visual disturbances, or discharge , pale sclera +  ENMT:  No difficulty hearing, tinnitus, vertigo; No sinus or throat pain  NECK: No pain or stiffness  RESPIRATORY: No cough, wheezing, chills or hemoptysis; No shortness of breath   CARDIOVASCULAR: No chest pain, palpitations, dizziness, or leg swelling  GASTROINTESTINAL: No abdominal or epigastric pain. No nausea, vomiting, or hematemesis; No diarrhea or constipation. No melena or hematochezia.  GENITOURINARY: No dysuria, frequency, hematuria, or incontinence  NEUROLOGICAL: No headaches, memory loss, loss of strength, numbness, or tremors  SKIN: No itching, burning, rashes, or lesions   LYMPH NODES: No enlarged glands  ENDOCRINE: No heat or cold intolerance; No hair loss  MUSCULOSKELETAL: L sided chest tenderness +   PSYCHIATRIC:  depression treated, no  anxiety, mood swings, or difficulty sleeping  HEME/LYMPH: No easy bruising, or bleeding gums  ALLERGY AND IMMUNOLOGIC: No hives or eczema    Vital Signs Last 24 Hrs  T(C): 36.9, Max: 37 (04-09 @ 19:47)  T(F): 98.5, Max: 98.6 (04-09 @ 19:47)  HR: 76 (76 - 80)  BP: 128/62 (120/46 - 140/60)  BP(mean): --  RR: 18 (16 - 18)  SpO2: 96% (93% - 96%)  PHYSICAL EXAM:    GENERAL: NAD, well-groomed, well-developed  HEAD:  Atraumatic, Normocephalic  EYES: EOMI, PERRLA, conjunctiva and sclera clear  NECK: Supple, No JVD, Normal thyroid  NERVOUS SYSTEM:  Alert & Oriented X3, no focal deficit  CHEST/LUNG: CTA b/l ,  no  rales, rhonchi, wheezing, or rubs  HEART: Regular rate and rhythm; No murmurs, rubs, or gallops  ABDOMEN: Soft, Nontender, Nondistended; Bowel sounds present  EXTREMITIES:  2+ Peripheral Pulses, No clubbing, cyanosis, or edema  LYMPH: No lymphadenopathy noted  SKIN: No rashes or lesions  L anterior chest tenderness , no bruises Patient seen and examined . C/O LLE stump pain , well controlled with pain meds ,  also c/o left sided chest tenderness for few days since was accidentally hit by NA head , no sob , no chest pain , no other  complaints , daughter Emperatriz  at bed side .     CC: left leg pain  ,  Left sided chest tenderness ,no other complaints .      HPI: 85 yr old female with PMH CAD s/p stents x 2 , DM2, hypertension, hyperlipidemia, anxiety, significant PVD s/p multiple stents, paroxysmal atrial fibrillation on Eliquis admitted for hypertensive urgency post LE angiogram. 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 Hospital course complicated by urinary retention with UTI (failed TOV), hyponatremia likely due to pain and improved with NaCl (per nephrology) and pain medications. BP meds and insulin coverage titrated for better control.  Patient stable for discharge to acute rehab.    PAST MEDICAL & SURGICAL HISTORY:  Hyperlipidemia  Hypertension  Diabetes  PVD (peripheral vascular disease)  CAD (coronary artery disease)  History of cholecystectomy  H/O angioplasty      MEDICATIONS  (STANDING):  multivitamin 1Tablet(s) Oral daily  tamsulosin 0.4milliGRAM(s) Oral at bedtime  insulin lispro (HumaLOG) corrective regimen sliding scale  SubCutaneous three times a day before meals  dextrose 5%. 1000milliLiter(s) IV Continuous <Continuous>  dextrose 50% Injectable 12.5Gram(s) IV Push once  dextrose 50% Injectable 25Gram(s) IV Push once  dextrose 50% Injectable 25Gram(s) IV Push once  atorvastatin 40milliGRAM(s) Oral at bedtime  amLODIPine   Tablet 10milliGRAM(s) Oral daily  ferrous    sulfate 325milliGRAM(s) Oral daily  pantoprazole    Tablet 40milliGRAM(s) Oral before breakfast  folic acid 1milliGRAM(s) Oral daily  zinc sulfate 220milliGRAM(s) Oral daily  ascorbic acid 500milliGRAM(s) Oral daily  metoprolol 50milliGRAM(s) Oral two times a day  lidocaine   Patch 1Patch Transdermal daily  saccharomyces boulardii 250milliGRAM(s) Oral every 12 hours  docusate sodium 100milliGRAM(s) Oral two times a day  mirtazapine 7.5milliGRAM(s) Oral at bedtime  polyethylene glycol 3350 17Gram(s) Oral daily  gabapentin 200milliGRAM(s) Oral <User Schedule>  gabapentin 300milliGRAM(s) Oral <User Schedule>  nystatin Powder 1Application(s) Topical two times a day  lisinopril 2.5milliGRAM(s) Oral daily  insulin glargine Injectable (LANTUS) 10Unit(s) SubCutaneous at bedtime  levothyroxine 88MICROGram(s) Oral daily  traMADol 25milliGRAM(s) Oral every 4 hours  enoxaparin Injectable 75milliGRAM(s) SubCutaneous two times a day    MEDICATIONS  (PRN):  acetaminophen   Tablet 650milliGRAM(s) Oral every 6 hours PRN For Temp greater than 38 C (100.4 F)  aluminum hydroxide/magnesium hydroxide/simethicone Suspension 30milliLiter(s) Oral every 4 hours PRN Dyspepsia  dextrose Gel 1Dose(s) Oral once PRN Blood Glucose LESS THAN 70 milliGRAM(s)/deciLiter  glucagon  Injectable 1milliGRAM(s) IntraMuscular once PRN Glucose <70 milliGRAM(s)/deciLiter  ondansetron   Disintegrating Tablet 4milliGRAM(s) Oral every 8 hours PRN Nausea and/or Vomiting  senna 2Tablet(s) Oral at bedtime PRN Constipation  bisacodyl 5milliGRAM(s) Oral every 12 hours PRN Constipation  traMADol 25milliGRAM(s) Oral every 4 hours PRN Severe Pain (7 - 10)  acetaminophen   Tablet 650milliGRAM(s) Oral every 6 hours PRN moderate pain      LABS:                          8.1    6.7   )-----------( 347      ( 10 Apr 2017 06:49 )             25.5     04-10    134<L>  |  97<L>  |  20.0  ----------------------------<  221<H>  4.3   |  26.0  |  0.97    Ca    9.1      10 Apr 2017 06:49    TPro  5.9<L>  /  Alb  2.9<L>  /  TBili  0.2<L>  /  DBili  x   /  AST  15  /  ALT  21  /  AlkPhos  89  04-10    REVIEW OF SYSTEMS:    CONSTITUTIONAL: No fever, weight loss, or fatigue  EYES: No eye pain, visual disturbances, or discharge , pale sclera +  ENMT:  No difficulty hearing, tinnitus, vertigo; No sinus or throat pain  NECK: No pain or stiffness  RESPIRATORY: No cough, wheezing, chills or hemoptysis; No shortness of breath   CARDIOVASCULAR: No chest pain, palpitations, dizziness, or leg swelling  GASTROINTESTINAL: No abdominal or epigastric pain. No nausea, vomiting, or hematemesis; No diarrhea or constipation. No melena or hematochezia.  GENITOURINARY: No dysuria, frequency, hematuria, or incontinence  NEUROLOGICAL: No headaches, memory loss, loss of strength, numbness, or tremors  SKIN: No itching, burning, rashes, or lesions   LYMPH NODES: No enlarged glands  ENDOCRINE: No heat or cold intolerance; No hair loss  MUSCULOSKELETAL: L sided chest tenderness +   PSYCHIATRIC:  depression treated, no  anxiety, mood swings, or difficulty sleeping  HEME/LYMPH: No easy bruising, or bleeding gums  ALLERGY AND IMMUNOLOGIC: No hives or eczema    Vital Signs Last 24 Hrs  T(C): 36.9, Max: 37 (04-09 @ 19:47)  T(F): 98.5, Max: 98.6 (04-09 @ 19:47)  HR: 76 (76 - 80)  BP: 128/62 (120/46 - 140/60)  BP(mean): --  RR: 18 (16 - 18)  SpO2: 96% (93% - 96%)  PHYSICAL EXAM:    GENERAL: NAD, well-groomed, well-developed  HEAD:  Atraumatic, Normocephalic  EYES: EOMI, PERRLA, conjunctiva and sclera clear  NECK: Supple, No JVD, Normal thyroid  NERVOUS SYSTEM:  Alert & Oriented X3, no focal deficit  CHEST/LUNG: CTA b/l ,  no  rales, rhonchi, wheezing, or rubs  HEART: Regular rate and rhythm; No murmurs, rubs, or gallops  ABDOMEN: Soft, Nontender, Nondistended; Bowel sounds present  EXTREMITIES:  2+ Peripheral Pulses, No clubbing, cyanosis, or edema of R LE , S/P L BKA , stump with ACE wrap + , as per vascular dehiscence + ,   LYMPH: No lymphadenopathy noted  SKIN: No rashes or lesions  L anterior chest tenderness , no bruises

## 2017-04-10 NOTE — PROGRESS NOTE ADULT - SUBJECTIVE AND OBJECTIVE BOX
HISTORY OF PRESENT ILLNESS  85 yr old RH female with multiple medical problems including CAD s/p stents, diabetes, hypertension, hyperlipidemia, anxiety, significant PVD s/p multiple stents, paroxysmal atrial fibrillation was admitted 3/9/17 for elective LE angiogram. Patient was noted to have elevated BP post procedure, and hence admission requested by Dr. Irwin. Per him, patient with occluded SFA, POP b/l with minimal outflow to b/l LE. 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   Hospital course complicated by:  -  pain control management, patient being followed by palliative care.  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, being followed by nephrology   - UTI and urinary retention- currently on IV cipro, flomax, juarez catheter     NB: check with vascular surgery/cardiology about need for plavix [ ]    PMHx -   Hyperlipidemia, Hypertension, Diabetes, PVD (peripheral vascular disease), CAD (coronary artery disease)  History of cholecystectomy, H/O angioplasty    TODAY'S SUBJECTIVE & REVIEW OF SYMPTOMS  [X] Constitutional WNL     [X] Cardio WNL            [X] Resp WNL           [X] GI WNL                          [ ]  WNL                   [X] Heme WNL              [X] Endo WNL                     [ ] Skin WNL                 [ ] MSK WNL            [X] Neuro WNL                   [X] Cognitive WNL        [X] Psych WNL    Patient seen and examined. No overnight events.  Pt reports sleeping better.  Persistent severe pain left residual limb.  Voiding well.  +BMDressing changed at bedside this morning by surgeon; dehiscence, contracture of the left knee .  Revision to AKA is advised.  Pt   VITALS  Vital Signs Last 24 Hrs  T(C): 36.9, Max: 37 (04-08 @ 12:00)  T(F): 98.5, Max: 98.6 (04-08 @ 12:00)  HR: 78 (78 - 78)  BP: 136/65 (130/70 - 145/60)  BP(mean): --  RR: 18 (16 - 18)  SpO2: 96% (93% - 96%)      PHYSICAL EXAM  Constitutional - NAD, awake, sleepy  HEENT - NCAT, EOMI  Neck - Supple, No limited ROM  Chest - CTA bilaterally,   Cardiovascular - RRR, S1S2  Abdomen - BS+, Soft, NTND  Extremities -  No calf tenderness. Mild left LE edema  +knee flexion contracture  Neurologic Exam -                    Cognitive - Awake, Alert, AAO to self, place, date, year, situation     Communication - Fluent, No dysarthria     Cranial Nerves - CN 2-12 intact    Psychiatric - Mood stable, Affect WNL  Skin - moisture dermatitis in intergluteal folds, right heel DTI  Wounds - left BKA residual limb with staples and sutures, area of central dehiscence     FUNCTIONAL PROGRESS  Gait: unable  Transfers: Min A  Wheelchair mobility: ' Salomon  ADLs: grooming and upper body dressing supervision, mod assist lower body dressing  Functional transfers: Toilet and bed transfers min assist, bed mobility supervision  RECENT LABS:                        8.1    6.7   )-----------( 347      ( 10 Apr 2017 06:49 )             25.5   04-10    134<L>  |  97<L>  |  20.0  ----------------------------<  221<H>  4.3   |  26.0  |  0.97    Ca    9.1      10 Apr 2017 06:49    TPro  5.9<L>  /  Alb  2.9<L>  /  TBili  0.2<L>  /  DBili  x   /  AST  15  /  ALT  21  /  AlkPhos  89  04-10    U/A 4/5: negative    CAPILLARY BLOOD GLUCOSE  175 (09 Apr 2017 21:35)  320 (09 Apr 2017 16:31)      CURRENT MEDICATIONS  MEDICATIONS  (STANDING):  multivitamin 1Tablet(s) Oral daily  tamsulosin 0.4milliGRAM(s) Oral at bedtime  apixaban 2.5milliGRAM(s) Oral two times a day  insulin lispro (HumaLOG) corrective regimen sliding scale  SubCutaneous three times a day before meals  atorvastatin 40milliGRAM(s) Oral at bedtime  amLODIPine   Tablet 10milliGRAM(s) Oral daily  ferrous    sulfate 325milliGRAM(s) Oral daily  pantoprazole    Tablet 40milliGRAM(s) Oral before breakfast  levothyroxine 75MICROGram(s) Oral daily  folic acid 1milliGRAM(s) Oral daily  zinc sulfate 220milliGRAM(s) Oral daily  ascorbic acid 500milliGRAM(s) Oral daily  acetaminophen   Tablet 650milliGRAM(s) Oral every 6 hours  metoprolol 50milliGRAM(s) Oral two times a day  lidocaine   Patch 1Patch Transdermal daily  saccharomyces boulardii 250milliGRAM(s) Oral every 12 hours  docusate sodium 100milliGRAM(s) Oral two times a day  mirtazapine 7.5milliGRAM(s) Oral at bedtime  polyethylene glycol 3350 17Gram(s) Oral daily  gabapentin 200milliGRAM(s) Oral <User Schedule>  gabapentin 300milliGRAM(s) Oral <User Schedule>  nystatin Powder 1Application(s) Topical two times a day  lisinopril 2.5milliGRAM(s) Oral daily  insulin glargine Injectable (LANTUS) 10Unit(s) SubCutaneous at bedtime    MEDICATIONS  (PRN):  acetaminophen   Tablet 650milliGRAM(s) Oral every 6 hours PRN For Temp greater than 38 C (100.4 F)  aluminum hydroxide/magnesium hydroxide/simethicone Suspension 30milliLiter(s) Oral every 4 hours PRN Dyspepsia  ondansetron   Disintegrating Tablet 4milliGRAM(s) Oral every 8 hours PRN Nausea and/or Vomiting  senna 2Tablet(s) Oral at bedtime PRN Constipation  traMADol 25milliGRAM(s) Oral every 4 hours PRN Moderate Pain (4 - 6)  traMADol 50milliGRAM(s) Oral every 6 hours PRN Severe Pain (7 - 10)  bisacodyl 5milliGRAM(s) Oral every 12 hours PRN Constipation        ASSESSMENT & PLAN    84yo female with DM, CAD, PVD s/p right BKA with gait and functional impairments    Left BKA dehiscence:  Revision to AKA pending for 4/13 or 4/14.  Cardiology for preop clearance and ID consult requested as per surgery.  Order T&S and CBC for am-plan to transfuse preop and BID dressing changes with moistened kerlix  as per surgery.  Hospitalist to eval for medical clearance.  Eliquis discontinued; order Lovenox 75 SC q12 in lieu of Eliquis for now      Urinary retention:  Improved:  Bladder scans 142, 278, 42.  DC scans.  Cont  Flomax.      Post-op pain control, phantom pain, anxiety: tylenol prn, Change tramadol PRN to standing, gabapentin TID, xanax, lidoderm patch left knee daily.  Heat to left knee prn and stretching.    Acute blood loss anemia: Continue ferrous sulfate and folic acid.     BKA wound healing: MVI, vit C, zinc, dressing change with iodoform packing BID. Vascular follow-up today.    Right heel DTI: z loretta boot and pillow for off-loading heel, No sting barrier film    DMT2: Lantus 10units, SSI AC HS    pAFib/CAD: Eliquis, statin, metoprolol    HTN: metoprolol, norvasc, lisinopril    Hypothyroididsm: TSH 4.76.  Increase synthroid from 75mcg to 88mcg    Depression with poor appetite: Remeron 7.5mg qhs     DVT PPx: Eliquis on hold due to pending surgery.  Lovenox ordered for now and SCDs RLE    Protein calorie malnutrition: Prealbumin 13-Cont glucerna-increase from daily to bid    Continue comprehensive acute rehab program consisting of 3hrs/day of OT/PT

## 2017-04-11 DIAGNOSIS — T81.31XA DISRUPTION OF EXTERNAL OPERATION (SURGICAL) WOUND, NOT ELSEWHERE CLASSIFIED, INITIAL ENCOUNTER: ICD-10-CM

## 2017-04-11 LAB
HCT VFR BLD CALC: 28.8 % — LOW (ref 37–47)
HGB BLD-MCNC: 9.3 G/DL — LOW (ref 12–16)
MCHC RBC-ENTMCNC: 27.5 PG — SIGNIFICANT CHANGE UP (ref 27–31)
MCHC RBC-ENTMCNC: 32.3 G/DL — SIGNIFICANT CHANGE UP (ref 32–36)
MCV RBC AUTO: 85.2 FL — SIGNIFICANT CHANGE UP (ref 81–99)
PLATELET # BLD AUTO: 320 K/UL — SIGNIFICANT CHANGE UP (ref 150–400)
RBC # BLD: 3.38 M/UL — LOW (ref 4.4–5.2)
RBC # FLD: 15.2 % — SIGNIFICANT CHANGE UP (ref 11–15.6)
WBC # BLD: 7.4 K/UL — SIGNIFICANT CHANGE UP (ref 4.8–10.8)
WBC # FLD AUTO: 7.4 K/UL — SIGNIFICANT CHANGE UP (ref 4.8–10.8)

## 2017-04-11 PROCEDURE — 99222 1ST HOSP IP/OBS MODERATE 55: CPT

## 2017-04-11 PROCEDURE — 99233 SBSQ HOSP IP/OBS HIGH 50: CPT

## 2017-04-11 RX ORDER — INSULIN GLARGINE 100 [IU]/ML
12 INJECTION, SOLUTION SUBCUTANEOUS AT BEDTIME
Qty: 0 | Refills: 0 | Status: DISCONTINUED | OUTPATIENT
Start: 2017-04-11 | End: 2017-04-13

## 2017-04-11 RX ORDER — CEFAZOLIN SODIUM 1 G
2000 VIAL (EA) INJECTION EVERY 12 HOURS
Qty: 0 | Refills: 0 | Status: DISCONTINUED | OUTPATIENT
Start: 2017-04-12 | End: 2017-04-12

## 2017-04-11 RX ORDER — LISINOPRIL 2.5 MG/1
2.5 TABLET ORAL DAILY
Qty: 0 | Refills: 0 | Status: DISCONTINUED | OUTPATIENT
Start: 2017-04-11 | End: 2017-04-13

## 2017-04-11 RX ADMIN — INSULIN GLARGINE 12 UNIT(S): 100 INJECTION, SOLUTION SUBCUTANEOUS at 23:03

## 2017-04-11 RX ADMIN — TRAMADOL HYDROCHLORIDE 25 MILLIGRAM(S): 50 TABLET ORAL at 10:16

## 2017-04-11 RX ADMIN — PANTOPRAZOLE SODIUM 40 MILLIGRAM(S): 20 TABLET, DELAYED RELEASE ORAL at 05:40

## 2017-04-11 RX ADMIN — LIDOCAINE 1 PATCH: 4 CREAM TOPICAL at 23:14

## 2017-04-11 RX ADMIN — Medication 0.25 MILLIGRAM(S): at 22:04

## 2017-04-11 RX ADMIN — TRAMADOL HYDROCHLORIDE 25 MILLIGRAM(S): 50 TABLET ORAL at 14:00

## 2017-04-11 RX ADMIN — Medication 500 MILLIGRAM(S): at 12:09

## 2017-04-11 RX ADMIN — Medication 4: at 08:53

## 2017-04-11 RX ADMIN — TRAMADOL HYDROCHLORIDE 25 MILLIGRAM(S): 50 TABLET ORAL at 09:20

## 2017-04-11 RX ADMIN — Medication 325 MILLIGRAM(S): at 12:09

## 2017-04-11 RX ADMIN — Medication 650 MILLIGRAM(S): at 17:08

## 2017-04-11 RX ADMIN — Medication 100 MILLIGRAM(S): at 05:39

## 2017-04-11 RX ADMIN — ZINC SULFATE TAB 220 MG (50 MG ZINC EQUIVALENT) 220 MILLIGRAM(S): 220 (50 ZN) TAB at 12:09

## 2017-04-11 RX ADMIN — LIDOCAINE 1 PATCH: 4 CREAM TOPICAL at 12:17

## 2017-04-11 RX ADMIN — LIDOCAINE 1 PATCH: 4 CREAM TOPICAL at 00:00

## 2017-04-11 RX ADMIN — Medication 0.25 MILLIGRAM(S): at 18:37

## 2017-04-11 RX ADMIN — GABAPENTIN 300 MILLIGRAM(S): 400 CAPSULE ORAL at 22:01

## 2017-04-11 RX ADMIN — Medication 1 TABLET(S): at 12:09

## 2017-04-11 RX ADMIN — Medication 4: at 12:09

## 2017-04-11 RX ADMIN — TRAMADOL HYDROCHLORIDE 25 MILLIGRAM(S): 50 TABLET ORAL at 13:07

## 2017-04-11 RX ADMIN — Medication 1 MILLIGRAM(S): at 12:17

## 2017-04-11 RX ADMIN — Medication 50 MILLIGRAM(S): at 17:06

## 2017-04-11 RX ADMIN — TRAMADOL HYDROCHLORIDE 25 MILLIGRAM(S): 50 TABLET ORAL at 22:09

## 2017-04-11 RX ADMIN — GABAPENTIN 200 MILLIGRAM(S): 400 CAPSULE ORAL at 12:17

## 2017-04-11 RX ADMIN — Medication 50 MILLIGRAM(S): at 05:40

## 2017-04-11 RX ADMIN — Medication 100 MILLIGRAM(S): at 17:08

## 2017-04-11 RX ADMIN — TRAMADOL HYDROCHLORIDE 25 MILLIGRAM(S): 50 TABLET ORAL at 23:00

## 2017-04-11 RX ADMIN — AMLODIPINE BESYLATE 10 MILLIGRAM(S): 2.5 TABLET ORAL at 05:40

## 2017-04-11 RX ADMIN — NYSTATIN CREAM 1 APPLICATION(S): 100000 CREAM TOPICAL at 05:38

## 2017-04-11 RX ADMIN — NYSTATIN CREAM 1 APPLICATION(S): 100000 CREAM TOPICAL at 17:09

## 2017-04-11 RX ADMIN — TAMSULOSIN HYDROCHLORIDE 0.4 MILLIGRAM(S): 0.4 CAPSULE ORAL at 22:00

## 2017-04-11 RX ADMIN — TRAMADOL HYDROCHLORIDE 25 MILLIGRAM(S): 50 TABLET ORAL at 05:40

## 2017-04-11 RX ADMIN — TRAMADOL HYDROCHLORIDE 25 MILLIGRAM(S): 50 TABLET ORAL at 06:35

## 2017-04-11 RX ADMIN — GABAPENTIN 200 MILLIGRAM(S): 400 CAPSULE ORAL at 05:40

## 2017-04-11 RX ADMIN — MIRTAZAPINE 7.5 MILLIGRAM(S): 45 TABLET, ORALLY DISINTEGRATING ORAL at 22:04

## 2017-04-11 RX ADMIN — Medication 88 MICROGRAM(S): at 05:39

## 2017-04-11 RX ADMIN — Medication 4: at 17:07

## 2017-04-11 RX ADMIN — ONDANSETRON 4 MILLIGRAM(S): 8 TABLET, FILM COATED ORAL at 18:37

## 2017-04-11 RX ADMIN — ENOXAPARIN SODIUM 75 MILLIGRAM(S): 100 INJECTION SUBCUTANEOUS at 17:09

## 2017-04-11 RX ADMIN — TRAMADOL HYDROCHLORIDE 25 MILLIGRAM(S): 50 TABLET ORAL at 17:06

## 2017-04-11 RX ADMIN — ATORVASTATIN CALCIUM 40 MILLIGRAM(S): 80 TABLET, FILM COATED ORAL at 22:00

## 2017-04-11 RX ADMIN — ENOXAPARIN SODIUM 75 MILLIGRAM(S): 100 INJECTION SUBCUTANEOUS at 05:37

## 2017-04-11 RX ADMIN — LISINOPRIL 2.5 MILLIGRAM(S): 2.5 TABLET ORAL at 05:39

## 2017-04-11 RX ADMIN — TRAMADOL HYDROCHLORIDE 25 MILLIGRAM(S): 50 TABLET ORAL at 18:26

## 2017-04-11 NOTE — PROVIDER CONTACT NOTE (OTHER) - SITUATION
patient receiving a blood transfusion.  During 15 min vital signs recheck patient temperature was 101F.

## 2017-04-11 NOTE — PROGRESS NOTE ADULT - SUBJECTIVE AND OBJECTIVE BOX
Patient seen and examined . C/O LLE stump pain , well controlled with pain meds ,  also c/o left sided chest tenderness for few days since was accidentally hit by NA head , no sob , no chest pain , no other  complaints , daughter Emperatriz  at bed side .     CC: left leg pain  ,  Left sided chest tenderness ,no other complaints .      HPI: 85 yr old female with PMH CAD s/p stents x 2 , DM2, hypertension, hyperlipidemia, anxiety, significant PVD s/p multiple stents, paroxysmal atrial fibrillation on Eliquis admitted for hypertensive urgency post LE angiogram. 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 Hospital course complicated by urinary retention with UTI (failed TOV), hyponatremia likely due to pain and improved with NaCl (per nephrology) and pain medications. BP meds and insulin coverage titrated for better control.  Patient stable for discharge to acute rehab.    PAST MEDICAL & SURGICAL HISTORY:  Hyperlipidemia  Hypertension  Diabetes  PVD (peripheral vascular disease)  CAD (coronary artery disease)  History of cholecystectomy  H/O angioplasty      MEDICATIONS  (STANDING):  multivitamin 1Tablet(s) Oral daily  tamsulosin 0.4milliGRAM(s) Oral at bedtime  insulin lispro (HumaLOG) corrective regimen sliding scale  SubCutaneous three times a day before meals  dextrose 5%. 1000milliLiter(s) IV Continuous <Continuous>  dextrose 50% Injectable 12.5Gram(s) IV Push once  dextrose 50% Injectable 25Gram(s) IV Push once  dextrose 50% Injectable 25Gram(s) IV Push once  atorvastatin 40milliGRAM(s) Oral at bedtime  amLODIPine   Tablet 10milliGRAM(s) Oral daily  ferrous    sulfate 325milliGRAM(s) Oral daily  pantoprazole    Tablet 40milliGRAM(s) Oral before breakfast  folic acid 1milliGRAM(s) Oral daily  zinc sulfate 220milliGRAM(s) Oral daily  ascorbic acid 500milliGRAM(s) Oral daily  metoprolol 50milliGRAM(s) Oral two times a day  lidocaine   Patch 1Patch Transdermal daily  docusate sodium 100milliGRAM(s) Oral two times a day  mirtazapine 7.5milliGRAM(s) Oral at bedtime  polyethylene glycol 3350 17Gram(s) Oral daily  gabapentin 200milliGRAM(s) Oral <User Schedule>  gabapentin 300milliGRAM(s) Oral <User Schedule>  nystatin Powder 1Application(s) Topical two times a day  lisinopril 2.5milliGRAM(s) Oral daily  levothyroxine 88MICROGram(s) Oral daily  traMADol 25milliGRAM(s) Oral every 4 hours  enoxaparin Injectable 75milliGRAM(s) SubCutaneous two times a day  ALPRAZolam 0.25milliGRAM(s) Oral <User Schedule>  insulin glargine Injectable (LANTUS) 12Unit(s) SubCutaneous at bedtime    MEDICATIONS  (PRN):  acetaminophen   Tablet 650milliGRAM(s) Oral every 6 hours PRN For Temp greater than 38 C (100.4 F)  aluminum hydroxide/magnesium hydroxide/simethicone Suspension 30milliLiter(s) Oral every 4 hours PRN Dyspepsia  dextrose Gel 1Dose(s) Oral once PRN Blood Glucose LESS THAN 70 milliGRAM(s)/deciLiter  glucagon  Injectable 1milliGRAM(s) IntraMuscular once PRN Glucose <70 milliGRAM(s)/deciLiter  ondansetron   Disintegrating Tablet 4milliGRAM(s) Oral every 8 hours PRN Nausea and/or Vomiting  senna 2Tablet(s) Oral at bedtime PRN Constipation  bisacodyl 5milliGRAM(s) Oral every 12 hours PRN Constipation  acetaminophen   Tablet 650milliGRAM(s) Oral every 6 hours PRN moderate pain  traMADol 25milliGRAM(s) Oral every 4 hours PRN Severe Pain (7 - 10)  ALPRAZolam 0.25milliGRAM(s) Oral two times a day PRN anxiety      LABS:                          9.3    7.4   )-----------( 320      ( 11 Apr 2017 06:21 )             28.8     04-10    134<L>  |  97<L>  |  20.0  ----------------------------<  221<H>  4.3   |  26.0  |  0.97    Ca    9.1      10 Apr 2017 06:49    TPro  5.9<L>  /  Alb  2.9<L>  /  TBili  0.2<L>  /  DBili  x   /  AST  15  /  ALT  21  /  AlkPhos  89  04-10          RADIOLOGY & ADDITIONAL TESTS:      REVIEW OF SYSTEMS:    CONSTITUTIONAL: No fever, weight loss, or fatigue  EYES: No eye pain, visual disturbances, or discharge  ENMT:  No difficulty hearing, tinnitus, vertigo; No sinus or throat pain  NECK: No pain or stiffness  RESPIRATORY: No cough, wheezing, chills or hemoptysis; No shortness of breath  CARDIOVASCULAR: No chest pain, palpitations, dizziness, or leg swelling  GASTROINTESTINAL: No abdominal or epigastric pain. No nausea, vomiting, or hematemesis; No diarrhea or constipation. No melena or hematochezia.  GENITOURINARY: No dysuria, frequency, hematuria, or incontinence  NEUROLOGICAL: No headaches, memory loss, loss of strength, numbness, or tremors  SKIN: No itching, burning, rashes, or lesions   LYMPH NODES: No enlarged glands  ENDOCRINE: No heat or cold intolerance; No hair loss  MUSCULOSKELETAL:   PSYCHIATRIC: No depression, anxiety, mood swings, or difficulty sleeping  HEME/LYMPH: No easy bruising, or bleeding gums  ALLERGY AND IMMUNOLOGIC: No hives or eczema    Vital Signs Last 24 Hrs  T(C): 37.2, Max: 37.2 (04-10 @ 19:00)  T(F): 99, Max: 99 (04-11 @ 05:24)  HR: 85 (72 - 98)  BP: 155/65 (95/52 - 155/65)  BP(mean): --  RR: 18 (17 - 18)  SpO2: 96% (93% - 96%)  PHYSICAL EXAM:    GENERAL: NAD, well-groomed, well-developed  HEAD:  Atraumatic, Normocephalic  EYES: EOMI, PERRLA, conjunctiva and sclera clear  NECK: Supple, No JVD, Normal thyroid  NERVOUS SYSTEM:  Alert & Oriented X3, no focal deficit  CHEST/LUNG: CTA b/l ,  no  rales, rhonchi, wheezing, or rubs  HEART: Regular rate and rhythm; No murmurs, rubs, or gallops  ABDOMEN: Soft, Nontender, Nondistended; Bowel sounds present  EXTREMITIES:  2+ Peripheral Pulses, No clubbing, cyanosis, or edema  LYMPH: No lymphadenopathy noted  SKIN: No rashes or lesions Patient seen and examined . C/O LLE stump pain , well controlled with pain meds ,  also c/o left sided chest tenderness for few days since was accidentally hit by NA head , no sob , no chest pain , no other  complaints , daughter Emperatriz  at bed side . Feels fatigue .    CC: left leg pain  ,  Left sided chest tenderness , fatigue       HPI: 85 yr old female with PMH CAD s/p stents x 2 , DM2, hypertension, hyperlipidemia, anxiety, significant PVD s/p multiple stents, paroxysmal atrial fibrillation on Eliquis admitted for hypertensive urgency post LE angiogram. 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 Hospital course complicated by urinary retention with UTI (failed TOV), hyponatremia likely due to pain and improved with NaCl (per nephrology) and pain medications. BP meds and insulin coverage titrated for better control.  Patient stable for discharge to acute rehab.    PAST MEDICAL & SURGICAL HISTORY:  Hyperlipidemia  Hypertension  Diabetes  PVD (peripheral vascular disease)  CAD (coronary artery disease)  History of cholecystectomy  H/O angioplasty      MEDICATIONS  (STANDING):  multivitamin 1Tablet(s) Oral daily  tamsulosin 0.4milliGRAM(s) Oral at bedtime  insulin lispro (HumaLOG) corrective regimen sliding scale  SubCutaneous three times a day before meals  dextrose 5%. 1000milliLiter(s) IV Continuous <Continuous>  dextrose 50% Injectable 12.5Gram(s) IV Push once  dextrose 50% Injectable 25Gram(s) IV Push once  dextrose 50% Injectable 25Gram(s) IV Push once  atorvastatin 40milliGRAM(s) Oral at bedtime  amLODIPine   Tablet 10milliGRAM(s) Oral daily  ferrous    sulfate 325milliGRAM(s) Oral daily  pantoprazole    Tablet 40milliGRAM(s) Oral before breakfast  folic acid 1milliGRAM(s) Oral daily  zinc sulfate 220milliGRAM(s) Oral daily  ascorbic acid 500milliGRAM(s) Oral daily  metoprolol 50milliGRAM(s) Oral two times a day  lidocaine   Patch 1Patch Transdermal daily  docusate sodium 100milliGRAM(s) Oral two times a day  mirtazapine 7.5milliGRAM(s) Oral at bedtime  polyethylene glycol 3350 17Gram(s) Oral daily  gabapentin 200milliGRAM(s) Oral <User Schedule>  gabapentin 300milliGRAM(s) Oral <User Schedule>  nystatin Powder 1Application(s) Topical two times a day  lisinopril 2.5milliGRAM(s) Oral daily  levothyroxine 88MICROGram(s) Oral daily  traMADol 25milliGRAM(s) Oral every 4 hours  enoxaparin Injectable 75milliGRAM(s) SubCutaneous two times a day  ALPRAZolam 0.25milliGRAM(s) Oral <User Schedule>  insulin glargine Injectable (LANTUS) 12Unit(s) SubCutaneous at bedtime    MEDICATIONS  (PRN):  acetaminophen   Tablet 650milliGRAM(s) Oral every 6 hours PRN For Temp greater than 38 C (100.4 F)  aluminum hydroxide/magnesium hydroxide/simethicone Suspension 30milliLiter(s) Oral every 4 hours PRN Dyspepsia  dextrose Gel 1Dose(s) Oral once PRN Blood Glucose LESS THAN 70 milliGRAM(s)/deciLiter  glucagon  Injectable 1milliGRAM(s) IntraMuscular once PRN Glucose <70 milliGRAM(s)/deciLiter  ondansetron   Disintegrating Tablet 4milliGRAM(s) Oral every 8 hours PRN Nausea and/or Vomiting  senna 2Tablet(s) Oral at bedtime PRN Constipation  bisacodyl 5milliGRAM(s) Oral every 12 hours PRN Constipation  acetaminophen   Tablet 650milliGRAM(s) Oral every 6 hours PRN moderate pain  traMADol 25milliGRAM(s) Oral every 4 hours PRN Severe Pain (7 - 10)  ALPRAZolam 0.25milliGRAM(s) Oral two times a day PRN anxiety      LABS:                          9.3    7.4   )-----------( 320      ( 11 Apr 2017 06:21 )             28.8     04-10    134<L>  |  97<L>  |  20.0  ----------------------------<  221<H>  4.3   |  26.0  |  0.97    Ca    9.1      10 Apr 2017 06:49    TPro  5.9<L>  /  Alb  2.9<L>  /  TBili  0.2<L>  /  DBili  x   /  AST  15  /  ALT  21  /  AlkPhos  89  04-10        REVIEW OF SYSTEMS:    CONSTITUTIONAL: No fever, weight loss,  fatigue  EYES: No eye pain, visual disturbances, or discharge  ENMT:  No difficulty hearing, tinnitus, vertigo; No sinus or throat pain  NECK: No pain or stiffness  RESPIRATORY: No cough, wheezing, chills or hemoptysis; No shortness of breath  CARDIOVASCULAR: No chest pain, palpitations, dizziness, or leg swelling  GASTROINTESTINAL: No abdominal or epigastric pain. No nausea, vomiting, or hematemesis; No diarrhea or constipation. No melena or hematochezia.  GENITOURINARY: No dysuria, frequency, hematuria, or incontinence  NEUROLOGICAL: No headaches, memory loss, loss of strength, numbness, or tremors  SKIN: No itching, burning, rashes, or lesions   LYMPH NODES: No enlarged glands  ENDOCRINE: No heat or cold intolerance; No hair loss  MUSCULOSKELETAL: no joint / muscle pain or swelling , L stump pain +   PSYCHIATRIC: No depression, anxiety, mood swings, or difficulty sleeping  HEME/LYMPH: No easy bruising, or bleeding gums  ALLERGY AND IMMUNOLOGIC: No hives or eczema    Vital Signs Last 24 Hrs  T(C): 37.2, Max: 37.2 (04-10 @ 19:00)  T(F): 99, Max: 99 (04-11 @ 05:24)  HR: 85 (72 - 98)  BP: 155/65 (95/52 - 155/65)  BP(mean): --  RR: 18 (17 - 18)  SpO2: 96% (93% - 96%)  PHYSICAL EXAM:    GENERAL: NAD, well-groomed, well-developed  HEAD:  Atraumatic, Normocephalic  EYES: EOMI, PERRLA, conjunctiva and sclera clear  NECK: Supple, No JVD, Normal thyroid  NERVOUS SYSTEM:  Alert & Oriented X3, no focal deficit  CHEST/LUNG: CTA b/l ,  no  rales, rhonchi, wheezing, or rubs  HEART: Regular rate and rhythm; No murmurs, rubs, or gallops  ABDOMEN: Soft, Nontender, Nondistended; Bowel sounds present  EXTREMITIES: S/P L BKA , ACE wrap + , as per vascular wound  dehiscence + , patient refusing to remove ACE   LYMPH: No lymphadenopathy noted  SKIN: No rashes or lesions

## 2017-04-11 NOTE — CONSULT NOTE ADULT - ASSESSMENT
A: NPUAP pressure ulcer, Stage 1 on the right heel and the medial PIPJ of the 2nd digit from the hallux    P: Patient evaluated, chart reviewed  Gauze used for interspace spacers to relieve interdigital pressure, secured with cling.  Recommend the daughter purchase silicone spacers at the pharmacy for greater relief  New offloading boot procured for the patient and applied to the right foot, heel offloaded  Discussed with the daughter the importance of offloading  Patient is podiatrically stable  Will continue to monitor in house  Thank you for the consult

## 2017-04-11 NOTE — CONSULT NOTE ADULT - SUBJECTIVE AND OBJECTIVE BOX
[facility] for pre-op CV assessment prior to planned AKA      85 yr old female with multiple medical problems CAD s/p stents last coronary stent , diabetes, hypertension, hyperlipidemia, anxiety, significant PVD s/p multiple stents, paroxysmal atrial fibrillation  on Elliquist was admitted with signs and symptoms consistent with  occluded SFA, POP b/l with minimal outflow to b/l LE.   Functional status: Limited ambulation due to leg pain ; no reported history of congestive heart failure. Patient reports negative stress test .   CV ROS: patient denies chest pain, orthopnea PND syncope or pre-syncope.             PAST MEDICAL & SURGICAL HISTORY:  Hyperlipidemia  Hypertension  Diabetes  PVD (peripheral vascular disease)  CAD (coronary artery disease)  History of cholecystectomy  H/O angioplasty      PREVIOUS DIAGNOSTIC TESTING:      ECHO 3/10/2017       Summary:   1. Normal biventricular systolic function. Left ventricular ejection   fraction, by visual estimation, is 60 to 65%.   2. There is mild concentric left ventricular hypertrophy.   3. Spectral Doppler shows impaired relaxation pattern of left   ventricular myocardial filling (Grade I diastolic dysfunction).   4. Mild tricuspidregurgitation.   5. Estimated PA systolic pressure = 32 mmHg (presuming RA pressure = 3   mmHg).   6. Aortic sclerosis without evidence for stenosis.   7. There is no evidence of pericardial effusion.   8. ** No prior echocardiograms available for comparison.      Allergies    Benadryl (Other)  Demerol HCl (Other)    Intolerances        MEDICATIONS  (STANDING):  sodium chloride 0.9%. 1000milliLiter(s) IV Continuous <Continuous>  BACItracin/polymyxin B Ointment 1Application(s) Topical daily  gabapentin 600milliGRAM(s) Oral at bedtime  atorvastatin 40milliGRAM(s) Oral at bedtime  clopidogrel Tablet 75milliGRAM(s) Oral daily  metoprolol 50milliGRAM(s) Oral every 12 hours  amLODIPine   Tablet 2.5milliGRAM(s) Oral daily  BACItracin   Ointment 1Application(s) Topical three times a day  pantoprazole    Tablet 40milliGRAM(s) Oral before breakfast  levothyroxine 75MICROGram(s) Oral daily  folic acid 1milliGRAM(s) Oral daily  multivitamin/minerals 1Tablet(s) Oral daily  piperacillin/tazobactam IVPB. 3.375Gram(s) IV Intermittent every 8 hours  insulin glargine Injectable (LANTUS) 10Unit(s) SubCutaneous at bedtime  insulin lispro (HumaLOG) corrective regimen sliding scale  SubCutaneous three times a day before meals  dextrose 5%. 1000milliLiter(s) IV Continuous <Continuous>  dextrose 50% Injectable 12.5Gram(s) IV Push once  dextrose 50% Injectable 25Gram(s) IV Push once  dextrose 50% Injectable 25Gram(s) IV Push once  enoxaparin Injectable 40milliGRAM(s) SubCutaneous daily    MEDICATIONS  (PRN):  acetaminophen   Tablet 650milliGRAM(s) Oral every 6 hours PRN For Temp greater than 38 C (100.4 F)  acetaminophen   Tablet. 650milliGRAM(s) Oral every 6 hours PRN Moderate Pain (4 - 6)  ALPRAZolam 0.5milliGRAM(s) Oral three times a day PRN anxiety  dextrose Gel 1Dose(s) Oral once PRN Blood Glucose LESS THAN 70 milliGRAM(s)/deciliter  glucagon  Injectable 1milliGRAM(s) IntraMuscular once PRN Glucose LESS THAN 70 milligrams/deciliter  oxyCODONE IR 5milliGRAM(s) Oral every 4 hours PRN Moderate Pain (4 - 6)      FAMILY HISTORY:  No pertinent family history in first degree relatives      SOCIAL HISTORY: Lives with family    CIGARETTES: denies    ALCOHOL: denies    REVIEW OF SYSTEMS:  CONSTITUTIONAL: No fever, weight loss, or fatigue  EYES: No eye pain, visual disturbances, or discharge  ENMT:  No difficulty hearing, tinnitus, vertigo; No sinus or throat pain  NECK: No pain or stiffness  RESPIRATORY: No cough, wheezing, chills or hemoptysis; No Shortness of Breath  CARDIOVASCULAR: No chest pain, palpitations, passing out, dizziness, or leg swelling  GASTROINTESTINAL: No abdominal or epigastric pain. No nausea, vomiting, or hematemesis; No diarrhea or constipation. No melena or hematochezia.  GENITOURINARY: No dysuria, frequency, hematuria, or incontinence  NEUROLOGICAL: No headaches, memory loss, loss of strength, numbness, or tremors  SKIN: No itching, burning, rashes, or lesions   LYMPH Nodes: No enlarged glands  ENDOCRINE: No heat or cold intolerance; No hair loss  MUSCULOSKELETAL: No joint pain or swelling; No muscle, back, or extremity pain  PSYCHIATRIC: No depression, anxiety, mood swings, or difficulty sleeping  HEME/LYMPH: No easy bruising, or bleeding gums  ALLERY AND IMMUNOLOGIC: No hives or eczema	    Vital Signs Last 24 Hrs  T(C): 36.6, Max: 37.2 (03-09 @ 21:24)  T(F): 97.8, Max: 99 (10 @ 06:37)  HR: 82 (76 - 94)  BP: 148/52 (147/53 - 218/81)  BP(mean): --  RR: 18 (10 - 18)  SpO2: 96% (96% - 100%)    Daily     Daily Weight in k (10 Mar 2017 05:35)    I&O's Detail    I & Os for current day (as of 10 Mar 2017 16:46)  =============================================  IN:    sodium chloride 0.9%.: 400 ml    Total IN: 400 ml  ---------------------------------------------  OUT:    Total OUT: 0 ml  ---------------------------------------------  Total NET: 400 ml      PHYSICAL EXAM:  Physical Exam:  · Constitutional	detailed exam	  · Constitutional Details	well-groomed; well-nourished; no distress	  · Eyes	detailed exam	  · Eyes Details	conjunctiva clear	  · ENMT	detailed exam	  · ENMT Details	mouth	  · Mouth	moist	  · Neck	detailed exam	  · Neck Details	supple	  · Respiratory	detailed exam	  · Respiratory Details	airway patent; breath sounds equal; good air movement; respirations non-labored; clear to auscultation bilaterally	  · Cardiovascular	detailed exam	  · Cardiovascular Details	regular rate and rhythm	  · Gastrointestinal	detailed exam	  · GI Normal	soft; nontender; bowel sounds normal	  · Gastrointestinal Comments	hernia	  · Extremities	detailed exam	  · Extremities Details	no pedal edema	  · Extremities Comments	erythema over left foot, + warmth  erythematous with TTP	  · Neurological	detailed exam	  · Neurological Details	alert and oriented x 3; responds to pain; responds to verbal commands	  · Skin	detailed exam	  · Skin Details	erythema	  · Erythema Location	foot L	  · Psychiatric	detailed exam	  · Psychiatric Details	flat affect	        INTERPRETATION OF TELEMETRY:    ECG: SR with ns st twa    LABS:                        9.3    10.2  )-----------( 273      ( 10 Mar 2017 16:08 )             29.4     10 Mar 2017 15:58    134    |  96     |  28.0   ----------------------------<  269    4.8     |  25.0   |  1.27     Ca    8.5        10 Mar 2017 15:58  Phos  3.8       09 Mar 2017 19:02  Mg     1.8       09 Mar 2017 19:02          PT/INR - ( 09 Mar 2017 19:02 )   PT: 11.0 sec;   INR: 1.00 ratio             I&O's Summary    I & Os for current day (as of 10 Mar 2017 16:46)  =============================================  IN: 400 ml / OUT: 0 ml / NET: 400 ml    BNP  RADIOLOGY & ADDITIONAL STUDIES:  Xray  : 3/22/2017  Findings:  The lungs are clear. The heart and mediastinum unremarkable. No hilar   abnormality. No evidence of a pleural effusion. There are degenerative   changes in the spine..    Impression:  No evidence of acute cardiopulmonary disease..    Assessment and Recommendation:   · Assessment		  85 yr old female with multiple medical problems CAD s/p stents last coronary stent , diabetes, hypertension, hyperlipidemia, anxiety, significant PVD s/p multiple stents, paroxysmal atrial fibrillation s/p LT BKA with wound dehiscence now being evaluated for planned AKA  based on RCRI  risk criteria for planned non-cardiac surgery patient is at least intermediate-high risk of MACE for planned intermediate risk surgery. The patient is currently optimized form a cardiac standpoint. Benefits of procedure outweigh risks.    CAD s/p remote stents (-) NST  : TTE LVEF 65% no significant valvular heart disease. Recc: continue Plavix/Statin add ASA 325mg daily if ok from surgical standpoint.  PAF: Elliquist on hold per vascular surgery ;[id number] =[id number]  Lovenox  bridge over weekend goal PTT 60-80  HTN: Continue norvasc to 10mg , c/w lopressor.  hold ACE-I given MAGI  DM: RHISS  Jami-op ok to use IV lopressor 2.5mg -5mg prn for HR/BP control ; resume Elliquist post-op once safe from vascular surgery standpoint  Hemodynamic monitoring as per protocol.  Discussed with primary team and patient and daughter

## 2017-04-11 NOTE — CONSULT NOTE ADULT - SUBJECTIVE AND OBJECTIVE BOX
85 year old diabetic female seen bedside for concerns of right heel "redness". Daughter is bedside and states that the mother has had multiple ulcerations in the past and she is concerned because "the heel has been red the entire time we've been here." Patient is currently awaiting AKA on the left leg as a recent BKA has dehisced. Patient has undergone numerous revascularization procedures on both legs.   Patient is resting, seems comfortably, rouseable, and in NAD. There are no current complaints of F/C/N/V/D/SOB.   Patient secondarily complains of pain in the 1st interspace of the right foot. Patient is wearing offloading boot, and has been wearing it the length of her stay.    PAST MEDICAL & SURGICAL HISTORY:  Hyperlipidemia  Hypertension  Diabetes  PVD (peripheral vascular disease)  CAD (coronary artery disease)  History of cholecystectomy  H/O angioplasty    O: (focused on right foot)  No palpable pedal pulses, TG WNL, CFT WNL  No significant edema, no erythema, however heel appears hyperemic and irritated, no ecchymosis on the heel, no lymphatic streaking, no drainage, mild warmth to touch  Second digit exhibits ecchymosis/possible superficial hematoma just proximal to the nail. No trauma recounted. The hallux is abrading the medial PIPJ of the second digit and it appears irritated with slight pain to palpation.   Hammered digits (2-5) and Bunion deformity noted.  No open lesions, no clinical signs of infection.                          9.3    7.4   )-----------( 320      ( 11 Apr 2017 06:21 )             28.8   04-10    134<L>  |  97<L>  |  20.0  ----------------------------<  221<H>  4.3   |  26.0  |  0.97    Ca    9.1      10 Apr 2017 06:49    TPro  5.9<L>  /  Alb  2.9<L>  /  TBili  0.2<L>  /  DBili  x   /  AST  15  /  ALT  21  /  AlkPhos  89  04-10    Vital Signs Last 24 Hrs  T(C): 37.2, Max: 37.2 (04-10 @ 19:00)  T(F): 99, Max: 99 (04-11 @ 05:24)  HR: 85 (72 - 98)  BP: 155/65 (95/52 - 155/65)  BP(mean): --  RR: 18 (17 - 18)  SpO2: 96% (93% - 96%)

## 2017-04-11 NOTE — CONSULT NOTE ADULT - SUBJECTIVE AND OBJECTIVE BOX
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 While in rehab patient has a left BKA stump wound dehiscence and is now planned for a Left AKA. ID input requested for IV antibiotics for stump.       Past Medical & Surgical Hx:  Hyperlipidemia  Hypertension  Diabetes  PVD (peripheral vascular disease)  CAD (coronary artery disease)  History of cholecystectomy  H/O angioplasty      Social Hx:  Former smoker  Denies ETOH ot drug use    FAMILY HISTORY:  No pertinent family history in first degree relatives      Allergies  Benadryl (Other)  Demerol HCl (Other)      Antibiotics:   None       REVIEW OF SYSTEMS:  CONSTITUTIONAL: No fever or chills  HEENT:  No diplopia or blurred vision. No earache, sore throat or runny nose.  CARDIOVASCULAR:  No pressure, squeezing, strangling, tightness, heaviness or aching about the chest, neck, axilla or epigastrium.  RESPIRATORY:  No cough, shortness of breath, PND or orthopnea.  GASTROINTESTINAL:  No nausea, vomiting or diarrhea.  GENITOURINARY:  No dysuria, frequency or urgency. No Blood in urine  MUSCULOSKELETAL:  no joint aches, no muscle pain  SKIN:  No change in skin, hair or nails.  NEUROLOGIC:  No paresthesias, fasciculations, seizures or weakness.  PSYCHIATRIC:  No disorder of thought or mood.  ENDOCRINE:  No heat or cold intolerance, polyuria or polydipsia.  HEMATOLOGICAL:  No easy bruising or bleeding.         Physical Exam:  Vital Signs Last 24 Hrs  T(C): 37.2, Max: 37.2 (04-10 @ 19:00)  T(F): 99, Max: 99 (04-11 @ 05:24)  HR: 85 (72 - 98)  BP: 155/65 (95/52 - 155/65)  RR: 18 (17 - 18)  SpO2: 96% (93% - 96%)    GEN: NAD, pleasant  HEENT: normocephalic and atraumatic. EOMI. PERRL.    NECK: Supple  LUNGS: Clear to auscultation.  HEART: Regular rate and rhythm   ABDOMEN: Soft, nontender, and nondistended.  Positive bowel sounds.    : No CVA tenderness  EXTREMITIES: Left BKA stump with a dry open wound  NEUROLOGIC: Cranial nerves II through XII are grossly intact.  PSYCHIATRIC: Appropriate affect .  SKIN: left BKA wound        Labs:   04-10    134<L>  |  97<L>  |  20.0  ----------------------------<  221<H>  4.3   |  26.0  |  0.97    Ca    9.1      10 Apr 2017 06:49    TPro  5.9<L>  /  Alb  2.9<L>  /  TBili  0.2<L>  /  DBili  x   /  AST  15  /  ALT  21  /  AlkPhos  89  04-10                          9.3    7.4   )-----------( 320      ( 11 Apr 2017 06:21 )             28.8       LIVER FUNCTIONS - ( 10 Apr 2017 06:49 )  Alb: 2.9 g/dL / Pro: 5.9 g/dL / ALK PHOS: 89 U/L / ALT: 21 U/L / AST: 15 U/L / GGT: x             RECENT CULTURES:  04-05 .Urine Catheterized XXXX XXXX   No growth

## 2017-04-11 NOTE — CONSULT NOTE ADULT - PROBLEM SELECTOR RECOMMENDATION 9
Unlikely infected given no leukocytosis, no fevers and no clear cellulitis on the stump  Patient is going for an AKA on Thursday  Will start Perioperative antibiotics starting tomorrow

## 2017-04-11 NOTE — PROGRESS NOTE ADULT - SUBJECTIVE AND OBJECTIVE BOX
HISTORY OF PRESENT ILLNESS  85 yr old RH female with multiple medical problems including CAD s/p stents, diabetes, hypertension, hyperlipidemia, anxiety, significant PVD s/p multiple stents, paroxysmal atrial fibrillation was admitted 3/9/17 for elective LE angiogram. Patient was noted to have elevated BP post procedure, and hence admission requested by Dr. Irwin. Per him, patient with occluded SFA, POP b/l with minimal outflow to b/l LE. 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   Hospital course complicated by:  -  pain control management, patient being followed by palliative care.  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, being followed by nephrology   - UTI and urinary retention- currently on IV cipro, flomax, juarez catheter     NB: check with vascular surgery/cardiology about need for plavix [ ]    PMHx -   Hyperlipidemia, Hypertension, Diabetes, PVD (peripheral vascular disease), CAD (coronary artery disease)  History of cholecystectomy, H/O angioplasty    TODAY'S SUBJECTIVE & REVIEW OF SYMPTOMS  [X] Constitutional WNL     [X] Cardio WNL            [X] Resp WNL           [X] GI WNL                          [ ]  WNL                   [X] Heme WNL              [X] Endo WNL                     [ ] Skin WNL                 [ ] MSK WNL            [X] Neuro WNL                   [X] Cognitive WNL        [X] Psych WNL    Patient seen and examined. No overnight events.  Pt reports having had slept better and had less pain left residual limb.  Pt c/o pain right calcaneus.  Voiding well without complaints.  +BM    VITALS  Vital Signs Last 24 Hrs  T(C): 36.9, Max: 37 (04-08 @ 12:00)  T(F): 98.5, Max: 98.6 (04-08 @ 12:00)  HR: 78 (78 - 78)  BP: 136/65 (130/70 - 145/60)  BP(mean): --  RR: 18 (16 - 18)  SpO2: 96% (93% - 96%)      PHYSICAL EXAM  Constitutional - NAD, awake  HEENT - NCAT, EOMI  Neck - Supple  Chest - CTA bilaterally  Cardiovascular - RRR, S1S2  Abdomen - BS+, Soft, NTND  Extremities -  No calf tenderness. Mild left LE edema  +knee flexion contracture  Neurologic Exam -                    Cognitive - Awake, Alert, AAO to self, place, date, year, situation     Communication - Fluent, No dysarthria     Cranial Nerves - CN 2-12 intact    Psychiatric - Mood stable, Affect WNL  Skin - moisture dermatitis in intergluteal folds, right heel DTI + Stage I  Callous formation noted right medial forefoot discolored  Tip right second  discoloration  Wounds - left BKA residual limb with staples, area of central dehiscence     FUNCTIONAL PROGRESS  Gait: unable  Transfers: Min/mod A  Wheelchair mobility: 80' supervision  ADLs: grooming and upper body dressing supervision, mod assist lower body dressing  Functional transfers: Toilet and bed transfers mod assist, bed mobility min A  RECENT LABS:                         9.3    7.4   )-----------( 320      ( 11 Apr 2017 06:21 )             28.8                        8.1    6.7   )-----------( 347      ( 10 Apr 2017 06:49 )             25.5   04-10    134<L>  |  97<L>  |  20.0  ----------------------------<  221<H>  4.3   |  26.0  |  0.97    Ca    9.1      10 Apr 2017 06:49    TPro  5.9<L>  /  Alb  2.9<L>  /  TBili  0.2<L>  /  DBili  x   /  AST  15  /  ALT  21  /  AlkPhos  89  04-10    U/A 4/5: negative      CAPILLARY BLOOD GLUCOSE  225 (11 Apr 2017 08:00)  144 (10 Apr 2017 21:34)  269 (10 Apr 2017 17:00)  139 (10 Apr 2017 12:00)    175 (09 Apr 2017 21:35)  320 (09 Apr 2017 16:31)      CURRENT MEDICATIONS  MEDICATIONS  (STANDING):  multivitamin 1Tablet(s) Oral daily  tamsulosin 0.4milliGRAM(s) Oral at bedtime  apixaban 2.5milliGRAM(s) Oral two times a day  insulin lispro (HumaLOG) corrective regimen sliding scale  SubCutaneous three times a day before meals  atorvastatin 40milliGRAM(s) Oral at bedtime  amLODIPine   Tablet 10milliGRAM(s) Oral daily  ferrous    sulfate 325milliGRAM(s) Oral daily  pantoprazole    Tablet 40milliGRAM(s) Oral before breakfast  levothyroxine 75MICROGram(s) Oral daily  folic acid 1milliGRAM(s) Oral daily  zinc sulfate 220milliGRAM(s) Oral daily  ascorbic acid 500milliGRAM(s) Oral daily  acetaminophen   Tablet 650milliGRAM(s) Oral every 6 hours  metoprolol 50milliGRAM(s) Oral two times a day  lidocaine   Patch 1Patch Transdermal daily  saccharomyces boulardii 250milliGRAM(s) Oral every 12 hours  docusate sodium 100milliGRAM(s) Oral two times a day  mirtazapine 7.5milliGRAM(s) Oral at bedtime  polyethylene glycol 3350 17Gram(s) Oral daily  gabapentin 200milliGRAM(s) Oral <User Schedule>  gabapentin 300milliGRAM(s) Oral <User Schedule>  nystatin Powder 1Application(s) Topical two times a day  lisinopril 2.5milliGRAM(s) Oral daily  insulin glargine Injectable (LANTUS) 10Unit(s) SubCutaneous at bedtime    MEDICATIONS  (PRN):  acetaminophen   Tablet 650milliGRAM(s) Oral every 6 hours PRN For Temp greater than 38 C (100.4 F)  aluminum hydroxide/magnesium hydroxide/simethicone Suspension 30milliLiter(s) Oral every 4 hours PRN Dyspepsia  ondansetron   Disintegrating Tablet 4milliGRAM(s) Oral every 8 hours PRN Nausea and/or Vomiting  senna 2Tablet(s) Oral at bedtime PRN Constipation  traMADol 25milliGRAM(s) Oral every 4 hours PRN Moderate Pain (4 - 6)  traMADol 50milliGRAM(s) Oral every 6 hours PRN Severe Pain (7 - 10)  bisacodyl 5milliGRAM(s) Oral every 12 hours PRN Constipation        ASSESSMENT & PLAN    86yo female with DM, CAD, PVD s/p right BKA with gait and functional impairments    Left BKA dehiscence:  Revision to AKA pending for 4/13 or 4/14.  Cardiology for preop clearance and ID consult requested as per surgery.  Ordered T&S-Plan to transfuse preop and BID dressing changes with moistened kerlix  as per surgery.  Hospitalist to [facility] for medical clearance.  Eliquis discontinued; ordered Lovenox 75 SC q12 in lieu of Eliquis for now      Urinary retention:  Improved:  Bladder scans 142, 278, 42.  DCd scans.  Cont  Flomax.      Post-op pain control, phantom pain, anxiety: tylenol prn, Change tramadol PRN to standing, gabapentin TID, xanax, lidoderm patch left knee daily.  Heat to left knee prn and stretching.    Acute blood loss anemia: Improved H/H at 9.3/28.8  Continue ferrous sulfate and folic acid.     BKA wound healing: MVI, vit C, zinc, dressing change with moistened Kerlix packing BID. Vascular follow-up     Right heel DTI: z loretta boot and pillow for off-loading heel, No sting barrier film    DMT2: Lantus 10units-Increase to 12U due to hyperglycemia.  ISS AC HS    pAFib/CAD: Eliquis on hold for pending surgery, statin, metoprolol    HTN: metoprolol, norvasc, lisinopril    Hypothyroididsm: TSH 4.76.  Increased synthroid from 75mcg to 88mcg 4/10    Depression with poor appetite: Remeron 7.5mg qhs initiated 4/7     DVT PPx: Eliquis on hold due to pending surgery.  Lovenox ordered for now and SCDs RLE    Protein calorie malnutrition: Prealbumin 13-Cont glucerna-increased from daily to bid    Continue comprehensive acute rehab program consisting of 3hrs/day of OT/PT HISTORY OF PRESENT ILLNESS  85 yr old RH female with multiple medical problems including CAD s/p stents, diabetes, hypertension, hyperlipidemia, anxiety, significant PVD s/p multiple stents, paroxysmal atrial fibrillation was admitted 3/9/17 for elective LE angiogram. Patient was noted to have elevated BP post procedure, and hence admission requested by Dr. Irwin. Per him, patient with occluded SFA, POP b/l with minimal outflow to b/l LE. 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   Hospital course complicated by:  -  pain control management, patient being followed by palliative care.  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, being followed by nephrology   - UTI and urinary retention- currently on IV cipro, flomax, juarez catheter     NB: check with vascular surgery/cardiology about need for plavix [ ]    PMHx -   Hyperlipidemia, Hypertension, Diabetes, PVD (peripheral vascular disease), CAD (coronary artery disease)  History of cholecystectomy, H/O angioplasty    TODAY'S SUBJECTIVE & REVIEW OF SYMPTOMS  [X] Constitutional WNL     [X] Cardio WNL            [X] Resp WNL           [X] GI WNL                          [ ]  WNL                   [X] Heme WNL              [X] Endo WNL                     [ ] Skin WNL                 [ ] MSK WNL            [X] Neuro WNL                   [X] Cognitive WNL        [X] Psych WNL    Patient seen and examined. No overnight events.  Pt reports having had slept better and had less pain left residual limb.  Pt c/o pain right calcaneus.  Voiding well without complaints.  +BM    VITALS  Vital Signs Last 24 Hrs  T(C): 36.9, Max: 37 (04-08 @ 12:00)  T(F): 98.5, Max: 98.6 (04-08 @ 12:00)  HR: 78 (78 - 78)  BP: 136/65 (130/70 - 145/60)  BP(mean): --  RR: 18 (16 - 18)  SpO2: 96% (93% - 96%)      PHYSICAL EXAM  Constitutional - NAD, awake  HEENT - NCAT, EOMI  Neck - Supple  Chest - CTA bilaterally  Cardiovascular - RRR, S1S2  Abdomen - BS+, Soft, NTND  Extremities -  No calf tenderness. Mild left LE edema  +knee flexion contracture  Neurologic Exam -                    Cognitive - Awake, Alert, AAO to self, place, date, year, situation     Communication - Fluent, No dysarthria     Cranial Nerves - CN 2-12 intact    Psychiatric - Mood stable, Affect WNL  Skin - moisture dermatitis in intergluteal folds, right heel DTI + Stage I  Callous formation noted right medial forefoot discolored  Tip right second  discoloration  Wounds - left BKA residual limb with staples, area of central dehiscence     FUNCTIONAL PROGRESS  Gait: unable  Transfers: Min/mod A  Wheelchair mobility: 80' supervision  ADLs: grooming and upper body dressing supervision, mod assist lower body dressing  Functional transfers: Toilet and bed transfers mod assist, bed mobility min A  RECENT LABS:                         9.3    7.4   )-----------( 320      ( 11 Apr 2017 06:21 )             28.8                        8.1    6.7   )-----------( 347      ( 10 Apr 2017 06:49 )             25.5   04-10    134<L>  |  97<L>  |  20.0  ----------------------------<  221<H>  4.3   |  26.0  |  0.97    Ca    9.1      10 Apr 2017 06:49    TPro  5.9<L>  /  Alb  2.9<L>  /  TBili  0.2<L>  /  DBili  x   /  AST  15  /  ALT  21  /  AlkPhos  89  04-10    U/A 4/5: negative      CAPILLARY BLOOD GLUCOSE  225 (11 Apr 2017 08:00)  144 (10 Apr 2017 21:34)  269 (10 Apr 2017 17:00)  139 (10 Apr 2017 12:00)    175 (09 Apr 2017 21:35)  320 (09 Apr 2017 16:31)      CURRENT MEDICATIONS  MEDICATIONS  (STANDING):  multivitamin 1Tablet(s) Oral daily  tamsulosin 0.4milliGRAM(s) Oral at bedtime  apixaban 2.5milliGRAM(s) Oral two times a day  insulin lispro (HumaLOG) corrective regimen sliding scale  SubCutaneous three times a day before meals  atorvastatin 40milliGRAM(s) Oral at bedtime  amLODIPine   Tablet 10milliGRAM(s) Oral daily  ferrous    sulfate 325milliGRAM(s) Oral daily  pantoprazole    Tablet 40milliGRAM(s) Oral before breakfast  levothyroxine 75MICROGram(s) Oral daily  folic acid 1milliGRAM(s) Oral daily  zinc sulfate 220milliGRAM(s) Oral daily  ascorbic acid 500milliGRAM(s) Oral daily  acetaminophen   Tablet 650milliGRAM(s) Oral every 6 hours  metoprolol 50milliGRAM(s) Oral two times a day  lidocaine   Patch 1Patch Transdermal daily  saccharomyces boulardii 250milliGRAM(s) Oral every 12 hours  docusate sodium 100milliGRAM(s) Oral two times a day  mirtazapine 7.5milliGRAM(s) Oral at bedtime  polyethylene glycol 3350 17Gram(s) Oral daily  gabapentin 200milliGRAM(s) Oral <User Schedule>  gabapentin 300milliGRAM(s) Oral <User Schedule>  nystatin Powder 1Application(s) Topical two times a day  lisinopril 2.5milliGRAM(s) Oral daily  insulin glargine Injectable (LANTUS) 10Unit(s) SubCutaneous at bedtime    MEDICATIONS  (PRN):  acetaminophen   Tablet 650milliGRAM(s) Oral every 6 hours PRN For Temp greater than 38 C (100.4 F)  aluminum hydroxide/magnesium hydroxide/simethicone Suspension 30milliLiter(s) Oral every 4 hours PRN Dyspepsia  ondansetron   Disintegrating Tablet 4milliGRAM(s) Oral every 8 hours PRN Nausea and/or Vomiting  senna 2Tablet(s) Oral at bedtime PRN Constipation  traMADol 25milliGRAM(s) Oral every 4 hours PRN Moderate Pain (4 - 6)  traMADol 50milliGRAM(s) Oral every 6 hours PRN Severe Pain (7 - 10)  bisacodyl 5milliGRAM(s) Oral every 12 hours PRN Constipation        ASSESSMENT & PLAN    84yo female with DM, CAD, PVD s/p right BKA with gait and functional impairments    Left BKA dehiscence:  Revision to AKA pending for 4/13 or 4/14.  Cardiology for preop clearance and ID consult requested as per surgery.  1U PRBCs given 4/10.  Cont BID dressing changes with moistened kerlix  as per surgery.  Hospitalist to brendan for medical clearance.  Eliquis discontinued; ordered Lovenox 75 SC q12 in lieu of Eliquis for now      Urinary retention:  Improved:  Bladder scans 142, 278, 42.  DCd scans.  Cont  Flomax.      Post-op pain control, phantom pain, anxiety: tylenol prn, Change tramadol PRN to standing, gabapentin TID, xanax, lidoderm patch left knee daily.  Heat to left knee prn and stretching.    Acute blood loss anemia: Improved H/H at 9.3/28.8  Continue ferrous sulfate and folic acid.     BKA wound healing: MVI, vit C, zinc, dressing change with moistened Kerlix packing BID. Vascular follow-up     Right heel DTI: z loretta boot and pillow for off-loading heel, No sting barrier film    DMT2: Lantus 10units-Increase to 12U due to hyperglycemia.  ISS AC HS    pAFib/CAD: Eliquis on hold for pending surgery, statin, metoprolol    HTN: metoprolol, norvasc, lisinopril    Hypothyroididsm: TSH 4.76.  Increased synthroid from 75mcg to 88mcg 4/10    Depression with poor appetite: Remeron 7.5mg qhs initiated 4/7     DVT PPx: Eliquis on hold due to pending surgery.  Lovenox ordered for now and SCDs RLE    Protein calorie malnutrition: Prealbumin 13-Cont glucerna-increased from daily to bid    Continue comprehensive acute rehab program consisting of 3hrs/day of OT/PT HISTORY OF PRESENT ILLNESS  85 yr old RH female with multiple medical problems including CAD s/p stents, diabetes, hypertension, hyperlipidemia, anxiety, significant PVD s/p multiple stents, paroxysmal atrial fibrillation was admitted 3/9/17 for elective LE angiogram. Patient was noted to have elevated BP post procedure, and hence admission requested by Dr. Irwin. Per him, patient with occluded SFA, POP b/l with minimal outflow to b/l LE. 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   Hospital course complicated by:  -  pain control management, patient being followed by palliative care.  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, being followed by nephrology   - UTI and urinary retention- currently on IV cipro, flomax, juarez catheter     NB: check with vascular surgery/cardiology about need for plavix [ ]    PMHx -   Hyperlipidemia, Hypertension, Diabetes, PVD (peripheral vascular disease), CAD (coronary artery disease)  History of cholecystectomy, H/O angioplasty    TODAY'S SUBJECTIVE & REVIEW OF SYMPTOMS  [X] Constitutional WNL     [X] Cardio WNL            [X] Resp WNL           [X] GI WNL                          [ ]  WNL                   [X] Heme WNL              [X] Endo WNL                     [ ] Skin WNL                 [ ] MSK WNL            [X] Neuro WNL                   [X] Cognitive WNL        [X] Psych WNL    Patient seen and examined. No overnight events.  Pt reports having had slept better and had less pain left residual limb.  Pt c/o pain right calcaneus.  Voiding well without complaints.  +BM    VITALS  Vital Signs Last 24 Hrs  T(C): 36.9, Max: 37 (04-08 @ 12:00)  T(F): 98.5, Max: 98.6 (04-08 @ 12:00)  HR: 78 (78 - 78)  BP: 136/65 (130/70 - 145/60)  BP(mean): --  RR: 18 (16 - 18)  SpO2: 96% (93% - 96%)      PHYSICAL EXAM  Constitutional - NAD, awake  HEENT - NCAT, EOMI  Neck - Supple  Chest - CTA bilaterally  Cardiovascular - RRR, S1S2  Abdomen - BS+, Soft, NTND  Extremities -  No calf tenderness. Mild left LE edema  +knee flexion contracture  Neurologic Exam -                    Cognitive - Awake, Alert, AAO to self, place, date, year, situation     Communication - Fluent, No dysarthria     Cranial Nerves - CN 2-12 intact    Psychiatric - Mood stable, Affect WNL  Skin - moisture dermatitis in intergluteal folds, right heel DTI + Stage I  Callous formation noted right medial forefoot discolored  Tip right second  discoloration  Wounds - left BKA residual limb with staples, area of central dehiscence     FUNCTIONAL PROGRESS  Gait: unable  Transfers: Min/mod A  Wheelchair mobility: 80' supervision  ADLs: grooming and upper body dressing supervision, mod assist lower body dressing  Functional transfers: Toilet and bed transfers mod assist, bed mobility min A  RECENT LABS:                         9.3    7.4   )-----------( 320      ( 11 Apr 2017 06:21 )             28.8                        8.1    6.7   )-----------( 347      ( 10 Apr 2017 06:49 )             25.5   04-10    134<L>  |  97<L>  |  20.0  ----------------------------<  221<H>  4.3   |  26.0  |  0.97    Ca    9.1      10 Apr 2017 06:49    TPro  5.9<L>  /  Alb  2.9<L>  /  TBili  0.2<L>  /  DBili  x   /  AST  15  /  ALT  21  /  AlkPhos  89  04-10    U/A 4/5: negative      CAPILLARY BLOOD GLUCOSE  225 (11 Apr 2017 08:00)  144 (10 Apr 2017 21:34)  269 (10 Apr 2017 17:00)  139 (10 Apr 2017 12:00)    175 (09 Apr 2017 21:35)  320 (09 Apr 2017 16:31)      CURRENT MEDICATIONS  MEDICATIONS  (STANDING):  multivitamin 1Tablet(s) Oral daily  tamsulosin 0.4milliGRAM(s) Oral at bedtime  apixaban 2.5milliGRAM(s) Oral two times a day  insulin lispro (HumaLOG) corrective regimen sliding scale  SubCutaneous three times a day before meals  atorvastatin 40milliGRAM(s) Oral at bedtime  amLODIPine   Tablet 10milliGRAM(s) Oral daily  ferrous    sulfate 325milliGRAM(s) Oral daily  pantoprazole    Tablet 40milliGRAM(s) Oral before breakfast  levothyroxine 75MICROGram(s) Oral daily  folic acid 1milliGRAM(s) Oral daily  zinc sulfate 220milliGRAM(s) Oral daily  ascorbic acid 500milliGRAM(s) Oral daily  acetaminophen   Tablet 650milliGRAM(s) Oral every 6 hours  metoprolol 50milliGRAM(s) Oral two times a day  lidocaine   Patch 1Patch Transdermal daily  saccharomyces boulardii 250milliGRAM(s) Oral every 12 hours  docusate sodium 100milliGRAM(s) Oral two times a day  mirtazapine 7.5milliGRAM(s) Oral at bedtime  polyethylene glycol 3350 17Gram(s) Oral daily  gabapentin 200milliGRAM(s) Oral <User Schedule>  gabapentin 300milliGRAM(s) Oral <User Schedule>  nystatin Powder 1Application(s) Topical two times a day  lisinopril 2.5milliGRAM(s) Oral daily  insulin glargine Injectable (LANTUS) 10Unit(s) SubCutaneous at bedtime    MEDICATIONS  (PRN):  acetaminophen   Tablet 650milliGRAM(s) Oral every 6 hours PRN For Temp greater than 38 C (100.4 F)  aluminum hydroxide/magnesium hydroxide/simethicone Suspension 30milliLiter(s) Oral every 4 hours PRN Dyspepsia  ondansetron   Disintegrating Tablet 4milliGRAM(s) Oral every 8 hours PRN Nausea and/or Vomiting  senna 2Tablet(s) Oral at bedtime PRN Constipation  traMADol 25milliGRAM(s) Oral every 4 hours PRN Moderate Pain (4 - 6)  traMADol 50milliGRAM(s) Oral every 6 hours PRN Severe Pain (7 - 10)  bisacodyl 5milliGRAM(s) Oral every 12 hours PRN Constipation        ASSESSMENT & PLAN    86yo female with DM, CAD, PVD s/p right BKA with gait and functional impairments    Left BKA dehiscence:  Revision to AKA pending for 4/13 or 4/14.  Cardiology for preop clearance and ID consult requested as per surgery.  1U PRBCs given 4/10.  Cont BID dressing changes with moistened kerlix  as per surgery.  Hospitalist to brendan for medical clearance.  Eliquis discontinued; ordered Lovenox 75 SC q12 in lieu of Eliquis for now      Urinary retention:  Improved:  Bladder scans 142, 278, 42.  DCd scans.  Cont  Flomax.      Post-op pain control, phantom pain, anxiety: tylenol prn, Change tramadol PRN to standing, gabapentin TID, xanax, lidoderm patch left knee daily.  Heat to left knee prn and stretching.    Acute blood loss anemia: Improved H/H at 9.3/28.8  Continue ferrous sulfate and folic acid.     BKA wound healing: MVI, vit C, zinc, dressing change with moistened Kerlix packing BID. Vascular follow-up     Right heel DTI and now stage I with callous forefoot and second toe contusion:  Cont z loretta boot and pillow for off-loading heel, No sting barrier film bid.  Request pvt podiatry consult with Dr Toscano    DMT2: Lantus 10units-Increase to 12U due to hyperglycemia.  ISS AC HS    pAFib/CAD: Eliquis on hold for pending surgery, statin, metoprolol    HTN: metoprolol, norvasc, lisinopril    Hypothyroididsm: TSH 4.76.  Increased synthroid from 75mcg to 88mcg 4/10    Depression with poor appetite: Remeron 7.5mg qhs initiated 4/7     DVT PPx: Eliquis on hold due to pending surgery.  Lovenox ordered for now and SCDs RLE    Protein calorie malnutrition: Prealbumin 13-Cont glucerna-increased from daily to bid    Continue comprehensive acute rehab program consisting of 3hrs/day of OT/PT

## 2017-04-11 NOTE — PROGRESS NOTE ADULT - ASSESSMENT
85 yr old female with PMH CAD s/p stents, DM2, hypertension, hyperlipidemia, anxiety, significant PVD s/p multiple stents, paroxysmal atrial fibrillation on Eliquis admitted for hypertensive urgency post LE angiogram. 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 Hospital course complicated by urinary retention with UTI (failed TOV), hyponatremia likely due to pain and improved with NaCl (per nephrology) and pain medications. BP meds and insulin coverage titrated for better control.  Patient was   transferred  to acute rehab , pain poorly controlled , duragesic  patch discontinued due to lethargy ,feels better low blood sugar this am . Revaluated by vascular surgery , found to have  postoperative wound dehiscence. Plan for AKA on Thursday . Seen by cardiologist - cleared , seen by ID - recommended preoperative Abx . Patient with ABLA with Hg 8.1 , s/p PRBC.     Problem/Plan - 1:  ·  Problem: Type 2 diabetes mellitus with hypoglycemia without coma, with long-term current use of insulin.  Plan: discontinue premeals humolog cont sliding , agree with dropping insulin to 12 units , ADA diet , ISSC    Problem/Plan - 2:  ·  Problem: PVD (peripheral vascular disease).  Plan: s/p left BKA  Now  with  L stump dehiscence, contracture of the left knee ,  as per vascular   - L AKA on  Thursday   - Continue BID wound care  - ID  consult  note noted and appreciated , no need for long term Abx , only pre-op abx to be given     Will not restart Plavix at this time , hold oral AC , start full dose Lovenox . Hold Lovenox after dose on 4 /12. Restart Plavix / Eliquis  once OK with vascular surgery     Problem/Plan - 3:  ·  Problem: Constipation, unspecified constipation type.  Plan: stool softener on colace and senna    laxative as needed, dulcolax prn , start miralax daily.     Problem/Plan - 4:  ·  Problem: Acute blood loss anemia , likely on anemia of chronic disease   Plan:Will transfuse 2nd unit of PRBC   to keep  Hg > 10  as patient has hx of CAD      Problem/Plan - 5:  ·  Problem: CAD (coronary artery disease).  Plan: Continue Metoprolol and Lipitor . Patient with Hx of cardiac stents . Will need to restart Plavix after surgery once OK with vascular .  Cardiology consult  noted - patient cleared with intermediate risk .    Problem/Plan - 6:  Problem: Essential hypertension. Plan: Controlled with Norvasc and Metoprolol.    Problem/Plan - 7:  ·  Problem: PAF (paroxysmal atrial fibrillation).  Plan: Continue Metoprolol , hold  Eliquis. Restarted     Problem/Plan - 8:  ·  Problem: Prophylactic measure.  Plan: Eliquis on hold for planned surgery , continue Lovenox     Problem / Plan - 9 : Hx of Hypothyroidism -  Synthroid restarted     Problem / Plan - 10: Episode of  Hypoglycemia , insulin decreased , pre meal insulin d/martin , will observe closely     Problem /Plan - 11: Urinary retention - resolved , no need for straight ,  on Flomax     Problem / Plan -12: UTI -  treated     Problem / Plan - 13 : Hyponatremia - resolved , follow up BMP in 2-3 days . 85 yr old female with PMH CAD s/p stents, DM2, hypertension, hyperlipidemia, anxiety, significant PVD s/p multiple stents, paroxysmal atrial fibrillation on Eliquis admitted for hypertensive urgency post LE angiogram. 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 Hospital course complicated by urinary retention with UTI (failed TOV), hyponatremia likely due to pain and improved with NaCl (per nephrology) and pain medications. BP meds and insulin coverage titrated for better control.  Patient was   transferred  to acute rehab , pain poorly controlled , duragesic  patch discontinued due to lethargy ,feels better low blood sugar this am . Revaluated by vascular surgery , found to have  postoperative wound dehiscence. Plan for AKA on Thursday . Seen by cardiologist - cleared , seen by ID - recommended preoperative Abx . Patient with ABLA with Hg 8.1 , s/p PRBC X 1 , will transfuse second PRBC unit to keep Hg > 10 .     Problem/Plan - 1:  ·  Problem: Type 2 diabetes mellitus with hypoglycemia without coma, with long-term current use of insulin.  Plan: discontinue premeals humolog cont sliding , agree with dropping insulin to 12 units , ADA diet , ISSC    Problem/Plan - 2:  ·  Problem: PVD (peripheral vascular disease).  Plan: s/p left BKA  Now  with  L stump dehiscence, contracture of the left knee ,  as per vascular   - L AKA on  Thursday   - Continue BID wound care  - ID  consult  note noted and appreciated , no need for long term Abx , only pre-op abx to be given     Will not restart Plavix at this time , hold oral AC , start full dose Lovenox . Hold Lovenox after dose on 4 /12. Restart Plavix / Eliquis  once OK with vascular surgery     Problem/Plan - 3:  ·  Problem: Constipation, unspecified constipation type.  Plan: stool softener on colace and senna    laxative as needed, dulcolax prn , start miralax daily.     Problem/Plan - 4:  ·  Problem: Acute blood loss anemia , likely on anemia of chronic disease   Plan:Will transfuse 2nd unit of PRBC   to keep  Hg > 10  as patient has hx of CAD      Problem/Plan - 5:  ·  Problem: CAD (coronary artery disease).  Plan: Continue Metoprolol and Lipitor . Patient with Hx of cardiac stents . Will need to restart Plavix after surgery once OK with vascular .  Cardiology consult  noted - patient cleared with intermediate risk .    Problem/Plan - 6:  Problem: Essential hypertension. Plan: Controlled with Norvasc and Metoprolol.    Problem/Plan - 7:  ·  Problem: PAF (paroxysmal atrial fibrillation).  Plan: Continue Metoprolol , hold  Eliquis. Restarted     Problem/Plan - 8:  ·  Problem: Prophylactic measure.  Plan: Eliquis on hold for planned surgery , continue Lovenox     Problem / Plan - 9 : Hx of Hypothyroidism -  Synthroid restarted     Problem / Plan - 10: Episode of  Hypoglycemia , insulin decreased , pre meal insulin d/martin , will observe closely     Problem /Plan - 11: Urinary retention - resolved , no need for straight ,  on Flomax     Problem / Plan -12: UTI -  treated     Problem / Plan - 13 : Hyponatremia - resolved , follow up BMP in 2-3 days .

## 2017-04-12 LAB
ANION GAP SERPL CALC-SCNC: 13 MMOL/L — SIGNIFICANT CHANGE UP (ref 5–17)
BUN SERPL-MCNC: 20 MG/DL — SIGNIFICANT CHANGE UP (ref 8–20)
CALCIUM SERPL-MCNC: 9 MG/DL — SIGNIFICANT CHANGE UP (ref 8.6–10.2)
CHLORIDE SERPL-SCNC: 95 MMOL/L — LOW (ref 98–107)
CO2 SERPL-SCNC: 25 MMOL/L — SIGNIFICANT CHANGE UP (ref 22–29)
CREAT SERPL-MCNC: 0.92 MG/DL — SIGNIFICANT CHANGE UP (ref 0.5–1.3)
GLUCOSE SERPL-MCNC: 206 MG/DL — HIGH (ref 70–115)
HCT VFR BLD CALC: 31.4 % — LOW (ref 37–47)
HGB BLD-MCNC: 10.2 G/DL — LOW (ref 12–16)
MCHC RBC-ENTMCNC: 27.6 PG — SIGNIFICANT CHANGE UP (ref 27–31)
MCHC RBC-ENTMCNC: 32.5 G/DL — SIGNIFICANT CHANGE UP (ref 32–36)
MCV RBC AUTO: 85.1 FL — SIGNIFICANT CHANGE UP (ref 81–99)
PLATELET # BLD AUTO: 320 K/UL — SIGNIFICANT CHANGE UP (ref 150–400)
POTASSIUM SERPL-MCNC: 4.2 MMOL/L — SIGNIFICANT CHANGE UP (ref 3.5–5.3)
POTASSIUM SERPL-SCNC: 4.2 MMOL/L — SIGNIFICANT CHANGE UP (ref 3.5–5.3)
RBC # BLD: 3.69 M/UL — LOW (ref 4.4–5.2)
RBC # FLD: 14.9 % — SIGNIFICANT CHANGE UP (ref 11–15.6)
SODIUM SERPL-SCNC: 133 MMOL/L — LOW (ref 135–145)
WBC # BLD: 7.8 K/UL — SIGNIFICANT CHANGE UP (ref 4.8–10.8)
WBC # FLD AUTO: 7.8 K/UL — SIGNIFICANT CHANGE UP (ref 4.8–10.8)

## 2017-04-12 PROCEDURE — 99233 SBSQ HOSP IP/OBS HIGH 50: CPT

## 2017-04-12 RX ORDER — TRAMADOL HYDROCHLORIDE 50 MG/1
50 TABLET ORAL EVERY 6 HOURS
Qty: 0 | Refills: 0 | Status: DISCONTINUED | OUTPATIENT
Start: 2017-04-12 | End: 2017-04-13

## 2017-04-12 RX ORDER — SODIUM CHLORIDE 9 MG/ML
500 INJECTION INTRAMUSCULAR; INTRAVENOUS; SUBCUTANEOUS ONCE
Qty: 0 | Refills: 0 | Status: DISCONTINUED | OUTPATIENT
Start: 2017-04-12 | End: 2017-04-12

## 2017-04-12 RX ORDER — SODIUM CHLORIDE 9 MG/ML
500 INJECTION INTRAMUSCULAR; INTRAVENOUS; SUBCUTANEOUS
Qty: 0 | Refills: 0 | Status: DISCONTINUED | OUTPATIENT
Start: 2017-04-12 | End: 2017-04-12

## 2017-04-12 RX ORDER — TRAMADOL HYDROCHLORIDE 50 MG/1
25 TABLET ORAL
Qty: 0 | Refills: 0 | Status: DISCONTINUED | OUTPATIENT
Start: 2017-04-12 | End: 2017-04-13

## 2017-04-12 RX ORDER — LIDOCAINE 4 G/100G
1 CREAM TOPICAL
Qty: 0 | Refills: 0 | Status: DISCONTINUED | OUTPATIENT
Start: 2017-04-12 | End: 2017-04-12

## 2017-04-12 RX ORDER — SODIUM CHLORIDE 9 MG/ML
1000 INJECTION INTRAMUSCULAR; INTRAVENOUS; SUBCUTANEOUS
Qty: 0 | Refills: 0 | Status: DISCONTINUED | OUTPATIENT
Start: 2017-04-12 | End: 2017-04-13

## 2017-04-12 RX ORDER — OXYCODONE HYDROCHLORIDE 5 MG/1
10 TABLET ORAL EVERY 12 HOURS
Qty: 0 | Refills: 0 | Status: DISCONTINUED | OUTPATIENT
Start: 2017-04-12 | End: 2017-04-13

## 2017-04-12 RX ORDER — DIBUCAINE 1 %
1 OINTMENT (GRAM) RECTAL THREE TIMES A DAY
Qty: 0 | Refills: 0 | Status: DISCONTINUED | OUTPATIENT
Start: 2017-04-12 | End: 2017-04-13

## 2017-04-12 RX ORDER — CEFAZOLIN SODIUM 1 G
2000 VIAL (EA) INJECTION EVERY 12 HOURS
Qty: 0 | Refills: 0 | Status: COMPLETED | OUTPATIENT
Start: 2017-04-12 | End: 2017-04-13

## 2017-04-12 RX ADMIN — OXYCODONE HYDROCHLORIDE 10 MILLIGRAM(S): 5 TABLET ORAL at 17:09

## 2017-04-12 RX ADMIN — ENOXAPARIN SODIUM 75 MILLIGRAM(S): 100 INJECTION SUBCUTANEOUS at 17:09

## 2017-04-12 RX ADMIN — Medication 1 APPLICATION(S): at 21:47

## 2017-04-12 RX ADMIN — Medication 2: at 17:09

## 2017-04-12 RX ADMIN — MIRTAZAPINE 7.5 MILLIGRAM(S): 45 TABLET, ORALLY DISINTEGRATING ORAL at 21:47

## 2017-04-12 RX ADMIN — Medication 1 TABLET(S): at 13:27

## 2017-04-12 RX ADMIN — TRAMADOL HYDROCHLORIDE 25 MILLIGRAM(S): 50 TABLET ORAL at 10:00

## 2017-04-12 RX ADMIN — Medication 0.25 MILLIGRAM(S): at 11:43

## 2017-04-12 RX ADMIN — LIDOCAINE 1 PATCH: 4 CREAM TOPICAL at 11:45

## 2017-04-12 RX ADMIN — GABAPENTIN 200 MILLIGRAM(S): 400 CAPSULE ORAL at 05:49

## 2017-04-12 RX ADMIN — ATORVASTATIN CALCIUM 40 MILLIGRAM(S): 80 TABLET, FILM COATED ORAL at 21:47

## 2017-04-12 RX ADMIN — ENOXAPARIN SODIUM 75 MILLIGRAM(S): 100 INJECTION SUBCUTANEOUS at 06:18

## 2017-04-12 RX ADMIN — GABAPENTIN 300 MILLIGRAM(S): 400 CAPSULE ORAL at 21:47

## 2017-04-12 RX ADMIN — AMLODIPINE BESYLATE 10 MILLIGRAM(S): 2.5 TABLET ORAL at 05:49

## 2017-04-12 RX ADMIN — PANTOPRAZOLE SODIUM 40 MILLIGRAM(S): 20 TABLET, DELAYED RELEASE ORAL at 05:50

## 2017-04-12 RX ADMIN — ZINC SULFATE TAB 220 MG (50 MG ZINC EQUIVALENT) 220 MILLIGRAM(S): 220 (50 ZN) TAB at 11:45

## 2017-04-12 RX ADMIN — TRAMADOL HYDROCHLORIDE 25 MILLIGRAM(S): 50 TABLET ORAL at 05:51

## 2017-04-12 RX ADMIN — TAMSULOSIN HYDROCHLORIDE 0.4 MILLIGRAM(S): 0.4 CAPSULE ORAL at 21:47

## 2017-04-12 RX ADMIN — GABAPENTIN 200 MILLIGRAM(S): 400 CAPSULE ORAL at 11:45

## 2017-04-12 RX ADMIN — TRAMADOL HYDROCHLORIDE 25 MILLIGRAM(S): 50 TABLET ORAL at 06:51

## 2017-04-12 RX ADMIN — Medication 500 MILLIGRAM(S): at 11:44

## 2017-04-12 RX ADMIN — Medication 650 MILLIGRAM(S): at 17:16

## 2017-04-12 RX ADMIN — Medication 100 MILLIGRAM(S): at 05:46

## 2017-04-12 RX ADMIN — TRAMADOL HYDROCHLORIDE 25 MILLIGRAM(S): 50 TABLET ORAL at 20:08

## 2017-04-12 RX ADMIN — SODIUM CHLORIDE 75 MILLILITER(S): 9 INJECTION INTRAMUSCULAR; INTRAVENOUS; SUBCUTANEOUS at 21:45

## 2017-04-12 RX ADMIN — Medication 100 MILLIGRAM(S): at 17:08

## 2017-04-12 RX ADMIN — NYSTATIN CREAM 1 APPLICATION(S): 100000 CREAM TOPICAL at 17:16

## 2017-04-12 RX ADMIN — Medication 88 MICROGRAM(S): at 05:49

## 2017-04-12 RX ADMIN — OXYCODONE HYDROCHLORIDE 10 MILLIGRAM(S): 5 TABLET ORAL at 18:00

## 2017-04-12 RX ADMIN — INSULIN GLARGINE 12 UNIT(S): 100 INJECTION, SOLUTION SUBCUTANEOUS at 21:52

## 2017-04-12 RX ADMIN — Medication 2: at 11:46

## 2017-04-12 RX ADMIN — Medication 325 MILLIGRAM(S): at 11:45

## 2017-04-12 RX ADMIN — LISINOPRIL 2.5 MILLIGRAM(S): 2.5 TABLET ORAL at 05:49

## 2017-04-12 RX ADMIN — Medication 0.25 MILLIGRAM(S): at 21:48

## 2017-04-12 RX ADMIN — Medication 100 MILLIGRAM(S): at 05:50

## 2017-04-12 RX ADMIN — TRAMADOL HYDROCHLORIDE 25 MILLIGRAM(S): 50 TABLET ORAL at 09:50

## 2017-04-12 RX ADMIN — Medication 4: at 08:55

## 2017-04-12 RX ADMIN — Medication 50 MILLIGRAM(S): at 17:08

## 2017-04-12 RX ADMIN — Medication 1 MILLIGRAM(S): at 11:45

## 2017-04-12 RX ADMIN — Medication 50 MILLIGRAM(S): at 05:49

## 2017-04-12 RX ADMIN — NYSTATIN CREAM 1 APPLICATION(S): 100000 CREAM TOPICAL at 05:50

## 2017-04-12 NOTE — PROGRESS NOTE ADULT - SUBJECTIVE AND OBJECTIVE BOX
Patient s/p L BKA now with stump breakdown. No signs op sepsis. Pt c/o right foot rest pain.     Vital Signs Last 24 Hrs  T(C): 37, Max: 38.3 (04-11 @ 17:00)  T(F): 98.6, Max: 101 (04-11 @ 17:00)  HR: 80 (80 - 100)  BP: 149/67 (126/56 - 150/61)  BP(mean): --  RR: 16 (16 - 17)  SpO2: 97% (97% - 100%)                          10.2   7.8   )-----------( 320      ( 12 Apr 2017 06:53 )             31.4   04-12    133<L>  |  95<L>  |  20.0  ----------------------------<  206<H>  4.2   |  25.0  |  0.92    Ca    9.0      12 Apr 2017 06:53      PE:  L BKA with stump dehiscence   R foot 2nd toe distal dry eschar, lateral foot area of dark discoloration, heel stage 1 decubitus

## 2017-04-12 NOTE — CHART NOTE - NSCHARTNOTEFT_GEN_A_CORE
Pt noted to have temperature of 101 this evening. All other vitals stable. Currently receiving IV Zosyn. Was evaluated by ID yesterday when she was afebrile. No leukocytosis today. Follow up call placed to ID, awaiting call back. Discussed with medicine team, Dr. Ramirez, will order blood cultures x 2 and CBC for the AM. Continue to monitor temperature and vitals.

## 2017-04-12 NOTE — PROGRESS NOTE ADULT - ASSESSMENT
A: NPUAP pressure ulcer, Stage 1 on the right heel and the medial PIPJ of the 2nd digit from the hallux    P: Patient evaluated, chart reviewed  Gauze used for interspace spacers to relieve interdigital pressure, secured with cling.  Recommend the daughter purchase silicone spacers at the pharmacy for greater relief  New offloading boot procured for the patient and applied to the right foot, heel offloaded  Discussed with the daughter the importance of offloading  Patient is podiatrically stable  Will continue to monitor in house until patient receives appropriate toe spacers.

## 2017-04-12 NOTE — PROGRESS NOTE ADULT - ASSESSMENT
85 yr old female with PMH CAD s/p stents, DM2, hypertension, hyperlipidemia, anxiety, significant PVD s/p multiple stents, paroxysmal atrial fibrillation on Eliquis admitted for hypertensive urgency post LE angiogram. 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 Hospital course complicated by urinary retention with UTI (failed TOV), hyponatremia likely due to pain and improved with NaCl (per nephrology) and pain medications. BP meds and insulin coverage titrated for better control.  Patient was   transferred  to acute rehab , pain poorly controlled , duragesic  patch discontinued due to lethargy ,feels better low blood sugar this am . Revaluated by vascular surgery , found to have  postoperative wound dehiscence. Plan for AKA on Thursday . Seen by cardiologist - cleared , seen by ID - recommended preoperative Abx . Patient with ABLA with Hg 8.1 , s/p PRBC X 2 , to keep Hg > 10     Problem/Plan - 1:  ·  Problem: Type 2 diabetes mellitus with hypoglycemia without coma, with long-term current use of insulin.  Plan: discontinue premeals humolog cont sliding ,will increase Lantus back to 15 units for better control  , ADA diet , ISSC    Problem/Plan - 2:  ·  Problem: PVD (peripheral vascular disease).  Plan: s/p left BKA  Now  with  L stump dehiscence, contracture of the left knee ,  as per vascular   - L AKA on  Friday , possible angiogram / angioplasty of R LE  - Continue BID wound care  - ID  consult  note noted and appreciated , no need for long term Abx , only pre-op abx to be given , will continue abx for 24 hrs    Will not restart Plavix at this time , hold oral AC , start full dose Lovenox . Hold Lovenox after dose on 4 /13. Restart  Eliquis  once OK with vascular surgery , need to d/w patient cardiologist about restarting Plavix if OK with vascular to restart .    Problem/Plan - 3:  ·  Problem: Constipation, unspecified constipation type.  Plan: stool softener on colace and senna    laxative as needed, dulcolax prn , start miralax daily.     Problem/Plan - 4:  ·  Problem: Acute blood loss anemia , likely on anemia of chronic disease   Plan: 2nd unit of PRBC transfused    to keep  Hg > 10  as patient has hx of CAD      Problem/Plan - 5:  ·  Problem: CAD (coronary artery disease).  Plan: Continue Metoprolol and Lipitor . Patient with Hx of cardiac stents . Will need to restart Plavix after surgery once OK with vascular( will need to d/w patient own cardiologist ) .  Cardiology consult  noted - patient cleared with intermediate risk .    Problem/Plan - 6:  Problem: Essential hypertension. Plan: Controlled with Norvasc and Metoprolol.    Problem/Plan - 7:  ·  Problem: PAF (paroxysmal atrial fibrillation).  Plan: Continue Metoprolol , hold  Eliquis. Restart after surgery once OK with vascular    Problem/Plan - 8:  ·  Problem: Prophylactic measure.  Plan: Eliquis on hold for planned surgery , continue Lovenox  ,last dose on 4/13    Problem / Plan - 9 : Hx of Hypothyroidism -  Synthroid restarted     Problem / Plan - 10: Episode of  Hypoglycemia , insulin decreased , pre meal insulin d/martin , will observe closely     Problem / Plan - 13 : Hyponatremia - will monitor

## 2017-04-12 NOTE — PROGRESS NOTE ADULT - SUBJECTIVE AND OBJECTIVE BOX
HISTORY OF PRESENT ILLNESS  85 yr old RH female with multiple medical problems including CAD s/p stents, diabetes, hypertension, hyperlipidemia, anxiety, significant PVD s/p multiple stents, paroxysmal atrial fibrillation was admitted 3/9/17 for elective LE angiogram. Patient was noted to have elevated BP post procedure, and hence admission requested by Dr. Irwin. Per him, patient with occluded SFA, POP b/l with minimal outflow to b/l LE. 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   Hospital course complicated by:  -  pain control management, patient being followed by palliative care.  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, being followed by nephrology   - UTI and urinary retention- currently on IV cipro, flomax, juarez catheter     NB: check with vascular surgery/cardiology about need for plavix [ ]    PMHx -   Hyperlipidemia, Hypertension, Diabetes, PVD (peripheral vascular disease), CAD (coronary artery disease)  History of cholecystectomy, H/O angioplasty    TODAY'S SUBJECTIVE & REVIEW OF SYMPTOMS  [X] Constitutional WNL     [X] Cardio WNL            [X] Resp WNL           [X] GI WNL                          [ ]  WNL                   [X] Heme WNL              [X] Endo WNL                     [ ] Skin WNL                 [ ] MSK WNL            [X] Neuro WNL                   [X] Cognitive WNL        [X] Psych WNL    Patient seen and examined. No overnight events.  Pt reports having had slept fairly well and had less pain left residual limb.  Pt c/o pain right calcaneus.  Voiding well without complaints.  +BM    VITALS  Vital Signs Last 24 Hrs  T(C): 36.9, Max: 37 (04-08 @ 12:00)  T(F): 98.5, Max: 98.6 (04-08 @ 12:00)  HR: 78 (78 - 78)  BP: 136/65 (130/70 - 145/60)  BP(mean): --  RR: 18 (16 - 18)  SpO2: 96% (93% - 96%)      PHYSICAL EXAM  Constitutional - NAD, awake  HEENT - NCAT, EOMI  Neck - Supple  Chest - CTA bilaterally  Cardiovascular - RRR, S1S2  Abdomen - BS+, Soft, NTND  Extremities -  No calf tenderness. Mild left LE edema  +knee flexion contracture  Neurologic Exam -                    Cognitive - Awake, Alert, AAO to self, place, date, year, situation     Communication - Fluent, No dysarthria     Cranial Nerves - CN 2-12 intact    Psychiatric - Mood stable, Affect WNL  Skin - moisture dermatitis in intergluteal folds, right heel DTI + Stage I  Callous formation noted right medial forefoot discolored  Tip right second  discoloration-Stage I  Wounds - left BKA residual limb with staples, area of central dehiscence-packed with moistened kerlix    FUNCTIONAL PROGRESS  Gait: unable  Transfers: Min/mod A  Wheelchair mobility: 80' supervision  ADLs: grooming and upper body dressing supervision, mod assist lower body dressing  Functional transfers: Toilet and bed transfers mod assist, bed mobility min A  RECENT LABS:                                    10.2   7.8   )-----------( 320      ( 12 Apr 2017 06:53 )             31.4                9.3    7.4   )-----------( 320      ( 11 Apr 2017 06:21 )             28.8                        8.1    6.7   )-----------( 347      ( 10 Apr 2017 06:49 )             25.5   04-10    134<L>  |  97<L>  |  20.0  ----------------------------<  221<H>  4.3   |  26.0  |  0.97    Ca    9.1      10 Apr 2017 06:49    TPro  5.9<L>  /  Alb  2.9<L>  /  TBili  0.2<L>  /  DBili  x   /  AST  15  /  ALT  21  /  AlkPhos  89  04-10    U/A 4/5: negative      CAPILLARY BLOOD GLUCOSE  CAPILLARY BLOOD GLUCOSE  208 (12 Apr 2017 08:00)  220 (11 Apr 2017 22:05)    225 (11 Apr 2017 08:00)  144 (10 Apr 2017 21:34)  269 (10 Apr 2017 17:00)  139 (10 Apr 2017 12:00)      CURRENT MEDICATIONS  MEDICATIONS  (STANDING):  multivitamin 1Tablet(s) Oral daily  tamsulosin 0.4milliGRAM(s) Oral at bedtime  apixaban 2.5milliGRAM(s) Oral two times a day  insulin lispro (HumaLOG) corrective regimen sliding scale  SubCutaneous three times a day before meals  atorvastatin 40milliGRAM(s) Oral at bedtime  amLODIPine   Tablet 10milliGRAM(s) Oral daily  ferrous    sulfate 325milliGRAM(s) Oral daily  pantoprazole    Tablet 40milliGRAM(s) Oral before breakfast  levothyroxine 75MICROGram(s) Oral daily  folic acid 1milliGRAM(s) Oral daily  zinc sulfate 220milliGRAM(s) Oral daily  ascorbic acid 500milliGRAM(s) Oral daily  acetaminophen   Tablet 650milliGRAM(s) Oral every 6 hours  metoprolol 50milliGRAM(s) Oral two times a day  lidocaine   Patch 1Patch Transdermal daily  saccharomyces boulardii 250milliGRAM(s) Oral every 12 hours  docusate sodium 100milliGRAM(s) Oral two times a day  mirtazapine 7.5milliGRAM(s) Oral at bedtime  polyethylene glycol 3350 17Gram(s) Oral daily  gabapentin 200milliGRAM(s) Oral <User Schedule>  gabapentin 300milliGRAM(s) Oral <User Schedule>  nystatin Powder 1Application(s) Topical two times a day  lisinopril 2.5milliGRAM(s) Oral daily  insulin glargine Injectable (LANTUS) 10Unit(s) SubCutaneous at bedtime    MEDICATIONS  (PRN):  acetaminophen   Tablet 650milliGRAM(s) Oral every 6 hours PRN For Temp greater than 38 C (100.4 F)  aluminum hydroxide/magnesium hydroxide/simethicone Suspension 30milliLiter(s) Oral every 4 hours PRN Dyspepsia  ondansetron   Disintegrating Tablet 4milliGRAM(s) Oral every 8 hours PRN Nausea and/or Vomiting  senna 2Tablet(s) Oral at bedtime PRN Constipation  traMADol 25milliGRAM(s) Oral every 4 hours PRN Moderate Pain (4 - 6)  traMADol 50milliGRAM(s) Oral every 6 hours PRN Severe Pain (7 - 10)  bisacodyl 5milliGRAM(s) Oral every 12 hours PRN Constipation        ASSESSMENT & PLAN    84yo female with DM, CAD, PVD s/p right BKA with gait and functional impairments    Left BKA dehiscence:  Revision to AKA pending for 4/15 as well as angiogram RLE.  Cardiology for preop clearance and ID consult requested as per surgery.  1U PRBCs given 4/10 and 4/11.  Cont BID dressing changes with moistened kerlix  as per surgery.  Hospitalist to [facility] for medical clearance.  Eliquis discontinued; ordered Lovenox 75 SC q12 in lieu of Eliquis for now      Urinary retention:  Improved:  Bladder scans were 142, 278, 42.  DCd scans.  Cont  Flomax.      Post-op pain control, phantom pain, anxiety: tylenol prn, Change tramadol PRN to standing, gabapentin TID, xanax, lidoderm patch left knee daily.  Heat to left knee prn and stretching.    Acute blood loss anemia: Improved H/H at 9.3/28.8  Continue ferrous sulfate and folic acid.     BKA wound healing: MVI, vit C, zinc, dressing change with moistened Kerlix packing BID. Vascular follow-up     Right heel DTI and now stage I with callous forefoot and second toe contusion:  Cont z loretta boot and pillow for off-loading heel, No sting barrier film bid.  Appreciate pvt podiatry consult with Dr Toscano: NPUAP pressure ulcer, Stage 1 on the right heel and the medial PIPJ of the 2nd digit from the hallux Gauze used for interspace spacers to relieve interdigital pressure, secured with cling.  Recommend the daughter purchase silicone spacers at the pharmacy for greater relief.  Off loading right heel.  Ordered PRAFO    DMT2: Lantus 10units-Increased to 12U due to hyperglycemia 4/11.  ISS AC HS    pAFib/CAD: Eliquis on hold for pending surgery, statin, metoprolol    HTN: metoprolol, norvasc, lisinopril    Hypothyroididsm: TSH 4.76.  Increased synthroid from 75mcg to 88mcg 4/10    Depression with poor appetite: Remeron 7.5mg qhs initiated 4/7     DVT PPx: Eliquis on hold due to pending surgery.  Lovenox ordered for now and SCDs RLE    Protein calorie malnutrition: Prealbumin 13-Cont glucerna-increased from daily to bid    Continue comprehensive acute rehab program consisting of 3hrs/day of OT/PT

## 2017-04-12 NOTE — PROGRESS NOTE ADULT - SUBJECTIVE AND OBJECTIVE BOX
Patient seen and examined . C/O LLE stump pain ,  R foot pain at rest , today pain not well controlled   ,  feels anxious / fatigue  as per daughter  , daughter Emperatriz  at bed side .    CC: L LE stump pain , R foot pain at rest , fatigue       HPI: 85 yr old female with PMH CAD s/p stents x 2 , DM2, hypertension, hyperlipidemia, anxiety, significant PVD s/p multiple stents, paroxysmal atrial fibrillation on Eliquis admitted for hypertensive urgency post LE angiogram. 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 Hospital course complicated by urinary retention with UTI (failed TOV), hyponatremia likely due to pain and improved with NaCl (per nephrology) and pain medications. BP meds and insulin coverage titrated for better control.  Patient stable for discharge to acute rehab. Found to have postoperative L LE wound dehiscence . C/O R foot pain . Seen by Cardiology and cleared . Seen by ID - no need for long term abx , will give pre- op abx     PAST MEDICAL & SURGICAL HISTORY:  Hyperlipidemia  Hypertension  Diabetes  PVD (peripheral vascular disease)  CAD (coronary artery disease)  History of cholecystectomy  H/O angioplasty      MEDICATIONS  (STANDING):  multivitamin 1Tablet(s) Oral daily  tamsulosin 0.4milliGRAM(s) Oral at bedtime  insulin lispro (HumaLOG) corrective regimen sliding scale  SubCutaneous three times a day before meals  dextrose 5%. 1000milliLiter(s) IV Continuous <Continuous>  dextrose 50% Injectable 12.5Gram(s) IV Push once  dextrose 50% Injectable 25Gram(s) IV Push once  dextrose 50% Injectable 25Gram(s) IV Push once  atorvastatin 40milliGRAM(s) Oral at bedtime  amLODIPine   Tablet 10milliGRAM(s) Oral daily  ferrous    sulfate 325milliGRAM(s) Oral daily  pantoprazole    Tablet 40milliGRAM(s) Oral before breakfast  folic acid 1milliGRAM(s) Oral daily  zinc sulfate 220milliGRAM(s) Oral daily  ascorbic acid 500milliGRAM(s) Oral daily  metoprolol 50milliGRAM(s) Oral two times a day  lidocaine   Patch 1Patch Transdermal daily  docusate sodium 100milliGRAM(s) Oral two times a day  mirtazapine 7.5milliGRAM(s) Oral at bedtime  polyethylene glycol 3350 17Gram(s) Oral daily  gabapentin 200milliGRAM(s) Oral <User Schedule>  gabapentin 300milliGRAM(s) Oral <User Schedule>  nystatin Powder 1Application(s) Topical two times a day  levothyroxine 88MICROGram(s) Oral daily  traMADol 25milliGRAM(s) Oral every 4 hours  enoxaparin Injectable 75milliGRAM(s) SubCutaneous two times a day  ALPRAZolam 0.25milliGRAM(s) Oral <User Schedule>  insulin glargine Injectable (LANTUS) 12Unit(s) SubCutaneous at bedtime  ceFAZolin   IVPB 2000milliGRAM(s) IV Intermittent every 12 hours  lisinopril 2.5milliGRAM(s) Oral daily    MEDICATIONS  (PRN):  acetaminophen   Tablet 650milliGRAM(s) Oral every 6 hours PRN For Temp greater than 38 C (100.4 F)  aluminum hydroxide/magnesium hydroxide/simethicone Suspension 30milliLiter(s) Oral every 4 hours PRN Dyspepsia  dextrose Gel 1Dose(s) Oral once PRN Blood Glucose LESS THAN 70 milliGRAM(s)/deciLiter  glucagon  Injectable 1milliGRAM(s) IntraMuscular once PRN Glucose <70 milliGRAM(s)/deciLiter  ondansetron   Disintegrating Tablet 4milliGRAM(s) Oral every 8 hours PRN Nausea and/or Vomiting  senna 2Tablet(s) Oral at bedtime PRN Constipation  bisacodyl 5milliGRAM(s) Oral every 12 hours PRN Constipation  acetaminophen   Tablet 650milliGRAM(s) Oral every 6 hours PRN moderate pain  traMADol 25milliGRAM(s) Oral every 4 hours PRN Severe Pain (7 - 10)  ALPRAZolam 0.25milliGRAM(s) Oral two times a day PRN anxiety      LABS:                          10.2   7.8   )-----------( 320      ( 12 Apr 2017 06:53 )             31.4     04-12    133<L>  |  95<L>  |  20.0  ----------------------------<  206<H>  4.2   |  25.0  |  0.92    Ca    9.0      12 Apr 2017 06:53    REVIEW OF SYSTEMS:    CONSTITUTIONAL: No fever, weight loss,  fatigue+  EYES: No eye pain, visual disturbances, or discharge  ENMT:  No difficulty hearing, tinnitus, vertigo; No sinus or throat pain  NECK: No pain or stiffness  RESPIRATORY: No cough, wheezing, chills or hemoptysis; No shortness of breath  CARDIOVASCULAR: No chest pain, palpitations, dizziness, or R  leg swelling  GASTROINTESTINAL: No abdominal or epigastric pain. No nausea, vomiting, or hematemesis; No diarrhea or constipation. No melena or hematochezia.  GENITOURINARY: No dysuria, frequency, hematuria, or incontinence  NEUROLOGICAL: No headaches, memory loss, loss of strength, numbness, or tremors  SKIN: No itching, burning, rashes, or lesions   LYMPH NODES: No enlarged glands  ENDOCRINE: No heat or cold intolerance; No hair loss  MUSCULOSKELETAL: no joint / muscle pain or swelling   PSYCHIATRIC: No depression, anxiety, mood swings, or difficulty sleeping  HEME/LYMPH: No easy bruising, or bleeding gums  ALLERGY AND IMMUNOLOGIC: No hives or eczema    Vital Signs Last 24 Hrs  T(C): 37, Max: 38.3 (04-11 @ 17:00)  T(F): 98.6, Max: 101 (04-11 @ 17:00)  HR: 80 (80 - 100)  BP: 149/67 (126/56 - 150/61)  BP(mean): --  RR: 16 (16 - 17)  SpO2: 97% (97% - 100%)  PHYSICAL EXAM:    GENERAL: NAD, well-groomed, well-developed  HEAD:  Atraumatic, Normocephalic  EYES: EOMI, PERRLA, conjunctiva and sclera clear  NECK: Supple, No JVD, Normal thyroid  NERVOUS SYSTEM:  Alert & Oriented X3, no focal deficit  CHEST/LUNG: CTA b/l ,  no  rales, rhonchi, wheezing, or rubs  HEART: Regular rate and rhythm; No murmurs, rubs, or gallops  ABDOMEN: Soft, Nontender, Nondistended; Bowel sounds present  EXTREMITIES: s/p L BKA - wound dehiscence + , R second toe dry eschar + , stage 1 pressure ulcer + , Z- FLOW boot +

## 2017-04-12 NOTE — PROGRESS NOTE ADULT - SUBJECTIVE AND OBJECTIVE BOX
85 year old diabetic female seen bedside for care and evaluation of right heel "redness" and 2nd digit. Pt is in NAD. Pt's daughter is bedside and started she did buy toe spacers but they are too big. Pt denies F/C/N/V/SOB/D.  HPI: During initial exam: Daughter is bedside and states that the mother has had multiple ulcerations in the past and she is concerned because "the heel has been red the entire time we've been here." Patient is currently awaiting AKA on the left leg as a recent BKA has dehisced. Patient has undergone numerous revascularization procedures on both legs.        PAST MEDICAL & SURGICAL HISTORY:  Hyperlipidemia  Hypertension  Diabetes  PVD (peripheral vascular disease)  CAD (coronary artery disease)  History of cholecystectomy  H/O angioplasty    O: (focused on right foot)  No palpable pedal pulses, TG WNL, CFT WNL  No significant edema, no erythema, however heel appears hyperemic and irritated, no ecchymosis on the heel, no lymphatic streaking, no drainage, mild warmth to touch  Second digit exhibits ecchymosis/possible superficial hematoma just proximal to the nail. No trauma recounted. The hallux is abrading the medial PIPJ of the second digit and it appears irritated with slight pain to palpation.   Hammered digits (2-5) and Bunion deformity noted.  No open lesions, no clinical signs of infection.

## 2017-04-13 ENCOUNTER — INPATIENT (INPATIENT)
Facility: HOSPITAL | Age: 82
LOS: 13 days | Discharge: NURSING, REHAB OR LONG-TERM CARE | DRG: 475 | End: 2017-04-27
Attending: INTERNAL MEDICINE | Admitting: INTERNAL MEDICINE
Payer: COMMERCIAL

## 2017-04-13 VITALS
DIASTOLIC BLOOD PRESSURE: 50 MMHG | SYSTOLIC BLOOD PRESSURE: 109 MMHG | TEMPERATURE: 101 F | RESPIRATION RATE: 16 BRPM | OXYGEN SATURATION: 98 % | HEART RATE: 74 BPM

## 2017-04-13 VITALS
WEIGHT: 161.38 LBS | SYSTOLIC BLOOD PRESSURE: 142 MMHG | OXYGEN SATURATION: 93 % | TEMPERATURE: 98 F | HEART RATE: 84 BPM | DIASTOLIC BLOOD PRESSURE: 71 MMHG | HEIGHT: 67 IN | RESPIRATION RATE: 18 BRPM

## 2017-04-13 DIAGNOSIS — Z90.49 ACQUIRED ABSENCE OF OTHER SPECIFIED PARTS OF DIGESTIVE TRACT: Chronic | ICD-10-CM

## 2017-04-13 DIAGNOSIS — R50.9 FEVER, UNSPECIFIED: ICD-10-CM

## 2017-04-13 DIAGNOSIS — T81.30XA DISRUPTION OF WOUND, UNSPECIFIED, INITIAL ENCOUNTER: ICD-10-CM

## 2017-04-13 DIAGNOSIS — Z98.62 PERIPHERAL VASCULAR ANGIOPLASTY STATUS: Chronic | ICD-10-CM

## 2017-04-13 LAB
APPEARANCE UR: CLEAR — SIGNIFICANT CHANGE UP
BASOPHILS # BLD AUTO: 0 K/UL — SIGNIFICANT CHANGE UP (ref 0–0.2)
BASOPHILS NFR BLD AUTO: 0.3 % — SIGNIFICANT CHANGE UP (ref 0–2)
BILIRUB UR-MCNC: NEGATIVE — SIGNIFICANT CHANGE UP
COLOR SPEC: YELLOW — SIGNIFICANT CHANGE UP
DIFF PNL FLD: ABNORMAL
EOSINOPHIL # BLD AUTO: 0.3 K/UL — SIGNIFICANT CHANGE UP (ref 0–0.5)
EOSINOPHIL NFR BLD AUTO: 5.1 % — SIGNIFICANT CHANGE UP (ref 0–6)
EPI CELLS # UR: SIGNIFICANT CHANGE UP
GLUCOSE UR QL: NEGATIVE MG/DL — SIGNIFICANT CHANGE UP
HCT VFR BLD CALC: 31.1 % — LOW (ref 37–47)
HGB BLD-MCNC: 10 G/DL — LOW (ref 12–16)
KETONES UR-MCNC: NEGATIVE — SIGNIFICANT CHANGE UP
LEUKOCYTE ESTERASE UR-ACNC: ABNORMAL
LYMPHOCYTES # BLD AUTO: 0.8 K/UL — LOW (ref 1–4.8)
LYMPHOCYTES # BLD AUTO: 12.4 % — LOW (ref 20–55)
MCHC RBC-ENTMCNC: 27.6 PG — SIGNIFICANT CHANGE UP (ref 27–31)
MCHC RBC-ENTMCNC: 32.2 G/DL — SIGNIFICANT CHANGE UP (ref 32–36)
MCV RBC AUTO: 85.9 FL — SIGNIFICANT CHANGE UP (ref 81–99)
MONOCYTES # BLD AUTO: 0.7 K/UL — SIGNIFICANT CHANGE UP (ref 0–0.8)
MONOCYTES NFR BLD AUTO: 10 % — SIGNIFICANT CHANGE UP (ref 3–10)
NEUTROPHILS # BLD AUTO: 4.8 K/UL — SIGNIFICANT CHANGE UP (ref 1.8–8)
NEUTROPHILS NFR BLD AUTO: 71.9 % — SIGNIFICANT CHANGE UP (ref 37–73)
NITRITE UR-MCNC: NEGATIVE — SIGNIFICANT CHANGE UP
PH UR: 7 — SIGNIFICANT CHANGE UP (ref 4.8–8)
PLATELET # BLD AUTO: 279 K/UL — SIGNIFICANT CHANGE UP (ref 150–400)
PROT UR-MCNC: 30 MG/DL
RBC # BLD: 3.62 M/UL — LOW (ref 4.4–5.2)
RBC # FLD: 15.1 % — SIGNIFICANT CHANGE UP (ref 11–15.6)
RBC CASTS # UR COMP ASSIST: SIGNIFICANT CHANGE UP /HPF (ref 0–4)
SP GR SPEC: 1 — LOW (ref 1.01–1.02)
UROBILINOGEN FLD QL: NEGATIVE MG/DL — SIGNIFICANT CHANGE UP
WBC # BLD: 6.7 K/UL — SIGNIFICANT CHANGE UP (ref 4.8–10.8)
WBC # FLD AUTO: 6.7 K/UL — SIGNIFICANT CHANGE UP (ref 4.8–10.8)
WBC UR QL: ABNORMAL

## 2017-04-13 PROCEDURE — 99239 HOSP IP/OBS DSCHRG MGMT >30: CPT

## 2017-04-13 PROCEDURE — 99233 SBSQ HOSP IP/OBS HIGH 50: CPT

## 2017-04-13 PROCEDURE — 71010: CPT | Mod: 26

## 2017-04-13 PROCEDURE — 99232 SBSQ HOSP IP/OBS MODERATE 35: CPT

## 2017-04-13 PROCEDURE — 99222 1ST HOSP IP/OBS MODERATE 55: CPT

## 2017-04-13 PROCEDURE — 99223 1ST HOSP IP/OBS HIGH 75: CPT

## 2017-04-13 RX ORDER — ATORVASTATIN CALCIUM 80 MG/1
1 TABLET, FILM COATED ORAL
Qty: 0 | Refills: 0 | COMMUNITY
Start: 2017-04-13

## 2017-04-13 RX ORDER — MIRTAZAPINE 45 MG/1
1 TABLET, ORALLY DISINTEGRATING ORAL
Qty: 0 | Refills: 0 | COMMUNITY
Start: 2017-04-13

## 2017-04-13 RX ORDER — DOCUSATE SODIUM 100 MG
1 CAPSULE ORAL
Qty: 0 | Refills: 0 | COMMUNITY
Start: 2017-04-13

## 2017-04-13 RX ORDER — INSULIN LISPRO 100/ML
0 VIAL (ML) SUBCUTANEOUS
Qty: 0 | Refills: 0 | COMMUNITY
Start: 2017-04-13

## 2017-04-13 RX ORDER — LEVOTHYROXINE SODIUM 125 MCG
1 TABLET ORAL
Qty: 0 | Refills: 0 | COMMUNITY
Start: 2017-04-13

## 2017-04-13 RX ORDER — GABAPENTIN 400 MG/1
300 CAPSULE ORAL AT BEDTIME
Qty: 0 | Refills: 0 | Status: DISCONTINUED | OUTPATIENT
Start: 2017-04-13 | End: 2017-04-14

## 2017-04-13 RX ORDER — ATORVASTATIN CALCIUM 80 MG/1
40 TABLET, FILM COATED ORAL AT BEDTIME
Qty: 0 | Refills: 0 | Status: DISCONTINUED | OUTPATIENT
Start: 2017-04-13 | End: 2017-04-14

## 2017-04-13 RX ORDER — LEVOTHYROXINE SODIUM 125 MCG
1 TABLET ORAL
Qty: 0 | Refills: 0 | COMMUNITY

## 2017-04-13 RX ORDER — POLYETHYLENE GLYCOL 3350 17 G/17G
17 POWDER, FOR SOLUTION ORAL
Qty: 0 | Refills: 0 | COMMUNITY
Start: 2017-04-13

## 2017-04-13 RX ORDER — SODIUM CHLORIDE 9 MG/ML
1000 INJECTION, SOLUTION INTRAVENOUS
Qty: 0 | Refills: 0 | Status: DISCONTINUED | OUTPATIENT
Start: 2017-04-13 | End: 2017-04-14

## 2017-04-13 RX ORDER — DOCUSATE SODIUM 100 MG
100 CAPSULE ORAL
Qty: 0 | Refills: 0 | Status: DISCONTINUED | OUTPATIENT
Start: 2017-04-13 | End: 2017-04-14

## 2017-04-13 RX ORDER — ACETAMINOPHEN 500 MG
2 TABLET ORAL
Qty: 0 | Refills: 0 | COMMUNITY
Start: 2017-04-13

## 2017-04-13 RX ORDER — TRAMADOL HYDROCHLORIDE 50 MG/1
0.5 TABLET ORAL
Qty: 0 | Refills: 0 | COMMUNITY
Start: 2017-04-13

## 2017-04-13 RX ORDER — DEXTROSE 50 % IN WATER 50 %
1 SYRINGE (ML) INTRAVENOUS ONCE
Qty: 0 | Refills: 0 | Status: DISCONTINUED | OUTPATIENT
Start: 2017-04-13 | End: 2017-04-14

## 2017-04-13 RX ORDER — ALPRAZOLAM 0.25 MG
1 TABLET ORAL
Qty: 0 | Refills: 0 | COMMUNITY
Start: 2017-04-13

## 2017-04-13 RX ORDER — DEXTROSE 50 % IN WATER 50 %
12.5 SYRINGE (ML) INTRAVENOUS ONCE
Qty: 0 | Refills: 0 | Status: DISCONTINUED | OUTPATIENT
Start: 2017-04-13 | End: 2017-04-14

## 2017-04-13 RX ORDER — METOPROLOL TARTRATE 50 MG
50 TABLET ORAL
Qty: 0 | Refills: 0 | Status: DISCONTINUED | OUTPATIENT
Start: 2017-04-13 | End: 2017-04-14

## 2017-04-13 RX ORDER — CEFAZOLIN SODIUM 1 G
0 VIAL (EA) INJECTION
Qty: 0 | Refills: 0 | COMMUNITY
Start: 2017-04-13

## 2017-04-13 RX ORDER — TAMSULOSIN HYDROCHLORIDE 0.4 MG/1
1 CAPSULE ORAL
Qty: 0 | Refills: 0 | COMMUNITY
Start: 2017-04-13

## 2017-04-13 RX ORDER — INSULIN GLARGINE 100 [IU]/ML
0 INJECTION, SOLUTION SUBCUTANEOUS
Qty: 0 | Refills: 0 | COMMUNITY
Start: 2017-04-13

## 2017-04-13 RX ORDER — NYSTATIN CREAM 100000 [USP'U]/G
1 CREAM TOPICAL
Qty: 0 | Refills: 0 | COMMUNITY
Start: 2017-04-13 | End: 2017-04-15

## 2017-04-13 RX ORDER — ASCORBIC ACID 60 MG
1 TABLET,CHEWABLE ORAL
Qty: 0 | Refills: 0 | COMMUNITY
Start: 2017-04-13

## 2017-04-13 RX ORDER — DEXTROSE 50 % IN WATER 50 %
25 SYRINGE (ML) INTRAVENOUS ONCE
Qty: 0 | Refills: 0 | Status: DISCONTINUED | OUTPATIENT
Start: 2017-04-13 | End: 2017-04-14

## 2017-04-13 RX ORDER — FOLIC ACID 0.8 MG
1 TABLET ORAL DAILY
Qty: 0 | Refills: 0 | Status: DISCONTINUED | OUTPATIENT
Start: 2017-04-13 | End: 2017-04-14

## 2017-04-13 RX ORDER — TRAMADOL HYDROCHLORIDE 50 MG/1
1 TABLET ORAL
Qty: 0 | Refills: 0 | COMMUNITY
Start: 2017-04-13

## 2017-04-13 RX ORDER — INSULIN GLARGINE 100 [IU]/ML
6 INJECTION, SOLUTION SUBCUTANEOUS ONCE
Qty: 0 | Refills: 0 | Status: DISCONTINUED | OUTPATIENT
Start: 2017-04-13 | End: 2017-04-13

## 2017-04-13 RX ORDER — ENOXAPARIN SODIUM 100 MG/ML
75 INJECTION SUBCUTANEOUS ONCE
Qty: 0 | Refills: 0 | Status: COMPLETED | OUTPATIENT
Start: 2017-04-13 | End: 2017-04-13

## 2017-04-13 RX ORDER — FERROUS SULFATE 325(65) MG
325 TABLET ORAL DAILY
Qty: 0 | Refills: 0 | Status: DISCONTINUED | OUTPATIENT
Start: 2017-04-13 | End: 2017-04-14

## 2017-04-13 RX ORDER — SODIUM CHLORIDE 9 MG/ML
0 INJECTION INTRAMUSCULAR; INTRAVENOUS; SUBCUTANEOUS
Qty: 0 | Refills: 0 | COMMUNITY
Start: 2017-04-13

## 2017-04-13 RX ORDER — SENNA PLUS 8.6 MG/1
2 TABLET ORAL
Qty: 0 | Refills: 0 | COMMUNITY
Start: 2017-04-13

## 2017-04-13 RX ORDER — METOPROLOL TARTRATE 50 MG
1 TABLET ORAL
Qty: 0 | Refills: 0 | COMMUNITY

## 2017-04-13 RX ORDER — SODIUM CHLORIDE 9 MG/ML
1000 INJECTION INTRAMUSCULAR; INTRAVENOUS; SUBCUTANEOUS
Qty: 0 | Refills: 0 | Status: DISCONTINUED | OUTPATIENT
Start: 2017-04-14 | End: 2017-04-13

## 2017-04-13 RX ORDER — MIRTAZAPINE 45 MG/1
7.5 TABLET, ORALLY DISINTEGRATING ORAL AT BEDTIME
Qty: 0 | Refills: 0 | Status: DISCONTINUED | OUTPATIENT
Start: 2017-04-13 | End: 2017-04-14

## 2017-04-13 RX ORDER — PANTOPRAZOLE SODIUM 20 MG/1
1 TABLET, DELAYED RELEASE ORAL
Qty: 0 | Refills: 0 | COMMUNITY
Start: 2017-04-13

## 2017-04-13 RX ORDER — INSULIN LISPRO 100/ML
VIAL (ML) SUBCUTANEOUS
Qty: 0 | Refills: 0 | Status: DISCONTINUED | OUTPATIENT
Start: 2017-04-13 | End: 2017-04-14

## 2017-04-13 RX ORDER — GLUCAGON INJECTION, SOLUTION 0.5 MG/.1ML
1 INJECTION, SOLUTION SUBCUTANEOUS ONCE
Qty: 0 | Refills: 0 | Status: DISCONTINUED | OUTPATIENT
Start: 2017-04-13 | End: 2017-04-14

## 2017-04-13 RX ORDER — LIDOCAINE 4 G/100G
0 CREAM TOPICAL
Qty: 0 | Refills: 0 | COMMUNITY
Start: 2017-04-13

## 2017-04-13 RX ORDER — TAMSULOSIN HYDROCHLORIDE 0.4 MG/1
0.4 CAPSULE ORAL AT BEDTIME
Qty: 0 | Refills: 0 | Status: DISCONTINUED | OUTPATIENT
Start: 2017-04-13 | End: 2017-04-14

## 2017-04-13 RX ORDER — ALPRAZOLAM 0.25 MG
0.25 TABLET ORAL
Qty: 0 | Refills: 0 | Status: DISCONTINUED | OUTPATIENT
Start: 2017-04-13 | End: 2017-04-14

## 2017-04-13 RX ORDER — APIXABAN 2.5 MG/1
1 TABLET, FILM COATED ORAL
Qty: 0 | Refills: 0 | COMMUNITY

## 2017-04-13 RX ORDER — PANTOPRAZOLE SODIUM 20 MG/1
40 TABLET, DELAYED RELEASE ORAL
Qty: 0 | Refills: 0 | Status: DISCONTINUED | OUTPATIENT
Start: 2017-04-13 | End: 2017-04-14

## 2017-04-13 RX ORDER — ONDANSETRON 8 MG/1
1 TABLET, FILM COATED ORAL
Qty: 0 | Refills: 0 | COMMUNITY

## 2017-04-13 RX ORDER — AMLODIPINE BESYLATE 2.5 MG/1
1 TABLET ORAL
Qty: 0 | Refills: 0 | COMMUNITY
Start: 2017-04-13

## 2017-04-13 RX ORDER — ALPRAZOLAM 0.25 MG
0.25 TABLET ORAL AT BEDTIME
Qty: 0 | Refills: 0 | Status: DISCONTINUED | OUTPATIENT
Start: 2017-04-13 | End: 2017-04-14

## 2017-04-13 RX ORDER — GABAPENTIN 400 MG/1
200 CAPSULE ORAL
Qty: 0 | Refills: 0 | Status: DISCONTINUED | OUTPATIENT
Start: 2017-04-13 | End: 2017-04-14

## 2017-04-13 RX ORDER — ENOXAPARIN SODIUM 100 MG/ML
75 INJECTION SUBCUTANEOUS
Qty: 0 | Refills: 0 | COMMUNITY
Start: 2017-04-13 | End: 2017-04-14

## 2017-04-13 RX ORDER — AMLODIPINE BESYLATE 2.5 MG/1
10 TABLET ORAL DAILY
Qty: 0 | Refills: 0 | Status: DISCONTINUED | OUTPATIENT
Start: 2017-04-13 | End: 2017-04-14

## 2017-04-13 RX ORDER — ZINC SULFATE TAB 220 MG (50 MG ZINC EQUIVALENT) 220 (50 ZN) MG
220 TAB ORAL DAILY
Qty: 0 | Refills: 0 | Status: DISCONTINUED | OUTPATIENT
Start: 2017-04-13 | End: 2017-04-14

## 2017-04-13 RX ORDER — ACETAMINOPHEN 500 MG
650 TABLET ORAL EVERY 6 HOURS
Qty: 0 | Refills: 0 | Status: DISCONTINUED | OUTPATIENT
Start: 2017-04-13 | End: 2017-04-14

## 2017-04-13 RX ORDER — SODIUM CHLORIDE 9 MG/ML
1000 INJECTION, SOLUTION INTRAVENOUS
Qty: 0 | Refills: 0 | COMMUNITY
Start: 2017-04-13

## 2017-04-13 RX ORDER — CIPROFLOXACIN LACTATE 400MG/40ML
1 VIAL (ML) INTRAVENOUS
Qty: 0 | Refills: 0 | COMMUNITY

## 2017-04-13 RX ORDER — LIDOCAINE 4 G/100G
1 CREAM TOPICAL DAILY
Qty: 0 | Refills: 0 | Status: DISCONTINUED | OUTPATIENT
Start: 2017-04-13 | End: 2017-04-14

## 2017-04-13 RX ORDER — OXYCODONE HYDROCHLORIDE 5 MG/1
1 TABLET ORAL
Qty: 0 | Refills: 0 | COMMUNITY
Start: 2017-04-13

## 2017-04-13 RX ORDER — GABAPENTIN 400 MG/1
1 CAPSULE ORAL
Qty: 0 | Refills: 0 | COMMUNITY
Start: 2017-04-13

## 2017-04-13 RX ORDER — ENOXAPARIN SODIUM 100 MG/ML
75 INJECTION SUBCUTANEOUS
Qty: 0 | Refills: 0 | Status: COMPLETED | OUTPATIENT
Start: 2017-04-13 | End: 2017-04-13

## 2017-04-13 RX ORDER — CEFAZOLIN SODIUM 1 G
2000 VIAL (EA) INJECTION EVERY 12 HOURS
Qty: 0 | Refills: 0 | Status: DISCONTINUED | OUTPATIENT
Start: 2017-04-13 | End: 2017-04-14

## 2017-04-13 RX ORDER — SENNA PLUS 8.6 MG/1
2 TABLET ORAL AT BEDTIME
Qty: 0 | Refills: 0 | Status: DISCONTINUED | OUTPATIENT
Start: 2017-04-13 | End: 2017-04-14

## 2017-04-13 RX ORDER — ONDANSETRON 8 MG/1
1 TABLET, FILM COATED ORAL
Qty: 0 | Refills: 0 | COMMUNITY
Start: 2017-04-13

## 2017-04-13 RX ORDER — FOLIC ACID 0.8 MG
1 TABLET ORAL
Qty: 0 | Refills: 0 | COMMUNITY

## 2017-04-13 RX ORDER — ONDANSETRON 8 MG/1
4 TABLET, FILM COATED ORAL EVERY 8 HOURS
Qty: 0 | Refills: 0 | Status: DISCONTINUED | OUTPATIENT
Start: 2017-04-13 | End: 2017-04-14

## 2017-04-13 RX ORDER — DIBUCAINE 1 %
1 OINTMENT (GRAM) RECTAL
Qty: 0 | Refills: 0 | COMMUNITY
Start: 2017-04-13

## 2017-04-13 RX ORDER — LISINOPRIL 2.5 MG/1
1 TABLET ORAL
Qty: 0 | Refills: 0 | COMMUNITY
Start: 2017-04-13

## 2017-04-13 RX ORDER — POLYETHYLENE GLYCOL 3350 17 G/17G
17 POWDER, FOR SOLUTION ORAL DAILY
Qty: 0 | Refills: 0 | Status: DISCONTINUED | OUTPATIENT
Start: 2017-04-13 | End: 2017-04-14

## 2017-04-13 RX ORDER — INSULIN GLARGINE 100 [IU]/ML
6 INJECTION, SOLUTION SUBCUTANEOUS ONCE
Qty: 0 | Refills: 0 | Status: COMPLETED | OUTPATIENT
Start: 2017-04-13 | End: 2017-04-14

## 2017-04-13 RX ORDER — ASCORBIC ACID 60 MG
500 TABLET,CHEWABLE ORAL DAILY
Qty: 0 | Refills: 0 | Status: DISCONTINUED | OUTPATIENT
Start: 2017-04-13 | End: 2017-04-14

## 2017-04-13 RX ORDER — LEVOTHYROXINE SODIUM 125 MCG
88 TABLET ORAL DAILY
Qty: 0 | Refills: 0 | Status: DISCONTINUED | OUTPATIENT
Start: 2017-04-13 | End: 2017-04-14

## 2017-04-13 RX ORDER — GABAPENTIN 400 MG/1
2 CAPSULE ORAL
Qty: 0 | Refills: 0 | COMMUNITY
Start: 2017-04-13

## 2017-04-13 RX ORDER — CEFAZOLIN SODIUM 1 G
1000 VIAL (EA) INJECTION
Qty: 0 | Refills: 0 | COMMUNITY
Start: 2017-04-13

## 2017-04-13 RX ORDER — TRAMADOL HYDROCHLORIDE 50 MG/1
25 TABLET ORAL
Qty: 0 | Refills: 0 | Status: DISCONTINUED | OUTPATIENT
Start: 2017-04-13 | End: 2017-04-14

## 2017-04-13 RX ORDER — TRAMADOL HYDROCHLORIDE 50 MG/1
50 TABLET ORAL EVERY 6 HOURS
Qty: 0 | Refills: 0 | Status: DISCONTINUED | OUTPATIENT
Start: 2017-04-13 | End: 2017-04-14

## 2017-04-13 RX ORDER — METOPROLOL TARTRATE 50 MG
1 TABLET ORAL
Qty: 0 | Refills: 0 | COMMUNITY
Start: 2017-04-13

## 2017-04-13 RX ORDER — NYSTATIN CREAM 100000 [USP'U]/G
1 CREAM TOPICAL
Qty: 0 | Refills: 0 | Status: DISCONTINUED | OUTPATIENT
Start: 2017-04-13 | End: 2017-04-14

## 2017-04-13 RX ORDER — DIBUCAINE 1 %
1 OINTMENT (GRAM) RECTAL THREE TIMES A DAY
Qty: 0 | Refills: 0 | Status: DISCONTINUED | OUTPATIENT
Start: 2017-04-13 | End: 2017-04-14

## 2017-04-13 RX ORDER — FERROUS GLUCONATE 100 %
1 POWDER (GRAM) MISCELLANEOUS
Qty: 0 | Refills: 0 | COMMUNITY

## 2017-04-13 RX ORDER — PANTOPRAZOLE SODIUM 20 MG/1
1 TABLET, DELAYED RELEASE ORAL
Qty: 0 | Refills: 0 | COMMUNITY

## 2017-04-13 RX ORDER — OXYCODONE HYDROCHLORIDE 5 MG/1
10 TABLET ORAL EVERY 12 HOURS
Qty: 0 | Refills: 0 | Status: DISCONTINUED | OUTPATIENT
Start: 2017-04-13 | End: 2017-04-14

## 2017-04-13 RX ORDER — INSULIN GLARGINE 100 [IU]/ML
20 INJECTION, SOLUTION SUBCUTANEOUS
Qty: 0 | Refills: 0 | COMMUNITY
Start: 2017-04-13

## 2017-04-13 RX ORDER — LISINOPRIL 2.5 MG/1
2.5 TABLET ORAL DAILY
Qty: 0 | Refills: 0 | Status: DISCONTINUED | OUTPATIENT
Start: 2017-04-13 | End: 2017-04-14

## 2017-04-13 RX ORDER — ZINC SULFATE TAB 220 MG (50 MG ZINC EQUIVALENT) 220 (50 ZN) MG
1 TAB ORAL
Qty: 0 | Refills: 0 | COMMUNITY
Start: 2017-04-13

## 2017-04-13 RX ORDER — FOLIC ACID 0.8 MG
1 TABLET ORAL
Qty: 0 | Refills: 0 | COMMUNITY
Start: 2017-04-13

## 2017-04-13 RX ADMIN — GABAPENTIN 200 MILLIGRAM(S): 400 CAPSULE ORAL at 06:22

## 2017-04-13 RX ADMIN — ZINC SULFATE TAB 220 MG (50 MG ZINC EQUIVALENT) 220 MILLIGRAM(S): 220 (50 ZN) TAB at 12:04

## 2017-04-13 RX ADMIN — LIDOCAINE 1 PATCH: 4 CREAM TOPICAL at 00:21

## 2017-04-13 RX ADMIN — Medication 100 MILLIGRAM(S): at 06:23

## 2017-04-13 RX ADMIN — Medication 100 MILLIGRAM(S): at 17:02

## 2017-04-13 RX ADMIN — Medication 1 MILLIGRAM(S): at 12:04

## 2017-04-13 RX ADMIN — NYSTATIN CREAM 1 APPLICATION(S): 100000 CREAM TOPICAL at 06:22

## 2017-04-13 RX ADMIN — Medication 50 MILLIGRAM(S): at 17:03

## 2017-04-13 RX ADMIN — ENOXAPARIN SODIUM 75 MILLIGRAM(S): 100 INJECTION SUBCUTANEOUS at 17:01

## 2017-04-13 RX ADMIN — Medication 100 MILLIGRAM(S): at 17:01

## 2017-04-13 RX ADMIN — Medication 1 APPLICATION(S): at 06:22

## 2017-04-13 RX ADMIN — Medication 6: at 17:03

## 2017-04-13 RX ADMIN — ENOXAPARIN SODIUM 75 MILLIGRAM(S): 100 INJECTION SUBCUTANEOUS at 06:21

## 2017-04-13 RX ADMIN — AMLODIPINE BESYLATE 10 MILLIGRAM(S): 2.5 TABLET ORAL at 06:19

## 2017-04-13 RX ADMIN — PANTOPRAZOLE SODIUM 40 MILLIGRAM(S): 20 TABLET, DELAYED RELEASE ORAL at 06:19

## 2017-04-13 RX ADMIN — Medication 88 MICROGRAM(S): at 06:22

## 2017-04-13 RX ADMIN — Medication 50 MILLIGRAM(S): at 06:19

## 2017-04-13 RX ADMIN — Medication 6: at 08:31

## 2017-04-13 RX ADMIN — Medication 500 MILLIGRAM(S): at 12:04

## 2017-04-13 RX ADMIN — TRAMADOL HYDROCHLORIDE 50 MILLIGRAM(S): 50 TABLET ORAL at 02:49

## 2017-04-13 RX ADMIN — Medication 1 APPLICATION(S): at 14:32

## 2017-04-13 RX ADMIN — OXYCODONE HYDROCHLORIDE 10 MILLIGRAM(S): 5 TABLET ORAL at 06:26

## 2017-04-13 RX ADMIN — TRAMADOL HYDROCHLORIDE 50 MILLIGRAM(S): 50 TABLET ORAL at 14:00

## 2017-04-13 RX ADMIN — NYSTATIN CREAM 1 APPLICATION(S): 100000 CREAM TOPICAL at 17:16

## 2017-04-13 RX ADMIN — Medication 5 MILLIGRAM(S): at 18:15

## 2017-04-13 RX ADMIN — TRAMADOL HYDROCHLORIDE 50 MILLIGRAM(S): 50 TABLET ORAL at 20:52

## 2017-04-13 RX ADMIN — GABAPENTIN 200 MILLIGRAM(S): 400 CAPSULE ORAL at 12:04

## 2017-04-13 RX ADMIN — Medication 1 TABLET(S): at 12:04

## 2017-04-13 RX ADMIN — Medication 100 MILLIGRAM(S): at 06:19

## 2017-04-13 RX ADMIN — LISINOPRIL 2.5 MILLIGRAM(S): 2.5 TABLET ORAL at 06:20

## 2017-04-13 RX ADMIN — TRAMADOL HYDROCHLORIDE 50 MILLIGRAM(S): 50 TABLET ORAL at 13:50

## 2017-04-13 RX ADMIN — POLYETHYLENE GLYCOL 3350 17 GRAM(S): 17 POWDER, FOR SOLUTION ORAL at 12:04

## 2017-04-13 RX ADMIN — Medication 650 MILLIGRAM(S): at 16:37

## 2017-04-13 RX ADMIN — LIDOCAINE 1 PATCH: 4 CREAM TOPICAL at 12:05

## 2017-04-13 RX ADMIN — Medication 325 MILLIGRAM(S): at 12:04

## 2017-04-13 RX ADMIN — OXYCODONE HYDROCHLORIDE 10 MILLIGRAM(S): 5 TABLET ORAL at 17:07

## 2017-04-13 NOTE — PROGRESS NOTE ADULT - PROBLEM SELECTOR PROBLEM 2
Acute blood loss anemia
PVD (peripheral vascular disease)
Urinary tract infection, site unspecified
Postoperative wound dehiscence, initial encounter
Urinary tract infection, site unspecified

## 2017-04-13 NOTE — H&P PST ADULT - RS GEN PE MLT RESP DETAILS PC
airway patent/clear to auscultation bilaterally/respirations non-labored/breath sounds equal/good air movement

## 2017-04-13 NOTE — H&P PST ADULT - PROBLEM SELECTOR PLAN 7
Asymptomatic.  Monitor CBC.  Continue Iron. Asymptomatic. S/P 2 units of PRBC to keep Hg > 10  .  Monitor CBC.  Continue Iron.

## 2017-04-13 NOTE — H&P PST ADULT - NEUROLOGICAL DETAILS
responds to verbal commands/generalized weakness/strength decreased/sensation intact/alert and oriented x 3

## 2017-04-13 NOTE — PROGRESS NOTE ADULT - SUBJECTIVE AND OBJECTIVE BOX
HISTORY OF PRESENT ILLNESS  85 yr old RH female with multiple medical problems including CAD s/p stents, diabetes, hypertension, hyperlipidemia, anxiety, significant PVD s/p multiple stents, paroxysmal atrial fibrillation was admitted 3/9/17 for elective LE angiogram. Patient was noted to have elevated BP post procedure, and hence admission requested by Dr. Irwin. Per him, patient with occluded SFA, POP b/l with minimal outflow to b/l LE. 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   Hospital course complicated by:  -  pain control management, patient being followed by palliative care.  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, being followed by nephrology   - UTI and urinary retention- currently on IV cipro, flomax, juarez catheter     NB: check with vascular surgery/cardiology about need for plavix [ ]    PMHx -   Hyperlipidemia, Hypertension, Diabetes, PVD (peripheral vascular disease), CAD (coronary artery disease)  History of cholecystectomy, H/O angioplasty    TODAY'S SUBJECTIVE & REVIEW OF SYMPTOMS  [X] Constitutional WNL     [X] Cardio WNL            [X] Resp WNL           [X] GI WNL                          [ ]  WNL                   [X] Heme WNL              [X] Endo WNL                     [ ] Skin WNL                 [ ] MSK WNL            [X] Neuro WNL                   [X] Cognitive WNL        [X] Psych WNL    Patient seen and examined.  Pt developed temp of 101 last night; currently afebrile.   Pt reports having had slept fairly well and had less pain left residual limb.  Pt c/o pain right calcaneus.  Voiding well without complaints.  +BM    VITALS  Vital Signs Last 24 Hrs  T(C): 36.9, Max: 37 (04-08 @ 12:00)  T(F): 98.5, Max: 98.6 (04-08 @ 12:00)  HR: 78 (78 - 78)  BP: 136/65 (130/70 - 145/60)  BP(mean): --  RR: 18 (16 - 18)  SpO2: 96% (93% - 96%)      PHYSICAL EXAM  Constitutional - NAD, awake  HEENT - NCAT, EOMI  Neck - Supple  Chest - CTA bilaterally  Cardiovascular - RRR, S1S2  Abdomen - BS+, Soft, NTND  Extremities -  No calf tenderness. Mild left LE edema  +knee flexion contracture  Neurologic Exam -                    Cognitive - Awake, Alert, AAO to self, place, date, year, situation     Communication - Fluent, No dysarthria     Cranial Nerves - CN 2-12 intact    Psychiatric - Mood stable, Affect WNL  Skin - moisture dermatitis in intergluteal folds, right heel DTI + Stage I  Callous formation noted right medial forefoot discolored  Tip right second  discoloration-Stage I  Wounds - left BKA residual limb with staples, area of central dehiscence-packed with moistened kerlix    FUNCTIONAL PROGRESS  Gait: unable  Transfers: mod/max A  Wheelchair mobility: 100' min A  ADLs: grooming and upper body dressing supervision, mod assist lower body dressing  Functional transfers: Toilet and bed transfers mod assist, bed mobility min A    RECENT LABS:                                     10.0   6.7   )-----------( 279      ( 2017 06:37 )             31.1                        10.2   7.8   )-----------( 320      ( 2017 06:53 )             31.4         04-12    133<L>  |  95<L>  |  20.0  ----------------------------<  206<H>  4.2   |  25.0  |  0.92    Ca    9.0      2017 06:53      2<L>  /  DBili  x   /  AST  15  /  ALT  21  /  AlkPhos  89  04-10    U/A 45: negative    Urinalysis Basic - ( 2017 10:17 )    Color: Yellow / Appearance: Clear / S.005 / pH: x  Gluc: x / Ketone: Negative  / Bili: Negative / Urobili: Negative mg/dL   Blood: x / Protein: 30 mg/dL / Nitrite: Negative   Leuk Esterase: Small / RBC: 0-2 /HPF / WBC 6-10   Sq Epi: x / Non Sq Epi: Occasional / Bacteria: x    CAPILLARY BLOOD GLUCOSE  127 (2017 11:30)  271 (2017 08:13)  150 (2017 21:52)  166 (2017 16:00)              CURRENT MEDICATIONS  MEDICATIONS  (STANDING):  multivitamin 1Tablet(s) Oral daily  tamsulosin 0.4milliGRAM(s) Oral at bedtime  apixaban 2.5milliGRAM(s) Oral two times a day  insulin lispro (HumaLOG) corrective regimen sliding scale  SubCutaneous three times a day before meals  atorvastatin 40milliGRAM(s) Oral at bedtime  amLODIPine   Tablet 10milliGRAM(s) Oral daily  ferrous    sulfate 325milliGRAM(s) Oral daily  pantoprazole    Tablet 40milliGRAM(s) Oral before breakfast  levothyroxine 75MICROGram(s) Oral daily  folic acid 1milliGRAM(s) Oral daily  zinc sulfate 220milliGRAM(s) Oral daily  ascorbic acid 500milliGRAM(s) Oral daily  acetaminophen   Tablet 650milliGRAM(s) Oral every 6 hours  metoprolol 50milliGRAM(s) Oral two times a day  lidocaine   Patch 1Patch Transdermal daily  saccharomyces boulardii 250milliGRAM(s) Oral every 12 hours  docusate sodium 100milliGRAM(s) Oral two times a day  mirtazapine 7.5milliGRAM(s) Oral at bedtime  polyethylene glycol 3350 17Gram(s) Oral daily  gabapentin 200milliGRAM(s) Oral <User Schedule>  gabapentin 300milliGRAM(s) Oral <User Schedule>  nystatin Powder 1Application(s) Topical two times a day  lisinopril 2.5milliGRAM(s) Oral daily  insulin glargine Injectable (LANTUS) 10Unit(s) SubCutaneous at bedtime    MEDICATIONS  (PRN):  acetaminophen   Tablet 650milliGRAM(s) Oral every 6 hours PRN For Temp greater than 38 C (100.4 F)  aluminum hydroxide/magnesium hydroxide/simethicone Suspension 30milliLiter(s) Oral every 4 hours PRN Dyspepsia  ondansetron   Disintegrating Tablet 4milliGRAM(s) Oral every 8 hours PRN Nausea and/or Vomiting  senna 2Tablet(s) Oral at bedtime PRN Constipation  traMADol 25milliGRAM(s) Oral every 4 hours PRN Moderate Pain (4 - 6)  traMADol 50milliGRAM(s) Oral every 6 hours PRN Severe Pain (7 - 10)  bisacodyl 5milliGRAM(s) Oral every 12 hours PRN Constipation        ASSESSMENT & PLAN    86yo female with DM, CAD, PVD s/p right BKA with gait and functional impairments    Left BKA dehiscence:  Revision to AKA pending for  as well as angiogram RLE.  Cardiology for preop clearance and ID consult requested as per surgery.  1U PRBCs given 4/10 and .  Cont BID dressing changes with moistened kerlix  as per surgery.  Hospitalist following and given medical clearance.  Eliquis discontinued; ordered Lovenox 75 SC q12 in lieu of Eliquis for now-last dose this pm.      Tmax 101 last pm.  ID and surgery informed.  Cont current management  Currently afebrile.  WBC WNL at 6.7.  UA neg except small LE, 6-10 WBCs.  CXR with small left pleural effusion and basilar atelectasis or pneumonia. Blood and urine cultures pending    Urinary retention:  Improved:  Bladder scans were 142, 278, 42.  DCd scans.  Cont  Flomax.      Post-op pain control, phantom pain, anxiety: tylenol prn, Change tramadol PRN to standing, gabapentin TID, xanax, lidoderm patch left knee daily.  Heat to left knee prn and stretching.  Added Oxycontin 10 bid  as well as lidocaine ointment to right foot    Acute blood loss anemia:  s/p transfusion PRBCs 4/10 and .  Improved H/H at 10/31.1  Continue ferrous sulfate and folic acid.     BKA wound healing: MVI, vit C, zinc, dressing change with moistened Kerlix packing BID. Vascular follow-up     Right heel DTI and now stage I with callous forefoot and second toe contusion:  Had z loretta boot and pillow for off-loading heel, now with PRAFO, No sting barrier film bid.  Appreciate pvt podiatry consult with Dr Toscano: NPUAP pressure ulcer, Stage 1 on the right heel and the medial PIPJ of the 2nd digit from the hallux Gauze used for interspace spacers to relieve interdigital pressure, secured with cling.  Recommend the daughter purchase silicone spacers at the pharmacy for greater relief.  Off loading right heel.  Ordered PRAFO    DMT2: Lantus 10units-Increased to 12U due to hyperglycemia .  ISS AC HS    pAFib/CAD: Eliquis on hold for pending surgery, statin, metoprolol    HTN: metoprolol, norvasc, lisinopril    Hypothyroididsm: TSH 4.76.  Increased synthroid from 75mcg to 88mcg 4/10    Depression with poor appetite: Remeron 7.5mg qhs initiated      DVT PPx: Eliquis on hold due to pending surgery.  Lovenox ordered for now and SCDs RLE-hold after pm dose for pending surgery    Protein calorie malnutrition: Prealbumin 13-Cont glucerna-increased from daily to bid.  NPO after MN    Comprehensive acute rehab program consisting of 3hrs/day of OT/PT.  Pt to be discharged to acute hospital for pending surgery

## 2017-04-13 NOTE — H&P PST ADULT - PROBLEM SELECTOR PLAN 2
Follow up blood and urine culture.  Likely secondary to LLE BKA site.  ID following - will continue Cefazolin. Follow up blood and urine culture.  Likely secondary to LLE BKA site.  ID following , d/w Dr Payne - will continue Cefazolin, patient has no cough , no WBC , CXR - read LLL small pleural effusion vs PNA. Doubt PNA .

## 2017-04-13 NOTE — H&P PST ADULT - PROBLEM SELECTOR PLAN 6
Continue ASA, Metoprolol, Lisinopril, and Lipitor.  Plavix on hold.  Will need to discuss with cardiology and vascular after surgery about restarting.

## 2017-04-13 NOTE — PROGRESS NOTE ADULT - ATTENDING COMMENTS
Agree with above.  Appreciate vascular surgery f/u.  Cont wound care, pain management, DVT ppx, bladder scans with straight cath prn pvr>350cc
Agree with above.  Pt with moderately severe pain.  Cont Fentanyl patch 25mcg; add tylenol 650mg q6hrs while awake and continue oxycodone 2.5mg prn.  Cont PT/OT, wound care, f/u labs.  Attempt voiding trial when more functional
Continue comprehensive rehab.  BP stable since IVFs given; will continue pm hours only.  Anemia of acute blood loss; Venofer added.  Appreciate vascular surgery f/u; iodoform packing to area of dehiscence bid; cover with kerlix and ace.  Multiple BMs: change colace and senna to prn
Agree with above.  Cont wound care; added vitamin c, zinc, cont glucerna daily, add off loading right heel and cont z loretta boot  Comprehensive rehab  Add fentanyl patch 12mcg for severe pain  Reduce Na tabs for hyponatremia
Agree with above.  Continue PT/OT, wound care with iodoform packing bid as per vascular, pain management-change oxy ir to tramadol prn due to severe pain and side effect of lethargy with oxy.
Consulted vascular surgery to evaluate wound at the BKA residual incision site. Significant change in opening and thick slough/serosanguinous fluid discharging. Packing is poor and insufficient from last change. This AM RN repacked wound tightly. Concern for viability of tissue around the incision as well as being a site for infection, as depth per RN appears worse.
No absolute contraindication for planned procedure , patient medically stable.
Patient medically optimized for plan surgery , there is no absolute contraindication , patient is with intermediate risk for surgery . Will transfer patient to medicine on 4/13 as patient may benefit of physical therapy one more day .
Seen and examined. Agree w Dr. Sheehan's note.
Wound being packed. DC extension brace as it is a source of barrier as well as potential for more breakdown.  Continue rehab program.
Will follow
discontinue fluid

## 2017-04-13 NOTE — PROGRESS NOTE ADULT - SUBJECTIVE AND OBJECTIVE BOX
[facility]  INFECTIOUS DISEASES AND INTERNAL MEDICINE OF [place]  =======================================================  Kwaku Payne MD  Diplomates American Board of Internal Medicine and Infectious Diseases  =======================================================    YELITZA LÓPEZ     Follow up Fever    Patient with fever last night  No specific complaints      Allergies:  Benadryl (Other)  Demerol HCl (Other)      Antibiotics:  ceFAZolin   IVPB 2000milliGRAM(s) IV Intermittent every 12 hours        REVIEW OF SYSTEMS:  CONSTITUTIONAL: No fever or chills  HEENT:  No diplopia or blurred vision. No earache, sore throat or runny nose.  CARDIOVASCULAR:  No pressure, squeezing, strangling, tightness, heaviness or aching about the chest, neck, axilla or epigastrium.  RESPIRATORY:  No cough, shortness of breath  GASTROINTESTINAL:  No nausea, vomiting or diarrhea.  GENITOURINARY:  No dysuria, frequency or urgency  MUSCULOSKELETAL:  Pain of Lt BKA stump  SKIN:  No change in skin, hair or nails.  NEUROLOGIC:  No paresthesias, fasciculations  PSYCHIATRIC:  No disorder of thought or mood.  ENDOCRINE:  No heat or cold intolerance, polyuria or polydipsia.  HEMATOLOGICAL:  No easy bruising or bleeding.      Physical Exam:  Vital Signs Last 24 Hrs  T(C): 36.6, Max: 38.3 (04-12 @ 18:00)  T(F): 97.8, Max: 101 (04-12 @ 18:00)  HR: 100 (84 - 100)  BP: 136/64 (106/50 - 152/64)  RR: 17 (16 - 17)  SpO2: 92% (92% - 97%)    GEN: NAD, pleasant  HEENT: normocephalic and atraumatic. EOMI. PERRL.    NECK: Supple  LUNGS: Clear to auscultation.  HEART: Regular rate and rhythm   ABDOMEN: Soft, nontender, and nondistended.  Positive bowel sounds.    : No CVA tenderness  EXTREMITIES: Left BKA stump with a dry open wound  NEUROLOGIC: Cranial nerves II through XII are grossly intact.  PSYCHIATRIC: Appropriate affect .  SKIN: left BKA wound      Labs:      133<L>  |  95<L>  |  20.0  ----------------------------<  206<H>  4.2   |  25.0  |  0.92    Ca    9.0      2017 06:53                          10.0   6.7   )-----------( 279      ( 2017 06:37 )             31.1       Urinalysis Basic - ( 2017 10:17 )    Color: Yellow / Appearance: Clear / S.005 / pH: x  Gluc: x / Ketone: Negative  / Bili: Negative / Urobili: Negative mg/dL   Blood: x / Protein: 30 mg/dL / Nitrite: Negative   Leuk Esterase: Small / RBC: 0-2 /HPF / WBC 6-10   Sq Epi: x / Non Sq Epi: Occasional / Bacteria: x        RECENT CULTURES:  Pending

## 2017-04-13 NOTE — PROGRESS NOTE ADULT - PROBLEM SELECTOR PROBLEM 1
PVD (peripheral vascular disease)
Type 2 diabetes mellitus with hypoglycemia without coma, with long-term current use of insulin
Fever
PVD (peripheral vascular disease)

## 2017-04-13 NOTE — PROGRESS NOTE ADULT - PROBLEM SELECTOR PLAN 1
discontinue premeals humolog cont sliding , agree with dropping insulin dose blood sugar low in am; monitor closely
hold am humolog before meals   bed time snack to be encouraged, instructed the patient
hold am humolog before meals , will decrease lantus dose , monitor closely
s/p left BKA  Pain control    PT/ OT   will give iv tylenol x1now   Vascular following.  Continue rehab program.
s/p left BKA  Pain control. Fentanyl patch added yesterday.  Recommend to change Tylenol to ATC and prn narcotics.  Vascular following.  Continue rehab program.
s/p left BKA  Pain control. Fentanyl patch and continue tylenol ATC  .  Vascular following.  Continue rehab program.
s/p left BKA  Pain no well controlled started on  Fentanyl patch few days ago    and continue tylenol ATC    will give iv tylenol x1now   Vascular following.  Continue rehab program.
- BID packing with 1/2" Iodoform covered with kirlex and ace  - cont PT for improvement of ROM of the L knee
- MEENU WILLETT Friday  - Hold AC  - Will need RLE angiogram, Possible angioplasty, possible stenting Friday
- Wound care  - Abx  - NPO After MN  - Hold AM dose of Lovenox  - OR tomorrow for L AKA and RLE angiogram
- local wound care  - Will likely open central portion of the incision and pack.  - PT for left knee contracture   - will follow along   - Thank you
Patient had fever to 101F yesterday  Blood cultures pending  CXR not suggestive of PNA  UA not suggestive of UTI  Sources are LLE BKA stump or RLE dry gangrene  Plan for OR tomorrow for LLE AKA which will help if LLE BKA stump is source of fever and RLE angiogram  Continue Cefazolin for now
s/p left BKA  Pain control.  Vascular following.  Continue rehab program.

## 2017-04-13 NOTE — H&P PST ADULT - PROBLEM SELECTOR PLAN 4
Continue Lantus and sliding scale coverage. Continue Lantus and sliding scale coverage , will decrease Lantus tonight

## 2017-04-13 NOTE — PROGRESS NOTE ADULT - PROBLEM SELECTOR PLAN 2
Complete antibiotics regimen
Complete antibiotics.
Complete antibiotics.
Hgb drop , asymptomatic   will order iv venofer 200 mg daily for 3 days   Monitor CBC closely while on Eliquis.
s/p left BKA  Pain control  change oxycodone to tramadol due to lethargy    PT/ OT , rehab program   will give iv tylenol x1now   Vascular following.  Continue rehab program.
s/p left BKA  Pain control  on tramadol and gabapentin     PT/ OT , rehab program     Vascular following.  Continue rehab program.
s/p left BKA  Pain control , improving    PT/ OT   will give iv tylenol x1now   Vascular following.  Continue rehab program.
Complete antibiotics.
Continue antibiotics as above  Plan for Left AKA tomorrow

## 2017-04-13 NOTE — H&P PST ADULT - PROBLEM SELECTOR PLAN 1
Patient scheduled for Left AKA 4/14/17 with Dr. Mckeon.  Pain control.  Antibiotics as per ID. Patient scheduled for Left AKA 4/14/17  and R LE angioplasty with Dr. Mckeon.  Pain control.  Antibiotics as per ID.

## 2017-04-13 NOTE — PROGRESS NOTE ADULT - PROVIDER SPECIALTY LIST ADULT
Anesthesia
Hospitalist
Infectious Disease
Nephrology
Podiatry
Rehab Medicine
Vascular Surgery
Rehab Medicine

## 2017-04-13 NOTE — H&P PST ADULT - HISTORY OF PRESENT ILLNESS
85 yr old female with PMH CAD s/p stents x 2 , DM2, hypertension, hyperlipidemia, anxiety, significant PVD s/p multiple stents, paroxysmal atrial fibrillation on Eliquis admitted for hypertensive urgency post LE angiogram. 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 Hospital course complicated by urinary retention with UTI (failed TOV), hyponatremia likely due to pain and improved with NaCl (per nephrology) and pain medications. BP meds and insulin coverage titrated for better control.  Patient stable for discharge to acute rehab. While in rehab, patient found to have postoperative L LE wound dehiscence and complaints of R foot pain, evaluated by vascular and scheduled for Left AKA and RLE angiogram 4/14/17. Seen by Cardiology and cleared.  Patient had fever 101 last night.  Pancultured. Patient being followed by ID, will continue antibiotics and follow up cultures.  Patient complains of LLE discomfort with drainage from BKA site.  Patient also has right foot ulceration and eschar.

## 2017-04-13 NOTE — PROGRESS NOTE ADULT - SUBJECTIVE AND OBJECTIVE BOX
s/p L BKA now with stump breakdown. Tmax 101.4 Pt sleeping    Vital Signs Last 24 Hrs  T(C): 36.6, Max: 38.3 (04-12 @ 18:00)  T(F): 97.8, Max: 101 (04-12 @ 18:00)  HR: 100 (84 - 100)  BP: 136/64 (106/50 - 152/64)  BP(mean): --  RR: 17 (16 - 17)  SpO2: 92% (92% - 97%)                          10.0   6.7   )-----------( 279      ( 13 Apr 2017 06:37 )             31.1   04-12    133<L>  |  95<L>  |  20.0  ----------------------------<  206<H>  4.2   |  25.0  |  0.92    Ca    9.0      12 Apr 2017 06:53    PE:  L BKA stump with dehiscence and necrotic material centrally  R foot with Stage I heel decubitus, 2nd digit eschar

## 2017-04-13 NOTE — H&P PST ADULT - ATTENDING COMMENTS
D/W Dr Guzman , will take over on medical floor . D/W Dr Guzman , will take over on medical floor .    Medically optimized for surgery , no absolute contraindication .

## 2017-04-14 ENCOUNTER — RESULT REVIEW (OUTPATIENT)
Age: 82
End: 2017-04-14

## 2017-04-14 DIAGNOSIS — I73.9 PERIPHERAL VASCULAR DISEASE, UNSPECIFIED: ICD-10-CM

## 2017-04-14 LAB
ALBUMIN SERPL ELPH-MCNC: 2.7 G/DL — LOW (ref 3.3–5.2)
ALP SERPL-CCNC: 96 U/L — SIGNIFICANT CHANGE UP (ref 40–120)
ALT FLD-CCNC: 12 U/L — SIGNIFICANT CHANGE UP
ANION GAP SERPL CALC-SCNC: 11 MMOL/L — SIGNIFICANT CHANGE UP (ref 5–17)
AST SERPL-CCNC: 17 U/L — SIGNIFICANT CHANGE UP
BILIRUB SERPL-MCNC: 0.3 MG/DL — LOW (ref 0.4–2)
BUN SERPL-MCNC: 16 MG/DL — SIGNIFICANT CHANGE UP (ref 8–20)
CALCIUM SERPL-MCNC: 9 MG/DL — SIGNIFICANT CHANGE UP (ref 8.6–10.2)
CHLORIDE SERPL-SCNC: 97 MMOL/L — LOW (ref 98–107)
CO2 SERPL-SCNC: 26 MMOL/L — SIGNIFICANT CHANGE UP (ref 22–29)
CREAT SERPL-MCNC: 0.88 MG/DL — SIGNIFICANT CHANGE UP (ref 0.5–1.3)
EOSINOPHIL NFR BLD AUTO: 2 % — SIGNIFICANT CHANGE UP (ref 0–5)
GLUCOSE SERPL-MCNC: 218 MG/DL — HIGH (ref 70–115)
HCT VFR BLD CALC: 30.3 % — LOW (ref 37–47)
HGB BLD-MCNC: 9.7 G/DL — LOW (ref 12–16)
LYMPHOCYTES # BLD AUTO: 16 % — LOW (ref 20–55)
MCHC RBC-ENTMCNC: 27.5 PG — SIGNIFICANT CHANGE UP (ref 27–31)
MCHC RBC-ENTMCNC: 32 G/DL — SIGNIFICANT CHANGE UP (ref 32–36)
MCV RBC AUTO: 85.8 FL — SIGNIFICANT CHANGE UP (ref 81–99)
MONOCYTES NFR BLD AUTO: 8 % — SIGNIFICANT CHANGE UP (ref 3–10)
NEUTROPHILS NFR BLD AUTO: 74 % — HIGH (ref 37–73)
PLATELET # BLD AUTO: 277 K/UL — SIGNIFICANT CHANGE UP (ref 150–400)
POTASSIUM SERPL-MCNC: 4.3 MMOL/L — SIGNIFICANT CHANGE UP (ref 3.5–5.3)
POTASSIUM SERPL-SCNC: 4.3 MMOL/L — SIGNIFICANT CHANGE UP (ref 3.5–5.3)
PROT SERPL-MCNC: 5.9 G/DL — LOW (ref 6.6–8.7)
RBC # BLD: 3.53 M/UL — LOW (ref 4.4–5.2)
RBC # FLD: 15.1 % — SIGNIFICANT CHANGE UP (ref 11–15.6)
SODIUM SERPL-SCNC: 134 MMOL/L — LOW (ref 135–145)
WBC # BLD: 6.2 K/UL — SIGNIFICANT CHANGE UP (ref 4.8–10.8)
WBC # FLD AUTO: 6.2 K/UL — SIGNIFICANT CHANGE UP (ref 4.8–10.8)

## 2017-04-14 PROCEDURE — 88311 DECALCIFY TISSUE: CPT | Mod: 26

## 2017-04-14 PROCEDURE — 99233 SBSQ HOSP IP/OBS HIGH 50: CPT

## 2017-04-14 PROCEDURE — 88307 TISSUE EXAM BY PATHOLOGIST: CPT | Mod: 26

## 2017-04-14 RX ORDER — CEFAZOLIN SODIUM 1 G
2000 VIAL (EA) INJECTION EVERY 12 HOURS
Qty: 0 | Refills: 0 | Status: COMPLETED | OUTPATIENT
Start: 2017-04-14 | End: 2017-04-18

## 2017-04-14 RX ORDER — ALPRAZOLAM 0.25 MG
0.25 TABLET ORAL AT BEDTIME
Qty: 0 | Refills: 0 | Status: DISCONTINUED | OUTPATIENT
Start: 2017-04-14 | End: 2017-04-21

## 2017-04-14 RX ORDER — SENNA PLUS 8.6 MG/1
2 TABLET ORAL AT BEDTIME
Qty: 0 | Refills: 0 | Status: DISCONTINUED | OUTPATIENT
Start: 2017-04-14 | End: 2017-04-17

## 2017-04-14 RX ORDER — BACITRACIN ZINC 500 UNIT/G
1 OINTMENT IN PACKET (EA) TOPICAL
Qty: 0 | Refills: 0 | COMMUNITY

## 2017-04-14 RX ORDER — ONDANSETRON 8 MG/1
4 TABLET, FILM COATED ORAL ONCE
Qty: 0 | Refills: 0 | Status: DISCONTINUED | OUTPATIENT
Start: 2017-04-14 | End: 2017-04-14

## 2017-04-14 RX ORDER — DEXTROSE 50 % IN WATER 50 %
25 SYRINGE (ML) INTRAVENOUS ONCE
Qty: 0 | Refills: 0 | Status: DISCONTINUED | OUTPATIENT
Start: 2017-04-14 | End: 2017-04-27

## 2017-04-14 RX ORDER — PANTOPRAZOLE SODIUM 20 MG/1
40 TABLET, DELAYED RELEASE ORAL
Qty: 0 | Refills: 0 | Status: DISCONTINUED | OUTPATIENT
Start: 2017-04-14 | End: 2017-04-27

## 2017-04-14 RX ORDER — DOCUSATE SODIUM 100 MG
100 CAPSULE ORAL
Qty: 0 | Refills: 0 | Status: DISCONTINUED | OUTPATIENT
Start: 2017-04-14 | End: 2017-04-27

## 2017-04-14 RX ORDER — LEVOTHYROXINE SODIUM 125 MCG
88 TABLET ORAL DAILY
Qty: 0 | Refills: 0 | Status: DISCONTINUED | OUTPATIENT
Start: 2017-04-14 | End: 2017-04-27

## 2017-04-14 RX ORDER — ATORVASTATIN CALCIUM 80 MG/1
40 TABLET, FILM COATED ORAL AT BEDTIME
Qty: 0 | Refills: 0 | Status: DISCONTINUED | OUTPATIENT
Start: 2017-04-14 | End: 2017-04-27

## 2017-04-14 RX ORDER — GABAPENTIN 400 MG/1
200 CAPSULE ORAL
Qty: 0 | Refills: 0 | Status: DISCONTINUED | OUTPATIENT
Start: 2017-04-14 | End: 2017-04-25

## 2017-04-14 RX ORDER — MIRTAZAPINE 45 MG/1
7.5 TABLET, ORALLY DISINTEGRATING ORAL AT BEDTIME
Qty: 0 | Refills: 0 | Status: DISCONTINUED | OUTPATIENT
Start: 2017-04-14 | End: 2017-04-27

## 2017-04-14 RX ORDER — AMLODIPINE BESYLATE 2.5 MG/1
10 TABLET ORAL DAILY
Qty: 0 | Refills: 0 | Status: DISCONTINUED | OUTPATIENT
Start: 2017-04-14 | End: 2017-04-27

## 2017-04-14 RX ORDER — SODIUM CHLORIDE 9 MG/ML
1000 INJECTION, SOLUTION INTRAVENOUS
Qty: 0 | Refills: 0 | Status: DISCONTINUED | OUTPATIENT
Start: 2017-04-14 | End: 2017-04-15

## 2017-04-14 RX ORDER — FOLIC ACID 0.8 MG
1 TABLET ORAL DAILY
Qty: 0 | Refills: 0 | Status: DISCONTINUED | OUTPATIENT
Start: 2017-04-14 | End: 2017-04-27

## 2017-04-14 RX ORDER — TAMSULOSIN HYDROCHLORIDE 0.4 MG/1
0.4 CAPSULE ORAL AT BEDTIME
Qty: 0 | Refills: 0 | Status: DISCONTINUED | OUTPATIENT
Start: 2017-04-14 | End: 2017-04-27

## 2017-04-14 RX ORDER — METOPROLOL TARTRATE 50 MG
50 TABLET ORAL
Qty: 0 | Refills: 0 | Status: DISCONTINUED | OUTPATIENT
Start: 2017-04-14 | End: 2017-04-27

## 2017-04-14 RX ORDER — AMLODIPINE BESYLATE 2.5 MG/1
1 TABLET ORAL
Qty: 0 | Refills: 0 | COMMUNITY

## 2017-04-14 RX ORDER — ASCORBIC ACID 60 MG
500 TABLET,CHEWABLE ORAL DAILY
Qty: 0 | Refills: 0 | Status: DISCONTINUED | OUTPATIENT
Start: 2017-04-14 | End: 2017-04-27

## 2017-04-14 RX ORDER — GABAPENTIN 400 MG/1
300 CAPSULE ORAL AT BEDTIME
Qty: 0 | Refills: 0 | Status: DISCONTINUED | OUTPATIENT
Start: 2017-04-14 | End: 2017-04-25

## 2017-04-14 RX ORDER — ZINC SULFATE TAB 220 MG (50 MG ZINC EQUIVALENT) 220 (50 ZN) MG
220 TAB ORAL DAILY
Qty: 0 | Refills: 0 | Status: DISCONTINUED | OUTPATIENT
Start: 2017-04-14 | End: 2017-04-27

## 2017-04-14 RX ORDER — FERROUS SULFATE 325(65) MG
325 TABLET ORAL DAILY
Qty: 0 | Refills: 0 | Status: DISCONTINUED | OUTPATIENT
Start: 2017-04-14 | End: 2017-04-27

## 2017-04-14 RX ORDER — NYSTATIN CREAM 100000 [USP'U]/G
1 CREAM TOPICAL
Qty: 0 | Refills: 0 | Status: DISCONTINUED | OUTPATIENT
Start: 2017-04-14 | End: 2017-04-22

## 2017-04-14 RX ORDER — ALPRAZOLAM 0.25 MG
0.25 TABLET ORAL
Qty: 0 | Refills: 0 | Status: DISCONTINUED | OUTPATIENT
Start: 2017-04-14 | End: 2017-04-14

## 2017-04-14 RX ORDER — SODIUM CHLORIDE 9 MG/ML
1000 INJECTION, SOLUTION INTRAVENOUS
Qty: 0 | Refills: 0 | Status: DISCONTINUED | OUTPATIENT
Start: 2017-04-14 | End: 2017-04-14

## 2017-04-14 RX ORDER — LISINOPRIL 2.5 MG/1
2.5 TABLET ORAL DAILY
Qty: 0 | Refills: 0 | Status: DISCONTINUED | OUTPATIENT
Start: 2017-04-14 | End: 2017-04-19

## 2017-04-14 RX ORDER — DIBUCAINE 1 %
1 OINTMENT (GRAM) RECTAL THREE TIMES A DAY
Qty: 0 | Refills: 0 | Status: DISCONTINUED | OUTPATIENT
Start: 2017-04-14 | End: 2017-04-27

## 2017-04-14 RX ORDER — DEXTROSE 50 % IN WATER 50 %
12.5 SYRINGE (ML) INTRAVENOUS ONCE
Qty: 0 | Refills: 0 | Status: DISCONTINUED | OUTPATIENT
Start: 2017-04-14 | End: 2017-04-27

## 2017-04-14 RX ORDER — GLUCAGON INJECTION, SOLUTION 0.5 MG/.1ML
1 INJECTION, SOLUTION SUBCUTANEOUS ONCE
Qty: 0 | Refills: 0 | Status: DISCONTINUED | OUTPATIENT
Start: 2017-04-14 | End: 2017-04-27

## 2017-04-14 RX ORDER — LIDOCAINE 4 G/100G
1 CREAM TOPICAL DAILY
Qty: 0 | Refills: 0 | Status: DISCONTINUED | OUTPATIENT
Start: 2017-04-14 | End: 2017-04-18

## 2017-04-14 RX ORDER — POLYETHYLENE GLYCOL 3350 17 G/17G
17 POWDER, FOR SOLUTION ORAL DAILY
Qty: 0 | Refills: 0 | Status: DISCONTINUED | OUTPATIENT
Start: 2017-04-14 | End: 2017-04-27

## 2017-04-14 RX ORDER — ENOXAPARIN SODIUM 100 MG/ML
40 INJECTION SUBCUTANEOUS DAILY
Qty: 0 | Refills: 0 | Status: DISCONTINUED | OUTPATIENT
Start: 2017-04-15 | End: 2017-04-22

## 2017-04-14 RX ORDER — ATORVASTATIN CALCIUM 80 MG/1
1 TABLET, FILM COATED ORAL
Qty: 0 | Refills: 0 | COMMUNITY

## 2017-04-14 RX ORDER — FENTANYL CITRATE 50 UG/ML
25 INJECTION INTRAVENOUS
Qty: 0 | Refills: 0 | Status: DISCONTINUED | OUTPATIENT
Start: 2017-04-14 | End: 2017-04-14

## 2017-04-14 RX ORDER — ONDANSETRON 8 MG/1
4 TABLET, FILM COATED ORAL EVERY 8 HOURS
Qty: 0 | Refills: 0 | Status: DISCONTINUED | OUTPATIENT
Start: 2017-04-14 | End: 2017-04-17

## 2017-04-14 RX ORDER — DEXTROSE 50 % IN WATER 50 %
1 SYRINGE (ML) INTRAVENOUS ONCE
Qty: 0 | Refills: 0 | Status: DISCONTINUED | OUTPATIENT
Start: 2017-04-14 | End: 2017-04-27

## 2017-04-14 RX ORDER — INSULIN LISPRO 100/ML
VIAL (ML) SUBCUTANEOUS
Qty: 0 | Refills: 0 | Status: DISCONTINUED | OUTPATIENT
Start: 2017-04-14 | End: 2017-04-27

## 2017-04-14 RX ADMIN — Medication 88 MICROGRAM(S): at 06:05

## 2017-04-14 RX ADMIN — Medication 0.25 MILLIGRAM(S): at 21:57

## 2017-04-14 RX ADMIN — LISINOPRIL 2.5 MILLIGRAM(S): 2.5 TABLET ORAL at 06:05

## 2017-04-14 RX ADMIN — Medication: at 17:42

## 2017-04-14 RX ADMIN — SENNA PLUS 2 TABLET(S): 8.6 TABLET ORAL at 21:57

## 2017-04-14 RX ADMIN — FENTANYL CITRATE 25 MICROGRAM(S): 50 INJECTION INTRAVENOUS at 13:56

## 2017-04-14 RX ADMIN — Medication 1 APPLICATION(S): at 22:00

## 2017-04-14 RX ADMIN — GABAPENTIN 300 MILLIGRAM(S): 400 CAPSULE ORAL at 21:59

## 2017-04-14 RX ADMIN — Medication 50 MILLIGRAM(S): at 06:05

## 2017-04-14 RX ADMIN — TAMSULOSIN HYDROCHLORIDE 0.4 MILLIGRAM(S): 0.4 CAPSULE ORAL at 21:58

## 2017-04-14 RX ADMIN — INSULIN GLARGINE 6 UNIT(S): 100 INJECTION, SOLUTION SUBCUTANEOUS at 01:39

## 2017-04-14 RX ADMIN — Medication: at 08:17

## 2017-04-14 RX ADMIN — GABAPENTIN 200 MILLIGRAM(S): 400 CAPSULE ORAL at 06:04

## 2017-04-14 RX ADMIN — FENTANYL CITRATE 25 MICROGRAM(S): 50 INJECTION INTRAVENOUS at 13:47

## 2017-04-14 RX ADMIN — OXYCODONE HYDROCHLORIDE 10 MILLIGRAM(S): 5 TABLET ORAL at 06:05

## 2017-04-14 RX ADMIN — FENTANYL CITRATE 25 MICROGRAM(S): 50 INJECTION INTRAVENOUS at 13:02

## 2017-04-14 RX ADMIN — NYSTATIN CREAM 1 APPLICATION(S): 100000 CREAM TOPICAL at 21:59

## 2017-04-14 RX ADMIN — Medication 100 MILLIGRAM(S): at 06:03

## 2017-04-14 RX ADMIN — ATORVASTATIN CALCIUM 40 MILLIGRAM(S): 80 TABLET, FILM COATED ORAL at 21:58

## 2017-04-14 RX ADMIN — Medication 100 MILLIGRAM(S): at 06:04

## 2017-04-14 RX ADMIN — MIRTAZAPINE 7.5 MILLIGRAM(S): 45 TABLET, ORALLY DISINTEGRATING ORAL at 21:59

## 2017-04-14 RX ADMIN — NYSTATIN CREAM 1 APPLICATION(S): 100000 CREAM TOPICAL at 06:06

## 2017-04-14 RX ADMIN — OXYCODONE HYDROCHLORIDE 10 MILLIGRAM(S): 5 TABLET ORAL at 07:05

## 2017-04-14 RX ADMIN — PANTOPRAZOLE SODIUM 40 MILLIGRAM(S): 20 TABLET, DELAYED RELEASE ORAL at 06:05

## 2017-04-14 RX ADMIN — Medication 100 MILLIGRAM(S): at 18:30

## 2017-04-14 RX ADMIN — Medication 100 MILLIGRAM(S): at 21:57

## 2017-04-14 RX ADMIN — Medication 1 APPLICATION(S): at 06:04

## 2017-04-14 RX ADMIN — AMLODIPINE BESYLATE 10 MILLIGRAM(S): 2.5 TABLET ORAL at 06:04

## 2017-04-14 RX ADMIN — FENTANYL CITRATE 25 MICROGRAM(S): 50 INJECTION INTRAVENOUS at 17:05

## 2017-04-14 NOTE — PROGRESS NOTE ADULT - PROBLEM SELECTOR PLAN 1
1-Patient is stable, pain is controlled  2-Meds: Continue current medication regimen as ordered  -Percocet 5/325mg 1-2tabs, mod-severe pain  -Gabapentin as ordered  -Lidoderm Patch  -Xanax 0.25mg 1-Patient is stable, pain is controlled  2-Meds: Continue current medication regimen as ordered  -Percocet 5/325mg 1-2tabs, mod-severe pain  -Gabapentin as ordered  -Lidoderm Patch  -Xanax 0.25mg  3-Patient and family agrees with plan, will f/u as needed  4-Thank You

## 2017-04-14 NOTE — PROGRESS NOTE ADULT - SUBJECTIVE AND OBJECTIVE BOX
HEALTH ISSUES - PROBLEM Dx:  HPI:  85 yr old female with PMH CAD s/p stents x 2 , DM2, hypertension, hyperlipidemia, anxiety, significant PVD s/p multiple stents, paroxysmal atrial fibrillation on Eliquis admitted for hypertensive urgency post LE angiogram. 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 Hospital course complicated by urinary retention with UTI (failed TOV), hyponatremia likely due to pain and improved with NaCl (per nephrology) and pain medications. BP meds and insulin coverage titrated for better control.  Patient stable for discharge to acute rehab. While in rehab, patient found to have postoperative L LE wound dehiscence and complaints of R foot pain, evaluated by vascular and scheduled for Left AKA and RLE angiogram 17. Seen by Cardiology and cleared.  Patient had fever 101 last night.  Pancultured. Patient being followed by ID, will continue antibiotics and follow up cultures.  Patient complains of LLE discomfort with drainage from BKA site.  Patient also has right foot ulceration and eschar.       NOW   inpatient for AKA due to wound dihescnece of BKA    INTERVAL HPI/ OVERNIGHT EVENTS:  to OR today. pt doesnt c/o pain, but the daughter keeps saying pt is in pain.  denies chest pain, nausea, vomits, cough, SOB, fever, HA    MEDICATIONS  (STANDING):  lactated ringers. 1000milliLiter(s) IV Continuous <Continuous>  insulin lispro (HumaLOG) corrective regimen sliding scale  SubCutaneous three times a day before meals  dextrose 50% Injectable 12.5Gram(s) IV Push once  dextrose 50% Injectable 25Gram(s) IV Push once  dextrose 50% Injectable 25Gram(s) IV Push once  nystatin Powder 1Application(s) Topical two times a day  lactated ringers. 1000milliLiter(s) IV Continuous <Continuous>  tamsulosin 0.4milliGRAM(s) Oral at bedtime  ceFAZolin   IVPB 2000milliGRAM(s) IV Intermittent every 12 hours  atorvastatin 40milliGRAM(s) Oral at bedtime  amLODIPine   Tablet 10milliGRAM(s) Oral daily  ferrous    sulfate 325milliGRAM(s) Oral daily  pantoprazole    Tablet 40milliGRAM(s) Oral before breakfast  folic acid 1milliGRAM(s) Oral daily  multivitamin 1Tablet(s) Oral daily  zinc sulfate 220milliGRAM(s) Oral daily  ascorbic acid 500milliGRAM(s) Oral daily  metoprolol 50milliGRAM(s) Oral two times a day  lidocaine   Patch 1Patch Transdermal daily  docusate sodium 100milliGRAM(s) Oral two times a day  mirtazapine 7.5milliGRAM(s) Oral at bedtime  polyethylene glycol 3350 17Gram(s) Oral daily  gabapentin 200milliGRAM(s) Oral <User Schedule>  gabapentin 300milliGRAM(s) Oral at bedtime  levothyroxine 88MICROGram(s) Oral daily  ALPRAZolam 0.25milliGRAM(s) Oral at bedtime  lisinopril 2.5milliGRAM(s) Oral daily  dibucaine 1% Ointment 1Application(s) Topical three times a day    MEDICATIONS  (PRN):  fentaNYL    Injectable 25MICROGram(s) IV Push every 10 minutes PRN Moderate Pain  ondansetron Injectable 4milliGRAM(s) IV Push once PRN Nausea and/or Vomiting  dextrose Gel 1Dose(s) Oral once PRN Blood Glucose LESS THAN 70 milliGRAM(s)/deciliter  glucagon  Injectable 1milliGRAM(s) IntraMuscular once PRN Glucose LESS THAN 70 milligrams/deciliter  ondansetron   Disintegrating Tablet 4milliGRAM(s) Oral every 8 hours PRN Nausea and/or Vomiting  senna 2Tablet(s) Oral at bedtime PRN Constipation  bisacodyl 5milliGRAM(s) Oral every 12 hours PRN Constipation  ALPRAZolam 0.25milliGRAM(s) Oral two times a day PRN anxiety  oxyCODONE  5 mG/acetaminophen 325 mG 1Tablet(s) Oral every 4 hours PRN Mild Pain (1 - 3)  oxyCODONE  5 mG/acetaminophen 325 mG 2Tablet(s) Oral every 4 hours PRN Severe Pain (7 - 10)      Allergies    Benadryl (Other)  Demerol HCl (Other)    Intolerances        Vital Signs Last 24 Hrs  T(C): 37.6, Max: 37.6 (04-14 @ 17:36)  T(F): 99.7, Max: 99.7 (04-14 @ 17:36)  HR: 90 (77 - 90)  BP: 148/56 (131/50 - 163/92)  BP(mean): 81 (81 - 81)  RR: 16 (10 - 18)  SpO2: 100% (93% - 100%)    ACCUCHECKS    PHYSICAL EXAM-  GENERAL: NAD, well-groomed, well-developed  HEAD:  Atraumatic, Normocephalic  EYES: EOMI, PERRLA, conjunctiva and sclera clear  ENMT:  Moist mucous membranes, Good dentition, No lesions  NECK: Supple, No JVD, Normal thyroid  NERVOUS SYSTEM:  Alert & Oriented X 3, moving all extremities   CHEST/LUNG:  No rales, rhonchi, wheezing, or rubs  HEART: Regular rate and rhythm; No murmurs, rubs, or gallops  ABDOMEN: Soft, Nontender, Nondistended; Bowel sounds present  EXTREMITIES:  2+ Peripheral Pulses, LLE BKA site with dehiscence; Right heel with stage 1, right 2nd digit with eschar  LYMPH: No lymphadenopathy noted  SKIN: No rashes or lesions    LABS:                        9.7    6.2   )-----------( 277      ( 2017 07:10 )             30.3     04-14    134<L>  |  97<L>  |  16.0  ----------------------------<  218<H>  4.3   |  26.0  |  0.88    Ca    9.0      2017 07:10    TPro  5.9<L>  /  Alb  2.7<L>  /  TBili  0.3<L>  /  DBili  x   /  AST  17  /  ALT  12  /  AlkPhos  96  -14      Urinalysis Basic - ( 2017 10:17 )    Color: Yellow / Appearance: Clear / S.005 / pH: x  Gluc: x / Ketone: Negative  / Bili: Negative / Urobili: Negative mg/dL   Blood: x / Protein: 30 mg/dL / Nitrite: Negative   Leuk Esterase: Small / RBC: 0-2 /HPF / WBC 6-10   Sq Epi: x / Non Sq Epi: Occasional / Bacteria: x      RADIOLOGY & ADDITIONAL TESTS:    Assessment and Plan  DVT Prophylaxis    Discussed with: Patient, family, RN, CM, Consultants  Plan of care/ Discharge planning discussed.    Visit Time:

## 2017-04-14 NOTE — PROGRESS NOTE ADULT - ASSESSMENT
Problem/Plan - 1:  ·  Problem: Postoperative wound dehiscence, initial encounter.  Plan:  Left AKA 4/14/17  and R LE angioplasty with Dr. Mckeon.  Pain control.  Antibiotics as per ID.     Problem/Plan - 2:  ·  Problem: Fever.  Plan: Urine c/s + for gram +; bld c/s testing  ID following , d/w Dr Payne - will continue Cefazolin,  , CXR - read LLL small pleural effusion vs PNA. Doubt PNA ..     Problem/Plan - 3:  ·  Problem: PVD (peripheral vascular disease).  Plan: As above.  RLE angiogram scheduled for 4/14/17.     Problem/Plan - 4:  ·  Problem: Type 2 diabetes mellitus with hypoglycemia without coma, with long-term current use of insulin.  Plan: Continue Lantus and sliding scale coverage , will decrease Lantus tonight.     Problem/Plan - 5:  ·  Problem: PAF (paroxysmal atrial fibrillation).  Plan: Rate controlled.  Eliquis on hold for OR. to assess when to resume  eliquis and plavix that pt was on prior.    Problem/Plan - 6:  Problem: CAD (coronary artery disease). Plan: Continue ASA, Metoprolol, Lisinopril, and Lipitor.  Plavix on hold.  Will need to discuss with cardiology and vascular after surgery about restarting.    Problem/Plan - 7:  ·  Problem: Acute blood loss anemia.  Plan: Asymptomatic. S/P 2 units of PRBC to keep Hg > 10  .  Monitor CBC.  Continue Iron.

## 2017-04-14 NOTE — H&P ADULT - ASSESSMENT
Please refer to the H&P that was done on 4/13/17 when this Admission was done under preadmit account, while awaiting bed from [facility] to 6 twr.

## 2017-04-14 NOTE — PROGRESS NOTE ADULT - SUBJECTIVE AND OBJECTIVE BOX
Chief Complaint:    Patient seen and examined at bedside    PAST MEDICAL & SURGICAL HISTORY:  Hyperlipidemia  Hypertension  Diabetes  PVD (peripheral vascular disease)  CAD (coronary artery disease)  History of cholecystectomy  H/O angioplasty      SOCIAL HISTORY:  [ ] Denies Smoking, Alcohol, or Drug Use    Allergies    Benadryl (Other)  Demerol HCl (Other)    Intolerances        PAIN MEDICATIONS:  fentaNYL    Injectable 25MICROGram(s) IV Push every 10 minutes PRN  ondansetron Injectable 4milliGRAM(s) IV Push once PRN  ondansetron   Disintegrating Tablet 4milliGRAM(s) Oral every 8 hours PRN  mirtazapine 7.5milliGRAM(s) Oral at bedtime  gabapentin 200milliGRAM(s) Oral <User Schedule>  gabapentin 300milliGRAM(s) Oral at bedtime  ALPRAZolam 0.25milliGRAM(s) Oral at bedtime  ALPRAZolam 0.25milliGRAM(s) Oral two times a day PRN  oxyCODONE  5 mG/acetaminophen 325 mG 1Tablet(s) Oral every 4 hours PRN  oxyCODONE  5 mG/acetaminophen 325 mG 2Tablet(s) Oral every 4 hours PRN    Heme:    Antibiotics:  ceFAZolin   IVPB 2000milliGRAM(s) IV Intermittent every 12 hours    Cardiac:  tamsulosin 0.4milliGRAM(s) Oral at bedtime  amLODIPine   Tablet 10milliGRAM(s) Oral daily  metoprolol 50milliGRAM(s) Oral two times a day  lisinopril 2.5milliGRAM(s) Oral daily    Pulmonary:    Endocrine  insulin lispro (HumaLOG) corrective regimen sliding scale  SubCutaneous three times a day before meals  dextrose Gel 1Dose(s) Oral once PRN  dextrose 50% Injectable 12.5Gram(s) IV Push once  dextrose 50% Injectable 25Gram(s) IV Push once  dextrose 50% Injectable 25Gram(s) IV Push once  glucagon  Injectable 1milliGRAM(s) IntraMuscular once PRN  atorvastatin 40milliGRAM(s) Oral at bedtime  levothyroxine 88MICROGram(s) Oral daily    GI:  pantoprazole    Tablet 40milliGRAM(s) Oral before breakfast  senna 2Tablet(s) Oral at bedtime PRN  docusate sodium 100milliGRAM(s) Oral two times a day  bisacodyl 5milliGRAM(s) Oral every 12 hours PRN  polyethylene glycol 3350 17Gram(s) Oral daily    All Other Meds:  lactated ringers. 1000milliLiter(s) IV Continuous <Continuous>  nystatin Powder 1Application(s) Topical two times a day  lactated ringers. 1000milliLiter(s) IV Continuous <Continuous>  ferrous    sulfate 325milliGRAM(s) Oral daily  folic acid 1milliGRAM(s) Oral daily  multivitamin 1Tablet(s) Oral daily  zinc sulfate 220milliGRAM(s) Oral daily  ascorbic acid 500milliGRAM(s) Oral daily  lidocaine   Patch 1Patch Transdermal daily  dibucaine 1% Ointment 1Application(s) Topical three times a day      REVIEW OF SYSTEMS:  CONSTITUTIONAL: Negative fever,chills  NECK: No pain or stiffness  RESPIRATORY: No cough, wheezing,  No shortness of breath  GASTROINTESTINAL: No abdominal, No nausea, vomiting; No diarrhea or constipation.   GENITOURINARY: No dysuria, frequency, or incontinence  NEUROLOGICAL: No headaches, memory loss, loss of strength, numbness, or  SKIN: No itching, burning, rashes, or lesions   MUSCULOSKELETAL: No joint pain or swelling; No muscle, back, or extremity pain    PHYSICAL EXAM:    Vital Signs Last 24 Hrs  T(C): 36.7, Max: 37 (04-14 @ 08:05)  T(F): 98.1, Max: 98.6 (04-14 @ 08:05)  HR: 80 (77 - 88)  BP: 147/42 (133/76 - 163/92)  BP(mean): --  RR: 12 (11 - 18)  SpO2: 100% (93% - 100%)    PAIN SCORE:         SCALE USED: (1-10 VNRS)       GENERAL: Comfortable, in no apparent distress  HEENT:  Atraumatic, Normocephalic, PERRL, EOMI  NECK: Supple  LUNG: Respiration even and unlabored, no distress noted  ABDOMEN: Soft, Nontender, Nondistended; Dressing C/D/I  BACK: No midline bony tenderness to palpation, no step off or deformity noted.   EXTREMITIES:  RODRIGUEZ X 4; 2+ Peripheral Pulses, No clubbing, cyanosis, or edema  NEUROLOGIC: Awake, alert and oriented X3;  No focal deficits. Motor strength 5/5. Sensation intact to light touch  SKIN: Warm and Dry    LABS:                          9.7    6.2   )-----------( 277      ( 2017 07:10 )             30.3     04-14    134<L>  |  97<L>  |  16.0  ----------------------------<  218<H>  4.3   |  26.0  |  0.88    Ca    9.0      2017 07:10    TPro  5.9<L>  /  Alb  2.7<L>  /  TBili  0.3<L>  /  DBili  x   /  AST  17  /  ALT  12  /  AlkPhos  96  04-14      Urinalysis Basic - ( 2017 10:17 )    Color: Yellow / Appearance: Clear / S.005 / pH: x  Gluc: x / Ketone: Negative  / Bili: Negative / Urobili: Negative mg/dL   Blood: x / Protein: 30 mg/dL / Nitrite: Negative   Leuk Esterase: Small / RBC: 0-2 /HPF / WBC 6-10   Sq Epi: x / Non Sq Epi: Occasional / Bacteria: x        Drug Screen:        RADIOLOGY:      [x ]  [place]  Reviewed and Copied to Chart     This report was requested by: Zoila Garcia | Reference #: [id number]  Others' Prescriptions  Patient Name: 	Jeanie Fletcher 	YOB: 1931  Address: 	[address] [place] [address] 	Sex: 	Female  Rx Written 	Rx Dispensed 	Drug 	Quantity 	Days Supply 	Prescriber Name  2017 	oxycodone hcl 5 mg tablet 	120 	20 	Tomasa Ahn MD  2017 	alprazolam 0.5 mg tablet 	90 	30 	Tomasa Ahn MD  10/19/2016 	10/19/2016 	alprazolam 0.5 mg tablet 	90 	30 	Tomasa Ahn MD    Patient Name: 	Jeanie Fletcher 	YOB: 1939  Address: 	[address] [place] [address] 	Sex: 	Female  Rx Written 	Rx Dispensed 	Drug 	Quantity 	Days Supply 	Prescriber Name  10/05/2016 	10/05/2016 	alprazolam 0.5 mg tablet 	10 	10 	Roberta Winters  10/01/2016 	10/05/2016 	oxycodone hcl 5 mg tablet 	30 	5 	Roberta Winters    Patient Name: 	Jeanie Fletcher 	YOB: 1931  Address: 	[address] [place] [address] 	Sex: 	Female  Rx Written 	Rx Dispensed 	Drug 	Quantity 	Days Supply 	Prescriber Name  2016 	oxycodone hcl 5 mg tablet 	30 	5 	Giantinoto, Cruz J DO  2016 	oxycodone hcl 10 mg tablet 	60 	10 	Giantinoto, Cruz J DO  09/10/2016 	09/10/2016 	oxycodone hcl 10 mg tablet 	30 	5 	Giantinoto, Cruz J DO  2016 	oxycodone hcl 10 mg tablet 	30 	7 	Giantinoto, Cruz J DO  2016 	alprazolam 0.5 mg tablet 	30 	30 	Giantinoto, Cruz J DO  2016 	oxycodone hcl 5 mg tablet 	60 	15 	Giantinoto, Cruz J DO  06/10/2016 	2016 	alprazolam 0.5 mg tablet 	60 	30 	Alessandro Powers   2016 	oxycodone-acetaminophen 5-325 mg tab 	14 	3 	Roly Cervantes DO    Patient Name: 	Jeanie Fletcher 	YOB: 1931  Address: 	[address] [place] [address]  [place] [address] 	Sex: 	Female  Rx Written 	Rx Dispensed 	Drug 	Quantity 	Days Supply 	Prescriber Name  2016 	alprazolam 0.5 mg tablet 	20 	20 	Spencer Petersen MD    * - Drugs marked with an asterisk are compound drugs. If the compound drug is made up of more than one controlled substance, then each controlled substance will be a separate row in the table. Chief Complaint: Incisional Pain    Patient seen and examined at bedside in PACU  Pain well maintained with the current medication regimen, complaining of right foot pain; minimal pain to the surgical site  No adverse side effects  Diet as tolerated    PAST MEDICAL & SURGICAL HISTORY:  Hyperlipidemia  Hypertension  Diabetes  PVD (peripheral vascular disease)  CAD (coronary artery disease)  History of cholecystectomy  H/O angioplasty      SOCIAL HISTORY:  [ ] Denies Smoking, Alcohol, or Drug Use    Allergies    Benadryl (Other)  Demerol HCl (Other)    Intolerances        PAIN MEDICATIONS:  fentaNYL    Injectable 25MICROGram(s) IV Push every 10 minutes PRN  ondansetron Injectable 4milliGRAM(s) IV Push once PRN  ondansetron   Disintegrating Tablet 4milliGRAM(s) Oral every 8 hours PRN  mirtazapine 7.5milliGRAM(s) Oral at bedtime  gabapentin 200milliGRAM(s) Oral <User Schedule>  gabapentin 300milliGRAM(s) Oral at bedtime  ALPRAZolam 0.25milliGRAM(s) Oral at bedtime  ALPRAZolam 0.25milliGRAM(s) Oral two times a day PRN  oxyCODONE  5 mG/acetaminophen 325 mG 1Tablet(s) Oral every 4 hours PRN  oxyCODONE  5 mG/acetaminophen 325 mG 2Tablet(s) Oral every 4 hours PRN    Heme:    Antibiotics:  ceFAZolin   IVPB 2000milliGRAM(s) IV Intermittent every 12 hours    Cardiac:  tamsulosin 0.4milliGRAM(s) Oral at bedtime  amLODIPine   Tablet 10milliGRAM(s) Oral daily  metoprolol 50milliGRAM(s) Oral two times a day  lisinopril 2.5milliGRAM(s) Oral daily    Pulmonary:    Endocrine  insulin lispro (HumaLOG) corrective regimen sliding scale  SubCutaneous three times a day before meals  dextrose Gel 1Dose(s) Oral once PRN  dextrose 50% Injectable 12.5Gram(s) IV Push once  dextrose 50% Injectable 25Gram(s) IV Push once  dextrose 50% Injectable 25Gram(s) IV Push once  glucagon  Injectable 1milliGRAM(s) IntraMuscular once PRN  atorvastatin 40milliGRAM(s) Oral at bedtime  levothyroxine 88MICROGram(s) Oral daily    GI:  pantoprazole    Tablet 40milliGRAM(s) Oral before breakfast  senna 2Tablet(s) Oral at bedtime PRN  docusate sodium 100milliGRAM(s) Oral two times a day  bisacodyl 5milliGRAM(s) Oral every 12 hours PRN  polyethylene glycol 3350 17Gram(s) Oral daily    All Other Meds:  lactated ringers. 1000milliLiter(s) IV Continuous <Continuous>  nystatin Powder 1Application(s) Topical two times a day  lactated ringers. 1000milliLiter(s) IV Continuous <Continuous>  ferrous    sulfate 325milliGRAM(s) Oral daily  folic acid 1milliGRAM(s) Oral daily  multivitamin 1Tablet(s) Oral daily  zinc sulfate 220milliGRAM(s) Oral daily  ascorbic acid 500milliGRAM(s) Oral daily  lidocaine   Patch 1Patch Transdermal daily  dibucaine 1% Ointment 1Application(s) Topical three times a day      REVIEW OF SYSTEMS:  CONSTITUTIONAL: Negative fever, chills  NECK: No pain or stiffness  RESPIRATORY: No cough, wheezing,  No shortness of breath  GASTROINTESTINAL: No abdominal, No nausea, vomiting; No diarrhea or constipation.   GENITOURINARY: No dysuria, frequency, or incontinence  NEUROLOGICAL: No headaches, memory loss, loss of strength, numbness, or  SKIN: No itching, burning, rashes, or lesions   MUSCULOSKELETAL: + joint pain; swelling; No muscle, back, + extremity pain    PHYSICAL EXAM:    Vital Signs Last 24 Hrs  T(C): 36.7, Max: 37 (04-14 @ 08:05)  T(F): 98.1, Max: 98.6 (04-14 @ 08:05)  HR: 80 (77 - 88)  BP: 147/42 (133/76 - 163/92)  BP(mean): --  RR: 12 (11 - 18)  SpO2: 100% (93% - 100%)    PAIN SCORE:    4-5/10     SCALE USED: (1-10 VNRS)       GENERAL: Comfortable, in no apparent distress  HEENT:  Atraumatic, Normocephalic  NECK: Supple  LUNG: Respiration even and unlabored, no distress noted  ABDOMEN: Soft, Nontender, Nondistended  BACK: No midline bony tenderness to palpation, no step off or deformity noted.   EXTREMITIES:  Left LE Dressing C/D/I  NEUROLOGIC: Awake, alert and oriented X3  SKIN: Warm and Dry    LABS:                          9.7    6.2   )-----------( 277      ( 2017 07:10 )             30.3     04-14    134<L>  |  97<L>  |  16.0  ----------------------------<  218<H>  4.3   |  26.0  |  0.88    Ca    9.0      2017 07:10    TPro  5.9<L>  /  Alb  2.7<L>  /  TBili  0.3<L>  /  DBili  x   /  AST  17  /  ALT  12  /  AlkPhos  96  04-14      Urinalysis Basic - ( 2017 10:17 )    Color: Yellow / Appearance: Clear / S.005 / pH: x  Gluc: x / Ketone: Negative  / Bili: Negative / Urobili: Negative mg/dL   Blood: x / Protein: 30 mg/dL / Nitrite: Negative   Leuk Esterase: Small / RBC: 0-2 /HPF / WBC 6-10   Sq Epi: x / Non Sq Epi: Occasional / Bacteria: x        Drug Screen:        RADIOLOGY:      [x ]  [place]  Reviewed and Copied to Chart     This report was requested by: Zoila Garcia | Reference #: [id number]  Others' Prescriptions  Patient Name: 	Jeanie Fletcher 	YOB: 1931  Address: 	[address] [place] [address] 	Sex: 	Female  Rx Written 	Rx Dispensed 	Drug 	Quantity 	Days Supply 	Prescriber Name  2017 	oxycodone hcl 5 mg tablet 	120 	20 	JimyTomasa pedraza MD  2017 	alprazolam 0.5 mg tablet 	90 	30 	Tomasa Ahn MD  10/19/2016 	10/19/2016 	alprazolam 0.5 mg tablet 	90 	30 	Tomasa Ahn MD    Patient Name: 	Jeanie Fletcher 	YOB: 1939  Address: 	[address] [place] [address] 	Sex: 	Female  Rx Written 	Rx Dispensed 	Drug 	Quantity 	Days Supply 	Prescriber Name  10/05/2016 	10/05/2016 	alprazolam 0.5 mg tablet 	10 	10 	Roberta Winters  10/01/2016 	10/05/2016 	oxycodone hcl 5 mg tablet 	30 	5 	Singh Roberta SHOEMAKER    Patient Name: 	Jeanie Fletcher 	YOB: 1931  Address: 	[address] [place] [address] 	Sex: 	Female  Rx Written 	Rx Dispensed 	Drug 	Quantity 	Days Supply 	Prescriber Name  2016 	oxycodone hcl 5 mg tablet 	30 	5 	Giantinoto, Cruz J DO  2016 	oxycodone hcl 10 mg tablet 	60 	10 	Giantinoto, Cruz J DO  09/10/2016 	09/10/2016 	oxycodone hcl 10 mg tablet 	30 	5 	Giantinoto, Cruz J DO  2016 	oxycodone hcl 10 mg tablet 	30 	7 	Giantinoto, Cruz J DO  2016 	alprazolam 0.5 mg tablet 	30 	30 	Giantinoto, Cruz J DO  2016 	oxycodone hcl 5 mg tablet 	60 	15 	Giantinoto, Cruz J DO  06/10/2016 	2016 	alprazolam 0.5 mg tablet 	60 	30 	CarmenAlessandro watson MEENU ROUSSEAU  2016 	oxycodone-acetaminophen 5-325 mg tab 	14 	3 	Roly Cervantes DO    Patient Name: 	Jeanie Fletcher 	YOB: 1931  Address: 	[address] [place] [address]  [place] [address] 	Sex: 	Female  Rx Written 	Rx Dispensed 	Drug 	Quantity 	Days Supply 	Prescriber Name  2016 	alprazolam 0.5 mg tablet 	20 	20 	Spencer Petersen MD    * - Drugs marked with an asterisk are compound drugs. If the compound drug is made up of more than one controlled substance, then each controlled substance will be a separate row in the table.

## 2017-04-15 ENCOUNTER — TRANSCRIPTION ENCOUNTER (OUTPATIENT)
Age: 82
End: 2017-04-15

## 2017-04-15 LAB
-  AMPICILLIN: SIGNIFICANT CHANGE UP
-  AMPICILLIN: SIGNIFICANT CHANGE UP
-  NITROFURANTOIN: SIGNIFICANT CHANGE UP
-  NITROFURANTOIN: SIGNIFICANT CHANGE UP
-  TETRACYCLINE: SIGNIFICANT CHANGE UP
-  TETRACYCLINE: SIGNIFICANT CHANGE UP
-  VANCOMYCIN: SIGNIFICANT CHANGE UP
-  VANCOMYCIN: SIGNIFICANT CHANGE UP
CULTURE RESULTS: SIGNIFICANT CHANGE UP
METHOD TYPE: SIGNIFICANT CHANGE UP
METHOD TYPE: SIGNIFICANT CHANGE UP
ORGANISM # SPEC MICROSCOPIC CNT: SIGNIFICANT CHANGE UP
SPECIMEN SOURCE: SIGNIFICANT CHANGE UP

## 2017-04-15 PROCEDURE — 99232 SBSQ HOSP IP/OBS MODERATE 35: CPT

## 2017-04-15 RX ORDER — SODIUM CHLORIDE 9 MG/ML
1000 INJECTION, SOLUTION INTRAVENOUS
Qty: 0 | Refills: 0 | Status: DISCONTINUED | OUTPATIENT
Start: 2017-04-15 | End: 2017-04-23

## 2017-04-15 RX ADMIN — Medication 100 MILLIGRAM(S): at 17:27

## 2017-04-15 RX ADMIN — ATORVASTATIN CALCIUM 40 MILLIGRAM(S): 80 TABLET, FILM COATED ORAL at 21:23

## 2017-04-15 RX ADMIN — Medication 1 APPLICATION(S): at 14:46

## 2017-04-15 RX ADMIN — Medication 500 MILLIGRAM(S): at 12:19

## 2017-04-15 RX ADMIN — AMLODIPINE BESYLATE 10 MILLIGRAM(S): 2.5 TABLET ORAL at 06:19

## 2017-04-15 RX ADMIN — PANTOPRAZOLE SODIUM 40 MILLIGRAM(S): 20 TABLET, DELAYED RELEASE ORAL at 06:19

## 2017-04-15 RX ADMIN — Medication 5: at 17:20

## 2017-04-15 RX ADMIN — Medication 0.25 MILLIGRAM(S): at 21:23

## 2017-04-15 RX ADMIN — Medication 100 MILLIGRAM(S): at 06:19

## 2017-04-15 RX ADMIN — Medication 1 APPLICATION(S): at 06:12

## 2017-04-15 RX ADMIN — Medication 1 MILLIGRAM(S): at 12:19

## 2017-04-15 RX ADMIN — GABAPENTIN 200 MILLIGRAM(S): 400 CAPSULE ORAL at 14:46

## 2017-04-15 RX ADMIN — TAMSULOSIN HYDROCHLORIDE 0.4 MILLIGRAM(S): 0.4 CAPSULE ORAL at 21:23

## 2017-04-15 RX ADMIN — ENOXAPARIN SODIUM 40 MILLIGRAM(S): 100 INJECTION SUBCUTANEOUS at 12:20

## 2017-04-15 RX ADMIN — Medication 100 MILLIGRAM(S): at 17:20

## 2017-04-15 RX ADMIN — Medication 325 MILLIGRAM(S): at 12:19

## 2017-04-15 RX ADMIN — ZINC SULFATE TAB 220 MG (50 MG ZINC EQUIVALENT) 220 MILLIGRAM(S): 220 (50 ZN) TAB at 14:46

## 2017-04-15 RX ADMIN — Medication 4: at 09:08

## 2017-04-15 RX ADMIN — NYSTATIN CREAM 1 APPLICATION(S): 100000 CREAM TOPICAL at 17:20

## 2017-04-15 RX ADMIN — LISINOPRIL 2.5 MILLIGRAM(S): 2.5 TABLET ORAL at 06:19

## 2017-04-15 RX ADMIN — GABAPENTIN 200 MILLIGRAM(S): 400 CAPSULE ORAL at 06:19

## 2017-04-15 RX ADMIN — Medication 50 MILLIGRAM(S): at 06:20

## 2017-04-15 RX ADMIN — Medication 1 APPLICATION(S): at 21:25

## 2017-04-15 RX ADMIN — Medication 4: at 12:19

## 2017-04-15 RX ADMIN — Medication 88 MICROGRAM(S): at 06:19

## 2017-04-15 RX ADMIN — NYSTATIN CREAM 1 APPLICATION(S): 100000 CREAM TOPICAL at 06:19

## 2017-04-15 RX ADMIN — Medication 50 MILLIGRAM(S): at 17:20

## 2017-04-15 RX ADMIN — Medication 1 TABLET(S): at 12:19

## 2017-04-15 RX ADMIN — GABAPENTIN 300 MILLIGRAM(S): 400 CAPSULE ORAL at 21:23

## 2017-04-15 RX ADMIN — POLYETHYLENE GLYCOL 3350 17 GRAM(S): 17 POWDER, FOR SOLUTION ORAL at 12:44

## 2017-04-15 RX ADMIN — MIRTAZAPINE 7.5 MILLIGRAM(S): 45 TABLET, ORALLY DISINTEGRATING ORAL at 21:24

## 2017-04-15 NOTE — PROGRESS NOTE ADULT - PROBLEM SELECTOR PLAN 1
Patient reports her pain is stable. Well controlled with use of percocet.   - Continue Percocet 5/325mg, 1-2 tabs for mod-severe pain.  -continue gabapentin for neuropathic pain.    -continue bowel regimen-senna/miralax/colace. Discussed side effects of opiate induced constipation with daughter and patient.  Discussed above plan with patient and her daughter. They are in agreement with plan. Will continue to follow.  Call with any pain questions .

## 2017-04-15 NOTE — PROGRESS NOTE ADULT - PROBLEM SELECTOR PLAN 1
Afebrile   Blood cultures No growth  CXR not suggestive of PNA  UA not suggestive of UTI  Sources are LLE BKA stump or RLE dry gangrene  s/p LLE AKA 4/14/17 and now afebrile  Continue Cefazolin till 4/18/17

## 2017-04-15 NOTE — PROGRESS NOTE ADULT - SUBJECTIVE AND OBJECTIVE BOX
YELITZA LÓPEZ    [id number]    85y      Female    Patient is a 85y old  Female who presents with a chief complaint of     INTERVAL HPI/OVERNIGHT EVENTS:  No Overnight events, patient's pain is well controlled, she denies fever, chills, chest pain, sob, dizziness, nausea or vomiting.     REVIEW OF SYSTEMS:    CONSTITUTIONAL: No fever, has fatigue  RESPIRATORY: No cough; No shortness of breath  CARDIOVASCULAR: No chest pain, palpitations  GASTROINTESTINAL: No abdominal or epigastric pain. No nausea, vomiting  NEUROLOGICAL: No headaches,  loss of strength.  MISCELLANEOUS: pain in the both lower extremities      Vital Signs Last 24 Hrs  T(C): 36.7, Max: 37.6 ( @ 17:36)  T(F): 98, Max: 99.7 ( @ 17:36)  HR: 79 (77 - 90)  BP: 155/72 (120/66 - 158/70)  BP(mean): 81 (81 - 81)  RR: 18 (10 - 18)  SpO2: 98% (92% - 100%)    PHYSICAL EXAM:    GENERAL: Elderly female looking comfortable   HEENT: PERRL, +EOMI  NECK: soft, Supple, No JVD,   CHEST/LUNG: minimal basal crackles bilaterally; No wheezing  HEART: S1S2+, Regular rate and rhythm; No murmurs.   ABDOMEN: Soft, Nontender, Nondistended; Bowel sounds present  EXTREMITIES:  2+ Peripheral Pulses, No clubbing, cyanosis, or edema  SKIN: No rashes or lesions  NEURO: AAOX3, no focal deficits  PSYCH: normal mood      LABS:                        9.7    6.2   )-----------( 277      ( 2017 07:10 )             30.3     -14    134<L>  |  97<L>  |  16.0  ----------------------------<  218<H>  4.3   |  26.0  |  0.88    Ca    9.0      2017 07:10    TPro  5.9<L>  /  Alb  2.7<L>  /  TBili  0.3<L>  /  DBili  x   /  AST  17  /  ALT  12  /  AlkPhos  96  -14      Urinalysis Basic - ( 2017 10:17 )    Color: Yellow / Appearance: Clear / S.005 / pH: x  Gluc: x / Ketone: Negative  / Bili: Negative / Urobili: Negative mg/dL   Blood: x / Protein: 30 mg/dL / Nitrite: Negative   Leuk Esterase: Small / RBC: 0-2 /HPF / WBC 6-10   Sq Epi: x / Non Sq Epi: Occasional / Bacteria: x          I&O's Summary    I & Os for current day (as of 15 Apr 2017 09:24)  =============================================  IN: 900 ml / OUT: 300 ml / NET: 600 ml      MEDICATIONS  (STANDING):  insulin lispro (HumaLOG) corrective regimen sliding scale  SubCutaneous three times a day before meals  dextrose 50% Injectable 12.5Gram(s) IV Push once  dextrose 50% Injectable 25Gram(s) IV Push once  dextrose 50% Injectable 25Gram(s) IV Push once  nystatin Powder 1Application(s) Topical two times a day  lactated ringers. 1000milliLiter(s) IV Continuous <Continuous>  tamsulosin 0.4milliGRAM(s) Oral at bedtime  ceFAZolin   IVPB 2000milliGRAM(s) IV Intermittent every 12 hours  atorvastatin 40milliGRAM(s) Oral at bedtime  amLODIPine   Tablet 10milliGRAM(s) Oral daily  ferrous    sulfate 325milliGRAM(s) Oral daily  pantoprazole    Tablet 40milliGRAM(s) Oral before breakfast  folic acid 1milliGRAM(s) Oral daily  multivitamin 1Tablet(s) Oral daily  zinc sulfate 220milliGRAM(s) Oral daily  ascorbic acid 500milliGRAM(s) Oral daily  metoprolol 50milliGRAM(s) Oral two times a day  lidocaine   Patch 1Patch Transdermal daily  docusate sodium 100milliGRAM(s) Oral two times a day  mirtazapine 7.5milliGRAM(s) Oral at bedtime  polyethylene glycol 3350 17Gram(s) Oral daily  gabapentin 200milliGRAM(s) Oral <User Schedule>  gabapentin 300milliGRAM(s) Oral at bedtime  levothyroxine 88MICROGram(s) Oral daily  ALPRAZolam 0.25milliGRAM(s) Oral at bedtime  lisinopril 2.5milliGRAM(s) Oral daily  dibucaine 1% Ointment 1Application(s) Topical three times a day  enoxaparin Injectable 40milliGRAM(s) SubCutaneous daily    MEDICATIONS  (PRN):  dextrose Gel 1Dose(s) Oral once PRN Blood Glucose LESS THAN 70 milliGRAM(s)/deciliter  glucagon  Injectable 1milliGRAM(s) IntraMuscular once PRN Glucose LESS THAN 70 milligrams/deciliter  ondansetron   Disintegrating Tablet 4milliGRAM(s) Oral every 8 hours PRN Nausea and/or Vomiting  senna 2Tablet(s) Oral at bedtime PRN Constipation  bisacodyl 5milliGRAM(s) Oral every 12 hours PRN Constipation  ALPRAZolam 0.25milliGRAM(s) Oral two times a day PRN anxiety  oxyCODONE  5 mG/acetaminophen 325 mG 1Tablet(s) Oral every 4 hours PRN Mild Pain (1 - 3)  oxyCODONE  5 mG/acetaminophen 325 mG 2Tablet(s) Oral every 4 hours PRN Severe Pain (7 - 10) YELITZA LÓPEZ    [id number]    85y      Female    Patient is a 85y old  Female who presents with a chief complaint of     INTERVAL HPI/OVERNIGHT EVENTS:  No Overnight events, patient's pain is well controlled, she denies fever, chills, chest pain, sob, dizziness, nausea or vomiting.     REVIEW OF SYSTEMS:    CONSTITUTIONAL: No fever, has fatigue  RESPIRATORY: No cough; No shortness of breath  CARDIOVASCULAR: No chest pain, palpitations  GASTROINTESTINAL: No abdominal or epigastric pain. No nausea, vomiting  NEUROLOGICAL: No headaches,  loss of strength.  MISCELLANEOUS: pain in the both lower extremities      Vital Signs Last 24 Hrs  T(C): 36.7, Max: 37.6 ( @ 17:36)  T(F): 98, Max: 99.7 ( @ 17:36)  HR: 79 (77 - 90)  BP: 155/72 (120/66 - 158/70)  BP(mean): 81 (81 - 81)  RR: 18 (10 - 18)  SpO2: 98% (92% - 100%)    PHYSICAL EXAM:    GENERAL: Elderly female looking comfortable   HEENT: PERRL, +EOMI  NECK: soft, Supple, No JVD,   CHEST/LUNG: minimal basal crackles bilaterally; No wheezing  HEART: S1S2+, Regular rate and rhythm; No murmurs.   ABDOMEN: Soft, Nontender, Nondistended; Bowel sounds present  EXTREMITIES:  1+ Peripheral Pulses, Left AKA  SKIN: Patient has left AKA with dressings on, right foot wound with dressings on, no coaking or bleeding.   NEURO: AAOX3, no focal deficits  PSYCH: normal mood      LABS:                        9.7    6.2   )-----------( 277      ( 2017 07:10 )             30.3     04-14    134<L>  |  97<L>  |  16.0  ----------------------------<  218<H>  4.3   |  26.0  |  0.88    Ca    9.0      2017 07:10    TPro  5.9<L>  /  Alb  2.7<L>  /  TBili  0.3<L>  /  DBili  x   /  AST  17  /  ALT  12  /  AlkPhos  96  14      Urinalysis Basic - ( 2017 10:17 )    Color: Yellow / Appearance: Clear / S.005 / pH: x  Gluc: x / Ketone: Negative  / Bili: Negative / Urobili: Negative mg/dL   Blood: x / Protein: 30 mg/dL / Nitrite: Negative   Leuk Esterase: Small / RBC: 0-2 /HPF / WBC 6-10   Sq Epi: x / Non Sq Epi: Occasional / Bacteria: x          I&O's Summary    I & Os for current day (as of 15 Apr 2017 09:24)  =============================================  IN: 900 ml / OUT: 300 ml / NET: 600 ml      MEDICATIONS  (STANDING):  insulin lispro (HumaLOG) corrective regimen sliding scale  SubCutaneous three times a day before meals  dextrose 50% Injectable 12.5Gram(s) IV Push once  dextrose 50% Injectable 25Gram(s) IV Push once  dextrose 50% Injectable 25Gram(s) IV Push once  nystatin Powder 1Application(s) Topical two times a day  lactated ringers. 1000milliLiter(s) IV Continuous <Continuous>  tamsulosin 0.4milliGRAM(s) Oral at bedtime  ceFAZolin   IVPB 2000milliGRAM(s) IV Intermittent every 12 hours  atorvastatin 40milliGRAM(s) Oral at bedtime  amLODIPine   Tablet 10milliGRAM(s) Oral daily  ferrous    sulfate 325milliGRAM(s) Oral daily  pantoprazole    Tablet 40milliGRAM(s) Oral before breakfast  folic acid 1milliGRAM(s) Oral daily  multivitamin 1Tablet(s) Oral daily  zinc sulfate 220milliGRAM(s) Oral daily  ascorbic acid 500milliGRAM(s) Oral daily  metoprolol 50milliGRAM(s) Oral two times a day  lidocaine   Patch 1Patch Transdermal daily  docusate sodium 100milliGRAM(s) Oral two times a day  mirtazapine 7.5milliGRAM(s) Oral at bedtime  polyethylene glycol 3350 17Gram(s) Oral daily  gabapentin 200milliGRAM(s) Oral <User Schedule>  gabapentin 300milliGRAM(s) Oral at bedtime  levothyroxine 88MICROGram(s) Oral daily  ALPRAZolam 0.25milliGRAM(s) Oral at bedtime  lisinopril 2.5milliGRAM(s) Oral daily  dibucaine 1% Ointment 1Application(s) Topical three times a day  enoxaparin Injectable 40milliGRAM(s) SubCutaneous daily    MEDICATIONS  (PRN):  dextrose Gel 1Dose(s) Oral once PRN Blood Glucose LESS THAN 70 milliGRAM(s)/deciliter  glucagon  Injectable 1milliGRAM(s) IntraMuscular once PRN Glucose LESS THAN 70 milligrams/deciliter  ondansetron   Disintegrating Tablet 4milliGRAM(s) Oral every 8 hours PRN Nausea and/or Vomiting  senna 2Tablet(s) Oral at bedtime PRN Constipation  bisacodyl 5milliGRAM(s) Oral every 12 hours PRN Constipation  ALPRAZolam 0.25milliGRAM(s) Oral two times a day PRN anxiety  oxyCODONE  5 mG/acetaminophen 325 mG 1Tablet(s) Oral every 4 hours PRN Mild Pain (1 - 3)  oxyCODONE  5 mG/acetaminophen 325 mG 2Tablet(s) Oral every 4 hours PRN Severe Pain (7 - 10) YELITZA LÓPEZ    [id number]    85y      Female    Patient is a 85y old  Female who presents with a chief complaint of wound infection     INTERVAL HPI/OVERNIGHT EVENTS:  No Overnight events, patient's pain is well controlled, she denies fever, chills, chest pain, sob, dizziness, nausea or vomiting.     REVIEW OF SYSTEMS:    CONSTITUTIONAL: No fever, has fatigue  RESPIRATORY: No cough; No shortness of breath  CARDIOVASCULAR: No chest pain, palpitations  GASTROINTESTINAL: No abdominal or epigastric pain. No nausea, vomiting  NEUROLOGICAL: No headaches,  loss of strength.  MISCELLANEOUS: pain in the both lower extremities      Vital Signs Last 24 Hrs  T(C): 36.7, Max: 37.6 ( @ 17:36)  T(F): 98, Max: 99.7 ( @ 17:36)  HR: 79 (77 - 90)  BP: 155/72 (120/66 - 158/70)  BP(mean): 81 (81 - 81)  RR: 18 (10 - 18)  SpO2: 98% (92% - 100%)    PHYSICAL EXAM:    GENERAL: Elderly female looking comfortable   HEENT: PERRL, +EOMI  NECK: soft, Supple, No JVD,   CHEST/LUNG: minimal basal crackles bilaterally; No wheezing  HEART: S1S2+, Regular rate and rhythm; No murmurs.   ABDOMEN: Soft, Nontender, Nondistended; Bowel sounds present  EXTREMITIES:  1+ Peripheral Pulses, Left AKA  SKIN: Patient has left AKA with dressings on, right foot wound with dressings on, no coaking or bleeding.   NEURO: AAOX3, no focal deficits  PSYCH: normal mood      LABS:                        9.7    6.2   )-----------( 277      ( 2017 07:10 )             30.3         134<L>  |  97<L>  |  16.0  ----------------------------<  218<H>  4.3   |  26.0  |  0.88    Ca    9.0      2017 07:10    TPro  5.9<L>  /  Alb  2.7<L>  /  TBili  0.3<L>  /  DBili  x   /  AST  17  /  ALT  12  /  AlkPhos  96        Urinalysis Basic - ( 2017 10:17 )    Color: Yellow / Appearance: Clear / S.005 / pH: x  Gluc: x / Ketone: Negative  / Bili: Negative / Urobili: Negative mg/dL   Blood: x / Protein: 30 mg/dL / Nitrite: Negative   Leuk Esterase: Small / RBC: 0-2 /HPF / WBC 6-10   Sq Epi: x / Non Sq Epi: Occasional / Bacteria: x          I&O's Summary    I & Os for current day (as of 15 Apr 2017 09:24)  =============================================  IN: 900 ml / OUT: 300 ml / NET: 600 ml      MEDICATIONS  (STANDING):  insulin lispro (HumaLOG) corrective regimen sliding scale  SubCutaneous three times a day before meals  dextrose 50% Injectable 12.5Gram(s) IV Push once  dextrose 50% Injectable 25Gram(s) IV Push once  dextrose 50% Injectable 25Gram(s) IV Push once  nystatin Powder 1Application(s) Topical two times a day  lactated ringers. 1000milliLiter(s) IV Continuous <Continuous>  tamsulosin 0.4milliGRAM(s) Oral at bedtime  ceFAZolin   IVPB 2000milliGRAM(s) IV Intermittent every 12 hours  atorvastatin 40milliGRAM(s) Oral at bedtime  amLODIPine   Tablet 10milliGRAM(s) Oral daily  ferrous    sulfate 325milliGRAM(s) Oral daily  pantoprazole    Tablet 40milliGRAM(s) Oral before breakfast  folic acid 1milliGRAM(s) Oral daily  multivitamin 1Tablet(s) Oral daily  zinc sulfate 220milliGRAM(s) Oral daily  ascorbic acid 500milliGRAM(s) Oral daily  metoprolol 50milliGRAM(s) Oral two times a day  lidocaine   Patch 1Patch Transdermal daily  docusate sodium 100milliGRAM(s) Oral two times a day  mirtazapine 7.5milliGRAM(s) Oral at bedtime  polyethylene glycol 3350 17Gram(s) Oral daily  gabapentin 200milliGRAM(s) Oral <User Schedule>  gabapentin 300milliGRAM(s) Oral at bedtime  levothyroxine 88MICROGram(s) Oral daily  ALPRAZolam 0.25milliGRAM(s) Oral at bedtime  lisinopril 2.5milliGRAM(s) Oral daily  dibucaine 1% Ointment 1Application(s) Topical three times a day  enoxaparin Injectable 40milliGRAM(s) SubCutaneous daily    MEDICATIONS  (PRN):  dextrose Gel 1Dose(s) Oral once PRN Blood Glucose LESS THAN 70 milliGRAM(s)/deciliter  glucagon  Injectable 1milliGRAM(s) IntraMuscular once PRN Glucose LESS THAN 70 milligrams/deciliter  ondansetron   Disintegrating Tablet 4milliGRAM(s) Oral every 8 hours PRN Nausea and/or Vomiting  senna 2Tablet(s) Oral at bedtime PRN Constipation  bisacodyl 5milliGRAM(s) Oral every 12 hours PRN Constipation  ALPRAZolam 0.25milliGRAM(s) Oral two times a day PRN anxiety  oxyCODONE  5 mG/acetaminophen 325 mG 1Tablet(s) Oral every 4 hours PRN Mild Pain (1 - 3)  oxyCODONE  5 mG/acetaminophen 325 mG 2Tablet(s) Oral every 4 hours PRN Severe Pain (7 - 10)

## 2017-04-15 NOTE — PROGRESS NOTE ADULT - ASSESSMENT
Assessment: 85 yr old female with PMH CAD s/p stents x 2 , DM2, hypertension, hyperlipidemia, anxiety, significant PVD s/p multiple stents, paroxysmal atrial fibrillation on Eliquis admitted for hypertensive urgency post LE angiogram. 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 Hospital course complicated by urinary retention with UTI (failed TOV), hyponatremia likely due to pain and improved with NaCl (per nephrology) and pain medications. BP meds and insulin coverage titrated for better control.  Patient stable for discharge to acute rehab. While in rehab, patient found to have postoperative L LE wound dehiscence and complaints of R foot pain, evaluated by vascular and scheduled for Left AKA and RLE angiogram 4/14/17. Seen by Cardiology and cleared.  Patient had fever 101 last night.  Pancultured. Patient being followed by ID, will continue antibiotics and follow up cultures.  Patient complains of LLE discomfort with drainage from BKA site.  Patient also has right foot ulceration and eschar.     inpatient for AKA due to wound dihescnece of BKA    Problem/Plan - 1:  ·  Problem: Postoperative wound dehiscence, initial encounter.  Plan:  Left AKA 4/14/17  and R LE angioplasty with Dr. Mckeon, Pain is well control, patient is getting cefazolin, blood cultures negative.     Problem/Plan - 2:  ·  Problem: Fever.  Plan: Urine c/s + for strep species bld c/s has been negative, patient is on cefazolin, CXR - read LLL small pleural effusion vs PNA. Doubt PNA, patient has no cough or SOB, will continue monitor, patient has been seen by ID.     Problem/Plan - 3:  ·  Problem: PVD (peripheral vascular disease).  Plan: As above, s/p AKA, wound dressings, followed by Vascular surgery.     Problem/Plan - 4:  ·  Problem: Type 2 diabetes mellitus with hypoglycemia without coma, with long-term current use of insulin.  Plan: Continue Lantus and sliding scale coverage , will decreased Lantus , will continue to monitor glucose.     Problem/Plan - 5:  ·  Problem: PAF (paroxysmal atrial fibrillation).  Plan: Rate controlled.  Eliquis on hold for OR. to assess when to resume  eliquis and plavix that pt was on prior, will discuss with Vascular surgery team.     Problem/Plan - 6:  Problem: CAD (coronary artery disease). Plan: Continue ASA, Metoprolol, Lisinopril, and Lipitor.  Plavix on hold.  Will need to discuss with cardiology and vascular after surgery about restarting.    Problem/Plan - 7:  ·  Problem: Acute blood loss anemia.  Plan: Asymptomatic. S/P 2 units of PRBC to keep Hg > 10  .  Monitor CBC.  Continue Iron, H&H has been stable.     DVT prophylaxis: will resume eliquis.

## 2017-04-15 NOTE — PROGRESS NOTE ADULT - SUBJECTIVE AND OBJECTIVE BOX
Chief Complaint:    Patient seen and examined at bedside,. Resting comfortably in bed with family at bedside. Patient is doing well in terms of her pain. Reports good pain control with use of Percocet. Currently pain is 5/10. She does report tingling and jolting pain intermittently in left LE as well as pain in the right foot. States she is going for angio next week for right foot.  Denies any side effects of medication. Sedation is improved since surgery. Patient does report flatus, denies any bowel movements yet.     PAST MEDICAL & SURGICAL HISTORY:  Hyperlipidemia  Hypertension  Diabetes  PVD (peripheral vascular disease)  CAD (coronary artery disease)  History of cholecystectomy  H/O angioplasty      SOCIAL HISTORY:  [ ] Denies Smoking, Alcohol, or Drug Use    Allergies    Benadryl (Other)  Demerol HCl (Other)    Intolerances    PAIN MEDICATIONS:  ondansetron   Disintegrating Tablet 4milliGRAM(s) Oral every 8 hours PRN  mirtazapine 7.5milliGRAM(s) Oral at bedtime  gabapentin 200milliGRAM(s) Oral <User Schedule>  gabapentin 300milliGRAM(s) Oral at bedtime  ALPRAZolam 0.25milliGRAM(s) Oral at bedtime  ALPRAZolam 0.25milliGRAM(s) Oral two times a day PRN  oxyCODONE  5 mG/acetaminophen 325 mG 1Tablet(s) Oral every 4 hours PRN  oxyCODONE  5 mG/acetaminophen 325 mG 2Tablet(s) Oral every 4 hours PRN    Heme:  enoxaparin Injectable 40milliGRAM(s) SubCutaneous daily    Antibiotics:  ceFAZolin   IVPB 2000milliGRAM(s) IV Intermittent every 12 hours    Cardiac:  tamsulosin 0.4milliGRAM(s) Oral at bedtime  amLODIPine   Tablet 10milliGRAM(s) Oral daily  metoprolol 50milliGRAM(s) Oral two times a day  lisinopril 2.5milliGRAM(s) Oral daily    Pulmonary:    Endocrine  insulin lispro (HumaLOG) corrective regimen sliding scale  SubCutaneous three times a day before meals  dextrose Gel 1Dose(s) Oral once PRN  dextrose 50% Injectable 12.5Gram(s) IV Push once  dextrose 50% Injectable 25Gram(s) IV Push once  dextrose 50% Injectable 25Gram(s) IV Push once  glucagon  Injectable 1milliGRAM(s) IntraMuscular once PRN  atorvastatin 40milliGRAM(s) Oral at bedtime  levothyroxine 88MICROGram(s) Oral daily    GI:  pantoprazole    Tablet 40milliGRAM(s) Oral before breakfast  senna 2Tablet(s) Oral at bedtime PRN  docusate sodium 100milliGRAM(s) Oral two times a day  bisacodyl 5milliGRAM(s) Oral every 12 hours PRN  polyethylene glycol 3350 17Gram(s) Oral daily    All Other Meds:  nystatin Powder 1Application(s) Topical two times a day  ferrous    sulfate 325milliGRAM(s) Oral daily  folic acid 1milliGRAM(s) Oral daily  multivitamin 1Tablet(s) Oral daily  zinc sulfate 220milliGRAM(s) Oral daily  ascorbic acid 500milliGRAM(s) Oral daily  lidocaine   Patch 1Patch Transdermal daily  dibucaine 1% Ointment 1Application(s) Topical three times a day  lactated ringers. 1000milliLiter(s) IV Continuous <Continuous>      LABS:                          9.7    6.2   )-----------( 277      ( 14 Apr 2017 07:10 )             30.3     04-14    134<L>  |  97<L>  |  16.0  ----------------------------<  218<H>  4.3   |  26.0  |  0.88    Ca    9.0      14 Apr 2017 07:10    TPro  5.9<L>  /  Alb  2.7<L>  /  TBili  0.3<L>  /  DBili  x   /  AST  17  /  ALT  12  /  AlkPhos  96  04-14      REVIEW OF SYSTEMS:  CONSTITUTIONAL: Negative fever, chills  NECK: No pain or stiffness  RESPIRATORY: No cough, wheezing,  No shortness of breath  GASTROINTESTINAL: No abdominal, No nausea, vomiting; No diarrhea or constipation.   GENITOURINARY: No dysuria, frequency, or incontinence  NEUROLOGICAL: No headaches, memory loss, loss of strength, numbness, or  SKIN: No itching, burning, rashes, or lesions   MUSCULOSKELETAL: + joint pain; swelling; No muscle, back, + extremity pain    PHYSICAL EXAM:     GENERAL: Comfortable, in no apparent distress  HEENT:  Atraumatic, Normocephalic  NECK: Supple  LUNG: Respiration even and unlabored, no distress noted  ABDOMEN: Soft, Nontender, Nondistended  BACK: No midline bony tenderness to palpation, no step off or deformity noted.   EXTREMITIES:  Left LE Dressing C/D/I  NEUROLOGIC: Awake, alert and oriented X3  SKIN: Warm and Dry        Drug Screen:        RADIOLOGY:    Vital Signs Last 24 Hrs  T(C): 36.7, Max: 37.6 (04-14 @ 17:36)  T(F): 98, Max: 99.7 (04-14 @ 17:36)  HR: 79 (79 - 90)  BP: 155/72 (120/66 - 158/70)  BP(mean): 81 (81 - 81)  RR: 18 (10 - 18)  SpO2: 98% (92% - 100%)    PAIN SCORE:   5/10      SCALE USED: (1-10)

## 2017-04-15 NOTE — PROGRESS NOTE ADULT - SUBJECTIVE AND OBJECTIVE BOX
POD #1 Left AKA    Resting comfortably  Pain well controlled  Afebrile. VSS  Dressing dry and intact    Continue post-op care    MADDIE Rogers DO

## 2017-04-15 NOTE — PROGRESS NOTE ADULT - SUBJECTIVE AND OBJECTIVE BOX
[facility]  INFECTIOUS DISEASES AND INTERNAL MEDICINE OF [place]  =======================================================  Kwaku Payne MD  Diplomates American Board of Internal Medicine and Infectious Diseases  =======================================================    YELITZA LÓPEZ     Follow up Fever    No more fever    S/P Left AKA 4/14/17      Allergies:  Benadryl (Other)  Demerol HCl (Other)      Antibiotics:  ceFAZolin   IVPB 2000milliGRAM(s) IV Intermittent every 12 hours        REVIEW OF SYSTEMS:  CONSTITUTIONAL: No fever or chills  HEENT:  No diplopia or blurred vision. No earache, sore throat or runny nose.  CARDIOVASCULAR:  No pressure, squeezing, strangling, tightness, heaviness or aching about the chest, neck, axilla or epigastrium.  RESPIRATORY:  No cough, shortness of breath  GASTROINTESTINAL:  No nausea, vomiting or diarrhea.  GENITOURINARY:  No dysuria, frequency or urgency  MUSCULOSKELETAL:  No joint pain  SKIN:  No rash.  NEUROLOGIC:  No paresthesias, fasciculations  PSYCHIATRIC:  No disorder of thought or mood.  ENDOCRINE:  No heat or cold intolerance, polyuria or polydipsia.  HEMATOLOGICAL:  No easy bruising or bleeding.      Physical Exam:  Vital Signs Last 24 Hrs  T(C): 36.7, Max: 37.6 (04-14 @ 17:36)  T(F): 98, Max: 99.7 (04-14 @ 17:36)  HR: 79 (78 - 90)  BP: 155/72 (120/66 - 158/70)  BP(mean): 81 (81 - 81)  RR: 18 (10 - 18)  SpO2: 98% (92% - 100%)      GEN: NAD, pleasant  HEENT: normocephalic and atraumatic. EOMI. PERRL.    NECK: Supple  LUNGS: Clear to auscultation.  HEART: Regular rate and rhythm   ABDOMEN: Soft, nontender, and nondistended.  Positive bowel sounds.    : No CVA tenderness  EXTREMITIES: Left AKA stump in surgical dressing  NEUROLOGIC: No focal deficits   PSYCHIATRIC: Appropriate affect .      Labs:  04-14    134<L>  |  97<L>  |  16.0  ----------------------------<  218<H>  4.3   |  26.0  |  0.88    Ca    9.0      14 Apr 2017 07:10    TPro  5.9<L>  /  Alb  2.7<L>  /  TBili  0.3<L>  /  DBili  x   /  AST  17  /  ALT  12  /  AlkPhos  96  04-14                      9.7    6.2   )-----------( 277      ( 14 Apr 2017 07:10 )             30.3       LIVER FUNCTIONS - ( 14 Apr 2017 07:10 )  Alb: 2.7 g/dL / Pro: 5.9 g/dL / ALK PHOS: 96 U/L / ALT: 12 U/L / AST: 17 U/L / GGT: x             RECENT CULTURES:  04-13 .Urine Clean Catch (Midstream) XXXX XXXX   >100,000 CFU/ml Streptococcus species  Culture in progress    04-13 .Blood Blood XXXX XXXX   No growth at 48 hours

## 2017-04-16 LAB
ANION GAP SERPL CALC-SCNC: 11 MMOL/L — SIGNIFICANT CHANGE UP (ref 5–17)
BUN SERPL-MCNC: 23 MG/DL — HIGH (ref 8–20)
CALCIUM SERPL-MCNC: 8.6 MG/DL — SIGNIFICANT CHANGE UP (ref 8.6–10.2)
CHLORIDE SERPL-SCNC: 100 MMOL/L — SIGNIFICANT CHANGE UP (ref 98–107)
CO2 SERPL-SCNC: 25 MMOL/L — SIGNIFICANT CHANGE UP (ref 22–29)
CREAT SERPL-MCNC: 1.01 MG/DL — SIGNIFICANT CHANGE UP (ref 0.5–1.3)
GLUCOSE SERPL-MCNC: 405 MG/DL — HIGH (ref 70–115)
HCT VFR BLD CALC: 29 % — LOW (ref 37–47)
HGB BLD-MCNC: 9.1 G/DL — LOW (ref 12–16)
MAGNESIUM SERPL-MCNC: 2 MG/DL — SIGNIFICANT CHANGE UP (ref 1.8–2.5)
MCHC RBC-ENTMCNC: 27.4 PG — SIGNIFICANT CHANGE UP (ref 27–31)
MCHC RBC-ENTMCNC: 31.4 G/DL — LOW (ref 32–36)
MCV RBC AUTO: 87.3 FL — SIGNIFICANT CHANGE UP (ref 81–99)
PHOSPHATE SERPL-MCNC: 2.7 MG/DL — SIGNIFICANT CHANGE UP (ref 2.4–4.7)
PLATELET # BLD AUTO: 291 K/UL — SIGNIFICANT CHANGE UP (ref 150–400)
POTASSIUM SERPL-MCNC: 5.1 MMOL/L — SIGNIFICANT CHANGE UP (ref 3.5–5.3)
POTASSIUM SERPL-SCNC: 5.1 MMOL/L — SIGNIFICANT CHANGE UP (ref 3.5–5.3)
RBC # BLD: 3.32 M/UL — LOW (ref 4.4–5.2)
RBC # FLD: 15.5 % — SIGNIFICANT CHANGE UP (ref 11–15.6)
SODIUM SERPL-SCNC: 136 MMOL/L — SIGNIFICANT CHANGE UP (ref 135–145)
WBC # BLD: 6 K/UL — SIGNIFICANT CHANGE UP (ref 4.8–10.8)
WBC # FLD AUTO: 6 K/UL — SIGNIFICANT CHANGE UP (ref 4.8–10.8)

## 2017-04-16 PROCEDURE — 99232 SBSQ HOSP IP/OBS MODERATE 35: CPT

## 2017-04-16 RX ORDER — INSULIN GLARGINE 100 [IU]/ML
20 INJECTION, SOLUTION SUBCUTANEOUS AT BEDTIME
Qty: 0 | Refills: 0 | Status: DISCONTINUED | OUTPATIENT
Start: 2017-04-16 | End: 2017-04-19

## 2017-04-16 RX ADMIN — Medication 325 MILLIGRAM(S): at 11:37

## 2017-04-16 RX ADMIN — Medication 50 MILLIGRAM(S): at 17:15

## 2017-04-16 RX ADMIN — Medication 50 MILLIGRAM(S): at 05:00

## 2017-04-16 RX ADMIN — Medication 100 MILLIGRAM(S): at 05:00

## 2017-04-16 RX ADMIN — AMLODIPINE BESYLATE 10 MILLIGRAM(S): 2.5 TABLET ORAL at 05:00

## 2017-04-16 RX ADMIN — Medication 88 MICROGRAM(S): at 05:00

## 2017-04-16 RX ADMIN — PANTOPRAZOLE SODIUM 40 MILLIGRAM(S): 20 TABLET, DELAYED RELEASE ORAL at 05:01

## 2017-04-16 RX ADMIN — MIRTAZAPINE 7.5 MILLIGRAM(S): 45 TABLET, ORALLY DISINTEGRATING ORAL at 21:47

## 2017-04-16 RX ADMIN — LISINOPRIL 2.5 MILLIGRAM(S): 2.5 TABLET ORAL at 05:00

## 2017-04-16 RX ADMIN — Medication 1 TABLET(S): at 11:37

## 2017-04-16 RX ADMIN — ATORVASTATIN CALCIUM 40 MILLIGRAM(S): 80 TABLET, FILM COATED ORAL at 21:47

## 2017-04-16 RX ADMIN — TAMSULOSIN HYDROCHLORIDE 0.4 MILLIGRAM(S): 0.4 CAPSULE ORAL at 21:47

## 2017-04-16 RX ADMIN — SODIUM CHLORIDE 50 MILLILITER(S): 9 INJECTION, SOLUTION INTRAVENOUS at 04:51

## 2017-04-16 RX ADMIN — Medication 1 APPLICATION(S): at 05:01

## 2017-04-16 RX ADMIN — Medication 1 MILLIGRAM(S): at 11:37

## 2017-04-16 RX ADMIN — Medication 1 APPLICATION(S): at 21:47

## 2017-04-16 RX ADMIN — GABAPENTIN 200 MILLIGRAM(S): 400 CAPSULE ORAL at 11:38

## 2017-04-16 RX ADMIN — Medication 0.25 MILLIGRAM(S): at 21:47

## 2017-04-16 RX ADMIN — POLYETHYLENE GLYCOL 3350 17 GRAM(S): 17 POWDER, FOR SOLUTION ORAL at 11:38

## 2017-04-16 RX ADMIN — Medication 5: at 08:48

## 2017-04-16 RX ADMIN — GABAPENTIN 200 MILLIGRAM(S): 400 CAPSULE ORAL at 05:00

## 2017-04-16 RX ADMIN — Medication 100 MILLIGRAM(S): at 17:15

## 2017-04-16 RX ADMIN — Medication 1 APPLICATION(S): at 16:01

## 2017-04-16 RX ADMIN — INSULIN GLARGINE 20 UNIT(S): 100 INJECTION, SOLUTION SUBCUTANEOUS at 21:49

## 2017-04-16 RX ADMIN — ZINC SULFATE TAB 220 MG (50 MG ZINC EQUIVALENT) 220 MILLIGRAM(S): 220 (50 ZN) TAB at 11:37

## 2017-04-16 RX ADMIN — Medication 100 MILLIGRAM(S): at 17:16

## 2017-04-16 RX ADMIN — NYSTATIN CREAM 1 APPLICATION(S): 100000 CREAM TOPICAL at 17:16

## 2017-04-16 RX ADMIN — Medication 500 MILLIGRAM(S): at 11:43

## 2017-04-16 RX ADMIN — Medication 4: at 17:16

## 2017-04-16 RX ADMIN — ENOXAPARIN SODIUM 40 MILLIGRAM(S): 100 INJECTION SUBCUTANEOUS at 11:38

## 2017-04-16 RX ADMIN — NYSTATIN CREAM 1 APPLICATION(S): 100000 CREAM TOPICAL at 05:01

## 2017-04-16 RX ADMIN — GABAPENTIN 300 MILLIGRAM(S): 400 CAPSULE ORAL at 21:47

## 2017-04-16 RX ADMIN — Medication 5: at 12:25

## 2017-04-16 NOTE — PROGRESS NOTE ADULT - SUBJECTIVE AND OBJECTIVE BOX
85y Female s/p left above the knee amputation under general anesthesia    T(C): 36.6, Max: 37.1 (04-15 @ 16:10)  HR: 80 (76 - 85)  BP: 127/58 (115/63 - 165/66)  RR: 18 (17 - 20)  SpO2: 96% (93% - 97%)  Wt(kg): --    Pt seen, doing well, no anesthesia complications or complaints noted or reported.   No Nausea  Pain well controlled

## 2017-04-16 NOTE — PROGRESS NOTE ADULT - SUBJECTIVE AND OBJECTIVE BOX
No distress  No significant complaints  Afebrile. VSS  Dressing dry and intact    Continue post-op care    MADDIE Rogers DO

## 2017-04-16 NOTE — PROGRESS NOTE ADULT - SUBJECTIVE AND OBJECTIVE BOX
YELITZA LÓPEZ    [id number]    85y      Female    Patient is a 85y old  Female who presents with a chief complaint of wound infection     INTERVAL HPI/OVERNIGHT EVENTS:  No Overnight events, patient's pain is controlled, she denies fever, chills, chest pain, nausea or vomiting.     REVIEW OF SYSTEMS:    CONSTITUTIONAL: No fever, has fatigue  RESPIRATORY: No cough; No shortness of breath  CARDIOVASCULAR: No chest pain, palpitations  GASTROINTESTINAL: No abdominal or epigastric pain. No nausea, vomiting  NEUROLOGICAL: No headaches,  loss of strength.  MISCELLANEOUS: pain in the both lower extremities      Vital Signs Last 24 Hrs  T(C): 36.7, Max: 38.6 ( @ 17:36)  T(F): 98, Max: 99.7 ( @ 17:36)  HR: 82 (77 - 90)  BP: 137/63 (120/66 - 158/70)  BP(mean): 81 (81 - 81)  RR: 18 (10 - 18)  SpO2: 97% (92% - 100%)    PHYSICAL EXAM:    GENERAL: Elderly female looking comfortable   HEENT: PERRL, +EOMI  NECK: soft, Supple, No JVD,   CHEST/LUNG: minimal basal crackles bilaterally; No wheezing  HEART: S1S2+, Regular rate and rhythm; No murmurs.   ABDOMEN: Soft, Nontender, Nondistended; Bowel sounds present  EXTREMITIES:  1+ Peripheral Pulses, Left AKA  SKIN: Patient has left AKA with dressings on, right foot wound with dressings on, no coaking or bleeding.   NEURO: AAOX3, no focal deficits  PSYCH: normal mood      LABS:                        9.7    6.2   )-----------( 277      ( 2017 07:10 )             30.3     -14    134<L>  |  97<L>  |  16.0  ----------------------------<  218<H>  4.3   |  26.0  |  0.88    Ca    9.0      2017 07:10    TPro  5.9<L>  /  Alb  2.7<L>  /  TBili  0.3<L>  /  DBili  x   /  AST  17  /  ALT  12  /  AlkPhos  96  -      Urinalysis Basic - ( 2017 10:17 )    Color: Yellow / Appearance: Clear / S.005 / pH: x  Gluc: x / Ketone: Negative  / Bili: Negative / Urobili: Negative mg/dL   Blood: x / Protein: 30 mg/dL / Nitrite: Negative   Leuk Esterase: Small / RBC: 0-2 /HPF / WBC 6-10   Sq Epi: x / Non Sq Epi: Occasional / Bacteria: x          I&O's Summary    I & Os for current day (as of 15 Apr 2017 09:24)  =============================================  IN: 900 ml / OUT: 300 ml / NET: 600 ml      MEDICATIONS  (STANDING):  insulin lispro (HumaLOG) corrective regimen sliding scale  SubCutaneous three times a day before meals  dextrose 50% Injectable 12.5Gram(s) IV Push once  dextrose 50% Injectable 25Gram(s) IV Push once  dextrose 50% Injectable 25Gram(s) IV Push once  nystatin Powder 1Application(s) Topical two times a day  lactated ringers. 1000milliLiter(s) IV Continuous <Continuous>  tamsulosin 0.4milliGRAM(s) Oral at bedtime  ceFAZolin   IVPB 2000milliGRAM(s) IV Intermittent every 12 hours  atorvastatin 40milliGRAM(s) Oral at bedtime  amLODIPine   Tablet 10milliGRAM(s) Oral daily  ferrous    sulfate 325milliGRAM(s) Oral daily  pantoprazole    Tablet 40milliGRAM(s) Oral before breakfast  folic acid 1milliGRAM(s) Oral daily  multivitamin 1Tablet(s) Oral daily  zinc sulfate 220milliGRAM(s) Oral daily  ascorbic acid 500milliGRAM(s) Oral daily  metoprolol 50milliGRAM(s) Oral two times a day  lidocaine   Patch 1Patch Transdermal daily  docusate sodium 100milliGRAM(s) Oral two times a day  mirtazapine 7.5milliGRAM(s) Oral at bedtime  polyethylene glycol 3350 17Gram(s) Oral daily  gabapentin 200milliGRAM(s) Oral <User Schedule>  gabapentin 300milliGRAM(s) Oral at bedtime  levothyroxine 88MICROGram(s) Oral daily  ALPRAZolam 0.25milliGRAM(s) Oral at bedtime  lisinopril 2.5milliGRAM(s) Oral daily  dibucaine 1% Ointment 1Application(s) Topical three times a day  enoxaparin Injectable 40milliGRAM(s) SubCutaneous daily    MEDICATIONS  (PRN):  dextrose Gel 1Dose(s) Oral once PRN Blood Glucose LESS THAN 70 milliGRAM(s)/deciliter  glucagon  Injectable 1milliGRAM(s) IntraMuscular once PRN Glucose LESS THAN 70 milligrams/deciliter  ondansetron   Disintegrating Tablet 4milliGRAM(s) Oral every 8 hours PRN Nausea and/or Vomiting  senna 2Tablet(s) Oral at bedtime PRN Constipation  bisacodyl 5milliGRAM(s) Oral every 12 hours PRN Constipation  ALPRAZolam 0.25milliGRAM(s) Oral two times a day PRN anxiety  oxyCODONE  5 mG/acetaminophen 325 mG 1Tablet(s) Oral every 4 hours PRN Mild Pain (1 - 3)  oxyCODONE  5 mG/acetaminophen 325 mG 2Tablet(s) Oral every 4 hours PRN Severe Pain (7 - 10)

## 2017-04-17 DIAGNOSIS — R11.2 NAUSEA WITH VOMITING, UNSPECIFIED: ICD-10-CM

## 2017-04-17 DIAGNOSIS — Z51.5 ENCOUNTER FOR PALLIATIVE CARE: ICD-10-CM

## 2017-04-17 DIAGNOSIS — K59.00 CONSTIPATION, UNSPECIFIED: ICD-10-CM

## 2017-04-17 DIAGNOSIS — F32.9 MAJOR DEPRESSIVE DISORDER, SINGLE EPISODE, UNSPECIFIED: ICD-10-CM

## 2017-04-17 LAB
ANION GAP SERPL CALC-SCNC: 14 MMOL/L — SIGNIFICANT CHANGE UP (ref 5–17)
BUN SERPL-MCNC: 19 MG/DL — SIGNIFICANT CHANGE UP (ref 8–20)
CALCIUM SERPL-MCNC: 8.9 MG/DL — SIGNIFICANT CHANGE UP (ref 8.6–10.2)
CHLORIDE SERPL-SCNC: 99 MMOL/L — SIGNIFICANT CHANGE UP (ref 98–107)
CO2 SERPL-SCNC: 24 MMOL/L — SIGNIFICANT CHANGE UP (ref 22–29)
CREAT SERPL-MCNC: 0.75 MG/DL — SIGNIFICANT CHANGE UP (ref 0.5–1.3)
GLUCOSE SERPL-MCNC: 320 MG/DL — HIGH (ref 70–115)
HCT VFR BLD CALC: 32.4 % — LOW (ref 37–47)
HGB BLD-MCNC: 10.3 G/DL — LOW (ref 12–16)
MCHC RBC-ENTMCNC: 27.6 PG — SIGNIFICANT CHANGE UP (ref 27–31)
MCHC RBC-ENTMCNC: 31.8 G/DL — LOW (ref 32–36)
MCV RBC AUTO: 86.9 FL — SIGNIFICANT CHANGE UP (ref 81–99)
PLATELET # BLD AUTO: 308 K/UL — SIGNIFICANT CHANGE UP (ref 150–400)
POTASSIUM SERPL-MCNC: 4.7 MMOL/L — SIGNIFICANT CHANGE UP (ref 3.5–5.3)
POTASSIUM SERPL-SCNC: 4.7 MMOL/L — SIGNIFICANT CHANGE UP (ref 3.5–5.3)
RBC # BLD: 3.73 M/UL — LOW (ref 4.4–5.2)
RBC # FLD: 15.4 % — SIGNIFICANT CHANGE UP (ref 11–15.6)
SODIUM SERPL-SCNC: 137 MMOL/L — SIGNIFICANT CHANGE UP (ref 135–145)
WBC # BLD: 6.3 K/UL — SIGNIFICANT CHANGE UP (ref 4.8–10.8)
WBC # FLD AUTO: 6.3 K/UL — SIGNIFICANT CHANGE UP (ref 4.8–10.8)

## 2017-04-17 PROCEDURE — 97542 WHEELCHAIR MNGMENT TRAINING: CPT

## 2017-04-17 PROCEDURE — 99232 SBSQ HOSP IP/OBS MODERATE 35: CPT

## 2017-04-17 PROCEDURE — 86900 BLOOD TYPING SEROLOGIC ABO: CPT

## 2017-04-17 PROCEDURE — 87040 BLOOD CULTURE FOR BACTERIA: CPT

## 2017-04-17 PROCEDURE — 99222 1ST HOSP IP/OBS MODERATE 55: CPT

## 2017-04-17 PROCEDURE — 87086 URINE CULTURE/COLONY COUNT: CPT

## 2017-04-17 PROCEDURE — 97163 PT EVAL HIGH COMPLEX 45 MIN: CPT

## 2017-04-17 PROCEDURE — 87186 SC STD MICRODIL/AGAR DIL: CPT

## 2017-04-17 PROCEDURE — 97530 THERAPEUTIC ACTIVITIES: CPT

## 2017-04-17 PROCEDURE — 86901 BLOOD TYPING SEROLOGIC RH(D): CPT

## 2017-04-17 PROCEDURE — P9016: CPT

## 2017-04-17 PROCEDURE — 97112 NEUROMUSCULAR REEDUCATION: CPT

## 2017-04-17 PROCEDURE — 36415 COLL VENOUS BLD VENIPUNCTURE: CPT

## 2017-04-17 PROCEDURE — 82306 VITAMIN D 25 HYDROXY: CPT

## 2017-04-17 PROCEDURE — 86920 COMPATIBILITY TEST SPIN: CPT

## 2017-04-17 PROCEDURE — 86850 RBC ANTIBODY SCREEN: CPT

## 2017-04-17 PROCEDURE — 84443 ASSAY THYROID STIM HORMONE: CPT

## 2017-04-17 PROCEDURE — 80076 HEPATIC FUNCTION PANEL: CPT

## 2017-04-17 PROCEDURE — 97110 THERAPEUTIC EXERCISES: CPT

## 2017-04-17 PROCEDURE — 84134 ASSAY OF PREALBUMIN: CPT

## 2017-04-17 PROCEDURE — 71045 X-RAY EXAM CHEST 1 VIEW: CPT

## 2017-04-17 PROCEDURE — 93005 ELECTROCARDIOGRAM TRACING: CPT

## 2017-04-17 PROCEDURE — 97167 OT EVAL HIGH COMPLEX 60 MIN: CPT

## 2017-04-17 PROCEDURE — 80048 BASIC METABOLIC PNL TOTAL CA: CPT

## 2017-04-17 PROCEDURE — 85027 COMPLETE CBC AUTOMATED: CPT

## 2017-04-17 PROCEDURE — 85018 HEMOGLOBIN: CPT

## 2017-04-17 PROCEDURE — 80053 COMPREHEN METABOLIC PANEL: CPT

## 2017-04-17 PROCEDURE — 81001 URINALYSIS AUTO W/SCOPE: CPT

## 2017-04-17 PROCEDURE — 97535 SELF CARE MNGMENT TRAINING: CPT

## 2017-04-17 PROCEDURE — 36430 TRANSFUSION BLD/BLD COMPNT: CPT

## 2017-04-17 RX ORDER — MAGNESIUM HYDROXIDE 400 MG/1
30 TABLET, CHEWABLE ORAL DAILY
Qty: 0 | Refills: 0 | Status: DISCONTINUED | OUTPATIENT
Start: 2017-04-17 | End: 2017-04-27

## 2017-04-17 RX ORDER — ONDANSETRON 8 MG/1
4 TABLET, FILM COATED ORAL ONCE
Qty: 0 | Refills: 0 | Status: COMPLETED | OUTPATIENT
Start: 2017-04-17 | End: 2017-04-17

## 2017-04-17 RX ORDER — ONDANSETRON 8 MG/1
4 TABLET, FILM COATED ORAL EVERY 8 HOURS
Qty: 0 | Refills: 0 | Status: DISCONTINUED | OUTPATIENT
Start: 2017-04-17 | End: 2017-04-27

## 2017-04-17 RX ORDER — SENNA PLUS 8.6 MG/1
2 TABLET ORAL AT BEDTIME
Qty: 0 | Refills: 0 | Status: DISCONTINUED | OUTPATIENT
Start: 2017-04-18 | End: 2017-04-27

## 2017-04-17 RX ADMIN — Medication 100 MILLIGRAM(S): at 18:12

## 2017-04-17 RX ADMIN — Medication 325 MILLIGRAM(S): at 12:32

## 2017-04-17 RX ADMIN — ONDANSETRON 4 MILLIGRAM(S): 8 TABLET, FILM COATED ORAL at 16:12

## 2017-04-17 RX ADMIN — MIRTAZAPINE 7.5 MILLIGRAM(S): 45 TABLET, ORALLY DISINTEGRATING ORAL at 21:20

## 2017-04-17 RX ADMIN — ZINC SULFATE TAB 220 MG (50 MG ZINC EQUIVALENT) 220 MILLIGRAM(S): 220 (50 ZN) TAB at 12:32

## 2017-04-17 RX ADMIN — POLYETHYLENE GLYCOL 3350 17 GRAM(S): 17 POWDER, FOR SOLUTION ORAL at 12:34

## 2017-04-17 RX ADMIN — Medication 50 MILLIGRAM(S): at 06:31

## 2017-04-17 RX ADMIN — Medication 100 MILLIGRAM(S): at 06:30

## 2017-04-17 RX ADMIN — Medication 2: at 18:10

## 2017-04-17 RX ADMIN — Medication 2: at 12:30

## 2017-04-17 RX ADMIN — GABAPENTIN 300 MILLIGRAM(S): 400 CAPSULE ORAL at 21:22

## 2017-04-17 RX ADMIN — PANTOPRAZOLE SODIUM 40 MILLIGRAM(S): 20 TABLET, DELAYED RELEASE ORAL at 06:31

## 2017-04-17 RX ADMIN — GABAPENTIN 200 MILLIGRAM(S): 400 CAPSULE ORAL at 06:31

## 2017-04-17 RX ADMIN — Medication 50 MILLIGRAM(S): at 18:11

## 2017-04-17 RX ADMIN — Medication 500 MILLIGRAM(S): at 12:32

## 2017-04-17 RX ADMIN — ENOXAPARIN SODIUM 40 MILLIGRAM(S): 100 INJECTION SUBCUTANEOUS at 12:34

## 2017-04-17 RX ADMIN — INSULIN GLARGINE 20 UNIT(S): 100 INJECTION, SOLUTION SUBCUTANEOUS at 21:20

## 2017-04-17 RX ADMIN — Medication 3: at 08:56

## 2017-04-17 RX ADMIN — Medication 1 MILLIGRAM(S): at 12:32

## 2017-04-17 RX ADMIN — Medication 0.25 MILLIGRAM(S): at 21:21

## 2017-04-17 RX ADMIN — MAGNESIUM HYDROXIDE 30 MILLILITER(S): 400 TABLET, CHEWABLE ORAL at 10:06

## 2017-04-17 RX ADMIN — Medication 88 MICROGRAM(S): at 06:31

## 2017-04-17 RX ADMIN — Medication 1 APPLICATION(S): at 22:35

## 2017-04-17 RX ADMIN — AMLODIPINE BESYLATE 10 MILLIGRAM(S): 2.5 TABLET ORAL at 06:30

## 2017-04-17 RX ADMIN — Medication 1 APPLICATION(S): at 06:31

## 2017-04-17 RX ADMIN — Medication 100 MILLIGRAM(S): at 06:32

## 2017-04-17 RX ADMIN — NYSTATIN CREAM 1 APPLICATION(S): 100000 CREAM TOPICAL at 18:12

## 2017-04-17 RX ADMIN — NYSTATIN CREAM 1 APPLICATION(S): 100000 CREAM TOPICAL at 06:32

## 2017-04-17 RX ADMIN — LISINOPRIL 2.5 MILLIGRAM(S): 2.5 TABLET ORAL at 06:31

## 2017-04-17 RX ADMIN — ATORVASTATIN CALCIUM 40 MILLIGRAM(S): 80 TABLET, FILM COATED ORAL at 21:22

## 2017-04-17 RX ADMIN — Medication 1 TABLET(S): at 12:32

## 2017-04-17 RX ADMIN — Medication 1 APPLICATION(S): at 13:50

## 2017-04-17 RX ADMIN — TAMSULOSIN HYDROCHLORIDE 0.4 MILLIGRAM(S): 0.4 CAPSULE ORAL at 21:21

## 2017-04-17 NOTE — CONSULT NOTE ADULT - SUBJECTIVE AND OBJECTIVE BOX
Palliative Medicine Initial Consultation Note    HPI:  85yr woman hx CAD, stents, PVD , DM, HTN, HL, PAF on Eliquis s/p BKA on 3/14/17 transferred from acute rehab for left LE wound dehiscence and RLE pain. Patient found at the time to have a fever 101. Patient was admitted last month for a hyperintensive urgency post LE angiogram. She underwent left BKA.  Hospital course complicated by urinary retention with UTI and hyponatremia.     Patient seen and examined-  She had just vomited and feeling better. No BM in 5 days and given MOM with + BM.     PERTINENT PMH REVIEWED:  x[ ] YES [ ] NO         CAD  PVD  DM    Past SX History:  cholecystectomy  angioplasty    SOCIAL HISTORY:  EtOH [ ] Yes  [ x] No                                    Drugs [ ] Yes [x ] No                                   [ x] former smoker [ ] nonsmoker                                    Admitted from: [ ] home [ x] Assisted Living    Surrogate/HCP/Guardian: [x ] YES [ ] NO   Daughter-  Kaia Bhatt  [phone]    FAMILY HISTORY:  No pertinent family history in first degree relatives      Baseline ADLs (prior to admission):  Independent [ ] moderately [ ] fully   Dependent   [ ] moderately [x ]fully    MEDICATIONS  (STANDING):  insulin lispro (HumaLOG) corrective regimen sliding scale  SubCutaneous three times a day before meals  dextrose 50% Injectable 12.5Gram(s) IV Push once  dextrose 50% Injectable 25Gram(s) IV Push once  dextrose 50% Injectable 25Gram(s) IV Push once  nystatin Powder 1Application(s) Topical two times a day  tamsulosin 0.4milliGRAM(s) Oral at bedtime  ceFAZolin   IVPB 2000milliGRAM(s) IV Intermittent every 12 hours  atorvastatin 40milliGRAM(s) Oral at bedtime  amLODIPine   Tablet 10milliGRAM(s) Oral daily  ferrous    sulfate 325milliGRAM(s) Oral daily  pantoprazole    Tablet 40milliGRAM(s) Oral before breakfast  folic acid 1milliGRAM(s) Oral daily  multivitamin 1Tablet(s) Oral daily  zinc sulfate 220milliGRAM(s) Oral daily  ascorbic acid 500milliGRAM(s) Oral daily  metoprolol 50milliGRAM(s) Oral two times a day  lidocaine   Patch 1Patch Transdermal daily  docusate sodium 100milliGRAM(s) Oral two times a day  mirtazapine 7.5milliGRAM(s) Oral at bedtime  polyethylene glycol 3350 17Gram(s) Oral daily  gabapentin 200milliGRAM(s) Oral <User Schedule>  gabapentin 300milliGRAM(s) Oral at bedtime  levothyroxine 88MICROGram(s) Oral daily  ALPRAZolam 0.25milliGRAM(s) Oral at bedtime  lisinopril 2.5milliGRAM(s) Oral daily  dibucaine 1% Ointment 1Application(s) Topical three times a day  enoxaparin Injectable 40milliGRAM(s) SubCutaneous daily  lactated ringers. 1000milliLiter(s) IV Continuous <Continuous>  insulin glargine Injectable (LANTUS) 20Unit(s) SubCutaneous at bedtime    MEDICATIONS  (PRN):  dextrose Gel 1Dose(s) Oral once PRN Blood Glucose LESS THAN 70 milliGRAM(s)/deciliter  glucagon  Injectable 1milliGRAM(s) IntraMuscular once PRN Glucose LESS THAN 70 milligrams/deciliter  senna 2Tablet(s) Oral at bedtime PRN Constipation  bisacodyl 5milliGRAM(s) Oral every 12 hours PRN Constipation  ALPRAZolam 0.25milliGRAM(s) Oral two times a day PRN anxiety  oxyCODONE  5 mG/acetaminophen 325 mG 1Tablet(s) Oral every 4 hours PRN Mild Pain (1 - 3)  oxyCODONE  5 mG/acetaminophen 325 mG 2Tablet(s) Oral every 4 hours PRN Severe Pain (7 - 10)  magnesium hydroxide Suspension 30milliLiter(s) Oral daily PRN Constipation  ondansetron Injectable 4milliGRAM(s) IV Push every 8 hours PRN Nausea and/or Vomiting      Allergies    Benadryl (Other)  Demerol HCl (Other)    Intolerances        REVIEW OF SYSTEMS   see HPI    [ ] Unable to obtain due to poor mentation     General: no fevers, + fatigue, no loss of appetite    Skin: no rashes, skin changes  	  Ophthalmologic: no eye pain or blurred vision  	  ENMT:	no sore throat, no ear pain    Respiratory and Thorax: no cough,  no  SOB 	    Cardiovascular:	no chest pain, no leg swelling    Gastrointestinal:	no  + constipation, nausea ,     Genitourinary:	no FUD    Musculoskeletal: general weakness, left leg pain. pain right foot     Neurological: no seizures, no dizziness    Hematology/Lymphatics: no petechia or purpura	    Endocrine: no polyuria, no polydipsia	      Karnofsky Performance Score/Palliative Performance Status Version 2: 40   %    Vital Signs Last 24 Hrs  T(C): 36.9, Max: 37 (04-16 @ 16:30)  T(F): 98.4, Max: 98.6 (04-16 @ 16:30)  HR: 91 (82 - 95)  BP: 163/80 (124/53 - 163/80)  BP(mean): --  RR: 18 (17 - 18)  SpO2: 97% (93% - 97%)    PHYSICAL EXAM:    General: [x ] alert  [ ] oriented x ____ [ ] lethargic [ ] agitated                  [ ] cachexia  [ ] nonverbal  [ ] coma    HEENT: [x ] normal  [ ] dry mouth  [ ] ET tube/trach    Lungs: [x ] comfortable [ ] tachypnea/labored breathing  [ ] excessive secretions    CV: [ x] normal  [ ] tachycardia    GI: [x ] normal + BS soft non tender non distended     : [ ] normal  [ ] incontinent  [ ] oliguria/anuria  [ ] juarez    MSK: [ ] normal  [ x]general  weakness  [ ] edema    + dressings clean and dry            [ ] ambulatory  [ ] bedbound/wheelchair bound    Skin: [ ] normal  [ ] pressure ulcers- Stage_____  [ ] no rash    LABS:                        10.3   6.3   )-----------( 308      ( 17 Apr 2017 05:48 )             32.4     04-17    137  |  99  |  19.0  ----------------------------<  320<H>  4.7   |  24.0  |  0.75    Ca    8.9      17 Apr 2017 05:48  Phos  2.7     04-16  Mg     2.0     04-16          I&O's Summary  I & Os for 24h ending 17 Apr 2017 07:00  =============================================  IN: 400 ml / OUT: 350 ml / NET: 50 ml    I & Os for current day (as of 17 Apr 2017 15:10)  =============================================  IN: 950 ml / OUT: 410 ml / NET: 540 ml        Thank you for the opportunity to assist with the care of this patient.   [facility] Consult Service [phone].

## 2017-04-17 NOTE — CONSULT NOTE ADULT - PROBLEM SELECTOR RECOMMENDATION 4
Patient with 1 episode of vomiting today. Feeling better. Changed Zofran to IVP from PO.  Monitor BM's.

## 2017-04-17 NOTE — PROGRESS NOTE ADULT - SUBJECTIVE AND OBJECTIVE BOX
YELITZA LÓPEZ    [id number]    85y      Female    Patient is a 85y old  Female who presents with a chief complaint of     INTERVAL HPI/OVERNIGHT EVENTS:    No Overnight events, pain is well controlled, she denies fever, chills, chest pain, nausea or vomiting, she has some constipation.     REVIEW OF SYSTEMS:    CONSTITUTIONAL: No fever, has fatigue  RESPIRATORY: No cough; No shortness of breath  CARDIOVASCULAR: No chest pain, palpitations  GASTROINTESTINAL: No abdominal or epigastric pain. No nausea, vomiting  NEUROLOGICAL: No headaches,  loss of strength.  MISCELLANEOUS: pain in the both lower extremities          Vital Signs Last 24 Hrs  T(C): 37.1, Max: 37.1 (04-17 @ 16:53)  T(F): 98.7, Max: 98.7 (04-17 @ 16:53)  HR: 97 (82 - 97)  BP: 155/63 (124/53 - 163/80)  RR: 18 (17 - 18)  SpO2: 95% (93% - 97%)    PHYSICAL EXAM:    GENERAL: Elderly female looking comfortable   HEENT: PERRL, +EOMI  NECK: soft, Supple, No JVD,   CHEST/LUNG: CTA bilaterally; No wheezing  HEART: S1S2+, Regular rate and rhythm; No murmurs.   ABDOMEN: Soft, Nontender, Nondistended; Bowel sounds present  EXTREMITIES:  1+ Peripheral Pulses, Left AKA  SKIN: Patient has left AKA with dressings on, right foot wound with dressings on, no soaking or bleeding.   NEURO: AAOX3, no focal deficits  PSYCH: normal mood        LABS:                        10.3   6.3   )-----------( 308      ( 17 Apr 2017 05:48 )             32.4     04-17    137  |  99  |  19.0  ----------------------------<  320<H>  4.7   |  24.0  |  0.75    Ca    8.9      17 Apr 2017 05:48  Phos  2.7     04-16  Mg     2.0     04-16      I&O's Summary  I & Os for 24h ending 17 Apr 2017 07:00  =============================================  IN: 400 ml / OUT: 350 ml / NET: 50 ml    I & Os for current day (as of 17 Apr 2017 18:06)  =============================================  IN: 950 ml / OUT: 410 ml / NET: 540 ml      MEDICATIONS  (STANDING):  insulin lispro (HumaLOG) corrective regimen sliding scale  SubCutaneous three times a day before meals  dextrose 50% Injectable 12.5Gram(s) IV Push once  dextrose 50% Injectable 25Gram(s) IV Push once  dextrose 50% Injectable 25Gram(s) IV Push once  nystatin Powder 1Application(s) Topical two times a day  tamsulosin 0.4milliGRAM(s) Oral at bedtime  ceFAZolin   IVPB 2000milliGRAM(s) IV Intermittent every 12 hours  atorvastatin 40milliGRAM(s) Oral at bedtime  amLODIPine   Tablet 10milliGRAM(s) Oral daily  ferrous    sulfate 325milliGRAM(s) Oral daily  pantoprazole    Tablet 40milliGRAM(s) Oral before breakfast  folic acid 1milliGRAM(s) Oral daily  multivitamin 1Tablet(s) Oral daily  zinc sulfate 220milliGRAM(s) Oral daily  ascorbic acid 500milliGRAM(s) Oral daily  metoprolol 50milliGRAM(s) Oral two times a day  lidocaine   Patch 1Patch Transdermal daily  docusate sodium 100milliGRAM(s) Oral two times a day  mirtazapine 7.5milliGRAM(s) Oral at bedtime  polyethylene glycol 3350 17Gram(s) Oral daily  gabapentin 200milliGRAM(s) Oral <User Schedule>  gabapentin 300milliGRAM(s) Oral at bedtime  levothyroxine 88MICROGram(s) Oral daily  ALPRAZolam 0.25milliGRAM(s) Oral at bedtime  lisinopril 2.5milliGRAM(s) Oral daily  dibucaine 1% Ointment 1Application(s) Topical three times a day  enoxaparin Injectable 40milliGRAM(s) SubCutaneous daily  lactated ringers. 1000milliLiter(s) IV Continuous <Continuous>  insulin glargine Injectable (LANTUS) 20Unit(s) SubCutaneous at bedtime  ondansetron    Tablet 4milliGRAM(s) Oral once    MEDICATIONS  (PRN):  dextrose Gel 1Dose(s) Oral once PRN Blood Glucose LESS THAN 70 milliGRAM(s)/deciliter  glucagon  Injectable 1milliGRAM(s) IntraMuscular once PRN Glucose LESS THAN 70 milligrams/deciliter  bisacodyl 5milliGRAM(s) Oral every 12 hours PRN Constipation  ALPRAZolam 0.25milliGRAM(s) Oral two times a day PRN anxiety  oxyCODONE  5 mG/acetaminophen 325 mG 1Tablet(s) Oral every 4 hours PRN Mild Pain (1 - 3)  oxyCODONE  5 mG/acetaminophen 325 mG 2Tablet(s) Oral every 4 hours PRN Severe Pain (7 - 10)  magnesium hydroxide Suspension 30milliLiter(s) Oral daily PRN Constipation  ondansetron Injectable 4milliGRAM(s) IV Push every 8 hours PRN Nausea and/or Vomiting

## 2017-04-17 NOTE — PROGRESS NOTE ADULT - ASSESSMENT
Assessment: 85 yr old female with PMH CAD s/p stents x 2 , DM2, hypertension, hyperlipidemia, anxiety, significant PVD s/p multiple stents, paroxysmal atrial fibrillation on Eliquis admitted for hypertensive urgency post LE angiogram. 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 Hospital course complicated by urinary retention with UTI (failed TOV), hyponatremia likely due to pain and improved with NaCl (per nephrology) and pain medications. BP meds and insulin coverage titrated for better control.  Patient stable for discharge to acute rehab. While in rehab, patient found to have postoperative L LE wound dehiscence and complaints of R foot pain, evaluated by vascular and scheduled for Left AKA and RLE angiogram 4/14/17. Seen by Cardiology and cleared.  Patient had fever 101 last night.  Pancultured. Patient being followed by ID, will continue antibiotics and follow up cultures.  Patient complains of LLE discomfort with drainage from BKA site.  Patient also has right foot ulceration and eschar.     inpatient for AKA due to wound dihescnece of BKA    Problem/Plan - 1:  ·  Problem: Postoperative wound dehiscence, initial encounter.  Plan:  Left AKA 4/14/17  and R LE angioplasty with Dr. Mckeon, Pain is well control, patient is getting cefazolin, blood cultures negative, seen by ID, will continue with current antibiotics, patient is going for angio in couple of days as per vascular, patient is getting wound care.        Problem/Plan - 2:  ·  Problem: Fever.  Plan: Urine c/s Enterococcus, bld c/s has been negative, patient is on cefazolin, CXR - read LLL small pleural effusion vs PNA. Doubt PNA, patient has no cough or SOB, will continue monitor, patient has been seen by ID.     Problem/Plan - 3:  ·  Problem: PVD (peripheral vascular disease).  Plan: As above, s/p AKA, wound dressings, followed by Vascular surgery.     Problem/Plan - 4:  ·  Problem: Type 2 diabetes mellitus with hypoglycemia without coma, with long-term current use of insulin.  Plan: Continue Lantus 20units and sliding scale coverage, will continue to monitor glucose.     Problem/Plan - 5:  ·  Problem: PAF (paroxysmal atrial fibrillation).  Plan: Rate controlled.  Eliquis on hold, will resume  eliquis and Plavix after all procedures done, will discuss with vascular surgery.     Problem/Plan - 6:  Problem: CAD (coronary artery disease). Plan: Continue ASA, Metoprolol, Lisinopril, and Lipitor, Plavix on hold.  Will need to discuss with  vascular surgeon when to resume plavis and plavix.      Problem/Plan - 7:  ·  Problem: Acute blood loss anemia.  Plan: Asymptomatic. S/P 2 units of PRBC to keep Hg > 10  .  Monitor CBC.  Continue Iron, H&H has been stable.     DVT prophylaxis: SCDs    Constipation: Bowel meds.

## 2017-04-17 NOTE — PHYSICAL THERAPY INITIAL EVALUATION ADULT - PERTINENT HX OF CURRENT PROBLEM, REHAB EVAL
s/p left BKA 3/14/17 2*2 PVD, Hospital course complicated by urinary retention with UTI (failed TOV), hyponatremia likely due to pain and improved with NaCl (per nephrology) and pain medications. Patient stable for discharge to acute rehab. While in rehab, patient found to have postoperative L LE wound dehiscence and complaints of R foot pain, evaluated by vascular and now s/p Left AKA and RLE angiogram 4/14/17.

## 2017-04-17 NOTE — CONSULT NOTE ADULT - ASSESSMENT
84yr woman, hx of PVD with stents, s/p BKA on 3/14/17, CAD, PAF, transferred from acute rehab for wound dehiscence. Patient s/p left  AKA on 4/14/17

## 2017-04-17 NOTE — PROGRESS NOTE ADULT - SUBJECTIVE AND OBJECTIVE BOX
pt s/p L AKA. No acute events. C/o RLE rest pain.    Vital Signs Last 24 Hrs  T(C): 37.1, Max: 37.1 (04-17 @ 16:53)  T(F): 98.7, Max: 98.7 (04-17 @ 16:53)  HR: 97 (82 - 97)  BP: 155/63 (124/53 - 163/80)  BP(mean): --  RR: 18 (17 - 18)  SpO2: 95% (93% - 97%)    L AKA dressing C/D/I  RLE ischemic changes to the 2nd toe.    A/P STable with advanced PAD  - L AKA dressing change tomorrow  - RLE angio Thursday tentatively  - DVT prophylaxis  - Hold AC

## 2017-04-17 NOTE — CONSULT NOTE ADULT - PROBLEM SELECTOR RECOMMENDATION 3
Patient given MOM today  with +BM. Cont to monitor. Patient given MOM today  with +BM. Cont to monitor. Bowel regimen with Miralax Patient given MOM today  with +BM. Cont to monitor. Bowel regimen with Miralax. Add Senna daily

## 2017-04-18 PROCEDURE — 99232 SBSQ HOSP IP/OBS MODERATE 35: CPT

## 2017-04-18 RX ADMIN — Medication 1: at 11:42

## 2017-04-18 RX ADMIN — MIRTAZAPINE 7.5 MILLIGRAM(S): 45 TABLET, ORALLY DISINTEGRATING ORAL at 21:36

## 2017-04-18 RX ADMIN — Medication 1 MILLIGRAM(S): at 11:40

## 2017-04-18 RX ADMIN — Medication 325 MILLIGRAM(S): at 11:40

## 2017-04-18 RX ADMIN — LISINOPRIL 2.5 MILLIGRAM(S): 2.5 TABLET ORAL at 05:18

## 2017-04-18 RX ADMIN — Medication 100 MILLIGRAM(S): at 05:18

## 2017-04-18 RX ADMIN — Medication 50 MILLIGRAM(S): at 05:18

## 2017-04-18 RX ADMIN — Medication 1 APPLICATION(S): at 05:19

## 2017-04-18 RX ADMIN — NYSTATIN CREAM 1 APPLICATION(S): 100000 CREAM TOPICAL at 05:20

## 2017-04-18 RX ADMIN — Medication 50 MILLIGRAM(S): at 17:06

## 2017-04-18 RX ADMIN — NYSTATIN CREAM 1 APPLICATION(S): 100000 CREAM TOPICAL at 17:14

## 2017-04-18 RX ADMIN — TAMSULOSIN HYDROCHLORIDE 0.4 MILLIGRAM(S): 0.4 CAPSULE ORAL at 21:36

## 2017-04-18 RX ADMIN — Medication 500 MILLIGRAM(S): at 11:40

## 2017-04-18 RX ADMIN — AMLODIPINE BESYLATE 10 MILLIGRAM(S): 2.5 TABLET ORAL at 05:19

## 2017-04-18 RX ADMIN — GABAPENTIN 300 MILLIGRAM(S): 400 CAPSULE ORAL at 21:36

## 2017-04-18 RX ADMIN — Medication 100 MILLIGRAM(S): at 05:19

## 2017-04-18 RX ADMIN — GABAPENTIN 200 MILLIGRAM(S): 400 CAPSULE ORAL at 05:19

## 2017-04-18 RX ADMIN — ENOXAPARIN SODIUM 40 MILLIGRAM(S): 100 INJECTION SUBCUTANEOUS at 11:40

## 2017-04-18 RX ADMIN — Medication 100 MILLIGRAM(S): at 17:06

## 2017-04-18 RX ADMIN — Medication 1 TABLET(S): at 11:40

## 2017-04-18 RX ADMIN — ZINC SULFATE TAB 220 MG (50 MG ZINC EQUIVALENT) 220 MILLIGRAM(S): 220 (50 ZN) TAB at 11:40

## 2017-04-18 RX ADMIN — Medication 1 APPLICATION(S): at 21:38

## 2017-04-18 RX ADMIN — ATORVASTATIN CALCIUM 40 MILLIGRAM(S): 80 TABLET, FILM COATED ORAL at 21:36

## 2017-04-18 RX ADMIN — Medication 0.25 MILLIGRAM(S): at 21:37

## 2017-04-18 RX ADMIN — Medication 88 MICROGRAM(S): at 05:18

## 2017-04-18 RX ADMIN — GABAPENTIN 200 MILLIGRAM(S): 400 CAPSULE ORAL at 11:40

## 2017-04-18 RX ADMIN — PANTOPRAZOLE SODIUM 40 MILLIGRAM(S): 20 TABLET, DELAYED RELEASE ORAL at 05:18

## 2017-04-18 RX ADMIN — POLYETHYLENE GLYCOL 3350 17 GRAM(S): 17 POWDER, FOR SOLUTION ORAL at 11:43

## 2017-04-18 RX ADMIN — Medication 2: at 17:07

## 2017-04-18 RX ADMIN — SODIUM CHLORIDE 50 MILLILITER(S): 9 INJECTION, SOLUTION INTRAVENOUS at 03:32

## 2017-04-18 RX ADMIN — SENNA PLUS 2 TABLET(S): 8.6 TABLET ORAL at 21:36

## 2017-04-18 RX ADMIN — INSULIN GLARGINE 20 UNIT(S): 100 INJECTION, SOLUTION SUBCUTANEOUS at 21:38

## 2017-04-18 NOTE — PROGRESS NOTE ADULT - PROBLEM SELECTOR PLAN 1
Daily dressing to the L AKA stump  offloading to the right heel  RLE angiogram, possible intervention Thursday

## 2017-04-18 NOTE — PROGRESS NOTE ADULT - SUBJECTIVE AND OBJECTIVE BOX
POD # 4 s/p L AKA. Pt c/o of intermittent R foot pain    Vital Signs Last 24 Hrs  T(C): 36.8, Max: 37.1 (04-17 @ 16:53)  T(F): 98.3, Max: 98.7 (04-17 @ 16:53)  HR: 75 (75 - 97)  BP: 163/75 (121/59 - 163/75)  BP(mean): --  RR: 16 (16 - 18)  SpO2: 97% (94% - 98%)    PE:   L AKA stump C/D/I  R foot with 2nd toe distal ischemic changes  Stage I pressure injury to the right heel despite use of offloading boot.

## 2017-04-18 NOTE — PROGRESS NOTE ADULT - SUBJECTIVE AND OBJECTIVE BOX
YELITZA LÓPEZ    [id number]    85y      Female    Patient is a 85y old  Female who presents with a chief complaint of     INTERVAL HPI/OVERNIGHT EVENTS:    No Overnight events, pain is well controlled, she denies fever, chills, chest pain, nausea or vomiting,  no more constipation, had large bowel movement yesterday.     REVIEW OF SYSTEMS:    CONSTITUTIONAL: No fever, has fatigue  RESPIRATORY: No cough; No shortness of breath  CARDIOVASCULAR: No chest pain, palpitations  GASTROINTESTINAL: No abdominal or epigastric pain. No nausea, vomiting  NEUROLOGICAL: No headaches,  loss of strength.  MISCELLANEOUS: pain in the both lower extremities is better with pain meds.         Vital Signs Last 24 Hrs  T(C): 36.8, Max: 37.1 (04-17 @ 16:53)  T(F): 98.3, Max: 98.7 (04-17 @ 16:53)  HR: 75 (75 - 97)  BP: 163/75 (121/59 - 163/75)  RR: 16 (16 - 18)  SpO2: 97% (94% - 98%)    PHYSICAL EXAM:    GENERAL: Elderly female looking comfortable   HEENT: PERRL, +EOMI  NECK: soft, Supple, No JVD,   CHEST/LUNG: CTA bilaterally; No wheezing  HEART: S1S2+, Regular rate and rhythm; No murmurs.   ABDOMEN: Soft, Nontender, Nondistended; Bowel sounds present  EXTREMITIES:  1+ Peripheral Pulses, Left AKA  SKIN: Patient has left AKA with dressings on, right foot wound with dressings on, no soaking or bleeding.   NEURO: AAOX3, no focal deficits  PSYCH: normal mood      LABS:                        10.3   6.3   )-----------( 308      ( 17 Apr 2017 05:48 )             32.4     04-17    137  |  99  |  19.0  ----------------------------<  320<H>  4.7   |  24.0  |  0.75    Ca    8.9      17 Apr 2017 05:48              I&O's Summary  I & Os for 24h ending 18 Apr 2017 07:00  =============================================  IN: 1850 ml / OUT: 410 ml / NET: 1440 ml    I & Os for current day (as of 18 Apr 2017 10:44)  =============================================  IN: 440 ml / OUT: 450 ml / NET: -10 ml      MEDICATIONS  (STANDING):  insulin lispro (HumaLOG) corrective regimen sliding scale  SubCutaneous three times a day before meals  dextrose 50% Injectable 12.5Gram(s) IV Push once  dextrose 50% Injectable 25Gram(s) IV Push once  dextrose 50% Injectable 25Gram(s) IV Push once  nystatin Powder 1Application(s) Topical two times a day  tamsulosin 0.4milliGRAM(s) Oral at bedtime  ceFAZolin   IVPB 2000milliGRAM(s) IV Intermittent every 12 hours  atorvastatin 40milliGRAM(s) Oral at bedtime  amLODIPine   Tablet 10milliGRAM(s) Oral daily  ferrous    sulfate 325milliGRAM(s) Oral daily  pantoprazole    Tablet 40milliGRAM(s) Oral before breakfast  folic acid 1milliGRAM(s) Oral daily  multivitamin 1Tablet(s) Oral daily  zinc sulfate 220milliGRAM(s) Oral daily  ascorbic acid 500milliGRAM(s) Oral daily  metoprolol 50milliGRAM(s) Oral two times a day  lidocaine   Patch 1Patch Transdermal daily  docusate sodium 100milliGRAM(s) Oral two times a day  mirtazapine 7.5milliGRAM(s) Oral at bedtime  polyethylene glycol 3350 17Gram(s) Oral daily  gabapentin 200milliGRAM(s) Oral <User Schedule>  gabapentin 300milliGRAM(s) Oral at bedtime  levothyroxine 88MICROGram(s) Oral daily  ALPRAZolam 0.25milliGRAM(s) Oral at bedtime  lisinopril 2.5milliGRAM(s) Oral daily  dibucaine 1% Ointment 1Application(s) Topical three times a day  enoxaparin Injectable 40milliGRAM(s) SubCutaneous daily  lactated ringers. 1000milliLiter(s) IV Continuous <Continuous>  insulin glargine Injectable (LANTUS) 20Unit(s) SubCutaneous at bedtime  ondansetron    Tablet 4milliGRAM(s) Oral once  senna 2Tablet(s) Oral at bedtime    MEDICATIONS  (PRN):  dextrose Gel 1Dose(s) Oral once PRN Blood Glucose LESS THAN 70 milliGRAM(s)/deciliter  glucagon  Injectable 1milliGRAM(s) IntraMuscular once PRN Glucose LESS THAN 70 milligrams/deciliter  bisacodyl 5milliGRAM(s) Oral every 12 hours PRN Constipation  ALPRAZolam 0.25milliGRAM(s) Oral two times a day PRN anxiety  oxyCODONE  5 mG/acetaminophen 325 mG 1Tablet(s) Oral every 4 hours PRN Mild Pain (1 - 3)  oxyCODONE  5 mG/acetaminophen 325 mG 2Tablet(s) Oral every 4 hours PRN Severe Pain (7 - 10)  magnesium hydroxide Suspension 30milliLiter(s) Oral daily PRN Constipation  ondansetron Injectable 4milliGRAM(s) IV Push every 8 hours PRN Nausea and/or Vomiting

## 2017-04-18 NOTE — PROGRESS NOTE ADULT - ASSESSMENT
Assessment: 85 yr old female with PMH CAD s/p stents x 2 , DM2, hypertension, hyperlipidemia, anxiety, significant PVD s/p multiple stents, paroxysmal atrial fibrillation on Eliquis admitted for hypertensive urgency post LE angiogram. 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 Hospital course complicated by urinary retention with UTI (failed TOV), hyponatremia likely due to pain and improved with NaCl (per nephrology) and pain medications. BP meds and insulin coverage titrated for better control.  Patient stable for discharge to acute rehab. While in rehab, patient found to have postoperative L LE wound dehiscence and complaints of R foot pain, evaluated by vascular and scheduled for Left AKA and RLE angiogram 4/14/17. Seen by Cardiology and cleared.  Patient had fever 101 last night.  Pancultured. Patient being followed by ID, will continue antibiotics and follow up cultures.  Patient complains of LLE discomfort with drainage from BKA site.  Patient also has right foot ulceration and eschar.     inpatient for AKA due to wound dihescnece of BKA    Problem/Plan - 1:  ·  Problem: Postoperative wound dehiscence, initial encounter.  Plan:  Left AKA 4/14/17 by Dr. Mckeon, Pain is well control, patient is getting cefazolin, blood cultures negative, seen by ID, will continue with current antibiotics, patient is going for RLE angio on Thursdays patient is getting daily wound care and daily dressing change.       Problem/Plan - 2:  ·  Problem: Fever.  Plan: Urine c/s Enterococcus, bld c/s has been negative, patient is on cefazolin, CXR - read LLL small pleural effusion vs PNA. Doubt PNA, patient has no cough or SOB, will continue monitor, patient has been seen by ID, she has been afebrile, will given incentive spirometry.       Problem/Plan - 3:  ·  Problem: PVD (peripheral vascular disease).  Plan: As above, s/p AKA, wound dressings, followed by Vascular surgery, going to have RLE Angio on Thursday.      Problem/Plan - 4:  ·  Problem: Type 2 diabetes mellitus with hypoglycemia without coma, with long-term current use of insulin.  Plan: Continue Lantus 20units and sliding scale coverage, will continue to monitor glucose.     Problem/Plan - 5:  ·  Problem: PAF (paroxysmal atrial fibrillation).  Plan: Rate controlled.  Eliquis on hold, will resume  eliquis and Plavix after all procedures done, will discuss with vascular surgery.     Problem/Plan - 6:  Problem: CAD (coronary artery disease). Plan: Continue ASA, Metoprolol, Lisinopril, and Lipitor, Plavix on hold.  Will need to discuss with  vascular surgeon when to resume plavis and plavix.      Problem/Plan - 7:  ·  Problem: Acute blood loss anemia.  Plan: Asymptomatic. S/P 2 units of PRBC to keep Hg > 10  .  Monitor CBC.  Continue Iron, H&H has been stable.     DVT prophylaxis: SCDs    Constipation: resolved with bowel meds.

## 2017-04-19 LAB
ANION GAP SERPL CALC-SCNC: 8 MMOL/L — SIGNIFICANT CHANGE UP (ref 5–17)
BUN SERPL-MCNC: 16 MG/DL — SIGNIFICANT CHANGE UP (ref 8–20)
CALCIUM SERPL-MCNC: 8.9 MG/DL — SIGNIFICANT CHANGE UP (ref 8.6–10.2)
CHLORIDE SERPL-SCNC: 98 MMOL/L — SIGNIFICANT CHANGE UP (ref 98–107)
CO2 SERPL-SCNC: 28 MMOL/L — SIGNIFICANT CHANGE UP (ref 22–29)
CREAT SERPL-MCNC: 0.81 MG/DL — SIGNIFICANT CHANGE UP (ref 0.5–1.3)
CULTURE RESULTS: SIGNIFICANT CHANGE UP
GLUCOSE SERPL-MCNC: 286 MG/DL — HIGH (ref 70–115)
HCT VFR BLD CALC: 29.3 % — LOW (ref 37–47)
HGB BLD-MCNC: 9.2 G/DL — LOW (ref 12–16)
MCHC RBC-ENTMCNC: 27.6 PG — SIGNIFICANT CHANGE UP (ref 27–31)
MCHC RBC-ENTMCNC: 31.4 G/DL — LOW (ref 32–36)
MCV RBC AUTO: 88 FL — SIGNIFICANT CHANGE UP (ref 81–99)
PLATELET # BLD AUTO: 279 K/UL — SIGNIFICANT CHANGE UP (ref 150–400)
POTASSIUM SERPL-MCNC: 4.8 MMOL/L — SIGNIFICANT CHANGE UP (ref 3.5–5.3)
POTASSIUM SERPL-SCNC: 4.8 MMOL/L — SIGNIFICANT CHANGE UP (ref 3.5–5.3)
RBC # BLD: 3.33 M/UL — LOW (ref 4.4–5.2)
RBC # FLD: 15.4 % — SIGNIFICANT CHANGE UP (ref 11–15.6)
SODIUM SERPL-SCNC: 134 MMOL/L — LOW (ref 135–145)
SPECIMEN SOURCE: SIGNIFICANT CHANGE UP
WBC # BLD: 5.4 K/UL — SIGNIFICANT CHANGE UP (ref 4.8–10.8)
WBC # FLD AUTO: 5.4 K/UL — SIGNIFICANT CHANGE UP (ref 4.8–10.8)

## 2017-04-19 PROCEDURE — 99232 SBSQ HOSP IP/OBS MODERATE 35: CPT

## 2017-04-19 RX ORDER — INSULIN GLARGINE 100 [IU]/ML
10 INJECTION, SOLUTION SUBCUTANEOUS AT BEDTIME
Qty: 0 | Refills: 0 | Status: DISCONTINUED | OUTPATIENT
Start: 2017-04-19 | End: 2017-04-20

## 2017-04-19 RX ORDER — LISINOPRIL 2.5 MG/1
5 TABLET ORAL DAILY
Qty: 0 | Refills: 0 | Status: DISCONTINUED | OUTPATIENT
Start: 2017-04-20 | End: 2017-04-27

## 2017-04-19 RX ORDER — LISINOPRIL 2.5 MG/1
2.5 TABLET ORAL ONCE
Qty: 0 | Refills: 0 | Status: COMPLETED | OUTPATIENT
Start: 2017-04-19 | End: 2017-04-19

## 2017-04-19 RX ADMIN — MIRTAZAPINE 7.5 MILLIGRAM(S): 45 TABLET, ORALLY DISINTEGRATING ORAL at 22:28

## 2017-04-19 RX ADMIN — TAMSULOSIN HYDROCHLORIDE 0.4 MILLIGRAM(S): 0.4 CAPSULE ORAL at 22:28

## 2017-04-19 RX ADMIN — SODIUM CHLORIDE 50 MILLILITER(S): 9 INJECTION, SOLUTION INTRAVENOUS at 02:10

## 2017-04-19 RX ADMIN — GABAPENTIN 300 MILLIGRAM(S): 400 CAPSULE ORAL at 22:28

## 2017-04-19 RX ADMIN — AMLODIPINE BESYLATE 10 MILLIGRAM(S): 2.5 TABLET ORAL at 05:35

## 2017-04-19 RX ADMIN — Medication 1 APPLICATION(S): at 14:47

## 2017-04-19 RX ADMIN — Medication 100 MILLIGRAM(S): at 05:35

## 2017-04-19 RX ADMIN — Medication 2: at 12:21

## 2017-04-19 RX ADMIN — Medication 100 MILLIGRAM(S): at 17:49

## 2017-04-19 RX ADMIN — ZINC SULFATE TAB 220 MG (50 MG ZINC EQUIVALENT) 220 MILLIGRAM(S): 220 (50 ZN) TAB at 11:32

## 2017-04-19 RX ADMIN — NYSTATIN CREAM 1 APPLICATION(S): 100000 CREAM TOPICAL at 05:35

## 2017-04-19 RX ADMIN — INSULIN GLARGINE 10 UNIT(S): 100 INJECTION, SOLUTION SUBCUTANEOUS at 23:33

## 2017-04-19 RX ADMIN — Medication 50 MILLIGRAM(S): at 17:49

## 2017-04-19 RX ADMIN — NYSTATIN CREAM 1 APPLICATION(S): 100000 CREAM TOPICAL at 17:53

## 2017-04-19 RX ADMIN — ATORVASTATIN CALCIUM 40 MILLIGRAM(S): 80 TABLET, FILM COATED ORAL at 22:29

## 2017-04-19 RX ADMIN — SENNA PLUS 2 TABLET(S): 8.6 TABLET ORAL at 22:29

## 2017-04-19 RX ADMIN — Medication 2: at 17:21

## 2017-04-19 RX ADMIN — ENOXAPARIN SODIUM 40 MILLIGRAM(S): 100 INJECTION SUBCUTANEOUS at 11:32

## 2017-04-19 RX ADMIN — LISINOPRIL 2.5 MILLIGRAM(S): 2.5 TABLET ORAL at 05:35

## 2017-04-19 RX ADMIN — PANTOPRAZOLE SODIUM 40 MILLIGRAM(S): 20 TABLET, DELAYED RELEASE ORAL at 05:35

## 2017-04-19 RX ADMIN — Medication 1 MILLIGRAM(S): at 11:29

## 2017-04-19 RX ADMIN — Medication 0.25 MILLIGRAM(S): at 22:29

## 2017-04-19 RX ADMIN — Medication 500 MILLIGRAM(S): at 11:29

## 2017-04-19 RX ADMIN — Medication 50 MILLIGRAM(S): at 05:35

## 2017-04-19 RX ADMIN — LISINOPRIL 2.5 MILLIGRAM(S): 2.5 TABLET ORAL at 17:48

## 2017-04-19 RX ADMIN — Medication 1 TABLET(S): at 11:29

## 2017-04-19 RX ADMIN — POLYETHYLENE GLYCOL 3350 17 GRAM(S): 17 POWDER, FOR SOLUTION ORAL at 11:34

## 2017-04-19 RX ADMIN — Medication 1 APPLICATION(S): at 05:36

## 2017-04-19 RX ADMIN — GABAPENTIN 200 MILLIGRAM(S): 400 CAPSULE ORAL at 05:35

## 2017-04-19 RX ADMIN — Medication 325 MILLIGRAM(S): at 11:28

## 2017-04-19 RX ADMIN — Medication 88 MICROGRAM(S): at 05:35

## 2017-04-19 RX ADMIN — GABAPENTIN 200 MILLIGRAM(S): 400 CAPSULE ORAL at 11:32

## 2017-04-19 RX ADMIN — Medication 1: at 08:38

## 2017-04-19 NOTE — PROGRESS NOTE ADULT - SUBJECTIVE AND OBJECTIVE BOX
YELITZA LÓPEZ    [id number]    85y      Female    Patient is a 85y old  Female who presents with a chief complaint of     INTERVAL HPI/OVERNIGHT EVENTS: No overnight events. Pain is well controlled. Constipation is resolved. Denies fever, chills, chest pain, nausea or vomiting.    REVIEW OF SYSTEMS:    CONSTITUTIONAL: No fever, has fatigue  RESPIRATORY: No cough; No shortness of breath  CARDIOVASCULAR: No chest pain, palpitations  GASTROINTESTINAL: No abdominal or epigastric pain. No nausea, vomiting  NEUROLOGICAL: No headaches,  loss of strength.  MISCELLANEOUS: pain in the both lower extremities is better with pain meds.     Vital Signs Last 24 Hrs  T(C): 36.6, Max: 37.2 (04-19 @ 04:38)  T(F): 97.9, Max: 99 (04-19 @ 04:38)  HR: 85 (70 - 86)  BP: 165/74 (129/66 - 174/71)  BP(mean): --  RR: 20 (16 - 20)  SpO2: 95% (93% - 97%)    PHYSICAL EXAM:    GENERAL: Elderly female looking comfortable   HEENT: PERRL, +EOMI  NECK: soft, Supple, No JVD,   CHEST/LUNG: CTA bilaterally; No wheezing  HEART: S1S2+, Regular rate and rhythm; No murmurs.   ABDOMEN: Soft, Nontender, Nondistended; Bowel sounds present  EXTREMITIES:  1+ Peripheral Pulses, Left AKA, Right gangrenous 2nd toe and wound on heel.  SKIN: Patient has left AKA with dressings on, right foot wound with dressings on, no soaking or bleeding.   NEURO: AAOX3, no focal deficits  PSYCH: normal mood      LABS:                        9.2    5.4   )-----------( 279      ( 19 Apr 2017 05:47 )             29.3     04-19    134<L>  |  98  |  16.0  ----------------------------<  286<H>  4.8   |  28.0  |  0.81    Ca    8.9      19 Apr 2017 05:47              I&O's Summary    I & Os for current day (as of 19 Apr 2017 16:32)  =============================================  IN: 1760 ml / OUT: 700 ml / NET: 1060 ml      MEDICATIONS  (STANDING):  insulin lispro (HumaLOG) corrective regimen sliding scale  SubCutaneous three times a day before meals  dextrose 50% Injectable 12.5Gram(s) IV Push once  dextrose 50% Injectable 25Gram(s) IV Push once  dextrose 50% Injectable 25Gram(s) IV Push once  nystatin Powder 1Application(s) Topical two times a day  tamsulosin 0.4milliGRAM(s) Oral at bedtime  atorvastatin 40milliGRAM(s) Oral at bedtime  amLODIPine   Tablet 10milliGRAM(s) Oral daily  ferrous    sulfate 325milliGRAM(s) Oral daily  pantoprazole    Tablet 40milliGRAM(s) Oral before breakfast  folic acid 1milliGRAM(s) Oral daily  multivitamin 1Tablet(s) Oral daily  zinc sulfate 220milliGRAM(s) Oral daily  ascorbic acid 500milliGRAM(s) Oral daily  metoprolol 50milliGRAM(s) Oral two times a day  docusate sodium 100milliGRAM(s) Oral two times a day  mirtazapine 7.5milliGRAM(s) Oral at bedtime  polyethylene glycol 3350 17Gram(s) Oral daily  gabapentin 200milliGRAM(s) Oral <User Schedule>  gabapentin 300milliGRAM(s) Oral at bedtime  levothyroxine 88MICROGram(s) Oral daily  ALPRAZolam 0.25milliGRAM(s) Oral at bedtime  lisinopril 2.5milliGRAM(s) Oral daily  dibucaine 1% Ointment 1Application(s) Topical three times a day  enoxaparin Injectable 40milliGRAM(s) SubCutaneous daily  lactated ringers. 1000milliLiter(s) IV Continuous <Continuous>  insulin glargine Injectable (LANTUS) 20Unit(s) SubCutaneous at bedtime  ondansetron    Tablet 4milliGRAM(s) Oral once  senna 2Tablet(s) Oral at bedtime    MEDICATIONS  (PRN):  dextrose Gel 1Dose(s) Oral once PRN Blood Glucose LESS THAN 70 milliGRAM(s)/deciliter  glucagon  Injectable 1milliGRAM(s) IntraMuscular once PRN Glucose LESS THAN 70 milligrams/deciliter  bisacodyl 5milliGRAM(s) Oral every 12 hours PRN Constipation  ALPRAZolam 0.25milliGRAM(s) Oral two times a day PRN anxiety  oxyCODONE  5 mG/acetaminophen 325 mG 1Tablet(s) Oral every 4 hours PRN Mild Pain (1 - 3)  oxyCODONE  5 mG/acetaminophen 325 mG 2Tablet(s) Oral every 4 hours PRN Severe Pain (7 - 10)  magnesium hydroxide Suspension 30milliLiter(s) Oral daily PRN Constipation  ondansetron Injectable 4milliGRAM(s) IV Push every 8 hours PRN Nausea and/or Vomiting

## 2017-04-19 NOTE — PROGRESS NOTE ADULT - ASSESSMENT
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 S/p AKA 4/14/17. Patient being followed by ID, will continue antibiotics, blood cultures have been negative.  Patient also has right foot ulceration and eschar.       Problem/Plan - 1:  ·  Problem: Postoperative wound dehiscence, initial encounter.  Left AKA done on 4/14/17 by Dr. Mckeon, Pain is well controlled, patient is getting cefazolin, blood cultures were negative, seen by ID, will continue with current antibiotics, patient is going for RLE angio on Thursday,  patient is getting daily wound care and daily dressing change.       Problem/Plan - 2:   Problem: Fever.  Plan: Urine c/s Enterococcus, blood cultures have been negative, patient is on cefazolin, CXR 4/13 - read LLL small pleural effusion vs PNA. Doubt PNA, patient has no cough or SOB, will continue monitor, patient has been seen by ID, she has been afebrile, will continue with incentive spirometry.       Problem/Plan - 3:  ·  Problem: PVD (peripheral vascular disease).  Plan: As above, s/p AKA, wound dressings, followed by Vascular surgery, going to have RLE Angio on Thursday.      Problem/Plan - 4:  ·  Problem: Type 2 diabetes mellitus with hypoglycemia without coma, with long-term current use of insulin.  Plan: Give only 10 units of lantus as she will be NPO for procedure in the AM, resume 20 unit dose after. Continue sliding scale coverage, will continue to monitor glucose.     Problem/Plan - 5:  ·  Problem: PAF (paroxysmal atrial fibrillation).  Plan: Rate controlled.  Eliquis on hold, will resume eliquis and Plavix after all procedures done, will discuss with vascular surgery.     Problem/Plan - 6:  Problem: CAD (coronary artery disease). Plan: Continue ASA, Metoprolol, Lisinopril, and Lipitor, Plavix on hold.  Will need to discuss with  vascular surgeon when to resume eliquis and plavix.      Problem/Plan - 7: HTN. On amlodipine, metoprolol, and lisinopril. BP's have been high, will increase lisinopril from 2.5mg to 5mg.    Problem/Plan - 8:  ·  Problem: Acute blood loss anemia.  Plan: Asymptomatic. S/P 2 units of PRBC to keep Hg > 10 .  Monitor CBC.  Continue Iron, H&H continues to be stable.     DVT prophylaxis: SCDs    Constipation: resolved with bowel meds.

## 2017-04-19 NOTE — PROGRESS NOTE ADULT - SUBJECTIVE AND OBJECTIVE BOX
s/p L AKA. No acute events.    Vital Signs Last 24 Hrs  T(C): 36.6, Max: 37.2 (04-19 @ 04:38)  T(F): 97.8, Max: 99 (04-19 @ 04:38)  HR: 75 (70 - 86)  BP: 174/71 (129/66 - 174/71)  BP(mean): --  RR: 18 (16 - 18)  SpO2: 97% (93% - 97%)    L AKA stump C/D/I   RLE heel with stage 1 pressure injury  2nd toe distal ischemic changes      A/P Advanced PAD  - RLE angiogram tomorrow  - NPO after MN

## 2017-04-20 LAB
ANION GAP SERPL CALC-SCNC: 9 MMOL/L — SIGNIFICANT CHANGE UP (ref 5–17)
BUN SERPL-MCNC: 15 MG/DL — SIGNIFICANT CHANGE UP (ref 8–20)
CALCIUM SERPL-MCNC: 9 MG/DL — SIGNIFICANT CHANGE UP (ref 8.6–10.2)
CHLORIDE SERPL-SCNC: 98 MMOL/L — SIGNIFICANT CHANGE UP (ref 98–107)
CO2 SERPL-SCNC: 29 MMOL/L — SIGNIFICANT CHANGE UP (ref 22–29)
CREAT SERPL-MCNC: 0.67 MG/DL — SIGNIFICANT CHANGE UP (ref 0.5–1.3)
GLUCOSE SERPL-MCNC: 247 MG/DL — HIGH (ref 70–115)
HCT VFR BLD CALC: 33.4 % — LOW (ref 37–47)
HGB BLD-MCNC: 10.5 G/DL — LOW (ref 12–16)
INR BLD: 1.12 RATIO — SIGNIFICANT CHANGE UP (ref 0.88–1.16)
MCHC RBC-ENTMCNC: 27.4 PG — SIGNIFICANT CHANGE UP (ref 27–31)
MCHC RBC-ENTMCNC: 31.4 G/DL — LOW (ref 32–36)
MCV RBC AUTO: 87.2 FL — SIGNIFICANT CHANGE UP (ref 81–99)
PLATELET # BLD AUTO: 282 K/UL — SIGNIFICANT CHANGE UP (ref 150–400)
POTASSIUM SERPL-MCNC: 4.5 MMOL/L — SIGNIFICANT CHANGE UP (ref 3.5–5.3)
POTASSIUM SERPL-SCNC: 4.5 MMOL/L — SIGNIFICANT CHANGE UP (ref 3.5–5.3)
PROTHROM AB SERPL-ACNC: 12.3 SEC — SIGNIFICANT CHANGE UP (ref 9.8–12.7)
RBC # BLD: 3.83 M/UL — LOW (ref 4.4–5.2)
RBC # FLD: 15.2 % — SIGNIFICANT CHANGE UP (ref 11–15.6)
SODIUM SERPL-SCNC: 136 MMOL/L — SIGNIFICANT CHANGE UP (ref 135–145)
WBC # BLD: 5.4 K/UL — SIGNIFICANT CHANGE UP (ref 4.8–10.8)
WBC # FLD AUTO: 5.4 K/UL — SIGNIFICANT CHANGE UP (ref 4.8–10.8)

## 2017-04-20 PROCEDURE — 99232 SBSQ HOSP IP/OBS MODERATE 35: CPT

## 2017-04-20 RX ORDER — TRAMADOL HYDROCHLORIDE 50 MG/1
50 TABLET ORAL EVERY 6 HOURS
Qty: 0 | Refills: 0 | Status: DISCONTINUED | OUTPATIENT
Start: 2017-04-20 | End: 2017-04-22

## 2017-04-20 RX ORDER — FENTANYL CITRATE 50 UG/ML
25 INJECTION INTRAVENOUS
Qty: 0 | Refills: 0 | Status: DISCONTINUED | OUTPATIENT
Start: 2017-04-20 | End: 2017-04-20

## 2017-04-20 RX ORDER — INSULIN GLARGINE 100 [IU]/ML
20 INJECTION, SOLUTION SUBCUTANEOUS AT BEDTIME
Qty: 0 | Refills: 0 | Status: DISCONTINUED | OUTPATIENT
Start: 2017-04-20 | End: 2017-04-27

## 2017-04-20 RX ORDER — SODIUM CHLORIDE 9 MG/ML
1000 INJECTION, SOLUTION INTRAVENOUS
Qty: 0 | Refills: 0 | Status: DISCONTINUED | OUTPATIENT
Start: 2017-04-20 | End: 2017-04-20

## 2017-04-20 RX ORDER — ACETAMINOPHEN 500 MG
1000 TABLET ORAL ONCE
Qty: 0 | Refills: 0 | Status: COMPLETED | OUTPATIENT
Start: 2017-04-20 | End: 2017-04-20

## 2017-04-20 RX ORDER — ONDANSETRON 8 MG/1
4 TABLET, FILM COATED ORAL ONCE
Qty: 0 | Refills: 0 | Status: DISCONTINUED | OUTPATIENT
Start: 2017-04-20 | End: 2017-04-20

## 2017-04-20 RX ADMIN — Medication 88 MICROGRAM(S): at 07:06

## 2017-04-20 RX ADMIN — Medication 1 APPLICATION(S): at 07:46

## 2017-04-20 RX ADMIN — NYSTATIN CREAM 1 APPLICATION(S): 100000 CREAM TOPICAL at 07:07

## 2017-04-20 RX ADMIN — SODIUM CHLORIDE 50 MILLILITER(S): 9 INJECTION, SOLUTION INTRAVENOUS at 18:22

## 2017-04-20 RX ADMIN — LISINOPRIL 5 MILLIGRAM(S): 2.5 TABLET ORAL at 04:16

## 2017-04-20 RX ADMIN — Medication 50 MILLIGRAM(S): at 18:18

## 2017-04-20 RX ADMIN — GABAPENTIN 200 MILLIGRAM(S): 400 CAPSULE ORAL at 07:06

## 2017-04-20 RX ADMIN — Medication 0.25 MILLIGRAM(S): at 22:09

## 2017-04-20 RX ADMIN — Medication 100 MILLIGRAM(S): at 18:23

## 2017-04-20 RX ADMIN — PANTOPRAZOLE SODIUM 40 MILLIGRAM(S): 20 TABLET, DELAYED RELEASE ORAL at 07:06

## 2017-04-20 RX ADMIN — INSULIN GLARGINE 20 UNIT(S): 100 INJECTION, SOLUTION SUBCUTANEOUS at 23:55

## 2017-04-20 RX ADMIN — TAMSULOSIN HYDROCHLORIDE 0.4 MILLIGRAM(S): 0.4 CAPSULE ORAL at 23:08

## 2017-04-20 RX ADMIN — GABAPENTIN 300 MILLIGRAM(S): 400 CAPSULE ORAL at 23:07

## 2017-04-20 RX ADMIN — Medication 1 APPLICATION(S): at 23:08

## 2017-04-20 RX ADMIN — Medication 1: at 19:22

## 2017-04-20 RX ADMIN — SODIUM CHLORIDE 85 MILLILITER(S): 9 INJECTION, SOLUTION INTRAVENOUS at 17:45

## 2017-04-20 RX ADMIN — NYSTATIN CREAM 1 APPLICATION(S): 100000 CREAM TOPICAL at 18:19

## 2017-04-20 RX ADMIN — ATORVASTATIN CALCIUM 40 MILLIGRAM(S): 80 TABLET, FILM COATED ORAL at 23:07

## 2017-04-20 RX ADMIN — SODIUM CHLORIDE 85 MILLILITER(S): 9 INJECTION, SOLUTION INTRAVENOUS at 18:05

## 2017-04-20 RX ADMIN — Medication 50 MILLIGRAM(S): at 07:06

## 2017-04-20 RX ADMIN — Medication 400 MILLIGRAM(S): at 23:59

## 2017-04-20 RX ADMIN — Medication 100 MILLIGRAM(S): at 07:06

## 2017-04-20 RX ADMIN — SENNA PLUS 2 TABLET(S): 8.6 TABLET ORAL at 23:07

## 2017-04-20 RX ADMIN — Medication 1: at 07:53

## 2017-04-20 RX ADMIN — MIRTAZAPINE 7.5 MILLIGRAM(S): 45 TABLET, ORALLY DISINTEGRATING ORAL at 23:55

## 2017-04-20 RX ADMIN — AMLODIPINE BESYLATE 10 MILLIGRAM(S): 2.5 TABLET ORAL at 04:14

## 2017-04-20 NOTE — BRIEF OPERATIVE NOTE - OPERATION/FINDINGS
right DFA in stent re-stenosis with distal occlusion; reconstitution of the below the knee POP and runoff via AT to DP. Occluded PT and peroneal

## 2017-04-20 NOTE — PROGRESS NOTE ADULT - SUBJECTIVE AND OBJECTIVE BOX
POD#0 s/p L AKA   Currently with no complaints.   Wants to go back to sleep.   Pain controllled  , has some R heel pain.     VSS, except for hypertension sbp ~170   L AKA stump dsg c/d/i, mobile    A/P: -pod#0 as above  potential aka, bka of R leg, continue daily dsg changse to l aka and r foot , heel

## 2017-04-20 NOTE — BRIEF OPERATIVE NOTE - PROCEDURE
Angiogram peripheral  04/20/2017  1. US guided, percutaneous access to the right comon femoral artery, 2. RLE angiogram, 3. Right SFA/POP angioplasty  Active  [facility]

## 2017-04-20 NOTE — PROGRESS NOTE ADULT - ASSESSMENT
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 S/p AKA 4/14/17. Patient being followed by ID, completed 7 day course of cefazolin, blood cultures have been negative. Patient also has right foot ulceration and eschar.       Problem/Plan - 1:  ·  Problem: Postoperative wound dehiscence, initial encounter.  Left AKA done on 4/14/17 by Dr. Mckeon, Pain is well controlled, completed course of cefazolin, blood cultures were negative, seen by ID, will continue with current antibiotics, patient is going for RLE angio today, patient is getting daily wound care and daily dressing changes.       Problem/Plan - 2:   Problem: Fever.  Plan: Urine c/s Enterococcus, completed 7 day course of cefazolin. blood cultures negative. CXR 4/13 - read LLL small pleural effusion vs PNA. Doubt PNA, patient has no cough or SOB, will continue monitor, patient has been seen by ID, continues to be afebrile, will continue with incentive spirometry.       Problem/Plan - 3:  ·  Problem: PVD (peripheral vascular disease).  Plan: As above, s/p AKA, daily wound dressings, RLE Angio today. Followed by Vascular surgery.    Problem/Plan - 4:  ·  Problem: Type 2 diabetes mellitus with hypoglycemia without coma, with long-term current use of insulin.  Plan: Resume 20 units lantus qHS after angio. Continue sliding scale coverage, will continue to monitor glucose.     Problem/Plan - 5:  ·  Problem: PAF (paroxysmal atrial fibrillation).  Plan: Rate controlled.  Eliquis on hold, will resume eliquis and Plavix after all procedures done, will discuss with vascular surgery.     Problem/Plan - 6:  Problem: CAD (coronary artery disease). Plan: Continue ASA, Metoprolol, Lisinopril, and Lipitor, Plavix on hold.  Will need to discuss with  vascular surgeon when to resume eliquis and plavix.      Problem/Plan - 7: HTN. On amlodipine, metoprolol, and lisinopril. Lisinopril increased from 2.5mg to 5mg, BP is improving. Will continue to monitor.    Problem/Plan - 8:  ·  Problem: Acute blood loss anemia.  Plan: Asymptomatic. S/P 2 units of PRBC to keep Hg > 10 .  Monitor CBC.  Continue Iron, H&H continues to be stable.     DVT prophylaxis: SCDs    Constipation: resolved with bowel meds.

## 2017-04-20 NOTE — PROGRESS NOTE ADULT - SUBJECTIVE AND OBJECTIVE BOX
YELITZA LÓPEZ    [id number]    85y      Female    Patient is a 85y old  Female who presents with a chief complaint of wound infection     INTERVAL HPI/OVERNIGHT EVENTS: No overnight events. Pain is well controlled. No active complaints. Denies chest pain, SOB, nausea, vomiting, constipation.    REVIEW OF SYSTEMS:    CONSTITUTIONAL: No fever, has fatigue  RESPIRATORY: No cough; No shortness of breath  CARDIOVASCULAR: No chest pain, palpitations  GASTROINTESTINAL: No abdominal or epigastric pain. No nausea, vomiting  NEUROLOGICAL: No headaches,  loss of strength.  MISCELLANEOUS: pain in the both lower extremities that is controlled with pain meds.       Vital Signs Last 24 Hrs  T(C): 36.4, Max: 37.1 (04-19 @ 19:03)  T(F): 97.5, Max: 98.7 (04-19 @ 19:03)  HR: 80 (78 - 96)  BP: 151/68 (143/58 - 175/70)  BP(mean): --  RR: 18 (18 - 20)  SpO2: 97% (92% - 97%)    PHYSICAL EXAM:    GENERAL: Elderly female looking comfortable   HEENT: PERRL, +EOMI  NECK: soft, Supple, No JVD,   CHEST/LUNG: CTA bilaterally; No wheezing  HEART: S1S2+, Regular rate and rhythm; No murmurs.   ABDOMEN: Soft, Nontender, Nondistended; Bowel sounds present  EXTREMITIES:  1+ Peripheral Pulses, Left AKA,   SKIN: Patient has left AKA with dressing in place. Right gangrenous 2nd toe and wound on heel with dressings, no soaking or bleeding.   NEURO: AAOX3, no focal deficits  PSYCH: normal mood        LABS:                        10.5   5.4   )-----------( 282      ( 20 Apr 2017 06:18 )             33.4     04-20    136  |  98  |  15.0  ----------------------------<  247<H>  4.5   |  29.0  |  0.67    Ca    9.0      20 Apr 2017 05:31      PT/INR - ( 20 Apr 2017 12:05 )   PT: 12.3 sec;   INR: 1.12 ratio                 I&O's Summary  I & Os for 24h ending 20 Apr 2017 07:00  =============================================  IN: 1150 ml / OUT: 1100 ml / NET: 50 ml    I & Os for current day (as of 20 Apr 2017 15:38)  =============================================  IN: 300 ml / OUT: 0 ml / NET: 300 ml      MEDICATIONS  (STANDING):  insulin lispro (HumaLOG) corrective regimen sliding scale  SubCutaneous three times a day before meals  dextrose 50% Injectable 12.5Gram(s) IV Push once  dextrose 50% Injectable 25Gram(s) IV Push once  dextrose 50% Injectable 25Gram(s) IV Push once  nystatin Powder 1Application(s) Topical two times a day  tamsulosin 0.4milliGRAM(s) Oral at bedtime  atorvastatin 40milliGRAM(s) Oral at bedtime  amLODIPine   Tablet 10milliGRAM(s) Oral daily  ferrous    sulfate 325milliGRAM(s) Oral daily  pantoprazole    Tablet 40milliGRAM(s) Oral before breakfast  folic acid 1milliGRAM(s) Oral daily  multivitamin 1Tablet(s) Oral daily  zinc sulfate 220milliGRAM(s) Oral daily  ascorbic acid 500milliGRAM(s) Oral daily  metoprolol 50milliGRAM(s) Oral two times a day  docusate sodium 100milliGRAM(s) Oral two times a day  mirtazapine 7.5milliGRAM(s) Oral at bedtime  polyethylene glycol 3350 17Gram(s) Oral daily  gabapentin 200milliGRAM(s) Oral <User Schedule>  gabapentin 300milliGRAM(s) Oral at bedtime  levothyroxine 88MICROGram(s) Oral daily  ALPRAZolam 0.25milliGRAM(s) Oral at bedtime  dibucaine 1% Ointment 1Application(s) Topical three times a day  enoxaparin Injectable 40milliGRAM(s) SubCutaneous daily  lactated ringers. 1000milliLiter(s) IV Continuous <Continuous>  ondansetron    Tablet 4milliGRAM(s) Oral once  senna 2Tablet(s) Oral at bedtime  lisinopril 5milliGRAM(s) Oral daily  insulin glargine Injectable (LANTUS) 10Unit(s) SubCutaneous at bedtime    MEDICATIONS  (PRN):  dextrose Gel 1Dose(s) Oral once PRN Blood Glucose LESS THAN 70 milliGRAM(s)/deciliter  glucagon  Injectable 1milliGRAM(s) IntraMuscular once PRN Glucose LESS THAN 70 milligrams/deciliter  bisacodyl 5milliGRAM(s) Oral every 12 hours PRN Constipation  ALPRAZolam 0.25milliGRAM(s) Oral two times a day PRN anxiety  oxyCODONE  5 mG/acetaminophen 325 mG 1Tablet(s) Oral every 4 hours PRN Mild Pain (1 - 3)  oxyCODONE  5 mG/acetaminophen 325 mG 2Tablet(s) Oral every 4 hours PRN Severe Pain (7 - 10)  magnesium hydroxide Suspension 30milliLiter(s) Oral daily PRN Constipation  ondansetron Injectable 4milliGRAM(s) IV Push every 8 hours PRN Nausea and/or Vomiting

## 2017-04-21 ENCOUNTER — TRANSCRIPTION ENCOUNTER (OUTPATIENT)
Age: 82
End: 2017-04-21

## 2017-04-21 PROCEDURE — 99233 SBSQ HOSP IP/OBS HIGH 50: CPT | Mod: GC

## 2017-04-21 PROCEDURE — 99232 SBSQ HOSP IP/OBS MODERATE 35: CPT

## 2017-04-21 RX ADMIN — SENNA PLUS 2 TABLET(S): 8.6 TABLET ORAL at 22:12

## 2017-04-21 RX ADMIN — MIRTAZAPINE 7.5 MILLIGRAM(S): 45 TABLET, ORALLY DISINTEGRATING ORAL at 22:12

## 2017-04-21 RX ADMIN — ZINC SULFATE TAB 220 MG (50 MG ZINC EQUIVALENT) 220 MILLIGRAM(S): 220 (50 ZN) TAB at 13:55

## 2017-04-21 RX ADMIN — GABAPENTIN 300 MILLIGRAM(S): 400 CAPSULE ORAL at 21:29

## 2017-04-21 RX ADMIN — Medication 1000 MILLIGRAM(S): at 00:03

## 2017-04-21 RX ADMIN — Medication: at 19:07

## 2017-04-21 RX ADMIN — ENOXAPARIN SODIUM 40 MILLIGRAM(S): 100 INJECTION SUBCUTANEOUS at 13:55

## 2017-04-21 RX ADMIN — AMLODIPINE BESYLATE 10 MILLIGRAM(S): 2.5 TABLET ORAL at 05:57

## 2017-04-21 RX ADMIN — GABAPENTIN 200 MILLIGRAM(S): 400 CAPSULE ORAL at 05:58

## 2017-04-21 RX ADMIN — POLYETHYLENE GLYCOL 3350 17 GRAM(S): 17 POWDER, FOR SOLUTION ORAL at 13:55

## 2017-04-21 RX ADMIN — Medication 325 MILLIGRAM(S): at 13:55

## 2017-04-21 RX ADMIN — LISINOPRIL 5 MILLIGRAM(S): 2.5 TABLET ORAL at 05:59

## 2017-04-21 RX ADMIN — INSULIN GLARGINE 20 UNIT(S): 100 INJECTION, SOLUTION SUBCUTANEOUS at 22:13

## 2017-04-21 RX ADMIN — TRAMADOL HYDROCHLORIDE 50 MILLIGRAM(S): 50 TABLET ORAL at 21:27

## 2017-04-21 RX ADMIN — PANTOPRAZOLE SODIUM 40 MILLIGRAM(S): 20 TABLET, DELAYED RELEASE ORAL at 05:59

## 2017-04-21 RX ADMIN — NYSTATIN CREAM 1 APPLICATION(S): 100000 CREAM TOPICAL at 06:00

## 2017-04-21 RX ADMIN — Medication 1 APPLICATION(S): at 06:00

## 2017-04-21 RX ADMIN — Medication 1 APPLICATION(S): at 22:12

## 2017-04-21 RX ADMIN — NYSTATIN CREAM 1 APPLICATION(S): 100000 CREAM TOPICAL at 19:07

## 2017-04-21 RX ADMIN — ATORVASTATIN CALCIUM 40 MILLIGRAM(S): 80 TABLET, FILM COATED ORAL at 22:12

## 2017-04-21 RX ADMIN — Medication 1 MILLIGRAM(S): at 13:55

## 2017-04-21 RX ADMIN — GABAPENTIN 200 MILLIGRAM(S): 400 CAPSULE ORAL at 13:54

## 2017-04-21 RX ADMIN — TAMSULOSIN HYDROCHLORIDE 0.4 MILLIGRAM(S): 0.4 CAPSULE ORAL at 22:12

## 2017-04-21 RX ADMIN — Medication 50 MILLIGRAM(S): at 17:43

## 2017-04-21 RX ADMIN — Medication 1 TABLET(S): at 13:55

## 2017-04-21 RX ADMIN — Medication 0.25 MILLIGRAM(S): at 21:29

## 2017-04-21 RX ADMIN — Medication 88 MICROGRAM(S): at 05:59

## 2017-04-21 RX ADMIN — Medication 500 MILLIGRAM(S): at 13:55

## 2017-04-21 RX ADMIN — Medication 100 MILLIGRAM(S): at 05:58

## 2017-04-21 RX ADMIN — Medication 4: at 13:55

## 2017-04-21 RX ADMIN — Medication 50 MILLIGRAM(S): at 06:00

## 2017-04-21 NOTE — PROGRESS NOTE ADULT - ASSESSMENT
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 S/p AKA 4/14/17. Patient being followed by ID, completed 7 day course of cefazolin, blood cultures have been negative. Patient also has right foot ulceration and eschar.       Problem/Plan - 1:  ·  Problem: Postoperative wound dehiscence, initial encounter.  Left AKA done on 4/14/17 by Dr. Mckeon, Pain is well controlled, completed course of cefazolin, blood cultures were negative, seen by ID, patient is s/p RLE angio yesterday, patient is getting daily wound care and daily dressing changes.       Problem/Plan - 2:   Problem: Fever.  Plan: Urine c/s Enterococcus, completed 7 day course of cefazolin. blood cultures negative. CXR 4/13 - read LLL small pleural effusion vs PNA. Doubt PNA, patient has no cough or SOB, will continue monitor, patient has been seen by ID, continues to be afebrile, will continue with incentive spirometry.       Problem/Plan - 3:  ·  Problem: PVD (peripheral vascular disease).  Plan: As above, s/p AKA, daily wound dressings, RLE Angio done yesterday, being Followed by Vascular surgery.    Problem/Plan - 4:  ·  Problem: Type 2 diabetes mellitus with hypoglycemia without coma, with long-term current use of insulin.  Plan: 20 units lantus qHS,  Continue sliding scale coverage, will continue to monitor glucose.     Problem/Plan - 5:  ·  Problem: PAF (paroxysmal atrial fibrillation).  Plan: Rate controlled.  Eliquis on hold, will resume eliquis and Plavix after all procedures done, will discuss with vascular surgery tomorrow.    Problem/Plan - 6:  Problem: CAD (coronary artery disease). Plan: Continue ASA, Metoprolol, Lisinopril, and Lipitor, Plavix on hold.  Will need to discuss with  vascular surgeon when to resume eliquis and plavix.      Problem/Plan - 7: HTN. On amlodipine, metoprolol, and lisinopril. Lisinopril increased from 2.5mg to 5mg, BP has been stable, Will continue to monitor.    Problem/Plan - 8:  ·  Problem: Acute blood loss anemia.  Plan: Asymptomatic. S/P 2 units of PRBC to keep Hg > 10 .  Monitor CBC.  Continue Iron, H&H continues to be stable.     DVT prophylaxis: SCDs    Constipation: resolved with bowel meds.

## 2017-04-21 NOTE — PROGRESS NOTE ADULT - SUBJECTIVE AND OBJECTIVE BOX
pt pod 1 sp right LE angio with Right SFA angio under mac with sedation. Tolerated well. Denies any A/c.   pt with no n/v @ this time. using pain meds as ordered/needed with some pain relief. vss. spont vent. no issues with anesthesia at this time. pt resting comfortably @ this time.

## 2017-04-21 NOTE — CONSULT NOTE ADULT - ATTENDING COMMENTS
S/P RLE angiogram and R SFA angioplasty 4/20/17.  Resume PT.  Recommend acute inpatient rehab once medically optimized; pt may tolerate >3hrs rehab daily, >5days weekly rehab

## 2017-04-21 NOTE — PROGRESS NOTE ADULT - SUBJECTIVE AND OBJECTIVE BOX
POD # 1 s/p RLE angiogram and R SFA angioplasty. No new complaints.    Vital Signs Last 24 Hrs  T(C): 36.6, Max: 36.9 (04-20 @ 16:20)  T(F): 97.8, Max: 98.5 (04-20 @ 16:20)  HR: 73 (73 - 88)  BP: 140/54 (117/60 - 170/68)  BP(mean): --  RR: 18 (14 - 18)  SpO2: 95% (95% - 100%)    L AKA stump clean  R foot distal second toe gangrene on the dorsal surface  strong doppler DP signal'  Right groin soft, no hematoma

## 2017-04-21 NOTE — PROGRESS NOTE ADULT - SUBJECTIVE AND OBJECTIVE BOX
YELITZA LÓPEZ    [id number]    85y      Female    Patient is a 85y old  Female who presents with a chief complaint of wound infection    INTERVAL HPI/OVERNIGHT EVENTS:    No overnight events, s/p angio of the right leg, Pain is well controlled  with pain meds, Denies chest pain, SOB, nausea, vomiting, constipation.    REVIEW OF SYSTEMS:    CONSTITUTIONAL: No fever, has fatigue  RESPIRATORY: No cough; No shortness of breath  CARDIOVASCULAR: No chest pain, palpitations  GASTROINTESTINAL: No abdominal or epigastric pain. No nausea, vomiting  NEUROLOGICAL: No headaches,  loss of strength.  MISCELLANEOUS: pain in the both lower extremities that is controlled with pain meds.     Vital Signs Last 24 Hrs  T(C): 36.8, Max: 36.8 (04-20 @ 23:03)  T(F): 98.2, Max: 98.2 (04-20 @ 23:03)  HR: 83 (73 - 83)  BP: 134/64 (129/66 - 140/63)  BP(mean): --  RR: 18 (18 - 18)  SpO2: 94% (94% - 96%)    PHYSICAL EXAM:    GENERAL: Elderly female looking comfortable   HEENT: PERRL, +EOMI  NECK: soft, Supple, No JVD,   CHEST/LUNG: CTA bilaterally; No wheezing  HEART: S1S2+, Regular rate and rhythm; No murmurs.   ABDOMEN: Soft, Nontender, Nondistended; Bowel sounds present  EXTREMITIES:  1+ Peripheral Pulses, Left AKA,   SKIN: Patient has left AKA with dressing in place. Right gangrenous 2nd toe and wound on heel with dressings, no soaking or bleeding.   NEURO: AAOX3, no focal deficits  PSYCH: normal mood  LABS:                        10.5   5.4   )-----------( 282      ( 20 Apr 2017 06:18 )             33.4     04-20    136  |  98  |  15.0  ----------------------------<  247<H>  4.5   |  29.0  |  0.67    Ca    9.0      20 Apr 2017 05:31      PT/INR - ( 20 Apr 2017 12:05 )   PT: 12.3 sec;   INR: 1.12 ratio                 I&O's Summary  I & Os for 24h ending 21 Apr 2017 07:00  =============================================  IN: 972 ml / OUT: 920 ml / NET: 52 ml    I & Os for current day (as of 21 Apr 2017 20:18)  =============================================  IN: 1554 ml / OUT: 550 ml / NET: 1004 ml      MEDICATIONS  (STANDING):  insulin lispro (HumaLOG) corrective regimen sliding scale  SubCutaneous three times a day before meals  dextrose 50% Injectable 12.5Gram(s) IV Push once  dextrose 50% Injectable 25Gram(s) IV Push once  dextrose 50% Injectable 25Gram(s) IV Push once  nystatin Powder 1Application(s) Topical two times a day  tamsulosin 0.4milliGRAM(s) Oral at bedtime  atorvastatin 40milliGRAM(s) Oral at bedtime  amLODIPine   Tablet 10milliGRAM(s) Oral daily  ferrous    sulfate 325milliGRAM(s) Oral daily  pantoprazole    Tablet 40milliGRAM(s) Oral before breakfast  folic acid 1milliGRAM(s) Oral daily  multivitamin 1Tablet(s) Oral daily  zinc sulfate 220milliGRAM(s) Oral daily  ascorbic acid 500milliGRAM(s) Oral daily  metoprolol 50milliGRAM(s) Oral two times a day  docusate sodium 100milliGRAM(s) Oral two times a day  mirtazapine 7.5milliGRAM(s) Oral at bedtime  polyethylene glycol 3350 17Gram(s) Oral daily  gabapentin 200milliGRAM(s) Oral <User Schedule>  gabapentin 300milliGRAM(s) Oral at bedtime  levothyroxine 88MICROGram(s) Oral daily  ALPRAZolam 0.25milliGRAM(s) Oral at bedtime  dibucaine 1% Ointment 1Application(s) Topical three times a day  enoxaparin Injectable 40milliGRAM(s) SubCutaneous daily  lactated ringers. 1000milliLiter(s) IV Continuous <Continuous>  ondansetron    Tablet 4milliGRAM(s) Oral once  senna 2Tablet(s) Oral at bedtime  lisinopril 5milliGRAM(s) Oral daily  insulin glargine Injectable (LANTUS) 20Unit(s) SubCutaneous at bedtime    MEDICATIONS  (PRN):  dextrose Gel 1Dose(s) Oral once PRN Blood Glucose LESS THAN 70 milliGRAM(s)/deciliter  glucagon  Injectable 1milliGRAM(s) IntraMuscular once PRN Glucose LESS THAN 70 milligrams/deciliter  bisacodyl 5milliGRAM(s) Oral every 12 hours PRN Constipation  ALPRAZolam 0.25milliGRAM(s) Oral two times a day PRN anxiety  oxyCODONE  5 mG/acetaminophen 325 mG 1Tablet(s) Oral every 4 hours PRN Mild Pain (1 - 3)  oxyCODONE  5 mG/acetaminophen 325 mG 2Tablet(s) Oral every 4 hours PRN Severe Pain (7 - 10)  magnesium hydroxide Suspension 30milliLiter(s) Oral daily PRN Constipation  ondansetron Injectable 4milliGRAM(s) IV Push every 8 hours PRN Nausea and/or Vomiting  traMADol 50milliGRAM(s) Oral every 6 hours PRN Severe Pain (7 - 10)

## 2017-04-21 NOTE — CONSULT NOTE ADULT - SUBJECTIVE AND OBJECTIVE BOX
Patient is a 85y old  Female who presents for rehab evaluation s/p revision of BKA.     HPI:  Patient known to rehab service.  85 yr old female with extensive cardiovascular history initially admitted 3/9 for hypertensive urgency s/p LE angiogram. found to have occluded SFA, POP b/l with minimal outflow to b/l LE. 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 Irwin. Hospital course complicated by urinary retention, pain treated by pain managment and electrolyte disturbance. Admitted to acute rehab at [facility] 3/30. While in rehab, patient found to have postoperative L LE wound dehiscence and complaints of R foot pain, evaluated by vascular and scheduled for Left AKA and RLE angiogram 4/14/17. Seen by Cardiology and cleared.  Patient had pre-op fever and was followed by ID, treated with antibiotics and cultures were followed. Patient also had complaints of LLE discomfort with drainage from BKA site and right foot ulceration and eschar. Underwent left AKA on 4/20 by Dr. Irwin.        REVIEW OF SYSTEMS  Constitutional - No fever, No weight loss, No fatigue  HEENT - No eye pain, No visual disturbances, No difficulty hearing, No tinnitus, No vertigo, No neck pain  Respiratory - No cough, No wheezing, No shortness of breath  Cardiovascular - No chest pain, No palpitations  Gastrointestinal - No abdominal pain, No nausea, No vomiting, No diarrhea, No constipation  Genitourinary - No dysuria, No frequency, No hematuria, No incontinence  Neurological - No headaches, No memory loss, No loss of strength, No numbness, No tremors  Skin - No itching, No rashes, No lesions, +ulceration   Endocrine - No temperature intolerance  Musculoskeletal - + joint pain, No joint swelling, No muscle pain  Psychiatric - No depression, No anxiety    PAST MEDICAL & SURGICAL HISTORY  Hyperlipidemia  Hypertension  Diabetes  PAF  PVD (peripheral vascular disease) with multiple stents  CAD (coronary artery disease) s/p stents x 2  Anxiety  History of cholecystectomy  H/O angioplasty      SOCIAL HISTORY  Smoking - Denied  EtOH - Denied   Drugs - Denied    FUNCTIONAL HISTORY  Lives   Independent    CURRENT FUNCTIONAL STATUS      FAMILY HISTORY   No pertinent family history in first degree relatives      RECENT LABS/IMAGING  CBC Full  -  ( 20 Apr 2017 06:18 )  WBC Count : 5.4 K/uL  Hemoglobin : 10.5 g/dL  Hematocrit : 33.4 %  Platelet Count - Automated : 282 K/uL  Mean Cell Volume : 87.2 fl  Mean Cell Hemoglobin : 27.4 pg  Mean Cell Hemoglobin Concentration : 31.4 g/dL    04-20    136  |  98  |  15.0  ----------------------------<  247<H>  4.5   |  29.0  |  0.67    Ca    9.0      20 Apr 2017 05:31          VITALS  T(C): 36.6, Max: 36.9 (04-20 @ 16:20)  HR: 73 (73 - 88)  BP: 140/54 (117/60 - 170/68)  RR: 18 (14 - 18)  SpO2: 95% (95% - 100%)  Wt(kg): --    ALLERGIES  Benadryl (Other)  Demerol HCl (Other)      MEDICATIONS   insulin lispro (HumaLOG) corrective regimen sliding scale  SubCutaneous three times a day before meals  dextrose Gel 1Dose(s) Oral once PRN  dextrose 50% Injectable 12.5Gram(s) IV Push once  dextrose 50% Injectable 25Gram(s) IV Push once  dextrose 50% Injectable 25Gram(s) IV Push once  glucagon  Injectable 1milliGRAM(s) IntraMuscular once PRN  nystatin Powder 1Application(s) Topical two times a day  tamsulosin 0.4milliGRAM(s) Oral at bedtime  atorvastatin 40milliGRAM(s) Oral at bedtime  amLODIPine   Tablet 10milliGRAM(s) Oral daily  ferrous    sulfate 325milliGRAM(s) Oral daily  pantoprazole    Tablet 40milliGRAM(s) Oral before breakfast  folic acid 1milliGRAM(s) Oral daily  multivitamin 1Tablet(s) Oral daily  zinc sulfate 220milliGRAM(s) Oral daily  ascorbic acid 500milliGRAM(s) Oral daily  metoprolol 50milliGRAM(s) Oral two times a day  docusate sodium 100milliGRAM(s) Oral two times a day  mirtazapine 7.5milliGRAM(s) Oral at bedtime  bisacodyl 5milliGRAM(s) Oral every 12 hours PRN  polyethylene glycol 3350 17Gram(s) Oral daily  gabapentin 200milliGRAM(s) Oral <User Schedule>  gabapentin 300milliGRAM(s) Oral at bedtime  levothyroxine 88MICROGram(s) Oral daily  ALPRAZolam 0.25milliGRAM(s) Oral at bedtime  ALPRAZolam 0.25milliGRAM(s) Oral two times a day PRN  dibucaine 1% Ointment 1Application(s) Topical three times a day  enoxaparin Injectable 40milliGRAM(s) SubCutaneous daily  oxyCODONE  5 mG/acetaminophen 325 mG 1Tablet(s) Oral every 4 hours PRN  oxyCODONE  5 mG/acetaminophen 325 mG 2Tablet(s) Oral every 4 hours PRN  lactated ringers. 1000milliLiter(s) IV Continuous <Continuous>  magnesium hydroxide Suspension 30milliLiter(s) Oral daily PRN  ondansetron Injectable 4milliGRAM(s) IV Push every 8 hours PRN  ondansetron    Tablet 4milliGRAM(s) Oral once  senna 2Tablet(s) Oral at bedtime  lisinopril 5milliGRAM(s) Oral daily  insulin glargine Injectable (LANTUS) 20Unit(s) SubCutaneous at bedtime  traMADol 50milliGRAM(s) Oral every 6 hours PRN Patient is a 85y old  Female who presents for rehab evaluation s/p revision of BKA.     HPI:  Patient known to rehab service.  85 yr old female with extensive cardiovascular history initially admitted 3/9 for hypertensive urgency s/p LE angiogram. found to have occluded SFA, POP b/l with minimal outflow to b/l LE. 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 Irwin. Hospital course complicated by urinary retention, pain treated by pain managment and electrolyte disturbance. Admitted to acute rehab at [facility] 3/30. While in rehab, patient found to have postoperative L LE wound dehiscence and complaints of R foot pain, evaluated by vascular and scheduled for Left AKA and RLE angiogram 4/14/17. Seen by Cardiology and cleared.  Patient had pre-op fever and was followed by ID, treated with antibiotics and cultures were followed. Patient also had complaints of LLE discomfort with drainage from BKA site and right foot ulceration and eschar. Underwent left AKA on 4/14 and revascularization of RLE on 4/20 by Dr. Irwin. Patient reports pain is well controlled.        REVIEW OF SYSTEMS  Constitutional - No fever, No weight loss, No fatigue  HEENT - No eye pain, No visual disturbances, No difficulty hearing, No tinnitus, No vertigo, No neck pain  Respiratory - No cough, No wheezing, No shortness of breath  Cardiovascular - No chest pain, No palpitations  Gastrointestinal - No abdominal pain, No nausea, No vomiting, No diarrhea, No constipation  Genitourinary - No dysuria, No frequency, No hematuria, No incontinence  Neurological - No headaches, No memory loss, No loss of strength, No numbness, No tremors, +phantom sensation and pain LLE  Skin - No itching, No rashes, No lesions, +ulceration   Endocrine - No temperature intolerance  Musculoskeletal - + joint pain, No joint swelling, No muscle pain  Psychiatric - No depression, No anxiety    PAST MEDICAL & SURGICAL HISTORY  Hyperlipidemia  Hypertension  Diabetes  PAF  PVD (peripheral vascular disease) with multiple stents  CAD (coronary artery disease) s/p stents x 2  Anxiety  History of cholecystectomy  H/O angioplasty      SOCIAL HISTORY  Smoking - Denied  EtOH - Denied   Drugs - Denied    FUNCTIONAL HISTORY  As per previous PT evaluation dated 3/9/17: pt was living at an assistive living facility, used a RW, was becoming more and more unsteady prior to arrival. no stairs to negotiate at home	      CURRENT FUNCTIONAL STATUS PT Eval Pending      FAMILY HISTORY   No pertinent family history in first degree relatives      RECENT LABS/IMAGING  CBC Full  -  ( 20 Apr 2017 06:18 )  WBC Count : 5.4 K/uL  Hemoglobin : 10.5 g/dL  Hematocrit : 33.4 %  Platelet Count - Automated : 282 K/uL  Mean Cell Volume : 87.2 fl  Mean Cell Hemoglobin : 27.4 pg  Mean Cell Hemoglobin Concentration : 31.4 g/dL    04-20    136  |  98  |  15.0  ----------------------------<  247<H>  4.5   |  29.0  |  0.67    Ca    9.0      20 Apr 2017 05:31          VITALS  T(C): 36.6, Max: 36.9 (04-20 @ 16:20)  HR: 73 (73 - 88)  BP: 140/54 (117/60 - 170/68)  RR: 18 (14 - 18)  SpO2: 95% (95% - 100%)  Wt(kg): --    ALLERGIES  Benadryl (Other)  Demerol HCl (Other)      MEDICATIONS   insulin lispro (HumaLOG) corrective regimen sliding scale  SubCutaneous three times a day before meals  dextrose Gel 1Dose(s) Oral once PRN  dextrose 50% Injectable 12.5Gram(s) IV Push once  dextrose 50% Injectable 25Gram(s) IV Push once  dextrose 50% Injectable 25Gram(s) IV Push once  glucagon  Injectable 1milliGRAM(s) IntraMuscular once PRN  nystatin Powder 1Application(s) Topical two times a day  tamsulosin 0.4milliGRAM(s) Oral at bedtime  atorvastatin 40milliGRAM(s) Oral at bedtime  amLODIPine   Tablet 10milliGRAM(s) Oral daily  ferrous    sulfate 325milliGRAM(s) Oral daily  pantoprazole    Tablet 40milliGRAM(s) Oral before breakfast  folic acid 1milliGRAM(s) Oral daily  multivitamin 1Tablet(s) Oral daily  zinc sulfate 220milliGRAM(s) Oral daily  ascorbic acid 500milliGRAM(s) Oral daily  metoprolol 50milliGRAM(s) Oral two times a day  docusate sodium 100milliGRAM(s) Oral two times a day  mirtazapine 7.5milliGRAM(s) Oral at bedtime  bisacodyl 5milliGRAM(s) Oral every 12 hours PRN  polyethylene glycol 3350 17Gram(s) Oral daily  gabapentin 200milliGRAM(s) Oral <User Schedule>  gabapentin 300milliGRAM(s) Oral at bedtime  levothyroxine 88MICROGram(s) Oral daily  ALPRAZolam 0.25milliGRAM(s) Oral at bedtime  ALPRAZolam 0.25milliGRAM(s) Oral two times a day PRN  dibucaine 1% Ointment 1Application(s) Topical three times a day  enoxaparin Injectable 40milliGRAM(s) SubCutaneous daily  oxyCODONE  5 mG/acetaminophen 325 mG 1Tablet(s) Oral every 4 hours PRN  oxyCODONE  5 mG/acetaminophen 325 mG 2Tablet(s) Oral every 4 hours PRN  lactated ringers. 1000milliLiter(s) IV Continuous <Continuous>  magnesium hydroxide Suspension 30milliLiter(s) Oral daily PRN  ondansetron Injectable 4milliGRAM(s) IV Push every 8 hours PRN  ondansetron    Tablet 4milliGRAM(s) Oral once  senna 2Tablet(s) Oral at bedtime  lisinopril 5milliGRAM(s) Oral daily  insulin glargine Injectable (LANTUS) 20Unit(s) SubCutaneous at bedtime  traMADol 50milliGRAM(s) Oral every 6 hours PRN    ----------------------------------------------------------------------------------------  PHYSICAL EXAM  Constitutional - NAD, Comfortable  HEENT - NCAT, EOMI  Neck - Supple, No limited ROM  Chest - CTA bilaterally, No wheeze, No rhonchi, No crackles  Cardiovascular - RRR, S1S2, No murmurs  Abdomen - BS+, Soft, NTND  Extremities - left AKA, right LE bandaged and in PRAFO  Neurologic Exam -                    Cognitive - Awake, Alert, AAO to self, place, date, year, situation     Communication - Fluent, No dysarthria     Cranial Nerves - CN 2-12 intact     Motor - Full strength and ROM in b/l UE. Patient able to actively flex and extend left residual limb. 4+/5 hip and knee strength RLE       Sensory - Intact to LT RLE and b/l UE     Reflexes - DTR Intact     Psychiatric - Mood stable, Affect WNL  ----------------------------------------------------------------------------------------  ASSESSMENT/PLAN    86 yo F s/p L AKA and RLE revascularization now with worsened functional deficits.    PT Eval pending  DVT PPx-Lovenox  Pain-Tramadol prn    - Recommend f/u once PT re-eval. Likely inpatient rehab. Patient is a 85y old  Female who presents for rehab evaluation s/p revision of BKA.     HPI:  Patient known to rehab service.  85 yr old female with extensive cardiovascular history initially admitted 3/9 for hypertensive urgency s/p LE angiogram. found to have occluded SFA, POP b/l with minimal outflow to b/l LE. 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 Irwin. Hospital course complicated by urinary retention, pain treated by pain managment and electrolyte disturbance. Admitted to acute rehab at [facility] 3/30. While in rehab, patient found to have postoperative L LE wound dehiscence and complaints of R foot pain, evaluated by vascular and scheduled for Left AKA and RLE angiogram 4/14/17. Seen by Cardiology and cleared.  Patient had pre-op fever and was followed by ID, treated with antibiotics and cultures were followed. Patient also had complaints of LLE discomfort with drainage from BKA site and right foot ulceration and eschar. Underwent left AKA on 4/14 and revascularization of RLE with angiogram and R SFA angioplasty. on 4/20 by Dr. Irwin. Patient reports pain is well controlled.        REVIEW OF SYSTEMS  Constitutional - No fever, No weight loss, + fatigue  HEENT - No eye pain, No visual disturbances, No difficulty hearing, No tinnitus, No vertigo, No neck pain  Respiratory - No cough, No wheezing, No shortness of breath  Cardiovascular - No chest pain, No palpitations  Gastrointestinal - No abdominal pain, No nausea, No vomiting, No diarrhea, No constipation  Genitourinary - No dysuria, No frequency, No hematuria, No incontinence  Neurological - No headaches, No memory loss, No loss of strength, No numbness, No tremors, +phantom sensation and pain LLE  Skin - No itching, No rashes, No lesions, +ulceration   Endocrine - No temperature intolerance  Musculoskeletal - + joint pain, No joint swelling, No muscle pain  Pt c/o right heel and left phantom pain in area of previous ankle  Psychiatric - No depression, No anxiety    PAST MEDICAL & SURGICAL HISTORY  Hyperlipidemia  Hypertension  Diabetes  PAF  PVD (peripheral vascular disease) with multiple stents  CAD (coronary artery disease) s/p stents x 2  Anxiety  History of cholecystectomy  H/O angioplasty      SOCIAL HISTORY  Smoking - Denied  EtOH - Denied   Drugs - Denied    FUNCTIONAL HISTORY  As per previous PT evaluation dated 3/9/17: pt was living at an assistive living facility, used a RW, was becoming more and more unsteady prior to arrival. no stairs to negotiate at home	      CURRENT FUNCTIONAL STATUS PT Eval Pending      FAMILY HISTORY   No pertinent family history in first degree relatives      RECENT LABS/IMAGING  CBC Full  -  ( 20 Apr 2017 06:18 )  WBC Count : 5.4 K/uL  Hemoglobin : 10.5 g/dL  Hematocrit : 33.4 %  Platelet Count - Automated : 282 K/uL  Mean Cell Volume : 87.2 fl  Mean Cell Hemoglobin : 27.4 pg  Mean Cell Hemoglobin Concentration : 31.4 g/dL    04-20    136  |  98  |  15.0  ----------------------------<  247<H>  4.5   |  29.0  |  0.67    Ca    9.0      20 Apr 2017 05:31          VITALS  T(C): 36.6, Max: 36.9 (04-20 @ 16:20)  HR: 73 (73 - 88)  BP: 140/54 (117/60 - 170/68)  RR: 18 (14 - 18)  SpO2: 95% (95% - 100%)  Wt(kg): --    ALLERGIES  Benadryl (Other)  Demerol HCl (Other)      MEDICATIONS   insulin lispro (HumaLOG) corrective regimen sliding scale  SubCutaneous three times a day before meals  dextrose Gel 1Dose(s) Oral once PRN  dextrose 50% Injectable 12.5Gram(s) IV Push once  dextrose 50% Injectable 25Gram(s) IV Push once  dextrose 50% Injectable 25Gram(s) IV Push once  glucagon  Injectable 1milliGRAM(s) IntraMuscular once PRN  nystatin Powder 1Application(s) Topical two times a day  tamsulosin 0.4milliGRAM(s) Oral at bedtime  atorvastatin 40milliGRAM(s) Oral at bedtime  amLODIPine   Tablet 10milliGRAM(s) Oral daily  ferrous    sulfate 325milliGRAM(s) Oral daily  pantoprazole    Tablet 40milliGRAM(s) Oral before breakfast  folic acid 1milliGRAM(s) Oral daily  multivitamin 1Tablet(s) Oral daily  zinc sulfate 220milliGRAM(s) Oral daily  ascorbic acid 500milliGRAM(s) Oral daily  metoprolol 50milliGRAM(s) Oral two times a day  docusate sodium 100milliGRAM(s) Oral two times a day  mirtazapine 7.5milliGRAM(s) Oral at bedtime  bisacodyl 5milliGRAM(s) Oral every 12 hours PRN  polyethylene glycol 3350 17Gram(s) Oral daily  gabapentin 200milliGRAM(s) Oral <User Schedule>  gabapentin 300milliGRAM(s) Oral at bedtime  levothyroxine 88MICROGram(s) Oral daily  ALPRAZolam 0.25milliGRAM(s) Oral at bedtime  ALPRAZolam 0.25milliGRAM(s) Oral two times a day PRN  dibucaine 1% Ointment 1Application(s) Topical three times a day  enoxaparin Injectable 40milliGRAM(s) SubCutaneous daily  oxyCODONE  5 mG/acetaminophen 325 mG 1Tablet(s) Oral every 4 hours PRN  oxyCODONE  5 mG/acetaminophen 325 mG 2Tablet(s) Oral every 4 hours PRN  lactated ringers. 1000milliLiter(s) IV Continuous <Continuous>  magnesium hydroxide Suspension 30milliLiter(s) Oral daily PRN  ondansetron Injectable 4milliGRAM(s) IV Push every 8 hours PRN  ondansetron    Tablet 4milliGRAM(s) Oral once  senna 2Tablet(s) Oral at bedtime  lisinopril 5milliGRAM(s) Oral daily  insulin glargine Injectable (LANTUS) 20Unit(s) SubCutaneous at bedtime  traMADol 50milliGRAM(s) Oral every 6 hours PRN    ----------------------------------------------------------------------------------------  PHYSICAL EXAM  Constitutional - NAD, Comfortable  HEENT - NCAT, EOMI  Neck - Supple, No limited ROM  Chest - CTA bilaterally, No wheeze, No rhonchi, No crackles  Cardiovascular - RRR, S1S2, No murmurs  Abdomen - BS+, Soft, NTND  Extremities - left AKA, right LE bandaged and in PRAFO  Neurologic Exam -                    Cognitive - Awake, Alert, AAO to self, place, date, year, situation     Communication - Fluent, No dysarthria     Cranial Nerves - CN 2-12 intact     Motor - Full strength and ROM in b/l UE. Patient able to actively flex and extend left residual limb. 4+/5 hip and knee strength RLE       Sensory - Intact to LT RLE and b/l UE     Reflexes - DTR Intact     Psychiatric - Mood stable, Affect WNL  ----------------------------------------------------------------------------------------  ASSESSMENT/PLAN    86 yo F s/p L AKA and RLE revascularization now with worsened functional deficits.    PT Eval pending  DVT PPx-Lovenox  Pain-Tramadol prn    - Recommend f/u once PT re-eval. Likely inpatient rehab.

## 2017-04-22 LAB
HCT VFR BLD CALC: 30.5 % — LOW (ref 37–47)
HGB BLD-MCNC: 9.6 G/DL — LOW (ref 12–16)
MCHC RBC-ENTMCNC: 27.7 PG — SIGNIFICANT CHANGE UP (ref 27–31)
MCHC RBC-ENTMCNC: 31.5 G/DL — LOW (ref 32–36)
MCV RBC AUTO: 87.9 FL — SIGNIFICANT CHANGE UP (ref 81–99)
PLATELET # BLD AUTO: 237 K/UL — SIGNIFICANT CHANGE UP (ref 150–400)
RBC # BLD: 3.47 M/UL — LOW (ref 4.4–5.2)
RBC # FLD: 15.4 % — SIGNIFICANT CHANGE UP (ref 11–15.6)
WBC # BLD: 4.7 K/UL — LOW (ref 4.8–10.8)
WBC # FLD AUTO: 4.7 K/UL — LOW (ref 4.8–10.8)

## 2017-04-22 PROCEDURE — 99232 SBSQ HOSP IP/OBS MODERATE 35: CPT

## 2017-04-22 RX ORDER — ENOXAPARIN SODIUM 100 MG/ML
40 INJECTION SUBCUTANEOUS DAILY
Qty: 0 | Refills: 0 | Status: DISCONTINUED | OUTPATIENT
Start: 2017-04-22 | End: 2017-04-23

## 2017-04-22 RX ORDER — NYSTATIN CREAM 100000 [USP'U]/G
1 CREAM TOPICAL
Qty: 0 | Refills: 0 | Status: DISCONTINUED | OUTPATIENT
Start: 2017-04-22 | End: 2017-04-27

## 2017-04-22 RX ORDER — TRAMADOL HYDROCHLORIDE 50 MG/1
25 TABLET ORAL EVERY 4 HOURS
Qty: 0 | Refills: 0 | Status: DISCONTINUED | OUTPATIENT
Start: 2017-04-22 | End: 2017-04-27

## 2017-04-22 RX ORDER — MORPHINE SULFATE 50 MG/1
4 CAPSULE, EXTENDED RELEASE ORAL EVERY 4 HOURS
Qty: 0 | Refills: 0 | Status: DISCONTINUED | OUTPATIENT
Start: 2017-04-22 | End: 2017-04-27

## 2017-04-22 RX ADMIN — AMLODIPINE BESYLATE 10 MILLIGRAM(S): 2.5 TABLET ORAL at 06:31

## 2017-04-22 RX ADMIN — NYSTATIN CREAM 1 APPLICATION(S): 100000 CREAM TOPICAL at 06:32

## 2017-04-22 RX ADMIN — Medication 1: at 16:23

## 2017-04-22 RX ADMIN — Medication 50 MILLIGRAM(S): at 17:38

## 2017-04-22 RX ADMIN — SENNA PLUS 2 TABLET(S): 8.6 TABLET ORAL at 21:19

## 2017-04-22 RX ADMIN — GABAPENTIN 300 MILLIGRAM(S): 400 CAPSULE ORAL at 21:20

## 2017-04-22 RX ADMIN — SODIUM CHLORIDE 50 MILLILITER(S): 9 INJECTION, SOLUTION INTRAVENOUS at 14:16

## 2017-04-22 RX ADMIN — TAMSULOSIN HYDROCHLORIDE 0.4 MILLIGRAM(S): 0.4 CAPSULE ORAL at 21:22

## 2017-04-22 RX ADMIN — ENOXAPARIN SODIUM 40 MILLIGRAM(S): 100 INJECTION SUBCUTANEOUS at 12:41

## 2017-04-22 RX ADMIN — Medication 100 MILLIGRAM(S): at 17:37

## 2017-04-22 RX ADMIN — GABAPENTIN 200 MILLIGRAM(S): 400 CAPSULE ORAL at 12:41

## 2017-04-22 RX ADMIN — MIRTAZAPINE 7.5 MILLIGRAM(S): 45 TABLET, ORALLY DISINTEGRATING ORAL at 21:19

## 2017-04-22 RX ADMIN — ATORVASTATIN CALCIUM 40 MILLIGRAM(S): 80 TABLET, FILM COATED ORAL at 21:20

## 2017-04-22 RX ADMIN — Medication 1 APPLICATION(S): at 06:32

## 2017-04-22 RX ADMIN — INSULIN GLARGINE 20 UNIT(S): 100 INJECTION, SOLUTION SUBCUTANEOUS at 21:22

## 2017-04-22 RX ADMIN — ZINC SULFATE TAB 220 MG (50 MG ZINC EQUIVALENT) 220 MILLIGRAM(S): 220 (50 ZN) TAB at 12:41

## 2017-04-22 RX ADMIN — TRAMADOL HYDROCHLORIDE 50 MILLIGRAM(S): 50 TABLET ORAL at 03:41

## 2017-04-22 RX ADMIN — Medication 500 MILLIGRAM(S): at 12:41

## 2017-04-22 RX ADMIN — TRAMADOL HYDROCHLORIDE 50 MILLIGRAM(S): 50 TABLET ORAL at 12:42

## 2017-04-22 RX ADMIN — Medication 325 MILLIGRAM(S): at 12:41

## 2017-04-22 RX ADMIN — Medication 50 MILLIGRAM(S): at 06:29

## 2017-04-22 RX ADMIN — TRAMADOL HYDROCHLORIDE 50 MILLIGRAM(S): 50 TABLET ORAL at 03:49

## 2017-04-22 RX ADMIN — LISINOPRIL 5 MILLIGRAM(S): 2.5 TABLET ORAL at 06:30

## 2017-04-22 RX ADMIN — Medication 88 MICROGRAM(S): at 06:31

## 2017-04-22 RX ADMIN — Medication 1: at 12:41

## 2017-04-22 RX ADMIN — Medication 1 MILLIGRAM(S): at 12:41

## 2017-04-22 RX ADMIN — Medication 1 TABLET(S): at 12:41

## 2017-04-22 RX ADMIN — NYSTATIN CREAM 1 APPLICATION(S): 100000 CREAM TOPICAL at 17:37

## 2017-04-22 RX ADMIN — TRAMADOL HYDROCHLORIDE 50 MILLIGRAM(S): 50 TABLET ORAL at 13:42

## 2017-04-22 RX ADMIN — PANTOPRAZOLE SODIUM 40 MILLIGRAM(S): 20 TABLET, DELAYED RELEASE ORAL at 06:32

## 2017-04-22 RX ADMIN — Medication 1 APPLICATION(S): at 13:52

## 2017-04-22 RX ADMIN — Medication 100 MILLIGRAM(S): at 06:29

## 2017-04-22 RX ADMIN — GABAPENTIN 200 MILLIGRAM(S): 400 CAPSULE ORAL at 06:31

## 2017-04-22 NOTE — PROGRESS NOTE ADULT - SUBJECTIVE AND OBJECTIVE BOX
YELITZA LÓPEZ    [id number]    85y      Female    Patient is a 85y old  Female who presents with a chief complaint of wound infection    INTERVAL HPI/OVERNIGHT EVENTS:    No overnight events, s/p angio of the right leg, Pain is well controlled  with pain meds, Denies chest pain, SOB, nausea, vomiting, constipation.    REVIEW OF SYSTEMS:    CONSTITUTIONAL: No fever, has fatigue  RESPIRATORY: No cough; No shortness of breath  CARDIOVASCULAR: No chest pain, palpitations  GASTROINTESTINAL: No abdominal or epigastric pain. No nausea, vomiting  NEUROLOGICAL: No headaches,  loss of strength.  MISCELLANEOUS: pain in the both lower extremities that is controlled with pain meds.     Vital Signs Last 24 Hrs  T(C): 36.6, Max: 36.8 (04-20 @ 23:03)  T(F): 97.8, Max: 98.2 (04-20 @ 23:03)  HR: 75 (73 - 83)  BP: 146/63 (129/66 - 140/63)  RR: 18 (18 - 18)  SpO2: 99% (94% - 96%)    PHYSICAL EXAM:    GENERAL: Elderly female looking comfortable   HEENT: PERRL, +EOMI  NECK: soft, Supple, No JVD,   CHEST/LUNG: CTA bilaterally; No wheezing  HEART: S1S2+, Regular rate and rhythm; No murmurs.   ABDOMEN: Soft, Nontender, Nondistended; Bowel sounds present  EXTREMITIES:  1+ Peripheral Pulses, Left AKA,   SKIN: Patient has left AKA with dressing in place. Right gangrenous 2nd toe and wound on heel with dressings, no soaking or bleeding.   NEURO: AAOX3, no focal deficits  PSYCH: normal mood  LABS:                        10.5   5.4   )-----------( 282      ( 20 Apr 2017 06:18 )             33.4     04-20    136  |  98  |  15.0  ----------------------------<  247<H>  4.5   |  29.0  |  0.67    Ca    9.0      20 Apr 2017 05:31      PT/INR - ( 20 Apr 2017 12:05 )   PT: 12.3 sec;   INR: 1.12 ratio                 I&O's Summary  I & Os for 24h ending 21 Apr 2017 07:00  =============================================  IN: 972 ml / OUT: 920 ml / NET: 52 ml    I & Os for current day (as of 21 Apr 2017 20:18)  =============================================  IN: 1554 ml / OUT: 550 ml / NET: 1004 ml      MEDICATIONS  (STANDING):  insulin lispro (HumaLOG) corrective regimen sliding scale  SubCutaneous three times a day before meals  dextrose 50% Injectable 12.5Gram(s) IV Push once  dextrose 50% Injectable 25Gram(s) IV Push once  dextrose 50% Injectable 25Gram(s) IV Push once  nystatin Powder 1Application(s) Topical two times a day  tamsulosin 0.4milliGRAM(s) Oral at bedtime  atorvastatin 40milliGRAM(s) Oral at bedtime  amLODIPine   Tablet 10milliGRAM(s) Oral daily  ferrous    sulfate 325milliGRAM(s) Oral daily  pantoprazole    Tablet 40milliGRAM(s) Oral before breakfast  folic acid 1milliGRAM(s) Oral daily  multivitamin 1Tablet(s) Oral daily  zinc sulfate 220milliGRAM(s) Oral daily  ascorbic acid 500milliGRAM(s) Oral daily  metoprolol 50milliGRAM(s) Oral two times a day  docusate sodium 100milliGRAM(s) Oral two times a day  mirtazapine 7.5milliGRAM(s) Oral at bedtime  polyethylene glycol 3350 17Gram(s) Oral daily  gabapentin 200milliGRAM(s) Oral <User Schedule>  gabapentin 300milliGRAM(s) Oral at bedtime  levothyroxine 88MICROGram(s) Oral daily  ALPRAZolam 0.25milliGRAM(s) Oral at bedtime  dibucaine 1% Ointment 1Application(s) Topical three times a day  enoxaparin Injectable 40milliGRAM(s) SubCutaneous daily  lactated ringers. 1000milliLiter(s) IV Continuous <Continuous>  ondansetron    Tablet 4milliGRAM(s) Oral once  senna 2Tablet(s) Oral at bedtime  lisinopril 5milliGRAM(s) Oral daily  insulin glargine Injectable (LANTUS) 20Unit(s) SubCutaneous at bedtime    MEDICATIONS  (PRN):  dextrose Gel 1Dose(s) Oral once PRN Blood Glucose LESS THAN 70 milliGRAM(s)/deciliter  glucagon  Injectable 1milliGRAM(s) IntraMuscular once PRN Glucose LESS THAN 70 milligrams/deciliter  bisacodyl 5milliGRAM(s) Oral every 12 hours PRN Constipation  ALPRAZolam 0.25milliGRAM(s) Oral two times a day PRN anxiety  oxyCODONE  5 mG/acetaminophen 325 mG 1Tablet(s) Oral every 4 hours PRN Mild Pain (1 - 3)  oxyCODONE  5 mG/acetaminophen 325 mG 2Tablet(s) Oral every 4 hours PRN Severe Pain (7 - 10)  magnesium hydroxide Suspension 30milliLiter(s) Oral daily PRN Constipation  ondansetron Injectable 4milliGRAM(s) IV Push every 8 hours PRN Nausea and/or Vomiting  traMADol 50milliGRAM(s) Oral every 6 hours PRN Severe Pain (7 - 10)

## 2017-04-22 NOTE — PROGRESS NOTE ADULT - ASSESSMENT
PVD, comorbid conditions, s/p L AKA, RLE angioplasty.  Continue supportive care.  Start rehab in a couple days.  ANTONIO discharge planning.

## 2017-04-22 NOTE — PROGRESS NOTE ADULT - SUBJECTIVE AND OBJECTIVE BOX
Subjective:    Pt without new complaints.  No pain in BLE.    Objective:    AVSS    Gen NAD  Abd soft  Extr Good signal in RLE, toe wounds stable, LLE stump stable, and intact

## 2017-04-22 NOTE — PROGRESS NOTE ADULT - ASSESSMENT
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 S/p AKA 4/14/17. Patient being followed by ID, completed 7 day course of cefazolin, blood cultures have been negative. Patient also has right foot ulceration and eschar.       Problem/Plan - 1:  ·  Problem: Postoperative wound dehiscence:  Left AKA done on 4/14/17 by Dr. Mckeon, Pain is well controlled, completed course of cefazolin, blood cultures were negative, seen by ID, patient is s/p RLE angio 2 days ago, patient is getting daily wound care and daily dressing changes, patient's wound is healing, will continue with current management.      Problem/Plan - 2:   Problem: Fever: Plan:  Urine c/s Enterococcus, completed 7 day course of cefazolin. blood cultures negative. CXR 4/13 - read LLL small pleural effusion vs PNA. Doubt PNA, patient has no cough or SOB, will continue monitor, patient has been seen by ID, continues to be afebrile, will continue with incentive spirometry, has no more fever.        Problem/Plan - 3:  ·  Problem: PVD (peripheral vascular disease).  Plan: As above, s/p AKA, daily wound dressings, RLE Angio done 2 days ago, being Followed by Vascular surgery.    Problem/Plan - 4:  ·  Problem: Type 2 diabetes mellitus with hypoglycemia without coma, with long-term current use of insulin.  Plan: 20 units lantus qHS,  Continue sliding scale coverage, will continue to monitor glucose.     Problem/Plan - 5:  ·  Problem: PAF (paroxysmal atrial fibrillation).  Plan: Rate controlled.  Eliquis on hold, will resume eliquis and Plavix after all procedures done, will discuss with vascular surgery to when to resume.     Problem/Plan - 6:  Problem: CAD (coronary artery disease). Plan: Continue ASA, Metoprolol, Lisinopril, and Lipitor, Plavix on hold.  Will need to discuss with  vascular surgeon when to resume eliquis and plavix.      Problem/Plan - 7: HTN. On amlodipine, metoprolol, and lisinopril. Lisinopril increased from 2.5mg to 5mg, BP has been stable, Will continue to monitor.    Problem/Plan - 8:  ·  Problem: Acute blood loss anemia.  Plan: Asymptomatic. S/P 2 units of PRBC to keep Hg > 10 .  Monitor CBC.  Continue Iron, H&H continues to be stable.     DVT prophylaxis: SCDs    Constipation: resolved with bowel meds.     Patient is going to have PT eval then will further decide if patient need to go acute or subacute rehab.

## 2017-04-23 LAB
ANION GAP SERPL CALC-SCNC: 11 MMOL/L — SIGNIFICANT CHANGE UP (ref 5–17)
BASOPHILS # BLD AUTO: 0 K/UL — SIGNIFICANT CHANGE UP (ref 0–0.2)
BASOPHILS NFR BLD AUTO: 0.3 % — SIGNIFICANT CHANGE UP (ref 0–2)
BUN SERPL-MCNC: 10 MG/DL — SIGNIFICANT CHANGE UP (ref 8–20)
CALCIUM SERPL-MCNC: 9.3 MG/DL — SIGNIFICANT CHANGE UP (ref 8.6–10.2)
CHLORIDE SERPL-SCNC: 94 MMOL/L — LOW (ref 98–107)
CO2 SERPL-SCNC: 30 MMOL/L — HIGH (ref 22–29)
CREAT SERPL-MCNC: 0.68 MG/DL — SIGNIFICANT CHANGE UP (ref 0.5–1.3)
EOSINOPHIL # BLD AUTO: 0.6 K/UL — HIGH (ref 0–0.5)
EOSINOPHIL NFR BLD AUTO: 8.1 % — HIGH (ref 0–6)
GLUCOSE SERPL-MCNC: 235 MG/DL — HIGH (ref 70–115)
HCT VFR BLD CALC: 34.8 % — LOW (ref 37–47)
HGB BLD-MCNC: 10.9 G/DL — LOW (ref 12–16)
LYMPHOCYTES # BLD AUTO: 1 K/UL — SIGNIFICANT CHANGE UP (ref 1–4.8)
LYMPHOCYTES # BLD AUTO: 14.6 % — LOW (ref 20–55)
MCHC RBC-ENTMCNC: 27.6 PG — SIGNIFICANT CHANGE UP (ref 27–31)
MCHC RBC-ENTMCNC: 31.3 G/DL — LOW (ref 32–36)
MCV RBC AUTO: 88.1 FL — SIGNIFICANT CHANGE UP (ref 81–99)
MONOCYTES # BLD AUTO: 0.6 K/UL — SIGNIFICANT CHANGE UP (ref 0–0.8)
MONOCYTES NFR BLD AUTO: 7.9 % — SIGNIFICANT CHANGE UP (ref 3–10)
NEUTROPHILS # BLD AUTO: 4.8 K/UL — SIGNIFICANT CHANGE UP (ref 1.8–8)
NEUTROPHILS NFR BLD AUTO: 68.7 % — SIGNIFICANT CHANGE UP (ref 37–73)
PLATELET # BLD AUTO: 288 K/UL — SIGNIFICANT CHANGE UP (ref 150–400)
POTASSIUM SERPL-MCNC: 5 MMOL/L — SIGNIFICANT CHANGE UP (ref 3.5–5.3)
POTASSIUM SERPL-SCNC: 5 MMOL/L — SIGNIFICANT CHANGE UP (ref 3.5–5.3)
RBC # BLD: 3.95 M/UL — LOW (ref 4.4–5.2)
RBC # FLD: 15.2 % — SIGNIFICANT CHANGE UP (ref 11–15.6)
SODIUM SERPL-SCNC: 135 MMOL/L — SIGNIFICANT CHANGE UP (ref 135–145)
WBC # BLD: 7.1 K/UL — SIGNIFICANT CHANGE UP (ref 4.8–10.8)
WBC # FLD AUTO: 7.1 K/UL — SIGNIFICANT CHANGE UP (ref 4.8–10.8)

## 2017-04-23 PROCEDURE — 99232 SBSQ HOSP IP/OBS MODERATE 35: CPT

## 2017-04-23 RX ORDER — APIXABAN 2.5 MG/1
5 TABLET, FILM COATED ORAL
Qty: 0 | Refills: 0 | Status: DISCONTINUED | OUTPATIENT
Start: 2017-04-23 | End: 2017-04-27

## 2017-04-23 RX ORDER — CLOPIDOGREL BISULFATE 75 MG/1
75 TABLET, FILM COATED ORAL DAILY
Qty: 0 | Refills: 0 | Status: DISCONTINUED | OUTPATIENT
Start: 2017-04-23 | End: 2017-04-27

## 2017-04-23 RX ADMIN — PANTOPRAZOLE SODIUM 40 MILLIGRAM(S): 20 TABLET, DELAYED RELEASE ORAL at 05:08

## 2017-04-23 RX ADMIN — ZINC SULFATE TAB 220 MG (50 MG ZINC EQUIVALENT) 220 MILLIGRAM(S): 220 (50 ZN) TAB at 12:18

## 2017-04-23 RX ADMIN — Medication 325 MILLIGRAM(S): at 12:18

## 2017-04-23 RX ADMIN — AMLODIPINE BESYLATE 10 MILLIGRAM(S): 2.5 TABLET ORAL at 05:08

## 2017-04-23 RX ADMIN — Medication 500 MILLIGRAM(S): at 12:18

## 2017-04-23 RX ADMIN — Medication 100 MILLIGRAM(S): at 05:09

## 2017-04-23 RX ADMIN — Medication 88 MICROGRAM(S): at 05:08

## 2017-04-23 RX ADMIN — Medication 100 MILLIGRAM(S): at 16:28

## 2017-04-23 RX ADMIN — Medication 50 MILLIGRAM(S): at 16:28

## 2017-04-23 RX ADMIN — GABAPENTIN 200 MILLIGRAM(S): 400 CAPSULE ORAL at 05:08

## 2017-04-23 RX ADMIN — Medication 1: at 16:30

## 2017-04-23 RX ADMIN — Medication 1 APPLICATION(S): at 14:17

## 2017-04-23 RX ADMIN — Medication 50 MILLIGRAM(S): at 05:08

## 2017-04-23 RX ADMIN — Medication 1 TABLET(S): at 12:17

## 2017-04-23 RX ADMIN — CLOPIDOGREL BISULFATE 75 MILLIGRAM(S): 75 TABLET, FILM COATED ORAL at 12:19

## 2017-04-23 RX ADMIN — NYSTATIN CREAM 1 APPLICATION(S): 100000 CREAM TOPICAL at 05:09

## 2017-04-23 RX ADMIN — Medication 1 MILLIGRAM(S): at 12:18

## 2017-04-23 RX ADMIN — APIXABAN 5 MILLIGRAM(S): 2.5 TABLET, FILM COATED ORAL at 16:28

## 2017-04-23 RX ADMIN — NYSTATIN CREAM 1 APPLICATION(S): 100000 CREAM TOPICAL at 16:28

## 2017-04-23 RX ADMIN — LISINOPRIL 5 MILLIGRAM(S): 2.5 TABLET ORAL at 05:08

## 2017-04-23 RX ADMIN — GABAPENTIN 200 MILLIGRAM(S): 400 CAPSULE ORAL at 12:19

## 2017-04-23 RX ADMIN — Medication 3: at 12:19

## 2017-04-23 RX ADMIN — Medication 1 APPLICATION(S): at 05:09

## 2017-04-23 NOTE — PHYSICAL THERAPY INITIAL EVALUATION ADULT - PLANNED THERAPY INTERVENTIONS, PT EVAL
bed mobility training/balance training/gait training/transfer training/strengthening
transfer training/ROM/bed mobility training/balance training/strengthening/gait training

## 2017-04-23 NOTE — PHYSICAL THERAPY INITIAL EVALUATION ADULT - BED MOBILITY LIMITATIONS, REHAB EVAL
decreased ability to use legs for bridging/pushing/impaired ability to control trunk for mobility/decreased ability to use arms for pushing/pulling
decreased ability to use legs for bridging/pushing

## 2017-04-23 NOTE — PHYSICAL THERAPY INITIAL EVALUATION ADULT - IMPAIRMENTS FOUND, PT EVAL
muscle strength/gross motor/gait, locomotion, and balance/circulation
muscle strength/ROM/gait, locomotion, and balance/circulation

## 2017-04-23 NOTE — PHYSICAL THERAPY INITIAL EVALUATION ADULT - LEVEL OF INDEPENDENCE: SCOOT/BRIDGE, REHAB EVAL
with bed in [place], pt in hooklying position and use of UEs on bed rails, pt able to boost herself to HOB. Mod A needed to scoot in seated position/supervision
minimum assist (75% patients effort)

## 2017-04-23 NOTE — PHYSICAL THERAPY INITIAL EVALUATION ADULT - RANGE OF MOTION EXAMINATION, REHAB EVAL
pt maintaining LLE in sustained hip flexion despite encouragement for extension/bilateral upper extremity ROM was WFL (within functional limits)/Right LE ROM was WFL (within functional limits)
no ROM deficits were identified

## 2017-04-23 NOTE — PROGRESS NOTE ADULT - ASSESSMENT
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 Patient being followed by ID, completed 7 day course of cefazolin, blood cultures have been negative. Patient also has right foot ulceration and eschar.       Problem/Plan - 1:    Problem: Postoperative wound dehiscence:  Left AKA done on 4/14/17 by Dr. Mckeon, Pain is well controlled, completed course of cefazolin, blood cultures were negative, seen by ID, patient is s/p RLE angio 2 days ago, patient is getting daily wound care and daily dressing changes, patient's wound is healing, will continue with current management.      Problem/Plan - 2:   Problem: Fever: Plan:  Urine c/s Enterococcus, completed 7 day course of cefazolin. blood cultures negative. CXR 4/13 - read LLL small pleural effusion vs PNA. Doubt PNA, patient has no cough or SOB, will continue monitor, patient has been seen by ID, continues to be afebrile, will continue with incentive spirometry, has no more fever.        Problem/Plan - 3:  ·  Problem: PVD (peripheral vascular disease).  Plan: As above, s/p AKA, daily wound dressings, RLE Angio done 2 days ago, being Followed by Vascular surgery.    Problem/Plan - 4:  ·  Problem: Type 2 diabetes mellitus with hypoglycemia without coma, with long-term current use of insulin.  Plan: 20 units lantus qHS,  Continue sliding scale coverage, will continue to monitor glucose.     Problem/Plan - 5:  ·  Problem: PAF (paroxysmal atrial fibrillation).  Plan: Rate controlled.  Eliquis on hold, will resume eliquis and Plavix after all procedures done, will discuss with vascular surgery to when to resume.     Problem/Plan - 6:  Problem: CAD (coronary artery disease). Plan: Continue ASA, Metoprolol, Lisinopril, and Lipitor, Plavix on hold.  Will need to discuss with  vascular surgeon when to resume eliquis and plavix.      Problem/Plan - 7: HTN. On amlodipine, metoprolol, and lisinopril. Lisinopril increased from 2.5mg to 5mg, BP has been stable, Will continue to monitor.    Problem/Plan - 8:  ·  Problem: Acute blood loss anemia.  Plan: Asymptomatic. S/P 2 units of PRBC to keep Hg > 10 .  Monitor CBC.  Continue Iron, H&H continues to be stable.     DVT prophylaxis: SCDs    Constipation: resolved with bowel meds.     Patient is going to have PT eval then will further decide if patient need to go acute or subacute rehab. 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 Patient being followed by ID, completed 7 day course of cefazolin, blood cultures have been negative. Patient also has right foot ulceration and eschar.       Problem/Plan - 1:    Problem: Postoperative wound dehiscence:  Left AKA done on 4/14/17 by Dr. Mckeon, Pain is well controlled, completed course of cefazolin, blood cultures were negative, seen by ID, patient is s/p RLE angio 3 days ago, patient is getting daily wound care and daily dressing changes, patient's wound is healing, will continue with current management as per vascular surgery.       Problem/Plan - 2:   Problem: Fever: Plan:  Urine c/s Enterococcus, completed 7 day course of cefazolin. blood cultures negative. CXR 4/13 - read LLL small pleural effusion vs PNA. Doubt PNA, patient has no cough or SOB, will continue monitor, patient has been seen by ID, continues to be afebrile, will continue with incentive spirometry, has no more fever.        Problem/Plan - 3:  ·  Problem: PVD (peripheral vascular disease).  Plan: As above, s/p AKA, daily wound dressings, RLE Angio done 3 days ago, Followed by Vascular surgery.    Problem/Plan - 4:  ·  Problem: Type 2 diabetes mellitus with hypoglycemia without coma, with long-term current use of insulin.  Plan: 20 units lantus qHS,  Continue sliding scale coverage, will continue to monitor glucose.     Problem/Plan - 5:  ·  Problem: PAF (paroxysmal atrial fibrillation).  Plan: Rate controlled, talked with vascular Surgeon on call today, as per him patient can be resumed on aspirin and plavix, patient has been started on that, will monitor.     Problem/Plan - 6:  Problem: CAD (coronary artery disease). Plan: Continue ASA, Metoprolol, Lisinopril, and Lipitor, Patient's Plavix has been resumed, discussed with vascular surgeon.     Problem/Plan - 7: HTN. On amlodipine, metoprolol, and lisinopril. Lisinopril, BP has been stable, Will continue to monitor.    Problem/Plan - 8:  ·  Problem: Acute blood loss anemia.  Plan: Asymptomatic. S/P 2 units of PRBC to keep Hg > 10 .  Monitor CBC.  Continue Iron, H&H continues to be stable.     DVT prophylaxis: SCDs    Constipation: resolved with bowel meds.     Patient is going to have PT eval then will further decide if patient need to go acute or subacute rehab.

## 2017-04-23 NOTE — PROGRESS NOTE ADULT - SUBJECTIVE AND OBJECTIVE BOX
YELITZA LÓPEZ    [id number]    85y      Female    Patient is a 85y old  Female who presents with a chief complaint of     INTERVAL HPI/OVERNIGHT EVENTS:    No overnight events, s/p angio of the right leg, Pain is well controlled, has no chest pain, SOB, nausea, vomiting, constipation.    REVIEW OF SYSTEMS:    CONSTITUTIONAL: No fever, has fatigue  RESPIRATORY: No cough; No shortness of breath  CARDIOVASCULAR: No chest pain, palpitations  GASTROINTESTINAL: No abdominal or epigastric pain. No nausea, vomiting  NEUROLOGICAL: No headaches,  loss of strength.  MISCELLANEOUS: pain in the both lower extremities that is controlled with pain meds.       Vital Signs Last 24 Hrs  T(C): 36.7, Max: 36.8 (04-23 @ 08:09)  T(F): 98, Max: 98.2 (04-23 @ 08:09)  HR: 72 (72 - 87)  BP: 136/61 (125/66 - 175/69)  BP(mean): --  RR: 18 (18 - 18)  SpO2: 98% (94% - 98%)    PHYSICAL EXAM:    GENERAL: Elderly female looking comfortable   HEENT: PERRL, +EOMI  NECK: soft, Supple, No JVD,   CHEST/LUNG: CTA bilaterally; No wheezing  HEART: S1S2+, Regular rate and rhythm; No murmurs.   ABDOMEN: Soft, Nontender, Nondistended; Bowel sounds present  EXTREMITIES:  1+ Peripheral Pulses, Left AKA,   SKIN: Patient has left AKA with dressing in place. Right gangrenous 2nd toe and wound on heel with dressings, no soaking or bleeding.   NEURO: AAOX3, no focal deficits  PSYCH: normal mood        LABS:                        10.9   7.1   )-----------( 288      ( 23 Apr 2017 11:36 )             34.8     04-23    135  |  94<L>  |  10.0  ----------------------------<  235<H>  5.0   |  30.0<H>  |  0.68    Ca    9.3      23 Apr 2017 11:36              I&O's Summary  I & Os for 24h ending 23 Apr 2017 07:00  =============================================  IN: 1700 ml / OUT: 1050 ml / NET: 650 ml    I & Os for current day (as of 23 Apr 2017 21:00)  =============================================  IN: 1575 ml / OUT: 0 ml / NET: 1575 ml      MEDICATIONS  (STANDING):  insulin lispro (HumaLOG) corrective regimen sliding scale  SubCutaneous three times a day before meals  dextrose 50% Injectable 12.5Gram(s) IV Push once  dextrose 50% Injectable 25Gram(s) IV Push once  dextrose 50% Injectable 25Gram(s) IV Push once  tamsulosin 0.4milliGRAM(s) Oral at bedtime  atorvastatin 40milliGRAM(s) Oral at bedtime  amLODIPine   Tablet 10milliGRAM(s) Oral daily  ferrous    sulfate 325milliGRAM(s) Oral daily  pantoprazole    Tablet 40milliGRAM(s) Oral before breakfast  folic acid 1milliGRAM(s) Oral daily  multivitamin 1Tablet(s) Oral daily  zinc sulfate 220milliGRAM(s) Oral daily  ascorbic acid 500milliGRAM(s) Oral daily  metoprolol 50milliGRAM(s) Oral two times a day  docusate sodium 100milliGRAM(s) Oral two times a day  mirtazapine 7.5milliGRAM(s) Oral at bedtime  polyethylene glycol 3350 17Gram(s) Oral daily  gabapentin 200milliGRAM(s) Oral <User Schedule>  gabapentin 300milliGRAM(s) Oral at bedtime  levothyroxine 88MICROGram(s) Oral daily  dibucaine 1% Ointment 1Application(s) Topical three times a day  senna 2Tablet(s) Oral at bedtime  lisinopril 5milliGRAM(s) Oral daily  insulin glargine Injectable (LANTUS) 20Unit(s) SubCutaneous at bedtime  nystatin Powder 1Application(s) Topical two times a day  clopidogrel Tablet 75milliGRAM(s) Oral daily  apixaban 5milliGRAM(s) Oral two times a day    MEDICATIONS  (PRN):  dextrose Gel 1Dose(s) Oral once PRN Blood Glucose LESS THAN 70 milliGRAM(s)/deciliter  glucagon  Injectable 1milliGRAM(s) IntraMuscular once PRN Glucose LESS THAN 70 milligrams/deciliter  bisacodyl 5milliGRAM(s) Oral every 12 hours PRN Constipation  magnesium hydroxide Suspension 30milliLiter(s) Oral daily PRN Constipation  ondansetron Injectable 4milliGRAM(s) IV Push every 8 hours PRN Nausea and/or Vomiting  morphine  - Injectable 4milliGRAM(s) IV Push every 4 hours PRN Severe Pain (7 - 10)  oxyCODONE  5 mG/acetaminophen 325 mG 2Tablet(s) Oral every 4 hours PRN Moderate Pain (4 - 6)  traMADol 25milliGRAM(s) Oral every 4 hours PRN Mild Pain (1 - 3) YELITZA LÓPEZ    [id number]    85y      Female    Patient is a 85y old  Female who presents with a chief complaint of     INTERVAL HPI/OVERNIGHT EVENTS:    No overnight events, s/p angio of the right leg and left leg AKA, Pain is well controlled, has no chest pain, SOB, nausea, vomiting, constipation.    REVIEW OF SYSTEMS:    CONSTITUTIONAL: No fever, has fatigue  RESPIRATORY: No cough; No shortness of breath  CARDIOVASCULAR: No chest pain, palpitations  GASTROINTESTINAL: No abdominal or epigastric pain. No nausea, vomiting  NEUROLOGICAL: No headaches,  loss of strength.  MISCELLANEOUS: pain in the both lower extremities is controlled with pain meds.       Vital Signs Last 24 Hrs  T(C): 36.7, Max: 36.8 (04-23 @ 08:09)  T(F): 98, Max: 98.2 (04-23 @ 08:09)  HR: 72 (72 - 87)  BP: 136/61 (125/66 - 175/69)  RR: 18 (18 - 18)  SpO2: 98% (94% - 98%)    PHYSICAL EXAM:    GENERAL: Elderly female looking comfortable   HEENT: PERRL, +EOMI  NECK: soft, Supple, No JVD,   CHEST/LUNG: CTA bilaterally; No wheezing  HEART: S1S2+, Regular rate and rhythm; No murmurs.   ABDOMEN: Soft, Nontender, Nondistended; Bowel sounds present  EXTREMITIES:  1+ Peripheral Pulses, Left AKA,   SKIN: Patient has left AKA with dressing in place. Right gangrenous 2nd toe and wound on heel with dressings, no soaking or bleeding.   NEURO: AAOX3, no focal deficits  PSYCH: normal mood        LABS:                        10.9   7.1   )-----------( 288      ( 23 Apr 2017 11:36 )             34.8     04-23    135  |  94<L>  |  10.0  ----------------------------<  235<H>  5.0   |  30.0<H>  |  0.68    Ca    9.3      23 Apr 2017 11:36              I&O's Summary  I & Os for 24h ending 23 Apr 2017 07:00  =============================================  IN: 1700 ml / OUT: 1050 ml / NET: 650 ml    I & Os for current day (as of 23 Apr 2017 21:00)  =============================================  IN: 1575 ml / OUT: 0 ml / NET: 1575 ml      MEDICATIONS  (STANDING):  insulin lispro (HumaLOG) corrective regimen sliding scale  SubCutaneous three times a day before meals  dextrose 50% Injectable 12.5Gram(s) IV Push once  dextrose 50% Injectable 25Gram(s) IV Push once  dextrose 50% Injectable 25Gram(s) IV Push once  tamsulosin 0.4milliGRAM(s) Oral at bedtime  atorvastatin 40milliGRAM(s) Oral at bedtime  amLODIPine   Tablet 10milliGRAM(s) Oral daily  ferrous    sulfate 325milliGRAM(s) Oral daily  pantoprazole    Tablet 40milliGRAM(s) Oral before breakfast  folic acid 1milliGRAM(s) Oral daily  multivitamin 1Tablet(s) Oral daily  zinc sulfate 220milliGRAM(s) Oral daily  ascorbic acid 500milliGRAM(s) Oral daily  metoprolol 50milliGRAM(s) Oral two times a day  docusate sodium 100milliGRAM(s) Oral two times a day  mirtazapine 7.5milliGRAM(s) Oral at bedtime  polyethylene glycol 3350 17Gram(s) Oral daily  gabapentin 200milliGRAM(s) Oral <User Schedule>  gabapentin 300milliGRAM(s) Oral at bedtime  levothyroxine 88MICROGram(s) Oral daily  dibucaine 1% Ointment 1Application(s) Topical three times a day  senna 2Tablet(s) Oral at bedtime  lisinopril 5milliGRAM(s) Oral daily  insulin glargine Injectable (LANTUS) 20Unit(s) SubCutaneous at bedtime  nystatin Powder 1Application(s) Topical two times a day  clopidogrel Tablet 75milliGRAM(s) Oral daily  apixaban 5milliGRAM(s) Oral two times a day    MEDICATIONS  (PRN):  dextrose Gel 1Dose(s) Oral once PRN Blood Glucose LESS THAN 70 milliGRAM(s)/deciliter  glucagon  Injectable 1milliGRAM(s) IntraMuscular once PRN Glucose LESS THAN 70 milligrams/deciliter  bisacodyl 5milliGRAM(s) Oral every 12 hours PRN Constipation  magnesium hydroxide Suspension 30milliLiter(s) Oral daily PRN Constipation  ondansetron Injectable 4milliGRAM(s) IV Push every 8 hours PRN Nausea and/or Vomiting  morphine  - Injectable 4milliGRAM(s) IV Push every 4 hours PRN Severe Pain (7 - 10)  oxyCODONE  5 mG/acetaminophen 325 mG 2Tablet(s) Oral every 4 hours PRN Moderate Pain (4 - 6)  traMADol 25milliGRAM(s) Oral every 4 hours PRN Mild Pain (1 - 3) YELITZA LÓPEZ    [id number]    85y      Female    Patient is a 85y old  Female who presents with a chief complaint of  wound infection.     INTERVAL HPI/OVERNIGHT EVENTS:    No overnight events, s/p angio of the right leg and left leg AKA, Pain is well controlled, has no chest pain, SOB, nausea, vomiting, constipation.    REVIEW OF SYSTEMS:    CONSTITUTIONAL: No fever, has fatigue  RESPIRATORY: No cough; No shortness of breath  CARDIOVASCULAR: No chest pain, palpitations  GASTROINTESTINAL: No abdominal or epigastric pain. No nausea, vomiting  NEUROLOGICAL: No headaches,  loss of strength.  MISCELLANEOUS: pain in the both lower extremities is controlled with pain meds.       Vital Signs Last 24 Hrs  T(C): 36.7, Max: 36.8 (04-23 @ 08:09)  T(F): 98, Max: 98.2 (04-23 @ 08:09)  HR: 72 (72 - 87)  BP: 136/61 (125/66 - 175/69)  RR: 18 (18 - 18)  SpO2: 98% (94% - 98%)    PHYSICAL EXAM:    GENERAL: Elderly female looking comfortable   HEENT: PERRL, +EOMI  NECK: soft, Supple, No JVD,   CHEST/LUNG: CTA bilaterally; No wheezing  HEART: S1S2+, Regular rate and rhythm; No murmurs.   ABDOMEN: Soft, Nontender, Nondistended; Bowel sounds present  EXTREMITIES:  1+ Peripheral Pulses, Left AKA,   SKIN: Patient has left AKA with dressing in place. Right gangrenous 2nd toe and wound on heel with dressings, no soaking or bleeding.   NEURO: AAOX3, no focal deficits  PSYCH: normal mood        LABS:                        10.9   7.1   )-----------( 288      ( 23 Apr 2017 11:36 )             34.8     04-23    135  |  94<L>  |  10.0  ----------------------------<  235<H>  5.0   |  30.0<H>  |  0.68    Ca    9.3      23 Apr 2017 11:36              I&O's Summary  I & Os for 24h ending 23 Apr 2017 07:00  =============================================  IN: 1700 ml / OUT: 1050 ml / NET: 650 ml    I & Os for current day (as of 23 Apr 2017 21:00)  =============================================  IN: 1575 ml / OUT: 0 ml / NET: 1575 ml      MEDICATIONS  (STANDING):  insulin lispro (HumaLOG) corrective regimen sliding scale  SubCutaneous three times a day before meals  dextrose 50% Injectable 12.5Gram(s) IV Push once  dextrose 50% Injectable 25Gram(s) IV Push once  dextrose 50% Injectable 25Gram(s) IV Push once  tamsulosin 0.4milliGRAM(s) Oral at bedtime  atorvastatin 40milliGRAM(s) Oral at bedtime  amLODIPine   Tablet 10milliGRAM(s) Oral daily  ferrous    sulfate 325milliGRAM(s) Oral daily  pantoprazole    Tablet 40milliGRAM(s) Oral before breakfast  folic acid 1milliGRAM(s) Oral daily  multivitamin 1Tablet(s) Oral daily  zinc sulfate 220milliGRAM(s) Oral daily  ascorbic acid 500milliGRAM(s) Oral daily  metoprolol 50milliGRAM(s) Oral two times a day  docusate sodium 100milliGRAM(s) Oral two times a day  mirtazapine 7.5milliGRAM(s) Oral at bedtime  polyethylene glycol 3350 17Gram(s) Oral daily  gabapentin 200milliGRAM(s) Oral <User Schedule>  gabapentin 300milliGRAM(s) Oral at bedtime  levothyroxine 88MICROGram(s) Oral daily  dibucaine 1% Ointment 1Application(s) Topical three times a day  senna 2Tablet(s) Oral at bedtime  lisinopril 5milliGRAM(s) Oral daily  insulin glargine Injectable (LANTUS) 20Unit(s) SubCutaneous at bedtime  nystatin Powder 1Application(s) Topical two times a day  clopidogrel Tablet 75milliGRAM(s) Oral daily  apixaban 5milliGRAM(s) Oral two times a day    MEDICATIONS  (PRN):  dextrose Gel 1Dose(s) Oral once PRN Blood Glucose LESS THAN 70 milliGRAM(s)/deciliter  glucagon  Injectable 1milliGRAM(s) IntraMuscular once PRN Glucose LESS THAN 70 milligrams/deciliter  bisacodyl 5milliGRAM(s) Oral every 12 hours PRN Constipation  magnesium hydroxide Suspension 30milliLiter(s) Oral daily PRN Constipation  ondansetron Injectable 4milliGRAM(s) IV Push every 8 hours PRN Nausea and/or Vomiting  morphine  - Injectable 4milliGRAM(s) IV Push every 4 hours PRN Severe Pain (7 - 10)  oxyCODONE  5 mG/acetaminophen 325 mG 2Tablet(s) Oral every 4 hours PRN Moderate Pain (4 - 6)  traMADol 25milliGRAM(s) Oral every 4 hours PRN Mild Pain (1 - 3)

## 2017-04-23 NOTE — PROGRESS NOTE ADULT - SUBJECTIVE AND OBJECTIVE BOX
Subjective:    Pt doing well, no new changes.      AVSS    Gen NAD  Abd soft  Extr Left lower extremity stump c/d/i, right foot stable

## 2017-04-23 NOTE — CHART NOTE - NSCHARTNOTEFT_GEN_A_CORE
Interm Summary from Admission to 04/23/17 Dr Hamilton.     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     For the wound infection patient was given cefazolin as well, seen and followed by ID, during the initial course patient spikes fever, blood and urine cultures done, Urine c/s showed Enterococcus, blood cultures negative. CXR 4/13 - read LLL small pleural effusion vs PNA. Doubt PNA, patient had no cough or SOB, patient was given incentive spirometry, she has completed 7 day course of cefazolin, she has been afebrile.    Patient has Hx of PAF paroxysmal atrial fibrillation: patient rate was monitored and remained stable, initially we held her Eliquis, after having all those procedure and discussion with vascular Surgeon her  Eliquis and Plavix has been resumed, patient is on Plavix for CAD.   Patient was also found to have Acute blood loss anemia after the procedures, she got 2 units of PRBCs, her Hb monitored and remained stable, patient was continued on Iron.    Patient is getting PT eval to see if she is candidate for acute or subacute rehab,  is working on the case.

## 2017-04-23 NOTE — PHYSICAL THERAPY INITIAL EVALUATION ADULT - CRITERIA FOR SKILLED THERAPEUTIC INTERVENTIONS
anticipated discharge recommendation/impairments found/predicted duration of therapy intervention/therapy frequency/rehab potential/Acute rehab/functional limitations in following categories
anticipated discharge recommendation/impairments found/rehab potential/Acute rehab vs [facility]

## 2017-04-23 NOTE — PHYSICAL THERAPY INITIAL EVALUATION ADULT - ADDITIONAL COMMENTS
Pt returned from acute rehab.
As per previous PT evaluation dated 3/9317: pt was living at an assistive living facility, used a RW, was becoming more and more unsteady prior to arrival. no stairs to negotiate at home

## 2017-04-23 NOTE — PHYSICAL THERAPY INITIAL EVALUATION ADULT - GENERAL OBSERVATIONS, REHAB EVAL
Pt received supine in bed, + PRAFO RLE, + IV, pt agreeable to PT.
Pt received supine in bed, + PRAFO RLE, + IV, no c/o pain, pt agreeable to PT

## 2017-04-23 NOTE — PHYSICAL THERAPY INITIAL EVALUATION ADULT - PERTINENT HX OF CURRENT PROBLEM, REHAB EVAL
s/p left BKA 3/14/17 2*2 PVD, Hospital course complicated by urinary retention with UTI (failed TOV), hyponatremia likely due to pain and improved with NaCl (per nephrology) and pain medications. Patient stable for discharge to acute rehab. While in rehab, patient found to have postoperative L LE wound dehiscence and complaints of R foot pain, evaluated by vascular and now s/p Left AKA and RLE angiogram 4/14/17. S/P RLE angiogram with R SFA angioplasty 4/20/17.

## 2017-04-24 DIAGNOSIS — R52 PAIN, UNSPECIFIED: ICD-10-CM

## 2017-04-24 LAB
ANION GAP SERPL CALC-SCNC: 11 MMOL/L — SIGNIFICANT CHANGE UP (ref 5–17)
BUN SERPL-MCNC: 14 MG/DL — SIGNIFICANT CHANGE UP (ref 8–20)
CALCIUM SERPL-MCNC: 8.8 MG/DL — SIGNIFICANT CHANGE UP (ref 8.6–10.2)
CHLORIDE SERPL-SCNC: 98 MMOL/L — SIGNIFICANT CHANGE UP (ref 98–107)
CO2 SERPL-SCNC: 29 MMOL/L — SIGNIFICANT CHANGE UP (ref 22–29)
CREAT SERPL-MCNC: 0.76 MG/DL — SIGNIFICANT CHANGE UP (ref 0.5–1.3)
GLUCOSE SERPL-MCNC: 135 MG/DL — HIGH (ref 70–115)
HCT VFR BLD CALC: 32.6 % — LOW (ref 37–47)
HGB BLD-MCNC: 10.1 G/DL — LOW (ref 12–16)
MCHC RBC-ENTMCNC: 26.9 PG — LOW (ref 27–31)
MCHC RBC-ENTMCNC: 31 G/DL — LOW (ref 32–36)
MCV RBC AUTO: 86.9 FL — SIGNIFICANT CHANGE UP (ref 81–99)
PLATELET # BLD AUTO: 246 K/UL — SIGNIFICANT CHANGE UP (ref 150–400)
POTASSIUM SERPL-MCNC: 4.2 MMOL/L — SIGNIFICANT CHANGE UP (ref 3.5–5.3)
POTASSIUM SERPL-SCNC: 4.2 MMOL/L — SIGNIFICANT CHANGE UP (ref 3.5–5.3)
RBC # BLD: 3.75 M/UL — LOW (ref 4.4–5.2)
RBC # FLD: 15.3 % — SIGNIFICANT CHANGE UP (ref 11–15.6)
SODIUM SERPL-SCNC: 138 MMOL/L — SIGNIFICANT CHANGE UP (ref 135–145)
WBC # BLD: 6.3 K/UL — SIGNIFICANT CHANGE UP (ref 4.8–10.8)
WBC # FLD AUTO: 6.3 K/UL — SIGNIFICANT CHANGE UP (ref 4.8–10.8)

## 2017-04-24 PROCEDURE — 99233 SBSQ HOSP IP/OBS HIGH 50: CPT

## 2017-04-24 RX ORDER — CEFAZOLIN SODIUM 1 G
1000 VIAL (EA) INJECTION EVERY 8 HOURS
Qty: 0 | Refills: 0 | Status: DISCONTINUED | OUTPATIENT
Start: 2017-04-24 | End: 2017-04-27

## 2017-04-24 RX ORDER — BACITRACIN ZINC 500 UNIT/G
1 OINTMENT IN PACKET (EA) TOPICAL DAILY
Qty: 0 | Refills: 0 | Status: DISCONTINUED | OUTPATIENT
Start: 2017-04-24 | End: 2017-04-27

## 2017-04-24 RX ADMIN — APIXABAN 5 MILLIGRAM(S): 2.5 TABLET, FILM COATED ORAL at 06:14

## 2017-04-24 RX ADMIN — GABAPENTIN 300 MILLIGRAM(S): 400 CAPSULE ORAL at 22:04

## 2017-04-24 RX ADMIN — NYSTATIN CREAM 1 APPLICATION(S): 100000 CREAM TOPICAL at 17:02

## 2017-04-24 RX ADMIN — INSULIN GLARGINE 20 UNIT(S): 100 INJECTION, SOLUTION SUBCUTANEOUS at 00:41

## 2017-04-24 RX ADMIN — ZINC SULFATE TAB 220 MG (50 MG ZINC EQUIVALENT) 220 MILLIGRAM(S): 220 (50 ZN) TAB at 11:01

## 2017-04-24 RX ADMIN — ATORVASTATIN CALCIUM 40 MILLIGRAM(S): 80 TABLET, FILM COATED ORAL at 00:34

## 2017-04-24 RX ADMIN — APIXABAN 5 MILLIGRAM(S): 2.5 TABLET, FILM COATED ORAL at 17:01

## 2017-04-24 RX ADMIN — Medication 50 MILLIGRAM(S): at 06:14

## 2017-04-24 RX ADMIN — POLYETHYLENE GLYCOL 3350 17 GRAM(S): 17 POWDER, FOR SOLUTION ORAL at 11:06

## 2017-04-24 RX ADMIN — SENNA PLUS 2 TABLET(S): 8.6 TABLET ORAL at 00:44

## 2017-04-24 RX ADMIN — LISINOPRIL 5 MILLIGRAM(S): 2.5 TABLET ORAL at 06:14

## 2017-04-24 RX ADMIN — Medication 325 MILLIGRAM(S): at 11:01

## 2017-04-24 RX ADMIN — TAMSULOSIN HYDROCHLORIDE 0.4 MILLIGRAM(S): 0.4 CAPSULE ORAL at 22:05

## 2017-04-24 RX ADMIN — Medication 3: at 12:02

## 2017-04-24 RX ADMIN — Medication 50 MILLIGRAM(S): at 17:01

## 2017-04-24 RX ADMIN — Medication 1 APPLICATION(S): at 11:43

## 2017-04-24 RX ADMIN — MIRTAZAPINE 7.5 MILLIGRAM(S): 45 TABLET, ORALLY DISINTEGRATING ORAL at 00:34

## 2017-04-24 RX ADMIN — AMLODIPINE BESYLATE 10 MILLIGRAM(S): 2.5 TABLET ORAL at 06:14

## 2017-04-24 RX ADMIN — TRAMADOL HYDROCHLORIDE 25 MILLIGRAM(S): 50 TABLET ORAL at 21:45

## 2017-04-24 RX ADMIN — Medication 1 TABLET(S): at 11:01

## 2017-04-24 RX ADMIN — PANTOPRAZOLE SODIUM 40 MILLIGRAM(S): 20 TABLET, DELAYED RELEASE ORAL at 06:14

## 2017-04-24 RX ADMIN — TRAMADOL HYDROCHLORIDE 25 MILLIGRAM(S): 50 TABLET ORAL at 21:40

## 2017-04-24 RX ADMIN — GABAPENTIN 300 MILLIGRAM(S): 400 CAPSULE ORAL at 00:34

## 2017-04-24 RX ADMIN — TAMSULOSIN HYDROCHLORIDE 0.4 MILLIGRAM(S): 0.4 CAPSULE ORAL at 00:35

## 2017-04-24 RX ADMIN — CLOPIDOGREL BISULFATE 75 MILLIGRAM(S): 75 TABLET, FILM COATED ORAL at 11:01

## 2017-04-24 RX ADMIN — GABAPENTIN 200 MILLIGRAM(S): 400 CAPSULE ORAL at 11:01

## 2017-04-24 RX ADMIN — Medication 100 MILLIGRAM(S): at 06:14

## 2017-04-24 RX ADMIN — ATORVASTATIN CALCIUM 40 MILLIGRAM(S): 80 TABLET, FILM COATED ORAL at 22:04

## 2017-04-24 RX ADMIN — MIRTAZAPINE 7.5 MILLIGRAM(S): 45 TABLET, ORALLY DISINTEGRATING ORAL at 22:04

## 2017-04-24 RX ADMIN — Medication 1: at 17:01

## 2017-04-24 RX ADMIN — Medication 88 MICROGRAM(S): at 06:14

## 2017-04-24 RX ADMIN — INSULIN GLARGINE 20 UNIT(S): 100 INJECTION, SOLUTION SUBCUTANEOUS at 22:04

## 2017-04-24 RX ADMIN — Medication 500 MILLIGRAM(S): at 11:01

## 2017-04-24 RX ADMIN — SENNA PLUS 2 TABLET(S): 8.6 TABLET ORAL at 22:05

## 2017-04-24 RX ADMIN — GABAPENTIN 200 MILLIGRAM(S): 400 CAPSULE ORAL at 06:14

## 2017-04-24 RX ADMIN — Medication 100 MILLIGRAM(S): at 17:01

## 2017-04-24 RX ADMIN — NYSTATIN CREAM 1 APPLICATION(S): 100000 CREAM TOPICAL at 06:15

## 2017-04-24 RX ADMIN — Medication 100 MILLIGRAM(S): at 22:20

## 2017-04-24 RX ADMIN — Medication 1 MILLIGRAM(S): at 11:01

## 2017-04-24 NOTE — PROGRESS NOTE ADULT - SUBJECTIVE AND OBJECTIVE BOX
Internal Medicine Hospitalist - Dr. Amparo LÓPEZ    [id number]    85y      Female    Patient is a 85y old  Female who presents with a chief complaint of   HPI:  INTERVAL HPI/ OVERNIGHT EVENTS: Patient is seen and examined, no new event overnight.     REVIEW OF SYSTEMS:    Denied fever, chills, abd. pain, nausea, vomiting, chest pain, SOB, headache, dizziness    PHYSICAL EXAM:    Vital Signs Last 24 Hrs  T(C): 37.1, Max: 37.1 (04-24 @ 15:43)  T(F): 98.7, Max: 98.7 (04-24 @ 15:43)  HR: 84 (72 - 84)  BP: 137/68 (136/61 - 147/66)  BP(mean): --  RR: 18 (18 - 18)  SpO2: 95% (95% - 98%)    GENERAL: NAD  HEENT: EOMI  Neck: supple  CHEST/LUNG: positive air entry   HEART: S1S2+ audible  ABDOMEN: Soft, Nontender, Nondistended; Bowel sounds present  EXTREMITIES:  dressing over RLE, left AKA  CNS: AAO X 3  Psychiatry: normal mood    LABS:                        10.1   6.3   )-----------( 246      ( 24 Apr 2017 05:30 )             32.6     04-24    138  |  98  |  14.0  ----------------------------<  135<H>  4.2   |  29.0  |  0.76    Ca    8.8      24 Apr 2017 05:30      MEDICATIONS  (STANDING):  insulin lispro (HumaLOG) corrective regimen sliding scale  SubCutaneous three times a day before meals  dextrose 50% Injectable 12.5Gram(s) IV Push once  dextrose 50% Injectable 25Gram(s) IV Push once  dextrose 50% Injectable 25Gram(s) IV Push once  tamsulosin 0.4milliGRAM(s) Oral at bedtime  atorvastatin 40milliGRAM(s) Oral at bedtime  amLODIPine   Tablet 10milliGRAM(s) Oral daily  ferrous    sulfate 325milliGRAM(s) Oral daily  pantoprazole    Tablet 40milliGRAM(s) Oral before breakfast  folic acid 1milliGRAM(s) Oral daily  multivitamin 1Tablet(s) Oral daily  zinc sulfate 220milliGRAM(s) Oral daily  ascorbic acid 500milliGRAM(s) Oral daily  metoprolol 50milliGRAM(s) Oral two times a day  docusate sodium 100milliGRAM(s) Oral two times a day  mirtazapine 7.5milliGRAM(s) Oral at bedtime  polyethylene glycol 3350 17Gram(s) Oral daily  gabapentin 200milliGRAM(s) Oral <User Schedule>  gabapentin 300milliGRAM(s) Oral at bedtime  levothyroxine 88MICROGram(s) Oral daily  dibucaine 1% Ointment 1Application(s) Topical three times a day  senna 2Tablet(s) Oral at bedtime  lisinopril 5milliGRAM(s) Oral daily  insulin glargine Injectable (LANTUS) 20Unit(s) SubCutaneous at bedtime  nystatin Powder 1Application(s) Topical two times a day  clopidogrel Tablet 75milliGRAM(s) Oral daily  apixaban 5milliGRAM(s) Oral two times a day    MEDICATIONS  (PRN):  dextrose Gel 1Dose(s) Oral once PRN Blood Glucose LESS THAN 70 milliGRAM(s)/deciliter  glucagon  Injectable 1milliGRAM(s) IntraMuscular once PRN Glucose LESS THAN 70 milligrams/deciliter  bisacodyl 5milliGRAM(s) Oral every 12 hours PRN Constipation  magnesium hydroxide Suspension 30milliLiter(s) Oral daily PRN Constipation  ondansetron Injectable 4milliGRAM(s) IV Push every 8 hours PRN Nausea and/or Vomiting  morphine  - Injectable 4milliGRAM(s) IV Push every 4 hours PRN Severe Pain (7 - 10)  oxyCODONE  5 mG/acetaminophen 325 mG 2Tablet(s) Oral every 4 hours PRN Moderate Pain (4 - 6)  traMADol 25milliGRAM(s) Oral every 4 hours PRN Mild Pain (1 - 3)      RADIOLOGY & ADDITIONAL TEST

## 2017-04-24 NOTE — PROGRESS NOTE ADULT - SUBJECTIVE AND OBJECTIVE BOX
s/p L AKA and RLE revascularization. Pt reports improved pain.     Vital Signs Last 24 Hrs  T(C): 37.1, Max: 37.1 (04-24 @ 15:43)  T(F): 98.7, Max: 98.7 (04-24 @ 15:43)  HR: 84 (72 - 84)  BP: 137/68 (136/61 - 142/68)  BP(mean): --  RR: 18 (18 - 18)  SpO2: 95% (95% - 98%)                          10.1   6.3   )-----------( 246      ( 24 Apr 2017 05:30 )             32.6   04-24    138  |  98  |  14.0  ----------------------------<  135<H>  4.2   |  29.0  |  0.76    Ca    8.8      24 Apr 2017 05:30    PE:  L AKA C/D/I  RLE perfused Doppler signal audible, 2nd toe anterior distal eschar with signs of bleeding, Stage II heel decubitus ulcer.

## 2017-04-24 NOTE — PROGRESS NOTE ADULT - PROBLEM SELECTOR PLAN 2
Pain appears neuropathic in origin.  Advised may also take Tramadol PRN for pain. Increase am dose of Neurontin to 300mg in am, continue 200 mg pm and 300 mg in evening Pain appears neuropathic in origin.  Advised may also take Tramadol PRN for pain. Increase  Neurontin to 300mg TID.

## 2017-04-24 NOTE — PROGRESS NOTE ADULT - ASSESSMENT
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 Patient being followed by ID, completed 7 day course of cefazolin, blood cultures have been negative. Patient also has right foot ulceration and eschar.  Pt was seen by PT will need acute vs [facility]     Problem/Plan - 1:    Problem: Postoperative wound dehiscence:  Left AKA done on 4/14/17 by Dr. Mckeon, Pain is well controlled, completed course of cefazolin, blood cultures were negative, seen by ID, patient is s/p RLE angio 3 days ago, patient is getting daily wound care and daily dressing changes, patient's wound is healing, will continue with current management as per vascular surgery.     PT eval, need [facility] vs acute rehab     Problem/Plan - 2:   Urine c/s Enterococcus UTI, completed 7 day course of cefazolin. blood cultures negative. CXR 4/13 - read LLL small pleural effusion vs PNA. Doubt PNA, patient has no cough or SOB, will continue monitor, patient has been seen by ID, continues to be afebrile, will continue with incentive spirometry, has no more fever.        Problem/Plan - 3:  ·  Problem: PVD (peripheral vascular disease).  Plan: As above, s/p AKA, daily wound dressings, RLE Angio done 3 days ago, Followed by Vascular surgery.    Problem/Plan - 4:  ·  Problem: Type 2 diabetes mellitus with hypoglycemia without coma, with long-term current use of insulin.  Plan: 20 units lantus qHS,  Continue sliding scale coverage, will continue to monitor glucose.     Problem/Plan - 5:  ·  Problem: PAF (paroxysmal atrial fibrillation).  Plan: Rate controlled, talked with vascular Surgeon on call today, as per him patient can be resumed on aspirin and plavix, patient has been started on that, will monitor.     Problem/Plan - 6:  Problem: CAD (coronary artery disease). Plan: Continue ASA, Metoprolol, Lisinopril, and Lipitor, Patient's Plavix has been resumed, discussed with vascular surgeon.     Problem/Plan - 7: HTN. On amlodipine, metoprolol, and lisinopril. Lisinopril, BP has been stable, Will continue to monitor.    Problem/Plan - 8:  ·  Problem: Acute blood loss anemia.  Plan: Asymptomatic. S/P 2 units of PRBC to keep Hg > 10 .  Monitor CBC.  Continue Iron, H&H continues to be stable.     DVT prophylaxis: SCDs

## 2017-04-24 NOTE — PROGRESS NOTE ADULT - SUBJECTIVE AND OBJECTIVE BOX
INTERVAL HPI/OVERNIGHT EVENTS:    PRESENT SYMPTOMS: SOURCE:  Patient/Family/Team  no overnight events    PAIN SCALE:  0 = none  1 = mild   2 = moderate  3 = severe    Pain: 1-2  right leg/foot, described as shooting, comes and goes    Dyspnea:  [ ] YES [ x] NO  Anxiety:  [ ] YES [x ] NO  Fatigue: [x ] YES [ ] NO  Nausea: [ ] YES [ x] NO  Loss of Appetite: [x ] YES [ ] NO  Other symptoms:  constipation improved    MEDICATIONS  (STANDING):  insulin lispro (HumaLOG) corrective regimen sliding scale  SubCutaneous three times a day before meals  dextrose 50% Injectable 12.5Gram(s) IV Push once  dextrose 50% Injectable 25Gram(s) IV Push once  dextrose 50% Injectable 25Gram(s) IV Push once  tamsulosin 0.4milliGRAM(s) Oral at bedtime  atorvastatin 40milliGRAM(s) Oral at bedtime  amLODIPine   Tablet 10milliGRAM(s) Oral daily  ferrous    sulfate 325milliGRAM(s) Oral daily  pantoprazole    Tablet 40milliGRAM(s) Oral before breakfast  folic acid 1milliGRAM(s) Oral daily  multivitamin 1Tablet(s) Oral daily  zinc sulfate 220milliGRAM(s) Oral daily  ascorbic acid 500milliGRAM(s) Oral daily  metoprolol 50milliGRAM(s) Oral two times a day  docusate sodium 100milliGRAM(s) Oral two times a day  mirtazapine 7.5milliGRAM(s) Oral at bedtime  polyethylene glycol 3350 17Gram(s) Oral daily  gabapentin 200milliGRAM(s) Oral <User Schedule>  gabapentin 300milliGRAM(s) Oral at bedtime  levothyroxine 88MICROGram(s) Oral daily  dibucaine 1% Ointment 1Application(s) Topical three times a day  senna 2Tablet(s) Oral at bedtime  lisinopril 5milliGRAM(s) Oral daily  insulin glargine Injectable (LANTUS) 20Unit(s) SubCutaneous at bedtime  nystatin Powder 1Application(s) Topical two times a day  clopidogrel Tablet 75milliGRAM(s) Oral daily  apixaban 5milliGRAM(s) Oral two times a day    MEDICATIONS  (PRN):  dextrose Gel 1Dose(s) Oral once PRN Blood Glucose LESS THAN 70 milliGRAM(s)/deciliter  glucagon  Injectable 1milliGRAM(s) IntraMuscular once PRN Glucose LESS THAN 70 milligrams/deciliter  bisacodyl 5milliGRAM(s) Oral every 12 hours PRN Constipation  magnesium hydroxide Suspension 30milliLiter(s) Oral daily PRN Constipation  ondansetron Injectable 4milliGRAM(s) IV Push every 8 hours PRN Nausea and/or Vomiting  morphine  - Injectable 4milliGRAM(s) IV Push every 4 hours PRN Severe Pain (7 - 10)  oxyCODONE  5 mG/acetaminophen 325 mG 2Tablet(s) Oral every 4 hours PRN Moderate Pain (4 - 6)  traMADol 25milliGRAM(s) Oral every 4 hours PRN Mild Pain (1 - 3)      Allergies    Benadryl (Other)  Demerol HCl (Other)    Intolerances      Karnofsky Performance Score/Palliative Performance Status Version 2: 40        %    Vital Signs Last 24 Hrs  T(C): 37.1, Max: 37.1 (04-24 @ 15:43)  T(F): 98.7, Max: 98.7 (04-24 @ 15:43)  HR: 84 (72 - 84)  BP: 137/68 (136/61 - 147/66)  BP(mean): --  RR: 18 (18 - 18)  SpO2: 95% (95% - 98%)    PHYSICAL EXAM:    General: [ x] alert  [ ] oriented x ____ [ ] lethargic [ ] agitated                  [ ] cachexia  [ ] nonverbal  [ ] coma    HEENT: [x] normal  [ ] dry mouth  [ ] ET tube/trach    Lungs: [ x] comfortable [ ] tachypnea/labored breathing  [ ] excessive secretions    CV: [ x] normal  [ ] tachycardia    GI: [x ] normal  [ ] distended  [ ] tender  [ ] no BS               [ ] PEG/NG/OG tube    : [ x] normal  [ ] incontinent  [ ] oliguria/anuria  [ ] juarez    MSK: [ ] normal  [ x] general weakness  [ ] edema             [ ] ambulatory  [ ] bedbound/wheelchair bound    Skin: [ ] normal  [ ] pressure ulcers- Stage_____  [ x] no rash  dressings to left stump and right leg c/d/i    LABS:                        10.1   6.3   )-----------( 246      ( 24 Apr 2017 05:30 )             32.6     04-24    138  |  98  |  14.0  ----------------------------<  135<H>  4.2   |  29.0  |  0.76    Ca    8.8      24 Apr 2017 05:30          I&O's Summary  I & Os for 24h ending 24 Apr 2017 07:00  =============================================  IN: 1575 ml / OUT: 0 ml / NET: 1575 ml    I & Os for current day (as of 24 Apr 2017 16:49)  =============================================  IN: 820 ml / OUT: 0 ml / NET: 820 ml      Thank you for the opportunity to assist with the care of this patient.   [facility] Consult Service [phone]. INTERVAL HPI/OVERNIGHT EVENTS:    PRESENT SYMPTOMS: SOURCE:  Patient/Family/Team  no overnight events    PAIN SCALE:  0 = none  1 = mild   2 = moderate  3 = severe    Pain: 1-2  right leg/foot, described as shooting, comes and goes throughout the day, seems happening more since angiogram    Dyspnea:  [ ] YES [ x] NO  Anxiety:  [ ] YES [x ] NO  Fatigue: [x ] YES [ ] NO  Nausea: [ ] YES [ x] NO  Loss of Appetite: [x ] YES [ ] NO  Other symptoms:  constipation improved    MEDICATIONS  (STANDING):  insulin lispro (HumaLOG) corrective regimen sliding scale  SubCutaneous three times a day before meals  dextrose 50% Injectable 12.5Gram(s) IV Push once  dextrose 50% Injectable 25Gram(s) IV Push once  dextrose 50% Injectable 25Gram(s) IV Push once  tamsulosin 0.4milliGRAM(s) Oral at bedtime  atorvastatin 40milliGRAM(s) Oral at bedtime  amLODIPine   Tablet 10milliGRAM(s) Oral daily  ferrous    sulfate 325milliGRAM(s) Oral daily  pantoprazole    Tablet 40milliGRAM(s) Oral before breakfast  folic acid 1milliGRAM(s) Oral daily  multivitamin 1Tablet(s) Oral daily  zinc sulfate 220milliGRAM(s) Oral daily  ascorbic acid 500milliGRAM(s) Oral daily  metoprolol 50milliGRAM(s) Oral two times a day  docusate sodium 100milliGRAM(s) Oral two times a day  mirtazapine 7.5milliGRAM(s) Oral at bedtime  polyethylene glycol 3350 17Gram(s) Oral daily  gabapentin 200milliGRAM(s) Oral <User Schedule>  gabapentin 300milliGRAM(s) Oral at bedtime  levothyroxine 88MICROGram(s) Oral daily  dibucaine 1% Ointment 1Application(s) Topical three times a day  senna 2Tablet(s) Oral at bedtime  lisinopril 5milliGRAM(s) Oral daily  insulin glargine Injectable (LANTUS) 20Unit(s) SubCutaneous at bedtime  nystatin Powder 1Application(s) Topical two times a day  clopidogrel Tablet 75milliGRAM(s) Oral daily  apixaban 5milliGRAM(s) Oral two times a day    MEDICATIONS  (PRN):  dextrose Gel 1Dose(s) Oral once PRN Blood Glucose LESS THAN 70 milliGRAM(s)/deciliter  glucagon  Injectable 1milliGRAM(s) IntraMuscular once PRN Glucose LESS THAN 70 milligrams/deciliter  bisacodyl 5milliGRAM(s) Oral every 12 hours PRN Constipation  magnesium hydroxide Suspension 30milliLiter(s) Oral daily PRN Constipation  ondansetron Injectable 4milliGRAM(s) IV Push every 8 hours PRN Nausea and/or Vomiting  morphine  - Injectable 4milliGRAM(s) IV Push every 4 hours PRN Severe Pain (7 - 10)  oxyCODONE  5 mG/acetaminophen 325 mG 2Tablet(s) Oral every 4 hours PRN Moderate Pain (4 - 6)  traMADol 25milliGRAM(s) Oral every 4 hours PRN Mild Pain (1 - 3)      Allergies    Benadryl (Other)  Demerol HCl (Other)    Intolerances      Karnofsky Performance Score/Palliative Performance Status Version 2: 40        %    Vital Signs Last 24 Hrs  T(C): 37.1, Max: 37.1 (04-24 @ 15:43)  T(F): 98.7, Max: 98.7 (04-24 @ 15:43)  HR: 84 (72 - 84)  BP: 137/68 (136/61 - 147/66)  BP(mean): --  RR: 18 (18 - 18)  SpO2: 95% (95% - 98%)    PHYSICAL EXAM:    General: [ x] alert  [ ] oriented x ____ [ ] lethargic [ ] agitated                  [ ] cachexia  [ ] nonverbal  [ ] coma    HEENT: [x] normal  [ ] dry mouth  [ ] ET tube/trach    Lungs: [ x] comfortable [ ] tachypnea/labored breathing  [ ] excessive secretions    CV: [ x] normal  [ ] tachycardia    GI: [x ] normal  [ ] distended  [ ] tender  [ ] no BS               [ ] PEG/NG/OG tube    : [ x] normal  [ ] incontinent  [ ] oliguria/anuria  [ ] juarez    MSK: [ ] normal  [ x] general weakness  [ ] edema             [ ] ambulatory  [ ] bedbound/wheelchair bound    Skin: [ ] normal  [ ] pressure ulcers- Stage_____  [ x] no rash  dressings to left stump and right leg c/d/i    LABS:                        10.1   6.3   )-----------( 246      ( 24 Apr 2017 05:30 )             32.6     04-24    138  |  98  |  14.0  ----------------------------<  135<H>  4.2   |  29.0  |  0.76    Ca    8.8      24 Apr 2017 05:30          I&O's Summary  I & Os for 24h ending 24 Apr 2017 07:00  =============================================  IN: 1575 ml / OUT: 0 ml / NET: 1575 ml    I & Os for current day (as of 24 Apr 2017 16:49)  =============================================  IN: 820 ml / OUT: 0 ml / NET: 820 ml      Thank you for the opportunity to assist with the care of this patient.   [facility] [phone].

## 2017-04-25 ENCOUNTER — TRANSCRIPTION ENCOUNTER (OUTPATIENT)
Age: 82
End: 2017-04-25

## 2017-04-25 PROCEDURE — 99233 SBSQ HOSP IP/OBS HIGH 50: CPT

## 2017-04-25 PROCEDURE — 99232 SBSQ HOSP IP/OBS MODERATE 35: CPT | Mod: GC

## 2017-04-25 RX ORDER — FERROUS FUMARATE 350(115)MG
1 TABLET ORAL
Qty: 30 | Refills: 0 | OUTPATIENT
Start: 2017-04-25 | End: 2017-05-25

## 2017-04-25 RX ORDER — CLOPIDOGREL BISULFATE 75 MG/1
1 TABLET, FILM COATED ORAL
Qty: 0 | Refills: 0 | COMMUNITY
Start: 2017-04-25

## 2017-04-25 RX ORDER — APIXABAN 2.5 MG/1
1 TABLET, FILM COATED ORAL
Qty: 0 | Refills: 0 | COMMUNITY
Start: 2017-04-25

## 2017-04-25 RX ORDER — LISINOPRIL 2.5 MG/1
1 TABLET ORAL
Qty: 0 | Refills: 0 | COMMUNITY
Start: 2017-04-25

## 2017-04-25 RX ORDER — BACITRACIN ZINC 500 UNIT/G
1 OINTMENT IN PACKET (EA) TOPICAL
Qty: 0 | Refills: 0 | COMMUNITY
Start: 2017-04-25

## 2017-04-25 RX ORDER — MAGNESIUM HYDROXIDE 400 MG/1
30 TABLET, CHEWABLE ORAL
Qty: 0 | Refills: 0 | COMMUNITY
Start: 2017-04-25

## 2017-04-25 RX ORDER — GABAPENTIN 400 MG/1
1 CAPSULE ORAL
Qty: 90 | Refills: 0 | OUTPATIENT
Start: 2017-04-25 | End: 2017-05-25

## 2017-04-25 RX ORDER — OXYCODONE HYDROCHLORIDE 5 MG/1
2 TABLET ORAL
Qty: 0 | Refills: 0 | COMMUNITY
Start: 2017-04-25

## 2017-04-25 RX ORDER — GABAPENTIN 400 MG/1
300 CAPSULE ORAL EVERY 8 HOURS
Qty: 0 | Refills: 0 | Status: DISCONTINUED | OUTPATIENT
Start: 2017-04-25 | End: 2017-04-27

## 2017-04-25 RX ADMIN — MIRTAZAPINE 7.5 MILLIGRAM(S): 45 TABLET, ORALLY DISINTEGRATING ORAL at 21:30

## 2017-04-25 RX ADMIN — TRAMADOL HYDROCHLORIDE 25 MILLIGRAM(S): 50 TABLET ORAL at 21:28

## 2017-04-25 RX ADMIN — Medication 50 MILLIGRAM(S): at 07:18

## 2017-04-25 RX ADMIN — Medication 1 TABLET(S): at 12:20

## 2017-04-25 RX ADMIN — Medication 100 MILLIGRAM(S): at 21:30

## 2017-04-25 RX ADMIN — APIXABAN 5 MILLIGRAM(S): 2.5 TABLET, FILM COATED ORAL at 17:45

## 2017-04-25 RX ADMIN — NYSTATIN CREAM 1 APPLICATION(S): 100000 CREAM TOPICAL at 17:45

## 2017-04-25 RX ADMIN — Medication 500 MILLIGRAM(S): at 12:20

## 2017-04-25 RX ADMIN — Medication 1 MILLIGRAM(S): at 12:20

## 2017-04-25 RX ADMIN — GABAPENTIN 200 MILLIGRAM(S): 400 CAPSULE ORAL at 07:17

## 2017-04-25 RX ADMIN — Medication 50 MILLIGRAM(S): at 17:44

## 2017-04-25 RX ADMIN — Medication 100 MILLIGRAM(S): at 17:45

## 2017-04-25 RX ADMIN — POLYETHYLENE GLYCOL 3350 17 GRAM(S): 17 POWDER, FOR SOLUTION ORAL at 12:20

## 2017-04-25 RX ADMIN — SENNA PLUS 2 TABLET(S): 8.6 TABLET ORAL at 21:31

## 2017-04-25 RX ADMIN — INSULIN GLARGINE 20 UNIT(S): 100 INJECTION, SOLUTION SUBCUTANEOUS at 21:53

## 2017-04-25 RX ADMIN — PANTOPRAZOLE SODIUM 40 MILLIGRAM(S): 20 TABLET, DELAYED RELEASE ORAL at 08:11

## 2017-04-25 RX ADMIN — CLOPIDOGREL BISULFATE 75 MILLIGRAM(S): 75 TABLET, FILM COATED ORAL at 12:20

## 2017-04-25 RX ADMIN — LISINOPRIL 5 MILLIGRAM(S): 2.5 TABLET ORAL at 07:18

## 2017-04-25 RX ADMIN — Medication 325 MILLIGRAM(S): at 12:20

## 2017-04-25 RX ADMIN — ZINC SULFATE TAB 220 MG (50 MG ZINC EQUIVALENT) 220 MILLIGRAM(S): 220 (50 ZN) TAB at 12:20

## 2017-04-25 RX ADMIN — APIXABAN 5 MILLIGRAM(S): 2.5 TABLET, FILM COATED ORAL at 07:16

## 2017-04-25 RX ADMIN — Medication 100 MILLIGRAM(S): at 07:32

## 2017-04-25 RX ADMIN — ATORVASTATIN CALCIUM 40 MILLIGRAM(S): 80 TABLET, FILM COATED ORAL at 21:30

## 2017-04-25 RX ADMIN — NYSTATIN CREAM 1 APPLICATION(S): 100000 CREAM TOPICAL at 07:58

## 2017-04-25 RX ADMIN — Medication 100 MILLIGRAM(S): at 07:17

## 2017-04-25 RX ADMIN — Medication 100 MILLIGRAM(S): at 13:56

## 2017-04-25 RX ADMIN — Medication 2: at 17:45

## 2017-04-25 RX ADMIN — TAMSULOSIN HYDROCHLORIDE 0.4 MILLIGRAM(S): 0.4 CAPSULE ORAL at 21:31

## 2017-04-25 RX ADMIN — GABAPENTIN 300 MILLIGRAM(S): 400 CAPSULE ORAL at 21:30

## 2017-04-25 RX ADMIN — Medication 88 MICROGRAM(S): at 07:17

## 2017-04-25 RX ADMIN — AMLODIPINE BESYLATE 10 MILLIGRAM(S): 2.5 TABLET ORAL at 07:16

## 2017-04-25 RX ADMIN — GABAPENTIN 300 MILLIGRAM(S): 400 CAPSULE ORAL at 13:56

## 2017-04-25 RX ADMIN — Medication 1 APPLICATION(S): at 12:20

## 2017-04-25 NOTE — PROGRESS NOTE ADULT - ATTENDING COMMENTS
Agree with above.  Continue PT/OT, medical management  Acute inpatient rehab
Please call PRN. Will sign off.

## 2017-04-25 NOTE — PROGRESS NOTE ADULT - CARDIOVASCULAR
Regular rate & rhythm, normal S1, S2; no murmurs, gallops or rubs; no S3, S4
Regular rate & rhythm, normal S1, S2; no murmurs, gallops or rubs; no S3, S4

## 2017-04-25 NOTE — DISCHARGE NOTE ADULT - MEDICATION SUMMARY - MEDICATIONS TO STOP TAKING
I will STOP taking the medications listed below when I get home from the hospital:    oxyCODONE 10 mg oral tablet, extended release  -- 1 tab(s) by mouth every 12 hours

## 2017-04-25 NOTE — PROGRESS NOTE ADULT - ADDITIONAL PE
R 2nd toe: ant skin necrosis noted with demarcation 1.5 cm below nail, no active bleeding, right foot warm with palp DP pulse

## 2017-04-25 NOTE — DISCHARGE NOTE ADULT - CARE PLAN
Principal Discharge DX:	PVD (peripheral vascular disease)  Goal:	s/p Left  AKA, RLE angio  Instructions for follow-up, activity and diet:	local wound care as per surgery, cont. cefazolin through 05/04   f/u ID , vascular surgery  cont. eliquis, statin  Secondary Diagnosis:	Diabetes  Instructions for follow-up, activity and diet:	cont. lantus plus sliding scale  Secondary Diagnosis:	Hyperlipidemia  Instructions for follow-up, activity and diet:	cont. statin  Secondary Diagnosis:	Hypertension  Secondary Diagnosis:	Atrial fibrillation  Instructions for follow-up, activity and diet:	cont. eliquis  Secondary Diagnosis:	CAD (coronary artery disease)  Instructions for follow-up, activity and diet:	cont, plavox, statin, B blocker  Secondary Diagnosis:	UTI (urinary tract infection)  Goal:	completed the course of Abx Principal Discharge DX:	PVD (peripheral vascular disease)  Goal:	s/p Left  AKA, RLE angio  Instructions for follow-up, activity and diet:	local wound care as per surgery, cont. cefazolin through 05/04   f/u ID , vascular surgery  cont. eliquis, statin  Secondary Diagnosis:	Diabetes  Instructions for follow-up, activity and diet:	cont. lantus plus sliding scale  Secondary Diagnosis:	Hyperlipidemia  Instructions for follow-up, activity and diet:	cont. statin  Secondary Diagnosis:	Hypertension  Secondary Diagnosis:	Atrial fibrillation  Instructions for follow-up, activity and diet:	cont. eliquism metoprolol  Secondary Diagnosis:	CAD (coronary artery disease)  Instructions for follow-up, activity and diet:	cont, plavix, statin, B blocker  Secondary Diagnosis:	UTI (urinary tract infection)  Goal:	completed the course of Abx

## 2017-04-25 NOTE — PROGRESS NOTE ADULT - PROBLEM SELECTOR PLAN 1
cont PT , awaiting acute rehab insurance approval , plan  per Dr Irwin cont PT , awaiting acute rehab insurance approval , plan  per Dr Mckeon

## 2017-04-25 NOTE — DISCHARGE NOTE ADULT - ADDITIONAL INSTRUCTIONS
F/U Vascular, surgery, PCP  local wound care as per surgery   cont. Cefazolin through 05/04 F/U Vascular, surgery, PCP  local wound care as per surgery   cont. Cefazolin through 05/04  f/u cbc, BMP

## 2017-04-25 NOTE — DISCHARGE NOTE ADULT - PLAN OF CARE
s/p Left  AKA, RLE angio local wound care as per surgery, cont. cefazolin through 05/04   f/u ID , vascular surgery  cont. eliquis, statin cont. lantus plus sliding scale cont. statin cont. eliquis cont, plavox, statin, B blocker completed the course of Absunday cont. eliquism metoprolol cont, plavix, statin, B blocker

## 2017-04-25 NOTE — DISCHARGE NOTE ADULT - PROVIDER TOKENS
TOKEN:'[id number]',TOKEN:'[id number]',FREE:[LAST:[PCP - Dr. Ahn],PHONE:[(   )    -],FAX:[(   )    -]] TOKEN:'[id number]:'[id number]',FREE:[LAST:[PCP - Dr. Ahn],PHONE:[(   )    -],FAX:[(   )    -]],TOKEN:'[id number]'

## 2017-04-25 NOTE — DISCHARGE NOTE ADULT - PATIENT PORTAL LINK FT
“You can access the [employer] Patient Portal, offered by [facility], by registering with the following website: [email or web address]”

## 2017-04-25 NOTE — DISCHARGE NOTE ADULT - MEDICATION SUMMARY - MEDICATIONS TO TAKE
I will START or STAY ON the medications listed below when I get home from the hospital:    traMADol 50 mg oral tablet  -- 1 tab(s) by mouth every 6 hours, As needed, Severe Pain (7 - 10)  -- Indication: For Pain control    acetaminophen-oxycodone 325 mg-5 mg oral tablet  -- 2 tab(s) by mouth every 4 hours, As needed, Moderate Pain (4 - 6)  -- Indication: For Pain control    lisinopril 5 mg oral tablet  -- 1 tab(s) by mouth once a day  -- Indication: For Htn    magnesium hydroxide 8% oral suspension  -- 30 milliliter(s) by mouth once a day, As needed, Constipation  -- Indication: For Home medication    aluminum hydroxide-magnesium hydroxide 200 mg-200 mg/5 mL oral suspension  -- 30 milliliter(s) by mouth every 4 hours, As needed, Dyspepsia  -- Indication: For Home medication    tamsulosin 0.4 mg oral capsule  -- 1 cap(s) by mouth once a day (at bedtime)  -- Indication: For Home medication    apixaban 5 mg oral tablet  -- 1 tab(s) by mouth 2 times a day  -- Indication: For Atrial fibrillation    gabapentin 300 mg oral capsule  -- 1 cap(s) by mouth every 8 hours  -- Indication: For Pain    mirtazapine 7.5 mg oral tablet  -- 1 tab(s) by mouth once a day (at bedtime)  -- Indication: For Home medication    insulin lispro 100 units/mL subcutaneous solution  --  subcutaneous 3 times a day (before meals); 2 Unit(s) if Glucose 151 - 200  4 Unit(s) if Glucose 201 - 250  6 Unit(s) if Glucose 251 - 300  8 Unit(s) if Glucose 301 - 350  10 Unit(s) if Glucose 351 - 400  12 Unit(s) if Glucose GREATER THAN 400  -- Indication: For Dm    insulin glargine  -- 20 unit(s) subcutaneous once a day (at bedtime)  -- Indication: For Dm    ondansetron 4 mg oral tablet, disintegrating  -- 1 tab(s) by mouth every 8 hours, As needed, Nausea and/or Vomiting  -- Indication: For Nausea and vomiting, intractability of vomiting not specified, unspecified vomiting type    atorvastatin 40 mg oral tablet  -- 1 tab(s) by mouth once a day (at bedtime)  -- Indication: For PVD (peripheral vascular disease)    clopidogrel 75 mg oral tablet  -- 1 tab(s) by mouth once a day  -- Indication: For CAD (coronary artery disease)    ALPRAZolam 0.25 mg oral tablet  -- 1 tab(s) by mouth 2 times a day, As needed, anxiety  -- Indication: For Anxiety    metoprolol tartrate 50 mg oral tablet  -- 1 tab(s) by mouth 2 times a day  -- Indication: For Htn    amLODIPine 10 mg oral tablet  -- 1 tab(s) by mouth once a day  -- Indication: For Htn    ceFAZolin  -- 1000 milligram(s) intravenous every 8 hours  last dose 05/04  -- Indication: For PVD (peripheral vascular disease)    nystatin 100,000 units/g topical powder  -- 1 application on skin 2 times a day  -- Indication: For Wound care    bacitracin 500 units/g topical ointment  -- 1 application on skin once a day  -- Indication: For Wound care    lidocaine 5% topical film  --  on skin   -- Indication: For Wound care    dibucaine 1% topical ointment  -- 1 application on skin 3 times a day  -- Indication: For Wound care    Ferretts Iron 325 mg (106 mg elemental iron) oral tablet  -- 1 tab(s) by mouth once a day  -- May discolor urine or feces.    -- Indication: For Anemia    polyethylene glycol 3350 oral powder for reconstitution  -- 17 gram(s) by mouth once a day  -- Indication: For Constipation, unspecified constipation type    docusate sodium 100 mg oral capsule  -- 1 cap(s) by mouth 2 times a day  -- Indication: For Constipation, unspecified constipation type    bisacodyl 5 mg oral delayed release tablet  -- 1 tab(s) by mouth every 12 hours, As needed, Constipation  -- Indication: For Constipation, unspecified constipation type    senna oral tablet  -- 2 tab(s) by mouth once a day (at bedtime), As needed, Constipation  -- Indication: For Constipation, unspecified constipation type    zinc sulfate 220 mg oral capsule  -- 1 cap(s) by mouth once a day  -- Indication: For Wound care    pantoprazole 40 mg oral delayed release tablet  -- 1 tab(s) by mouth once a day (before a meal)  -- Indication: For Prophylaxis     levothyroxine 88 mcg (0.088 mg) oral tablet  -- 1 tab(s) by mouth once a day  -- Indication: For Hypothroidism    Multiple Vitamins oral tablet  -- 1 tab(s) by mouth once a day  -- Indication: For supplement     ascorbic acid 500 mg oral tablet  -- 1 tab(s) by mouth once a day  -- Indication: For supplement    folic acid 1 mg oral tablet  -- 1 tab(s) by mouth once a day  -- Indication: For supplement

## 2017-04-25 NOTE — DISCHARGE NOTE ADULT - HOSPITAL COURSE
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 Patient being followed by ID, completed 7 day course of cefazolin, blood cultures have been negative. Patient also has right foot ulceration and eschar.     Postoperative wound dehiscence:  Left AKA done on 4/14/17 by Dr. Mckeon, Pain is well controlled, received cefazolin for 7 days, restarted cefazolin for 10 more days as per surgery  blood cultures were negative, seen by ID, patient is s/p RLE angio, patient is getting daily wound care and daily dressing changes as per surgery, patient's wound is healing, will continue with current management as per vascular surgery.   Pt was restarted on IV cefazolin by surgery for 10 more days  through 05/04  PT and rehab eval appreciated, suggest acute rehab - CC working on auth     Urine c/s Enterococcus UTI, completed 7 day course of cefazolin. blood cultures negative.     PVD (peripheral vascular disease).  Plan: As above, s/p AKA, daily wound dressings, RLE Angio, Followed by Vascular surgery.  restarted on cefazolin for 10 more days as per vascular surgery     Type 2 diabetes mellitus with hypoglycemia without coma, with long-term current use of insulin.  Plan: 20 units lantus qHS,  Continue sliding scale coverage, will continue to monitor glucose.     PAF (paroxysmal atrial fibrillation).  Plan: Rate controlled, talked with vascular Surgeon by Dr Hamilton, as per him patient can be resumed on eliquis     CAD (coronary artery disease). Plan: Continue ASA, Metoprolol, Lisinopril, and Lipitor, Patient's Plavix has been resumed, discussed with vascular surgeon.     HTN. On amlodipine, metoprolol, and lisinopril. Lisinopril, BP has been stable    Acute blood loss anemia.  Plan: Asymptomatic. S/P 2 units of PRBC to keep Hg > 10 .  Monitor CBC.  Continue Iron, H&H continues to be stable. 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 Patient being followed by ID, completed 7 day course of cefazolin on 04/18, blood cultures have been negative. Patient also has right foot ulceration and eschar, being followed by surgery     Postoperative wound dehiscence:  Left AKA done on 4/14/17 by Dr. Mckeon, Pain is well controlled, received cefazolin for 7 days, restarted cefazolin for 10 more days as per surgery through 05/04  blood cultures were negative, seen by ID, patient is s/p RLE angio,  right foot ulceration and eschar, patient is getting daily wound care and daily dressing changes as per surgery, patient's wound is healing, will continue with current management as per vascular surgery.   PT and rehab eval appreciated, suggest acute rehab     Urine c/s Enterococcus UTI, completed 7 day course of cefazolin. blood cultures negative.     PVD (peripheral vascular disease).  Plan: As above, s/p AKA, daily wound dressings, RLE Angio, Followed by Vascular surgery.  restarted on cefazolin for 10 more days as per vascular surgery through 05/04    Type 2 diabetes mellitus with hypoglycemia without coma, with long-term current use of insulin.  Plan: 20 units lantus qHS,  Continue sliding scale coverage, will continue to monitor glucose.     PAF (paroxysmal atrial fibrillation).  Plan: Rate controlled, ok with  vascular Surgeon, restarted  on eliquis     CAD (coronary artery disease). Plan: Continue ASA, Metoprolol, Lisinopril, and Lipitor, Patient's Plavix has been resumed, discussed with vascular surgeon, ok with that     HTN. On amlodipine, metoprolol, and lisinopril. Lisinopril, BP has been stable    Acute blood loss anemia.  Plan: Asymptomatic. S/P 2 units of PRBC to keep Hg > 10 .  Monitor CBC.  Continue Iron, H&H continues to be stable.    ICU Vital Signs Last 24 Hrs  T(C): 36.6, Max: 36.8 (04-26 @ 00:03)  T(F): 97.9, Max: 98.2 (04-26 @ 00:03)  HR: 71 (71 - 86)  BP: 124/52 (118/61 - 150/60)  BP(mean): --  ABP: --  ABP(mean): --  RR: 16 (16 - 20)  SpO2: 71% (71% - 98%)    PHYSICAL EXAM:    GENERAL: NAD,  CHEST/LUNG: positive air entry   HEART: s1/s2 audible   ABDOMEN: Soft, Nontender, Nondistended; Bowel sounds present  EXTREMITIES: s/p L AKA, dressing over right foot      Time spent 40 minutes This 85 yr old female with PMH CAD s/p stents x 2, DM2, hypertension, hyperlipidemia, anxiety, significant PVD s/p multiple stents, paroxysmal atrial fibrillation on Eliquis admitted with 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 Patient being followed by ID, completed 7 day course of cefazolin on 04/18, blood cultures have been negative. Patient also has right foot ulceration and eschar, being followed by surgery     Postoperative wound dehiscence:  Left AKA done on 4/14/17 by Dr. Mckeon, Pain is well controlled, received cefazolin for 7 days, restarted cefazolin for 10 more days as per surgery through 05/04  blood cultures were negative, seen by ID, patient is s/p RLE angio,  right foot ulceration and eschar, patient is getting daily wound care and daily dressing changes as per surgery, patient's wound is healing, will continue with current management as per vascular surgery.   PT and rehab eval appreciated, suggest acute rehab     Urine c/s Enterococcus UTI, completed 7 day course of cefazolin. blood cultures negative.     PVD (peripheral vascular disease).  Plan: As above, s/p AKA, daily wound dressings, RLE Angio, Followed by Vascular surgery.  restarted on cefazolin for 10 more days as per vascular surgery through 05/04    Type 2 diabetes mellitus with hypoglycemia without coma, with long-term current use of insulin.  Plan: 20 units lantus qHS,  Continue sliding scale coverage, will continue to monitor glucose.     PAF (paroxysmal atrial fibrillation).  Plan: Rate controlled, ok with  vascular Surgeon, restarted  on eliquis     CAD (coronary artery disease). Plan: Continue ASA, Metoprolol, Lisinopril, and Lipitor, Patient's Plavix has been resumed, discussed with vascular surgeon, ok with that     HTN. On amlodipine, metoprolol, and lisinopril. Lisinopril, BP has been stable    Acute blood loss anemia.  Plan: Asymptomatic. S/P 2 units of PRBC to keep Hg > 10 .  Monitor CBC.  Continue Iron, H&H continues to be stable.      Time spent 40 minutes

## 2017-04-25 NOTE — DISCHARGE NOTE ADULT - SECONDARY DIAGNOSIS.
Diabetes Hyperlipidemia Hypertension Atrial fibrillation CAD (coronary artery disease) UTI (urinary tract infection)

## 2017-04-25 NOTE — PROGRESS NOTE ADULT - SUBJECTIVE AND OBJECTIVE BOX
Internal Medicine Hospitalist - Dr. Amparo LÓPEZ    [id number]    85y      Female    Patient is a 85y old  Female who presents with a chief complaint of   HPI:        INTERVAL HPI/ OVERNIGHT EVENTS: Patient is seen and examined, report didn't have BM for last few days, report mild to moderate pain in LE     REVIEW OF SYSTEMS:    Denied fever, chills, abd. pain, nausea, vomiting, chest pain, SOB, headache, dizziness    PHYSICAL EXAM:    Vital Signs Last 24 Hrs  T(C): 36.7, Max: 37.1 (04-24 @ 15:43)  T(F): 98, Max: 98.7 (04-24 @ 15:43)  HR: 86 (84 - 92)  BP: 121/68 (121/68 - 164/69)  BP(mean): --  RR: 20 (18 - 20)  SpO2: 98% (95% - 98%)    GENERAL: NAD  HEENT: EOMI  Neck: supple  CHEST/LUNG: positive air entry   HEART: S1S2+ audible  ABDOMEN: Soft, Nontender, Nondistended; Bowel sounds present  EXTREMITIES: s/p L AKA, Right foot dressing intact   CNS: AAO X 3  Psychiatry: normal mood    LABS:                        10.1   6.3   )-----------( 246      ( 24 Apr 2017 05:30 )             32.6     04-24    138  |  98  |  14.0  ----------------------------<  135<H>  4.2   |  29.0  |  0.76    Ca    8.8      24 Apr 2017 05:30              MEDICATIONS  (STANDING):  insulin lispro (HumaLOG) corrective regimen sliding scale  SubCutaneous three times a day before meals  dextrose 50% Injectable 12.5Gram(s) IV Push once  dextrose 50% Injectable 25Gram(s) IV Push once  dextrose 50% Injectable 25Gram(s) IV Push once  tamsulosin 0.4milliGRAM(s) Oral at bedtime  atorvastatin 40milliGRAM(s) Oral at bedtime  amLODIPine   Tablet 10milliGRAM(s) Oral daily  ferrous    sulfate 325milliGRAM(s) Oral daily  pantoprazole    Tablet 40milliGRAM(s) Oral before breakfast  folic acid 1milliGRAM(s) Oral daily  multivitamin 1Tablet(s) Oral daily  zinc sulfate 220milliGRAM(s) Oral daily  ascorbic acid 500milliGRAM(s) Oral daily  metoprolol 50milliGRAM(s) Oral two times a day  docusate sodium 100milliGRAM(s) Oral two times a day  mirtazapine 7.5milliGRAM(s) Oral at bedtime  polyethylene glycol 3350 17Gram(s) Oral daily  levothyroxine 88MICROGram(s) Oral daily  dibucaine 1% Ointment 1Application(s) Topical three times a day  senna 2Tablet(s) Oral at bedtime  lisinopril 5milliGRAM(s) Oral daily  insulin glargine Injectable (LANTUS) 20Unit(s) SubCutaneous at bedtime  nystatin Powder 1Application(s) Topical two times a day  clopidogrel Tablet 75milliGRAM(s) Oral daily  apixaban 5milliGRAM(s) Oral two times a day  BACItracin   Ointment 1Application(s) Topical daily  ceFAZolin   IVPB 1000milliGRAM(s) IV Intermittent every 8 hours  gabapentin 300milliGRAM(s) Oral every 8 hours    MEDICATIONS  (PRN):  dextrose Gel 1Dose(s) Oral once PRN Blood Glucose LESS THAN 70 milliGRAM(s)/deciliter  glucagon  Injectable 1milliGRAM(s) IntraMuscular once PRN Glucose LESS THAN 70 milligrams/deciliter  bisacodyl 5milliGRAM(s) Oral every 12 hours PRN Constipation  magnesium hydroxide Suspension 30milliLiter(s) Oral daily PRN Constipation  ondansetron Injectable 4milliGRAM(s) IV Push every 8 hours PRN Nausea and/or Vomiting  morphine  - Injectable 4milliGRAM(s) IV Push every 4 hours PRN Severe Pain (7 - 10)  oxyCODONE  5 mG/acetaminophen 325 mG 2Tablet(s) Oral every 4 hours PRN Moderate Pain (4 - 6)  traMADol 25milliGRAM(s) Oral every 4 hours PRN Mild Pain (1 - 3)      RADIOLOGY & ADDITIONAL TEST

## 2017-04-25 NOTE — PROGRESS NOTE ADULT - GASTROINTESTINAL
Soft, non-tender, no hepatosplenomegaly, normal bowel sounds
Soft, non-tender, no hepatosplenomegaly, normal bowel sounds

## 2017-04-25 NOTE — PROGRESS NOTE ADULT - SUBJECTIVE AND OBJECTIVE BOX
SUBJECTIVE        CURRENT FUNCTIONAL STATUS PT Eval 4/24  Min A bed mobility  Mod A transfers      REVIEW OF SYSTEMS  Constitutional - No fever, No weight loss, No fatigue  HEENT - No eye pain, No visual disturbances, No difficulty hearing, No tinnitus, No vertigo, No neck pain  Neurological - No headaches, No memory loss, No loss of strength, No numbness, No tremors  Skin - No itching, No rashes, No lesions   Musculoskeletal - No joint pain, No joint swelling, No muscle pain  Psychiatric - No depression, No anxiety    RECENT LABS/IMAGING  CBC Full  -  ( 24 Apr 2017 05:30 )  WBC Count : 6.3 K/uL  Hemoglobin : 10.1 g/dL  Hematocrit : 32.6 %  Platelet Count - Automated : 246 K/uL  Mean Cell Volume : 86.9 fl  Mean Cell Hemoglobin : 26.9 pg  Mean Cell Hemoglobin Concentration : 31.0 g/dL    04-24    138  |  98  |  14.0  ----------------------------<  135<H>  4.2   |  29.0  |  0.76    Ca    8.8      24 Apr 2017 05:30          VITALS  T(C): 36.7, Max: 37.1 (04-24 @ 15:43)  HR: 86 (84 - 92)  BP: 121/68 (121/68 - 164/69)  RR: 20 (18 - 20)  SpO2: 98% (95% - 98%)  Wt(kg): --    MEDICATIONS   insulin lispro (HumaLOG) corrective regimen sliding scale  three times a day before meals  dextrose Gel 1Dose(s) once PRN  dextrose 50% Injectable 12.5Gram(s) once  dextrose 50% Injectable 25Gram(s) once  dextrose 50% Injectable 25Gram(s) once  glucagon  Injectable 1milliGRAM(s) once PRN  tamsulosin 0.4milliGRAM(s) at bedtime  atorvastatin 40milliGRAM(s) at bedtime  amLODIPine   Tablet 10milliGRAM(s) daily  ferrous    sulfate 325milliGRAM(s) daily  pantoprazole    Tablet 40milliGRAM(s) before breakfast  folic acid 1milliGRAM(s) daily  multivitamin 1Tablet(s) daily  zinc sulfate 220milliGRAM(s) daily  ascorbic acid 500milliGRAM(s) daily  metoprolol 50milliGRAM(s) two times a day  docusate sodium 100milliGRAM(s) two times a day  mirtazapine 7.5milliGRAM(s) at bedtime  bisacodyl 5milliGRAM(s) every 12 hours PRN  polyethylene glycol 3350 17Gram(s) daily  gabapentin 200milliGRAM(s) <User Schedule>  gabapentin 300milliGRAM(s) at bedtime  levothyroxine 88MICROGram(s) daily  dibucaine 1% Ointment 1Application(s) three times a day  magnesium hydroxide Suspension 30milliLiter(s) daily PRN  ondansetron Injectable 4milliGRAM(s) every 8 hours PRN  senna 2Tablet(s) at bedtime  lisinopril 5milliGRAM(s) daily  insulin glargine Injectable (LANTUS) 20Unit(s) at bedtime  nystatin Powder 1Application(s) two times a day  morphine  - Injectable 4milliGRAM(s) every 4 hours PRN  oxyCODONE  5 mG/acetaminophen 325 mG 2Tablet(s) every 4 hours PRN  traMADol 25milliGRAM(s) every 4 hours PRN  clopidogrel Tablet 75milliGRAM(s) daily  apixaban 5milliGRAM(s) two times a day  BACItracin   Ointment 1Application(s) daily  ceFAZolin   IVPB 1000milliGRAM(s) every 8 hours      -------------------------------------------------------------------- SUBJECTIVE  Patient seen and examined. States pain better controlled on current regimen, although she does get occasional sharp pain in the right great toe. Also reports constipation with no BM x 4 days. +passing flatus. Otherwise patient offers no complaints and looking forward to beginning rehab again.      CURRENT FUNCTIONAL STATUS PT Eval 4/24  Min A bed mobility  Mod A transfers    OT Eval 4/25  Mod A bathing  Max A LBD      REVIEW OF SYSTEMS  Constitutional - No fever, No weight loss, No fatigue  HEENT - No eye pain, No visual disturbances, No difficulty hearing, No tinnitus, No vertigo, No neck pain  Neurological - No headaches, No memory loss, No loss of strength, No numbness, No tremors, +phantom pain LLE  GI- +constipation  Skin - No itching, No rashes, +ulcer right foot   Musculoskeletal - No joint pain, No joint swelling, No muscle pain  Psychiatric - No depression, No anxiety    RECENT LABS/IMAGING  CBC Full  -  ( 24 Apr 2017 05:30 )  WBC Count : 6.3 K/uL  Hemoglobin : 10.1 g/dL  Hematocrit : 32.6 %  Platelet Count - Automated : 246 K/uL  Mean Cell Volume : 86.9 fl  Mean Cell Hemoglobin : 26.9 pg  Mean Cell Hemoglobin Concentration : 31.0 g/dL    04-24    138  |  98  |  14.0  ----------------------------<  135<H>  4.2   |  29.0  |  0.76    Ca    8.8      24 Apr 2017 05:30          VITALS  T(C): 36.7, Max: 37.1 (04-24 @ 15:43)  HR: 86 (84 - 92)  BP: 121/68 (121/68 - 164/69)  RR: 20 (18 - 20)  SpO2: 98% (95% - 98%)  Wt(kg): --    MEDICATIONS   insulin lispro (HumaLOG) corrective regimen sliding scale  three times a day before meals  dextrose Gel 1Dose(s) once PRN  dextrose 50% Injectable 12.5Gram(s) once  dextrose 50% Injectable 25Gram(s) once  dextrose 50% Injectable 25Gram(s) once  glucagon  Injectable 1milliGRAM(s) once PRN  tamsulosin 0.4milliGRAM(s) at bedtime  atorvastatin 40milliGRAM(s) at bedtime  amLODIPine   Tablet 10milliGRAM(s) daily  ferrous    sulfate 325milliGRAM(s) daily  pantoprazole    Tablet 40milliGRAM(s) before breakfast  folic acid 1milliGRAM(s) daily  multivitamin 1Tablet(s) daily  zinc sulfate 220milliGRAM(s) daily  ascorbic acid 500milliGRAM(s) daily  metoprolol 50milliGRAM(s) two times a day  docusate sodium 100milliGRAM(s) two times a day  mirtazapine 7.5milliGRAM(s) at bedtime  bisacodyl 5milliGRAM(s) every 12 hours PRN  polyethylene glycol 3350 17Gram(s) daily  gabapentin 200milliGRAM(s) <User Schedule>  gabapentin 300milliGRAM(s) at bedtime  levothyroxine 88MICROGram(s) daily  dibucaine 1% Ointment 1Application(s) three times a day  magnesium hydroxide Suspension 30milliLiter(s) daily PRN  ondansetron Injectable 4milliGRAM(s) every 8 hours PRN  senna 2Tablet(s) at bedtime  lisinopril 5milliGRAM(s) daily  insulin glargine Injectable (LANTUS) 20Unit(s) at bedtime  nystatin Powder 1Application(s) two times a day  morphine  - Injectable 4milliGRAM(s) every 4 hours PRN  oxyCODONE  5 mG/acetaminophen 325 mG 2Tablet(s) every 4 hours PRN  traMADol 25milliGRAM(s) every 4 hours PRN  clopidogrel Tablet 75milliGRAM(s) daily  apixaban 5milliGRAM(s) two times a day  BACItracin   Ointment 1Application(s) daily  ceFAZolin   IVPB 1000milliGRAM(s) every 8 hours    ----------------------------------------------------------------------------------------  PHYSICAL EXAM  Constitutional - NAD, Comfortable  HEENT - NCAT, EOMI  Neck - Supple, No limited ROM  Chest - CTA bilaterally, No wheeze, No rhonchi, No crackles  Cardiovascular - RRR, S1S2, No murmurs  Abdomen - BS+, Soft, NT +mildly distended  Extremities - No C/C/E, No calf tenderness , left AKA, RLE in bandage and Prafo  Neurologic Exam -                    Cognitive - Awake, Alert, AAO to self, place, date, year, situation     Motor -                     LEFT    UE - ShAB 5/5, EF 5/5, EE 5/5, WE 5/5,  5/5                    RIGHT UE - ShAB 5/5, EF 5/5, EE 5/5, WE 5/5,  5/5                    LEFT    LE - HF 4+/5                    RIGHT LE - HF 4+/5, KE 5/5, unable to test DF/PF secondary to Prafo in place        Sensory - Intact to LT     Reflexes - DTR Intact  Psychiatric - Mood stable, Affect WNL  ----------------------------------------------------------------------------------------  ASSESSMENT/PLAN    84 yo female s/p LLE AKA and RLE revascularization now with functional deficits.    Pain- Percocet prn, Tramadol prn  Precautions- Fall    - Recommend ACUTE inpatient rehabilitation for the functional deficits consisting of 3 hours of therapy/day & 24 hour RN/daily PMR physician for comorbid medical management. Will continue to follow for ongoing rehab needs and recommendations. SUBJECTIVE  Patient seen and examined. States pain better controlled on current regimen, although she does get occasional sharp pain in the right great toe. Also reports constipation with no BM x 4 days. +passing flatus. Otherwise patient offers no complaints and looking forward to beginning rehab again.      CURRENT FUNCTIONAL STATUS PT Eval 4/24  Min A bed mobility  Mod A transfers    OT Eval 4/25  Mod A toileting and bathing  Max A LE dressing  Min A UE dressing      REVIEW OF SYSTEMS  Constitutional - No fever, No weight loss, No fatigue  HEENT - No eye pain, No visual disturbances, No difficulty hearing, No tinnitus, No vertigo, No neck pain  Neurological - No headaches, No memory loss, No loss of strength, No numbness, No tremors, +phantom pain LLE  GI- +constipation  Skin - No itching, No rashes, +ulcer right foot   Musculoskeletal - No joint pain, No joint swelling, No muscle pain  Psychiatric - No depression, No anxiety    RECENT LABS/IMAGING  CBC Full  -  ( 24 Apr 2017 05:30 )  WBC Count : 6.3 K/uL  Hemoglobin : 10.1 g/dL  Hematocrit : 32.6 %  Platelet Count - Automated : 246 K/uL  Mean Cell Volume : 86.9 fl  Mean Cell Hemoglobin : 26.9 pg  Mean Cell Hemoglobin Concentration : 31.0 g/dL    04-24    138  |  98  |  14.0  ----------------------------<  135<H>  4.2   |  29.0  |  0.76    Ca    8.8      24 Apr 2017 05:30          VITALS  T(C): 36.7, Max: 37.1 (04-24 @ 15:43)  HR: 86 (84 - 92)  BP: 121/68 (121/68 - 164/69)  RR: 20 (18 - 20)  SpO2: 98% (95% - 98%)  Wt(kg): --    MEDICATIONS   insulin lispro (HumaLOG) corrective regimen sliding scale  three times a day before meals  dextrose Gel 1Dose(s) once PRN  dextrose 50% Injectable 12.5Gram(s) once  dextrose 50% Injectable 25Gram(s) once  dextrose 50% Injectable 25Gram(s) once  glucagon  Injectable 1milliGRAM(s) once PRN  tamsulosin 0.4milliGRAM(s) at bedtime  atorvastatin 40milliGRAM(s) at bedtime  amLODIPine   Tablet 10milliGRAM(s) daily  ferrous    sulfate 325milliGRAM(s) daily  pantoprazole    Tablet 40milliGRAM(s) before breakfast  folic acid 1milliGRAM(s) daily  multivitamin 1Tablet(s) daily  zinc sulfate 220milliGRAM(s) daily  ascorbic acid 500milliGRAM(s) daily  metoprolol 50milliGRAM(s) two times a day  docusate sodium 100milliGRAM(s) two times a day  mirtazapine 7.5milliGRAM(s) at bedtime  bisacodyl 5milliGRAM(s) every 12 hours PRN  polyethylene glycol 3350 17Gram(s) daily  gabapentin 200milliGRAM(s) <User Schedule>  gabapentin 300milliGRAM(s) at bedtime  levothyroxine 88MICROGram(s) daily  dibucaine 1% Ointment 1Application(s) three times a day  magnesium hydroxide Suspension 30milliLiter(s) daily PRN  ondansetron Injectable 4milliGRAM(s) every 8 hours PRN  senna 2Tablet(s) at bedtime  lisinopril 5milliGRAM(s) daily  insulin glargine Injectable (LANTUS) 20Unit(s) at bedtime  nystatin Powder 1Application(s) two times a day  morphine  - Injectable 4milliGRAM(s) every 4 hours PRN  oxyCODONE  5 mG/acetaminophen 325 mG 2Tablet(s) every 4 hours PRN  traMADol 25milliGRAM(s) every 4 hours PRN  clopidogrel Tablet 75milliGRAM(s) daily  apixaban 5milliGRAM(s) two times a day  BACItracin   Ointment 1Application(s) daily  ceFAZolin   IVPB 1000milliGRAM(s) every 8 hours    ----------------------------------------------------------------------------------------  PHYSICAL EXAM  Constitutional - NAD, Comfortable  HEENT - NCAT, EOMI  Neck - Supple, No limited ROM  Chest - CTA bilaterally, No wheeze, No rhonchi, No crackles  Cardiovascular - RRR, S1S2, No murmurs  Abdomen - BS+, Soft, NT +mildly distended  Extremities - No C/C/E, No calf tenderness , left AKA, RLE in bandage and Zflex boot  Neurologic Exam -                    Cognitive - Awake, Alert, AAO to self, place, date, year, situation     Motor -                     LEFT    UE - ShAB 5/5, EF 5/5, EE 5/5, WE 5/5,  5/5                    RIGHT UE - ShAB 5/5, EF 5/5, EE 5/5, WE 5/5,  5/5                    LEFT    LE - HF 4+/5                    RIGHT LE - HF 4+/5, KE 5/5, DF/PF N/T     Sensory - Intact to LT    Psychiatric - Mood stable, Affect WNL  ----------------------------------------------------------------------------------------  ASSESSMENT/PLAN    86 yo female s/p LLE AKA and RLE revascularization now with functional deficits.    Pain- Percocet prn, Tramadol prn  Precautions- Fall    - Recommend ACUTE inpatient rehabilitation for the functional deficits consisting of 3 hours of therapy/day & 24 hour RN/daily PMR physician for comorbid medical management. Will continue to follow for ongoing rehab needs and recommendations.

## 2017-04-25 NOTE — PROGRESS NOTE ADULT - SUBJECTIVE AND OBJECTIVE BOX
INTERVAL HPI/OVERNIGHT EVENTS:  awaiting acute rehab  no overnight events  daughter present at bedside    PRESENT SYMPTOMS: SOURCE:  Patient/Family/Team    PAIN SCALE:  0 = none  1 = mild   2 = moderate  3 = severe    Pain: tolerable today    Dyspnea:  [ ] YES [ x] NO  Anxiety:  [x ] YES [ ] NO  Fatigue: [x ] YES [ ] NO  Nausea: [ ] YES [x ] NO  no vomiting  Loss of Appetite: [x ] YES [ ] NO  Other symptoms: constipation - thinks no BM in 4 days    MEDICATIONS  (STANDING):  insulin lispro (HumaLOG) corrective regimen sliding scale  SubCutaneous three times a day before meals  dextrose 50% Injectable 12.5Gram(s) IV Push once  dextrose 50% Injectable 25Gram(s) IV Push once  dextrose 50% Injectable 25Gram(s) IV Push once  tamsulosin 0.4milliGRAM(s) Oral at bedtime  atorvastatin 40milliGRAM(s) Oral at bedtime  amLODIPine   Tablet 10milliGRAM(s) Oral daily  ferrous    sulfate 325milliGRAM(s) Oral daily  pantoprazole    Tablet 40milliGRAM(s) Oral before breakfast  folic acid 1milliGRAM(s) Oral daily  multivitamin 1Tablet(s) Oral daily  zinc sulfate 220milliGRAM(s) Oral daily  ascorbic acid 500milliGRAM(s) Oral daily  metoprolol 50milliGRAM(s) Oral two times a day  docusate sodium 100milliGRAM(s) Oral two times a day  mirtazapine 7.5milliGRAM(s) Oral at bedtime  polyethylene glycol 3350 17Gram(s) Oral daily  levothyroxine 88MICROGram(s) Oral daily  dibucaine 1% Ointment 1Application(s) Topical three times a day  senna 2Tablet(s) Oral at bedtime  lisinopril 5milliGRAM(s) Oral daily  insulin glargine Injectable (LANTUS) 20Unit(s) SubCutaneous at bedtime  nystatin Powder 1Application(s) Topical two times a day  clopidogrel Tablet 75milliGRAM(s) Oral daily  apixaban 5milliGRAM(s) Oral two times a day  BACItracin   Ointment 1Application(s) Topical daily  ceFAZolin   IVPB 1000milliGRAM(s) IV Intermittent every 8 hours  gabapentin 300milliGRAM(s) Oral every 8 hours    MEDICATIONS  (PRN):  dextrose Gel 1Dose(s) Oral once PRN Blood Glucose LESS THAN 70 milliGRAM(s)/deciliter  glucagon  Injectable 1milliGRAM(s) IntraMuscular once PRN Glucose LESS THAN 70 milligrams/deciliter  bisacodyl 5milliGRAM(s) Oral every 12 hours PRN Constipation  magnesium hydroxide Suspension 30milliLiter(s) Oral daily PRN Constipation  ondansetron Injectable 4milliGRAM(s) IV Push every 8 hours PRN Nausea and/or Vomiting  morphine  - Injectable 4milliGRAM(s) IV Push every 4 hours PRN Severe Pain (7 - 10)  oxyCODONE  5 mG/acetaminophen 325 mG 2Tablet(s) Oral every 4 hours PRN Moderate Pain (4 - 6)  traMADol 25milliGRAM(s) Oral every 4 hours PRN Mild Pain (1 - 3)      Allergies    Benadryl (Other)  Demerol HCl (Other)    Intolerances      Karnofsky Performance Score/Palliative Performance Status Version 2: 30    %    Vital Signs Last 24 Hrs  T(C): 36.6, Max: 36.8 (04-25 @ 00:59)  T(F): 97.9, Max: 98.2 (04-25 @ 00:59)  HR: 82 (82 - 92)  BP: 118/61 (118/61 - 164/69)  BP(mean): --  RR: 20 (20 - 20)  SpO2: 96% (96% - 98%)    PHYSICAL EXAM:    General: [x ] alert  [ ] oriented x ____ [ ] lethargic [ ] agitated                  [ ] cachexia  [ ] nonverbal  [ ] coma    HEENT: [x ] normal  [ ] dry mouth  [ ] ET tube/trach    Lungs: [ x] comfortable [ ] tachypnea/labored breathing  [ ] excessive secretions    CV: [x ] normal  [ ] tachycardia    GI: [ x] + BS soft non tender, non distended  [ ] distended  [ ] tender  [ ] no BS               [ ] PEG/NG/OG tube    : [ x] normal  [ ] incontinent  [ ] oliguria/anuria  [ ] juarez    MSK: [ ] normal  [x ]general  weakness  [ ] edema             [ ] ambulatory  [ ] bedbound/wheelchair bound    Skin: [ ] normal  [ ] pressure ulcers- Stage_____  [ x] no rash    LABS:                        10.1   6.3   )-----------( 246      ( 24 Apr 2017 05:30 )             32.6     04-24    138  |  98  |  14.0  ----------------------------<  135<H>  4.2   |  29.0  |  0.76    Ca    8.8      24 Apr 2017 05:30          I&O's Summary    I & Os for current day (as of 25 Apr 2017 17:19)  =============================================  IN: 820 ml / OUT: 0 ml / NET: 820 ml      ADVANCE DIRECTIVES:  [ x] YES [ ] NO    DNR: [x ] YES [ ] NO      Date Completed:                    BENITA [x ] YES [ ] NO   Date Completed:       Thank you for the opportunity to assist with the care of this patient.   [facility] [phone].

## 2017-04-25 NOTE — OCCUPATIONAL THERAPY INITIAL EVALUATION ADULT - PLANNED THERAPY INTERVENTIONS, OT EVAL
strengthening/balance training/bed mobility training/motor coordination training/ROM/ADL retraining/transfer training

## 2017-04-25 NOTE — PROGRESS NOTE ADULT - SUBJECTIVE AND OBJECTIVE BOX
SURGICAL PA NOTE:     STATUS POST:      Diagnosis:   Pre-Op Diagnosis:  PAD (peripheral artery disease)  04/14/2017  with breakdown of left BKA stump  Active  Uyen Mckeon.    Post-Op Dx:  PAD (peripheral artery disease)  04/14/2017  With breakdown of left BKA stump  Active  Uyen Mckeon.    Procedure:   Procedure:  Angiogram peripheral  04/20/2017  1. US guided, percutaneous access to the right comon femoral artery, 2. RLE angiogram, 3. Right SFA/POP angioplasty  Active  [facility].    POST OPERATIVE DAY #: 5    Vital Signs Last 24 Hrs  T(C): 36.7, Max: 37.1 (04-24 @ 15:43)  T(F): 98, Max: 98.7 (04-24 @ 15:43)  HR: 86 (84 - 92)  BP: 121/68 (121/68 - 164/69)  BP(mean): --  RR: 20 (18 - 20)  SpO2: 98% (95% - 98%)    HPI:      Wound disruption  H/o or current diagnosis of HF- Contraindication to ACEI/ARBs  H/o or current diagnosis of HF- ACEI/ARB contraindication unknown  H/o or current diagnosis of HF- Contraindication to ACEI/ARBs  No pertinent family history in first degree relatives  Handoff  MEWS Score  Hyperlipidemia  Hypertension  Diabetes  PVD (peripheral vascular disease)  CAD (coronary artery disease)  PAD (peripheral artery disease)  PAD (peripheral artery disease)  PVD (peripheral vascular disease)  Pain  Encounter for palliative care  Nausea and vomiting, intractability of vomiting not specified, unspecified vomiting type  Constipation, unspecified constipation type  Depression, unspecified depression type  PVD (peripheral vascular disease)  Angiogram peripheral  AKA (above-knee amputation), left  History of cholecystectomy  H/O angioplasty  DISRUPTION OF WOUND, UNSPECIFI  UTI (urinary tract infection)  CAD (coronary artery disease)  Atrial fibrillation  Hypertension  Hyperlipidemia  Diabetes      SUBJECTIVE: Pt seen lying supine, CTSP by nurse - active bleeding from 2 nd noted to be saturating dressings after PT, dressings taken down by nurse - bleeding site noted at ant demarcation margin of 2nd toe - direct gentle  pressure held for 10 minutes over site with cessation of bleeding, no c/o right foot pain but c/o phantom left leg pains    Diet: reg    Activity: bedrest, OOB with PT    Fevers: [ ]Yes [ x]NO  Chills: [ ] Yes [x NO  SOB:  [ ] YES [x ] NO  Dyspnea: [ ]YES [ x]NO  Chest Discomfort: [ ] YES [x ] NO    Nausea: [ ] YES [x ] NO           Vomiting: [ ] YES [ x] NO  Flatus: [x ] YES [ ] NO             Bowel Movement: x[ ] YES [ ] NO  Diarrhea: [ ] YES [x ] NO         Void: [x ]YES [ ]No  Constipation: [ ] YES [ ] NO       Pain (0-10):       3       Pain Control Adequate: [ x] YES [ ] NO    Bhatt:    NGT:      I&O's Detail    I & Os for current day (as of 25 Apr 2017 13:12)  =============================================  IN:    Oral Fluid: 820 ml    Total IN: 820 ml  ---------------------------------------------  OUT:    Total OUT: 0 ml  ---------------------------------------------  Total NET: 820 ml    I&O's Summary    I & Os for current day (as of 25 Apr 2017 13:12)  =============================================  IN: 820 ml / OUT: 0 ml / NET: 820 ml    I&O's Detail    I & Os for current day (as of 25 Apr 2017 13:12)  =============================================  IN:    Oral Fluid: 820 ml    Total IN: 820 ml  ---------------------------------------------  OUT:    Total OUT: 0 ml  ---------------------------------------------  Total NET: 820 ml      MEDICATIONS  (STANDING):  insulin lispro (HumaLOG) corrective regimen sliding scale  SubCutaneous three times a day before meals  dextrose 50% Injectable 12.5Gram(s) IV Push once  dextrose 50% Injectable 25Gram(s) IV Push once  dextrose 50% Injectable 25Gram(s) IV Push once  tamsulosin 0.4milliGRAM(s) Oral at bedtime  atorvastatin 40milliGRAM(s) Oral at bedtime  amLODIPine   Tablet 10milliGRAM(s) Oral daily  ferrous    sulfate 325milliGRAM(s) Oral daily  pantoprazole    Tablet 40milliGRAM(s) Oral before breakfast  folic acid 1milliGRAM(s) Oral daily  multivitamin 1Tablet(s) Oral daily  zinc sulfate 220milliGRAM(s) Oral daily  ascorbic acid 500milliGRAM(s) Oral daily  metoprolol 50milliGRAM(s) Oral two times a day  docusate sodium 100milliGRAM(s) Oral two times a day  mirtazapine 7.5milliGRAM(s) Oral at bedtime  polyethylene glycol 3350 17Gram(s) Oral daily  levothyroxine 88MICROGram(s) Oral daily  dibucaine 1% Ointment 1Application(s) Topical three times a day  senna 2Tablet(s) Oral at bedtime  lisinopril 5milliGRAM(s) Oral daily  insulin glargine Injectable (LANTUS) 20Unit(s) SubCutaneous at bedtime  nystatin Powder 1Application(s) Topical two times a day  clopidogrel Tablet 75milliGRAM(s) Oral daily  apixaban 5milliGRAM(s) Oral two times a day  BACItracin   Ointment 1Application(s) Topical daily  ceFAZolin   IVPB 1000milliGRAM(s) IV Intermittent every 8 hours  gabapentin 300milliGRAM(s) Oral every 8 hours    MEDICATIONS  (PRN):  dextrose Gel 1Dose(s) Oral once PRN Blood Glucose LESS THAN 70 milliGRAM(s)/deciliter  glucagon  Injectable 1milliGRAM(s) IntraMuscular once PRN Glucose LESS THAN 70 milligrams/deciliter  bisacodyl 5milliGRAM(s) Oral every 12 hours PRN Constipation  magnesium hydroxide Suspension 30milliLiter(s) Oral daily PRN Constipation  ondansetron Injectable 4milliGRAM(s) IV Push every 8 hours PRN Nausea and/or Vomiting  morphine  - Injectable 4milliGRAM(s) IV Push every 4 hours PRN Severe Pain (7 - 10)  oxyCODONE  5 mG/acetaminophen 325 mG 2Tablet(s) Oral every 4 hours PRN Moderate Pain (4 - 6)  traMADol 25milliGRAM(s) Oral every 4 hours PRN Mild Pain (1 - 3)      LABS:                        10.1   6.3   )-----------( 246      ( 24 Apr 2017 05:30 )             32.6     04-24    138  |  98  |  14.0  ----------------------------<  135<H>  4.2   |  29.0  |  0.76    Ca    8.8      24 Apr 2017 05:30              RADIOLOGY & ADDITIONAL STUDIES:

## 2017-04-25 NOTE — PROGRESS NOTE ADULT - ASSESSMENT
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 Patient being followed by ID, completed 7 day course of cefazolin, blood cultures have been negative. Patient also has right foot ulceration and eschar.  Pt was seen by PT and Rehab, suggested acute rehab, pending authorization, Pt was restarted on IV cefazolin by Podiatry for 10 more days    Problem/Plan - 1:    Problem: Postoperative wound dehiscence:  Left AKA done on 4/14/17 by Dr. Mckeon, Pain is well controlled, received cefazolin for 7 days, restarted cefazolin for 10 more days as per surgery  blood cultures were negative, seen by ID, patient is s/p RLE angio 3 days ago, patient is getting daily wound care and daily dressing changes as per podiatry, patient's wound is healing, will continue with current management as per vascular surgery.     PT and rehab eval appreciated, suggest acute rehab - [facility] working on auth     Problem/Plan - 2:   Urine c/s Enterococcus UTI, completed 7 day course of cefazolin. blood cultures negative. CXR 4/13 - read LLL small pleural effusion vs PNA. Doubt PNA, patient has no cough or SOB, will continue monitor, patient has been seen by ID, continues to be afebrile, will continue with incentive spirometry, has no more fever.        Problem/Plan - 3:  ·  Problem: PVD (peripheral vascular disease).  Plan: As above, s/p AKA, daily wound dressings, RLE Angio done 3 days ago, Followed by Vascular surgery.  restarted on cefazolin for 10 more days as per vascular surgery     Problem/Plan - 4:  ·  Problem: Type 2 diabetes mellitus with hypoglycemia without coma, with long-term current use of insulin.  Plan: 20 units lantus qHS,  Continue sliding scale coverage, will continue to monitor glucose.     Problem/Plan - 5:  ·  Problem: PAF (paroxysmal atrial fibrillation).  Plan: Rate controlled, talked with vascular Surgeon by Dr Hamilton, as per him patient can be resumed on eliquis     Problem/Plan - 6:  Problem: CAD (coronary artery disease). Plan: Continue ASA, Metoprolol, Lisinopril, and Lipitor, Patient's Plavix has been resumed, discussed with vascular surgeon.     Problem/Plan - 7: HTN. On amlodipine, metoprolol, and lisinopril. Lisinopril, BP has been stable, Will continue to monitor.    Problem/Plan - 8:  ·  Problem: Acute blood loss anemia.  Plan: Asymptomatic. S/P 2 units of PRBC to keep Hg > 10 .  Monitor CBC.  Continue Iron, H&H continues to be stable.     DVT prophylaxis: On Eliquis    disposition - PMR suggest acute rehab, pending auth

## 2017-04-26 PROCEDURE — 99233 SBSQ HOSP IP/OBS HIGH 50: CPT

## 2017-04-26 PROCEDURE — 99232 SBSQ HOSP IP/OBS MODERATE 35: CPT

## 2017-04-26 RX ADMIN — Medication 325 MILLIGRAM(S): at 11:10

## 2017-04-26 RX ADMIN — Medication 100 MILLIGRAM(S): at 17:27

## 2017-04-26 RX ADMIN — PANTOPRAZOLE SODIUM 40 MILLIGRAM(S): 20 TABLET, DELAYED RELEASE ORAL at 06:10

## 2017-04-26 RX ADMIN — SENNA PLUS 2 TABLET(S): 8.6 TABLET ORAL at 22:32

## 2017-04-26 RX ADMIN — LISINOPRIL 5 MILLIGRAM(S): 2.5 TABLET ORAL at 06:10

## 2017-04-26 RX ADMIN — Medication 500 MILLIGRAM(S): at 11:08

## 2017-04-26 RX ADMIN — AMLODIPINE BESYLATE 10 MILLIGRAM(S): 2.5 TABLET ORAL at 06:09

## 2017-04-26 RX ADMIN — Medication 100 MILLIGRAM(S): at 17:26

## 2017-04-26 RX ADMIN — TRAMADOL HYDROCHLORIDE 25 MILLIGRAM(S): 50 TABLET ORAL at 12:00

## 2017-04-26 RX ADMIN — GABAPENTIN 300 MILLIGRAM(S): 400 CAPSULE ORAL at 17:26

## 2017-04-26 RX ADMIN — GABAPENTIN 300 MILLIGRAM(S): 400 CAPSULE ORAL at 22:30

## 2017-04-26 RX ADMIN — TAMSULOSIN HYDROCHLORIDE 0.4 MILLIGRAM(S): 0.4 CAPSULE ORAL at 22:32

## 2017-04-26 RX ADMIN — Medication 100 MILLIGRAM(S): at 22:30

## 2017-04-26 RX ADMIN — ZINC SULFATE TAB 220 MG (50 MG ZINC EQUIVALENT) 220 MILLIGRAM(S): 220 (50 ZN) TAB at 11:10

## 2017-04-26 RX ADMIN — NYSTATIN CREAM 1 APPLICATION(S): 100000 CREAM TOPICAL at 17:28

## 2017-04-26 RX ADMIN — NYSTATIN CREAM 1 APPLICATION(S): 100000 CREAM TOPICAL at 06:14

## 2017-04-26 RX ADMIN — Medication 88 MICROGRAM(S): at 06:10

## 2017-04-26 RX ADMIN — Medication 1 TABLET(S): at 11:10

## 2017-04-26 RX ADMIN — CLOPIDOGREL BISULFATE 75 MILLIGRAM(S): 75 TABLET, FILM COATED ORAL at 11:07

## 2017-04-26 RX ADMIN — POLYETHYLENE GLYCOL 3350 17 GRAM(S): 17 POWDER, FOR SOLUTION ORAL at 11:07

## 2017-04-26 RX ADMIN — MIRTAZAPINE 7.5 MILLIGRAM(S): 45 TABLET, ORALLY DISINTEGRATING ORAL at 22:32

## 2017-04-26 RX ADMIN — Medication 1 APPLICATION(S): at 11:08

## 2017-04-26 RX ADMIN — INSULIN GLARGINE 20 UNIT(S): 100 INJECTION, SOLUTION SUBCUTANEOUS at 22:30

## 2017-04-26 RX ADMIN — TRAMADOL HYDROCHLORIDE 25 MILLIGRAM(S): 50 TABLET ORAL at 11:01

## 2017-04-26 RX ADMIN — Medication 50 MILLIGRAM(S): at 06:10

## 2017-04-26 RX ADMIN — Medication 100 MILLIGRAM(S): at 06:06

## 2017-04-26 RX ADMIN — Medication 1 MILLIGRAM(S): at 11:09

## 2017-04-26 RX ADMIN — APIXABAN 5 MILLIGRAM(S): 2.5 TABLET, FILM COATED ORAL at 17:27

## 2017-04-26 RX ADMIN — ATORVASTATIN CALCIUM 40 MILLIGRAM(S): 80 TABLET, FILM COATED ORAL at 22:30

## 2017-04-26 RX ADMIN — APIXABAN 5 MILLIGRAM(S): 2.5 TABLET, FILM COATED ORAL at 06:09

## 2017-04-26 RX ADMIN — GABAPENTIN 300 MILLIGRAM(S): 400 CAPSULE ORAL at 06:09

## 2017-04-26 RX ADMIN — Medication 1 APPLICATION(S): at 17:27

## 2017-04-26 RX ADMIN — Medication 50 MILLIGRAM(S): at 17:28

## 2017-04-26 NOTE — PROGRESS NOTE ADULT - ASSESSMENT
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 Patient being followed by ID, completed 7 day course of cefazolin, blood cultures have been negative. Patient also has right foot ulceration and eschar.  Pt was seen by PT and Rehab, suggested acute rehab, pending authorization, Pt was restarted on IV cefazolin by Podiatry for 10 more days    Problem/Plan - 1:    Problem: Postoperative wound dehiscence:  Left AKA done on 4/14/17 by Dr. Mckeon, Pain is well controlled, received cefazolin for 7 days, restarted cefazolin for 10 more days as per surgery  blood cultures were negative, seen by ID, patient is s/p RLE angio 3 days ago, patient is getting daily wound care and daily dressing changes as per podiatry, patient's wound is healing, will continue with current management as per vascular surgery.     PT and rehab eval appreciated, suggest acute rehab - [facility] working on auth - pending bed availability     Problem/Plan - 2:   Urine c/s Enterococcus UTI, completed 7 day course of cefazolin. blood cultures negative. CXR 4/13 - read LLL small pleural effusion vs PNA. Doubt PNA, patient has no cough or SOB, will continue monitor, patient has been seen by ID, continues to be afebrile, will continue with incentive spirometry, has no more fever.        Problem/Plan - 3:  ·  Problem: PVD (peripheral vascular disease).  Plan: As above, s/p AKA, daily wound dressings, RLE Angio done 3 days ago, Followed by Vascular surgery.  restarted on cefazolin for 10 more days as per vascular surgery     Problem/Plan - 4:  ·  Problem: Type 2 diabetes mellitus with hypoglycemia without coma, with long-term current use of insulin.  Plan: 20 units lantus qHS,  Continue sliding scale coverage, will continue to monitor glucose.     Problem/Plan - 5:  ·  Problem: PAF (paroxysmal atrial fibrillation).  Plan: Rate controlled, talked with vascular Surgeon by Dr Hamilton, as per him patient can be resumed on eliquis     Problem/Plan - 6:  Problem: CAD (coronary artery disease). Plan: Continue ASA, Metoprolol, Lisinopril, and Lipitor, Patient's Plavix has been resumed, discussed with vascular surgeon.     Problem/Plan - 7: HTN. On amlodipine, metoprolol, and lisinopril. Lisinopril, BP has been stable, Will continue to monitor.    Problem/Plan - 8:  ·  Problem: Acute blood loss anemia.  Plan: Asymptomatic. S/P 2 units of PRBC to keep Hg > 10 .  Monitor CBC.  Continue Iron, H&H continues to be stable.     DVT prophylaxis: On Eliquis    disposition - PMR suggest acute rehab, pending bed availability

## 2017-04-26 NOTE — PROGRESS NOTE ADULT - PROBLEM SELECTOR PROBLEM 1
Fever
PVD (peripheral vascular disease)

## 2017-04-26 NOTE — PROGRESS NOTE ADULT - SUBJECTIVE AND OBJECTIVE BOX
INTERVAL HPI/OVERNIGHT EVENTS:  no overnight events  + BM    PRESENT SYMPTOMS: SOURCE:  Patient/Family/Team    PAIN SCALE:  0 = none  1 = mild   2 = moderate  3 = severe    Pain: 0- comfortable    Dyspnea:  [ ] YES [ x] NO  Anxiety:  [ ] YES [ x] NO  Fatigue: [x ] YES [ ] NO  Nausea: [ ] YES [x ] NO  Loss of Appetite: [x ] YES [ ] NO  Other symptoms: __________    MEDICATIONS  (STANDING):  insulin lispro (HumaLOG) corrective regimen sliding scale  SubCutaneous three times a day before meals  dextrose 50% Injectable 12.5Gram(s) IV Push once  dextrose 50% Injectable 25Gram(s) IV Push once  dextrose 50% Injectable 25Gram(s) IV Push once  tamsulosin 0.4milliGRAM(s) Oral at bedtime  atorvastatin 40milliGRAM(s) Oral at bedtime  amLODIPine   Tablet 10milliGRAM(s) Oral daily  ferrous    sulfate 325milliGRAM(s) Oral daily  pantoprazole    Tablet 40milliGRAM(s) Oral before breakfast  folic acid 1milliGRAM(s) Oral daily  multivitamin 1Tablet(s) Oral daily  zinc sulfate 220milliGRAM(s) Oral daily  ascorbic acid 500milliGRAM(s) Oral daily  metoprolol 50milliGRAM(s) Oral two times a day  docusate sodium 100milliGRAM(s) Oral two times a day  mirtazapine 7.5milliGRAM(s) Oral at bedtime  polyethylene glycol 3350 17Gram(s) Oral daily  levothyroxine 88MICROGram(s) Oral daily  dibucaine 1% Ointment 1Application(s) Topical three times a day  senna 2Tablet(s) Oral at bedtime  lisinopril 5milliGRAM(s) Oral daily  insulin glargine Injectable (LANTUS) 20Unit(s) SubCutaneous at bedtime  nystatin Powder 1Application(s) Topical two times a day  clopidogrel Tablet 75milliGRAM(s) Oral daily  apixaban 5milliGRAM(s) Oral two times a day  BACItracin   Ointment 1Application(s) Topical daily  ceFAZolin   IVPB 1000milliGRAM(s) IV Intermittent every 8 hours  gabapentin 300milliGRAM(s) Oral every 8 hours    MEDICATIONS  (PRN):  dextrose Gel 1Dose(s) Oral once PRN Blood Glucose LESS THAN 70 milliGRAM(s)/deciliter  glucagon  Injectable 1milliGRAM(s) IntraMuscular once PRN Glucose LESS THAN 70 milligrams/deciliter  bisacodyl 5milliGRAM(s) Oral every 12 hours PRN Constipation  magnesium hydroxide Suspension 30milliLiter(s) Oral daily PRN Constipation  ondansetron Injectable 4milliGRAM(s) IV Push every 8 hours PRN Nausea and/or Vomiting  morphine  - Injectable 4milliGRAM(s) IV Push every 4 hours PRN Severe Pain (7 - 10)  oxyCODONE  5 mG/acetaminophen 325 mG 2Tablet(s) Oral every 4 hours PRN Moderate Pain (4 - 6)  traMADol 25milliGRAM(s) Oral every 4 hours PRN Mild Pain (1 - 3)      Allergies    Benadryl (Other)  Demerol HCl (Other)    Intolerances    Karnofsky Performance Score/Palliative Performance Status Version 2:   40      %    Vital Signs Last 24 Hrs  T(C): 36.6, Max: 36.8 (04-26 @ 00:03)  T(F): 97.9, Max: 98.2 (04-26 @ 00:03)  HR: 71 (71 - 86)  BP: 124/52 (118/61 - 150/60)  BP(mean): --  RR: 16 (16 - 20)  SpO2: 71% (71% - 98%)    PHYSICAL EXAM:    General: [x ] alert  [ ] oriented x ____ [ ] lethargic [ ] agitated                  [ ] cachexia  [ ] nonverbal  [ ] coma    HEENT: [x ] normal  [ ] dry mouth  [ ] ET tube/trach    Lungs: [x ] comfortable [ ] tachypnea/labored breathing  [ ] excessive secretions    CV: [x ] normal  [ ] tachycardia    GI: [ x] normal  [ ] distended  [ ] tender  [ ] no BS               [ ] PEG/NG/OG tube    : [x ] normal  [ ] incontinent  [ ] oliguria/anuria  [ ] juarez    MSK: [ ] normal  [x ]general  weakness  [ ] edema             [ ] ambulatory  [ ] bedbound/wheelchair bound    Skin: [ ] normal  [ ] pressure ulcers- Stage_____  [ ] no rash   Lower extremity dressing c/d/i    LABS:              I&O's Summary  I & Os for 24h ending 26 Apr 2017 07:00  =============================================  IN: 720 ml / OUT: 0 ml / NET: 720 ml    I & Os for current day (as of 26 Apr 2017 15:22)  =============================================  IN: 0 ml / OUT: 300 ml / NET: -300 ml      ADVANCE DIRECTIVES:  [x ] YES [ ] NO    DNR: [x ] YES [ ] NO      Date Completed:                    MOLST [x ] YES [ ] NO   Date Completed:         Thank you for the opportunity to assist with the care of this patient.   [facility] [phone].

## 2017-04-26 NOTE — PROGRESS NOTE ADULT - SUBJECTIVE AND OBJECTIVE BOX
Internal Medicine Hospitalist - Dr. Amparo LÓPEZ    [id number]    85y      Female    Patient is a 85y old  Female who presents with a chief complaint of wound dehiscence (25 Apr 2017 12:38)    INTERVAL HPI/ OVERNIGHT EVENTS: Patient is seen and examined, no new event overnight.     REVIEW OF SYSTEMS:    Denied fever, chills, abd. pain, nausea, vomiting, chest pain, SOB, headache, dizziness    PHYSICAL EXAM:    Vital Signs Last 24 Hrs  T(C): 36.6, Max: 36.8 (04-26 @ 00:03)  T(F): 97.9, Max: 98.2 (04-26 @ 00:03)  HR: 71 (71 - 86)  BP: 124/52 (118/61 - 150/60)  BP(mean): --  RR: 16 (16 - 20)  SpO2: 71% (71% - 98%)    GENERAL: NAD  HEENT: EOMI  Neck: supple  CHEST/LUNG: Clear to percussion bilaterally; No wheezing  HEART: S1S2+ audible  ABDOMEN: Soft, Nontender, Nondistended; Bowel sounds present  EXTREMITIES: L AKA, dressing over right foot   CNS: AAO X 3  Psychiatry: normal mood        MEDICATIONS  (STANDING):  insulin lispro (HumaLOG) corrective regimen sliding scale  SubCutaneous three times a day before meals  dextrose 50% Injectable 12.5Gram(s) IV Push once  dextrose 50% Injectable 25Gram(s) IV Push once  dextrose 50% Injectable 25Gram(s) IV Push once  tamsulosin 0.4milliGRAM(s) Oral at bedtime  atorvastatin 40milliGRAM(s) Oral at bedtime  amLODIPine   Tablet 10milliGRAM(s) Oral daily  ferrous    sulfate 325milliGRAM(s) Oral daily  pantoprazole    Tablet 40milliGRAM(s) Oral before breakfast  folic acid 1milliGRAM(s) Oral daily  multivitamin 1Tablet(s) Oral daily  zinc sulfate 220milliGRAM(s) Oral daily  ascorbic acid 500milliGRAM(s) Oral daily  metoprolol 50milliGRAM(s) Oral two times a day  docusate sodium 100milliGRAM(s) Oral two times a day  mirtazapine 7.5milliGRAM(s) Oral at bedtime  polyethylene glycol 3350 17Gram(s) Oral daily  levothyroxine 88MICROGram(s) Oral daily  dibucaine 1% Ointment 1Application(s) Topical three times a day  senna 2Tablet(s) Oral at bedtime  lisinopril 5milliGRAM(s) Oral daily  insulin glargine Injectable (LANTUS) 20Unit(s) SubCutaneous at bedtime  nystatin Powder 1Application(s) Topical two times a day  clopidogrel Tablet 75milliGRAM(s) Oral daily  apixaban 5milliGRAM(s) Oral two times a day  BACItracin   Ointment 1Application(s) Topical daily  ceFAZolin   IVPB 1000milliGRAM(s) IV Intermittent every 8 hours  gabapentin 300milliGRAM(s) Oral every 8 hours    MEDICATIONS  (PRN):  dextrose Gel 1Dose(s) Oral once PRN Blood Glucose LESS THAN 70 milliGRAM(s)/deciliter  glucagon  Injectable 1milliGRAM(s) IntraMuscular once PRN Glucose LESS THAN 70 milligrams/deciliter  bisacodyl 5milliGRAM(s) Oral every 12 hours PRN Constipation  magnesium hydroxide Suspension 30milliLiter(s) Oral daily PRN Constipation  ondansetron Injectable 4milliGRAM(s) IV Push every 8 hours PRN Nausea and/or Vomiting  morphine  - Injectable 4milliGRAM(s) IV Push every 4 hours PRN Severe Pain (7 - 10)  oxyCODONE  5 mG/acetaminophen 325 mG 2Tablet(s) Oral every 4 hours PRN Moderate Pain (4 - 6)  traMADol 25milliGRAM(s) Oral every 4 hours PRN Mild Pain (1 - 3)      RADIOLOGY & ADDITIONAL TEST

## 2017-04-26 NOTE — PROGRESS NOTE ADULT - SUBJECTIVE AND OBJECTIVE BOX
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

## 2017-04-27 ENCOUNTER — INPATIENT (INPATIENT)
Facility: HOSPITAL | Age: 82
LOS: 18 days | Discharge: ANOTHER HOSPITAL | DRG: 300 | End: 2017-05-16
Attending: PHYSICAL MEDICINE & REHABILITATION | Admitting: PHYSICAL MEDICINE & REHABILITATION
Payer: COMMERCIAL

## 2017-04-27 VITALS
RESPIRATION RATE: 18 BRPM | TEMPERATURE: 98 F | HEART RATE: 65 BPM | DIASTOLIC BLOOD PRESSURE: 75 MMHG | OXYGEN SATURATION: 97 % | SYSTOLIC BLOOD PRESSURE: 135 MMHG

## 2017-04-27 VITALS
WEIGHT: 148.81 LBS | DIASTOLIC BLOOD PRESSURE: 65 MMHG | TEMPERATURE: 97 F | RESPIRATION RATE: 18 BRPM | HEART RATE: 65 BPM | SYSTOLIC BLOOD PRESSURE: 126 MMHG | HEIGHT: 66 IN | OXYGEN SATURATION: 98 %

## 2017-04-27 DIAGNOSIS — Z98.62 PERIPHERAL VASCULAR ANGIOPLASTY STATUS: Chronic | ICD-10-CM

## 2017-04-27 DIAGNOSIS — I51.89 OTHER ILL-DEFINED HEART DISEASES: ICD-10-CM

## 2017-04-27 DIAGNOSIS — Z90.49 ACQUIRED ABSENCE OF OTHER SPECIFIED PARTS OF DIGESTIVE TRACT: Chronic | ICD-10-CM

## 2017-04-27 PROCEDURE — 36430 TRANSFUSION BLD/BLD COMPNT: CPT

## 2017-04-27 PROCEDURE — 97163 PT EVAL HIGH COMPLEX 45 MIN: CPT

## 2017-04-27 PROCEDURE — C1769: CPT

## 2017-04-27 PROCEDURE — 97116 GAIT TRAINING THERAPY: CPT

## 2017-04-27 PROCEDURE — 76000 FLUOROSCOPY <1 HR PHYS/QHP: CPT

## 2017-04-27 PROCEDURE — 83735 ASSAY OF MAGNESIUM: CPT

## 2017-04-27 PROCEDURE — 36415 COLL VENOUS BLD VENIPUNCTURE: CPT

## 2017-04-27 PROCEDURE — C1894: CPT

## 2017-04-27 PROCEDURE — 88311 DECALCIFY TISSUE: CPT

## 2017-04-27 PROCEDURE — 85610 PROTHROMBIN TIME: CPT

## 2017-04-27 PROCEDURE — C1725: CPT

## 2017-04-27 PROCEDURE — 84100 ASSAY OF PHOSPHORUS: CPT

## 2017-04-27 PROCEDURE — 85027 COMPLETE CBC AUTOMATED: CPT

## 2017-04-27 PROCEDURE — 97167 OT EVAL HIGH COMPLEX 60 MIN: CPT

## 2017-04-27 PROCEDURE — 97530 THERAPEUTIC ACTIVITIES: CPT

## 2017-04-27 PROCEDURE — 97110 THERAPEUTIC EXERCISES: CPT

## 2017-04-27 PROCEDURE — 99239 HOSP IP/OBS DSCHRG MGMT >30: CPT

## 2017-04-27 PROCEDURE — 80048 BASIC METABOLIC PNL TOTAL CA: CPT

## 2017-04-27 PROCEDURE — 80053 COMPREHEN METABOLIC PANEL: CPT

## 2017-04-27 PROCEDURE — 88307 TISSUE EXAM BY PATHOLOGIST: CPT

## 2017-04-27 PROCEDURE — C1887: CPT

## 2017-04-27 PROCEDURE — C1889: CPT

## 2017-04-27 PROCEDURE — 97164 PT RE-EVAL EST PLAN CARE: CPT

## 2017-04-27 RX ORDER — INSULIN LISPRO 100/ML
VIAL (ML) SUBCUTANEOUS
Qty: 0 | Refills: 0 | Status: DISCONTINUED | OUTPATIENT
Start: 2017-04-27 | End: 2017-05-02

## 2017-04-27 RX ORDER — DOCUSATE SODIUM 100 MG
100 CAPSULE ORAL THREE TIMES A DAY
Qty: 0 | Refills: 0 | Status: DISCONTINUED | OUTPATIENT
Start: 2017-04-27 | End: 2017-05-16

## 2017-04-27 RX ORDER — CLOPIDOGREL BISULFATE 75 MG/1
75 TABLET, FILM COATED ORAL DAILY
Qty: 0 | Refills: 0 | Status: DISCONTINUED | OUTPATIENT
Start: 2017-04-27 | End: 2017-05-12

## 2017-04-27 RX ORDER — AMLODIPINE BESYLATE 2.5 MG/1
10 TABLET ORAL DAILY
Qty: 0 | Refills: 0 | Status: DISCONTINUED | OUTPATIENT
Start: 2017-04-27 | End: 2017-04-30

## 2017-04-27 RX ORDER — CEFAZOLIN SODIUM 1 G
1000 VIAL (EA) INJECTION EVERY 8 HOURS
Qty: 0 | Refills: 0 | Status: COMPLETED | OUTPATIENT
Start: 2017-04-27 | End: 2017-05-04

## 2017-04-27 RX ORDER — DIBUCAINE 1 %
1 OINTMENT (GRAM) RECTAL THREE TIMES A DAY
Qty: 0 | Refills: 0 | Status: DISCONTINUED | OUTPATIENT
Start: 2017-04-27 | End: 2017-05-04

## 2017-04-27 RX ORDER — DEXTROSE 50 % IN WATER 50 %
25 SYRINGE (ML) INTRAVENOUS ONCE
Qty: 0 | Refills: 0 | Status: DISCONTINUED | OUTPATIENT
Start: 2017-04-27 | End: 2017-05-16

## 2017-04-27 RX ORDER — LISINOPRIL 2.5 MG/1
5 TABLET ORAL DAILY
Qty: 0 | Refills: 0 | Status: DISCONTINUED | OUTPATIENT
Start: 2017-04-27 | End: 2017-05-15

## 2017-04-27 RX ORDER — DEXTROSE 50 % IN WATER 50 %
12.5 SYRINGE (ML) INTRAVENOUS ONCE
Qty: 0 | Refills: 0 | Status: DISCONTINUED | OUTPATIENT
Start: 2017-04-27 | End: 2017-05-16

## 2017-04-27 RX ORDER — GLUCAGON INJECTION, SOLUTION 0.5 MG/.1ML
1 INJECTION, SOLUTION SUBCUTANEOUS ONCE
Qty: 0 | Refills: 0 | Status: DISCONTINUED | OUTPATIENT
Start: 2017-04-27 | End: 2017-05-16

## 2017-04-27 RX ORDER — PANTOPRAZOLE SODIUM 20 MG/1
40 TABLET, DELAYED RELEASE ORAL
Qty: 0 | Refills: 0 | Status: DISCONTINUED | OUTPATIENT
Start: 2017-04-27 | End: 2017-05-16

## 2017-04-27 RX ORDER — NYSTATIN CREAM 100000 [USP'U]/G
1 CREAM TOPICAL
Qty: 0 | Refills: 0 | Status: DISCONTINUED | OUTPATIENT
Start: 2017-04-27 | End: 2017-05-07

## 2017-04-27 RX ORDER — INSULIN GLARGINE 100 [IU]/ML
20 INJECTION, SOLUTION SUBCUTANEOUS AT BEDTIME
Qty: 0 | Refills: 0 | Status: DISCONTINUED | OUTPATIENT
Start: 2017-04-27 | End: 2017-05-01

## 2017-04-27 RX ORDER — DEXTROSE 50 % IN WATER 50 %
1 SYRINGE (ML) INTRAVENOUS ONCE
Qty: 0 | Refills: 0 | Status: DISCONTINUED | OUTPATIENT
Start: 2017-04-27 | End: 2017-05-16

## 2017-04-27 RX ORDER — FERROUS SULFATE 325(65) MG
325 TABLET ORAL DAILY
Qty: 0 | Refills: 0 | Status: DISCONTINUED | OUTPATIENT
Start: 2017-04-27 | End: 2017-05-16

## 2017-04-27 RX ORDER — ZINC SULFATE TAB 220 MG (50 MG ZINC EQUIVALENT) 220 (50 ZN) MG
220 TAB ORAL DAILY
Qty: 0 | Refills: 0 | Status: DISCONTINUED | OUTPATIENT
Start: 2017-04-27 | End: 2017-05-16

## 2017-04-27 RX ORDER — ACETAMINOPHEN 500 MG
650 TABLET ORAL EVERY 6 HOURS
Qty: 0 | Refills: 0 | Status: DISCONTINUED | OUTPATIENT
Start: 2017-04-27 | End: 2017-05-16

## 2017-04-27 RX ORDER — BACITRACIN ZINC 500 UNIT/G
1 OINTMENT IN PACKET (EA) TOPICAL DAILY
Qty: 0 | Refills: 0 | Status: DISCONTINUED | OUTPATIENT
Start: 2017-04-27 | End: 2017-04-28

## 2017-04-27 RX ORDER — METOPROLOL TARTRATE 50 MG
50 TABLET ORAL EVERY 12 HOURS
Qty: 0 | Refills: 0 | Status: DISCONTINUED | OUTPATIENT
Start: 2017-04-27 | End: 2017-05-01

## 2017-04-27 RX ORDER — FOLIC ACID 0.8 MG
1 TABLET ORAL DAILY
Qty: 0 | Refills: 0 | Status: DISCONTINUED | OUTPATIENT
Start: 2017-04-27 | End: 2017-05-16

## 2017-04-27 RX ORDER — ASCORBIC ACID 60 MG
500 TABLET,CHEWABLE ORAL DAILY
Qty: 0 | Refills: 0 | Status: DISCONTINUED | OUTPATIENT
Start: 2017-04-27 | End: 2017-05-16

## 2017-04-27 RX ORDER — GABAPENTIN 400 MG/1
300 CAPSULE ORAL EVERY 8 HOURS
Qty: 0 | Refills: 0 | Status: DISCONTINUED | OUTPATIENT
Start: 2017-04-27 | End: 2017-05-04

## 2017-04-27 RX ORDER — ATORVASTATIN CALCIUM 80 MG/1
40 TABLET, FILM COATED ORAL AT BEDTIME
Qty: 0 | Refills: 0 | Status: DISCONTINUED | OUTPATIENT
Start: 2017-04-27 | End: 2017-05-16

## 2017-04-27 RX ORDER — APIXABAN 2.5 MG/1
5 TABLET, FILM COATED ORAL
Qty: 0 | Refills: 0 | Status: DISCONTINUED | OUTPATIENT
Start: 2017-04-27 | End: 2017-05-07

## 2017-04-27 RX ORDER — MIRTAZAPINE 45 MG/1
7.5 TABLET, ORALLY DISINTEGRATING ORAL AT BEDTIME
Qty: 0 | Refills: 0 | Status: DISCONTINUED | OUTPATIENT
Start: 2017-04-27 | End: 2017-05-16

## 2017-04-27 RX ORDER — LEVOTHYROXINE SODIUM 125 MCG
88 TABLET ORAL DAILY
Qty: 0 | Refills: 0 | Status: DISCONTINUED | OUTPATIENT
Start: 2017-04-27 | End: 2017-05-16

## 2017-04-27 RX ORDER — SODIUM CHLORIDE 9 MG/ML
1000 INJECTION, SOLUTION INTRAVENOUS
Qty: 0 | Refills: 0 | Status: DISCONTINUED | OUTPATIENT
Start: 2017-04-27 | End: 2017-05-16

## 2017-04-27 RX ORDER — TRAMADOL HYDROCHLORIDE 50 MG/1
25 TABLET ORAL EVERY 6 HOURS
Qty: 0 | Refills: 0 | Status: DISCONTINUED | OUTPATIENT
Start: 2017-04-27 | End: 2017-04-28

## 2017-04-27 RX ORDER — TAMSULOSIN HYDROCHLORIDE 0.4 MG/1
0.4 CAPSULE ORAL AT BEDTIME
Qty: 0 | Refills: 0 | Status: DISCONTINUED | OUTPATIENT
Start: 2017-04-27 | End: 2017-05-05

## 2017-04-27 RX ORDER — SENNA PLUS 8.6 MG/1
2 TABLET ORAL AT BEDTIME
Qty: 0 | Refills: 0 | Status: DISCONTINUED | OUTPATIENT
Start: 2017-04-27 | End: 2017-05-16

## 2017-04-27 RX ORDER — POLYETHYLENE GLYCOL 3350 17 G/17G
17 POWDER, FOR SOLUTION ORAL DAILY
Qty: 0 | Refills: 0 | Status: DISCONTINUED | OUTPATIENT
Start: 2017-04-27 | End: 2017-05-16

## 2017-04-27 RX ADMIN — Medication 1 APPLICATION(S): at 16:37

## 2017-04-27 RX ADMIN — Medication 100 MILLIGRAM(S): at 16:38

## 2017-04-27 RX ADMIN — Medication 100 MILLIGRAM(S): at 21:36

## 2017-04-27 RX ADMIN — Medication 100 MILLIGRAM(S): at 06:16

## 2017-04-27 RX ADMIN — ATORVASTATIN CALCIUM 40 MILLIGRAM(S): 80 TABLET, FILM COATED ORAL at 21:36

## 2017-04-27 RX ADMIN — Medication 1 APPLICATION(S): at 11:40

## 2017-04-27 RX ADMIN — Medication 3: at 22:25

## 2017-04-27 RX ADMIN — Medication 500 MILLIGRAM(S): at 11:39

## 2017-04-27 RX ADMIN — APIXABAN 5 MILLIGRAM(S): 2.5 TABLET, FILM COATED ORAL at 06:15

## 2017-04-27 RX ADMIN — PANTOPRAZOLE SODIUM 40 MILLIGRAM(S): 20 TABLET, DELAYED RELEASE ORAL at 06:16

## 2017-04-27 RX ADMIN — Medication 1 TABLET(S): at 11:42

## 2017-04-27 RX ADMIN — GABAPENTIN 300 MILLIGRAM(S): 400 CAPSULE ORAL at 06:16

## 2017-04-27 RX ADMIN — Medication 88 MICROGRAM(S): at 06:15

## 2017-04-27 RX ADMIN — NYSTATIN CREAM 1 APPLICATION(S): 100000 CREAM TOPICAL at 06:14

## 2017-04-27 RX ADMIN — Medication 50 MILLIGRAM(S): at 18:40

## 2017-04-27 RX ADMIN — NYSTATIN CREAM 1 APPLICATION(S): 100000 CREAM TOPICAL at 18:38

## 2017-04-27 RX ADMIN — Medication 2: at 18:36

## 2017-04-27 RX ADMIN — MIRTAZAPINE 7.5 MILLIGRAM(S): 45 TABLET, ORALLY DISINTEGRATING ORAL at 22:26

## 2017-04-27 RX ADMIN — Medication 100 MILLIGRAM(S): at 06:15

## 2017-04-27 RX ADMIN — Medication 1 APPLICATION(S): at 06:14

## 2017-04-27 RX ADMIN — Medication 50 MILLIGRAM(S): at 06:17

## 2017-04-27 RX ADMIN — LISINOPRIL 5 MILLIGRAM(S): 2.5 TABLET ORAL at 06:17

## 2017-04-27 RX ADMIN — APIXABAN 5 MILLIGRAM(S): 2.5 TABLET, FILM COATED ORAL at 18:39

## 2017-04-27 RX ADMIN — AMLODIPINE BESYLATE 10 MILLIGRAM(S): 2.5 TABLET ORAL at 06:14

## 2017-04-27 RX ADMIN — SENNA PLUS 2 TABLET(S): 8.6 TABLET ORAL at 21:36

## 2017-04-27 RX ADMIN — INSULIN GLARGINE 20 UNIT(S): 100 INJECTION, SOLUTION SUBCUTANEOUS at 22:26

## 2017-04-27 RX ADMIN — Medication 325 MILLIGRAM(S): at 11:42

## 2017-04-27 RX ADMIN — GABAPENTIN 300 MILLIGRAM(S): 400 CAPSULE ORAL at 16:38

## 2017-04-27 RX ADMIN — TAMSULOSIN HYDROCHLORIDE 0.4 MILLIGRAM(S): 0.4 CAPSULE ORAL at 22:26

## 2017-04-27 RX ADMIN — POLYETHYLENE GLYCOL 3350 17 GRAM(S): 17 POWDER, FOR SOLUTION ORAL at 11:43

## 2017-04-27 RX ADMIN — GABAPENTIN 300 MILLIGRAM(S): 400 CAPSULE ORAL at 21:36

## 2017-04-27 RX ADMIN — Medication 1 MILLIGRAM(S): at 11:42

## 2017-04-27 RX ADMIN — ZINC SULFATE TAB 220 MG (50 MG ZINC EQUIVALENT) 220 MILLIGRAM(S): 220 (50 ZN) TAB at 11:43

## 2017-04-27 RX ADMIN — CLOPIDOGREL BISULFATE 75 MILLIGRAM(S): 75 TABLET, FILM COATED ORAL at 11:41

## 2017-04-27 NOTE — PROGRESS NOTE ADULT - SUBJECTIVE AND OBJECTIVE BOX
Internal Medicine Hospitalist - Dr. Amparo LÓPEZ    [id number]    85y      Female    Patient is a 85y old  Female who presents with a chief complaint of wound dehiscence (25 Apr 2017 12:38)    HPI:        INTERVAL HPI/ OVERNIGHT EVENTS: Patient is seen and examined, no new event overnight.     REVIEW OF SYSTEMS:    Denied fever, chills, abd. pain, nausea, vomiting, chest pain, SOB, headache, dizziness    PHYSICAL EXAM:    Vital Signs Last 24 Hrs  T(C): 36.8, Max: 36.8 (04-26 @ 20:51)  T(F): 98.2, Max: 98.3 (04-26 @ 20:51)  HR: 65 (65 - 77)  BP: 135/75 (118/63 - 140/59)  BP(mean): --  RR: 18 (17 - 18)  SpO2: 97% (94% - 98%)    GENERAL: NAD  CHEST/LUNG: positive air entry   HEART: S1S2+ audible  ABDOMEN: Soft, Nontender, Nondistended; Bowel sounds present  EXTREMITIES: left AKA, right foot dressing     LABS: NO new labs         MEDICATIONS  (STANDING):  insulin lispro (HumaLOG) corrective regimen sliding scale  SubCutaneous three times a day before meals  dextrose 50% Injectable 12.5Gram(s) IV Push once  dextrose 50% Injectable 25Gram(s) IV Push once  dextrose 50% Injectable 25Gram(s) IV Push once  tamsulosin 0.4milliGRAM(s) Oral at bedtime  atorvastatin 40milliGRAM(s) Oral at bedtime  amLODIPine   Tablet 10milliGRAM(s) Oral daily  ferrous    sulfate 325milliGRAM(s) Oral daily  pantoprazole    Tablet 40milliGRAM(s) Oral before breakfast  folic acid 1milliGRAM(s) Oral daily  multivitamin 1Tablet(s) Oral daily  zinc sulfate 220milliGRAM(s) Oral daily  ascorbic acid 500milliGRAM(s) Oral daily  metoprolol 50milliGRAM(s) Oral two times a day  docusate sodium 100milliGRAM(s) Oral two times a day  mirtazapine 7.5milliGRAM(s) Oral at bedtime  polyethylene glycol 3350 17Gram(s) Oral daily  levothyroxine 88MICROGram(s) Oral daily  dibucaine 1% Ointment 1Application(s) Topical three times a day  senna 2Tablet(s) Oral at bedtime  lisinopril 5milliGRAM(s) Oral daily  insulin glargine Injectable (LANTUS) 20Unit(s) SubCutaneous at bedtime  nystatin Powder 1Application(s) Topical two times a day  clopidogrel Tablet 75milliGRAM(s) Oral daily  apixaban 5milliGRAM(s) Oral two times a day  BACItracin   Ointment 1Application(s) Topical daily  ceFAZolin   IVPB 1000milliGRAM(s) IV Intermittent every 8 hours  gabapentin 300milliGRAM(s) Oral every 8 hours    MEDICATIONS  (PRN):  dextrose Gel 1Dose(s) Oral once PRN Blood Glucose LESS THAN 70 milliGRAM(s)/deciliter  glucagon  Injectable 1milliGRAM(s) IntraMuscular once PRN Glucose LESS THAN 70 milligrams/deciliter  bisacodyl 5milliGRAM(s) Oral every 12 hours PRN Constipation  magnesium hydroxide Suspension 30milliLiter(s) Oral daily PRN Constipation  ondansetron Injectable 4milliGRAM(s) IV Push every 8 hours PRN Nausea and/or Vomiting  morphine  - Injectable 4milliGRAM(s) IV Push every 4 hours PRN Severe Pain (7 - 10)  oxyCODONE  5 mG/acetaminophen 325 mG 2Tablet(s) Oral every 4 hours PRN Moderate Pain (4 - 6)  traMADol 25milliGRAM(s) Oral every 4 hours PRN Mild Pain (1 - 3)      RADIOLOGY & ADDITIONAL TEST

## 2017-04-27 NOTE — PROGRESS NOTE ADULT - PROVIDER SPECIALTY LIST ADULT
Anesthesia
Anesthesia
Hospitalist
Infectious Disease
Internal Medicine
Pain Medicine
Palliative Care
Rehab Medicine
Surgery
Vascular Surgery
Pain Medicine

## 2017-04-27 NOTE — PROGRESS NOTE ADULT - ASSESSMENT
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 Patient being followed by ID, completed 7 day course of cefazolin, blood cultures have been negative. Patient also has right foot ulceration and eschar.  Pt was seen by PT and Rehab, suggested acute rehab, pending bed availability at rehab    Problem/Plan - 1:    Problem: Postoperative wound dehiscence:  Left AKA done on 4/14/17 by Dr. Mckeon, Pain is well controlled, received cefazolin for 7 days, restarted cefazolin for 10 more days as per surgery  blood cultures were negative, seen by ID, patient is s/p RLE angio 3 days ago, patient is getting daily wound care and daily dressing changes as per podiatry, patient's wound is healing, will continue with current management as per vascular surgery.     PT and rehab eval appreciated, suggest acute rehab - [facility] working on auth - pending bed availability     Problem/Plan - 2:   Urine c/s Enterococcus UTI, completed 7 day course of cefazolin. blood cultures negative. CXR 4/13 - read LLL small pleural effusion vs PNA. Doubt PNA, patient has no cough or SOB, will continue monitor, patient has been seen by ID, continues to be afebrile, will continue with incentive spirometry, has no more fever.        Problem/Plan - 3:  ·  Problem: PVD (peripheral vascular disease).  Plan: As above, s/p AKA, daily wound dressings, RLE Angio done 3 days ago, Followed by Vascular surgery.  restarted on cefazolin for 10 more days as per vascular surgery     Problem/Plan - 4:  ·  Problem: Type 2 diabetes mellitus with hypoglycemia without coma, with long-term current use of insulin.  Plan: 20 units lantus qHS,  Continue sliding scale coverage, will continue to monitor glucose.     Problem/Plan - 5:  ·  Problem: PAF (paroxysmal atrial fibrillation).  Plan: Rate controlled, talked with vascular Surgeon by Dr Hamilton, as per him patient can be resumed on eliquis     Problem/Plan - 6:  Problem: CAD (coronary artery disease). Plan: Continue ASA, Metoprolol, Lisinopril, and Lipitor, Patient's Plavix has been resumed, discussed with vascular surgeon.     Problem/Plan - 7: HTN. On amlodipine, metoprolol, and lisinopril. Lisinopril, BP has been stable, Will continue to monitor.    Problem/Plan - 8:  ·  Problem: Acute blood loss anemia.  Plan: Asymptomatic. S/P 2 units of PRBC to keep Hg > 10 .  Monitor CBC.  Continue Iron, H&H continues to be stable.     DVT prophylaxis: On Eliquis    disposition - PMR suggest acute rehab, pending bed availability

## 2017-04-28 DIAGNOSIS — I73.9 PERIPHERAL VASCULAR DISEASE, UNSPECIFIED: ICD-10-CM

## 2017-04-28 LAB
ALBUMIN SERPL ELPH-MCNC: 2.8 G/DL — LOW (ref 3.3–5.2)
ALP SERPL-CCNC: 87 U/L — SIGNIFICANT CHANGE UP (ref 40–120)
ALT FLD-CCNC: 9 U/L — SIGNIFICANT CHANGE UP
ANION GAP SERPL CALC-SCNC: 11 MMOL/L — SIGNIFICANT CHANGE UP (ref 5–17)
AST SERPL-CCNC: 13 U/L — SIGNIFICANT CHANGE UP
BASOPHILS # BLD AUTO: 0 K/UL — SIGNIFICANT CHANGE UP (ref 0–0.2)
BASOPHILS NFR BLD AUTO: 0.5 % — SIGNIFICANT CHANGE UP (ref 0–2)
BILIRUB SERPL-MCNC: 0.2 MG/DL — LOW (ref 0.4–2)
BUN SERPL-MCNC: 29 MG/DL — HIGH (ref 8–20)
CALCIUM SERPL-MCNC: 9.1 MG/DL — SIGNIFICANT CHANGE UP (ref 8.6–10.2)
CHLORIDE SERPL-SCNC: 98 MMOL/L — SIGNIFICANT CHANGE UP (ref 98–107)
CO2 SERPL-SCNC: 26 MMOL/L — SIGNIFICANT CHANGE UP (ref 22–29)
CREAT SERPL-MCNC: 1 MG/DL — SIGNIFICANT CHANGE UP (ref 0.5–1.3)
EOSINOPHIL # BLD AUTO: 0.6 K/UL — HIGH (ref 0–0.5)
EOSINOPHIL NFR BLD AUTO: 9.8 % — HIGH (ref 0–6)
GLUCOSE SERPL-MCNC: 107 MG/DL — SIGNIFICANT CHANGE UP (ref 70–115)
HCT VFR BLD CALC: 30.6 % — LOW (ref 37–47)
HGB BLD-MCNC: 9.5 G/DL — LOW (ref 12–16)
LYMPHOCYTES # BLD AUTO: 1.5 K/UL — SIGNIFICANT CHANGE UP (ref 1–4.8)
LYMPHOCYTES # BLD AUTO: 23.1 % — SIGNIFICANT CHANGE UP (ref 20–55)
MAGNESIUM SERPL-MCNC: 2 MG/DL — SIGNIFICANT CHANGE UP (ref 1.8–2.5)
MCHC RBC-ENTMCNC: 27.5 PG — SIGNIFICANT CHANGE UP (ref 27–31)
MCHC RBC-ENTMCNC: 31 G/DL — LOW (ref 32–36)
MCV RBC AUTO: 88.7 FL — SIGNIFICANT CHANGE UP (ref 81–99)
MONOCYTES # BLD AUTO: 0.6 K/UL — SIGNIFICANT CHANGE UP (ref 0–0.8)
MONOCYTES NFR BLD AUTO: 10.1 % — HIGH (ref 3–10)
NEUTROPHILS # BLD AUTO: 3.6 K/UL — SIGNIFICANT CHANGE UP (ref 1.8–8)
NEUTROPHILS NFR BLD AUTO: 56 % — SIGNIFICANT CHANGE UP (ref 37–73)
PLATELET # BLD AUTO: 306 K/UL — SIGNIFICANT CHANGE UP (ref 150–400)
POTASSIUM SERPL-MCNC: 4.8 MMOL/L — SIGNIFICANT CHANGE UP (ref 3.5–5.3)
POTASSIUM SERPL-SCNC: 4.8 MMOL/L — SIGNIFICANT CHANGE UP (ref 3.5–5.3)
PREALB SERPL-MCNC: 18 MG/DL — SIGNIFICANT CHANGE UP (ref 18–38)
PROT SERPL-MCNC: 5.4 G/DL — LOW (ref 6.6–8.7)
RBC # BLD: 3.45 M/UL — LOW (ref 4.4–5.2)
RBC # FLD: 15.8 % — HIGH (ref 11–15.6)
SODIUM SERPL-SCNC: 135 MMOL/L — SIGNIFICANT CHANGE UP (ref 135–145)
WBC # BLD: 6.4 K/UL — SIGNIFICANT CHANGE UP (ref 4.8–10.8)
WBC # FLD AUTO: 6.4 K/UL — SIGNIFICANT CHANGE UP (ref 4.8–10.8)

## 2017-04-28 PROCEDURE — 99222 1ST HOSP IP/OBS MODERATE 55: CPT | Mod: AI,GC

## 2017-04-28 RX ORDER — BACITRACIN ZINC 500 UNIT/G
1 OINTMENT IN PACKET (EA) TOPICAL DAILY
Qty: 0 | Refills: 0 | Status: DISCONTINUED | OUTPATIENT
Start: 2017-04-28 | End: 2017-05-04

## 2017-04-28 RX ORDER — ALPRAZOLAM 0.25 MG
0.25 TABLET ORAL AT BEDTIME
Qty: 0 | Refills: 0 | Status: DISCONTINUED | OUTPATIENT
Start: 2017-04-28 | End: 2017-05-05

## 2017-04-28 RX ADMIN — SENNA PLUS 2 TABLET(S): 8.6 TABLET ORAL at 21:41

## 2017-04-28 RX ADMIN — TAMSULOSIN HYDROCHLORIDE 0.4 MILLIGRAM(S): 0.4 CAPSULE ORAL at 21:41

## 2017-04-28 RX ADMIN — PANTOPRAZOLE SODIUM 40 MILLIGRAM(S): 20 TABLET, DELAYED RELEASE ORAL at 05:35

## 2017-04-28 RX ADMIN — AMLODIPINE BESYLATE 10 MILLIGRAM(S): 2.5 TABLET ORAL at 05:35

## 2017-04-28 RX ADMIN — Medication 88 MICROGRAM(S): at 05:34

## 2017-04-28 RX ADMIN — LISINOPRIL 5 MILLIGRAM(S): 2.5 TABLET ORAL at 05:34

## 2017-04-28 RX ADMIN — Medication 100 MILLIGRAM(S): at 21:42

## 2017-04-28 RX ADMIN — Medication 1: at 21:44

## 2017-04-28 RX ADMIN — Medication 100 MILLIGRAM(S): at 13:14

## 2017-04-28 RX ADMIN — MIRTAZAPINE 7.5 MILLIGRAM(S): 45 TABLET, ORALLY DISINTEGRATING ORAL at 21:40

## 2017-04-28 RX ADMIN — NYSTATIN CREAM 1 APPLICATION(S): 100000 CREAM TOPICAL at 05:38

## 2017-04-28 RX ADMIN — Medication 1 MILLIGRAM(S): at 11:20

## 2017-04-28 RX ADMIN — CLOPIDOGREL BISULFATE 75 MILLIGRAM(S): 75 TABLET, FILM COATED ORAL at 11:20

## 2017-04-28 RX ADMIN — APIXABAN 5 MILLIGRAM(S): 2.5 TABLET, FILM COATED ORAL at 17:25

## 2017-04-28 RX ADMIN — Medication 50 MILLIGRAM(S): at 05:35

## 2017-04-28 RX ADMIN — APIXABAN 5 MILLIGRAM(S): 2.5 TABLET, FILM COATED ORAL at 05:34

## 2017-04-28 RX ADMIN — Medication 1 TABLET(S): at 11:20

## 2017-04-28 RX ADMIN — GABAPENTIN 300 MILLIGRAM(S): 400 CAPSULE ORAL at 21:40

## 2017-04-28 RX ADMIN — GABAPENTIN 300 MILLIGRAM(S): 400 CAPSULE ORAL at 13:13

## 2017-04-28 RX ADMIN — Medication 325 MILLIGRAM(S): at 11:20

## 2017-04-28 RX ADMIN — Medication 100 MILLIGRAM(S): at 05:35

## 2017-04-28 RX ADMIN — GABAPENTIN 300 MILLIGRAM(S): 400 CAPSULE ORAL at 05:34

## 2017-04-28 RX ADMIN — Medication 0.25 MILLIGRAM(S): at 21:55

## 2017-04-28 RX ADMIN — Medication 50 MILLIGRAM(S): at 17:25

## 2017-04-28 RX ADMIN — Medication 1 APPLICATION(S): at 05:39

## 2017-04-28 RX ADMIN — ATORVASTATIN CALCIUM 40 MILLIGRAM(S): 80 TABLET, FILM COATED ORAL at 21:41

## 2017-04-28 RX ADMIN — Medication 500 MILLIGRAM(S): at 11:20

## 2017-04-28 RX ADMIN — Medication 1 APPLICATION(S): at 21:44

## 2017-04-28 RX ADMIN — INSULIN GLARGINE 20 UNIT(S): 100 INJECTION, SOLUTION SUBCUTANEOUS at 21:42

## 2017-04-28 RX ADMIN — ZINC SULFATE TAB 220 MG (50 MG ZINC EQUIVALENT) 220 MILLIGRAM(S): 220 (50 ZN) TAB at 11:20

## 2017-04-28 RX ADMIN — Medication 100 MILLIGRAM(S): at 21:40

## 2017-04-28 RX ADMIN — Medication 2: at 17:24

## 2017-04-28 RX ADMIN — Medication 1 APPLICATION(S): at 16:27

## 2017-04-28 NOTE — PROGRESS NOTE ADULT - SUBJECTIVE AND OBJECTIVE BOX
s/p L AKA. Pt transferred to rehab.     ICU Vital Signs Last 24 Hrs  T(C): 35.9, Max: 36.1 (04-27 @ 20:00)  T(F): 96.7, Max: 97 (04-27 @ 21:36)  HR: 73 (65 - 73)  BP: 112/59 (112/59 - 128/70)  BP(mean): --  ABP: --  ABP(mean): --  RR: 18 (18 - 18)  SpO2: 95% (95% - 98%)      PE:  L AKA C/D/I  R foot stage 2 pressure injury to the heel improved  2nd to with dorsal distal eschar, improved erythema

## 2017-04-28 NOTE — PROGRESS NOTE ADULT - SUBJECTIVE AND OBJECTIVE BOX
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 Meyer. Subsequently admitted to acute rehab. Developed LLE post operative wound dehiscence, s/p left AKA and RLE angiogram on 4/14/17. Also s/p revascularization of RLE with angiogram and R SFA/POP angioplasty on 4/20/17. Hospital stay significant for: Completed course of cefazolin for UTI. Restarted on Abx for R foot second toe gangrene. Transfused two units PRBCs.    Interval history: No acute events overnight. Seen and examined. Reports poor sleep overnight. Pain controlled. + BM. Denies dysuria.    REVIEW OF SYSTEMS  Constitutional - No fever, No weight loss, No fatigue  HEENT - No eye pain, No visual disturbances, No difficulty hearing, No tinnitus, No vertigo, No neck pain  Neurological - No headaches, No memory loss, No loss of strength, No numbness, No tremors  Skin - No itching, No rashes, No lesions   Musculoskeletal - As in HPI  Psychiatric - Reports some anxiety per staff.    ICU Vital Signs Last 24 Hrs  T(C): 36.3, Max: 36.3 (04-28 @ 13:39)  T(F): 97.3, Max: 97.3 (04-28 @ 13:39)  HR: 77 (65 - 77)  BP: 100/54 (100/54 - 128/70)  BP(mean): --  ABP: --  ABP(mean): --  RR: 18 (18 - 18)  SpO2: 95% (95% - 98%)    PHYSICAL EXAM  Constitutional - NAD, Comfortable  Chest - CTA bilaterally, No wheeze, No rhonchi, No crackles  Cardiovascular - RRR, S1S2, No murmurs  Abdomen - BS+, Soft, NTND  Extremities - No C/C/E, No calf tenderness of RLE  Neurologic Exam -                    Cognitive - Awake, Alert, AAO to self, place, date, year, situation     Communication - Fluent, No dysarthria     Motor - No focal deficits                    LEFT    UE - ShAB 5/5, EF 5/5, EE 5/5, WE 5/5,  5/5                    RIGHT UE - ShAB 5/5, EF 5/5, EE 5/5, WE 5/5,  5/5                    LEFT    LE - N/A                    RIGHT LE - At least 3-4/5, limited by pain     Sensory - Intact to LT   Psychiatric - Mood stable, Affect WNL  Skin--LLE AKA incision with staples, c/d/i, Right 2nd toe gangrene on dorsum of toe. R calcaneus stage II pressure ulcer    Functional Progress: Eval     RECENT LABS:                   9.5    6.4   )-----------( 306      ( 28 Apr 2017 07:22 )             30.6     04-28    135  |  98  |  29.0<H>  ----------------------------<  107  4.8   |  26.0  |  1.00    Ca    9.1      28 Apr 2017 07:22  Mg     2.0     04-28    TPro  5.4<L>  /  Alb  2.8<L>  /  TBili  0.2<L>  /  DBili  x   /  AST  13  /  ALT  9   /  AlkPhos  87  04-28    MEDICATIONS  (STANDING):  docusate sodium 100milliGRAM(s) Oral three times a day  ascorbic acid 500milliGRAM(s) Oral daily  multivitamin 1Tablet(s) Oral daily  apixaban 5milliGRAM(s) Oral two times a day  clopidogrel Tablet 75milliGRAM(s) Oral daily  nystatin Powder 1Application(s) Topical two times a day  gabapentin 300milliGRAM(s) Oral every 8 hours  senna 2Tablet(s) Oral at bedtime  atorvastatin 40milliGRAM(s) Oral at bedtime  amLODIPine   Tablet 10milliGRAM(s) Oral daily  lisinopril 5milliGRAM(s) Oral daily  insulin glargine Injectable (LANTUS) 20Unit(s) SubCutaneous at bedtime  insulin lispro (HumaLOG) corrective regimen sliding scale  SubCutaneous Before meals and at bedtime  dextrose 5%. 1000milliLiter(s) IV Continuous <Continuous>  dextrose 50% Injectable 12.5Gram(s) IV Push once  dextrose 50% Injectable 25Gram(s) IV Push once  dextrose 50% Injectable 25Gram(s) IV Push once  ferrous    sulfate 325milliGRAM(s) Oral daily  folic acid 1milliGRAM(s) Oral daily  levothyroxine 88MICROGram(s) Oral daily  mirtazapine 7.5milliGRAM(s) Oral at bedtime  pantoprazole    Tablet 40milliGRAM(s) Oral before breakfast  polyethylene glycol 3350 17Gram(s) Oral daily  tamsulosin 0.4milliGRAM(s) Oral at bedtime  zinc sulfate 220milliGRAM(s) Oral daily  ceFAZolin   IVPB 1000milliGRAM(s) IV Intermittent every 8 hours  metoprolol 50milliGRAM(s) Oral every 12 hours  dibucaine 1% Ointment 1Application(s) Topical three times a day  BACItracin   Ointment 1Application(s) Topical daily    MEDICATIONS  (PRN):  acetaminophen   Tablet 650milliGRAM(s) Oral every 6 hours PRN For Temp greater than 38 C (100.4 F)  acetaminophen   Tablet. 650milliGRAM(s) Oral every 6 hours PRN Mild Pain (1 - 3)  dextrose Gel 1Dose(s) Oral once PRN Blood Glucose LESS THAN 70 milliGRAM(s)/deciliter  glucagon  Injectable 1milliGRAM(s) IntraMuscular once PRN Glucose LESS THAN 70 milligrams/deciliter  oxyCODONE  5 mG/acetaminophen 325 mG 1Tablet(s) Oral every 4 hours PRN Moderate Pain (4 - 6)    ASSESSMENT AND PLAN:  82 year old F with extensive PMH including PVD s/p multiple stents, 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 Meyer, s/p left AKA and RLE angiogram on 4/14/17. Also s/p revascularization of RLE with angiogram and R SFA/POP angioplasty on 4/20/17. Hospital stay significant for: Completed course of cefazolin for UTI. Restarted on Abx for R foot second toe gangrene. Transfused two units PRBCs. Presents with functional deficits.     PVD s/p L AKA: Residual limb--daily dressing changes, ACE wrap, keep c/d/i    S/p RLE angio, R toe gangrene: Dry sterile dressing daily, Vascular following. Cefazolin until 5/4/17    R heel stage II ulcer: Bacitracin, daily dressing change    Pain control: Percocet, gabapentin, tylenol.     PAF: Eliquis    DM II: Lantus, ISS, BG checks    HTN: Amlodipine, metoprolol, lisinopril    CAD: Metoprolol, lipitor, plavix    Bowel regimen: Senna, colace, miralax    Acute blood loss anemia: Hgb stable. FeSO4    Hypothyroidism: Synthroid    Urinary retention: Resolved. On Flomax. Consider dc in next few days if stable    Appetite/mood: Remeron    GI/supplements: Protonix, zinc sulfate, MVI, folic acid    Anxiety: Add Xanax prn    Comprehensive Rehab Program, 3 hours of daily therapy Subjective:  82 year old F with PMH CAD s/p stents x 2, DM2, HTN, HLD, anxiety, significant PVD s/p multiple stents, PAF on Eliquis who is 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 Meyer. Subsequently admitted to acute rehab. Developed LLE post operative wound dehiscence, s/p left AKA and RLE angiogram on 4/14/17. Also s/p revascularization of RLE with angiogram and R SFA/POP angioplasty on 4/20/17. Hospital stay significant for: Completed course of cefazolin for UTI. Restarted on Abx for R foot second toe gangrene. Transfused two units PRBCs.    Interval history: No acute events overnight. Seen and examined. Reports poor sleep overnight. Pain controlled. + BM. Denies dysuria.    REVIEW OF SYSTEMS  Constitutional - No fever, No weight loss, No fatigue  HEENT - No eye pain, No visual disturbances, No difficulty hearing, No tinnitus, No vertigo, No neck pain  Neurological - No headaches, No memory loss, No loss of strength, No numbness, No tremors  Skin - No itching, No rashes, No lesions   Musculoskeletal - As in HPI  Psychiatric - Reports some anxiety per staff.    ICU Vital Signs Last 24 Hrs  T(C): 36.3, Max: 36.3 (04-28 @ 13:39)  T(F): 97.3, Max: 97.3 (04-28 @ 13:39)  HR: 77 (65 - 77)  BP: 100/54 (100/54 - 128/70)  BP(mean): --  ABP: --  ABP(mean): --  RR: 18 (18 - 18)  SpO2: 95% (95% - 98%)    PHYSICAL EXAM  Constitutional - NAD, Comfortable  Chest - CTA bilaterally, No wheeze, No rhonchi, No crackles  Cardiovascular - RRR, S1S2, No murmurs  Abdomen - BS+, Soft, NTND  Extremities - No C/C/E, No calf tenderness of RLE  Neurologic Exam -                    Cognitive - Awake, Alert, AAO to self, place, date, year, situation     Communication - Fluent, No dysarthria     Motor - No focal deficits                    LEFT    UE - ShAB 5/5, EF 5/5, EE 5/5, WE 5/5,  5/5                    RIGHT UE - ShAB 5/5, EF 5/5, EE 5/5, WE 5/5,  5/5                    LEFT    LE - N/A                    RIGHT LE - At least 3-4/5, limited by pain     Sensory - Intact to LT   Psychiatric - Mood stable, Affect WNL  Skin--LLE AKA incision with staples, c/d/i, Right 2nd toe gangrene on dorsum of toe. R calcaneus stage II pressure ulcer    Functional Progress: Eval     RECENT LABS:                   9.5    6.4   )-----------( 306      ( 28 Apr 2017 07:22 )             30.6     04-28    135  |  98  |  29.0<H>  ----------------------------<  107  4.8   |  26.0  |  1.00    Ca    9.1      28 Apr 2017 07:22  Mg     2.0     04-28    TPro  5.4<L>  /  Alb  2.8<L>  /  TBili  0.2<L>  /  DBili  x   /  AST  13  /  ALT  9   /  AlkPhos  87  04-28    MEDICATIONS  (STANDING):  docusate sodium 100milliGRAM(s) Oral three times a day  ascorbic acid 500milliGRAM(s) Oral daily  multivitamin 1Tablet(s) Oral daily  apixaban 5milliGRAM(s) Oral two times a day  clopidogrel Tablet 75milliGRAM(s) Oral daily  nystatin Powder 1Application(s) Topical two times a day  gabapentin 300milliGRAM(s) Oral every 8 hours  senna 2Tablet(s) Oral at bedtime  atorvastatin 40milliGRAM(s) Oral at bedtime  amLODIPine   Tablet 10milliGRAM(s) Oral daily  lisinopril 5milliGRAM(s) Oral daily  insulin glargine Injectable (LANTUS) 20Unit(s) SubCutaneous at bedtime  insulin lispro (HumaLOG) corrective regimen sliding scale  SubCutaneous Before meals and at bedtime  dextrose 5%. 1000milliLiter(s) IV Continuous <Continuous>  dextrose 50% Injectable 12.5Gram(s) IV Push once  dextrose 50% Injectable 25Gram(s) IV Push once  dextrose 50% Injectable 25Gram(s) IV Push once  ferrous    sulfate 325milliGRAM(s) Oral daily  folic acid 1milliGRAM(s) Oral daily  levothyroxine 88MICROGram(s) Oral daily  mirtazapine 7.5milliGRAM(s) Oral at bedtime  pantoprazole    Tablet 40milliGRAM(s) Oral before breakfast  polyethylene glycol 3350 17Gram(s) Oral daily  tamsulosin 0.4milliGRAM(s) Oral at bedtime  zinc sulfate 220milliGRAM(s) Oral daily  ceFAZolin   IVPB 1000milliGRAM(s) IV Intermittent every 8 hours  metoprolol 50milliGRAM(s) Oral every 12 hours  dibucaine 1% Ointment 1Application(s) Topical three times a day  BACItracin   Ointment 1Application(s) Topical daily    MEDICATIONS  (PRN):  acetaminophen   Tablet 650milliGRAM(s) Oral every 6 hours PRN For Temp greater than 38 C (100.4 F)  acetaminophen   Tablet. 650milliGRAM(s) Oral every 6 hours PRN Mild Pain (1 - 3)  dextrose Gel 1Dose(s) Oral once PRN Blood Glucose LESS THAN 70 milliGRAM(s)/deciliter  glucagon  Injectable 1milliGRAM(s) IntraMuscular once PRN Glucose LESS THAN 70 milligrams/deciliter  oxyCODONE  5 mG/acetaminophen 325 mG 1Tablet(s) Oral every 4 hours PRN Moderate Pain (4 - 6)    ASSESSMENT AND PLAN:  82 year old F with extensive PMH including PVD s/p multiple stents, 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 Meyer, s/p left AKA and RLE angiogram on 4/14/17. Also s/p revascularization of RLE with angiogram and R SFA/POP angioplasty on 4/20/17. Hospital stay significant for: Completed course of cefazolin for UTI. Restarted on Abx for R foot second toe gangrene. Transfused two units PRBCs. Presents with functional deficits.     PVD s/p L AKA: Residual limb--daily dressing changes, ACE wrap, keep c/d/i    S/p RLE angio, R toe gangrene: Dry sterile dressing daily, Vascular following. Cefazolin until 5/4/17    R heel stage II ulcer: Bacitracin, daily dressing change    Pain control: Percocet, gabapentin, tylenol.     PAF: Eliquis    DM II: Lantus, ISS, BG checks    HTN: Amlodipine, metoprolol, lisinopril    CAD: Metoprolol, lipitor, plavix    Bowel regimen: Senna, colace, miralax    Acute blood loss anemia: Hgb stable. FeSO4    Hypothyroidism: Synthroid    Urinary retention: Voiding well on Flomax. Consider dc Flomax in next few days if stable as functional status improves and check bladder scans    Appetite/mood: Remeron    GI/supplements: Protonix, zinc sulfate, MVI, folic acid    Anxiety: Add Xanax qhs prn    Comprehensive Rehab Program, 3 hours of daily therapy

## 2017-04-29 DIAGNOSIS — I10 ESSENTIAL (PRIMARY) HYPERTENSION: ICD-10-CM

## 2017-04-29 DIAGNOSIS — E11.9 TYPE 2 DIABETES MELLITUS WITHOUT COMPLICATIONS: ICD-10-CM

## 2017-04-29 DIAGNOSIS — D50.0 IRON DEFICIENCY ANEMIA SECONDARY TO BLOOD LOSS (CHRONIC): ICD-10-CM

## 2017-04-29 DIAGNOSIS — I25.10 ATHEROSCLEROTIC HEART DISEASE OF NATIVE CORONARY ARTERY WITHOUT ANGINA PECTORIS: ICD-10-CM

## 2017-04-29 PROCEDURE — 99232 SBSQ HOSP IP/OBS MODERATE 35: CPT | Mod: GC

## 2017-04-29 RX ADMIN — INSULIN GLARGINE 20 UNIT(S): 100 INJECTION, SOLUTION SUBCUTANEOUS at 22:05

## 2017-04-29 RX ADMIN — MIRTAZAPINE 7.5 MILLIGRAM(S): 45 TABLET, ORALLY DISINTEGRATING ORAL at 22:04

## 2017-04-29 RX ADMIN — NYSTATIN CREAM 1 APPLICATION(S): 100000 CREAM TOPICAL at 05:17

## 2017-04-29 RX ADMIN — Medication 88 MICROGRAM(S): at 05:17

## 2017-04-29 RX ADMIN — Medication 650 MILLIGRAM(S): at 22:00

## 2017-04-29 RX ADMIN — PANTOPRAZOLE SODIUM 40 MILLIGRAM(S): 20 TABLET, DELAYED RELEASE ORAL at 05:17

## 2017-04-29 RX ADMIN — Medication 50 MILLIGRAM(S): at 05:17

## 2017-04-29 RX ADMIN — Medication 1 APPLICATION(S): at 05:17

## 2017-04-29 RX ADMIN — TAMSULOSIN HYDROCHLORIDE 0.4 MILLIGRAM(S): 0.4 CAPSULE ORAL at 22:04

## 2017-04-29 RX ADMIN — POLYETHYLENE GLYCOL 3350 17 GRAM(S): 17 POWDER, FOR SOLUTION ORAL at 12:09

## 2017-04-29 RX ADMIN — Medication 100 MILLIGRAM(S): at 22:05

## 2017-04-29 RX ADMIN — Medication 100 MILLIGRAM(S): at 22:04

## 2017-04-29 RX ADMIN — GABAPENTIN 300 MILLIGRAM(S): 400 CAPSULE ORAL at 14:42

## 2017-04-29 RX ADMIN — Medication 50 MILLIGRAM(S): at 17:52

## 2017-04-29 RX ADMIN — Medication 100 MILLIGRAM(S): at 14:42

## 2017-04-29 RX ADMIN — AMLODIPINE BESYLATE 10 MILLIGRAM(S): 2.5 TABLET ORAL at 05:17

## 2017-04-29 RX ADMIN — APIXABAN 5 MILLIGRAM(S): 2.5 TABLET, FILM COATED ORAL at 17:42

## 2017-04-29 RX ADMIN — ATORVASTATIN CALCIUM 40 MILLIGRAM(S): 80 TABLET, FILM COATED ORAL at 22:04

## 2017-04-29 RX ADMIN — GABAPENTIN 300 MILLIGRAM(S): 400 CAPSULE ORAL at 22:04

## 2017-04-29 RX ADMIN — NYSTATIN CREAM 1 APPLICATION(S): 100000 CREAM TOPICAL at 17:49

## 2017-04-29 RX ADMIN — Medication 3: at 17:47

## 2017-04-29 RX ADMIN — Medication 325 MILLIGRAM(S): at 12:08

## 2017-04-29 RX ADMIN — Medication 500 MILLIGRAM(S): at 12:08

## 2017-04-29 RX ADMIN — Medication 100 MILLIGRAM(S): at 05:19

## 2017-04-29 RX ADMIN — ZINC SULFATE TAB 220 MG (50 MG ZINC EQUIVALENT) 220 MILLIGRAM(S): 220 (50 ZN) TAB at 12:09

## 2017-04-29 RX ADMIN — Medication 650 MILLIGRAM(S): at 21:06

## 2017-04-29 RX ADMIN — LISINOPRIL 5 MILLIGRAM(S): 2.5 TABLET ORAL at 05:17

## 2017-04-29 RX ADMIN — APIXABAN 5 MILLIGRAM(S): 2.5 TABLET, FILM COATED ORAL at 05:17

## 2017-04-29 RX ADMIN — Medication 100 MILLIGRAM(S): at 05:17

## 2017-04-29 RX ADMIN — Medication 1 TABLET(S): at 12:08

## 2017-04-29 RX ADMIN — CLOPIDOGREL BISULFATE 75 MILLIGRAM(S): 75 TABLET, FILM COATED ORAL at 12:08

## 2017-04-29 RX ADMIN — Medication 1 MILLIGRAM(S): at 12:08

## 2017-04-29 RX ADMIN — GABAPENTIN 300 MILLIGRAM(S): 400 CAPSULE ORAL at 05:17

## 2017-04-29 RX ADMIN — Medication 1 APPLICATION(S): at 12:08

## 2017-04-29 RX ADMIN — Medication 1 APPLICATION(S): at 22:05

## 2017-04-29 RX ADMIN — SENNA PLUS 2 TABLET(S): 8.6 TABLET ORAL at 22:04

## 2017-04-29 RX ADMIN — Medication 1 APPLICATION(S): at 14:43

## 2017-04-29 RX ADMIN — Medication 4: at 12:11

## 2017-04-29 NOTE — DIETITIAN INITIAL EVALUATION ADULT. - OTHER INFO
Pt s/p AKA (4/14). Tolerating cons cho, dash diet with good intake. Diet education discussed previously.

## 2017-04-29 NOTE — PROGRESS NOTE ADULT - ASSESSMENT
85 yr old female with PMH CAD s/p stents, DM2, hypertension, hyperlipidemia, anxiety, significant PVD s/p multiple stents, paroxysmal atrial fibrillation on Eliquis admitted for hypertensive urgency post LE angiogram. 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 Hospital course complicated by urinary retention with UTI (failed TOV), hyponatremia likely due to pain and improved with NaCl (per nephrology) and pain medications. BP meds and insulin coverage titrated for better control.  Patient was   transferred  to acute rehab , pain poorly controlled , duragesic  patch discontinued due to lethargy ,feels better low blood sugar this am . Revaluated by vascular surgery , found to have  postoperative wound dehiscence. Had AKA now in rehab

## 2017-04-29 NOTE — PROGRESS NOTE ADULT - SUBJECTIVE AND OBJECTIVE BOX
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 Meyer. Subsequently admitted to acute rehab. Developed LLE post operative wound dehiscence, s/p left AKA and RLE angiogram on 4/14/17. Also s/p revascularization of RLE with angiogram and R SFA/POP angioplasty on 4/20/17. Hospital stay significant for: Completed course of cefazolin for UTI. Restarted on Abx for R foot second toe gangrene. Transfused two units PRBCs.    Interval history: No acute events overnight. Seen and examined. Patient complains of pain in both legs. Some relief with current meds.     REVIEW OF SYSTEMS  Constitutional - No fever, No weight loss, No fatigue  HEENT - No eye pain, No visual disturbances, No difficulty hearing, No tinnitus, No vertigo, No neck pain  Neurological - No headaches, No memory loss, No loss of strength, No numbness, No tremors  Skin - No itching, No rashes, No lesions   Musculoskeletal - As in HPI  Psychiatric - Reports some anxiety per staff.    Vital Signs Last 24 Hrs  T(C): 36.6, Max: 36.6 (04-29 @ 11:20)  T(F): 97.8, Max: 97.8 (04-29 @ 11:20)  HR: 65 (65 - 77)  BP: 125/59 (100/54 - 125/59)  RR: 18 (18 - 18)  SpO2: 97% (95% - 97%)    PHYSICAL EXAM  Constitutional - NAD, Comfortable  Chest - CTA bilaterally, No wheeze, No rhonchi, No crackles  Cardiovascular - RRR, S1S2, No murmurs  Abdomen - BS+, Soft, NTND  Extremities - No C/C/E, No calf tenderness of RLE  Neurologic Exam -                    Cognitive - Awake, Alert, AAO to self, place, date, year, situation     Communication - Fluent, No dysarthria     Motor - No focal deficits                    LEFT    UE - ShAB 5/5, EF 5/5, EE 5/5, WE 5/5,  5/5                    RIGHT UE - ShAB 5/5, EF 5/5, EE 5/5, WE 5/5,  5/5                    LEFT    LE - N/A                    RIGHT LE - At least 3-4/5, limited by pain     Sensory - Intact to LT   Psychiatric - Mood stable, Affect WNL  Skin--LLE AKA incision with staples, c/d/i, Right 2nd toe gangrene on dorsum of toe. R calcaneus stage II pressure ulcer. Bright red blood noted on dressing of right foot, near toes.    Functional Progress: Eval     RECENT LABS:                   9.5    6.4   )-----------( 306      ( 28 Apr 2017 07:22 )             30.6     04-28    135  |  98  |  29.0<H>  ----------------------------<  107  4.8   |  26.0  |  1.00    Ca    9.1      28 Apr 2017 07:22  Mg     2.0     04-28    TPro  5.4<L>  /  Alb  2.8<L>  /  TBili  0.2<L>  /  DBili  x   /  AST  13  /  ALT  9   /  AlkPhos  87  04-28    MEDICATIONS  (STANDING):  docusate sodium 100milliGRAM(s) Oral three times a day  ascorbic acid 500milliGRAM(s) Oral daily  multivitamin 1Tablet(s) Oral daily  apixaban 5milliGRAM(s) Oral two times a day  clopidogrel Tablet 75milliGRAM(s) Oral daily  nystatin Powder 1Application(s) Topical two times a day  gabapentin 300milliGRAM(s) Oral every 8 hours  senna 2Tablet(s) Oral at bedtime  atorvastatin 40milliGRAM(s) Oral at bedtime  amLODIPine   Tablet 10milliGRAM(s) Oral daily  lisinopril 5milliGRAM(s) Oral daily  insulin glargine Injectable (LANTUS) 20Unit(s) SubCutaneous at bedtime  insulin lispro (HumaLOG) corrective regimen sliding scale  SubCutaneous Before meals and at bedtime  dextrose 5%. 1000milliLiter(s) IV Continuous <Continuous>  dextrose 50% Injectable 12.5Gram(s) IV Push once  dextrose 50% Injectable 25Gram(s) IV Push once  dextrose 50% Injectable 25Gram(s) IV Push once  ferrous    sulfate 325milliGRAM(s) Oral daily  folic acid 1milliGRAM(s) Oral daily  levothyroxine 88MICROGram(s) Oral daily  mirtazapine 7.5milliGRAM(s) Oral at bedtime  pantoprazole    Tablet 40milliGRAM(s) Oral before breakfast  polyethylene glycol 3350 17Gram(s) Oral daily  tamsulosin 0.4milliGRAM(s) Oral at bedtime  zinc sulfate 220milliGRAM(s) Oral daily  ceFAZolin   IVPB 1000milliGRAM(s) IV Intermittent every 8 hours  metoprolol 50milliGRAM(s) Oral every 12 hours  dibucaine 1% Ointment 1Application(s) Topical three times a day  BACItracin   Ointment 1Application(s) Topical daily    MEDICATIONS  (PRN):  acetaminophen   Tablet 650milliGRAM(s) Oral every 6 hours PRN For Temp greater than 38 C (100.4 F)  acetaminophen   Tablet. 650milliGRAM(s) Oral every 6 hours PRN Mild Pain (1 - 3)  dextrose Gel 1Dose(s) Oral once PRN Blood Glucose LESS THAN 70 milliGRAM(s)/deciliter  glucagon  Injectable 1milliGRAM(s) IntraMuscular once PRN Glucose LESS THAN 70 milligrams/deciliter  oxyCODONE  5 mG/acetaminophen 325 mG 1Tablet(s) Oral every 4 hours PRN Moderate Pain (4 - 6)    ASSESSMENT AND PLAN:  82 year old F with extensive PMH including PVD s/p multiple stents, 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 Meyer, s/p left AKA and RLE angiogram on 4/14/17. Also s/p revascularization of RLE with angiogram and R SFA/POP angioplasty on 4/20/17. Hospital stay significant for: Completed course of cefazolin for UTI. Restarted on Abx for R foot second toe gangrene. Transfused two units PRBCs. Presents with functional deficits.     PVD s/p L AKA: Residual limb--daily dressing changes, ACE wrap, keep c/d/i    S/p RLE angio, R toe gangrene: Dry sterile dressing daily, Vascular following. Cefazolin until 5/4/17    R heel stage II ulcer: Bacitracin, daily dressing change. Will check right foot, left AKA with nurse during dressing changes.   Vascular follow up appreciated.     Pain control: Percocet, gabapentin, tylenol.     PAF: Eliquis    DM II: Lantus, ISS, BG checks    HTN: Amlodipine, metoprolol, lisinopril    CAD: Metoprolol, lipitor, plavix    Bowel regimen: Senna, colace, miralax    Acute blood loss anemia: Hgb stable. FeSO4    Hypothyroidism: Synthroid    Urinary retention: Voiding well on Flomax. Consider dc Flomax in next few days if stable as functional status improves and check bladder scans    Appetite/mood: Remeron    GI/supplements: Protonix, zinc sulfate, MVI, folic acid    Anxiety: Add Xanax qhs prn    Comprehensive Rehab Program, 3 hours of daily therapy

## 2017-04-29 NOTE — PROGRESS NOTE ADULT - SUBJECTIVE AND OBJECTIVE BOX
Patient seen and examined . C/O fatigue, tired , no pain resting in the bed     Chart reviewed , pt is seen and examined       HPI: 85 yr old female with PMH CAD s/p stents x 2 , DM2, hypertension, hyperlipidemia, anxiety, significant PVD s/p multiple stents, paroxysmal atrial fibrillation on Eliquis admitted for hypertensive urgency post LE angiogram. 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 Hospital course complicated by urinary retention with UTI (failed TOV), hyponatremia likely due to pain and improved with NaCl (per nephrology) and pain medications. BP meds and insulin coverage titrated for better control.  Patient stable for discharge to acute rehab.     REVIEW OF SYSTEMS:      Vital Signs Last 24 Hrs  T(C): 36.6, Max: 36.6 (04-29 @ 11:20)  T(F): 97.8, Max: 97.8 (04-29 @ 11:20)  HR: 65 (65 - 68)  BP: 125/59 (119/55 - 125/59)  BP(mean): --  RR: 18 (18 - 18)  SpO2: 97% (96% - 97%)    GENERAL: NAD, well-groomed, well-developed  NERVOUS SYSTEM:  Alert & Oriented X3, no focal deficit  CHEST/LUNG: CTA b/l ,  no  rales, rhonchi, wheezing, or rubs  HEART: Regular rate and rhythm; No murmurs, rubs, or gallops  ABDOMEN: Soft, Nontender, Nondistended; Bowel sounds present  EXTREMITIES: s/p L BKA - right foot dressing intact                         9.5    6.4   )-----------( 306      ( 28 Apr 2017 07:22 )             30.6   04-28    135  |  98  |  29.0<H>  ----------------------------<  107  4.8   |  26.0  |  1.00    Ca    9.1      28 Apr 2017 07:22  Mg     2.0     04-28    TPro  5.4<L>  /  Alb  2.8<L>  /  TBili  0.2<L>  /  DBili  x   /  AST  13  /  ALT  9   /  AlkPhos  87  04-28

## 2017-04-30 DIAGNOSIS — E11.52 TYPE 2 DIABETES MELLITUS WITH DIABETIC PERIPHERAL ANGIOPATHY WITH GANGRENE: ICD-10-CM

## 2017-04-30 PROCEDURE — 99232 SBSQ HOSP IP/OBS MODERATE 35: CPT | Mod: GC

## 2017-04-30 RX ORDER — AMLODIPINE BESYLATE 2.5 MG/1
5 TABLET ORAL DAILY
Qty: 0 | Refills: 0 | Status: DISCONTINUED | OUTPATIENT
Start: 2017-05-01 | End: 2017-05-01

## 2017-04-30 RX ORDER — IRON SUCROSE 20 MG/ML
200 INJECTION, SOLUTION INTRAVENOUS DAILY
Qty: 0 | Refills: 0 | Status: COMPLETED | OUTPATIENT
Start: 2017-04-30 | End: 2017-05-01

## 2017-04-30 RX ADMIN — TAMSULOSIN HYDROCHLORIDE 0.4 MILLIGRAM(S): 0.4 CAPSULE ORAL at 21:48

## 2017-04-30 RX ADMIN — IRON SUCROSE 110 MILLIGRAM(S): 20 INJECTION, SOLUTION INTRAVENOUS at 17:11

## 2017-04-30 RX ADMIN — PANTOPRAZOLE SODIUM 40 MILLIGRAM(S): 20 TABLET, DELAYED RELEASE ORAL at 06:26

## 2017-04-30 RX ADMIN — Medication 100 MILLIGRAM(S): at 13:13

## 2017-04-30 RX ADMIN — Medication 1 TABLET(S): at 11:37

## 2017-04-30 RX ADMIN — Medication 100 MILLIGRAM(S): at 21:49

## 2017-04-30 RX ADMIN — Medication 325 MILLIGRAM(S): at 11:37

## 2017-04-30 RX ADMIN — Medication 100 MILLIGRAM(S): at 06:26

## 2017-04-30 RX ADMIN — NYSTATIN CREAM 1 APPLICATION(S): 100000 CREAM TOPICAL at 17:11

## 2017-04-30 RX ADMIN — Medication 1: at 21:49

## 2017-04-30 RX ADMIN — Medication 1 APPLICATION(S): at 13:14

## 2017-04-30 RX ADMIN — CLOPIDOGREL BISULFATE 75 MILLIGRAM(S): 75 TABLET, FILM COATED ORAL at 11:36

## 2017-04-30 RX ADMIN — ATORVASTATIN CALCIUM 40 MILLIGRAM(S): 80 TABLET, FILM COATED ORAL at 21:49

## 2017-04-30 RX ADMIN — LISINOPRIL 5 MILLIGRAM(S): 2.5 TABLET ORAL at 06:26

## 2017-04-30 RX ADMIN — INSULIN GLARGINE 20 UNIT(S): 100 INJECTION, SOLUTION SUBCUTANEOUS at 21:49

## 2017-04-30 RX ADMIN — AMLODIPINE BESYLATE 10 MILLIGRAM(S): 2.5 TABLET ORAL at 06:26

## 2017-04-30 RX ADMIN — GABAPENTIN 300 MILLIGRAM(S): 400 CAPSULE ORAL at 21:48

## 2017-04-30 RX ADMIN — APIXABAN 5 MILLIGRAM(S): 2.5 TABLET, FILM COATED ORAL at 06:26

## 2017-04-30 RX ADMIN — Medication 50 MILLIGRAM(S): at 06:26

## 2017-04-30 RX ADMIN — APIXABAN 5 MILLIGRAM(S): 2.5 TABLET, FILM COATED ORAL at 17:11

## 2017-04-30 RX ADMIN — SENNA PLUS 2 TABLET(S): 8.6 TABLET ORAL at 21:48

## 2017-04-30 RX ADMIN — GABAPENTIN 300 MILLIGRAM(S): 400 CAPSULE ORAL at 06:26

## 2017-04-30 RX ADMIN — Medication 500 MILLIGRAM(S): at 11:36

## 2017-04-30 RX ADMIN — Medication 1 APPLICATION(S): at 06:27

## 2017-04-30 RX ADMIN — NYSTATIN CREAM 1 APPLICATION(S): 100000 CREAM TOPICAL at 06:27

## 2017-04-30 RX ADMIN — Medication 2: at 13:13

## 2017-04-30 RX ADMIN — Medication 1 MILLIGRAM(S): at 11:37

## 2017-04-30 RX ADMIN — GABAPENTIN 300 MILLIGRAM(S): 400 CAPSULE ORAL at 13:13

## 2017-04-30 RX ADMIN — Medication 88 MICROGRAM(S): at 06:26

## 2017-04-30 RX ADMIN — Medication 1 APPLICATION(S): at 21:49

## 2017-04-30 RX ADMIN — MIRTAZAPINE 7.5 MILLIGRAM(S): 45 TABLET, ORALLY DISINTEGRATING ORAL at 21:48

## 2017-04-30 RX ADMIN — ZINC SULFATE TAB 220 MG (50 MG ZINC EQUIVALENT) 220 MILLIGRAM(S): 220 (50 ZN) TAB at 11:37

## 2017-04-30 RX ADMIN — Medication 4: at 17:12

## 2017-04-30 NOTE — PROGRESS NOTE ADULT - ASSESSMENT
85 yr old female with PMH CAD s/p stents x 2 , DM2, hypertension, hyperlipidemia, anxiety, significant PVD s/p multiple stents, paroxysmal atrial fibrillation on Eliquis admitted for hypertensive urgency post LE angiogram. 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 Hospital course complicated by urinary retention with UTI (failed TOV), hyponatremia likely due to pain and improved with NaCl (per nephrology) and pain medications. BP meds and insulin coverage titrated for better control.  Patient stable for discharge to acute rehab.

## 2017-04-30 NOTE — PROGRESS NOTE ADULT - SUBJECTIVE AND OBJECTIVE BOX
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 Meyer. Subsequently admitted to acute rehab. Developed LLE post operative wound dehiscence, s/p left AKA and RLE angiogram on 4/14/17. Also s/p revascularization of RLE with angiogram and R SFA/POP angioplasty on 4/20/17. Hospital stay significant for: Completed course of cefazolin for UTI. Restarted on Abx for R foot second toe gangrene. Transfused two units PRBCs.    Interval history: No acute events overnight. Seen and examined. Patient complains of pain in left foot, tolerable, not asking for pain meds. Appetite better today, no nausea.     REVIEW OF SYSTEMS  Constitutional - No fever, No weight loss, No fatigue  HEENT - No eye pain, No visual disturbances, No difficulty hearing, No tinnitus, No vertigo, No neck pain  Neurological - No headaches, No memory loss, No loss of strength, No numbness, No tremors  Skin - No itching, No rashes, No lesions   Musculoskeletal - As in HPI  Psychiatric - Reports some anxiety per staff.    Vital Signs Last 24 Hrs  Vital Signs Last 24 Hrs  T(C): 36, Max: 36.6 (04-29 @ 11:20)  T(F): 96.8, Max: 97.8 (04-29 @ 11:20)  HR: 76 (65 - 76)  BP: 114/55 (88/50 - 125/59)  RR: 14 (14 - 18)  SpO2: 95% (95% - 97%)    PHYSICAL EXAM  Constitutional - NAD, Comfortable  Chest - CTA bilaterally, No wheeze, No rhonchi, No crackles  Cardiovascular - RRR, S1S2, No murmurs  Abdomen - BS+, Soft, NTND  Extremities - No C/C/E, No calf tenderness of RLE  Neurologic Exam -                    Cognitive - Awake, Alert, AAO to self, place, date, year, situation     Communication - Fluent, No dysarthria     Motor - No focal deficits                    LEFT    UE - ShAB 5/5, EF 5/5, EE 5/5, WE 5/5,  5/5                    RIGHT UE - ShAB 5/5, EF 5/5, EE 5/5, WE 5/5,  5/5                    LEFT    LE - N/A                    RIGHT LE - At least 3-4/5, limited by pain     Sensory - Intact to LT   Psychiatric - Mood stable, Affect WNL  Skin--LLE AKA incision with staples, c/d/i, Right 2nd toe gangrene on dorsum of toe. R calcaneus stage II pressure ulcer.     Functional Progress: Eval     RECENT LABS:                   9.5    6.4   )-----------( 306      ( 28 Apr 2017 07:22 )             30.6     04-28    135  |  98  |  29.0<H>  ----------------------------<  107  4.8   |  26.0  |  1.00    Ca    9.1      28 Apr 2017 07:22  Mg     2.0     04-28    TPro  5.4<L>  /  Alb  2.8<L>  /  TBili  0.2<L>  /  DBili  x   /  AST  13  /  ALT  9   /  AlkPhos  87  04-28    MEDICATIONS  (STANDING):  MEDICATIONS  (STANDING):  docusate sodium 100milliGRAM(s) Oral three times a day  ascorbic acid 500milliGRAM(s) Oral daily  multivitamin 1Tablet(s) Oral daily  apixaban 5milliGRAM(s) Oral two times a day  clopidogrel Tablet 75milliGRAM(s) Oral daily  nystatin Powder 1Application(s) Topical two times a day  gabapentin 300milliGRAM(s) Oral every 8 hours  senna 2Tablet(s) Oral at bedtime  atorvastatin 40milliGRAM(s) Oral at bedtime  lisinopril 5milliGRAM(s) Oral daily  insulin glargine Injectable (LANTUS) 20Unit(s) SubCutaneous at bedtime  insulin lispro (HumaLOG) corrective regimen sliding scale  SubCutaneous Before meals and at bedtime  dextrose 5%. 1000milliLiter(s) IV Continuous <Continuous>  dextrose 50% Injectable 12.5Gram(s) IV Push once  dextrose 50% Injectable 25Gram(s) IV Push once  dextrose 50% Injectable 25Gram(s) IV Push once  ferrous    sulfate 325milliGRAM(s) Oral daily  folic acid 1milliGRAM(s) Oral daily  levothyroxine 88MICROGram(s) Oral daily  mirtazapine 7.5milliGRAM(s) Oral at bedtime  pantoprazole    Tablet 40milliGRAM(s) Oral before breakfast  polyethylene glycol 3350 17Gram(s) Oral daily  tamsulosin 0.4milliGRAM(s) Oral at bedtime  zinc sulfate 220milliGRAM(s) Oral daily  ceFAZolin   IVPB 1000milliGRAM(s) IV Intermittent every 8 hours  metoprolol 50milliGRAM(s) Oral every 12 hours  dibucaine 1% Ointment 1Application(s) Topical three times a day  BACItracin   Ointment 1Application(s) Topical daily    MEDICATIONS  (PRN):  acetaminophen   Tablet 650milliGRAM(s) Oral every 6 hours PRN For Temp greater than 38 C (100.4 F)  acetaminophen   Tablet. 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 Meyer, s/p left AKA and RLE angiogram on 4/14/17. Also s/p revascularization of RLE with angiogram and R SFA/POP angioplasty on 4/20/17. Hospital stay significant for: Completed course of cefazolin for UTI. Restarted on Abx for R foot second toe gangrene. Transfused two units PRBCs. Presents with functional deficits.     PVD s/p L AKA: Residual limb--daily dressing changes, ACE wrap, keep c/d/i    S/p RLE angio, R toe gangrene: Dry sterile dressing daily, Vascular following. Cefazolin until 5/4/17    R heel stage II ulcer: Bacitracin, daily dressing change. Will check right foot, left AKA with nurse during dressing changes.   Vascular follow up appreciated.     Pain control: Percocet, gabapentin, tylenol.     PAF: Eliquis    DM II: Lantus, ISS, BG checks    HTN: Amlodipine, metoprolol, lisinopril    CAD: Metoprolol, lipitor, plavix    Bowel regimen: Senna, colace, miralax    Acute blood loss anemia: Hgb stable. FeSO4    Hypothyroidism: Synthroid    Urinary retention: Voiding well on Flomax. Consider dc Flomax in next few days if stable as functional status improves and check bladder scans    Appetite/mood: Remeron    GI/supplements: Protonix, zinc sulfate, MVI, folic acid    Anxiety: Add Xanax qhs prn, patient to go outside in wheelchair with family.    [facility], 3 hours of daily therapy

## 2017-04-30 NOTE — PROGRESS NOTE ADULT - SUBJECTIVE AND OBJECTIVE BOX
Patient seen and examined , daughter at the bedside    C/O fatigue, pain phantom pain in her left extremity   had BM earlier   BS 70's this am      HPI: 85 yr old female with PMH CAD s/p stents x 2 , DM2, hypertension, hyperlipidemia, anxiety, significant PVD s/p multiple stents, paroxysmal atrial fibrillation on Eliquis admitted for hypertensive urgency post LE angiogram. 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 Hospital course complicated by urinary retention with UTI (failed TOV), hyponatremia likely due to pain and improved with NaCl (per nephrology) and pain medications. BP meds and insulin coverage titrated for better control.  Patient stable for discharge to acute rehab.     REVIEW OF SYSTEMS:  as above otherwise negative     Vital Signs Last 24 Hrs  T(C): 36.8, Max: 36.8 (04-30 @ 11:30)  T(F): 98.2, Max: 98.2 (04-30 @ 11:30)  HR: 72 (65 - 76)  BP: 93/50 (88/50 - 114/55)  BP(mean): --  RR: 18 (14 - 18)  SpO2: 94% (94% - 97%)  GENERAL: NAD, well-groomed, well-developed  CHEST/LUNG: CTA b/l ,  no  rales, rhonchi, wheezing, or rubs  HEART: Regular rate and rhythm; No murmurs, rubs, or gallops  ABDOMEN: Soft, Nontender, Nondistended; Bowel sounds present  EXTREMITIES: s/p L BKA - right foot dressing intact

## 2017-05-01 DIAGNOSIS — E78.5 HYPERLIPIDEMIA, UNSPECIFIED: ICD-10-CM

## 2017-05-01 DIAGNOSIS — K59.00 CONSTIPATION, UNSPECIFIED: ICD-10-CM

## 2017-05-01 PROCEDURE — 99232 SBSQ HOSP IP/OBS MODERATE 35: CPT | Mod: GC

## 2017-05-01 PROCEDURE — 99233 SBSQ HOSP IP/OBS HIGH 50: CPT

## 2017-05-01 RX ORDER — INSULIN LISPRO 100/ML
4 VIAL (ML) SUBCUTANEOUS
Qty: 0 | Refills: 0 | Status: DISCONTINUED | OUTPATIENT
Start: 2017-05-01 | End: 2017-05-02

## 2017-05-01 RX ORDER — METOPROLOL TARTRATE 50 MG
25 TABLET ORAL EVERY 12 HOURS
Qty: 0 | Refills: 0 | Status: DISCONTINUED | OUTPATIENT
Start: 2017-05-01 | End: 2017-05-16

## 2017-05-01 RX ORDER — METOPROLOL TARTRATE 50 MG
25 TABLET ORAL ONCE
Qty: 0 | Refills: 0 | Status: COMPLETED | OUTPATIENT
Start: 2017-05-01 | End: 2017-05-01

## 2017-05-01 RX ORDER — INSULIN GLARGINE 100 [IU]/ML
10 INJECTION, SOLUTION SUBCUTANEOUS AT BEDTIME
Qty: 0 | Refills: 0 | Status: DISCONTINUED | OUTPATIENT
Start: 2017-05-01 | End: 2017-05-09

## 2017-05-01 RX ORDER — LACTULOSE 10 G/15ML
20 SOLUTION ORAL EVERY OTHER DAY
Qty: 0 | Refills: 0 | Status: DISCONTINUED | OUTPATIENT
Start: 2017-05-01 | End: 2017-05-04

## 2017-05-01 RX ADMIN — Medication 88 MICROGRAM(S): at 06:01

## 2017-05-01 RX ADMIN — Medication 1 TABLET(S): at 11:37

## 2017-05-01 RX ADMIN — Medication 100 MILLIGRAM(S): at 21:13

## 2017-05-01 RX ADMIN — TAMSULOSIN HYDROCHLORIDE 0.4 MILLIGRAM(S): 0.4 CAPSULE ORAL at 21:14

## 2017-05-01 RX ADMIN — Medication 100 MILLIGRAM(S): at 06:01

## 2017-05-01 RX ADMIN — CLOPIDOGREL BISULFATE 75 MILLIGRAM(S): 75 TABLET, FILM COATED ORAL at 11:37

## 2017-05-01 RX ADMIN — IRON SUCROSE 110 MILLIGRAM(S): 20 INJECTION, SOLUTION INTRAVENOUS at 11:40

## 2017-05-01 RX ADMIN — NYSTATIN CREAM 1 APPLICATION(S): 100000 CREAM TOPICAL at 17:13

## 2017-05-01 RX ADMIN — ATORVASTATIN CALCIUM 40 MILLIGRAM(S): 80 TABLET, FILM COATED ORAL at 21:14

## 2017-05-01 RX ADMIN — ZINC SULFATE TAB 220 MG (50 MG ZINC EQUIVALENT) 220 MILLIGRAM(S): 220 (50 ZN) TAB at 11:37

## 2017-05-01 RX ADMIN — Medication: at 12:05

## 2017-05-01 RX ADMIN — Medication 100 MILLIGRAM(S): at 21:14

## 2017-05-01 RX ADMIN — GABAPENTIN 300 MILLIGRAM(S): 400 CAPSULE ORAL at 13:53

## 2017-05-01 RX ADMIN — GABAPENTIN 300 MILLIGRAM(S): 400 CAPSULE ORAL at 06:01

## 2017-05-01 RX ADMIN — Medication 1 APPLICATION(S): at 12:03

## 2017-05-01 RX ADMIN — PANTOPRAZOLE SODIUM 40 MILLIGRAM(S): 20 TABLET, DELAYED RELEASE ORAL at 06:01

## 2017-05-01 RX ADMIN — APIXABAN 5 MILLIGRAM(S): 2.5 TABLET, FILM COATED ORAL at 06:01

## 2017-05-01 RX ADMIN — NYSTATIN CREAM 1 APPLICATION(S): 100000 CREAM TOPICAL at 06:01

## 2017-05-01 RX ADMIN — INSULIN GLARGINE 10 UNIT(S): 100 INJECTION, SOLUTION SUBCUTANEOUS at 21:13

## 2017-05-01 RX ADMIN — Medication 100 MILLIGRAM(S): at 13:54

## 2017-05-01 RX ADMIN — Medication 1 APPLICATION(S): at 06:03

## 2017-05-01 RX ADMIN — MIRTAZAPINE 7.5 MILLIGRAM(S): 45 TABLET, ORALLY DISINTEGRATING ORAL at 21:14

## 2017-05-01 RX ADMIN — LACTULOSE 20 GRAM(S): 10 SOLUTION ORAL at 11:44

## 2017-05-01 RX ADMIN — Medication 325 MILLIGRAM(S): at 11:37

## 2017-05-01 RX ADMIN — Medication 0.25 MILLIGRAM(S): at 21:25

## 2017-05-01 RX ADMIN — GABAPENTIN 300 MILLIGRAM(S): 400 CAPSULE ORAL at 21:14

## 2017-05-01 RX ADMIN — Medication 1 APPLICATION(S): at 13:54

## 2017-05-01 RX ADMIN — Medication 100 MILLIGRAM(S): at 13:53

## 2017-05-01 RX ADMIN — Medication 4 UNIT(S): at 17:13

## 2017-05-01 RX ADMIN — Medication 25 MILLIGRAM(S): at 10:26

## 2017-05-01 RX ADMIN — POLYETHYLENE GLYCOL 3350 17 GRAM(S): 17 POWDER, FOR SOLUTION ORAL at 11:39

## 2017-05-01 RX ADMIN — Medication 4 UNIT(S): at 12:05

## 2017-05-01 RX ADMIN — APIXABAN 5 MILLIGRAM(S): 2.5 TABLET, FILM COATED ORAL at 17:05

## 2017-05-01 RX ADMIN — Medication 1 MILLIGRAM(S): at 11:37

## 2017-05-01 RX ADMIN — Medication 1 APPLICATION(S): at 21:26

## 2017-05-01 RX ADMIN — SENNA PLUS 2 TABLET(S): 8.6 TABLET ORAL at 21:14

## 2017-05-01 RX ADMIN — Medication 25 MILLIGRAM(S): at 17:05

## 2017-05-01 RX ADMIN — Medication 500 MILLIGRAM(S): at 12:03

## 2017-05-01 NOTE — PROGRESS NOTE ADULT - SUBJECTIVE AND OBJECTIVE BOX
s/p L AKA and LLE angioplasty stenting. Pt c/o Right heel pain.    Vital Signs Last 24 Hrs  T(C): 36.9, Max: 36.9 (05-01 @ 04:59)  T(F): 98.4, Max: 98.4 (05-01 @ 04:59)  HR: 107 (73 - 107)  BP: 110/61 (92/51 - 110/61)  BP(mean): --  RR: 16 (16 - 18)  SpO2: 99% (95% - 99%)    L AKA C/D/I  RLE: Stage 2 right heel ulcer limited to breakdown of the skin with minimal erythema.  Improved  Dry gangrene of the distal second toe drying out

## 2017-05-01 NOTE — PROGRESS NOTE ADULT - SUBJECTIVE AND OBJECTIVE BOX
CC: Uncontrolled DM  HPI:85 yr old female with PMH CAD s/p stents x 2 , DM2, hypertension, hyperlipidemia, anxiety, significant PVD s/p multiple stents, paroxysmal atrial fibrillation on Eliquis admitted for hypertensive urgency post LE angiogram. 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 Hospital course complicated by urinary retention with UTI (failed TOV), hyponatremia likely due to pain and improved with NaCl (per nephrology) and pain medications. BP meds and insulin coverage titrated for better control.  Patient stable for discharge to acute rehab.     INTERVAL HPI/OVERNIGHT EVENTS: BG low in am and elevated throughout day. ?Dietary issues? BP medications held due to parameters yesterday and today    Vital Signs Last 24 Hrs  T(C): 36.9, Max: 36.9 (05-01 @ 04:59)  T(F): 98.4, Max: 98.4 (05-01 @ 04:59)  HR: 83 (72 - 85)  BP: 95/50 (92/51 - 95/50)  BP(mean): --  RR: 16 (16 - 18)  SpO2: 99% (94% - 99%)  I&O's Detail                         9.7    6.0   )-----------( 259      ( 01 May 2017 07:14 )             31.1     01 May 2017 07:14    139    |  103    |  34.0   ----------------------------<  99     5.0     |  24.0   |  0.94     Ca    8.8        01 May 2017 07:14  Mg     2.0       01 May 2017 07:14    CAPILLARY BLOOD GLUCOSE  88 (01 May 2017 07:52)  200 (30 Apr 2017 21:45)  338 (30 Apr 2017 17:08)  210 (30 Apr 2017 11:30)      MEDICATIONS  (STANDING):  docusate sodium 100milliGRAM(s) Oral three times a day  ascorbic acid 500milliGRAM(s) Oral daily  multivitamin 1Tablet(s) Oral daily  apixaban 5milliGRAM(s) Oral two times a day  clopidogrel Tablet 75milliGRAM(s) Oral daily  nystatin Powder 1Application(s) Topical two times a day  gabapentin 300milliGRAM(s) Oral every 8 hours  senna 2Tablet(s) Oral at bedtime  atorvastatin 40milliGRAM(s) Oral at bedtime  lisinopril 5milliGRAM(s) Oral daily  insulin glargine Injectable (LANTUS) 20Unit(s) SubCutaneous at bedtime  insulin lispro (HumaLOG) corrective regimen sliding scale  SubCutaneous Before meals and at bedtime  dextrose 5%. 1000milliLiter(s) IV Continuous <Continuous>  dextrose 50% Injectable 12.5Gram(s) IV Push once  dextrose 50% Injectable 25Gram(s) IV Push once  dextrose 50% Injectable 25Gram(s) IV Push once  ferrous    sulfate 325milliGRAM(s) Oral daily  folic acid 1milliGRAM(s) Oral daily  levothyroxine 88MICROGram(s) Oral daily  mirtazapine 7.5milliGRAM(s) Oral at bedtime  pantoprazole    Tablet 40milliGRAM(s) Oral before breakfast  polyethylene glycol 3350 17Gram(s) Oral daily  tamsulosin 0.4milliGRAM(s) Oral at bedtime  zinc sulfate 220milliGRAM(s) Oral daily  ceFAZolin   IVPB 1000milliGRAM(s) IV Intermittent every 8 hours  dibucaine 1% Ointment 1Application(s) Topical three times a day  BACItracin   Ointment 1Application(s) Topical daily  iron sucrose IVPB 200milliGRAM(s) IV Intermittent daily  metoprolol 25milliGRAM(s) Oral once  metoprolol 25milliGRAM(s) Oral every 12 hours    MEDICATIONS  (PRN):  acetaminophen   Tablet 650milliGRAM(s) Oral every 6 hours PRN For Temp greater than 38 C (100.4 F)  acetaminophen   Tablet. 650milliGRAM(s) Oral every 6 hours PRN Mild Pain (1 - 3)  dextrose Gel 1Dose(s) Oral once PRN Blood Glucose LESS THAN 70 milliGRAM(s)/deciliter  glucagon  Injectable 1milliGRAM(s) IntraMuscular once PRN Glucose LESS THAN 70 milligrams/deciliter  oxyCODONE  5 mG/acetaminophen 325 mG 1Tablet(s) Oral every 4 hours PRN Moderate Pain (4 - 6)  ALPRAZolam 0.25milliGRAM(s) Oral at bedtime PRN anxiety  oxyCODONE  5 mG/acetaminophen 325 mG 2Tablet(s) Oral every 4 hours PRN Severe Pain (7 - 10)      RADIOLOGY & ADDITIONAL TESTS:    ROS: +Constipation  Denies chest pain, SOB, dizziness, lightheadedness, nausea, vomiting, fever, chills

## 2017-05-01 NOTE — PROGRESS NOTE ADULT - ASSESSMENT
85 yr old female with PMH CAD s/p stents x 2 , DM2, hypertension, hyperlipidemia, anxiety, significant PVD s/p multiple stents, paroxysmal atrial fibrillation on Eliquis admitted for hypertensive urgency post LE angiogram. 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 Hospital course complicated by urinary retention with UTI (failed TOV), hyponatremia likely due to pain and improved with NaCl (per nephrology) and pain medications. BP meds and insulin coverage titrated for better control prior to transfer to rehab. BP now on low side with medications held as per parameters. BG low in am and elevated throughout day. Patient was on Levemir and Metformin at home prior to admission, HGA1c was 8.7 on admission.

## 2017-05-02 PROCEDURE — 99232 SBSQ HOSP IP/OBS MODERATE 35: CPT | Mod: GC

## 2017-05-02 RX ORDER — INSULIN LISPRO 100/ML
VIAL (ML) SUBCUTANEOUS AT BEDTIME
Qty: 0 | Refills: 0 | Status: DISCONTINUED | OUTPATIENT
Start: 2017-05-02 | End: 2017-05-03

## 2017-05-02 RX ORDER — INSULIN LISPRO 100/ML
2 VIAL (ML) SUBCUTANEOUS
Qty: 0 | Refills: 0 | Status: DISCONTINUED | OUTPATIENT
Start: 2017-05-02 | End: 2017-05-15

## 2017-05-02 RX ORDER — INSULIN LISPRO 100/ML
VIAL (ML) SUBCUTANEOUS
Qty: 0 | Refills: 0 | Status: DISCONTINUED | OUTPATIENT
Start: 2017-05-02 | End: 2017-05-04

## 2017-05-02 RX ADMIN — Medication 2 UNIT(S): at 11:45

## 2017-05-02 RX ADMIN — GABAPENTIN 300 MILLIGRAM(S): 400 CAPSULE ORAL at 21:38

## 2017-05-02 RX ADMIN — Medication 100 MILLIGRAM(S): at 14:56

## 2017-05-02 RX ADMIN — INSULIN GLARGINE 10 UNIT(S): 100 INJECTION, SOLUTION SUBCUTANEOUS at 21:38

## 2017-05-02 RX ADMIN — APIXABAN 5 MILLIGRAM(S): 2.5 TABLET, FILM COATED ORAL at 17:11

## 2017-05-02 RX ADMIN — Medication 325 MILLIGRAM(S): at 11:15

## 2017-05-02 RX ADMIN — GABAPENTIN 300 MILLIGRAM(S): 400 CAPSULE ORAL at 14:56

## 2017-05-02 RX ADMIN — Medication 100 MILLIGRAM(S): at 21:38

## 2017-05-02 RX ADMIN — MIRTAZAPINE 7.5 MILLIGRAM(S): 45 TABLET, ORALLY DISINTEGRATING ORAL at 21:38

## 2017-05-02 RX ADMIN — Medication 1 APPLICATION(S): at 05:45

## 2017-05-02 RX ADMIN — ZINC SULFATE TAB 220 MG (50 MG ZINC EQUIVALENT) 220 MILLIGRAM(S): 220 (50 ZN) TAB at 11:16

## 2017-05-02 RX ADMIN — Medication 100 MILLIGRAM(S): at 05:44

## 2017-05-02 RX ADMIN — GABAPENTIN 300 MILLIGRAM(S): 400 CAPSULE ORAL at 05:44

## 2017-05-02 RX ADMIN — Medication 1 APPLICATION(S): at 14:56

## 2017-05-02 RX ADMIN — PANTOPRAZOLE SODIUM 40 MILLIGRAM(S): 20 TABLET, DELAYED RELEASE ORAL at 05:44

## 2017-05-02 RX ADMIN — Medication 1 TABLET(S): at 11:16

## 2017-05-02 RX ADMIN — LISINOPRIL 5 MILLIGRAM(S): 2.5 TABLET ORAL at 05:44

## 2017-05-02 RX ADMIN — POLYETHYLENE GLYCOL 3350 17 GRAM(S): 17 POWDER, FOR SOLUTION ORAL at 11:16

## 2017-05-02 RX ADMIN — CLOPIDOGREL BISULFATE 75 MILLIGRAM(S): 75 TABLET, FILM COATED ORAL at 11:15

## 2017-05-02 RX ADMIN — APIXABAN 5 MILLIGRAM(S): 2.5 TABLET, FILM COATED ORAL at 05:44

## 2017-05-02 RX ADMIN — Medication 1 MILLIGRAM(S): at 11:15

## 2017-05-02 RX ADMIN — NYSTATIN CREAM 1 APPLICATION(S): 100000 CREAM TOPICAL at 05:44

## 2017-05-02 RX ADMIN — NYSTATIN CREAM 1 APPLICATION(S): 100000 CREAM TOPICAL at 17:12

## 2017-05-02 RX ADMIN — Medication 25 MILLIGRAM(S): at 17:11

## 2017-05-02 RX ADMIN — Medication 2 UNIT(S): at 17:12

## 2017-05-02 RX ADMIN — TAMSULOSIN HYDROCHLORIDE 0.4 MILLIGRAM(S): 0.4 CAPSULE ORAL at 21:38

## 2017-05-02 RX ADMIN — ATORVASTATIN CALCIUM 40 MILLIGRAM(S): 80 TABLET, FILM COATED ORAL at 21:38

## 2017-05-02 RX ADMIN — Medication 0.25 MILLIGRAM(S): at 21:38

## 2017-05-02 RX ADMIN — Medication 100 MILLIGRAM(S): at 21:39

## 2017-05-02 RX ADMIN — Medication 1: at 21:38

## 2017-05-02 RX ADMIN — Medication 88 MICROGRAM(S): at 05:44

## 2017-05-02 RX ADMIN — Medication 1 APPLICATION(S): at 11:43

## 2017-05-02 RX ADMIN — SENNA PLUS 2 TABLET(S): 8.6 TABLET ORAL at 21:38

## 2017-05-02 RX ADMIN — Medication 500 MILLIGRAM(S): at 11:15

## 2017-05-02 RX ADMIN — Medication 25 MILLIGRAM(S): at 05:44

## 2017-05-02 RX ADMIN — Medication 1 APPLICATION(S): at 21:40

## 2017-05-02 NOTE — PROGRESS NOTE ADULT - SUBJECTIVE AND OBJECTIVE BOX
Patient seen and examined . C/O nausea , poor appetite         HPI: 85 yr old female with PMH CAD s/p stents x 2 , DM2, hypertension, hyperlipidemia, anxiety, significant PVD s/p multiple stents, paroxysmal atrial fibrillation on Eliquis admitted for hypertensive urgency post LE angiogram. 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 Hospital course complicated by urinary retention with UTI (failed TOV), hyponatremia likely due to pain and improved with NaCl (per nephrology) and pain medications. BP meds and insulin coverage titrated for better control.  Patient stable for discharge to acute rehab.     REVIEW OF SYSTEMS: as above otherwise     Vital Signs Last 24 Hrs  T(C): 36.9, Max: 36.9 (05-02 @ 05:42)  T(F): 98.5, Max: 98.5 (05-02 @ 05:42)  HR: 88 (75 - 107)  BP: 145/68 (110/61 - 178/70)  BP(mean): --  RR: 18 (18 - 18)  SpO2: 97% (96% - 97%)  CAPILLARY BLOOD GLUCOSE  89 (01 May 2017 21:30)  79 (01 May 2017 17:14)  186 (01 May 2017 11:58)    GENERAL: NAD, well-groomed, well-developed  NERVOUS SYSTEM:  Alert & Oriented X3, no focal deficit  CHEST/LUNG: CTA b/l ,  no  rales, rhonchi, wheezing, or rubs  HEART: Regular rate and rhythm; No murmurs, rubs, or gallops  ABDOMEN: Soft, Nontender, Nondistended; Bowel sounds present  EXTREMITIES: s/p L BKA - right foot dressing intact                                           9.7    6.0   )-----------( 259      ( 01 May 2017 07:14 )             31.1   05-01    139  |  103  |  34.0<H>  ----------------------------<  99  5.0   |  24.0  |  0.94    Ca    8.8      01 May 2017 07:14  Mg     2.0     05-01

## 2017-05-02 NOTE — PROGRESS NOTE ADULT - SUBJECTIVE AND OBJECTIVE BOX
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 Meyer. Subsequently admitted to acute rehab. Developed LLE post operative wound dehiscence, s/p left AKA and RLE angiogram on 4/14/17. Also s/p revascularization of RLE with angiogram and R SFA/POP angioplasty on 4/20/17. Hospital stay significant for: Completed course of cefazolin for UTI. Restarted on Abx for R foot second toe gangrene. Transfused two units PRBCs.    Interval history: No acute events overnight.     REVIEW OF SYSTEMS  [ x ] Constitutional WNL  [   ] Cardio WNL  [   ] Resp WNL  [   ] GI WNL  [    ]  WNL  [ x ] Heme WNL  [ x ] Endo WNL  [   ] Skin WNL  [   ] MSK WNL  [   ] Neuro WNL  [x  ] Cognitive WNL  [x  ] Psych WNL    VITAL SIGNS:  Vital Signs Last 24 Hrs  T(C): 36.9, Max: 36.9 (05-02 @ 05:42)  T(F): 98.5, Max: 98.5 (05-02 @ 05:42)  HR: 88 (75 - 105)  BP: 145/68 (116/64 - 178/70)  BP(mean): --  RR: 18 (18 - 18)  SpO2: 97% (96% - 97%)    PHYSICAL EXAM  Constitutional - NAD, Comfortable  Chest - CTA bilaterally, No wheeze, No rhonchi, No crackles  Cardiovascular - RRR, S1S2, No murmurs  Abdomen - BS+, Soft, NTND  Extremities - No C/C/E, No calf tenderness of RLE  Neurologic Exam -                    Cognitive - Awake, Alert, AAO to self, place, date, year, situation     Communication - Fluent, No dysarthria     Motor - No focal deficits                    LEFT    UE - ShAB 5/5, EF 5/5, EE 5/5, WE 5/5,  5/5                    RIGHT UE - ShAB 5/5, EF 5/5, EE 5/5, WE 5/5,  5/5                    LEFT    LE - HF wfl                    RIGHT LE - At least 3-4/5, limited by pain     Sensory - Intact to LT   Psychiatric - Mood stable, Affect WNL  Skin--LLE AKA incision with staples, c/d/i, Right 2nd toe gangrene on dorsum of toe. R calcaneus stage II pressure ulcer.     Functional Progress:   Transfer: Sliding board, min A,  Toileting: Max A  LE dressing: Max, UE dressing: min A    RECENT LABS:                                9.7    6.0   )-----------( 259      ( 01 May 2017 07:14 )             31.1     05-01    139  |  103  |  34.0<H>  ----------------------------<  99  5.0   |  24.0  |  0.94    Ca    8.8      01 May 2017 07:14  Mg     2.0     05-01    MEDICATIONS  (STANDING):  docusate sodium 100milliGRAM(s) Oral three times a day  ascorbic acid 500milliGRAM(s) Oral daily  multivitamin 1Tablet(s) Oral daily  apixaban 5milliGRAM(s) Oral two times a day  clopidogrel Tablet 75milliGRAM(s) Oral daily  nystatin Powder 1Application(s) Topical two times a day  gabapentin 300milliGRAM(s) Oral every 8 hours  senna 2Tablet(s) Oral at bedtime  atorvastatin 40milliGRAM(s) Oral at bedtime  lisinopril 5milliGRAM(s) Oral daily  dextrose 5%. 1000milliLiter(s) IV Continuous <Continuous>  dextrose 50% Injectable 12.5Gram(s) IV Push once  dextrose 50% Injectable 25Gram(s) IV Push once  dextrose 50% Injectable 25Gram(s) IV Push once  ferrous    sulfate 325milliGRAM(s) Oral daily  folic acid 1milliGRAM(s) Oral daily  levothyroxine 88MICROGram(s) Oral daily  mirtazapine 7.5milliGRAM(s) Oral at bedtime  pantoprazole    Tablet 40milliGRAM(s) Oral before breakfast  polyethylene glycol 3350 17Gram(s) Oral daily  tamsulosin 0.4milliGRAM(s) Oral at bedtime  zinc sulfate 220milliGRAM(s) Oral daily  ceFAZolin   IVPB 1000milliGRAM(s) IV Intermittent every 8 hours  dibucaine 1% Ointment 1Application(s) Topical three times a day  BACItracin   Ointment 1Application(s) Topical daily  metoprolol 25milliGRAM(s) Oral every 12 hours  lactulose Syrup 20Gram(s) Oral every other day  insulin glargine Injectable (LANTUS) 10Unit(s) SubCutaneous at bedtime  insulin lispro Injectable (HumaLOG) 2Unit(s) SubCutaneous three times a day before meals  insulin lispro (HumaLOG) corrective regimen sliding scale  SubCutaneous at bedtime    MEDICATIONS  (PRN):  acetaminophen   Tablet 650milliGRAM(s) Oral every 6 hours PRN For Temp greater than 38 C (100.4 F)  acetaminophen   Tablet. 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 Meyer, s/p left AKA and RLE angiogram on 4/14/17. Also s/p revascularization of RLE with angiogram and R SFA/POP angioplasty on 4/20/17. Hospital stay significant for: Completed course of cefazolin for UTI. Restarted on Abx for R foot second toe gangrene. Transfused two units PRBCs. Presents with functional deficits.     PVD s/p L AKA: Residual limb--daily dressing changes, keep c/d/i. May start stump  per Vascular--PT to contact Prosthetist    S/p RLE angio, R toe gangrene: Dry sterile dressing daily, Vascular following. Cefazolin until 5/4/17    R heel stage II ulcer: Bacitracin, daily dressing change. Vascular follow up appreciated.     Pain control: Percocet, gabapentin--consider inc dosage if continued phantom pains, tylenol.     DM II: Hypoglycemic 70s-80s with elevated meal time BG--Change lantus to 10 units, dec humalog 2-2-2, ISS. Monitor BG.    HTN: BP more elevated. D/c Amlodipine 5/1, change metoprolol to 25 mg q12 per medicine recs on 5/1. Continue lisinopril    PAF: Eliquis    CAD: Metoprolol--changed to 25 mg q12, lipitor, plavix    Bowel regimen: + constipation. Senna, colace, miralax. Lactulose.     Acute blood loss anemia: Hgb stable. FeSO4    Hypothyroidism: Synthroid    Urinary retention: Voiding well on Flomax. Check PVR    Appetite/mood: Remeron    GI/supplements: Protonix, zinc sulfate, MVI, folic acid    Anxiety: Xanax qhs prn, patient to go outside in wheelchair with family.    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 Meyer. Subsequently admitted to acute rehab. Developed LLE post operative wound dehiscence, s/p left AKA and RLE angiogram on 4/14/17. Also s/p revascularization of RLE with angiogram and R SFA/POP angioplasty on 4/20/17. Hospital stay significant for: Completed course of cefazolin for UTI. Restarted on Abx for R foot second toe gangrene. Transfused two units PRBCs.    Interval history: No acute events overnight.     REVIEW OF SYSTEMS  [ x ] Constitutional WNL  [   ] Cardio WNL  [   ] Resp WNL  [   ] GI WNL  [    ]  WNL  [ x ] Heme WNL  [ x ] Endo WNL  [   ] Skin WNL  [   ] MSK WNL  [   ] Neuro WNL  [x  ] Cognitive WNL  [x  ] Psych WNL    VITAL SIGNS:  Vital Signs Last 24 Hrs  T(C): 36.9, Max: 36.9 (05-02 @ 05:42)  T(F): 98.5, Max: 98.5 (05-02 @ 05:42)  HR: 88 (75 - 105)  BP: 145/68 (116/64 - 178/70)  RR: 18 (18 - 18)  SpO2: 97% (96% - 97%)    PHYSICAL EXAM  Constitutional - NAD, Comfortable  Chest - CTA bilaterally.   Cardiovascular - RRR, S1S2, No murmurs  Abdomen - BS+, Soft, NTND  Extremities - No C/C/E, No calf tenderness of RLE  Neurologic Exam -                    Cognitive - Awake, Alert, AAO to self, place, date, year, situation     Communication - Fluent, No dysarthria     Motor - No focal deficits                    LEFT    UE - ShAB 5/5, EF 5/5, EE 5/5, WE 5/5,  5/5                    RIGHT UE - ShAB 5/5, EF 5/5, EE 5/5, WE 5/5,  5/5                    LEFT    LE - HF wfl                    RIGHT LE - At least 3-4/5, limited by pain     Sensory - Intact to LT   Psychiatric - Mood stable, Affect WNL  Skin--LLE AKA incision with staples, c/d/i, Right 2nd toe gangrene on dorsum of toe. R calcaneus stage II pressure ulcer.     Functional Progress:   Transfer: Sliding board, min A,  Toileting: Max A  LE dressing: Max, UE dressing: min A    RECENT LABS:                                9.7    6.0   )-----------( 259      ( 01 May 2017 07:14 )             31.1     05-01    139  |  103  |  34.0<H>  ----------------------------<  99  5.0   |  24.0  |  0.94    Ca    8.8      01 May 2017 07:14  Mg     2.0     05-01    MEDICATIONS  (STANDING):  docusate sodium 100milliGRAM(s) Oral three times a day  ascorbic acid 500milliGRAM(s) Oral daily  multivitamin 1Tablet(s) Oral daily  apixaban 5milliGRAM(s) Oral two times a day  clopidogrel Tablet 75milliGRAM(s) Oral daily  nystatin Powder 1Application(s) Topical two times a day  gabapentin 300milliGRAM(s) Oral every 8 hours  senna 2Tablet(s) Oral at bedtime  atorvastatin 40milliGRAM(s) Oral at bedtime  lisinopril 5milliGRAM(s) Oral daily  dextrose 5%. 1000milliLiter(s) IV Continuous <Continuous>  dextrose 50% Injectable 12.5Gram(s) IV Push once  dextrose 50% Injectable 25Gram(s) IV Push once  dextrose 50% Injectable 25Gram(s) IV Push once  ferrous    sulfate 325milliGRAM(s) Oral daily  folic acid 1milliGRAM(s) Oral daily  levothyroxine 88MICROGram(s) Oral daily  mirtazapine 7.5milliGRAM(s) Oral at bedtime  pantoprazole    Tablet 40milliGRAM(s) Oral before breakfast  polyethylene glycol 3350 17Gram(s) Oral daily  tamsulosin 0.4milliGRAM(s) Oral at bedtime  zinc sulfate 220milliGRAM(s) Oral daily  ceFAZolin   IVPB 1000milliGRAM(s) IV Intermittent every 8 hours  dibucaine 1% Ointment 1Application(s) Topical three times a day  BACItracin   Ointment 1Application(s) Topical daily  metoprolol 25milliGRAM(s) Oral every 12 hours  lactulose Syrup 20Gram(s) Oral every other day  insulin glargine Injectable (LANTUS) 10Unit(s) SubCutaneous at bedtime  insulin lispro Injectable (HumaLOG) 2Unit(s) SubCutaneous three times a day before meals  insulin lispro (HumaLOG) corrective regimen sliding scale  SubCutaneous at bedtime    MEDICATIONS  (PRN):  acetaminophen   Tablet 650milliGRAM(s) Oral every 6 hours PRN For Temp greater than 38 C (100.4 F)  acetaminophen   Tablet. 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 Meyer, s/p left AKA and RLE angiogram on 4/14/17. Also s/p revascularization of RLE with angiogram and R SFA/POP angioplasty on 4/20/17. Hospital stay significant for: Completed course of cefazolin for UTI. Restarted on Abx for R foot second toe gangrene. Transfused two units PRBCs. Presents with functional deficits.     PVD s/p L AKA: Residual limb--daily dressing changes, keep c/d/i. May start stump  per Vascular--PT to contact Prosthetist    S/p RLE angio, R toe gangrene: Dry sterile dressing daily, Vascular following. Cefazolin until 5/4/17    R heel stage II ulcer: Bacitracin, daily dressing change. Vascular follow up appreciated.     Pain control: Percocet, gabapentin--consider inc dosage if continued phantom pains, tylenol.     DM II: Hypoglycemic 70s-80s with elevated meal time BG--Change lantus to 10 units, dec humalog 2-2-2, ISS. Monitor BG.    HTN: BP more elevated. D/c Amlodipine 5/1, change metoprolol to 25 mg q12 per medicine recs on 5/1. Continue lisinopril    PAF: Eliquis    CAD: Metoprolol--changed to 25 mg q12, lipitor, plavix    Bowel regimen: + constipation. Senna, colace, miralax. Lactulose.     Acute blood loss anemia: Hgb stable. FeSO4    Hypothyroidism: Synthroid    Urinary retention: Voiding well on Flomax. Check PVR    Appetite/mood: Remeron    GI/supplements: Protonix, zinc sulfate, MVI, folic acid    Anxiety: Xanax qhs prn, patient to go outside in wheelchair with family.    [facility], 3 hours of daily therapy

## 2017-05-03 DIAGNOSIS — K59.00 CONSTIPATION, UNSPECIFIED: ICD-10-CM

## 2017-05-03 PROCEDURE — 99232 SBSQ HOSP IP/OBS MODERATE 35: CPT | Mod: GC

## 2017-05-03 RX ADMIN — APIXABAN 5 MILLIGRAM(S): 2.5 TABLET, FILM COATED ORAL at 17:14

## 2017-05-03 RX ADMIN — Medication 500 MILLIGRAM(S): at 11:25

## 2017-05-03 RX ADMIN — LACTULOSE 20 GRAM(S): 10 SOLUTION ORAL at 11:26

## 2017-05-03 RX ADMIN — NYSTATIN CREAM 1 APPLICATION(S): 100000 CREAM TOPICAL at 17:17

## 2017-05-03 RX ADMIN — Medication 100 MILLIGRAM(S): at 13:40

## 2017-05-03 RX ADMIN — Medication 88 MICROGRAM(S): at 05:30

## 2017-05-03 RX ADMIN — TAMSULOSIN HYDROCHLORIDE 0.4 MILLIGRAM(S): 0.4 CAPSULE ORAL at 21:32

## 2017-05-03 RX ADMIN — Medication 0.25 MILLIGRAM(S): at 22:11

## 2017-05-03 RX ADMIN — Medication 1 TABLET(S): at 11:26

## 2017-05-03 RX ADMIN — Medication 100 MILLIGRAM(S): at 21:31

## 2017-05-03 RX ADMIN — SENNA PLUS 2 TABLET(S): 8.6 TABLET ORAL at 21:32

## 2017-05-03 RX ADMIN — Medication 1 MILLIGRAM(S): at 11:26

## 2017-05-03 RX ADMIN — Medication 325 MILLIGRAM(S): at 11:25

## 2017-05-03 RX ADMIN — PANTOPRAZOLE SODIUM 40 MILLIGRAM(S): 20 TABLET, DELAYED RELEASE ORAL at 05:30

## 2017-05-03 RX ADMIN — Medication 1 APPLICATION(S): at 21:30

## 2017-05-03 RX ADMIN — Medication 2 UNIT(S): at 11:27

## 2017-05-03 RX ADMIN — Medication 25 MILLIGRAM(S): at 17:13

## 2017-05-03 RX ADMIN — Medication 1 APPLICATION(S): at 05:30

## 2017-05-03 RX ADMIN — CLOPIDOGREL BISULFATE 75 MILLIGRAM(S): 75 TABLET, FILM COATED ORAL at 13:40

## 2017-05-03 RX ADMIN — GABAPENTIN 300 MILLIGRAM(S): 400 CAPSULE ORAL at 21:31

## 2017-05-03 RX ADMIN — GABAPENTIN 300 MILLIGRAM(S): 400 CAPSULE ORAL at 05:30

## 2017-05-03 RX ADMIN — Medication 100 MILLIGRAM(S): at 05:30

## 2017-05-03 RX ADMIN — Medication 2 UNIT(S): at 07:51

## 2017-05-03 RX ADMIN — Medication 1 APPLICATION(S): at 13:39

## 2017-05-03 RX ADMIN — MIRTAZAPINE 7.5 MILLIGRAM(S): 45 TABLET, ORALLY DISINTEGRATING ORAL at 21:32

## 2017-05-03 RX ADMIN — Medication 2: at 11:27

## 2017-05-03 RX ADMIN — Medication 25 MILLIGRAM(S): at 05:30

## 2017-05-03 RX ADMIN — INSULIN GLARGINE 10 UNIT(S): 100 INJECTION, SOLUTION SUBCUTANEOUS at 21:31

## 2017-05-03 RX ADMIN — ZINC SULFATE TAB 220 MG (50 MG ZINC EQUIVALENT) 220 MILLIGRAM(S): 220 (50 ZN) TAB at 11:26

## 2017-05-03 RX ADMIN — NYSTATIN CREAM 1 APPLICATION(S): 100000 CREAM TOPICAL at 05:30

## 2017-05-03 RX ADMIN — APIXABAN 5 MILLIGRAM(S): 2.5 TABLET, FILM COATED ORAL at 05:30

## 2017-05-03 RX ADMIN — GABAPENTIN 300 MILLIGRAM(S): 400 CAPSULE ORAL at 13:40

## 2017-05-03 RX ADMIN — ATORVASTATIN CALCIUM 40 MILLIGRAM(S): 80 TABLET, FILM COATED ORAL at 21:32

## 2017-05-03 RX ADMIN — Medication 2 UNIT(S): at 17:15

## 2017-05-03 RX ADMIN — POLYETHYLENE GLYCOL 3350 17 GRAM(S): 17 POWDER, FOR SOLUTION ORAL at 11:26

## 2017-05-03 RX ADMIN — Medication 1 APPLICATION(S): at 13:41

## 2017-05-03 RX ADMIN — Medication 100 MILLIGRAM(S): at 05:31

## 2017-05-03 RX ADMIN — Medication 2: at 17:16

## 2017-05-03 RX ADMIN — LISINOPRIL 5 MILLIGRAM(S): 2.5 TABLET ORAL at 05:30

## 2017-05-03 NOTE — PROGRESS NOTE ADULT - SUBJECTIVE AND OBJECTIVE BOX
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 Meyer. Subsequently admitted to acute rehab. Developed LLE post operative wound dehiscence, s/p left AKA and RLE angiogram on 4/14/17. Also s/p revascularization of RLE with angiogram and R SFA/POP angioplasty on 4/20/17. Hospital stay significant for: Completed course of cefazolin for UTI. Restarted on Abx for R foot second toe gangrene. Transfused two units PRBCs.    Interval history: No acute events overnight. Seen and examined. Slept well. Ate cereal for breakfast. Has not had BM today. Continues to have right heel pain. Phantom pains improved.    REVIEW OF SYSTEMS  [ x ] Constitutional WNL  [   ] Cardio WNL  [   ] Resp WNL  [   ] GI WNL  [    ]  WNL  [ x ] Heme WNL  [ x ] Endo WNL  [   ] Skin WNL  [   ] MSK WNL  [   ] Neuro WNL  [x  ] Cognitive WNL  [x  ] Psych WNL    VITAL SIGNS:  Vital Signs Last 24 Hrs  T(C): 36.7, Max: 36.8 (05-02 @ 20:13)  T(F): 98, Max: 98.2 (05-02 @ 20:13)  HR: 88 (88 - 97)  BP: 131/72 (102/60 - 131/72)  BP(mean): --  RR: 18 (18 - 18)  SpO2: 99% (95% - 99%)    PHYSICAL EXAM  Constitutional - NAD, Comfortable  Chest - CTA bilaterally.   Cardiovascular - RRR, S1S2, No murmurs  Abdomen - BS+, Soft, NTND  Extremities - No C/C/E, No calf tenderness of RLE  Neurologic Exam -                    Cognitive - Awake, Alert, AAO to self, place, date, year, situation     Communication - Fluent, No dysarthria     Motor - No focal deficits                    LEFT    UE - ShAB 5/5, EF 5/5, EE 5/5, WE 5/5,  5/5                    RIGHT UE - ShAB 5/5, EF 5/5, EE 5/5, WE 5/5,  5/5                    LEFT    LE - HF wfl                    RIGHT LE - At least 3-4/5, limited by pain     Sensory - Intact to LT   Psychiatric - Mood stable, Affect WNL  Skin--LLE AKA incision with staples, c/d/i, Right 2nd toe gangrene on dorsum of toe-stable. R calcaneus stage II pressure ulcer, with mild bleed.     Functional Progress:   Sit to supine: Min A  Transfer: Sliding board, min A,  Toileting: Max A  LE dressing: Mod, UE dressing: S  Grooming: S    MEDICATIONS  (STANDING):  docusate sodium 100milliGRAM(s) Oral three times a day  ascorbic acid 500milliGRAM(s) Oral daily  multivitamin 1Tablet(s) Oral daily  apixaban 5milliGRAM(s) Oral two times a day  clopidogrel Tablet 75milliGRAM(s) Oral daily  nystatin Powder 1Application(s) Topical two times a day  gabapentin 300milliGRAM(s) Oral every 8 hours  senna 2Tablet(s) Oral at bedtime  atorvastatin 40milliGRAM(s) Oral at bedtime  lisinopril 5milliGRAM(s) Oral daily  dextrose 5%. 1000milliLiter(s) IV Continuous <Continuous>  dextrose 50% Injectable 12.5Gram(s) IV Push once  dextrose 50% Injectable 25Gram(s) IV Push once  dextrose 50% Injectable 25Gram(s) IV Push once  ferrous    sulfate 325milliGRAM(s) Oral daily  folic acid 1milliGRAM(s) Oral daily  levothyroxine 88MICROGram(s) Oral daily  mirtazapine 7.5milliGRAM(s) Oral at bedtime  pantoprazole    Tablet 40milliGRAM(s) Oral before breakfast  polyethylene glycol 3350 17Gram(s) Oral daily  tamsulosin 0.4milliGRAM(s) Oral at bedtime  zinc sulfate 220milliGRAM(s) Oral daily  ceFAZolin   IVPB 1000milliGRAM(s) IV Intermittent every 8 hours  dibucaine 1% Ointment 1Application(s) Topical three times a day  BACItracin   Ointment 1Application(s) Topical daily  metoprolol 25milliGRAM(s) Oral every 12 hours  lactulose Syrup 20Gram(s) Oral every other day  insulin glargine Injectable (LANTUS) 10Unit(s) SubCutaneous at bedtime  insulin lispro Injectable (HumaLOG) 2Unit(s) SubCutaneous three times a day before meals  insulin lispro (HumaLOG) corrective regimen sliding scale  SubCutaneous three times a day before meals    MEDICATIONS  (PRN):  acetaminophen   Tablet 650milliGRAM(s) Oral every 6 hours PRN For Temp greater than 38 C (100.4 F)  acetaminophen   Tablet. 650milliGRAM(s) Oral every 6 hours PRN Mild Pain (1 - 3)  dextrose Gel 1Dose(s) Oral once PRN Blood Glucose LESS THAN 70 milliGRAM(s)/deciliter  glucagon  Injectable 1milliGRAM(s) IntraMuscular once PRN Glucose LESS THAN 70 milligrams/deciliter  oxyCODONE  5 mG/acetaminophen 325 mG 1Tablet(s) Oral every 4 hours PRN Moderate Pain (4 - 6)  ALPRAZolam 0.25milliGRAM(s) Oral at bedtime PRN anxiety  oxyCODONE  5 mG/acetaminophen 325 mG 2Tablet(s) Oral every 4 hours PRN Severe Pain (7 - 10)  bisacodyl Suppository 10milliGRAM(s) Rectal daily PRN Constipation    RECENT LABS:                                9.7    6.0   )-----------( 259      ( 01 May 2017 07:14 )             31.1     05-01    139  |  103  |  34.0<H>  ----------------------------<  99  5.0   |  24.0  |  0.94    Ca    8.8      01 May 2017 07:14  Mg     2.0     05-01    CAPILLARY BLOOD GLUCOSE  266 (02 May 2017 21:36)  359 (02 May 2017 16:30)  259 (02 May 2017 12:00)    ASSESSMENT AND PLAN:  82 year old F with extensive PMH including PVD s/p multiple stents, 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 Meyer, s/p left AKA and RLE angiogram on 4/14/17. Also s/p revascularization of RLE with angiogram and R SFA/POP angioplasty on 4/20/17. Hospital stay significant for: Completed course of cefazolin for UTI. Restarted on Abx for R foot second toe gangrene. Transfused two units PRBCs. Presents with functional deficits.     PVD s/p L AKA: Residual limb- keep c/d/i. May start stump  per Vascular--Prosthetist contacted by PT    S/p RLE angio, R toe gangrene: Dry sterile dressing daily, Vascular following. Cefazolin until 5/4/17    R heel stage II ulcer: Bacitracin, daily dressing change. Vascular follow up appreciated.     Pain control: Percocet, gabapentin. tylenol.     DM II: BG elevated 200s-300 yesterday. Continue lantus 10 units, humalog 2-2-2, ISS AC/HS    HTN: BP stable. D/c Amlodipine 5/1, change metoprolol to 25 mg q12 per medicine recs on 5/1. Continue lisinopril    PAF: Eliquis    CAD: Metoprolol--changed to 25 mg q12, lipitor, plavix    Bowel regimen: + constipation. Senna, colace, miralax. Lactulose. Bisacodyl suppository prn    Acute blood loss anemia: Hgb stable. FeSO4    Hypothyroidism: Synthroid    Urinary retention: Voiding well on Flomax. Check PVR    Appetite/mood: Remeron    GI/supplements: Protonix, zinc sulfate, MVI, folic acid    Anxiety: Xanax qhs prn, patient to go outside in wheelchair with family.    [facility], 3 hours of daily therapy

## 2017-05-03 NOTE — PROGRESS NOTE ADULT - SUBJECTIVE AND OBJECTIVE BOX
Patient seen and examined . C/O pain in the right foot and phantom pain on the left   still constipated         HPI: 85 yr old female with PMH CAD s/p stents x 2 , DM2, hypertension, hyperlipidemia, anxiety, significant PVD s/p multiple stents, paroxysmal atrial fibrillation on Eliquis admitted for hypertensive urgency post LE angiogram. 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 Hospital course complicated by urinary retention with UTI (failed TOV), hyponatremia likely due to pain and improved with NaCl (per nephrology) and pain medications. BP meds and insulin coverage titrated for better control.  Patient stable for discharge to acute rehab.     REVIEW OF SYSTEMS: as above otherwise negative     Vital Signs Last 24 Hrs  T(C): 36.7, Max: 36.8 (05-02 @ 20:13)  T(F): 98, Max: 98.2 (05-02 @ 20:13)  HR: 88 (88 - 97)  BP: 131/72 (102/60 - 131/72)  BP(mean): --  RR: 18 (18 - 18)  SpO2: 99% (95% - 99%)  GENERAL: NAD, well-groomed, well-developed  NERVOUS SYSTEM:  Alert & Oriented X3, no focal deficit  CHEST/LUNG: CTA b/l ,  no  rales, rhonchi, wheezing, or rubs  HEART: Regular rate and rhythm; No murmurs, rubs, or gallops  ABDOMEN: Soft, Nontender, Nondistended; Bowel sounds present  EXTREMITIES: s/p L BKA - right foot dressing intact

## 2017-05-04 DIAGNOSIS — I10 ESSENTIAL (PRIMARY) HYPERTENSION: ICD-10-CM

## 2017-05-04 DIAGNOSIS — I25.10 ATHEROSCLEROTIC HEART DISEASE OF NATIVE CORONARY ARTERY WITHOUT ANGINA PECTORIS: ICD-10-CM

## 2017-05-04 DIAGNOSIS — R33.9 RETENTION OF URINE, UNSPECIFIED: ICD-10-CM

## 2017-05-04 LAB
APPEARANCE UR: CLEAR — SIGNIFICANT CHANGE UP
BACTERIA # UR AUTO: ABNORMAL
BILIRUB UR-MCNC: NEGATIVE — SIGNIFICANT CHANGE UP
COLOR SPEC: YELLOW — SIGNIFICANT CHANGE UP
COMMENT - URINE: SIGNIFICANT CHANGE UP
DIFF PNL FLD: NEGATIVE — SIGNIFICANT CHANGE UP
EPI CELLS # UR: SIGNIFICANT CHANGE UP
GLUCOSE UR QL: NEGATIVE MG/DL — SIGNIFICANT CHANGE UP
KETONES UR-MCNC: NEGATIVE — SIGNIFICANT CHANGE UP
LEUKOCYTE ESTERASE UR-ACNC: ABNORMAL
NITRITE UR-MCNC: NEGATIVE — SIGNIFICANT CHANGE UP
PH UR: 7 — SIGNIFICANT CHANGE UP (ref 5–8)
PROT UR-MCNC: 15 MG/DL
RBC CASTS # UR COMP ASSIST: ABNORMAL /HPF (ref 0–4)
SP GR SPEC: 1.01 — SIGNIFICANT CHANGE UP (ref 1.01–1.02)
UROBILINOGEN FLD QL: NEGATIVE MG/DL — SIGNIFICANT CHANGE UP
WBC UR QL: ABNORMAL

## 2017-05-04 PROCEDURE — 99232 SBSQ HOSP IP/OBS MODERATE 35: CPT | Mod: GC

## 2017-05-04 PROCEDURE — 99233 SBSQ HOSP IP/OBS HIGH 50: CPT

## 2017-05-04 RX ORDER — INSULIN LISPRO 100/ML
VIAL (ML) SUBCUTANEOUS
Qty: 0 | Refills: 0 | Status: DISCONTINUED | OUTPATIENT
Start: 2017-05-04 | End: 2017-05-16

## 2017-05-04 RX ORDER — MAGNESIUM HYDROXIDE 400 MG/1
30 TABLET, CHEWABLE ORAL ONCE
Qty: 0 | Refills: 0 | Status: COMPLETED | OUTPATIENT
Start: 2017-05-04 | End: 2017-05-04

## 2017-05-04 RX ORDER — GABAPENTIN 400 MG/1
200 CAPSULE ORAL THREE TIMES A DAY
Qty: 0 | Refills: 0 | Status: DISCONTINUED | OUTPATIENT
Start: 2017-05-04 | End: 2017-05-08

## 2017-05-04 RX ORDER — POVIDONE-IODINE 5 %
1 AEROSOL (ML) TOPICAL DAILY
Qty: 0 | Refills: 0 | Status: DISCONTINUED | OUTPATIENT
Start: 2017-05-04 | End: 2017-05-04

## 2017-05-04 RX ADMIN — Medication 1 TABLET(S): at 11:24

## 2017-05-04 RX ADMIN — Medication 2 UNIT(S): at 07:57

## 2017-05-04 RX ADMIN — NYSTATIN CREAM 1 APPLICATION(S): 100000 CREAM TOPICAL at 17:20

## 2017-05-04 RX ADMIN — TAMSULOSIN HYDROCHLORIDE 0.4 MILLIGRAM(S): 0.4 CAPSULE ORAL at 21:35

## 2017-05-04 RX ADMIN — GABAPENTIN 200 MILLIGRAM(S): 400 CAPSULE ORAL at 21:34

## 2017-05-04 RX ADMIN — Medication 88 MICROGRAM(S): at 05:54

## 2017-05-04 RX ADMIN — LISINOPRIL 5 MILLIGRAM(S): 2.5 TABLET ORAL at 05:55

## 2017-05-04 RX ADMIN — Medication 100 MILLIGRAM(S): at 15:37

## 2017-05-04 RX ADMIN — PANTOPRAZOLE SODIUM 40 MILLIGRAM(S): 20 TABLET, DELAYED RELEASE ORAL at 07:57

## 2017-05-04 RX ADMIN — Medication 2 UNIT(S): at 17:20

## 2017-05-04 RX ADMIN — ZINC SULFATE TAB 220 MG (50 MG ZINC EQUIVALENT) 220 MILLIGRAM(S): 220 (50 ZN) TAB at 11:24

## 2017-05-04 RX ADMIN — POLYETHYLENE GLYCOL 3350 17 GRAM(S): 17 POWDER, FOR SOLUTION ORAL at 11:24

## 2017-05-04 RX ADMIN — Medication 2 UNIT(S): at 12:02

## 2017-05-04 RX ADMIN — Medication 100 MILLIGRAM(S): at 05:54

## 2017-05-04 RX ADMIN — MIRTAZAPINE 7.5 MILLIGRAM(S): 45 TABLET, ORALLY DISINTEGRATING ORAL at 21:35

## 2017-05-04 RX ADMIN — Medication 500 MILLIGRAM(S): at 11:24

## 2017-05-04 RX ADMIN — APIXABAN 5 MILLIGRAM(S): 2.5 TABLET, FILM COATED ORAL at 17:20

## 2017-05-04 RX ADMIN — Medication: at 12:02

## 2017-05-04 RX ADMIN — APIXABAN 5 MILLIGRAM(S): 2.5 TABLET, FILM COATED ORAL at 05:54

## 2017-05-04 RX ADMIN — NYSTATIN CREAM 1 APPLICATION(S): 100000 CREAM TOPICAL at 05:55

## 2017-05-04 RX ADMIN — Medication 325 MILLIGRAM(S): at 11:24

## 2017-05-04 RX ADMIN — Medication 100 MILLIGRAM(S): at 13:09

## 2017-05-04 RX ADMIN — Medication 25 MILLIGRAM(S): at 05:54

## 2017-05-04 RX ADMIN — Medication 25 MILLIGRAM(S): at 17:20

## 2017-05-04 RX ADMIN — MAGNESIUM HYDROXIDE 30 MILLILITER(S): 400 TABLET, CHEWABLE ORAL at 15:37

## 2017-05-04 RX ADMIN — INSULIN GLARGINE 10 UNIT(S): 100 INJECTION, SOLUTION SUBCUTANEOUS at 21:34

## 2017-05-04 RX ADMIN — CLOPIDOGREL BISULFATE 75 MILLIGRAM(S): 75 TABLET, FILM COATED ORAL at 11:24

## 2017-05-04 RX ADMIN — Medication 1 MILLIGRAM(S): at 11:24

## 2017-05-04 RX ADMIN — Medication 0.25 MILLIGRAM(S): at 21:34

## 2017-05-04 RX ADMIN — GABAPENTIN 300 MILLIGRAM(S): 400 CAPSULE ORAL at 13:09

## 2017-05-04 RX ADMIN — ATORVASTATIN CALCIUM 40 MILLIGRAM(S): 80 TABLET, FILM COATED ORAL at 21:39

## 2017-05-04 RX ADMIN — GABAPENTIN 300 MILLIGRAM(S): 400 CAPSULE ORAL at 05:54

## 2017-05-04 RX ADMIN — Medication 1 APPLICATION(S): at 05:55

## 2017-05-04 NOTE — PROGRESS NOTE ADULT - ASSESSMENT
85 yr old female with PMH CAD s/p stents, DM2, hypertension, hyperlipidemia, anxiety, significant PVD s/p multiple stents, paroxysmal atrial fibrillation on Eliquis admitted for hypertensive urgency post LE angiogram. 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 Hospital course complicated by urinary retention with UTI (failed TOV), hyponatremia likely due to pain and improved with NaCl (per nephrology) and pain medications. BP meds and insulin coverage titrated for better control.  Patient was   transferred  to acute rehab , pain poorly controlled , duragesic  patch discontinued due to lethargy ,feels better low blood sugar this am . Revaluated by vascular surgery , found to have  postoperative wound dehiscence. Had AKA now in rehab. BG has been labile, improved over past 24 hours.

## 2017-05-04 NOTE — PROGRESS NOTE ADULT - SUBJECTIVE AND OBJECTIVE BOX
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 Meyer. Subsequently admitted to acute rehab. Developed LLE post operative wound dehiscence, s/p left AKA and RLE angiogram on 4/14/17. Also s/p revascularization of RLE with angiogram and R SFA/POP angioplasty on 4/20/17. Hospital stay significant for: Completed course of cefazolin for UTI. Restarted on Abx for R foot second toe gangrene. Transfused two units PRBCs.    Interval history: No acute events overnight. Seen and examined. Complains of continued pain in her right heel, left phantom pain. Has not had a BM x 2-3 days.     REVIEW OF SYSTEMS  [ x ] Constitutional WNL  [   ] Cardio WNL  [   ] Resp WNL  [   ] GI WNL  [    ]  WNL  [ x ] Heme WNL  [ x ] Endo WNL  [   ] Skin WNL  [   ] MSK WNL  [   ] Neuro WNL  [x  ] Cognitive WNL  [x  ] Psych WNL    VITAL SIGNS:  Vital Signs Last 24 Hrs  T(C): 36.5, Max: 36.5 (05-04 @ 06:13)  T(F): 97.7, Max: 97.7 (05-04 @ 06:13)  HR: 80 (71 - 80)  BP: 159/68 (113/51 - 159/68)  BP(mean): --  RR: 189 (17 - 189)  SpO2: 97% (95% - 97%)    PHYSICAL EXAM  Constitutional - NAD, Comfortable  Chest - CTA bilaterally.   Cardiovascular - RRR, S1S2, No murmurs  Abdomen - BS+, Soft, NTND  Extremities - No C/C/E, No calf tenderness of RLE  Neurologic Exam -                    Cognitive - Awake, Alert, AAO to self, place, date, year, situation     Communication - Fluent, No dysarthria     Motor - No focal deficits                    LEFT    UE - ShAB 5/5, EF 5/5, EE 5/5, WE 5/5,  5/5                    RIGHT UE - ShAB 5/5, EF 5/5, EE 5/5, WE 5/5,  5/5                    LEFT    LE - HF wfl                    RIGHT LE - At least 3-4/5, limited by pain     Sensory - Intact to LT   Psychiatric - Mood stable, Affect WNL  Skin--LLE AKA incision with staples, c/d/i, Right 2nd toe gangrene on dorsum of toe-stable. R calcaneus stage II pressure ulcer appears moist.    Functional Progress:   Sit to supine: Min A  Transfer: Sliding board, min A,  Toileting: Max A  LE dressing: Min A; UE dressing: S/min  Grooming: S    MEDICATIONS  (STANDING):  docusate sodium 100milliGRAM(s) Oral three times a day  ascorbic acid 500milliGRAM(s) Oral daily  multivitamin 1Tablet(s) Oral daily  apixaban 5milliGRAM(s) Oral two times a day  clopidogrel Tablet 75milliGRAM(s) Oral daily  nystatin Powder 1Application(s) Topical two times a day  gabapentin 300milliGRAM(s) Oral every 8 hours  senna 2Tablet(s) Oral at bedtime  atorvastatin 40milliGRAM(s) Oral at bedtime  lisinopril 5milliGRAM(s) Oral daily  dextrose 5%. 1000milliLiter(s) IV Continuous <Continuous>  dextrose 50% Injectable 12.5Gram(s) IV Push once  dextrose 50% Injectable 25Gram(s) IV Push once  dextrose 50% Injectable 25Gram(s) IV Push once  ferrous    sulfate 325milliGRAM(s) Oral daily  folic acid 1milliGRAM(s) Oral daily  levothyroxine 88MICROGram(s) Oral daily  mirtazapine 7.5milliGRAM(s) Oral at bedtime  pantoprazole    Tablet 40milliGRAM(s) Oral before breakfast  polyethylene glycol 3350 17Gram(s) Oral daily  tamsulosin 0.4milliGRAM(s) Oral at bedtime  zinc sulfate 220milliGRAM(s) Oral daily  ceFAZolin   IVPB 1000milliGRAM(s) IV Intermittent every 8 hours  dibucaine 1% Ointment 1Application(s) Topical three times a day  metoprolol 25milliGRAM(s) Oral every 12 hours  insulin glargine Injectable (LANTUS) 10Unit(s) SubCutaneous at bedtime  insulin lispro Injectable (HumaLOG) 2Unit(s) SubCutaneous three times a day before meals  insulin lispro (HumaLOG) corrective regimen sliding scale  SubCutaneous three times a day before meals  povidone iodine 10% Ointment 1Application(s) Topical daily    MEDICATIONS  (PRN):  acetaminophen   Tablet 650milliGRAM(s) Oral every 6 hours PRN For Temp greater than 38 C (100.4 F)  acetaminophen   Tablet. 650milliGRAM(s) Oral every 6 hours PRN Mild Pain (1 - 3)  dextrose Gel 1Dose(s) Oral once PRN Blood Glucose LESS THAN 70 milliGRAM(s)/deciliter  glucagon  Injectable 1milliGRAM(s) IntraMuscular once PRN Glucose LESS THAN 70 milligrams/deciliter  oxyCODONE  5 mG/acetaminophen 325 mG 1Tablet(s) Oral every 4 hours PRN Moderate Pain (4 - 6)  ALPRAZolam 0.25milliGRAM(s) Oral at bedtime PRN anxiety  oxyCODONE  5 mG/acetaminophen 325 mG 2Tablet(s) Oral every 4 hours PRN Severe Pain (7 - 10)  bisacodyl Suppository 10milliGRAM(s) Rectal daily PRN Constipation  bisacodyl 5milliGRAM(s) Oral every 12 hours PRN Constipation      RECENT LABS:                                9.7    6.0   )-----------( 259      ( 01 May 2017 07:14 )             31.1     05-01    139  |  103  |  34.0<H>  ----------------------------<  99  5.0   |  24.0  |  0.94    Ca    8.8      01 May 2017 07:14  Mg     2.0     05-01    CAPILLARY BLOOD GLUCOSE  146 (04 May 2017 08:00)  192 (03 May 2017 19:57)  202 (03 May 2017 16:27)  231 (03 May 2017 12:00)    MEDICATIONS  (STANDING):  docusate sodium 100milliGRAM(s) Oral three times a day  ascorbic acid 500milliGRAM(s) Oral daily  multivitamin 1Tablet(s) Oral daily  apixaban 5milliGRAM(s) Oral two times a day  clopidogrel Tablet 75milliGRAM(s) Oral daily  nystatin Powder 1Application(s) Topical two times a day  gabapentin 300milliGRAM(s) Oral every 8 hours  senna 2Tablet(s) Oral at bedtime  atorvastatin 40milliGRAM(s) Oral at bedtime  lisinopril 5milliGRAM(s) Oral daily  dextrose 5%. 1000milliLiter(s) IV Continuous <Continuous>  dextrose 50% Injectable 12.5Gram(s) IV Push once  dextrose 50% Injectable 25Gram(s) IV Push once  dextrose 50% Injectable 25Gram(s) IV Push once  ferrous    sulfate 325milliGRAM(s) Oral daily  folic acid 1milliGRAM(s) Oral daily  levothyroxine 88MICROGram(s) Oral daily  mirtazapine 7.5milliGRAM(s) Oral at bedtime  pantoprazole    Tablet 40milliGRAM(s) Oral before breakfast  polyethylene glycol 3350 17Gram(s) Oral daily  tamsulosin 0.4milliGRAM(s) Oral at bedtime  zinc sulfate 220milliGRAM(s) Oral daily  ceFAZolin   IVPB 1000milliGRAM(s) IV Intermittent every 8 hours  dibucaine 1% Ointment 1Application(s) Topical three times a day  metoprolol 25milliGRAM(s) Oral every 12 hours  insulin glargine Injectable (LANTUS) 10Unit(s) SubCutaneous at bedtime  insulin lispro Injectable (HumaLOG) 2Unit(s) SubCutaneous three times a day before meals  insulin lispro (HumaLOG) corrective regimen sliding scale  SubCutaneous three times a day before meals  povidone iodine 10% Ointment 1Application(s) Topical daily    MEDICATIONS  (PRN):  acetaminophen   Tablet 650milliGRAM(s) Oral every 6 hours PRN For Temp greater than 38 C (100.4 F)  acetaminophen   Tablet. 650milliGRAM(s) Oral every 6 hours PRN Mild Pain (1 - 3)  dextrose Gel 1Dose(s) Oral once PRN Blood Glucose LESS THAN 70 milliGRAM(s)/deciliter  glucagon  Injectable 1milliGRAM(s) IntraMuscular once PRN Glucose LESS THAN 70 milligrams/deciliter  oxyCODONE  5 mG/acetaminophen 325 mG 1Tablet(s) Oral every 4 hours PRN Moderate Pain (4 - 6)  ALPRAZolam 0.25milliGRAM(s) Oral at bedtime PRN anxiety  oxyCODONE  5 mG/acetaminophen 325 mG 2Tablet(s) Oral every 4 hours PRN Severe Pain (7 - 10)  bisacodyl Suppository 10milliGRAM(s) Rectal daily PRN Constipation  bisacodyl 5milliGRAM(s) Oral every 12 hours PRN Constipation    ASSESSMENT AND PLAN:  82 year old F with extensive PMH including PVD s/p multiple stents, 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 Meyer, s/p left AKA and RLE angiogram on 4/14/17. Also s/p revascularization of RLE with angiogram and R SFA/POP angioplasty on 4/20/17. Hospital stay significant for: Completed course of cefazolin for UTI. Restarted on Abx for R foot second toe gangrene. Transfused two units PRBCs. Presents with functional deficits.     PVD s/p L AKA: Residual limb- keep c/d/i. May start stump  per Vascular--Prosthetist contacted by PT    S/p RLE angio, R toe gangrene: Dry sterile dressing daily, Vascular following. Cefazolin until today 5/4/17    R heel stage II ulcer: + betadine. daily dressing change. Vascular follow up appreciated.     Pain control: Percocet, gabapentin. tylenol.     Bowel regimen: + constipation. Senna, colace, miralax. + Milk of magnesia. Bisacodyl suppository today if no BM.    Urinary retention: Likely exacerbated by constipation. PVR--300s. Continue Flomax. PVR/SC > 350 cc q6.     DM II: BG better controlled 100s-200s yesterday. Continue lantus 10 units, humalog 2-2-2, ISS AC    HTN: BP stable. D/c Amlodipine 5/1, change metoprolol to 25 mg q12 per medicine recs on 5/1. Continue lisinopril    PAF: Eliquis    CAD: Metoprolol--changed to 25 mg q12, lipitor, plavix    Acute blood loss anemia: Hgb stable. FeSO4    Hypothyroidism: Synthroid    Appetite/mood: Remeron    GI/supplements: Protonix, zinc sulfate, MVI, folic acid    Anxiety: Xanax qhs prn, patient to go outside in wheelchair with family.    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 Meyer. Subsequently admitted to acute rehab. Developed LLE post operative wound dehiscence, s/p left AKA and RLE angiogram on 4/14/17. Also s/p revascularization of RLE with angiogram and R SFA/POP angioplasty on 4/20/17. Hospital stay significant for: Completed course of cefazolin for UTI. Restarted on Abx for R foot second toe gangrene. Transfused two units PRBCs.    Interval history: No acute events overnight. Seen and examined. Complains of continued pain in her right heel, left phantom pain. Has not had a BM x 2-3 days.     REVIEW OF SYSTEMS  [ x ] Constitutional WNL  [   ] Cardio WNL  [   ] Resp WNL  [   ] GI WNL  [    ]  WNL  [ x ] Heme WNL  [ x ] Endo WNL  [   ] Skin WNL  [   ] MSK WNL  [   ] Neuro WNL  [x  ] Cognitive WNL  [x  ] Psych WNL    VITAL SIGNS:  Vital Signs Last 24 Hrs  T(C): 36.5, Max: 36.5 (05-04 @ 06:13)  T(F): 97.7, Max: 97.7 (05-04 @ 06:13)  HR: 80 (71 - 80)  BP: 159/68 (113/51 - 159/68)  BP(mean): --  RR: 189 (17 - 189)  SpO2: 97% (95% - 97%)    PHYSICAL EXAM  Constitutional - NAD, Comfortable  Chest - CTA bilaterally.   Cardiovascular - RRR, S1S2, No murmurs  Abdomen - BS+, Soft, NTND  Extremities - No C/C/E, No calf tenderness of RLE  Neurologic Exam -                    Cognitive - Awake, Alert, AAO to self, place, date, year, situation     Communication - Fluent, No dysarthria     Motor - No focal deficits                    LEFT    UE - ShAB 5/5, EF 5/5, EE 5/5, WE 5/5,  5/5                    RIGHT UE - ShAB 5/5, EF 5/5, EE 5/5, WE 5/5,  5/5                    LEFT    LE - HF wfl                    RIGHT LE - At least 3-4/5, limited by pain     Sensory - Intact to LT   Psychiatric - Mood stable, Affect WNL  Skin--LLE AKA incision with staples, c/d/i,no erythema.  Right 2nd toe gangrene on dorsum of toe-stable. R calcaneus stage II pressure ulcer appears moist.    Functional Progress:   Sit to supine: Min A  Transfer: Sliding board, min A  Toileting: Total A  LE dressing: Min A; UE dressing: Supervision  Grooming: S    MEDICATIONS  (STANDING):  docusate sodium 100milliGRAM(s) Oral three times a day  ascorbic acid 500milliGRAM(s) Oral daily  multivitamin 1Tablet(s) Oral daily  apixaban 5milliGRAM(s) Oral two times a day  clopidogrel Tablet 75milliGRAM(s) Oral daily  nystatin Powder 1Application(s) Topical two times a day  gabapentin 300milliGRAM(s) Oral every 8 hours  senna 2Tablet(s) Oral at bedtime  atorvastatin 40milliGRAM(s) Oral at bedtime  lisinopril 5milliGRAM(s) Oral daily  dextrose 5%. 1000milliLiter(s) IV Continuous <Continuous>  dextrose 50% Injectable 12.5Gram(s) IV Push once  dextrose 50% Injectable 25Gram(s) IV Push once  dextrose 50% Injectable 25Gram(s) IV Push once  ferrous    sulfate 325milliGRAM(s) Oral daily  folic acid 1milliGRAM(s) Oral daily  levothyroxine 88MICROGram(s) Oral daily  mirtazapine 7.5milliGRAM(s) Oral at bedtime  pantoprazole    Tablet 40milliGRAM(s) Oral before breakfast  polyethylene glycol 3350 17Gram(s) Oral daily  tamsulosin 0.4milliGRAM(s) Oral at bedtime  zinc sulfate 220milliGRAM(s) Oral daily  ceFAZolin   IVPB 1000milliGRAM(s) IV Intermittent every 8 hours  dibucaine 1% Ointment 1Application(s) Topical three times a day  metoprolol 25milliGRAM(s) Oral every 12 hours  insulin glargine Injectable (LANTUS) 10Unit(s) SubCutaneous at bedtime  insulin lispro Injectable (HumaLOG) 2Unit(s) SubCutaneous three times a day before meals  insulin lispro (HumaLOG) corrective regimen sliding scale  SubCutaneous three times a day before meals  povidone iodine 10% Ointment 1Application(s) Topical daily    MEDICATIONS  (PRN):  acetaminophen   Tablet 650milliGRAM(s) Oral every 6 hours PRN For Temp greater than 38 C (100.4 F)  acetaminophen   Tablet. 650milliGRAM(s) Oral every 6 hours PRN Mild Pain (1 - 3)  dextrose Gel 1Dose(s) Oral once PRN Blood Glucose LESS THAN 70 milliGRAM(s)/deciliter  glucagon  Injectable 1milliGRAM(s) IntraMuscular once PRN Glucose LESS THAN 70 milligrams/deciliter  oxyCODONE  5 mG/acetaminophen 325 mG 1Tablet(s) Oral every 4 hours PRN Moderate Pain (4 - 6)  ALPRAZolam 0.25milliGRAM(s) Oral at bedtime PRN anxiety  oxyCODONE  5 mG/acetaminophen 325 mG 2Tablet(s) Oral every 4 hours PRN Severe Pain (7 - 10)  bisacodyl Suppository 10milliGRAM(s) Rectal daily PRN Constipation  bisacodyl 5milliGRAM(s) Oral every 12 hours PRN Constipation      RECENT LABS:                                9.7    6.0   )-----------( 259      ( 01 May 2017 07:14 )             31.1     05-01    139  |  103  |  34.0<H>  ----------------------------<  99  5.0   |  24.0  |  0.94    Ca    8.8      01 May 2017 07:14  Mg     2.0     05-01    CAPILLARY BLOOD GLUCOSE  146 (04 May 2017 08:00)  192 (03 May 2017 19:57)  202 (03 May 2017 16:27)  231 (03 May 2017 12:00)    MEDICATIONS  (STANDING):  docusate sodium 100milliGRAM(s) Oral three times a day  ascorbic acid 500milliGRAM(s) Oral daily  multivitamin 1Tablet(s) Oral daily  apixaban 5milliGRAM(s) Oral two times a day  clopidogrel Tablet 75milliGRAM(s) Oral daily  nystatin Powder 1Application(s) Topical two times a day  gabapentin 300milliGRAM(s) Oral every 8 hours  senna 2Tablet(s) Oral at bedtime  atorvastatin 40milliGRAM(s) Oral at bedtime  lisinopril 5milliGRAM(s) Oral daily  dextrose 5%. 1000milliLiter(s) IV Continuous <Continuous>  dextrose 50% Injectable 12.5Gram(s) IV Push once  dextrose 50% Injectable 25Gram(s) IV Push once  dextrose 50% Injectable 25Gram(s) IV Push once  ferrous    sulfate 325milliGRAM(s) Oral daily  folic acid 1milliGRAM(s) Oral daily  levothyroxine 88MICROGram(s) Oral daily  mirtazapine 7.5milliGRAM(s) Oral at bedtime  pantoprazole    Tablet 40milliGRAM(s) Oral before breakfast  polyethylene glycol 3350 17Gram(s) Oral daily  tamsulosin 0.4milliGRAM(s) Oral at bedtime  zinc sulfate 220milliGRAM(s) Oral daily  ceFAZolin   IVPB 1000milliGRAM(s) IV Intermittent every 8 hours  dibucaine 1% Ointment 1Application(s) Topical three times a day  metoprolol 25milliGRAM(s) Oral every 12 hours  insulin glargine Injectable (LANTUS) 10Unit(s) SubCutaneous at bedtime  insulin lispro Injectable (HumaLOG) 2Unit(s) SubCutaneous three times a day before meals  insulin lispro (HumaLOG) corrective regimen sliding scale  SubCutaneous three times a day before meals  povidone iodine 10% Ointment 1Application(s) Topical daily    MEDICATIONS  (PRN):  acetaminophen   Tablet 650milliGRAM(s) Oral every 6 hours PRN For Temp greater than 38 C (100.4 F)  acetaminophen   Tablet. 650milliGRAM(s) Oral every 6 hours PRN Mild Pain (1 - 3)  dextrose Gel 1Dose(s) Oral once PRN Blood Glucose LESS THAN 70 milliGRAM(s)/deciliter  glucagon  Injectable 1milliGRAM(s) IntraMuscular once PRN Glucose LESS THAN 70 milligrams/deciliter  oxyCODONE  5 mG/acetaminophen 325 mG 1Tablet(s) Oral every 4 hours PRN Moderate Pain (4 - 6)  ALPRAZolam 0.25milliGRAM(s) Oral at bedtime PRN anxiety  oxyCODONE  5 mG/acetaminophen 325 mG 2Tablet(s) Oral every 4 hours PRN Severe Pain (7 - 10)  bisacodyl Suppository 10milliGRAM(s) Rectal daily PRN Constipation  bisacodyl 5milliGRAM(s) Oral every 12 hours PRN Constipation    ASSESSMENT AND PLAN:  82 year old F with extensive PMH including PVD s/p multiple stents, 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 Meyer, s/p left AKA and RLE angiogram on 4/14/17. Also s/p revascularization of RLE with angiogram and R SFA/POP angioplasty on 4/20/17. Hospital stay significant for: Completed course of cefazolin for UTI. Restarted on Abx for R foot second toe gangrene. Transfused two units PRBCs. Presents with functional deficits.     PVD s/p L AKA: Residual limb- keep c/d/i. May start stump  per Vascular--Prosthetist contacted by PT    S/p RLE angio, R toe gangrene: Dry sterile dressing daily, Vascular following. Cefazolin until today 5/4/17    R heel stage II ulcer: + betadine. daily dressing change. Vascular follow up appreciated.     Pain control: Percocet, gabapentin. tylenol.     Bowel regimen: + constipation. Senna, colace, miralax. + Milk of magnesia. Bisacodyl suppository today if no BM.    Urinary retention: Likely exacerbated by constipation. PVR--300s. Continue Flomax. PVR/SC > 350 cc q6.     DM II: BG better controlled 100s-200s yesterday. Continue lantus 10 units, humalog 2-2-2, ISS AC    HTN: BP stable. D/c Amlodipine 5/1, change metoprolol to 25 mg q12 per medicine recs on 5/1. Continue lisinopril    PAF: Eliquis    CAD: Metoprolol--changed to 25 mg q12, lipitor, plavix    Acute blood loss anemia: Hgb stable. FeSO4    Hypothyroidism: Synthroid    Appetite/mood: Remeron    GI/supplements: Protonix, zinc sulfate, MVI, folic acid    Anxiety: Xanax qhs prn, patient to go outside in wheelchair with family.    [facility] Program, 3 hours of daily therapy

## 2017-05-04 NOTE — PROGRESS NOTE ADULT - PROBLEM SELECTOR PLAN 8
bladder scan >300 last night? Will recheck stat, straight cath if greater than 250 bladder scan >300 last night? Will recheck stat, straight cath if greater than 250  treat constipation  check UA/CS

## 2017-05-04 NOTE — PROGRESS NOTE ADULT - SUBJECTIVE AND OBJECTIVE BOX
CC: Uncontrolled DM, right foot pain    HPI: 85 yr old female with PMH CAD s/p stents x 2 , DM2, hypertension, hyperlipidemia, anxiety, significant PVD s/p multiple stents, paroxysmal atrial fibrillation on Eliquis admitted for hypertensive urgency post LE angiogram. 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 Hospital course complicated by urinary retention with UTI (failed TOV), hyponatremia likely due to pain and improved with NaCl (per nephrology) and pain medications. BP meds and insulin coverage titrated for better control.  Patient stable for discharge to acute rehab.     INTERVAL HPI/OVERNIGHT EVENTS: Doesn't like the food, no acute issues overnight    Vital Signs Last 24 Hrs  T(C): 36.5, Max: 36.5 (05-04 @ 06:13)  T(F): 97.7, Max: 97.7 (05-04 @ 06:13)  HR: 80 (71 - 80)  BP: 159/68 (113/51 - 159/68)  BP(mean): --  RR: 189 (17 - 189)  SpO2: 97% (95% - 97%)  I&O's Detail    I & Os for current day (as of 04 May 2017 09:24)  =============================================  IN:    Oral Fluid: 470 ml    IV PiggyBack: 100 ml    Total IN: 570 ml  ---------------------------------------------  OUT:    Total OUT: 0 ml  ---------------------------------------------  Total NET: 570 ml                    CAPILLARY BLOOD GLUCOSE  192 (03 May 2017 19:57)  202 (03 May 2017 16:27)  231 (03 May 2017 12:00)          MEDICATIONS  (STANDING):  docusate sodium 100milliGRAM(s) Oral three times a day  ascorbic acid 500milliGRAM(s) Oral daily  multivitamin 1Tablet(s) Oral daily  apixaban 5milliGRAM(s) Oral two times a day  clopidogrel Tablet 75milliGRAM(s) Oral daily  nystatin Powder 1Application(s) Topical two times a day  gabapentin 300milliGRAM(s) Oral every 8 hours  senna 2Tablet(s) Oral at bedtime  atorvastatin 40milliGRAM(s) Oral at bedtime  lisinopril 5milliGRAM(s) Oral daily  dextrose 5%. 1000milliLiter(s) IV Continuous <Continuous>  dextrose 50% Injectable 12.5Gram(s) IV Push once  dextrose 50% Injectable 25Gram(s) IV Push once  dextrose 50% Injectable 25Gram(s) IV Push once  ferrous    sulfate 325milliGRAM(s) Oral daily  folic acid 1milliGRAM(s) Oral daily  levothyroxine 88MICROGram(s) Oral daily  mirtazapine 7.5milliGRAM(s) Oral at bedtime  pantoprazole    Tablet 40milliGRAM(s) Oral before breakfast  polyethylene glycol 3350 17Gram(s) Oral daily  tamsulosin 0.4milliGRAM(s) Oral at bedtime  zinc sulfate 220milliGRAM(s) Oral daily  ceFAZolin   IVPB 1000milliGRAM(s) IV Intermittent every 8 hours  dibucaine 1% Ointment 1Application(s) Topical three times a day  BACItracin   Ointment 1Application(s) Topical daily  metoprolol 25milliGRAM(s) Oral every 12 hours  lactulose Syrup 20Gram(s) Oral every other day  insulin glargine Injectable (LANTUS) 10Unit(s) SubCutaneous at bedtime  insulin lispro Injectable (HumaLOG) 2Unit(s) SubCutaneous three times a day before meals  insulin lispro (HumaLOG) corrective regimen sliding scale  SubCutaneous three times a day before meals    MEDICATIONS  (PRN):  acetaminophen   Tablet 650milliGRAM(s) Oral every 6 hours PRN For Temp greater than 38 C (100.4 F)  acetaminophen   Tablet. 650milliGRAM(s) Oral every 6 hours PRN Mild Pain (1 - 3)  dextrose Gel 1Dose(s) Oral once PRN Blood Glucose LESS THAN 70 milliGRAM(s)/deciliter  glucagon  Injectable 1milliGRAM(s) IntraMuscular once PRN Glucose LESS THAN 70 milligrams/deciliter  oxyCODONE  5 mG/acetaminophen 325 mG 1Tablet(s) Oral every 4 hours PRN Moderate Pain (4 - 6)  ALPRAZolam 0.25milliGRAM(s) Oral at bedtime PRN anxiety  oxyCODONE  5 mG/acetaminophen 325 mG 2Tablet(s) Oral every 4 hours PRN Severe Pain (7 - 10)  bisacodyl Suppository 10milliGRAM(s) Rectal daily PRN Constipation      RADIOLOGY & ADDITIONAL TESTS:    ROS: Denies chest pain, SOB, dizziness, lightheadedness, nausea, vomiting, abdominal pain, fever, chills    GENERAL: NAD, well-groomed, well-developed  NERVOUS SYSTEM:  Alert & Oriented X3, no focal deficit  CHEST/LUNG: CTA b/l ,  no  rales, rhonchi, wheezing, or rubs  HEART: Regular rate and rhythm; No murmurs, rubs, or gallops  ABDOMEN: Soft, Nontender, Nondistended; Bowel sounds present  EXTREMITIES: s/p L AKA staple line C/D/I - right foot dressing intact,  Right 2nd toe gangrene on dorsum of toe-stable. R calcaneus stage II pressure ulcer,

## 2017-05-05 LAB
CULTURE RESULTS: NO GROWTH — SIGNIFICANT CHANGE UP
SPECIMEN SOURCE: SIGNIFICANT CHANGE UP

## 2017-05-05 PROCEDURE — 99232 SBSQ HOSP IP/OBS MODERATE 35: CPT | Mod: GC

## 2017-05-05 PROCEDURE — 99233 SBSQ HOSP IP/OBS HIGH 50: CPT

## 2017-05-05 RX ORDER — ALPRAZOLAM 0.25 MG
0.25 TABLET ORAL AT BEDTIME
Qty: 0 | Refills: 0 | Status: DISCONTINUED | OUTPATIENT
Start: 2017-05-05 | End: 2017-05-12

## 2017-05-05 RX ORDER — TAMSULOSIN HYDROCHLORIDE 0.4 MG/1
0.8 CAPSULE ORAL AT BEDTIME
Qty: 0 | Refills: 0 | Status: DISCONTINUED | OUTPATIENT
Start: 2017-05-05 | End: 2017-05-10

## 2017-05-05 RX ADMIN — Medication 100 MILLIGRAM(S): at 05:26

## 2017-05-05 RX ADMIN — Medication 88 MICROGRAM(S): at 05:25

## 2017-05-05 RX ADMIN — APIXABAN 5 MILLIGRAM(S): 2.5 TABLET, FILM COATED ORAL at 05:26

## 2017-05-05 RX ADMIN — GABAPENTIN 200 MILLIGRAM(S): 400 CAPSULE ORAL at 21:35

## 2017-05-05 RX ADMIN — APIXABAN 5 MILLIGRAM(S): 2.5 TABLET, FILM COATED ORAL at 17:23

## 2017-05-05 RX ADMIN — Medication 500 MILLIGRAM(S): at 11:34

## 2017-05-05 RX ADMIN — NYSTATIN CREAM 1 APPLICATION(S): 100000 CREAM TOPICAL at 17:27

## 2017-05-05 RX ADMIN — Medication 1: at 08:10

## 2017-05-05 RX ADMIN — ZINC SULFATE TAB 220 MG (50 MG ZINC EQUIVALENT) 220 MILLIGRAM(S): 220 (50 ZN) TAB at 11:38

## 2017-05-05 RX ADMIN — MIRTAZAPINE 7.5 MILLIGRAM(S): 45 TABLET, ORALLY DISINTEGRATING ORAL at 21:35

## 2017-05-05 RX ADMIN — GABAPENTIN 200 MILLIGRAM(S): 400 CAPSULE ORAL at 14:21

## 2017-05-05 RX ADMIN — Medication 2 UNIT(S): at 17:23

## 2017-05-05 RX ADMIN — INSULIN GLARGINE 10 UNIT(S): 100 INJECTION, SOLUTION SUBCUTANEOUS at 21:36

## 2017-05-05 RX ADMIN — Medication 1: at 17:23

## 2017-05-05 RX ADMIN — Medication 1 MILLIGRAM(S): at 11:38

## 2017-05-05 RX ADMIN — Medication 100 MILLIGRAM(S): at 14:21

## 2017-05-05 RX ADMIN — ATORVASTATIN CALCIUM 40 MILLIGRAM(S): 80 TABLET, FILM COATED ORAL at 21:35

## 2017-05-05 RX ADMIN — NYSTATIN CREAM 1 APPLICATION(S): 100000 CREAM TOPICAL at 05:25

## 2017-05-05 RX ADMIN — TAMSULOSIN HYDROCHLORIDE 0.8 MILLIGRAM(S): 0.4 CAPSULE ORAL at 21:35

## 2017-05-05 RX ADMIN — Medication 3: at 11:37

## 2017-05-05 RX ADMIN — PANTOPRAZOLE SODIUM 40 MILLIGRAM(S): 20 TABLET, DELAYED RELEASE ORAL at 05:26

## 2017-05-05 RX ADMIN — Medication 2 UNIT(S): at 11:37

## 2017-05-05 RX ADMIN — Medication 25 MILLIGRAM(S): at 17:23

## 2017-05-05 RX ADMIN — Medication 2 UNIT(S): at 08:10

## 2017-05-05 RX ADMIN — Medication 325 MILLIGRAM(S): at 11:38

## 2017-05-05 RX ADMIN — Medication 0.25 MILLIGRAM(S): at 21:35

## 2017-05-05 RX ADMIN — Medication 1 TABLET(S): at 11:38

## 2017-05-05 RX ADMIN — Medication 25 MILLIGRAM(S): at 05:26

## 2017-05-05 RX ADMIN — CLOPIDOGREL BISULFATE 75 MILLIGRAM(S): 75 TABLET, FILM COATED ORAL at 11:34

## 2017-05-05 RX ADMIN — GABAPENTIN 200 MILLIGRAM(S): 400 CAPSULE ORAL at 05:25

## 2017-05-05 RX ADMIN — LISINOPRIL 5 MILLIGRAM(S): 2.5 TABLET ORAL at 05:25

## 2017-05-05 NOTE — PROGRESS NOTE ADULT - SUBJECTIVE AND OBJECTIVE BOX
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 Meyer. Subsequently admitted to acute rehab. Developed LLE post operative wound dehiscence, s/p left AKA and RLE angiogram on 4/14/17. Also s/p revascularization of RLE with angiogram and R SFA/POP angioplasty on 4/20/17. Hospital stay significant for: Completed course of cefazolin for UTI. Restarted on Abx for R foot second toe gangrene. Transfused two units PRBCs.    Interval history: No acute events overnight. Seen and examined. Did not sleep well last night. Continued right heel pain. Required straight cath x 1 yesterday. Denies dysuria. + BM yesterday. Worried about right LE wounds.     REVIEW OF SYSTEMS  [ x ] Constitutional WNL  [   ] Cardio WNL  [   ] Resp WNL  [   ] GI WNL  [    ]  WNL  [ x ] Heme WNL  [ x ] Endo WNL  [   ] Skin WNL  [   ] MSK WNL  [   ] Neuro WNL  [x  ] Cognitive WNL  [x  ] Psych WNL    VITAL SIGNS:  Vital Signs Last 24 Hrs  T(C): 37, Max: 37 (05-05 @ 05:00)  T(F): 98.6, Max: 98.6 (05-05 @ 05:00)  HR: 85 (76 - 85)  BP: 127/77 (108/52 - 139/71)  BP(mean): --  RR: 16 (16 - 18)  SpO2: 97% (95% - 99%)    PHYSICAL EXAM  Constitutional - NAD, Comfortable  Chest - CTA bilaterally.   Cardiovascular - RRR, S1S2, No murmurs  Abdomen - BS+, Soft, NTND  Extremities - No C/C/E, No calf tenderness of RLE  Neurologic Exam -                    Cognitive - Awake, Alert, AAO to self, place, date, year, situation     Communication - Fluent, No dysarthria     Motor - No focal deficits                    LEFT    UE - ShAB 5/5, EF 5/5, EE 5/5, WE 5/5,  5/5                    RIGHT UE - ShAB 5/5, EF 5/5, EE 5/5, WE 5/5,  5/5                    LEFT    LE - HF wfl                    RIGHT LE - At least 3-4/5, limited by pain     Sensory - Intact to LT   Psychiatric - Mood stable, Affect WNL  Skin--LLE AKA incision with staples, c/d/i,no erythema.  Right 2nd toe gangrene on dorsum of toe-stable. R calcaneus stage II pressure ulcer, black eschar over the 5th metatarsal on the plantar surface    Functional Progress:   Sit to supine: Min A  Transfer: Sliding board, min A  Toileting: Total A  LE dressing: Min A; UE dressing: Supervision  Grooming: S    MEDICATIONS  (STANDING):  docusate sodium 100milliGRAM(s) Oral three times a day  ascorbic acid 500milliGRAM(s) Oral daily  multivitamin 1Tablet(s) Oral daily  apixaban 5milliGRAM(s) Oral two times a day  clopidogrel Tablet 75milliGRAM(s) Oral daily  nystatin Powder 1Application(s) Topical two times a day  senna 2Tablet(s) Oral at bedtime  atorvastatin 40milliGRAM(s) Oral at bedtime  lisinopril 5milliGRAM(s) Oral daily  dextrose 5%. 1000milliLiter(s) IV Continuous <Continuous>  dextrose 50% Injectable 12.5Gram(s) IV Push once  dextrose 50% Injectable 25Gram(s) IV Push once  dextrose 50% Injectable 25Gram(s) IV Push once  ferrous    sulfate 325milliGRAM(s) Oral daily  folic acid 1milliGRAM(s) Oral daily  levothyroxine 88MICROGram(s) Oral daily  mirtazapine 7.5milliGRAM(s) Oral at bedtime  pantoprazole    Tablet 40milliGRAM(s) Oral before breakfast  polyethylene glycol 3350 17Gram(s) Oral daily  tamsulosin 0.4milliGRAM(s) Oral at bedtime  zinc sulfate 220milliGRAM(s) Oral daily  metoprolol 25milliGRAM(s) Oral every 12 hours  insulin glargine Injectable (LANTUS) 10Unit(s) SubCutaneous at bedtime  insulin lispro Injectable (HumaLOG) 2Unit(s) SubCutaneous three times a day before meals  insulin lispro (HumaLOG) corrective regimen sliding scale  SubCutaneous three times a day before meals  gabapentin 200milliGRAM(s) Oral three times a day    MEDICATIONS  (PRN):  acetaminophen   Tablet 650milliGRAM(s) Oral every 6 hours PRN For Temp greater than 38 C (100.4 F)  acetaminophen   Tablet. 650milliGRAM(s) Oral every 6 hours PRN Mild Pain (1 - 3)  dextrose Gel 1Dose(s) Oral once PRN Blood Glucose LESS THAN 70 milliGRAM(s)/deciliter  glucagon  Injectable 1milliGRAM(s) IntraMuscular once PRN Glucose LESS THAN 70 milligrams/deciliter  bisacodyl Suppository 10milliGRAM(s) Rectal daily PRN Constipation  oxyCODONE  5 mG/acetaminophen 325 mG 2Tablet(s) Oral every 4 hours PRN Severe Pain (7 - 10)  oxyCODONE  5 mG/acetaminophen 325 mG 1Tablet(s) Oral every 4 hours PRN Moderate Pain (4 - 6)  ALPRAZolam 0.25milliGRAM(s) Oral at bedtime PRN anxiety      RECENT LABS:                                9.7    6.0   )-----------( 259      ( 01 May 2017 07:14 )             31.1     05-01    139  |  103  |  34.0<H>  ----------------------------<  99  5.0   |  24.0  |  0.94    Ca    8.8      01 May 2017 07:14  Mg     2.0     05-01    CAPILLARY BLOOD GLUCOSE  182 (05 May 2017 07:38)  130 (04 May 2017 17:00)  181 (04 May 2017 12:00)    MEDICATIONS  (STANDING):  docusate sodium 100milliGRAM(s) Oral three times a day  ascorbic acid 500milliGRAM(s) Oral daily  multivitamin 1Tablet(s) Oral daily  apixaban 5milliGRAM(s) Oral two times a day  clopidogrel Tablet 75milliGRAM(s) Oral daily  nystatin Powder 1Application(s) Topical two times a day  gabapentin 300milliGRAM(s) Oral every 8 hours  senna 2Tablet(s) Oral at bedtime  atorvastatin 40milliGRAM(s) Oral at bedtime  lisinopril 5milliGRAM(s) Oral daily  dextrose 5%. 1000milliLiter(s) IV Continuous <Continuous>  dextrose 50% Injectable 12.5Gram(s) IV Push once  dextrose 50% Injectable 25Gram(s) IV Push once  dextrose 50% Injectable 25Gram(s) IV Push once  ferrous    sulfate 325milliGRAM(s) Oral daily  folic acid 1milliGRAM(s) Oral daily  levothyroxine 88MICROGram(s) Oral daily  mirtazapine 7.5milliGRAM(s) Oral at bedtime  pantoprazole    Tablet 40milliGRAM(s) Oral before breakfast  polyethylene glycol 3350 17Gram(s) Oral daily  tamsulosin 0.4milliGRAM(s) Oral at bedtime  zinc sulfate 220milliGRAM(s) Oral daily  ceFAZolin   IVPB 1000milliGRAM(s) IV Intermittent every 8 hours  dibucaine 1% Ointment 1Application(s) Topical three times a day  metoprolol 25milliGRAM(s) Oral every 12 hours  insulin glargine Injectable (LANTUS) 10Unit(s) SubCutaneous at bedtime  insulin lispro Injectable (HumaLOG) 2Unit(s) SubCutaneous three times a day before meals  insulin lispro (HumaLOG) corrective regimen sliding scale  SubCutaneous three times a day before meals  povidone iodine 10% Ointment 1Application(s) Topical daily    MEDICATIONS  (PRN):  acetaminophen   Tablet 650milliGRAM(s) Oral every 6 hours PRN For Temp greater than 38 C (100.4 F)  acetaminophen   Tablet. 650milliGRAM(s) Oral every 6 hours PRN Mild Pain (1 - 3)  dextrose Gel 1Dose(s) Oral once PRN Blood Glucose LESS THAN 70 milliGRAM(s)/deciliter  glucagon  Injectable 1milliGRAM(s) IntraMuscular once PRN Glucose LESS THAN 70 milligrams/deciliter  oxyCODONE  5 mG/acetaminophen 325 mG 1Tablet(s) Oral every 4 hours PRN Moderate Pain (4 - 6)  ALPRAZolam 0.25milliGRAM(s) Oral at bedtime PRN anxiety  oxyCODONE  5 mG/acetaminophen 325 mG 2Tablet(s) Oral every 4 hours PRN Severe Pain (7 - 10)  bisacodyl Suppository 10milliGRAM(s) Rectal daily PRN Constipation  bisacodyl 5milliGRAM(s) Oral every 12 hours PRN Constipation    ASSESSMENT AND PLAN:  82 year old F with extensive PMH including PVD s/p multiple stents, 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 Meyer, s/p left AKA and RLE angiogram on 4/14/17. Also s/p revascularization of RLE with angiogram and R SFA/POP angioplasty on 4/20/17. Hospital stay significant for: Completed course of cefazolin for UTI. Restarted on Abx for R foot second toe gangrene. Transfused two units PRBCs. Presents with functional deficits.     PVD s/p L AKA: Residual limb- keep c/d/i. Prosthetist contacted by PT for stump     S/p RLE angio, R toe gangrene: + betadine, daily dressing change. Vascular following. Completed cefazolin course 5/4/17.    R heel stage II ulcer: + betadine. daily dressing change. Vascular follow up appreciated.     Pain control: Percocet, gabapentin. tylenol.     Bowel regimen: Constipation improved. Senna, colace, miralax. + Milk of magnesia 5/4. Bisacodyl suppository prn.    Urinary retention: Improved overnight and this AM, PVRs 100s-300s. Continue Flomax. PVR/SC > 350 cc q6. UA: mod LE, WBC 26-50, urine cx neg    DM II: BG better controlled. Continue lantus 10 units, humalog 2-2-2, ISS AC    HTN: BP stable. D/c Amlodipine 5/1, change metoprolol to 25 mg q12 per medicine recs on 5/1. Continue lisinopril    PAF: Eliquis    CAD: Metoprolol 25 mg q12, lipitor, plavix    Acute blood loss anemia: Hgb stable. FeSO4    Hypothyroidism: Synthroid    Appetite/mood: Remeron    GI/supplements: Protonix, zinc sulfate, MVI, folic acid    Anxiety: Xanax qhs prn, patient to go outside in wheelchair with family.    [facility], 3 hours of daily therapy

## 2017-05-05 NOTE — PROGRESS NOTE ADULT - SUBJECTIVE AND OBJECTIVE BOX
pt s/p L AKA and RLE revascularization. Pt complaining of right heel pain.    Vital Signs Last 24 Hrs  T(C): 37, Max: 37 (05-05 @ 05:00)  T(F): 98.6, Max: 98.6 (05-05 @ 05:00)  HR: 85 (76 - 85)  BP: 127/77 (108/52 - 160/72)  BP(mean): --  RR: 16 (16 - 18)  SpO2: 97% (95% - 100%)    L AKA C/D/I staples in place  RLE Doppler pedal signals Stage 2 heel ulcer stable, dry gangrene of the dorsal distal second phalanx, black eschar over the 5th metatarsal on the plantar surface

## 2017-05-05 NOTE — PROGRESS NOTE ADULT - SUBJECTIVE AND OBJECTIVE BOX
CC: Uncontrolled DM, right foot pain    HPI: 85 yr old female with PMH CAD s/p stents x 2 , DM2, hypertension, hyperlipidemia, anxiety, significant PVD s/p multiple stents, paroxysmal atrial fibrillation on Eliquis admitted for hypertensive urgency post LE angiogram. 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 Hospital course complicated by urinary retention with UTI (failed TOV), hyponatremia likely due to pain and improved with NaCl (per nephrology) and pain medications. BP meds and insulin coverage titrated for better control.  Patient stable for discharge to acute rehab.     INTERVAL HPI/OVERNIGHT EVENTS: Had large BM yesterday, still with bladder scan >300. Upset over new necrotic area on 5th toe of right foot as per Vascular sx    Vital Signs Last 24 Hrs  T(C): 37, Max: 37 (- @ 05:00)  T(F): 98.6, Max: 98.6 ( @ 05:00)  HR: 85 (76 - 85)  BP: 127/77 (108/52 - 139/71)  BP(mean): --  RR: 16 (16 - 18)  SpO2: 97% (95% - 99%)  I&O's Detail    I & Os for current day (as of 05 May 2017 11:16)  =============================================  IN:    Oral Fluid: 320 ml    Total IN: 320 ml  ---------------------------------------------  OUT:    Intermittent Catheterization - Urethral: 350 ml    Voided: 300 ml    Total OUT: 650 ml  ---------------------------------------------  Total NET: -330 ml                    CAPILLARY BLOOD GLUCOSE  182 (05 May 2017 07:38)  130 (04 May 2017 17:00)  181 (04 May 2017 12:00)      Urinalysis Basic - ( 04 May 2017 11:28 )    Color: Yellow / Appearance: Clear / S.010 / pH: x  Gluc: x / Ketone: Negative  / Bili: Negative / Urobili: Negative mg/dL   Blood: x / Protein: 15 mg/dL / Nitrite: Negative   Leuk Esterase: Moderate / RBC: 3-5 /HPF / WBC 26-50   Sq Epi: x / Non Sq Epi: Few / Bacteria: Few        MEDICATIONS  (STANDING):  docusate sodium 100milliGRAM(s) Oral three times a day  ascorbic acid 500milliGRAM(s) Oral daily  multivitamin 1Tablet(s) Oral daily  apixaban 5milliGRAM(s) Oral two times a day  clopidogrel Tablet 75milliGRAM(s) Oral daily  nystatin Powder 1Application(s) Topical two times a day  senna 2Tablet(s) Oral at bedtime  atorvastatin 40milliGRAM(s) Oral at bedtime  lisinopril 5milliGRAM(s) Oral daily  dextrose 5%. 1000milliLiter(s) IV Continuous <Continuous>  dextrose 50% Injectable 12.5Gram(s) IV Push once  dextrose 50% Injectable 25Gram(s) IV Push once  dextrose 50% Injectable 25Gram(s) IV Push once  ferrous    sulfate 325milliGRAM(s) Oral daily  folic acid 1milliGRAM(s) Oral daily  levothyroxine 88MICROGram(s) Oral daily  mirtazapine 7.5milliGRAM(s) Oral at bedtime  pantoprazole    Tablet 40milliGRAM(s) Oral before breakfast  polyethylene glycol 3350 17Gram(s) Oral daily  tamsulosin 0.4milliGRAM(s) Oral at bedtime  zinc sulfate 220milliGRAM(s) Oral daily  metoprolol 25milliGRAM(s) Oral every 12 hours  insulin glargine Injectable (LANTUS) 10Unit(s) SubCutaneous at bedtime  insulin lispro Injectable (HumaLOG) 2Unit(s) SubCutaneous three times a day before meals  insulin lispro (HumaLOG) corrective regimen sliding scale  SubCutaneous three times a day before meals  gabapentin 200milliGRAM(s) Oral three times a day    MEDICATIONS  (PRN):  acetaminophen   Tablet 650milliGRAM(s) Oral every 6 hours PRN For Temp greater than 38 C (100.4 F)  acetaminophen   Tablet. 650milliGRAM(s) Oral every 6 hours PRN Mild Pain (1 - 3)  dextrose Gel 1Dose(s) Oral once PRN Blood Glucose LESS THAN 70 milliGRAM(s)/deciliter  glucagon  Injectable 1milliGRAM(s) IntraMuscular once PRN Glucose LESS THAN 70 milligrams/deciliter  bisacodyl Suppository 10milliGRAM(s) Rectal daily PRN Constipation  oxyCODONE  5 mG/acetaminophen 325 mG 2Tablet(s) Oral every 4 hours PRN Severe Pain (7 - 10)  oxyCODONE  5 mG/acetaminophen 325 mG 1Tablet(s) Oral every 4 hours PRN Moderate Pain (4 - 6)  ALPRAZolam 0.25milliGRAM(s) Oral at bedtime PRN anxiety      RADIOLOGY & ADDITIONAL TESTS:  ROS: Denies chest pain, SOB, dizziness, lightheadedness, nausea, vomiting, abdominal pain, fever, chills    PHYSICAL EXAM:    GENERAL: NAD, well-groomed, well-developed  NERVOUS SYSTEM:  Alert & Oriented X3, no focal deficit  CHEST/LUNG: CTA b/l ,  no  rales, rhonchi, wheezing, or rubs  HEART: Regular rate and rhythm; No murmurs, rubs, or gallops  ABDOMEN: Soft, Nontender, Nondistended; Bowel sounds present  EXTREMITIES: s/p L AKA staple line C/D/I - right foot dressing intact,  Right 2nd toe gangrene on dorsum of toe-stable. R calcaneus stage II pressure ulcer, black eschar over the 5th metatarsal on the plantar surface

## 2017-05-05 NOTE — PROGRESS NOTE ADULT - PROBLEM SELECTOR PLAN 6
cont metoprolol and lisinopril.
labile , pre meals humolog 2 units, continue sliding scale ordered additional to current regimen   lantus dose at night 10 units
stable
Statin

## 2017-05-06 PROCEDURE — 99232 SBSQ HOSP IP/OBS MODERATE 35: CPT | Mod: GC

## 2017-05-06 RX ADMIN — Medication 325 MILLIGRAM(S): at 12:00

## 2017-05-06 RX ADMIN — Medication 2 UNIT(S): at 17:35

## 2017-05-06 RX ADMIN — NYSTATIN CREAM 1 APPLICATION(S): 100000 CREAM TOPICAL at 05:27

## 2017-05-06 RX ADMIN — GABAPENTIN 200 MILLIGRAM(S): 400 CAPSULE ORAL at 05:26

## 2017-05-06 RX ADMIN — Medication 100 MILLIGRAM(S): at 16:56

## 2017-05-06 RX ADMIN — APIXABAN 5 MILLIGRAM(S): 2.5 TABLET, FILM COATED ORAL at 17:35

## 2017-05-06 RX ADMIN — MIRTAZAPINE 7.5 MILLIGRAM(S): 45 TABLET, ORALLY DISINTEGRATING ORAL at 21:57

## 2017-05-06 RX ADMIN — NYSTATIN CREAM 1 APPLICATION(S): 100000 CREAM TOPICAL at 17:36

## 2017-05-06 RX ADMIN — Medication 25 MILLIGRAM(S): at 17:35

## 2017-05-06 RX ADMIN — POLYETHYLENE GLYCOL 3350 17 GRAM(S): 17 POWDER, FOR SOLUTION ORAL at 12:05

## 2017-05-06 RX ADMIN — PANTOPRAZOLE SODIUM 40 MILLIGRAM(S): 20 TABLET, DELAYED RELEASE ORAL at 05:27

## 2017-05-06 RX ADMIN — Medication: at 16:59

## 2017-05-06 RX ADMIN — Medication 100 MILLIGRAM(S): at 21:56

## 2017-05-06 RX ADMIN — Medication 88 MICROGRAM(S): at 05:27

## 2017-05-06 RX ADMIN — GABAPENTIN 200 MILLIGRAM(S): 400 CAPSULE ORAL at 16:56

## 2017-05-06 RX ADMIN — SENNA PLUS 2 TABLET(S): 8.6 TABLET ORAL at 21:57

## 2017-05-06 RX ADMIN — Medication 100 MILLIGRAM(S): at 05:26

## 2017-05-06 RX ADMIN — Medication: at 08:16

## 2017-05-06 RX ADMIN — Medication 2 UNIT(S): at 08:17

## 2017-05-06 RX ADMIN — Medication 2 UNIT(S): at 16:59

## 2017-05-06 RX ADMIN — LISINOPRIL 5 MILLIGRAM(S): 2.5 TABLET ORAL at 05:26

## 2017-05-06 RX ADMIN — Medication 1 MILLIGRAM(S): at 12:00

## 2017-05-06 RX ADMIN — INSULIN GLARGINE 10 UNIT(S): 100 INJECTION, SOLUTION SUBCUTANEOUS at 21:55

## 2017-05-06 RX ADMIN — APIXABAN 5 MILLIGRAM(S): 2.5 TABLET, FILM COATED ORAL at 05:26

## 2017-05-06 RX ADMIN — CLOPIDOGREL BISULFATE 75 MILLIGRAM(S): 75 TABLET, FILM COATED ORAL at 12:00

## 2017-05-06 RX ADMIN — GABAPENTIN 200 MILLIGRAM(S): 400 CAPSULE ORAL at 21:56

## 2017-05-06 RX ADMIN — Medication 1 TABLET(S): at 12:00

## 2017-05-06 RX ADMIN — ATORVASTATIN CALCIUM 40 MILLIGRAM(S): 80 TABLET, FILM COATED ORAL at 21:56

## 2017-05-06 RX ADMIN — Medication 5: at 17:35

## 2017-05-06 RX ADMIN — Medication 25 MILLIGRAM(S): at 05:26

## 2017-05-06 RX ADMIN — Medication 500 MILLIGRAM(S): at 12:00

## 2017-05-06 RX ADMIN — ZINC SULFATE TAB 220 MG (50 MG ZINC EQUIVALENT) 220 MILLIGRAM(S): 220 (50 ZN) TAB at 12:00

## 2017-05-06 NOTE — PROGRESS NOTE ADULT - SUBJECTIVE AND OBJECTIVE BOX
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 Meyer. Subsequently admitted to acute rehab. Developed LLE post operative wound dehiscence, s/p left AKA and RLE angiogram on 4/14/17. Also s/p revascularization of RLE with angiogram and R SFA/POP angioplasty on 4/20/17. Hospital stay significant for: Completed course of cefazolin for UTI. Restarted on Abx for R foot second toe gangrene. Transfused two units PRBCs.    Interval history: No new medical issues this morning. Voided. No new medical issues.     REVIEW OF SYSTEMS  [ x ] Constitutional WNL  [   ] Cardio WNL  [   ] Resp WNL  [   ] GI WNL  [    ]  WNL  [ x ] Heme WNL  [ x ] Endo WNL  [   ] Skin WNL  [   ] MSK WNL  [   ] Neuro WNL  [x  ] Cognitive WNL  [x  ] Psych WNL    VITAL SIGNS:  Vital Signs Last 24 Hrs  T(C): 36.3, Max: 36.3 (05-05 @ 15:43)  T(F): 97.3, Max: 97.4 (05-05 @ 15:43)  HR: 87 (77 - 87)  BP: 133/64 (121/62 - 133/64)  BP(mean): --  RR: 18 (18 - 18)  SpO2: 95% (95% - 99%)    PHYSICAL EXAM  Constitutional - NAD, Comfortable  Chest - CTA bilaterally.   Cardiovascular - RRR, S1S2, No murmurs  Abdomen - BS+, Soft, NTND  Extremities - No C/C/E, No calf tenderness of RLE  Neurologic Exam -                    Cognitive - Awake, Alert, AAO to self, place, date, year, situation     Communication - Fluent, No dysarthria     Motor - No focal deficits                    LEFT    UE - ShAB 5/5, EF 5/5, EE 5/5, WE 5/5,  5/5                    RIGHT UE - ShAB 5/5, EF 5/5, EE 5/5, WE 5/5,  5/5                    LEFT    LE - HF wfl                    RIGHT LE - At least 3-4/5, limited by pain     Sensory - Intact to LT   Psychiatric - Mood stable, Affect WNL  Skin--LLE AKA incision with staples, c/d/i,no erythema.  Right 2nd toe gangrene on dorsum of toe-stable. R calcaneus stage II pressure ulcer, black eschar over the 5th metatarsal on the plantar surface    Functional Progress:   Sit to supine: Min A  Transfer: Sliding board, min A  Toileting: Total A  LE dressing: Min A; UE dressing: Supervision  Grooming: S    MEDICATIONS  (STANDING):  docusate sodium 100milliGRAM(s) Oral three times a day  ascorbic acid 500milliGRAM(s) Oral daily  multivitamin 1Tablet(s) Oral daily  apixaban 5milliGRAM(s) Oral two times a day  clopidogrel Tablet 75milliGRAM(s) Oral daily  nystatin Powder 1Application(s) Topical two times a day  senna 2Tablet(s) Oral at bedtime  atorvastatin 40milliGRAM(s) Oral at bedtime  lisinopril 5milliGRAM(s) Oral daily  dextrose 5%. 1000milliLiter(s) IV Continuous <Continuous>  dextrose 50% Injectable 12.5Gram(s) IV Push once  dextrose 50% Injectable 25Gram(s) IV Push once  dextrose 50% Injectable 25Gram(s) IV Push once  ferrous    sulfate 325milliGRAM(s) Oral daily  folic acid 1milliGRAM(s) Oral daily  levothyroxine 88MICROGram(s) Oral daily  mirtazapine 7.5milliGRAM(s) Oral at bedtime  pantoprazole    Tablet 40milliGRAM(s) Oral before breakfast  polyethylene glycol 3350 17Gram(s) Oral daily  zinc sulfate 220milliGRAM(s) Oral daily  metoprolol 25milliGRAM(s) Oral every 12 hours  insulin glargine Injectable (LANTUS) 10Unit(s) SubCutaneous at bedtime  insulin lispro Injectable (HumaLOG) 2Unit(s) SubCutaneous three times a day before meals  insulin lispro (HumaLOG) corrective regimen sliding scale  SubCutaneous three times a day before meals  gabapentin 200milliGRAM(s) Oral three times a day  tamsulosin 0.8milliGRAM(s) Oral at bedtime    MEDICATIONS  (PRN):  acetaminophen   Tablet 650milliGRAM(s) Oral every 6 hours PRN For Temp greater than 38 C (100.4 F)  acetaminophen   Tablet. 650milliGRAM(s) Oral every 6 hours PRN Mild Pain (1 - 3)  dextrose Gel 1Dose(s) Oral once PRN Blood Glucose LESS THAN 70 milliGRAM(s)/deciliter  glucagon  Injectable 1milliGRAM(s) IntraMuscular once PRN Glucose LESS THAN 70 milligrams/deciliter  bisacodyl Suppository 10milliGRAM(s) Rectal daily PRN Constipation  oxyCODONE  5 mG/acetaminophen 325 mG 2Tablet(s) Oral every 4 hours PRN Severe Pain (7 - 10)  oxyCODONE  5 mG/acetaminophen 325 mG 1Tablet(s) Oral every 4 hours PRN Moderate Pain (4 - 6)  ALPRAZolam 0.25milliGRAM(s) Oral at bedtime PRN anxiety      RECENT LABS:                                9.7    6.0   )-----------( 259      ( 01 May 2017 07:14 )             31.1     05-01    139  |  103  |  34.0<H>  ----------------------------<  99  5.0   |  24.0  |  0.94    Ca    8.8      01 May 2017 07:14  Mg     2.0     05-01    CAPILLARY BLOOD GLUCOSE  188 (06 May 2017 08:04)  251 (05 May 2017 20:15)  176 (05 May 2017 16:14)  263 (05 May 2017 11:38)        ASSESSMENT AND PLAN:  82 year old F with extensive PMH including PVD s/p multiple stents, 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 Meyer, s/p left AKA and RLE angiogram on 4/14/17. Also s/p revascularization of RLE with angiogram and R SFA/POP angioplasty on 4/20/17. Hospital stay significant for: Completed course of cefazolin for UTI. Restarted on Abx for R foot second toe gangrene. Transfused two units PRBCs. Presents with functional deficits.     PVD s/p L AKA: Residual limb- keep c/d/i. Prosthetist contacted by PT for stump     S/p RLE angio, R toe gangrene: + betadine, daily dressing change. Vascular following. Completed cefazolin course 5/4/17.    R heel stage II ulcer: + betadine. daily dressing change. Vascular follow up appreciated.     Pain control: Percocet, gabapentin. tylenol.     Bowel regimen: Constipation improved. Senna, colace, miralax. + Milk of magnesia 5/4. Bisacodyl suppository prn.    Urinary retention: Improved overnight and this AM, PVRs 100s-300s. Continue Flomax. PVR/SC > 350 cc q6. UA: mod LE, WBC 26-50, urine cx neg    DM II:  Continue lantus 10 units, humalog 2-2-2, ISS AC    HTN: BP stable. D/c Amlodipine 5/1, change metoprolol to 25 mg q12 per medicine recs on 5/1. Continue lisinopril    PAF: Eliquis    CAD: Metoprolol 25 mg q12, lipitor, plavix    Acute blood loss anemia: Hgb stable. FeSO4    Hypothyroidism: Synthroid    Appetite/mood: Remeron    GI/supplements: Protonix, zinc sulfate, MVI, folic acid    Anxiety: Xanax qhs prn, patient to go outside in wheelchair with family.    [facility], 3 hours of daily therapy

## 2017-05-07 PROCEDURE — 99232 SBSQ HOSP IP/OBS MODERATE 35: CPT | Mod: GC

## 2017-05-07 PROCEDURE — 99232 SBSQ HOSP IP/OBS MODERATE 35: CPT

## 2017-05-07 RX ORDER — NYSTATIN CREAM 100000 [USP'U]/G
1 CREAM TOPICAL
Qty: 0 | Refills: 0 | Status: DISCONTINUED | OUTPATIENT
Start: 2017-05-07 | End: 2017-05-16

## 2017-05-07 RX ORDER — APIXABAN 2.5 MG/1
5 TABLET, FILM COATED ORAL
Qty: 0 | Refills: 0 | Status: DISCONTINUED | OUTPATIENT
Start: 2017-05-07 | End: 2017-05-12

## 2017-05-07 RX ADMIN — SENNA PLUS 2 TABLET(S): 8.6 TABLET ORAL at 21:24

## 2017-05-07 RX ADMIN — ATORVASTATIN CALCIUM 40 MILLIGRAM(S): 80 TABLET, FILM COATED ORAL at 21:24

## 2017-05-07 RX ADMIN — NYSTATIN CREAM 1 APPLICATION(S): 100000 CREAM TOPICAL at 16:58

## 2017-05-07 RX ADMIN — GABAPENTIN 200 MILLIGRAM(S): 400 CAPSULE ORAL at 16:58

## 2017-05-07 RX ADMIN — CLOPIDOGREL BISULFATE 75 MILLIGRAM(S): 75 TABLET, FILM COATED ORAL at 11:54

## 2017-05-07 RX ADMIN — TAMSULOSIN HYDROCHLORIDE 0.8 MILLIGRAM(S): 0.4 CAPSULE ORAL at 06:15

## 2017-05-07 RX ADMIN — NYSTATIN CREAM 1 APPLICATION(S): 100000 CREAM TOPICAL at 08:30

## 2017-05-07 RX ADMIN — Medication 325 MILLIGRAM(S): at 11:54

## 2017-05-07 RX ADMIN — Medication 2 UNIT(S): at 12:06

## 2017-05-07 RX ADMIN — Medication 25 MILLIGRAM(S): at 16:55

## 2017-05-07 RX ADMIN — APIXABAN 5 MILLIGRAM(S): 2.5 TABLET, FILM COATED ORAL at 06:13

## 2017-05-07 RX ADMIN — Medication 500 MILLIGRAM(S): at 11:54

## 2017-05-07 RX ADMIN — APIXABAN 5 MILLIGRAM(S): 2.5 TABLET, FILM COATED ORAL at 16:58

## 2017-05-07 RX ADMIN — Medication 100 MILLIGRAM(S): at 21:24

## 2017-05-07 RX ADMIN — Medication: at 16:57

## 2017-05-07 RX ADMIN — Medication 2 UNIT(S): at 08:30

## 2017-05-07 RX ADMIN — GABAPENTIN 200 MILLIGRAM(S): 400 CAPSULE ORAL at 06:14

## 2017-05-07 RX ADMIN — ZINC SULFATE TAB 220 MG (50 MG ZINC EQUIVALENT) 220 MILLIGRAM(S): 220 (50 ZN) TAB at 11:54

## 2017-05-07 RX ADMIN — Medication 1 MILLIGRAM(S): at 11:54

## 2017-05-07 RX ADMIN — Medication 1 TABLET(S): at 11:54

## 2017-05-07 RX ADMIN — LISINOPRIL 5 MILLIGRAM(S): 2.5 TABLET ORAL at 06:13

## 2017-05-07 RX ADMIN — Medication 25 MILLIGRAM(S): at 06:14

## 2017-05-07 RX ADMIN — Medication 100 MILLIGRAM(S): at 06:13

## 2017-05-07 RX ADMIN — Medication 0.25 MILLIGRAM(S): at 21:24

## 2017-05-07 RX ADMIN — Medication: at 12:06

## 2017-05-07 RX ADMIN — MIRTAZAPINE 7.5 MILLIGRAM(S): 45 TABLET, ORALLY DISINTEGRATING ORAL at 21:24

## 2017-05-07 RX ADMIN — TAMSULOSIN HYDROCHLORIDE 0.8 MILLIGRAM(S): 0.4 CAPSULE ORAL at 21:24

## 2017-05-07 RX ADMIN — Medication 2 UNIT(S): at 16:58

## 2017-05-07 RX ADMIN — INSULIN GLARGINE 10 UNIT(S): 100 INJECTION, SOLUTION SUBCUTANEOUS at 21:25

## 2017-05-07 RX ADMIN — GABAPENTIN 200 MILLIGRAM(S): 400 CAPSULE ORAL at 21:24

## 2017-05-07 RX ADMIN — Medication 88 MICROGRAM(S): at 06:13

## 2017-05-07 RX ADMIN — PANTOPRAZOLE SODIUM 40 MILLIGRAM(S): 20 TABLET, DELAYED RELEASE ORAL at 06:14

## 2017-05-07 NOTE — PROGRESS NOTE ADULT - SUBJECTIVE AND OBJECTIVE BOX
CC: Uncontrolled DM, right foot pain    HPI: 85 yr old female with PMH CAD s/p stents x 2 , DM2, hypertension, hyperlipidemia, anxiety, significant PVD s/p multiple stents, paroxysmal atrial fibrillation on Eliquis admitted for hypertensive urgency post LE angiogram. 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 Hospital course complicated by urinary retention with UTI (failed TOV), hyponatremia likely due to pain and improved with NaCl (per nephrology) and pain medications. BP meds and insulin coverage titrated for better control.  Patient stable for discharge to acute rehab.       PAST MEDICAL & SURGICAL HISTORY:  Hyperlipidemia  Hypertension  Diabetes  PVD (peripheral vascular disease)  CAD (coronary artery disease)  History of cholecystectomy  H/O angioplasty      MEDICATIONS  (STANDING):  docusate sodium 100milliGRAM(s) Oral three times a day  ascorbic acid 500milliGRAM(s) Oral daily  multivitamin 1Tablet(s) Oral daily  apixaban 5milliGRAM(s) Oral two times a day  clopidogrel Tablet 75milliGRAM(s) Oral daily  nystatin Powder 1Application(s) Topical two times a day  senna 2Tablet(s) Oral at bedtime  atorvastatin 40milliGRAM(s) Oral at bedtime  lisinopril 5milliGRAM(s) Oral daily  dextrose 5%. 1000milliLiter(s) IV Continuous <Continuous>  dextrose 50% Injectable 12.5Gram(s) IV Push once  dextrose 50% Injectable 25Gram(s) IV Push once  dextrose 50% Injectable 25Gram(s) IV Push once  ferrous    sulfate 325milliGRAM(s) Oral daily  folic acid 1milliGRAM(s) Oral daily  levothyroxine 88MICROGram(s) Oral daily  mirtazapine 7.5milliGRAM(s) Oral at bedtime  pantoprazole    Tablet 40milliGRAM(s) Oral before breakfast  polyethylene glycol 3350 17Gram(s) Oral daily  zinc sulfate 220milliGRAM(s) Oral daily  metoprolol 25milliGRAM(s) Oral every 12 hours  insulin glargine Injectable (LANTUS) 10Unit(s) SubCutaneous at bedtime  insulin lispro Injectable (HumaLOG) 2Unit(s) SubCutaneous three times a day before meals  insulin lispro (HumaLOG) corrective regimen sliding scale  SubCutaneous three times a day before meals  gabapentin 200milliGRAM(s) Oral three times a day  tamsulosin 0.8milliGRAM(s) Oral at bedtime    MEDICATIONS  (PRN):  acetaminophen   Tablet 650milliGRAM(s) Oral every 6 hours PRN For Temp greater than 38 C (100.4 F)  acetaminophen   Tablet. 650milliGRAM(s) Oral every 6 hours PRN Mild Pain (1 - 3)  dextrose Gel 1Dose(s) Oral once PRN Blood Glucose LESS THAN 70 milliGRAM(s)/deciliter  glucagon  Injectable 1milliGRAM(s) IntraMuscular once PRN Glucose LESS THAN 70 milligrams/deciliter  bisacodyl Suppository 10milliGRAM(s) Rectal daily PRN Constipation  oxyCODONE  5 mG/acetaminophen 325 mG 2Tablet(s) Oral every 4 hours PRN Severe Pain (7 - 10)  oxyCODONE  5 mG/acetaminophen 325 mG 1Tablet(s) Oral every 4 hours PRN Moderate Pain (4 - 6)  ALPRAZolam 0.25milliGRAM(s) Oral at bedtime PRN anxiety      LABS:                  RADIOLOGY & ADDITIONAL TESTS:      REVIEW OF SYSTEMS:    CONSTITUTIONAL: No fever, weight loss, or fatigue  EYES: No eye pain, visual disturbances, or discharge  ENMT:  No difficulty hearing, tinnitus, vertigo; No sinus or throat pain  NECK: No pain or stiffness  RESPIRATORY: No cough, wheezing, chills or hemoptysis; No shortness of breath  CARDIOVASCULAR: No chest pain, palpitations, dizziness, or leg swelling  GASTROINTESTINAL: No abdominal or epigastric pain. No nausea, vomiting, or hematemesis; No diarrhea or constipation. No melena or hematochezia.  GENITOURINARY: No dysuria, frequency, hematuria, or incontinence  NEUROLOGICAL: No headaches, memory loss, loss of strength, numbness, or tremors  SKIN: No itching, burning, rashes, or lesions   LYMPH NODES: No enlarged glands  ENDOCRINE: No heat or cold intolerance; No hair loss  MUSCULOSKELETAL:   PSYCHIATRIC: No depression, anxiety, mood swings, or difficulty sleeping  HEME/LYMPH: No easy bruising, or bleeding gums  ALLERGY AND IMMUNOLOGIC: No hives or eczema    Vital Signs Last 24 Hrs  T(C): 36.1, Max: 36.3 (05-06 @ 19:20)  T(F): 97, Max: 97.3 (05-06 @ 19:20)  HR: 85 (75 - 85)  BP: 154/75 (100/53 - 154/75)  BP(mean): --  RR: 18 (16 - 18)  SpO2: 97% (96% - 97%)  PHYSICAL EXAM:    GENERAL: NAD, well-groomed, well-developed  HEAD:  Atraumatic, Normocephalic  EYES: EOMI, PERRLA, conjunctiva and sclera clear  NECK: Supple, No JVD, Normal thyroid  NERVOUS SYSTEM:  Alert & Oriented X3, no focal deficit  CHEST/LUNG: CTA b/l ,  no  rales, rhonchi, wheezing, or rubs  HEART: Regular rate and rhythm; No murmurs, rubs, or gallops  ABDOMEN: Soft, Nontender, Nondistended; Bowel sounds present  EXTREMITIES:  2+ Peripheral Pulses, No clubbing, cyanosis, or edema  LYMPH: No lymphadenopathy noted  SKIN: No rashes or lesions Patient seen and examined . NAD.    CC: no complaints     HPI: 85 yr old female with PMH CAD s/p stents x 2 , DM2, hypertension, hyperlipidemia, anxiety, significant PVD s/p multiple stents, paroxysmal atrial fibrillation on Eliquis admitted for hypertensive urgency post LE angiogram. 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 Hospital course complicated by urinary retention with UTI (failed TOV), hyponatremia likely due to pain and improved with NaCl (per nephrology) and pain medications. BP meds and insulin coverage titrated for better control.  Patient stable for discharge to acute rehab.       PAST MEDICAL & SURGICAL HISTORY:  Hyperlipidemia  Hypertension  Diabetes  PVD (peripheral vascular disease)  CAD (coronary artery disease)  History of cholecystectomy  H/O angioplasty      MEDICATIONS  (STANDING):  docusate sodium 100milliGRAM(s) Oral three times a day  ascorbic acid 500milliGRAM(s) Oral daily  multivitamin 1Tablet(s) Oral daily  apixaban 5milliGRAM(s) Oral two times a day  clopidogrel Tablet 75milliGRAM(s) Oral daily  nystatin Powder 1Application(s) Topical two times a day  senna 2Tablet(s) Oral at bedtime  atorvastatin 40milliGRAM(s) Oral at bedtime  lisinopril 5milliGRAM(s) Oral daily  dextrose 5%. 1000milliLiter(s) IV Continuous <Continuous>  dextrose 50% Injectable 12.5Gram(s) IV Push once  dextrose 50% Injectable 25Gram(s) IV Push once  dextrose 50% Injectable 25Gram(s) IV Push once  ferrous    sulfate 325milliGRAM(s) Oral daily  folic acid 1milliGRAM(s) Oral daily  levothyroxine 88MICROGram(s) Oral daily  mirtazapine 7.5milliGRAM(s) Oral at bedtime  pantoprazole    Tablet 40milliGRAM(s) Oral before breakfast  polyethylene glycol 3350 17Gram(s) Oral daily  zinc sulfate 220milliGRAM(s) Oral daily  metoprolol 25milliGRAM(s) Oral every 12 hours  insulin glargine Injectable (LANTUS) 10Unit(s) SubCutaneous at bedtime  insulin lispro Injectable (HumaLOG) 2Unit(s) SubCutaneous three times a day before meals  insulin lispro (HumaLOG) corrective regimen sliding scale  SubCutaneous three times a day before meals  gabapentin 200milliGRAM(s) Oral three times a day  tamsulosin 0.8milliGRAM(s) Oral at bedtime    MEDICATIONS  (PRN):  acetaminophen   Tablet 650milliGRAM(s) Oral every 6 hours PRN For Temp greater than 38 C (100.4 F)  acetaminophen   Tablet. 650milliGRAM(s) Oral every 6 hours PRN Mild Pain (1 - 3)  dextrose Gel 1Dose(s) Oral once PRN Blood Glucose LESS THAN 70 milliGRAM(s)/deciliter  glucagon  Injectable 1milliGRAM(s) IntraMuscular once PRN Glucose LESS THAN 70 milligrams/deciliter  bisacodyl Suppository 10milliGRAM(s) Rectal daily PRN Constipation  oxyCODONE  5 mG/acetaminophen 325 mG 2Tablet(s) Oral every 4 hours PRN Severe Pain (7 - 10)  oxyCODONE  5 mG/acetaminophen 325 mG 1Tablet(s) Oral every 4 hours PRN Moderate Pain (4 - 6)  ALPRAZolam 0.25milliGRAM(s) Oral at bedtime PRN anxiety          REVIEW OF SYSTEMS:    All systems reviewed and are negative     Vital Signs Last 24 Hrs  T(C): 36.1, Max: 36.3 (05-06 @ 19:20)  T(F): 97, Max: 97.3 (05-06 @ 19:20)  HR: 85 (75 - 85)  BP: 154/75 (100/53 - 154/75)  BP(mean): --  RR: 18 (16 - 18)  SpO2: 97% (96% - 97%)  PHYSICAL EXAM:    GENERAL: NAD, well-groomed, well-developed  HEAD:  Atraumatic, Normocephalic  EYES: EOMI, PERRLA, conjunctiva and sclera clear  NECK: Supple, No JVD, Normal thyroid  NERVOUS SYSTEM:  Alert & Oriented X3, no focal deficit  CHEST/LUNG: CTA b/l ,  no  rales, rhonchi, wheezing, or rubs  HEART: Regular rate and rhythm; No murmurs, rubs, or gallops  ABDOMEN: Soft, Nontender, Nondistended; Bowel sounds present  EXTREMITIES:  2+ Peripheral Pulses, No clubbing, cyanosis, or edema ,  L AKA C/D/I staples in place  RLE Doppler pedal signals Stage 2 heel ulcer stable, dry gangrene of the dorsal distal second phalanx, black eschar over the 5th metatarsal on the plantar surface

## 2017-05-07 NOTE — PROGRESS NOTE ADULT - SUBJECTIVE AND OBJECTIVE BOX
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 Meyer. Subsequently admitted to acute rehab. Developed LLE post operative wound dehiscence, s/p left AKA and RLE angiogram on 4/14/17. Also s/p revascularization of RLE with angiogram and R SFA/POP angioplasty on 4/20/17. Hospital stay significant for: Completed course of cefazolin for UTI. Restarted on Abx for R foot second toe gangrene. Transfused two units PRBCs.    Interval history:   No acute events overnight. Patient feels "alright". She still has phantom pain which is controlled on medication. BM (+) yesterday.    REVIEW OF SYSTEMS  [ x ] Constitutional WNL  [   ] Cardio WNL  [   ] Resp WNL  [   ] GI WNL  [    ]  WNL  [ x ] Heme WNL  [ x ] Endo WNL  [   ] Skin WNL  [   ] MSK WNL  [   ] Neuro WNL  [x  ] Cognitive WNL  [x  ] Psych WNL    VITAL SIGNS:  Vital Signs Last 24 Hrs  T(C): 36.1, Max: 36.3 (05-06 @ 19:20)  T(F): 97, Max: 97.3 (05-06 @ 19:20)  HR: 85 (75 - 85)  BP: 154/75 (100/53 - 154/75)  BP(mean): --  RR: 18 (16 - 18)  SpO2: 97% (96% - 97%)    PHYSICAL EXAM  Constitutional - NAD, Comfortable  Chest - CTA bilaterally.   Cardiovascular - RRR, S1S2, No murmurs  Abdomen - BS+, Soft, NTND  Extremities - No C/C/E, No calf tenderness of RLE  Neurologic Exam -                    Cognitive - Awake, Alert, AAO to self, place, date, year, situation     Communication - Fluent, No dysarthria     Motor - No focal deficits                    LEFT    UE - ShAB 5/5, EF 5/5, EE 5/5, WE 5/5,  5/5                    RIGHT UE - ShAB 5/5, EF 5/5, EE 5/5, WE 5/5,  5/5                    LEFT    LE - HF wfl                    RIGHT LE - At least 3-4/5, limited by pain     Sensory - Intact to LT   Psychiatric - Mood stable, Affect WNL  Skin--LLE AKA incision with staples, c/d/i,no erythema.  Right 2nd toe gangrene on dorsum of toe-stable. R calcaneus stage II pressure ulcer, black eschar over the 5th metatarsal on the plantar surface    Functional Progress:   Sit to supine: Min A  Transfer: Sliding board, min A  Toileting: Total A  LE dressing: Min A; UE dressing: Supervision  Grooming: S    MEDICATIONS  (STANDING):  docusate sodium 100milliGRAM(s) Oral three times a day  ascorbic acid 500milliGRAM(s) Oral daily  multivitamin 1Tablet(s) Oral daily  apixaban 5milliGRAM(s) Oral two times a day  clopidogrel Tablet 75milliGRAM(s) Oral daily  nystatin Powder 1Application(s) Topical two times a day  senna 2Tablet(s) Oral at bedtime  atorvastatin 40milliGRAM(s) Oral at bedtime  lisinopril 5milliGRAM(s) Oral daily  dextrose 5%. 1000milliLiter(s) IV Continuous <Continuous>  dextrose 50% Injectable 12.5Gram(s) IV Push once  dextrose 50% Injectable 25Gram(s) IV Push once  dextrose 50% Injectable 25Gram(s) IV Push once  ferrous    sulfate 325milliGRAM(s) Oral daily  folic acid 1milliGRAM(s) Oral daily  levothyroxine 88MICROGram(s) Oral daily  mirtazapine 7.5milliGRAM(s) Oral at bedtime  pantoprazole    Tablet 40milliGRAM(s) Oral before breakfast  polyethylene glycol 3350 17Gram(s) Oral daily  zinc sulfate 220milliGRAM(s) Oral daily  metoprolol 25milliGRAM(s) Oral every 12 hours  insulin glargine Injectable (LANTUS) 10Unit(s) SubCutaneous at bedtime  insulin lispro Injectable (HumaLOG) 2Unit(s) SubCutaneous three times a day before meals  insulin lispro (HumaLOG) corrective regimen sliding scale  SubCutaneous three times a day before meals  gabapentin 200milliGRAM(s) Oral three times a day  tamsulosin 0.8milliGRAM(s) Oral at bedtime    MEDICATIONS  (PRN):  acetaminophen   Tablet 650milliGRAM(s) Oral every 6 hours PRN For Temp greater than 38 C (100.4 F)  acetaminophen   Tablet. 650milliGRAM(s) Oral every 6 hours PRN Mild Pain (1 - 3)  dextrose Gel 1Dose(s) Oral once PRN Blood Glucose LESS THAN 70 milliGRAM(s)/deciliter  glucagon  Injectable 1milliGRAM(s) IntraMuscular once PRN Glucose LESS THAN 70 milligrams/deciliter  bisacodyl Suppository 10milliGRAM(s) Rectal daily PRN Constipation  oxyCODONE  5 mG/acetaminophen 325 mG 2Tablet(s) Oral every 4 hours PRN Severe Pain (7 - 10)  oxyCODONE  5 mG/acetaminophen 325 mG 1Tablet(s) Oral every 4 hours PRN Moderate Pain (4 - 6)  ALPRAZolam 0.25milliGRAM(s) Oral at bedtime PRN anxiety      RECENT LABS:                                9.7    6.0   )-----------( 259      ( 01 May 2017 07:14 )             31.1     05-01    139  |  103  |  34.0<H>  ----------------------------<  99  5.0   |  24.0  |  0.94    Ca    8.8      01 May 2017 07:14  Mg     2.0     05-01    CAPILLARY BLOOD GLUCOSE  145 (07 May 2017 08:28)  198 (06 May 2017 21:48)  375 (06 May 2017 16:43)  314 (06 May 2017 12:08)    ASSESSMENT AND PLAN:  82 year old F with extensive PMH including PVD s/p multiple stents, 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 Meyer, s/p left AKA and RLE angiogram on 4/14/17. Also s/p revascularization of RLE with angiogram and R SFA/POP angioplasty on 4/20/17. Hospital stay significant for: Completed course of cefazolin for UTI. Restarted on Abx for R foot second toe gangrene. Transfused two units PRBCs. Presents with functional deficits.     PVD s/p L AKA: Residual limb- keep c/d/i. Prosthetist contacted by PT for stump     S/p RLE angio, R toe gangrene: + betadine, daily dressing change. Vascular following. Completed cefazolin course 5/4/17.    R heel stage II ulcer: + betadine. daily dressing change. Vascular follow up appreciated.     Pain control: Percocet, gabapentin. tylenol.     Bowel regimen: Constipation improved. Senna, colace, miralax. + Milk of magnesia 5/4. Bisacodyl suppository prn.    Urinary retention: Improved overnight and this AM, PVRs 100s-300s. Continue Flomax. PVR/SC > 350 cc q6. UA: mod LE, WBC 26-50, urine cx neg    DM II:  Continue lantus 10 units, humalog 2-2-2, ISS AC. Daytime/afternoon BG running high, although morning level ok. Will continue to monitor & consider increasing insulin regimen.    HTN: BP stable. D/c Amlodipine 5/1, change metoprolol to 25 mg q12 per medicine recs on 5/1. Continue lisinopril    PAF: Eliquis    CAD: Metoprolol 25 mg q12, lipitor, plavix    Acute blood loss anemia: Hgb stable. FeSO4    Hypothyroidism: Synthroid    Appetite/mood: Remeron    GI/supplements: Protonix, zinc sulfate, MVI, folic acid    Anxiety: Xanax qhs prn, patient to go outside in wheelchair with family.    [facility], 3 hours of daily therapy

## 2017-05-07 NOTE — PROGRESS NOTE ADULT - ASSESSMENT
85 yr old female with PMH CAD s/p stents, DM2, hypertension, hyperlipidemia, anxiety, significant PVD s/p multiple stents, paroxysmal atrial fibrillation on Eliquis admitted for hypertensive urgency post LE angiogram. 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 Hospital course complicated by urinary retention with UTI (failed TOV), hyponatremia likely due to pain and improved with NaCl (per nephrology) and pain medications. BP meds and insulin coverage titrated for better control.  Patient was   transferred  to acute rehab , pain poorly controlled , duragesic  patch discontinued due to lethargy  . Revaluated by vascular surgery , found to have  postoperative wound dehiscence. Had AKA now in rehab. BG has been labile, improved over past 24 hours.    Problem/Plan - 1:  ·  Problem: PVD (peripheral vascular disease).  Plan: S/P left AKA- Vascular following  new black eschar on 5th metatarsal   s/p revascularization of RLE with angiogram and R SFA/POP angioplasty on 4/20/17  Wound Care.     Problem/Plan - 2:  ·  Problem: Type 2 diabetes mellitus with diabetic peripheral angiopathy and gangrene, with long-term current use of insulin.  Plan: BG improved control on current RX.     Problem/Plan - 3:  ·  Problem: Constipation, unspecified constipation type.  Plan: continue bowel RX.     Problem/Plan - 4:  ·  Problem: Urinary retention.  Plan: increase flomax to 0.8  continue bladder scan and straight cath cath for >300  maintain bowel rx to prevent constipation.     Problem/Plan - 5:  ·  Problem: Hypertension.  Plan: stable on current RX.     Problem/Plan - 6:  Problem: CAD (coronary artery disease). Plan: stable , continue current meds    Problem/Plan - 7:  ·  Problem: Hyperlipidemia.  Plan: statin.     Problem/Plan - 8:  ·  Problem: Anemia due to blood loss.  Plan: iron supplement  monitor labs. 85 yr old female with PMH CAD s/p stents, DM2, hypertension, hyperlipidemia, anxiety, significant PVD s/p multiple stents, paroxysmal atrial fibrillation on Eliquis admitted for hypertensive urgency post LE angiogram. 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 Hospital course complicated by urinary retention with UTI (failed TOV), hyponatremia likely due to pain and improved with NaCl (per nephrology) and pain medications. BP meds and insulin coverage titrated for better control.  Patient was   transferred  to acute rehab , pain poorly controlled , duragesic  patch discontinued due to lethargy  . Revaluated by vascular surgery , found to have  postoperative wound dehiscence. Had AKA now in rehab. BG has been labile, improved over past 24 hours.    Problem/Plan - 1:  ·  Problem: PVD (peripheral vascular disease).  Plan: S/P left AKA- Vascular following  new black eschar on 5th metatarsal   s/p revascularization of RLE with angiogram and R SFA/POP angioplasty on 4/20/17  Wound Care.     Problem/Plan - 2:  ·  Problem: Type 2 diabetes mellitus with diabetic peripheral angiopathy and gangrene, with long-term current use of insulin.  Plan: BG improved control on current RX.     Problem/Plan - 3:  ·  Problem: Constipation, unspecified constipation type.  Plan: continue bowel RX.     Problem/Plan - 4:  ·  Problem: Urinary retention.  Plan: increase flomax to 0.8  continue bladder scan and straight cath cath for >300  maintain bowel rx to prevent constipation.     Problem/Plan - 5:  ·  Problem: Hypertension.  Plan: stable on current RX.     Problem/Plan - 6:  Problem: CAD (coronary artery disease). Plan: stable , continue current meds    Problem/Plan - 7:  ·  Problem: Hyperlipidemia.  Plan: statin.     Problem/Plan - 8:  ·  Problem: Anemia multifactorial , likely ABLA on anemia of chronic dz.  Plan: iron supplement  monitor labs.

## 2017-05-08 LAB
ANION GAP SERPL CALC-SCNC: 11 MMOL/L — SIGNIFICANT CHANGE UP (ref 5–17)
BUN SERPL-MCNC: 29 MG/DL — HIGH (ref 8–20)
CALCIUM SERPL-MCNC: 9.2 MG/DL — SIGNIFICANT CHANGE UP (ref 8.6–10.2)
CHLORIDE SERPL-SCNC: 100 MMOL/L — SIGNIFICANT CHANGE UP (ref 98–107)
CO2 SERPL-SCNC: 27 MMOL/L — SIGNIFICANT CHANGE UP (ref 22–29)
CREAT SERPL-MCNC: 0.88 MG/DL — SIGNIFICANT CHANGE UP (ref 0.5–1.3)
GLUCOSE SERPL-MCNC: 93 MG/DL — SIGNIFICANT CHANGE UP (ref 70–115)
HCT VFR BLD CALC: 31.8 % — LOW (ref 37–47)
HGB BLD-MCNC: 10.2 G/DL — LOW (ref 12–16)
MCHC RBC-ENTMCNC: 28.3 PG — SIGNIFICANT CHANGE UP (ref 27–31)
MCHC RBC-ENTMCNC: 32.1 G/DL — SIGNIFICANT CHANGE UP (ref 32–36)
MCV RBC AUTO: 88.1 FL — SIGNIFICANT CHANGE UP (ref 81–99)
PLATELET # BLD AUTO: 224 K/UL — SIGNIFICANT CHANGE UP (ref 150–400)
POTASSIUM SERPL-MCNC: 5.2 MMOL/L — SIGNIFICANT CHANGE UP (ref 3.5–5.3)
POTASSIUM SERPL-SCNC: 5.2 MMOL/L — SIGNIFICANT CHANGE UP (ref 3.5–5.3)
RBC # BLD: 3.61 M/UL — LOW (ref 4.4–5.2)
RBC # FLD: 16.2 % — HIGH (ref 11–15.6)
SODIUM SERPL-SCNC: 138 MMOL/L — SIGNIFICANT CHANGE UP (ref 135–145)
WBC # BLD: 6.6 K/UL — SIGNIFICANT CHANGE UP (ref 4.8–10.8)
WBC # FLD AUTO: 6.6 K/UL — SIGNIFICANT CHANGE UP (ref 4.8–10.8)

## 2017-05-08 PROCEDURE — 99232 SBSQ HOSP IP/OBS MODERATE 35: CPT

## 2017-05-08 RX ORDER — GABAPENTIN 400 MG/1
100 CAPSULE ORAL THREE TIMES A DAY
Qty: 0 | Refills: 0 | Status: DISCONTINUED | OUTPATIENT
Start: 2017-05-08 | End: 2017-05-16

## 2017-05-08 RX ADMIN — Medication 25 MILLIGRAM(S): at 05:41

## 2017-05-08 RX ADMIN — Medication 2 UNIT(S): at 17:16

## 2017-05-08 RX ADMIN — Medication 25 MILLIGRAM(S): at 17:15

## 2017-05-08 RX ADMIN — INSULIN GLARGINE 10 UNIT(S): 100 INJECTION, SOLUTION SUBCUTANEOUS at 22:10

## 2017-05-08 RX ADMIN — MIRTAZAPINE 7.5 MILLIGRAM(S): 45 TABLET, ORALLY DISINTEGRATING ORAL at 21:10

## 2017-05-08 RX ADMIN — TAMSULOSIN HYDROCHLORIDE 0.8 MILLIGRAM(S): 0.4 CAPSULE ORAL at 22:10

## 2017-05-08 RX ADMIN — Medication 100 MILLIGRAM(S): at 05:41

## 2017-05-08 RX ADMIN — APIXABAN 5 MILLIGRAM(S): 2.5 TABLET, FILM COATED ORAL at 17:15

## 2017-05-08 RX ADMIN — Medication 100 MILLIGRAM(S): at 21:10

## 2017-05-08 RX ADMIN — Medication 1 MILLIGRAM(S): at 11:15

## 2017-05-08 RX ADMIN — Medication 325 MILLIGRAM(S): at 11:15

## 2017-05-08 RX ADMIN — Medication 2 UNIT(S): at 11:44

## 2017-05-08 RX ADMIN — Medication 2: at 11:44

## 2017-05-08 RX ADMIN — Medication 500 MILLIGRAM(S): at 11:14

## 2017-05-08 RX ADMIN — SENNA PLUS 2 TABLET(S): 8.6 TABLET ORAL at 21:10

## 2017-05-08 RX ADMIN — ZINC SULFATE TAB 220 MG (50 MG ZINC EQUIVALENT) 220 MILLIGRAM(S): 220 (50 ZN) TAB at 11:14

## 2017-05-08 RX ADMIN — APIXABAN 5 MILLIGRAM(S): 2.5 TABLET, FILM COATED ORAL at 05:41

## 2017-05-08 RX ADMIN — LISINOPRIL 5 MILLIGRAM(S): 2.5 TABLET ORAL at 05:41

## 2017-05-08 RX ADMIN — GABAPENTIN 100 MILLIGRAM(S): 400 CAPSULE ORAL at 21:10

## 2017-05-08 RX ADMIN — Medication 0.25 MILLIGRAM(S): at 21:10

## 2017-05-08 RX ADMIN — ATORVASTATIN CALCIUM 40 MILLIGRAM(S): 80 TABLET, FILM COATED ORAL at 21:10

## 2017-05-08 RX ADMIN — POLYETHYLENE GLYCOL 3350 17 GRAM(S): 17 POWDER, FOR SOLUTION ORAL at 11:14

## 2017-05-08 RX ADMIN — Medication 100 MILLIGRAM(S): at 14:33

## 2017-05-08 RX ADMIN — GABAPENTIN 200 MILLIGRAM(S): 400 CAPSULE ORAL at 05:41

## 2017-05-08 RX ADMIN — PANTOPRAZOLE SODIUM 40 MILLIGRAM(S): 20 TABLET, DELAYED RELEASE ORAL at 05:41

## 2017-05-08 RX ADMIN — CLOPIDOGREL BISULFATE 75 MILLIGRAM(S): 75 TABLET, FILM COATED ORAL at 11:14

## 2017-05-08 RX ADMIN — Medication 88 MICROGRAM(S): at 05:41

## 2017-05-08 RX ADMIN — NYSTATIN CREAM 1 APPLICATION(S): 100000 CREAM TOPICAL at 05:41

## 2017-05-08 RX ADMIN — Medication 2: at 17:16

## 2017-05-08 RX ADMIN — NYSTATIN CREAM 1 APPLICATION(S): 100000 CREAM TOPICAL at 19:40

## 2017-05-08 RX ADMIN — GABAPENTIN 100 MILLIGRAM(S): 400 CAPSULE ORAL at 14:33

## 2017-05-08 RX ADMIN — Medication 1 TABLET(S): at 11:15

## 2017-05-08 NOTE — PROGRESS NOTE ADULT - SUBJECTIVE AND OBJECTIVE BOX
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 Meyer. Subsequently admitted to acute rehab. Developed LLE post operative wound dehiscence, s/p left AKA and RLE angiogram on 4/14/17. Also s/p revascularization of RLE with angiogram and R SFA/POP angioplasty on 4/20/17. Hospital stay significant for: Completed course of cefazolin for UTI. Restarted on Abx for R foot second toe gangrene. Transfused two units PRBCs.    Interval history:   No acute events overnight. Patient c/o pain right heel as well as phantom pain.  +BM x 2 yesterday.  Denies urinary complaints.      REVIEW OF SYSTEMS  [ x ] Constitutional WNL  [   ] Cardio WNL  [   ] Resp WNL  [   ] GI WNL  [    ]  WNL  [ x ] Heme WNL  [ x ] Endo WNL  [   ] Skin WNL  [   ] MSK WNL  [   ] Neuro WNL  [x  ] Cognitive WNL  [x  ] Psych WNL    VITAL SIGNS:  Vital Signs Last 24 Hrs  T(C): 36.1, Max: 36.3 (05-06 @ 19:20)  T(F): 97, Max: 97.3 (05-06 @ 19:20)  HR: 85 (75 - 85)  BP: 154/75 (100/53 - 154/75)  BP(mean): --  RR: 18 (16 - 18)  SpO2: 97% (96% - 97%)    PHYSICAL EXAM  Constitutional - NAD, Comfortable  Chest - CTA bilaterally.   Cardiovascular - RRR, S1S2, No murmurs  Abdomen - BS+, Soft, NTND  Extremities - No C/C/E, No calf tenderness of RLE  Neurologic Exam -                    Cognitive - Awake, Alert, AAO to self, place, date, year, situation     Communication - Fluent, No dysarthria     Motor - No focal deficits                    LEFT    UE - ShAB 5/5, EF 5/5, EE 5/5, WE 5/5,  5/5                    RIGHT UE - ShAB 5/5, EF 5/5, EE 5/5, WE 5/5,  5/5                    LEFT    LE - HF wfl                    RIGHT LE - At least 3-4/5, limited by pain     Sensory - Intact to LT   Psychiatric - Mood stable, Affect WNL  Skin--LLE AKA incision with staples, c/d/i,no erythema.  Right 2nd toe gangrene and tip of great toe-stable. R calcaneus stage II pressure ulcer healing well, black eschar over the 5th metatarsal on the plantar surface    Functional Progress:   Sit to supine: Min A  Transfer: Sliding board, supervision  Toileting: Total A  LE dressing: Min A; UE dressing: Supervision  Grooming: S    MEDICATIONS  (STANDING):  docusate sodium 100milliGRAM(s) Oral three times a day  ascorbic acid 500milliGRAM(s) Oral daily  multivitamin 1Tablet(s) Oral daily  apixaban 5milliGRAM(s) Oral two times a day  clopidogrel Tablet 75milliGRAM(s) Oral daily  nystatin Powder 1Application(s) Topical two times a day  senna 2Tablet(s) Oral at bedtime  atorvastatin 40milliGRAM(s) Oral at bedtime  lisinopril 5milliGRAM(s) Oral daily  dextrose 5%. 1000milliLiter(s) IV Continuous <Continuous>  dextrose 50% Injectable 12.5Gram(s) IV Push once  dextrose 50% Injectable 25Gram(s) IV Push once  dextrose 50% Injectable 25Gram(s) IV Push once  ferrous    sulfate 325milliGRAM(s) Oral daily  folic acid 1milliGRAM(s) Oral daily  levothyroxine 88MICROGram(s) Oral daily  mirtazapine 7.5milliGRAM(s) Oral at bedtime  pantoprazole    Tablet 40milliGRAM(s) Oral before breakfast  polyethylene glycol 3350 17Gram(s) Oral daily  zinc sulfate 220milliGRAM(s) Oral daily  metoprolol 25milliGRAM(s) Oral every 12 hours  insulin glargine Injectable (LANTUS) 10Unit(s) SubCutaneous at bedtime  insulin lispro Injectable (HumaLOG) 2Unit(s) SubCutaneous three times a day before meals  insulin lispro (HumaLOG) corrective regimen sliding scale  SubCutaneous three times a day before meals  gabapentin 200milliGRAM(s) Oral three times a day  tamsulosin 0.8milliGRAM(s) Oral at bedtime    MEDICATIONS  (PRN):  acetaminophen   Tablet 650milliGRAM(s) Oral every 6 hours PRN For Temp greater than 38 C (100.4 F)  acetaminophen   Tablet. 650milliGRAM(s) Oral every 6 hours PRN Mild Pain (1 - 3)  dextrose Gel 1Dose(s) Oral once PRN Blood Glucose LESS THAN 70 milliGRAM(s)/deciliter  glucagon  Injectable 1milliGRAM(s) IntraMuscular once PRN Glucose LESS THAN 70 milligrams/deciliter  bisacodyl Suppository 10milliGRAM(s) Rectal daily PRN Constipation  oxyCODONE  5 mG/acetaminophen 325 mG 2Tablet(s) Oral every 4 hours PRN Severe Pain (7 - 10)  oxyCODONE  5 mG/acetaminophen 325 mG 1Tablet(s) Oral every 4 hours PRN Moderate Pain (4 - 6)  ALPRAZolam 0.25milliGRAM(s) Oral at bedtime PRN anxiety      RECENT LABS:                               10.2   6.6   )-----------( 224      ( 08 May 2017 07:43 )             31.8   05-08    138  |  100  |  29.0<H>  ----------------------------<  93  5.2   |  27.0  |  0.88    Ca    9.2      08 May 2017 07:43      CAPILLARY BLOOD GLUCOSE  95 (08 May 2017 07:30)  166 (07 May 2017 16:38)    ASSESSMENT AND PLAN:  82 year old F with extensive PMH including PVD s/p multiple stents, 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 Irwin, s/p left AKA and RLE angiogram on 4/14/17. Also s/p revascularization of RLE with angiogram and R SFA/POP angioplasty on 4/20/17. Hospital stay significant for: Completed course of cefazolin for UTI. Restarted on Abx for R foot second toe gangrene which was completed. Transfused two units PRBCs. Presents with functional deficits.     PVD s/p L AKA: Residual limb- keep c/d/i.   given    S/p RLE angio, R toe gangrene: + betadine, daily dressing change. Vascular following. Completed cefazolin course 5/4/17.  Change wound care to medihoney to 1st and 2nd toes, hydrogel to calcaneus 5/8    R heel stage II ulcer: + betadine. daily dressing change. Vascular follow up appreciated. Wound care changed to hydrogel daily    Pain control: Percocet, gabapentin. tylenol.     Bowel regimen: Constipation improved. Senna, colace, miralax. + Milk of magnesia 5/4. Bisacodyl suppository prn.    Urinary retention: Improved overnight and this AM, PVRs 100s-300s. Continue Flomax. PVR/SC > 350 cc q6. UA: mod LE, WBC 26-50, urine cx neg.  Flomax increased to 0.8 with improved PVRs at 123, 108 and 0.  D/C bladder scans    DM II:  Continue lantus 10 units, humalog 2-2-2, ISS AC. Daytime/afternoon BG running high, although morning level ok. Will continue to monitor & consider increasing insulin regimen.    HTN: BP stable. D/c Amlodipine 5/1, change metoprolol to 25 mg q12 per medicine recs on 5/1. Continue lisinopril    PAF: Eliquis    CAD: Metoprolol 25 mg q12, lipitor, plavix    Acute blood loss anemia: Hgb stable. FeSO4    Hypothyroidism: Synthroid    Appetite/mood: Remeron    GI/supplements: Protonix, zinc sulfate, MVI, folic acid    Anxiety: Xanax qhs prn, patient to go outside in wheelchair with family.    [facility], 3 hours of daily therapy

## 2017-05-08 NOTE — PROGRESS NOTE ADULT - SUBJECTIVE AND OBJECTIVE BOX
Patient seen and examined . NAD.    CC: no complaints     HPI: 85 yr old female with PMH CAD s/p stents x 2 , DM2, hypertension, hyperlipidemia, anxiety, significant PVD s/p multiple stents, paroxysmal atrial fibrillation on Eliquis admitted for hypertensive urgency post LE angiogram. 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 Hospital course complicated by urinary retention with UTI (failed TOV), hyponatremia likely due to pain and improved with NaCl (per nephrology) and pain medications. BP meds and insulin coverage titrated for better control.  Patient stable for discharge to acute rehab.     PAST MEDICAL & SURGICAL HISTORY:  Hyperlipidemia  Hypertension  Diabetes  PVD (peripheral vascular disease)  CAD (coronary artery disease)  History of cholecystectomy  H/O angioplasty      MEDICATIONS  (STANDING):  docusate sodium 100milliGRAM(s) Oral three times a day  ascorbic acid 500milliGRAM(s) Oral daily  multivitamin 1Tablet(s) Oral daily  clopidogrel Tablet 75milliGRAM(s) Oral daily  senna 2Tablet(s) Oral at bedtime  atorvastatin 40milliGRAM(s) Oral at bedtime  lisinopril 5milliGRAM(s) Oral daily  dextrose 5%. 1000milliLiter(s) IV Continuous <Continuous>  dextrose 50% Injectable 12.5Gram(s) IV Push once  dextrose 50% Injectable 25Gram(s) IV Push once  dextrose 50% Injectable 25Gram(s) IV Push once  ferrous    sulfate 325milliGRAM(s) Oral daily  folic acid 1milliGRAM(s) Oral daily  levothyroxine 88MICROGram(s) Oral daily  mirtazapine 7.5milliGRAM(s) Oral at bedtime  pantoprazole    Tablet 40milliGRAM(s) Oral before breakfast  polyethylene glycol 3350 17Gram(s) Oral daily  zinc sulfate 220milliGRAM(s) Oral daily  metoprolol 25milliGRAM(s) Oral every 12 hours  insulin glargine Injectable (LANTUS) 10Unit(s) SubCutaneous at bedtime  insulin lispro Injectable (HumaLOG) 2Unit(s) SubCutaneous three times a day before meals  insulin lispro (HumaLOG) corrective regimen sliding scale  SubCutaneous three times a day before meals  tamsulosin 0.8milliGRAM(s) Oral at bedtime  nystatin Powder 1Application(s) Topical two times a day  apixaban 5milliGRAM(s) Oral two times a day  gabapentin 100milliGRAM(s) Oral three times a day    MEDICATIONS  (PRN):  acetaminophen   Tablet 650milliGRAM(s) Oral every 6 hours PRN For Temp greater than 38 C (100.4 F)  acetaminophen   Tablet. 650milliGRAM(s) Oral every 6 hours PRN Mild Pain (1 - 3)  dextrose Gel 1Dose(s) Oral once PRN Blood Glucose LESS THAN 70 milliGRAM(s)/deciliter  glucagon  Injectable 1milliGRAM(s) IntraMuscular once PRN Glucose LESS THAN 70 milligrams/deciliter  bisacodyl Suppository 10milliGRAM(s) Rectal daily PRN Constipation  oxyCODONE  5 mG/acetaminophen 325 mG 2Tablet(s) Oral every 4 hours PRN Severe Pain (7 - 10)  oxyCODONE  5 mG/acetaminophen 325 mG 1Tablet(s) Oral every 4 hours PRN Moderate Pain (4 - 6)  ALPRAZolam 0.25milliGRAM(s) Oral at bedtime PRN anxiety      LABS:                          10.2   6.6   )-----------( 224      ( 08 May 2017 07:43 )             31.8     05-08    138  |  100  |  29.0<H>  ----------------------------<  93  5.2   |  27.0  |  0.88    Ca    9.2      08 May 2017 07:43            RADIOLOGY & ADDITIONAL TESTS:      REVIEW OF SYSTEMS:    CONSTITUTIONAL: No fever, weight loss, or fatigue  EYES: No eye pain, visual disturbances, or discharge  ENMT:  No difficulty hearing, tinnitus, vertigo; No sinus or throat pain  NECK: No pain or stiffness  RESPIRATORY: No cough, wheezing, chills or hemoptysis; No shortness of breath  CARDIOVASCULAR: No chest pain, palpitations, dizziness, or leg swelling  GASTROINTESTINAL: No abdominal or epigastric pain. No nausea, vomiting, or hematemesis; No diarrhea or constipation. No melena or hematochezia.  GENITOURINARY: No dysuria, frequency, hematuria, or incontinence  NEUROLOGICAL: No headaches, memory loss, loss of strength, numbness, or tremors  SKIN: No itching, burning, rashes, or lesions   LYMPH NODES: No enlarged glands  ENDOCRINE: No heat or cold intolerance; No hair loss  MUSCULOSKELETAL:   PSYCHIATRIC: No depression, anxiety, mood swings, or difficulty sleeping  HEME/LYMPH: No easy bruising, or bleeding gums  ALLERGY AND IMMUNOLOGIC: No hives or eczema    Vital Signs Last 24 Hrs  T(C): 35.8, Max: 36.7 (05-07 @ 20:13)  T(F): 96.5, Max: 98.1 (05-07 @ 20:13)  HR: 76 (76 - 96)  BP: 128/59 (117/51 - 128/59)  BP(mean): --  RR: 17 (17 - 20)  SpO2: 97% (95% - 97%)  PHYSICAL EXAM:    GENERAL: NAD, well-groomed, well-developed  HEAD:  Atraumatic, Normocephalic  EYES: EOMI, PERRLA, conjunctiva and sclera clear  NECK: Supple, No JVD, Normal thyroid  NERVOUS SYSTEM:  Alert & Oriented X3, no focal deficit  CHEST/LUNG: CTA b/l ,  no  rales, rhonchi, wheezing, or rubs  HEART: Regular rate and rhythm; No murmurs, rubs, or gallops  ABDOMEN: Soft, Nontender, Nondistended; Bowel sounds present  EXTREMITIES:  2+ Peripheral Pulses, No clubbing, cyanosis, or edema  LYMPH: No lymphadenopathy noted  SKIN: No rashes or lesions Patient seen and examined . NAD.    CC: no complaints     HPI: 85 yr old female with PMH CAD s/p stents x 2 , DM2, hypertension, hyperlipidemia, anxiety, significant PVD s/p multiple stents, paroxysmal atrial fibrillation on Eliquis admitted for hypertensive urgency post LE angiogram. 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 Hospital course complicated by urinary retention with UTI (failed TOV), hyponatremia likely due to pain and improved with NaCl (per nephrology) and pain medications. BP meds and insulin coverage titrated for better control.  Patient stable for discharge to acute rehab.     PAST MEDICAL & SURGICAL HISTORY:  Hyperlipidemia  Hypertension  Diabetes  PVD (peripheral vascular disease)  CAD (coronary artery disease)  History of cholecystectomy  H/O angioplasty      MEDICATIONS  (STANDING):  docusate sodium 100milliGRAM(s) Oral three times a day  ascorbic acid 500milliGRAM(s) Oral daily  multivitamin 1Tablet(s) Oral daily  clopidogrel Tablet 75milliGRAM(s) Oral daily  senna 2Tablet(s) Oral at bedtime  atorvastatin 40milliGRAM(s) Oral at bedtime  lisinopril 5milliGRAM(s) Oral daily  dextrose 5%. 1000milliLiter(s) IV Continuous <Continuous>  dextrose 50% Injectable 12.5Gram(s) IV Push once  dextrose 50% Injectable 25Gram(s) IV Push once  dextrose 50% Injectable 25Gram(s) IV Push once  ferrous    sulfate 325milliGRAM(s) Oral daily  folic acid 1milliGRAM(s) Oral daily  levothyroxine 88MICROGram(s) Oral daily  mirtazapine 7.5milliGRAM(s) Oral at bedtime  pantoprazole    Tablet 40milliGRAM(s) Oral before breakfast  polyethylene glycol 3350 17Gram(s) Oral daily  zinc sulfate 220milliGRAM(s) Oral daily  metoprolol 25milliGRAM(s) Oral every 12 hours  insulin glargine Injectable (LANTUS) 10Unit(s) SubCutaneous at bedtime  insulin lispro Injectable (HumaLOG) 2Unit(s) SubCutaneous three times a day before meals  insulin lispro (HumaLOG) corrective regimen sliding scale  SubCutaneous three times a day before meals  tamsulosin 0.8milliGRAM(s) Oral at bedtime  nystatin Powder 1Application(s) Topical two times a day  apixaban 5milliGRAM(s) Oral two times a day  gabapentin 100milliGRAM(s) Oral three times a day    MEDICATIONS  (PRN):  acetaminophen   Tablet 650milliGRAM(s) Oral every 6 hours PRN For Temp greater than 38 C (100.4 F)  acetaminophen   Tablet. 650milliGRAM(s) Oral every 6 hours PRN Mild Pain (1 - 3)  dextrose Gel 1Dose(s) Oral once PRN Blood Glucose LESS THAN 70 milliGRAM(s)/deciliter  glucagon  Injectable 1milliGRAM(s) IntraMuscular once PRN Glucose LESS THAN 70 milligrams/deciliter  bisacodyl Suppository 10milliGRAM(s) Rectal daily PRN Constipation  oxyCODONE  5 mG/acetaminophen 325 mG 2Tablet(s) Oral every 4 hours PRN Severe Pain (7 - 10)  oxyCODONE  5 mG/acetaminophen 325 mG 1Tablet(s) Oral every 4 hours PRN Moderate Pain (4 - 6)  ALPRAZolam 0.25milliGRAM(s) Oral at bedtime PRN anxiety      LABS:                          10.2   6.6   )-----------( 224      ( 08 May 2017 07:43 )             31.8     05-08    138  |  100  |  29.0<H>  ----------------------------<  93  5.2   |  27.0  |  0.88    Ca    9.2      08 May 2017 07:43            RADIOLOGY & ADDITIONAL TESTS:      REVIEW OF SYSTEMS:    CONSTITUTIONAL: No fever, weight loss, or fatigue  EYES: No eye pain, visual disturbances, or discharge  ENMT:  No difficulty hearing, tinnitus, vertigo; No sinus or throat pain  NECK: No pain or stiffness  RESPIRATORY: No cough, wheezing, chills or hemoptysis; No shortness of breath  CARDIOVASCULAR: No chest pain, palpitations, dizziness, or leg swelling  GASTROINTESTINAL: No abdominal or epigastric pain. No nausea, vomiting, or hematemesis; No diarrhea or constipation. No melena or hematochezia.  GENITOURINARY: No dysuria, frequency, hematuria, or incontinence  NEUROLOGICAL: No headaches, memory loss, loss of strength, numbness, or tremors  SKIN: No itching, burning, rashes, or lesions   LYMPH NODES: No enlarged glands  ENDOCRINE: No heat or cold intolerance; No hair loss  MUSCULOSKELETAL: no joint /  muscle pain or swelling   PSYCHIATRIC: No depression, anxiety, mood swings, or difficulty sleeping  HEME/LYMPH: No easy bruising, or bleeding gums  ALLERGY AND IMMUNOLOGIC: No hives or eczema    Vital Signs Last 24 Hrs  T(C): 35.8, Max: 36.7 (05-07 @ 20:13)  T(F): 96.5, Max: 98.1 (05-07 @ 20:13)  HR: 76 (76 - 96)  BP: 128/59 (117/51 - 128/59)  BP(mean): --  RR: 17 (17 - 20)  SpO2: 97% (95% - 97%)  PHYSICAL EXAM:    GENERAL: NAD, well-groomed, well-developed  HEAD:  Atraumatic, Normocephalic  EYES: EOMI, PERRLA, conjunctiva and sclera clear  NECK: Supple, No JVD, Normal thyroid  NERVOUS SYSTEM:  Alert & Oriented X3, no focal deficit  CHEST/LUNG: CTA b/l ,  no  rales, rhonchi, wheezing, or rubs  HEART: Regular rate and rhythm; No murmurs, rubs, or gallops  ABDOMEN: Soft, Nontender, Nondistended; Bowel sounds present  EXTREMITIES:  2+ Peripheral Pulses, No clubbing, cyanosis, or edema ,  L AKA  C/D/I , staples in place  RLE -  Stage 2 heel ulcer stable, dry gangrene of the dorsal distal second phalanx, black eschar over the 5th metatarsal on the plantar surface  LYMPH: No lymphadenopathy noted  SKIN: No rashes or lesions

## 2017-05-08 NOTE — PROGRESS NOTE ADULT - ASSESSMENT
85 yr old female with PMH CAD s/p stents, DM2, hypertension, hyperlipidemia, anxiety, significant PVD s/p multiple stents, paroxysmal atrial fibrillation on Eliquis admitted for hypertensive urgency post LE angiogram. 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 Hospital course complicated by urinary retention with UTI (failed TOV), hyponatremia likely due to pain and improved with NaCl (per nephrology) and pain medications. BP meds and insulin coverage titrated for better control.  Patient was   transferred  to acute rehab , pain poorly controlled , duragesic  patch discontinued due to lethargy  . Revaluated by vascular surgery , found to have  postoperative wound dehiscence. Had AKA now in rehab. BG has been labile, improved over past 24 hours.    Problem/Plan - 1:  ·  Problem: PVD (peripheral vascular disease).  Plan: S/P left AKA- Vascular following  new black eschar on 5th metatarsal   s/p revascularization of RLE with angiogram and R SFA/POP angioplasty on 4/20/17  Wound Care.     Problem/Plan - 2:  ·  Problem: Type 2 diabetes mellitus with diabetic peripheral angiopathy and gangrene, with long-term current use of insulin.  Plan: BG improved control on current RX.     Problem/Plan - 3:  ·  Problem: Constipation, unspecified constipation type.  Plan: continue bowel RX.     Problem/Plan - 4:  ·  Problem: Urinary retention.  Plan: increase flomax to 0.8  continue bladder scan and straight cath cath for >300  maintain bowel rx to prevent constipation.     Problem/Plan - 5:  ·  Problem: Hypertension.  Plan: stable on current RX.     Problem/Plan - 6:  Problem: CAD (coronary artery disease). Plan: stable , continue current meds    Problem/Plan - 7:  ·  Problem: Hyperlipidemia.  Plan: statin.     Problem/Plan - 8:  ·  Problem: Anemia multifactorial , likely ABLA on anemia of chronic dz.  Plan: iron supplement  monitor labs. 85 yr old female with PMH CAD s/p stents, DM2, hypertension, hyperlipidemia, anxiety, significant PVD s/p multiple stents, paroxysmal atrial fibrillation on Eliquis admitted for hypertensive urgency post LE angiogram. 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 Hospital course complicated by urinary retention with UTI (failed TOV), hyponatremia likely due to pain and improved with NaCl (per nephrology) and pain medications. BP meds and insulin coverage titrated for better control.  Patient was   transferred  to acute rehab , pain poorly controlled , duragesic  patch discontinued due to lethargy  . Revaluated by vascular surgery , found to have  postoperative wound dehiscence. Had AKA now in rehab.  Pain well controlled now ,BG has been labile, improved now with present Tx .    Problem/Plan - 1:  ·  Problem: PVD (peripheral vascular disease).  Plan: S/P left AKA- Vascular following  new black eschar on 5th metatarsal   s/p revascularization of RLE with angiogram and R SFA/POP angioplasty on 4/20/17  Wound Care.     Problem/Plan - 2:  ·  Problem: Type 2 diabetes mellitus with diabetic peripheral angiopathy and gangrene, with long-term current use of insulin.  Plan: BG improved control on current RX.     Problem/Plan - 3:  ·  Problem: Constipation, unspecified constipation type.  Plan: continue bowel RX.     Problem/Plan - 4:  ·  Problem: Urinary retention.  Plan: resolved with Flomax     Problem/Plan - 5:  ·  Problem: Hypertension.  Plan: stable on current RX.     Problem/Plan - 6:  Problem: CAD (coronary artery disease). Plan: stable , continue current meds    Problem/Plan - 7:  ·  Problem: Hyperlipidemia.  Plan: statin.     Problem/Plan - 8:  ·  Problem: Anemia multifactorial , likely ABLA on anemia of chronic dz.  Plan: iron supplement  monitor labs.

## 2017-05-09 PROCEDURE — 73630 X-RAY EXAM OF FOOT: CPT | Mod: 26,RT

## 2017-05-09 PROCEDURE — 99232 SBSQ HOSP IP/OBS MODERATE 35: CPT | Mod: GC

## 2017-05-09 RX ORDER — INSULIN GLARGINE 100 [IU]/ML
12 INJECTION, SOLUTION SUBCUTANEOUS AT BEDTIME
Qty: 0 | Refills: 0 | Status: DISCONTINUED | OUTPATIENT
Start: 2017-05-09 | End: 2017-05-10

## 2017-05-09 RX ADMIN — PANTOPRAZOLE SODIUM 40 MILLIGRAM(S): 20 TABLET, DELAYED RELEASE ORAL at 05:35

## 2017-05-09 RX ADMIN — Medication 100 MILLIGRAM(S): at 05:30

## 2017-05-09 RX ADMIN — LISINOPRIL 5 MILLIGRAM(S): 2.5 TABLET ORAL at 05:30

## 2017-05-09 RX ADMIN — Medication 25 MILLIGRAM(S): at 05:30

## 2017-05-09 RX ADMIN — Medication 2 UNIT(S): at 12:21

## 2017-05-09 RX ADMIN — NYSTATIN CREAM 1 APPLICATION(S): 100000 CREAM TOPICAL at 05:30

## 2017-05-09 RX ADMIN — NYSTATIN CREAM 1 APPLICATION(S): 100000 CREAM TOPICAL at 17:54

## 2017-05-09 RX ADMIN — Medication 100 MILLIGRAM(S): at 22:11

## 2017-05-09 RX ADMIN — Medication 1: at 08:43

## 2017-05-09 RX ADMIN — Medication 2 UNIT(S): at 17:53

## 2017-05-09 RX ADMIN — ZINC SULFATE TAB 220 MG (50 MG ZINC EQUIVALENT) 220 MILLIGRAM(S): 220 (50 ZN) TAB at 12:21

## 2017-05-09 RX ADMIN — Medication 88 MICROGRAM(S): at 05:30

## 2017-05-09 RX ADMIN — Medication 1 MILLIGRAM(S): at 12:21

## 2017-05-09 RX ADMIN — Medication 2 UNIT(S): at 08:43

## 2017-05-09 RX ADMIN — GABAPENTIN 100 MILLIGRAM(S): 400 CAPSULE ORAL at 05:30

## 2017-05-09 RX ADMIN — POLYETHYLENE GLYCOL 3350 17 GRAM(S): 17 POWDER, FOR SOLUTION ORAL at 12:20

## 2017-05-09 RX ADMIN — SENNA PLUS 2 TABLET(S): 8.6 TABLET ORAL at 22:11

## 2017-05-09 RX ADMIN — ATORVASTATIN CALCIUM 40 MILLIGRAM(S): 80 TABLET, FILM COATED ORAL at 22:11

## 2017-05-09 RX ADMIN — Medication 500 MILLIGRAM(S): at 12:20

## 2017-05-09 RX ADMIN — APIXABAN 5 MILLIGRAM(S): 2.5 TABLET, FILM COATED ORAL at 17:54

## 2017-05-09 RX ADMIN — Medication 325 MILLIGRAM(S): at 12:21

## 2017-05-09 RX ADMIN — Medication 0.25 MILLIGRAM(S): at 22:10

## 2017-05-09 RX ADMIN — INSULIN GLARGINE 12 UNIT(S): 100 INJECTION, SOLUTION SUBCUTANEOUS at 22:11

## 2017-05-09 RX ADMIN — Medication 1: at 12:21

## 2017-05-09 RX ADMIN — GABAPENTIN 100 MILLIGRAM(S): 400 CAPSULE ORAL at 12:20

## 2017-05-09 RX ADMIN — Medication 1 TABLET(S): at 12:21

## 2017-05-09 RX ADMIN — CLOPIDOGREL BISULFATE 75 MILLIGRAM(S): 75 TABLET, FILM COATED ORAL at 12:21

## 2017-05-09 RX ADMIN — MIRTAZAPINE 7.5 MILLIGRAM(S): 45 TABLET, ORALLY DISINTEGRATING ORAL at 22:10

## 2017-05-09 RX ADMIN — Medication 25 MILLIGRAM(S): at 17:53

## 2017-05-09 RX ADMIN — Medication 100 MILLIGRAM(S): at 12:20

## 2017-05-09 RX ADMIN — APIXABAN 5 MILLIGRAM(S): 2.5 TABLET, FILM COATED ORAL at 05:30

## 2017-05-09 RX ADMIN — GABAPENTIN 100 MILLIGRAM(S): 400 CAPSULE ORAL at 22:10

## 2017-05-09 RX ADMIN — TAMSULOSIN HYDROCHLORIDE 0.8 MILLIGRAM(S): 0.4 CAPSULE ORAL at 22:12

## 2017-05-09 RX ADMIN — Medication 3: at 17:53

## 2017-05-09 NOTE — CONSULT NOTE ADULT - SUBJECTIVE AND OBJECTIVE BOX
S : 85y year old Female seen at bedside for Right 2nd digit gangrene.  Patient denies any pain in the area.      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 Meyer. Subsequently admitted to acute rehab. Developed LLE post operative wound dehiscence, s/p left AKA and RLE angiogram on 4/14/17. Also s/p revascularization of RLE with angiogram and R SFA/POP angioplasty on 4/20/17.         Patient admits to  (-) Fevers, (-) Chills, (-) Nausea, (-) Vomiting, (-) Shortness of Breath      PMH: Hyperlipidemia  Hypertension  Diabetes  PVD (peripheral vascular disease)  CAD (coronary artery disease)    PSH:History of cholecystectomy  H/O angioplasty      Allergies:Benadryl (Other)  Demerol HCl (Other)      Labs:                          10.2   6.6   )-----------( 224      ( 08 May 2017 07:43 )             31.8     WBC Trend  6.6 Date (05-08 @ 07:43)  6.0 Date (05-01 @ 07:14)  6.4 Date (04-28 @ 07:22)  6.3 Date (04-24 @ 05:30)  7.1 Date (04-23 @ 11:36)  4.7<L> Date (04-22 @ 05:27)  5.4 Date (04-20 @ 06:18)  5.4 Date (04-19 @ 05:47)  6.3 Date (04-17 @ 05:48)  6.0 Date (04-16 @ 06:59)      Chem  05-08    138  |  100  |  29.0<H>  ----------------------------<  93  5.2   |  27.0  |  0.88    Ca    9.2      08 May 2017 07:43            T(F): 97.4, Max: 98.1 (05-08 @ 20:44)  HR: 93 (80 - 93)  BP: 144/66 (98/62 - 144/66)  RR: 16 (16 - 20)  SpO2: 97% (97% - 98%)  Wt(kg): --    O:   General: Pleasant  female NAD & AOX3.    Examination noted on the the Right lower extremity:  Integument:  Skin warm, dry and supple.    Ulceration Right heel  - hyperkeratotic border, wound base Fibrogranular - edema, - aide-wound erythema, - purulence, - fluctuance, - tracking/tunneling, - probe to bone.  Ulceration Right 5th MPJ - hyperkeratotic border, wound base Fibrogranular,  - edema, - aide-wound erythema, - purulence, - fluctuance, - tracking/tunneling, - probe to bone.    Gangrene noted on the right 2nd digit: NO purulence, no fluctuance, no tracking/tunneling, - probe to bone.  Vascular: Dorsalis Pedis and Posterior Tibial pulses 1/4.  Neuro: Protective sensation absent to the level of the digits bilateral.    Deformity:  A: Right foot Heel and 5th met ulceration, and 2nd digit dry gangrene.      P:   Chart reviewed and Patient evaluated  Discussed diagnosis and treatment with patient  Applied dry sterile dressing  X-rays ordered  Podiatry will follow while in house, and possible surgical management by 2nd digit amputation, pending discussion with attending.

## 2017-05-09 NOTE — PROGRESS NOTE ADULT - SUBJECTIVE AND OBJECTIVE BOX
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 Meyer. Subsequently admitted to acute rehab. Developed LLE post operative wound dehiscence, s/p left AKA and RLE angiogram on 4/14/17. Also s/p revascularization of RLE with angiogram and R SFA/POP angioplasty on 4/20/17. Hospital stay significant for: Completed course of cefazolin for UTI. Restarted on Abx for R foot second toe gangrene. Transfused two units PRBCs.    Interval history:   No acute events overnight. Continued right heel pain. + BM. Denies dysuria. Concerned about healing of right 2nd toe.    REVIEW OF SYSTEMS  [ x ] Constitutional WNL  [   ] Cardio WNL  [   ] Resp WNL  [   ] GI WNL  [    ]  WNL  [ x ] Heme WNL  [ x ] Endo WNL  [   ] Skin WNL  [   ] MSK WNL  [   ] Neuro WNL  [x  ] Cognitive WNL  [x  ] Psych WNL    Vital Signs Last 24 Hrs  T(C): 36.3, Max: 37.2 (05-08 @ 11:00)  T(F): 97.4, Max: 98.9 (05-08 @ 11:00)  HR: 93 (80 - 93)  BP: 144/66 (90/60 - 144/66)  BP(mean): --  RR: 16 (16 - 20)  SpO2: 97% (96% - 98%)    PHYSICAL EXAM  Constitutional - NAD, Comfortable  Chest - CTA bilaterally.   Cardiovascular - RRR, S1S2, No murmurs  Abdomen - BS+, Soft, NTND  Extremities - No C/C/E, No calf tenderness of RLE  Neurologic Exam -                    Cognitive - Awake, Alert, AAO to self, place, date, year, situation     Communication - Fluent, No dysarthria     Motor - No focal deficits                    LEFT    UE - ShAB 5/5, EF 5/5, EE 5/5, WE 5/5,  5/5                    RIGHT UE - ShAB 5/5, EF 5/5, EE 5/5, WE 5/5,  5/5                    LEFT    LE - HF wfl                    RIGHT LE - At least 3-4/5, limited by pain     Sensory - Intact to LT   Psychiatric - Mood stable, Affect WNL  Skin--LLE AKA incision with staples, c/d/i,no erythema, + stump .  Right 2nd toe gangrene and tip of great toe-stable. R calcaneus stage II pressure ulcer healing well, new blanchable erythema just proximal to ulcer. Black eschar over the 5th metatarsal on the plantar surface    Functional Progress:   Sit to supine: Min A  Transfer: Sliding board, supervision  Toileting: Total A  LE dressing: Min A; UE dressing: Supervision  Grooming: S    MEDICATIONS  (STANDING):  docusate sodium 100milliGRAM(s) Oral three times a day  ascorbic acid 500milliGRAM(s) Oral daily  multivitamin 1Tablet(s) Oral daily  clopidogrel Tablet 75milliGRAM(s) Oral daily  senna 2Tablet(s) Oral at bedtime  atorvastatin 40milliGRAM(s) Oral at bedtime  lisinopril 5milliGRAM(s) Oral daily  dextrose 5%. 1000milliLiter(s) IV Continuous <Continuous>  dextrose 50% Injectable 12.5Gram(s) IV Push once  dextrose 50% Injectable 25Gram(s) IV Push once  dextrose 50% Injectable 25Gram(s) IV Push once  ferrous    sulfate 325milliGRAM(s) Oral daily  folic acid 1milliGRAM(s) Oral daily  levothyroxine 88MICROGram(s) Oral daily  mirtazapine 7.5milliGRAM(s) Oral at bedtime  pantoprazole    Tablet 40milliGRAM(s) Oral before breakfast  polyethylene glycol 3350 17Gram(s) Oral daily  zinc sulfate 220milliGRAM(s) Oral daily  metoprolol 25milliGRAM(s) Oral every 12 hours  insulin glargine Injectable (LANTUS) 10Unit(s) SubCutaneous at bedtime  insulin lispro Injectable (HumaLOG) 2Unit(s) SubCutaneous three times a day before meals  insulin lispro (HumaLOG) corrective regimen sliding scale  SubCutaneous three times a day before meals  tamsulosin 0.8milliGRAM(s) Oral at bedtime  nystatin Powder 1Application(s) Topical two times a day  apixaban 5milliGRAM(s) Oral two times a day  gabapentin 100milliGRAM(s) Oral three times a day    MEDICATIONS  (PRN):  acetaminophen   Tablet 650milliGRAM(s) Oral every 6 hours PRN For Temp greater than 38 C (100.4 F)  acetaminophen   Tablet. 650milliGRAM(s) Oral every 6 hours PRN Mild Pain (1 - 3)  dextrose Gel 1Dose(s) Oral once PRN Blood Glucose LESS THAN 70 milliGRAM(s)/deciliter  glucagon  Injectable 1milliGRAM(s) IntraMuscular once PRN Glucose LESS THAN 70 milligrams/deciliter  bisacodyl Suppository 10milliGRAM(s) Rectal daily PRN Constipation  oxyCODONE  5 mG/acetaminophen 325 mG 2Tablet(s) Oral every 4 hours PRN Severe Pain (7 - 10)  oxyCODONE  5 mG/acetaminophen 325 mG 1Tablet(s) Oral every 4 hours PRN Moderate Pain (4 - 6)  ALPRAZolam 0.25milliGRAM(s) Oral at bedtime PRN anxiety    RECENT LABS:                               10.2   6.6   )-----------( 224      ( 08 May 2017 07:43 )             31.8   05-08    138  |  100  |  29.0<H>  ----------------------------<  93  5.2   |  27.0  |  0.88    Ca    9.2      08 May 2017 07:43    CAPILLARY BLOOD GLUCOSE  178 (08 May 2017 22:10)  213 (08 May 2017 17:00)  249 (08 May 2017 12:00)    ASSESSMENT AND PLAN:  82 year old F with extensive PMH including PVD s/p multiple stents, 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 Irwin, s/p left AKA and RLE angiogram on 4/14/17. Also s/p revascularization of RLE with angiogram and R SFA/POP angioplasty on 4/20/17. Hospital stay significant for: Completed course of cefazolin for UTI. Restarted on Abx for R foot second toe gangrene which was completed. Transfused two units PRBCs. Presents with functional deficits.     PVD s/p L AKA: Residual limb- keep c/d/i.   given    S/p RLE angio, R toe gangrene: + betadine, daily dressing change. Vascular following. Completed cefazolin course 5/4/17.  Change wound care to medihoney to 1st and 2nd toes, hydrogel to calcaneus 5/8    R heel stage II ulcer: + betadine. daily dressing change. Vascular follow up appreciated. Wound care changed to hydrogel daily    Pain control: Percocet, gabapentin. tylenol.     Bowel regimen: Constipation improved. Senna, colace, miralax. + Milk of magnesia 5/4. Bisacodyl suppository prn.    Urinary retention: Improved overnight and this AM, PVRs 100s-300s. Continue Flomax. PVR/SC > 350 cc q6. UA: mod LE, WBC 26-50, urine cx neg.  Flomax increased to 0.8 with improved PVRs at 123, 108 and 0.  D/C bladder scans    DM II:  Continue lantus 10 units, humalog 2-2-2, ISS AC. Daytime/afternoon BG running high, although morning level ok. Will continue to monitor & consider increasing insulin regimen.    HTN: BP stable. D/c Amlodipine 5/1, change metoprolol to 25 mg q12 per medicine recs on 5/1. Continue lisinopril    PAF: Eliquis    CAD: Metoprolol 25 mg q12, lipitor, plavix    Acute blood loss anemia: Hgb stable. FeSO4    Hypothyroidism: Synthroid    Appetite/mood: Remeron    GI/supplements: Protonix, zinc sulfate, MVI, folic acid    Anxiety: Xanax qhs prn, patient to go outside in wheelchair with family.    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 Meyer. Subsequently admitted to acute rehab. Developed LLE post operative wound dehiscence, s/p left AKA and RLE angiogram on 4/14/17. Also s/p revascularization of RLE with angiogram and R SFA/POP angioplasty on 4/20/17. Hospital stay significant for: Completed course of cefazolin for UTI. Restarted on Abx for R foot second toe gangrene. Transfused two units PRBCs.    Interval history:   No acute events overnight. Continued right heel pain. + BM. Denies dysuria. Concerned about healing of right 2nd toe.    REVIEW OF SYSTEMS  [ x ] Constitutional WNL  [   ] Cardio WNL  [   ] Resp WNL  [   ] GI WNL  [    ]  WNL  [ x ] Heme WNL  [ x ] Endo WNL  [   ] Skin WNL  [   ] MSK WNL  [   ] Neuro WNL  [x  ] Cognitive WNL  [x  ] Psych WNL    Vital Signs Last 24 Hrs  T(C): 36.3, Max: 37.2 (05-08 @ 11:00)  T(F): 97.4, Max: 98.9 (05-08 @ 11:00)  HR: 93 (80 - 93)  BP: 144/66 (90/60 - 144/66)  BP(mean): --  RR: 16 (16 - 20)  SpO2: 97% (96% - 98%)    PHYSICAL EXAM  Constitutional - NAD, Comfortable  Chest - CTA bilaterally.   Cardiovascular - RRR, S1S2, No murmurs  Abdomen - BS+, Soft, NTND  Extremities - No C/C/E, No calf tenderness of RLE  Neurologic Exam -                    Cognitive - Awake, Alert, AAO to self, place, date, year, situation     Communication - Fluent, No dysarthria     Motor - No focal deficits                    LEFT    UE - ShAB 5/5, EF 5/5, EE 5/5, WE 5/5,  5/5                    RIGHT UE - ShAB 5/5, EF 5/5, EE 5/5, WE 5/5,  5/5                    LEFT    LE - HF wfl                    RIGHT LE - At least 3-4/5, limited by pain     Sensory - Intact to LT   Psychiatric - Mood stable, Affect WNL  Skin--LLE AKA incision with staples, c/d/i,no erythema, + stump .  Right 2nd toe gangrene and tip of great toe-stable. R calcaneus stage II pressure ulcer healing well, new blanchable erythema just proximal to ulcer. Black eschar over the 5th metatarsal on the plantar surface    Functional Progress:   Sit to supine: Min A  Transfer: Sliding board, supervision  Toileting: Total A  LE dressing: Min A; UE dressing: Supervision  Grooming: S    MEDICATIONS  (STANDING):  docusate sodium 100milliGRAM(s) Oral three times a day  ascorbic acid 500milliGRAM(s) Oral daily  multivitamin 1Tablet(s) Oral daily  clopidogrel Tablet 75milliGRAM(s) Oral daily  senna 2Tablet(s) Oral at bedtime  atorvastatin 40milliGRAM(s) Oral at bedtime  lisinopril 5milliGRAM(s) Oral daily  dextrose 5%. 1000milliLiter(s) IV Continuous <Continuous>  dextrose 50% Injectable 12.5Gram(s) IV Push once  dextrose 50% Injectable 25Gram(s) IV Push once  dextrose 50% Injectable 25Gram(s) IV Push once  ferrous    sulfate 325milliGRAM(s) Oral daily  folic acid 1milliGRAM(s) Oral daily  levothyroxine 88MICROGram(s) Oral daily  mirtazapine 7.5milliGRAM(s) Oral at bedtime  pantoprazole    Tablet 40milliGRAM(s) Oral before breakfast  polyethylene glycol 3350 17Gram(s) Oral daily  zinc sulfate 220milliGRAM(s) Oral daily  metoprolol 25milliGRAM(s) Oral every 12 hours  insulin glargine Injectable (LANTUS) 10Unit(s) SubCutaneous at bedtime  insulin lispro Injectable (HumaLOG) 2Unit(s) SubCutaneous three times a day before meals  insulin lispro (HumaLOG) corrective regimen sliding scale  SubCutaneous three times a day before meals  tamsulosin 0.8milliGRAM(s) Oral at bedtime  nystatin Powder 1Application(s) Topical two times a day  apixaban 5milliGRAM(s) Oral two times a day  gabapentin 100milliGRAM(s) Oral three times a day    MEDICATIONS  (PRN):  acetaminophen   Tablet 650milliGRAM(s) Oral every 6 hours PRN For Temp greater than 38 C (100.4 F)  acetaminophen   Tablet. 650milliGRAM(s) Oral every 6 hours PRN Mild Pain (1 - 3)  dextrose Gel 1Dose(s) Oral once PRN Blood Glucose LESS THAN 70 milliGRAM(s)/deciliter  glucagon  Injectable 1milliGRAM(s) IntraMuscular once PRN Glucose LESS THAN 70 milligrams/deciliter  bisacodyl Suppository 10milliGRAM(s) Rectal daily PRN Constipation  oxyCODONE  5 mG/acetaminophen 325 mG 2Tablet(s) Oral every 4 hours PRN Severe Pain (7 - 10)  oxyCODONE  5 mG/acetaminophen 325 mG 1Tablet(s) Oral every 4 hours PRN Moderate Pain (4 - 6)  ALPRAZolam 0.25milliGRAM(s) Oral at bedtime PRN anxiety    RECENT LABS:                               10.2   6.6   )-----------( 224      ( 08 May 2017 07:43 )             31.8   05-08    138  |  100  |  29.0<H>  ----------------------------<  93  5.2   |  27.0  |  0.88    Ca    9.2      08 May 2017 07:43    CAPILLARY BLOOD GLUCOSE  178 (08 May 2017 22:10)  213 (08 May 2017 17:00)  249 (08 May 2017 12:00)    ASSESSMENT AND PLAN:  82 year old F with extensive PMH including PVD s/p multiple stents, 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 Irwin, s/p left AKA and RLE angiogram on 4/14/17. Also s/p revascularization of RLE with angiogram and R SFA/POP angioplasty on 4/20/17. Hospital stay significant for: Completed course of cefazolin for UTI. Restarted on Abx for R foot second toe gangrene which was completed. Transfused two units PRBCs. Presents with functional deficits.     PVD s/p L AKA: Residual limb- keep c/d/i.   given    S/p RLE angio, R toe gangrene:Vascular following. Completed cefazolin course 5/4/17.  Change wound care to medihoney to 1st and 2nd toes 5/8. Daily dressing change. Podiatry called for follow up/recs on 5/9.    R heel stage II ulcer:  Vascular follow up appreciated. Wound care changed to hydrogel daily. Daily dressing change    Pain control: Percocet, gabapentin. tylenol.     Bowel regimen: Constipation improved. Senna, colace, miralax. Bisacodyl suppository prn.    Urinary retention: Improved. UA: mod LE, WBC 26-50, urine cx neg.  Flomax  0.8 mg. Bladder scans d/martin.     DM II: BG 200s, Increase lantus 12 units, humalog 2-2-2, ISS AC.     HTN: BP stable. D/c Amlodipine 5/1, change metoprolol to 25 mg q12 per medicine recs on 5/1. Continue lisinopril    PAF: Eliquis    CAD: Metoprolol 25 mg q12, lipitor, plavix    Acute blood loss anemia: Hgb stable. FeSO4    Hypothyroidism: Synthroid    Appetite/mood: Remeron    GI/supplements: Protonix, zinc sulfate, MVI, folic acid    Anxiety: Xanax qhs prn, patient to go outside in wheelchair with family.    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 Meyer. Subsequently admitted to acute rehab. Developed LLE post operative wound dehiscence, s/p left AKA and RLE angiogram on 4/14/17. Also s/p revascularization of RLE with angiogram and R SFA/POP angioplasty on 4/20/17. Hospital stay significant for: Completed course of cefazolin for UTI. Restarted on Abx for R foot second toe gangrene. Transfused two units PRBCs.    Interval history:   No acute events overnight. Mildly improved right heel pain. + BM. Denies dysuria. Concerned about healing of right 2nd toe.    REVIEW OF SYSTEMS  [ x ] Constitutional WNL  [   ] Cardio WNL  [   ] Resp WNL  [   ] GI WNL  [    ]  WNL  [ x ] Heme WNL  [ x ] Endo WNL  [   ] Skin WNL  [   ] MSK WNL  [   ] Neuro WNL  [x  ] Cognitive WNL  [x  ] Psych WNL    Vital Signs Last 24 Hrs  T(C): 36.3, Max: 37.2 (05-08 @ 11:00)  T(F): 97.4, Max: 98.9 (05-08 @ 11:00)  HR: 93 (80 - 93)  BP: 144/66 (90/60 - 144/66)  BP(mean): --  RR: 16 (16 - 20)  SpO2: 97% (96% - 98%)    PHYSICAL EXAM  Constitutional - NAD, Comfortable  Chest - CTA bilaterally.   Cardiovascular - RRR, S1S2, No murmurs  Abdomen - BS+, Soft, NTND  Extremities - No C/C/E, No calf tenderness of RLE  Neurologic Exam -                    Cognitive - Awake, Alert, AAO to self, place, date, year, situation     Communication - Fluent, No dysarthria     Motor - No focal deficits                    LEFT    UE - ShAB 5/5, EF 5/5, EE 5/5, WE 5/5,  5/5                    RIGHT UE - ShAB 5/5, EF 5/5, EE 5/5, WE 5/5,  5/5                    LEFT    LE - HF wfl                    RIGHT LE - At least 3-4/5, limited by pain     Sensory - Intact to LT   Psychiatric - Mood stable, Affect WNL  Skin--LLE AKA incision with staples, c/d/i,no erythema, + stump .  Right 2nd toe gangrene and tip of great toe-stable. R calcaneus stage II pressure ulcer, new blanchable erythema just proximal to ulcer. Black eschar over the 5th metatarsal on the plantar surface    Functional Progress:   Sit to supine: Min A  Transfer: Sliding board, supervision  Toileting: Total A  LE dressing: Min A; UE dressing: Supervision  Grooming: S    MEDICATIONS  (STANDING):  docusate sodium 100milliGRAM(s) Oral three times a day  ascorbic acid 500milliGRAM(s) Oral daily  multivitamin 1Tablet(s) Oral daily  clopidogrel Tablet 75milliGRAM(s) Oral daily  senna 2Tablet(s) Oral at bedtime  atorvastatin 40milliGRAM(s) Oral at bedtime  lisinopril 5milliGRAM(s) Oral daily  dextrose 5%. 1000milliLiter(s) IV Continuous <Continuous>  dextrose 50% Injectable 12.5Gram(s) IV Push once  dextrose 50% Injectable 25Gram(s) IV Push once  dextrose 50% Injectable 25Gram(s) IV Push once  ferrous    sulfate 325milliGRAM(s) Oral daily  folic acid 1milliGRAM(s) Oral daily  levothyroxine 88MICROGram(s) Oral daily  mirtazapine 7.5milliGRAM(s) Oral at bedtime  pantoprazole    Tablet 40milliGRAM(s) Oral before breakfast  polyethylene glycol 3350 17Gram(s) Oral daily  zinc sulfate 220milliGRAM(s) Oral daily  metoprolol 25milliGRAM(s) Oral every 12 hours  insulin glargine Injectable (LANTUS) 10Unit(s) SubCutaneous at bedtime  insulin lispro Injectable (HumaLOG) 2Unit(s) SubCutaneous three times a day before meals  insulin lispro (HumaLOG) corrective regimen sliding scale  SubCutaneous three times a day before meals  tamsulosin 0.8milliGRAM(s) Oral at bedtime  nystatin Powder 1Application(s) Topical two times a day  apixaban 5milliGRAM(s) Oral two times a day  gabapentin 100milliGRAM(s) Oral three times a day    MEDICATIONS  (PRN):  acetaminophen   Tablet 650milliGRAM(s) Oral every 6 hours PRN For Temp greater than 38 C (100.4 F)  acetaminophen   Tablet. 650milliGRAM(s) Oral every 6 hours PRN Mild Pain (1 - 3)  dextrose Gel 1Dose(s) Oral once PRN Blood Glucose LESS THAN 70 milliGRAM(s)/deciliter  glucagon  Injectable 1milliGRAM(s) IntraMuscular once PRN Glucose LESS THAN 70 milligrams/deciliter  bisacodyl Suppository 10milliGRAM(s) Rectal daily PRN Constipation  oxyCODONE  5 mG/acetaminophen 325 mG 2Tablet(s) Oral every 4 hours PRN Severe Pain (7 - 10)  oxyCODONE  5 mG/acetaminophen 325 mG 1Tablet(s) Oral every 4 hours PRN Moderate Pain (4 - 6)  ALPRAZolam 0.25milliGRAM(s) Oral at bedtime PRN anxiety    RECENT LABS:                               10.2   6.6   )-----------( 224      ( 08 May 2017 07:43 )             31.8   05-08    138  |  100  |  29.0<H>  ----------------------------<  93  5.2   |  27.0  |  0.88    Ca    9.2      08 May 2017 07:43    CAPILLARY BLOOD GLUCOSE  178 (08 May 2017 22:10)  213 (08 May 2017 17:00)  249 (08 May 2017 12:00)    ASSESSMENT AND PLAN:  82 year old F with extensive PMH including PVD s/p multiple stents, 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 Irwin, s/p left AKA and RLE angiogram on 4/14/17. Also s/p revascularization of RLE with angiogram and R SFA/POP angioplasty on 4/20/17. Hospital stay significant for: Completed course of cefazolin for UTI. Restarted on Abx for R foot second toe gangrene which was completed. Transfused two units PRBCs. Presents with functional deficits.     PVD s/p L AKA: Residual limb- keep c/d/i.   given    S/p RLE angio, R toe gangrene:Vascular following. Completed cefazolin course 5/4/17. Betadine with dressing change. Podiatry called for follow up/recs on 5/9.    R heel stage II ulcer:  Vascular follow up appreciated. . Daily betadine and dressing change.  Cont off-loading with PRAFO    Pain control: Percocet, gabapentin. tylenol.     Bowel regimen: Constipation improved. Senna, colace, miralax. Bisacodyl suppository prn.    Urinary retention: Improved. UA: mod LE, WBC 26-50, urine cx neg.  Flomax  0.8 mg. Bladder scans d/martin.     DM II: BG 200s, Increase lantus 12 units, humalog 2-2-2, ISS AC.     HTN: BP stable. D/c Amlodipine 5/1, change metoprolol to 25 mg q12 per medicine recs on 5/1. Continue lisinopril    PAF: Eliquis    CAD: Metoprolol 25 mg q12, lipitor, plavix    Acute blood loss anemia: Hgb stable. FeSO4    Hypothyroidism: Synthroid    Appetite/mood: Remeron    GI/supplements: Protonix, zinc sulfate, MVI, folic acid    Anxiety: Xanax qhs prn, patient to go outside in wheelchair with family.    Com[facility] Rehab Program, 3 hours of daily therapy

## 2017-05-09 NOTE — PROGRESS NOTE ADULT - SUBJECTIVE AND OBJECTIVE BOX
Pt seen and examined. No acute events.    Vital Signs Last 24 Hrs  T(C): 36.3, Max: 36.7 (05-08 @ 20:44)  T(F): 97.4, Max: 98.1 (05-08 @ 20:44)  HR: 93 (80 - 93)  BP: 144/66 (98/62 - 144/66)  BP(mean): --  RR: 16 (16 - 20)  SpO2: 97% (97% - 98%)    PE:  L AKA C/D/I healing well  R Foot doppler signals DP/PT, Stage II heal ulcer stable, dry gangrene of the dorsal distal phalanx. No erythema

## 2017-05-10 PROCEDURE — 99232 SBSQ HOSP IP/OBS MODERATE 35: CPT

## 2017-05-10 RX ORDER — INSULIN GLARGINE 100 [IU]/ML
15 INJECTION, SOLUTION SUBCUTANEOUS AT BEDTIME
Qty: 0 | Refills: 0 | Status: DISCONTINUED | OUTPATIENT
Start: 2017-05-10 | End: 2017-05-15

## 2017-05-10 RX ORDER — TAMSULOSIN HYDROCHLORIDE 0.4 MG/1
0.4 CAPSULE ORAL AT BEDTIME
Qty: 0 | Refills: 0 | Status: DISCONTINUED | OUTPATIENT
Start: 2017-05-10 | End: 2017-05-16

## 2017-05-10 RX ORDER — CADEXOMER IODINE 0.9 %
1 PADS, MEDICATED (EA) TOPICAL DAILY
Qty: 0 | Refills: 0 | Status: DISCONTINUED | OUTPATIENT
Start: 2017-05-10 | End: 2017-05-16

## 2017-05-10 RX ADMIN — LISINOPRIL 5 MILLIGRAM(S): 2.5 TABLET ORAL at 06:06

## 2017-05-10 RX ADMIN — NYSTATIN CREAM 1 APPLICATION(S): 100000 CREAM TOPICAL at 06:05

## 2017-05-10 RX ADMIN — Medication 2: at 13:07

## 2017-05-10 RX ADMIN — SENNA PLUS 2 TABLET(S): 8.6 TABLET ORAL at 22:17

## 2017-05-10 RX ADMIN — POLYETHYLENE GLYCOL 3350 17 GRAM(S): 17 POWDER, FOR SOLUTION ORAL at 13:07

## 2017-05-10 RX ADMIN — PANTOPRAZOLE SODIUM 40 MILLIGRAM(S): 20 TABLET, DELAYED RELEASE ORAL at 06:06

## 2017-05-10 RX ADMIN — GABAPENTIN 100 MILLIGRAM(S): 400 CAPSULE ORAL at 22:17

## 2017-05-10 RX ADMIN — INSULIN GLARGINE 15 UNIT(S): 100 INJECTION, SOLUTION SUBCUTANEOUS at 23:29

## 2017-05-10 RX ADMIN — Medication 500 MILLIGRAM(S): at 13:06

## 2017-05-10 RX ADMIN — Medication 1 MILLIGRAM(S): at 13:06

## 2017-05-10 RX ADMIN — Medication 25 MILLIGRAM(S): at 06:06

## 2017-05-10 RX ADMIN — GABAPENTIN 100 MILLIGRAM(S): 400 CAPSULE ORAL at 13:09

## 2017-05-10 RX ADMIN — ZINC SULFATE TAB 220 MG (50 MG ZINC EQUIVALENT) 220 MILLIGRAM(S): 220 (50 ZN) TAB at 13:08

## 2017-05-10 RX ADMIN — Medication 1: at 07:51

## 2017-05-10 RX ADMIN — Medication 2: at 16:43

## 2017-05-10 RX ADMIN — ATORVASTATIN CALCIUM 40 MILLIGRAM(S): 80 TABLET, FILM COATED ORAL at 22:17

## 2017-05-10 RX ADMIN — Medication 100 MILLIGRAM(S): at 22:17

## 2017-05-10 RX ADMIN — Medication 2 UNIT(S): at 16:43

## 2017-05-10 RX ADMIN — Medication 2 UNIT(S): at 13:07

## 2017-05-10 RX ADMIN — CLOPIDOGREL BISULFATE 75 MILLIGRAM(S): 75 TABLET, FILM COATED ORAL at 13:07

## 2017-05-10 RX ADMIN — Medication 2 UNIT(S): at 07:52

## 2017-05-10 RX ADMIN — Medication 88 MICROGRAM(S): at 06:06

## 2017-05-10 RX ADMIN — Medication 100 MILLIGRAM(S): at 06:06

## 2017-05-10 RX ADMIN — Medication 1 TABLET(S): at 13:06

## 2017-05-10 RX ADMIN — Medication 0.25 MILLIGRAM(S): at 22:17

## 2017-05-10 RX ADMIN — TAMSULOSIN HYDROCHLORIDE 0.4 MILLIGRAM(S): 0.4 CAPSULE ORAL at 22:17

## 2017-05-10 RX ADMIN — Medication 325 MILLIGRAM(S): at 13:06

## 2017-05-10 RX ADMIN — APIXABAN 5 MILLIGRAM(S): 2.5 TABLET, FILM COATED ORAL at 16:43

## 2017-05-10 RX ADMIN — Medication 100 MILLIGRAM(S): at 13:06

## 2017-05-10 RX ADMIN — GABAPENTIN 100 MILLIGRAM(S): 400 CAPSULE ORAL at 06:06

## 2017-05-10 RX ADMIN — APIXABAN 5 MILLIGRAM(S): 2.5 TABLET, FILM COATED ORAL at 06:06

## 2017-05-10 RX ADMIN — NYSTATIN CREAM 1 APPLICATION(S): 100000 CREAM TOPICAL at 16:44

## 2017-05-10 RX ADMIN — MIRTAZAPINE 7.5 MILLIGRAM(S): 45 TABLET, ORALLY DISINTEGRATING ORAL at 22:19

## 2017-05-10 NOTE — PROGRESS NOTE ADULT - SUBJECTIVE AND OBJECTIVE BOX
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 Meyer. Subsequently admitted to acute rehab. Developed LLE post operative wound dehiscence, s/p left AKA and RLE angiogram on 4/14/17. Also s/p revascularization of RLE with angiogram and R SFA/POP angioplasty on 4/20/17. Hospital stay significant for: Completed course of cefazolin for UTI. Restarted on Abx for R foot second toe gangrene. Transfused two units PRBCs.    Interval history:   No acute events overnight. Informed by RN of SBP 85 this afternoon. Pt seen and examined. Reports therapy went ok today, but feels fatigued overall. States appetite poor. + BM, denies dysuria. Repeat manual BP--SBP 98.    REVIEW OF SYSTEMS  [ x ] Constitutional WNL  [   ] Cardio WNL  [   ] Resp WNL  [   ] GI WNL  [    ]  WNL  [ x ] Heme WNL  [ x ] Endo WNL  [   ] Skin WNL  [   ] MSK WNL  [   ] Neuro WNL  [x  ] Cognitive WNL  [x  ] Psych WNL    Vital Signs Last 24 Hrs  T(C): 36.3, Max: 36.6 (05-09 @ 19:55)  T(F): 97.3, Max: 97.8 (05-09 @ 19:55)  HR: 82 (74 - 82)  BP: 98/58 (85/53 - 135/60)  BP(mean): --  RR: 16 (16 - 18)  SpO2: 99% (96% - 100%)    PHYSICAL EXAM  Constitutional - NAD, Comfortable  Chest - CTA bilaterally.   Cardiovascular - RRR, S1S2, No murmurs  Abdomen - BS+, Soft, NTND  Extremities - No C/C/E, No calf tenderness of RLE  Neurologic Exam -                    Cognitive - Awake, Alert, AAO to self, place, date, year, situation     Communication - Fluent, No dysarthria     Motor - No focal deficits                    LEFT    UE - ShAB 5/5, EF 5/5, EE 5/5, WE 5/5,  5/5                    RIGHT UE - ShAB 5/5, EF 5/5, EE 5/5, WE 5/5,  5/5                    LEFT    LE - HF wfl                    RIGHT LE - At least 3-4/5, limited by pain     Sensory - Intact to LT   Psychiatric - Mood stable, Affect WNL  Skin--LLE AKA incision with staples, c/d/i,no erythema, + stump .  Right 2nd toe gangrene and tip of great toe-stable. R calcaneus stage II pressure ulcer, new blanchable erythema/ petechiae  just proximal to ulcer. Black eschar over the 5th metatarsal on the plantar surface    Functional Progress:   Sit to supine: Min A  Transfer: Sliding board, supervision  Toileting: Total A  LE dressing: Min A; UE dressing: Supervision  Grooming: S    MEDICATIONS  (STANDING):  docusate sodium 100milliGRAM(s) Oral three times a day  ascorbic acid 500milliGRAM(s) Oral daily  multivitamin 1Tablet(s) Oral daily  clopidogrel Tablet 75milliGRAM(s) Oral daily  senna 2Tablet(s) Oral at bedtime  atorvastatin 40milliGRAM(s) Oral at bedtime  lisinopril 5milliGRAM(s) Oral daily  dextrose 5%. 1000milliLiter(s) IV Continuous <Continuous>  dextrose 50% Injectable 12.5Gram(s) IV Push once  dextrose 50% Injectable 25Gram(s) IV Push once  dextrose 50% Injectable 25Gram(s) IV Push once  ferrous    sulfate 325milliGRAM(s) Oral daily  folic acid 1milliGRAM(s) Oral daily  levothyroxine 88MICROGram(s) Oral daily  mirtazapine 7.5milliGRAM(s) Oral at bedtime  pantoprazole    Tablet 40milliGRAM(s) Oral before breakfast  polyethylene glycol 3350 17Gram(s) Oral daily  zinc sulfate 220milliGRAM(s) Oral daily  metoprolol 25milliGRAM(s) Oral every 12 hours  insulin lispro Injectable (HumaLOG) 2Unit(s) SubCutaneous three times a day before meals  insulin lispro (HumaLOG) corrective regimen sliding scale  SubCutaneous three times a day before meals  tamsulosin 0.8milliGRAM(s) Oral at bedtime  nystatin Powder 1Application(s) Topical two times a day  apixaban 5milliGRAM(s) Oral two times a day  gabapentin 100milliGRAM(s) Oral three times a day  insulin glargine Injectable (LANTUS) 12Unit(s) SubCutaneous at bedtime  cadexomer iodine Gel 1Application(s) Topical daily    MEDICATIONS  (PRN):  acetaminophen   Tablet 650milliGRAM(s) Oral every 6 hours PRN For Temp greater than 38 C (100.4 F)  acetaminophen   Tablet. 650milliGRAM(s) Oral every 6 hours PRN Mild Pain (1 - 3)  dextrose Gel 1Dose(s) Oral once PRN Blood Glucose LESS THAN 70 milliGRAM(s)/deciliter  glucagon  Injectable 1milliGRAM(s) IntraMuscular once PRN Glucose LESS THAN 70 milligrams/deciliter  bisacodyl Suppository 10milliGRAM(s) Rectal daily PRN Constipation  oxyCODONE  5 mG/acetaminophen 325 mG 2Tablet(s) Oral every 4 hours PRN Severe Pain (7 - 10)  oxyCODONE  5 mG/acetaminophen 325 mG 1Tablet(s) Oral every 4 hours PRN Moderate Pain (4 - 6)  ALPRAZolam 0.25milliGRAM(s) Oral at bedtime PRN anxiety      RECENT LABS:                               10.2   6.6   )-----------( 224      ( 08 May 2017 07:43 )             31.8   05-08    138  |  100  |  29.0<H>  ----------------------------<  93  5.2   |  27.0  |  0.88    Ca    9.2      08 May 2017 07:43    CAPILLARY BLOOD GLUCOSE  213 (10 May 2017 13:01)  173 (10 May 2017 07:48)  281 (09 May 2017 17:00)    ASSESSMENT AND PLAN:  82 year old F with extensive PMH including PVD s/p multiple stents, 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 Irwin, s/p left AKA and RLE angiogram on 4/14/17. Also s/p revascularization of RLE with angiogram and R SFA/POP angioplasty on 4/20/17. Hospital stay significant for: Completed course of cefazolin for UTI. Restarted on Abx for R foot second toe gangrene which was completed. Transfused two units PRBCs. Presents with functional deficits.     Hypotension: Expect due to dehydration. Encourage PO intake for now, IVF if BP remains low. Other VSS.     PVD s/p L AKA: Residual limb- keep c/d/i.   given    S/p RLE angio, R toe gangrene:Vascular following. Completed cefazolin course 5/4/17. Podiatry called for f/u: Xray of right 2nd toe negative, ordered iodosorb gel. Potential right 2nd toe amputation.    R heel stage II ulcer:  Vascular follow up appreciated. . Daily betadine and dressing change.  Cont off-loading with PRAFO    Pain control: Percocet, gabapentin. tylenol.     Bowel regimen: Constipation improved. Senna, colace, miralax. Bisacodyl suppository prn.    Urinary retention: Improved. UA: mod LE, WBC 26-50, urine cx neg.  Flomax  0.8 mg. Bladder scans d/martin.     DM II: BG 200s, Increase lantus 15 units 5/10, humalog 2-2-2, ISS AC.     HTN: BP stable. D/c Amlodipine 5/1, change metoprolol to 25 mg q12 per medicine recs on 5/1. Continue lisinopril    PAF: Eliquis    CAD: Metoprolol 25 mg q12, lipitor, plavix    Acute blood loss anemia: Hgb stable. FeSO4    Hypothyroidism: Synthroid    Appetite/mood: Remeron    GI/supplements: Protonix, zinc sulfate, MVI, folic acid    Anxiety: Xanax qhs prn, patient to go outside in wheelchair with family.    [facility], 3 hours of daily therapy

## 2017-05-10 NOTE — PROGRESS NOTE ADULT - SUBJECTIVE AND OBJECTIVE BOX
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 Meyer. Subsequently admitted to acute rehab. Developed LLE post operative wound dehiscence, s/p left AKA and RLE angiogram on 4/14/17. Also s/p revascularization of RLE with angiogram and R SFA/POP angioplasty on 4/20/17. Hospital stay significant for: Completed course of cefazolin for UTI. Restarted on Abx for R foot second toe gangrene. Transfused two units PRBCs.    Interval history:   No acute events overnight. Informed by RN of low BP again. Pt seen and examined.  Pt c/o right heel pain with standing and left phantom limb pain which she describes as an electric shock-becoming progressively less frequent + BM, denies dysuria.     REVIEW OF SYSTEMS  [ x ] Constitutional WNL  [   ] Cardio WNL  [   ] Resp WNL  [   ] GI WNL  [    ]  WNL  [ x ] Heme WNL  [ x ] Endo WNL  [   ] Skin WNL  [   ] MSK WNL  [   ] Neuro WNL  [x  ] Cognitive WNL  [x  ] Psych WNL    Vital Signs Last 24 Hrs  T(C): 36.3, Max: 36.6 (05-09 @ 19:55)  T(F): 97.3, Max: 97.8 (05-09 @ 19:55)  HR: 82 (74 - 82)  BP: 98/58 (85/53 - 135/60)  BP(mean): --  RR: 16 (16 - 18)  SpO2: 99% (96% - 100%)    PHYSICAL EXAM  Constitutional - NAD, Comfortable  Chest - CTA bilaterally.   Cardiovascular - RRR, S1S2, No murmurs  Abdomen - BS+, Soft, NTND  Extremities - No C/C/E, No calf tenderness of RLE  Neurologic Exam -                    Cognitive - Awake, Alert, AAO to self, place, date, year, situation     Communication - Fluent, No dysarthria     Motor - No focal deficits                    LEFT    UE - ShAB 5/5, EF 5/5, EE 5/5, WE 5/5,  5/5                    RIGHT UE - ShAB 5/5, EF 5/5, EE 5/5, WE 5/5,  5/5                    LEFT    LE - HF wfl                    RIGHT LE - At least 3-4/5, limited by pain     Sensory - Intact to LT   Psychiatric - Mood stable, Affect WNL  Skin--LLE AKA incision with staples, c/d/i,no erythema, + stump .  Right 2nd toe gangrene and tip of great toe-stable. R calcaneus stage II pressure ulcer, new blanchable erythema/ petechiae  just proximal to ulcer. Black eschar over the 5th metatarsal on the plantar surface    Functional Progress:   Sit to supine: Min A  Transfer: Sliding board, supervision  Toileting: Max A  LE dressing: Min A; UE dressing: Supervision  Grooming: S    MEDICATIONS  (STANDING):  docusate sodium 100milliGRAM(s) Oral three times a day  ascorbic acid 500milliGRAM(s) Oral daily  multivitamin 1Tablet(s) Oral daily  clopidogrel Tablet 75milliGRAM(s) Oral daily  senna 2Tablet(s) Oral at bedtime  atorvastatin 40milliGRAM(s) Oral at bedtime  lisinopril 5milliGRAM(s) Oral daily  dextrose 5%. 1000milliLiter(s) IV Continuous <Continuous>  dextrose 50% Injectable 12.5Gram(s) IV Push once  dextrose 50% Injectable 25Gram(s) IV Push once  dextrose 50% Injectable 25Gram(s) IV Push once  ferrous    sulfate 325milliGRAM(s) Oral daily  folic acid 1milliGRAM(s) Oral daily  levothyroxine 88MICROGram(s) Oral daily  mirtazapine 7.5milliGRAM(s) Oral at bedtime  pantoprazole    Tablet 40milliGRAM(s) Oral before breakfast  polyethylene glycol 3350 17Gram(s) Oral daily  zinc sulfate 220milliGRAM(s) Oral daily  metoprolol 25milliGRAM(s) Oral every 12 hours  insulin lispro Injectable (HumaLOG) 2Unit(s) SubCutaneous three times a day before meals  insulin lispro (HumaLOG) corrective regimen sliding scale  SubCutaneous three times a day before meals  tamsulosin 0.8milliGRAM(s) Oral at bedtime  nystatin Powder 1Application(s) Topical two times a day  apixaban 5milliGRAM(s) Oral two times a day  gabapentin 100milliGRAM(s) Oral three times a day  insulin glargine Injectable (LANTUS) 12Unit(s) SubCutaneous at bedtime  cadexomer iodine Gel 1Application(s) Topical daily    MEDICATIONS  (PRN):  acetaminophen   Tablet 650milliGRAM(s) Oral every 6 hours PRN For Temp greater than 38 C (100.4 F)  acetaminophen   Tablet. 650milliGRAM(s) Oral every 6 hours PRN Mild Pain (1 - 3)  dextrose Gel 1Dose(s) Oral once PRN Blood Glucose LESS THAN 70 milliGRAM(s)/deciliter  glucagon  Injectable 1milliGRAM(s) IntraMuscular once PRN Glucose LESS THAN 70 milligrams/deciliter  bisacodyl Suppository 10milliGRAM(s) Rectal daily PRN Constipation  oxyCODONE  5 mG/acetaminophen 325 mG 2Tablet(s) Oral every 4 hours PRN Severe Pain (7 - 10)  oxyCODONE  5 mG/acetaminophen 325 mG 1Tablet(s) Oral every 4 hours PRN Moderate Pain (4 - 6)  ALPRAZolam 0.25milliGRAM(s) Oral at bedtime PRN anxiety      RECENT LABS:                               10.2   6.6   )-----------( 224      ( 08 May 2017 07:43 )             31.8   05-08    138  |  100  |  29.0<H>  ----------------------------<  93  5.2   |  27.0  |  0.88    Ca    9.2      08 May 2017 07:43    CAPILLARY BLOOD GLUCOSE  213 (10 May 2017 13:01)  173 (10 May 2017 07:48)  281 (09 May 2017 17:00)    ASSESSMENT AND PLAN:  82 year old F with extensive PMH including PVD s/p multiple stents, 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 Irwin, s/p left AKA and RLE angiogram on 4/14/17. Also s/p revascularization of RLE with angiogram and R SFA/POP angioplasty on 4/20/17. Hospital stay significant for: Completed course of cefazolin for UTI. Restarted on Abx for R foot second toe gangrene which was completed. Transfused two units PRBCs. Presents with functional deficits.     Hypotension: Expect due to dehydration and possibly increase in Flomax dose-decrease from 0.8 to 0.4.  Cont to encourage PO intake, IVF if BP remains low. Other VSS.     PVD s/p L AKA: Residual limb- keep c/d/i.   given    S/p RLE angio, R toe gangrene:Vascular following. Completed cefazolin course 5/4/17. Podiatry called for f/u: Xray of right 2nd toe negative, ordered iodosorb gel. Potential right 2nd toe amputation.    R heel stage II ulcer:  Vascular follow up appreciated. . Daily betadine and dressing change.  Cont off-loading with PRAFO    Pain control: Percocet, gabapentin. tylenol.     Bowel regimen: Constipation improved. Senna, colace, miralax. Bisacodyl suppository prn.    Urinary retention: Improved. UA: mod LE, WBC 26-50, urine cx neg.  Flomax  0.8 mg-decrease to 0.4 due to hypotension. Bladder scans were d/martin.     DM II: BG 200s, Increase lantus 15 units 5/10, humalog 2-2-2, ISS AC.     HTN: BP stable. D/c Amlodipine 5/1, change metoprolol to 25 mg q12 per medicine recs on 5/1. Continue lisinopril    PAF: Eliquis    CAD: Metoprolol 25 mg q12, lipitor, plavix    Acute blood loss anemia: Hgb stable. FeSO4    Hypothyroidism: Synthroid    Appetite/mood: Remeron    GI/supplements: Protonix, zinc sulfate, MVI, folic acid    Anxiety: Xanax qhs prn, patient to go outside in wheelchair with family.    [facility], 3 hours of daily therapy

## 2017-05-10 NOTE — PROGRESS NOTE ADULT - SUBJECTIVE AND OBJECTIVE BOX
S : 85y year old Female seen at bedside for Right 2nd digit gangrene.  Patient denies any pain in the area.      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 Meyer. Subsequently admitted to acute rehab. Developed LLE post operative wound dehiscence, s/p left AKA and RLE angiogram on 4/14/17. Also s/p revascularization of RLE with angiogram and R SFA/POP angioplasty on 4/20/17.         Patient admits to  (-) Fevers, (-) Chills, (-) Nausea, (-) Vomiting, (-) Shortness of Breath      PMH: Hyperlipidemia  Hypertension  Diabetes  PVD (peripheral vascular disease)  CAD (coronary artery disease)    PSH:History of cholecystectomy  H/O angioplasty      Allergies:Benadryl (Other)  Demerol HCl (Other)      Labs:                          O:   General: Pleasant  female NAD & AOX3.    Examination noted on the the Right lower extremity:  Integument:  Skin warm, dry and supple.    Ulceration Right heel  - hyperkeratotic border, wound base Fibrogranular - edema, - aide-wound erythema, - purulence, - fluctuance, - tracking/tunneling, - probe to bone.  Ulceration Right 5th MPJ - hyperkeratotic border, wound base Fibrogranular,  - edema, - aide-wound erythema, - purulence, - fluctuance, - tracking/tunneling, - probe to bone.    Gangrene noted on the right 2nd digit: NO purulence, no fluctuance, no tracking/tunneling, - probe to bone.  Vascular: Dorsalis Pedis and Posterior Tibial pulses 1/4.  Neuro: Protective sensation absent to the level of the digits bilateral.  xray: No evidence of bone destruction. No evidence of fracture or   dislocation. Degenerative changes at the first metatarsal phalangeal   joint. No evidence of soft tissue gas..      Deformity:  A: Right foot Heel and 5th met ulceration, and 2nd digit dry gangrene.      P:   Chart reviewed and Patient evaluated  Discussed diagnosis and treatment with patient  Applied dry sterile dressing  X-rays reviewed  Iodosorb Ordered  Podiatry will follow while in house, and possible surgical management by 2nd digit amputation, pending discussion with attending.

## 2017-05-10 NOTE — STUDENT SIGN OFF DOCUMENT - STUDENT DOCUMENT REVIEW
Olga Gordillo, Student Nurse Dianne
Student Nurse, Olga Gordillo
Olga Gordillo, Student Nurse Dianne

## 2017-05-11 LAB
ANION GAP SERPL CALC-SCNC: 11 MMOL/L — SIGNIFICANT CHANGE UP (ref 5–17)
BASOPHILS # BLD AUTO: 0 K/UL — SIGNIFICANT CHANGE UP (ref 0–0.2)
BASOPHILS NFR BLD AUTO: 0.4 % — SIGNIFICANT CHANGE UP (ref 0–2)
BLD GP AB SCN SERPL QL: SIGNIFICANT CHANGE UP
BUN SERPL-MCNC: 40 MG/DL — HIGH (ref 8–20)
CALCIUM SERPL-MCNC: 9.2 MG/DL — SIGNIFICANT CHANGE UP (ref 8.6–10.2)
CHLORIDE SERPL-SCNC: 97 MMOL/L — LOW (ref 98–107)
CO2 SERPL-SCNC: 25 MMOL/L — SIGNIFICANT CHANGE UP (ref 22–29)
CREAT SERPL-MCNC: 1.18 MG/DL — SIGNIFICANT CHANGE UP (ref 0.5–1.3)
EOSINOPHIL # BLD AUTO: 1 K/UL — HIGH (ref 0–0.5)
EOSINOPHIL NFR BLD AUTO: 17.4 % — HIGH (ref 0–6)
GLUCOSE SERPL-MCNC: 193 MG/DL — HIGH (ref 70–115)
HCT VFR BLD CALC: 32.5 % — LOW (ref 37–47)
HGB BLD-MCNC: 10.3 G/DL — LOW (ref 12–16)
INR BLD: 1.1 RATIO — SIGNIFICANT CHANGE UP (ref 0.88–1.16)
LYMPHOCYTES # BLD AUTO: 1.5 K/UL — SIGNIFICANT CHANGE UP (ref 1–4.8)
LYMPHOCYTES # BLD AUTO: 26.8 % — SIGNIFICANT CHANGE UP (ref 20–55)
MCHC RBC-ENTMCNC: 27.9 PG — SIGNIFICANT CHANGE UP (ref 27–31)
MCHC RBC-ENTMCNC: 31.7 G/DL — LOW (ref 32–36)
MCV RBC AUTO: 88.1 FL — SIGNIFICANT CHANGE UP (ref 81–99)
MONOCYTES # BLD AUTO: 0.6 K/UL — SIGNIFICANT CHANGE UP (ref 0–0.8)
MONOCYTES NFR BLD AUTO: 9.8 % — SIGNIFICANT CHANGE UP (ref 3–10)
NEUTROPHILS # BLD AUTO: 2.6 K/UL — SIGNIFICANT CHANGE UP (ref 1.8–8)
NEUTROPHILS NFR BLD AUTO: 45.4 % — SIGNIFICANT CHANGE UP (ref 37–73)
PLATELET # BLD AUTO: 205 K/UL — SIGNIFICANT CHANGE UP (ref 150–400)
POTASSIUM SERPL-MCNC: 5.3 MMOL/L — SIGNIFICANT CHANGE UP (ref 3.5–5.3)
POTASSIUM SERPL-SCNC: 5.3 MMOL/L — SIGNIFICANT CHANGE UP (ref 3.5–5.3)
PROTHROM AB SERPL-ACNC: 12.1 SEC — SIGNIFICANT CHANGE UP (ref 9.8–12.7)
RBC # BLD: 3.69 M/UL — LOW (ref 4.4–5.2)
RBC # FLD: 16.3 % — HIGH (ref 11–15.6)
SODIUM SERPL-SCNC: 133 MMOL/L — LOW (ref 135–145)
TYPE + AB SCN PNL BLD: SIGNIFICANT CHANGE UP
WBC # BLD: 5.6 K/UL — SIGNIFICANT CHANGE UP (ref 4.8–10.8)
WBC # FLD AUTO: 5.6 K/UL — SIGNIFICANT CHANGE UP (ref 4.8–10.8)

## 2017-05-11 PROCEDURE — 93010 ELECTROCARDIOGRAM REPORT: CPT

## 2017-05-11 PROCEDURE — 99232 SBSQ HOSP IP/OBS MODERATE 35: CPT | Mod: GC

## 2017-05-11 RX ADMIN — TAMSULOSIN HYDROCHLORIDE 0.4 MILLIGRAM(S): 0.4 CAPSULE ORAL at 21:49

## 2017-05-11 RX ADMIN — Medication 2 UNIT(S): at 13:17

## 2017-05-11 RX ADMIN — ATORVASTATIN CALCIUM 40 MILLIGRAM(S): 80 TABLET, FILM COATED ORAL at 21:49

## 2017-05-11 RX ADMIN — GABAPENTIN 100 MILLIGRAM(S): 400 CAPSULE ORAL at 05:54

## 2017-05-11 RX ADMIN — Medication 1 APPLICATION(S): at 14:53

## 2017-05-11 RX ADMIN — GABAPENTIN 100 MILLIGRAM(S): 400 CAPSULE ORAL at 21:49

## 2017-05-11 RX ADMIN — Medication 2 UNIT(S): at 17:26

## 2017-05-11 RX ADMIN — POLYETHYLENE GLYCOL 3350 17 GRAM(S): 17 POWDER, FOR SOLUTION ORAL at 13:17

## 2017-05-11 RX ADMIN — Medication 2 UNIT(S): at 08:47

## 2017-05-11 RX ADMIN — Medication 2: at 17:25

## 2017-05-11 RX ADMIN — Medication 1 MILLIGRAM(S): at 11:10

## 2017-05-11 RX ADMIN — Medication 100 MILLIGRAM(S): at 21:49

## 2017-05-11 RX ADMIN — Medication 88 MICROGRAM(S): at 05:54

## 2017-05-11 RX ADMIN — PANTOPRAZOLE SODIUM 40 MILLIGRAM(S): 20 TABLET, DELAYED RELEASE ORAL at 05:54

## 2017-05-11 RX ADMIN — INSULIN GLARGINE 15 UNIT(S): 100 INJECTION, SOLUTION SUBCUTANEOUS at 21:49

## 2017-05-11 RX ADMIN — Medication 25 MILLIGRAM(S): at 05:54

## 2017-05-11 RX ADMIN — ZINC SULFATE TAB 220 MG (50 MG ZINC EQUIVALENT) 220 MILLIGRAM(S): 220 (50 ZN) TAB at 11:10

## 2017-05-11 RX ADMIN — Medication 100 MILLIGRAM(S): at 13:17

## 2017-05-11 RX ADMIN — APIXABAN 5 MILLIGRAM(S): 2.5 TABLET, FILM COATED ORAL at 17:25

## 2017-05-11 RX ADMIN — Medication 500 MILLIGRAM(S): at 11:10

## 2017-05-11 RX ADMIN — Medication 100 MILLIGRAM(S): at 05:54

## 2017-05-11 RX ADMIN — CLOPIDOGREL BISULFATE 75 MILLIGRAM(S): 75 TABLET, FILM COATED ORAL at 11:10

## 2017-05-11 RX ADMIN — LISINOPRIL 5 MILLIGRAM(S): 2.5 TABLET ORAL at 05:54

## 2017-05-11 RX ADMIN — Medication 325 MILLIGRAM(S): at 11:10

## 2017-05-11 RX ADMIN — Medication 0.25 MILLIGRAM(S): at 21:49

## 2017-05-11 RX ADMIN — SENNA PLUS 2 TABLET(S): 8.6 TABLET ORAL at 21:49

## 2017-05-11 RX ADMIN — Medication 25 MILLIGRAM(S): at 17:25

## 2017-05-11 RX ADMIN — NYSTATIN CREAM 1 APPLICATION(S): 100000 CREAM TOPICAL at 17:20

## 2017-05-11 RX ADMIN — Medication 1 TABLET(S): at 11:10

## 2017-05-11 RX ADMIN — NYSTATIN CREAM 1 APPLICATION(S): 100000 CREAM TOPICAL at 05:54

## 2017-05-11 RX ADMIN — APIXABAN 5 MILLIGRAM(S): 2.5 TABLET, FILM COATED ORAL at 05:54

## 2017-05-11 RX ADMIN — MIRTAZAPINE 7.5 MILLIGRAM(S): 45 TABLET, ORALLY DISINTEGRATING ORAL at 21:49

## 2017-05-11 RX ADMIN — GABAPENTIN 100 MILLIGRAM(S): 400 CAPSULE ORAL at 13:17

## 2017-05-11 NOTE — PROGRESS NOTE ADULT - SUBJECTIVE AND OBJECTIVE BOX
S : 85y year old Female seen at bedside for Right 2nd digit gangrene.  Patient denies any pain in the area.      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 Meyer. Subsequently admitted to acute rehab. Developed LLE post operative wound dehiscence, s/p left AKA and RLE angiogram on 4/14/17. Also s/p revascularization of RLE with angiogram and R SFA/POP angioplasty on 4/20/17.       O:   General: Pleasant  female NAD & AOX3.    Examination noted on the the Right lower extremity:  Integument:  Skin warm, dry and supple.    Ulceration Right heel  - hyperkeratotic border, wound base Fibrogranular - edema, - aide-wound erythema, - purulence, - fluctuance, - tracking/tunneling, - probe to bone.  Ulceration Right 5th MPJ - hyperkeratotic border, wound base Fibrogranular,  - edema, - aide-wound erythema, - purulence, - fluctuance, - tracking/tunneling, - probe to bone.    Gangrene noted on the right 2nd digit: NO purulence, no fluctuance, no tracking/tunneling, - probe to bone.  Vascular: Dorsalis Pedis and Posterior Tibial pulses 1/4.  Neuro: Protective sensation absent to the level of the digits bilateral.    Deformity:  A: Right foot Heel and 5th met ulceration, and 2nd digit dry gangrene.      P:   Chart reviewed and Patient evaluated  Discussed diagnosis and treatment with patient  Applied dry sterile dressing with betadine  Discussed surgical management with patient  patient is scheduled for right 2nd digit amputation tomorrow  Request Medical clearance  NPO after midnight  Podiatry will f/u

## 2017-05-11 NOTE — PROGRESS NOTE ADULT - SUBJECTIVE AND OBJECTIVE BOX
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 Meyer. Subsequently admitted to acute rehab. Developed LLE post operative wound dehiscence, s/p left AKA and RLE angiogram on 4/14/17. Also s/p revascularization of RLE with angiogram and R SFA/POP angioplasty on 4/20/17. Hospital stay significant for: Completed course of cefazolin for UTI. Restarted on Abx for R foot second toe gangrene. Transfused two units PRBCs.    Interval history:   No acute events overnight. Seen and examined. C/o right second toe pain. Phantom pain LLE intermittent. BM yesterday. Denies dysuria.    REVIEW OF SYSTEMS  [ x ] Constitutional WNL  [   ] Cardio WNL  [   ] Resp WNL  [   ] GI WNL  [    ]  WNL  [ x ] Heme WNL  [ x ] Endo WNL  [   ] Skin WNL  [   ] MSK WNL  [   ] Neuro WNL  [x  ] Cognitive WNL  [x  ] Psych WNL    Vital Signs Last 24 Hrs  T(C): 36.3, Max: 36.6 (05-10 @ 20:12)  T(F): 97.3, Max: 97.8 (05-10 @ 20:12)  HR: 81 (75 - 81)  BP: 123/56 (98/58 - 126/59)  BP(mean): --  RR: 18 (18 - 18)  SpO2: 98% (95% - 98%)    PHYSICAL EXAM  Constitutional - NAD, Comfortable  Chest - CTA bilaterally.   Cardiovascular - RRR, S1S2, No murmurs  Abdomen - BS+, Soft, NTND  Extremities - No C/C/E, No calf tenderness of RLE  Neurologic Exam -                    Cognitive - Awake, Alert, AAO to self, place, date, year, situation     Communication - Fluent, No dysarthria     Motor - No focal deficits                    LEFT    UE - ShAB 5/5, EF 5/5, EE 5/5, WE 5/5,  5/5                    RIGHT UE - ShAB 5/5, EF 5/5, EE 5/5, WE 5/5,  5/5                    LEFT    LE - HF wfl                    RIGHT LE - At least 3-4/5, limited by pain     Sensory - Intact to LT   Psychiatric - Mood stable, Affect WNL  Skin--LLE AKA incision with staples, c/d/i,no erythema, + stump .  Right 2nd toe gangrene and tip of great toe-stable. R calcaneus stage II pressure ulcer, new blanchable erythema/ petechiae  just proximal to ulcer. Black eschar over the 5th metatarsal on the plantar surface    Functional Progress:   Sit to supine: Min A  Transfer: Sliding board, Min A  Toileting: Max A  LE dressing: Min A; UE dressing: Supervision  Grooming: S    MEDICATIONS  (STANDING):  docusate sodium 100milliGRAM(s) Oral three times a day  ascorbic acid 500milliGRAM(s) Oral daily  multivitamin 1Tablet(s) Oral daily  clopidogrel Tablet 75milliGRAM(s) Oral daily  senna 2Tablet(s) Oral at bedtime  atorvastatin 40milliGRAM(s) Oral at bedtime  lisinopril 5milliGRAM(s) Oral daily  dextrose 5%. 1000milliLiter(s) IV Continuous <Continuous>  dextrose 50% Injectable 12.5Gram(s) IV Push once  dextrose 50% Injectable 25Gram(s) IV Push once  dextrose 50% Injectable 25Gram(s) IV Push once  ferrous    sulfate 325milliGRAM(s) Oral daily  folic acid 1milliGRAM(s) Oral daily  levothyroxine 88MICROGram(s) Oral daily  mirtazapine 7.5milliGRAM(s) Oral at bedtime  pantoprazole    Tablet 40milliGRAM(s) Oral before breakfast  polyethylene glycol 3350 17Gram(s) Oral daily  zinc sulfate 220milliGRAM(s) Oral daily  metoprolol 25milliGRAM(s) Oral every 12 hours  insulin lispro Injectable (HumaLOG) 2Unit(s) SubCutaneous three times a day before meals  insulin lispro (HumaLOG) corrective regimen sliding scale  SubCutaneous three times a day before meals  nystatin Powder 1Application(s) Topical two times a day  apixaban 5milliGRAM(s) Oral two times a day  gabapentin 100milliGRAM(s) Oral three times a day  cadexomer iodine Gel 1Application(s) Topical daily  insulin glargine Injectable (LANTUS) 15Unit(s) SubCutaneous at bedtime  tamsulosin 0.4milliGRAM(s) Oral at bedtime    MEDICATIONS  (PRN):  acetaminophen   Tablet 650milliGRAM(s) Oral every 6 hours PRN For Temp greater than 38 C (100.4 F)  acetaminophen   Tablet. 650milliGRAM(s) Oral every 6 hours PRN Mild Pain (1 - 3)  dextrose Gel 1Dose(s) Oral once PRN Blood Glucose LESS THAN 70 milliGRAM(s)/deciliter  glucagon  Injectable 1milliGRAM(s) IntraMuscular once PRN Glucose LESS THAN 70 milligrams/deciliter  bisacodyl Suppository 10milliGRAM(s) Rectal daily PRN Constipation  oxyCODONE  5 mG/acetaminophen 325 mG 2Tablet(s) Oral every 4 hours PRN Severe Pain (7 - 10)  oxyCODONE  5 mG/acetaminophen 325 mG 1Tablet(s) Oral every 4 hours PRN Moderate Pain (4 - 6)  ALPRAZolam 0.25milliGRAM(s) Oral at bedtime PRN anxiety      RECENT LABS:                               10.2   6.6   )-----------( 224      ( 08 May 2017 07:43 )             31.8   05-08    138  |  100  |  29.0<H>  ----------------------------<  93  5.2   |  27.0  |  0.88    Ca    9.2      08 May 2017 07:43    CAPILLARY BLOOD GLUCOSE  114 (11 May 2017 08:34)  197 (10 May 2017 22:17)  221 (10 May 2017 16:31)      ASSESSMENT AND PLAN:  82 year old F with extensive PMH including PVD s/p multiple stents, 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 Irwin, s/p left AKA and RLE angiogram on 4/14/17. Also s/p revascularization of RLE with angiogram and R SFA/POP angioplasty on 4/20/17. Hospital stay significant for: Completed course of cefazolin for UTI. Restarted on Abx for R foot second toe gangrene which was completed. Transfused two units PRBCs. Presents with functional deficits.     Hypotension: Improved. Expect due to dehydration and possibly increase in Flomax dose-decrease from 0.8 to 0.4.  Cont to encourage PO intake, IVF if BP remains low. Other VSS.     PVD s/p L AKA: Residual limb- keep c/d/i.   given    S/p RLE angio, R toe gangrene:Vascular following. Completed cefazolin course 5/4/17. Podiatry called for f/u: Xray of right 2nd toe negative, ordered iodosorb gel. Potential right 2nd toe amputation. Appreciate vascular and podiatry recs.    R heel stage II ulcer:  Vascular follow up appreciated. . Daily betadine and dressing change.  Cont off-loading with PRAFO    Pain control: Percocet, gabapentin. tylenol.     Bowel regimen: Constipation improved. Senna, colace, miralax. Bisacodyl suppository prn.    Urinary retention: Improved. UA: mod LE, WBC 26-50, urine cx neg.  Flomax  0.8 mg-decrease to 0.4 due to hypotension. Bladder scans were d/martin.     DM II: BG 200s, Increase lantus 15 units 5/10, humalog 2-2-2, ISS AC.     HTN: BP stable. D/c Amlodipine 5/1, change metoprolol to 25 mg q12 per medicine recs on 5/1. Continue lisinopril    PAF: Eliquis    CAD: Metoprolol 25 mg q12, lipitor, plavix    Acute blood loss anemia: Hgb stable. FeSO4    Hypothyroidism: Synthroid    Appetite/mood: Remeron    GI/supplements: Protonix, zinc sulfate, MVI, folic acid    Anxiety: Xanax qhs prn, patient to go outside in wheelchair with family.    [facility], 3 hours of daily therapy

## 2017-05-12 LAB — SURGICAL PATHOLOGY FINAL REPORT - CH: SIGNIFICANT CHANGE UP

## 2017-05-12 PROCEDURE — 99232 SBSQ HOSP IP/OBS MODERATE 35: CPT

## 2017-05-12 PROCEDURE — 99233 SBSQ HOSP IP/OBS HIGH 50: CPT

## 2017-05-12 RX ORDER — SODIUM CHLORIDE 9 MG/ML
1000 INJECTION, SOLUTION INTRAVENOUS
Qty: 0 | Refills: 0 | Status: DISCONTINUED | OUTPATIENT
Start: 2017-05-12 | End: 2017-05-12

## 2017-05-12 RX ORDER — SODIUM CHLORIDE 9 MG/ML
1000 INJECTION INTRAMUSCULAR; INTRAVENOUS; SUBCUTANEOUS
Qty: 0 | Refills: 0 | Status: DISCONTINUED | OUTPATIENT
Start: 2017-05-12 | End: 2017-05-12

## 2017-05-12 RX ADMIN — Medication 25 MILLIGRAM(S): at 06:36

## 2017-05-12 RX ADMIN — Medication 0.25 MILLIGRAM(S): at 21:47

## 2017-05-12 RX ADMIN — Medication 2 UNIT(S): at 18:12

## 2017-05-12 RX ADMIN — APIXABAN 5 MILLIGRAM(S): 2.5 TABLET, FILM COATED ORAL at 06:36

## 2017-05-12 RX ADMIN — Medication 2 UNIT(S): at 12:29

## 2017-05-12 RX ADMIN — Medication 4: at 12:27

## 2017-05-12 RX ADMIN — Medication 100 MILLIGRAM(S): at 06:36

## 2017-05-12 RX ADMIN — ZINC SULFATE TAB 220 MG (50 MG ZINC EQUIVALENT) 220 MILLIGRAM(S): 220 (50 ZN) TAB at 11:39

## 2017-05-12 RX ADMIN — Medication 325 MILLIGRAM(S): at 11:39

## 2017-05-12 RX ADMIN — NYSTATIN CREAM 1 APPLICATION(S): 100000 CREAM TOPICAL at 18:07

## 2017-05-12 RX ADMIN — APIXABAN 5 MILLIGRAM(S): 2.5 TABLET, FILM COATED ORAL at 18:24

## 2017-05-12 RX ADMIN — GABAPENTIN 100 MILLIGRAM(S): 400 CAPSULE ORAL at 21:47

## 2017-05-12 RX ADMIN — Medication 1 TABLET(S): at 11:39

## 2017-05-12 RX ADMIN — NYSTATIN CREAM 1 APPLICATION(S): 100000 CREAM TOPICAL at 06:37

## 2017-05-12 RX ADMIN — Medication 25 MILLIGRAM(S): at 18:06

## 2017-05-12 RX ADMIN — LISINOPRIL 5 MILLIGRAM(S): 2.5 TABLET ORAL at 06:36

## 2017-05-12 RX ADMIN — MIRTAZAPINE 7.5 MILLIGRAM(S): 45 TABLET, ORALLY DISINTEGRATING ORAL at 21:47

## 2017-05-12 RX ADMIN — ATORVASTATIN CALCIUM 40 MILLIGRAM(S): 80 TABLET, FILM COATED ORAL at 21:47

## 2017-05-12 RX ADMIN — Medication 650 MILLIGRAM(S): at 11:35

## 2017-05-12 RX ADMIN — Medication 500 MILLIGRAM(S): at 11:39

## 2017-05-12 RX ADMIN — Medication 650 MILLIGRAM(S): at 20:26

## 2017-05-12 RX ADMIN — GABAPENTIN 100 MILLIGRAM(S): 400 CAPSULE ORAL at 06:36

## 2017-05-12 RX ADMIN — Medication 1 MILLIGRAM(S): at 11:39

## 2017-05-12 RX ADMIN — Medication 88 MICROGRAM(S): at 06:36

## 2017-05-12 RX ADMIN — TAMSULOSIN HYDROCHLORIDE 0.4 MILLIGRAM(S): 0.4 CAPSULE ORAL at 21:46

## 2017-05-12 RX ADMIN — INSULIN GLARGINE 15 UNIT(S): 100 INJECTION, SOLUTION SUBCUTANEOUS at 21:47

## 2017-05-12 RX ADMIN — GABAPENTIN 100 MILLIGRAM(S): 400 CAPSULE ORAL at 17:57

## 2017-05-12 RX ADMIN — Medication 650 MILLIGRAM(S): at 10:46

## 2017-05-12 RX ADMIN — Medication 1 APPLICATION(S): at 17:57

## 2017-05-12 RX ADMIN — Medication 650 MILLIGRAM(S): at 21:20

## 2017-05-12 RX ADMIN — PANTOPRAZOLE SODIUM 40 MILLIGRAM(S): 20 TABLET, DELAYED RELEASE ORAL at 06:36

## 2017-05-12 NOTE — PROGRESS NOTE ADULT - ASSESSMENT
85 yr old female with PMH CAD s/p stents, DM2, hypertension, hyperlipidemia, anxiety, significant PVD s/p multiple stents, paroxysmal atrial fibrillation on Eliquis admitted for hypertensive urgency post LE angiogram. 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 Hospital course complicated by urinary retention with UTI (failed TOV), hyponatremia likely due to pain and improved with NaCl (per nephrology) and pain medications. BP meds and insulin coverage titrated for better control.  Patient was   transferred  to acute rehab , pain poorly controlled , duragesic  patch discontinued due to lethargy  . Revaluated by vascular surgery , found to have  postoperative wound dehiscence. Had AKA now in rehab. BG  and BP well controlled. For  right 2nd digit amputation this am with Podiatry.    Problem/Plan - 1:  ·  Problem: PVD (peripheral vascular disease).  Plan: S/P left AKA- Vascular following   s/p revascularization of RLE with angiogram and R SFA/POP angioplasty on 4/20/17. Seen by Podiatry for amputation of right foot second digit this am      Problem/Plan - 2:  ·  Problem: Type 2 diabetes mellitus with diabetic peripheral angiopathy and gangrene, with long-term current use of insulin.  Plan: BG improved control on current RX. Hold am insulin for OR today    Problem/Plan - 3:  ·  Problem: Constipation, unspecified constipation type.  Plan: continue bowel RX.     Problem/Plan - 4:  ·  Problem: Urinary retention.  Plan: continue  flomax  0.8  continue bladder scan and straight cath cath for >300  maintain bowel rx to prevent constipation.     Problem/Plan - 5:  ·  Problem: Hypertension.  Plan: stable on current RX.     Problem/Plan - 6:  Problem: CAD (coronary artery disease). Plan: stable , continue current meds    Problem/Plan - 7:  ·  Problem: Hyperlipidemia.  Plan: statin.     Problem/Plan - 8:  ·  Problem: Anemia multifactorial ,  stable H&H, likely ABLA on anemia of chronic dz.  Plan: iron supplement  monitor labs.     Problem/Plan -9   Problem: Hyponatremia. Plan: IV Normal saline 85 yr old female with PMH CAD s/p stents, DM2, hypertension, hyperlipidemia, anxiety, significant PVD s/p multiple stents, paroxysmal atrial fibrillation on Eliquis admitted for hypertensive urgency post LE angiogram. 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 Hospital course complicated by urinary retention with UTI (failed TOV), hyponatremia likely due to pain and improved with NaCl (per nephrology) and pain medications. BP meds and insulin coverage titrated for better control.  Patient was   transferred  to acute rehab , pain poorly controlled , duragesic  patch discontinued due to lethargy  . Revaluated by vascular surgery , found to have  postoperative wound dehiscence. Had AKA now in rehab. BG  and BP well controlled. For  right 2nd digit amputation this am with Podiatry.    Problem/Plan - 1:  ·  Problem: PVD (peripheral vascular disease).  Plan: S/P left AKA- Vascular following   s/p revascularization of RLE with angiogram and R SFA/POP angioplasty on 4/20/17.  On Plavix. Seen by Podiatry for amputation of right foot second digit this am      Problem/Plan - 2:  ·  Problem: Type 2 diabetes mellitus with diabetic peripheral angiopathy and gangrene, with long-term current use of insulin.  Plan: BG improved control on current RX. Hold am insulin for OR today    Problem/Plan - 3:  ·  Problem: Constipation, unspecified constipation type.  Plan: continue bowel RX.     Problem/Plan - 4:  ·  Problem: Urinary retention.  Plan: continue  flomax  0.8  continue bladder scan and straight cath cath for >300  maintain bowel rx to prevent constipation.     Problem/Plan - 5:  ·  Problem: Hypertension.  Plan: stable on current RX.     Problem/Plan - 6:  Problem: CAD (coronary artery disease). Plan: stable , continue current meds    Problem/Plan - 7:  ·  Problem: Hyperlipidemia.  Plan: statin.     Problem/Plan - 8:  ·  Problem: Anemia multifactorial ,  stable H&H, likely ABLA on anemia of chronic dz.  Plan: iron supplement  monitor labs.     Problem/Plan -9   Problem: Hyponatremia. Plan: IV Normal saline    Problem/Plan-10   Problem: PAF. Plan: on Eliquis for AC, BB rate control 85 yr old female with PMH CAD s/p stents, DM2, hypertension, hyperlipidemia, anxiety, significant PVD s/p multiple stents, paroxysmal atrial fibrillation on Eliquis admitted for hypertensive urgency post LE angiogram. 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 Hospital course complicated by urinary retention with UTI (failed TOV), hyponatremia likely due to pain and improved with NaCl (per nephrology) and pain medications. BP meds and insulin coverage titrated for better control.  Patient was   transferred  to acute rehab , pain poorly controlled , duragesic  patch discontinued due to lethargy  . Revaluated by vascular surgery , found to have  postoperative wound dehiscence. Had AKA now in rehab. BG  and BP well controlled. For  right 2nd digit amputation this am with Podiatry.    Problem/Plan - 1:  ·  Problem: PVD (peripheral vascular disease).  Plan: S/P left AKA- Vascular following   s/p revascularization of RLE with angiogram and R SFA/POP angioplasty on 4/20/17.  On Plavix. Seen by Podiatry for amputation of right foot second digit this am      Problem/Plan - 2:  ·  Problem: Type 2 diabetes mellitus with diabetic peripheral angiopathy and gangrene, with long-term current use of insulin.  Plan: BG improved control on current RX. Hold am insulin for OR today    Problem/Plan - 3:  ·  Problem: Constipation, unspecified constipation type.  Plan: continue bowel RX.     Problem/Plan - 4:  ·  Problem: Urinary retention.  Plan: continue  flomax  0.8  continue bladder scan and straight cath cath for >300  maintain bowel rx to prevent constipation.     Problem/Plan - 5:  ·  Problem: Hypertension.  Plan: stable on current RX.     Problem/Plan - 6:  Problem: CAD (coronary artery disease). Plan: stable , continue current meds    Problem/Plan - 7:  ·  Problem: Hyperlipidemia.  Plan: statin.     Problem/Plan - 8:  ·  Problem: Anemia multifactorial ,  stable H&H, likely ABLA on anemia of chronic dz.  Plan: iron supplement  monitor labs.     Problem/Plan-9  Problem: PAF. Plan: on Eliquis for AC, BB rate control 85 yr old female with PMH CAD s/p stents, DM2, hypertension, hyperlipidemia, anxiety, significant PVD s/p multiple stents, paroxysmal atrial fibrillation on Eliquis admitted for hypertensive urgency post LE angiogram. 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 Hospital course complicated by urinary retention with UTI (failed TOV), hyponatremia likely due to pain and improved with NaCl (per nephrology) and pain medications. BP meds and insulin coverage titrated for better control.  Patient was   transferred  to acute rehab , pain poorly controlled , duragesic  patch discontinued due to lethargy  . Revaluated by vascular surgery , found to have  postoperative wound dehiscence. Had AKA now in rehab. BG  and BP well controlled. For  right 2nd digit amputation this am with Podiatry, however patient on Eliquis and Plavix.    Problem/Plan - 1:  ·  Problem: PVD (peripheral vascular disease).  Plan: S/P left AKA- Vascular following   s/p revascularization of RLE with angiogram and R SFA/POP angioplasty on 4/20/17.  On Plavix. Seen by Podiatry for amputation of right foot second digit, will hold Plavix from today, Eliquis for 48 hours prior. Plan for amputation next week.      Problem/Plan - 2:  ·  Problem: Type 2 diabetes mellitus with diabetic peripheral angiopathy and gangrene, with long-term current use of insulin.  Plan: BG improved control on current RX. Hold am insulin for OR     Problem/Plan - 3:  ·  Problem: Constipation, unspecified constipation type.  Plan: continue bowel RX.     Problem/Plan - 4:  ·  Problem: Urinary retention.  Plan: continue  flomax  0.8  continue bladder scan and straight cath cath for >300  maintain bowel rx to prevent constipation.     Problem/Plan - 5:  ·  Problem: Hypertension.  Plan: stable on current RX.     Problem/Plan - 6:  Problem: CAD (coronary artery disease). Plan:  on Plavix, stable , continue current meds    Problem/Plan - 7:  ·  Problem: Hyperlipidemia.  Plan: statin.     Problem/Plan - 8:  ·  Problem: Anemia multifactorial ,  stable H&H, likely ABLA on anemia of chronic dz.  Plan: iron supplement  monitor labs.     Problem/Plan-9  Problem: PAF. Plan: on Eliquis for AC, BB rate control

## 2017-05-12 NOTE — PROGRESS NOTE ADULT - SUBJECTIVE AND OBJECTIVE BOX
s/p L AKA. No acute events    Vital Signs Last 24 Hrs  T(C): 36.5, Max: 36.5 (05-11 @ 20:50)  T(F): 97.7, Max: 97.7 (05-11 @ 20:50)  HR: 72 (72 - 81)  BP: 117/70 (106/56 - 134/59)  BP(mean): --  RR: 18 (18 - 18)  SpO2: 96% (95% - 98%)      L AKA clean w/ staples  R foot 2nd toe distal gangrene, Stage II heel ulcer

## 2017-05-12 NOTE — PROGRESS NOTE ADULT - SUBJECTIVE AND OBJECTIVE BOX
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 Meyer. Subsequently admitted to acute rehab. Developed LLE post operative wound dehiscence, s/p left AKA and RLE angiogram on 4/14/17. Also s/p revascularization of RLE with angiogram and R SFA/POP angioplasty on 4/20/17. Hospital stay significant for: Completed course of cefazolin for UTI. Restarted on Abx for R foot second toe gangrene. Transfused two units PRBCs.    Interval history:   No acute events overnight. Seen and examined. C/o right second toe pain. Pt c/o phantom pain LLE which is intermittent and transient.  +BM  Denies dysuria.    REVIEW OF SYSTEMS  [ x ] Constitutional WNL  [   ] Cardio WNL  [   ] Resp WNL  [   ] GI WNL  [    ]  WNL  [ x ] Heme WNL  [ x ] Endo WNL  [   ] Skin WNL  [   ] MSK WNL  [   ] Neuro WNL  [x  ] Cognitive WNL  [x  ] Psych WNL    Vital Signs Last 24 Hrs  T(C): 36.3, Max: 36.6 (05-10 @ 20:12)  T(F): 97.3, Max: 97.8 (05-10 @ 20:12)  HR: 81 (75 - 81)  BP: 123/56 (98/58 - 126/59)  BP(mean): --  RR: 18 (18 - 18)  SpO2: 98% (95% - 98%)    PHYSICAL EXAM  Constitutional - NAD, Comfortable  Chest - CTA bilaterally.   Cardiovascular - RRR, S1S2, No murmurs  Abdomen - BS+, Soft, NTND  Extremities - No C/C/E, No calf tenderness of RLE  Neurologic Exam -                    Cognitive - Awake, Alert, AAO to self, place, date, year, situation     Communication - Fluent, No dysarthria     Motor - No focal deficits                    LEFT    UE - ShAB 5/5, EF 5/5, EE 5/5, WE 5/5,  5/5                    RIGHT UE - ShAB 5/5, EF 5/5, EE 5/5, WE 5/5,  5/5                    LEFT    LE - HF wfl                    RIGHT LE - At least 3-4/5, limited by pain     Sensory - Intact to LT   Psychiatric - Mood stable, Affect WNL  Skin--LLE AKA incision with staples, c/d/i,no erythema, + stump .  Right 2nd toe gangrene and tip of great toe-stable. R calcaneus stage II pressure ulcer. . Black eschar over the 5th metatarsal on the plantar surface    Functional Progress:   Transfers: Supervision  Bed mobility: Min A  Toileting: Mod A  ADLs:  LE dressing: Mod A; UE dressing and grooming: Supervision    MEDICATIONS  (STANDING):  docusate sodium 100milliGRAM(s) Oral three times a day  ascorbic acid 500milliGRAM(s) Oral daily  multivitamin 1Tablet(s) Oral daily  clopidogrel Tablet 75milliGRAM(s) Oral daily  senna 2Tablet(s) Oral at bedtime  atorvastatin 40milliGRAM(s) Oral at bedtime  lisinopril 5milliGRAM(s) Oral daily  dextrose 5%. 1000milliLiter(s) IV Continuous <Continuous>  dextrose 50% Injectable 12.5Gram(s) IV Push once  dextrose 50% Injectable 25Gram(s) IV Push once  dextrose 50% Injectable 25Gram(s) IV Push once  ferrous    sulfate 325milliGRAM(s) Oral daily  folic acid 1milliGRAM(s) Oral daily  levothyroxine 88MICROGram(s) Oral daily  mirtazapine 7.5milliGRAM(s) Oral at bedtime  pantoprazole    Tablet 40milliGRAM(s) Oral before breakfast  polyethylene glycol 3350 17Gram(s) Oral daily  zinc sulfate 220milliGRAM(s) Oral daily  metoprolol 25milliGRAM(s) Oral every 12 hours  insulin lispro Injectable (HumaLOG) 2Unit(s) SubCutaneous three times a day before meals  insulin lispro (HumaLOG) corrective regimen sliding scale  SubCutaneous three times a day before meals  nystatin Powder 1Application(s) Topical two times a day  apixaban 5milliGRAM(s) Oral two times a day  gabapentin 100milliGRAM(s) Oral three times a day  cadexomer iodine Gel 1Application(s) Topical daily  insulin glargine Injectable (LANTUS) 15Unit(s) SubCutaneous at bedtime  tamsulosin 0.4milliGRAM(s) Oral at bedtime    MEDICATIONS  (PRN):  acetaminophen   Tablet 650milliGRAM(s) Oral every 6 hours PRN For Temp greater than 38 C (100.4 F)  acetaminophen   Tablet. 650milliGRAM(s) Oral every 6 hours PRN Mild Pain (1 - 3)  dextrose Gel 1Dose(s) Oral once PRN Blood Glucose LESS THAN 70 milliGRAM(s)/deciliter  glucagon  Injectable 1milliGRAM(s) IntraMuscular once PRN Glucose LESS THAN 70 milligrams/deciliter  bisacodyl Suppository 10milliGRAM(s) Rectal daily PRN Constipation  oxyCODONE  5 mG/acetaminophen 325 mG 2Tablet(s) Oral every 4 hours PRN Severe Pain (7 - 10)  oxyCODONE  5 mG/acetaminophen 325 mG 1Tablet(s) Oral every 4 hours PRN Moderate Pain (4 - 6)  ALPRAZolam 0.25milliGRAM(s) Oral at bedtime PRN anxiety      RECENT LABS:                               10.2   6.6   )-----------( 224      ( 08 May 2017 07:43 )             31.8   05-08    138  |  100  |  29.0<H>  ----------------------------<  93  5.2   |  27.0  |  0.88    Ca    9.2      08 May 2017 07:43    CAPILLARY BLOOD GLUCOSE  114 (11 May 2017 08:34)  197 (10 May 2017 22:17)  221 (10 May 2017 16:31)      ASSESSMENT AND PLAN:  82 year old F with extensive PMH including PVD s/p multiple stents, 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 Irwin, s/p left AKA and RLE angiogram on 4/14/17. Also s/p revascularization of RLE with angiogram and R SFA/POP angioplasty on 4/20/17. Hospital stay significant for: Completed course of cefazolin for UTI. Restarted on Abx for R foot second toe gangrene which was completed. Transfused two units PRBCs. Presents with functional deficits.     Hypotension: Improved. Expect due to dehydration and possibly increase in Flomax dose-decrease from 0.8 to 0.4.  Cont to encourage PO intake, IVF if BP remains low. Other VSS.     PVD s/p L AKA: Residual limb- keep c/d/i.   given  Staples to be removed by surgeon    S/p RLE angio, R toe gangrene:Vascular following. Completed cefazolin course 5/4/17. Podiatry called for f/u: Xray of right 2nd toe negative, ordered iodosorb gel.  Right 2nd toe amputation pending next week with podiatry-Eliquis on hold. Appreciate vascular and podiatry recs.    R heel stage II ulcer:  Vascular follow up appreciated. . Daily betadine and dressing change.  Cont off-loading with PRAFO    Pain control: Percocet, gabapentin. tylenol.     Bowel regimen: Constipation improved. Senna, colace, miralax. Bisacodyl suppository prn.    Urinary retention: Improved. UA: mod LE, WBC 26-50, urine cx neg.  Flomax  0.8 mg-decrease to 0.4 due to hypotension. Bladder scan 136    DM II: BG 200s, Increase lantus 15 units 5/10, humalog 2-2-2, ISS AC.     HTN: BP stable. D/c Amlodipine 5/1, change metoprolol to 25 mg q12 per medicine recs on 5/1. Continue lisinopril    PAF: Eliquis-on hold due to pending surgery    CAD: Metoprolol 25 mg q12, lipitor, plavix    Acute blood loss anemia: Hb stable. FeSO4    Hypothyroidism: Synthroid    Appetite/mood: Remeron    GI/supplements: Protonix, zinc sulfate, MVI, folic acid    Anxiety: Xanax qhs prn    [facility]hab Program, 3 hours of daily therapy

## 2017-05-12 NOTE — PROGRESS NOTE ADULT - SUBJECTIVE AND OBJECTIVE BOX
CC: Medical Clearance for Amputation of gangrenous toe    HPI: 85 yr old female with PMH CAD s/p stents x 2 , DM2, hypertension, hyperlipidemia, anxiety, significant PVD s/p multiple stents, paroxysmal atrial fibrillation on Eliquis admitted for hypertensive urgency post LE angiogram. 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 Hospital course complicated by urinary retention with UTI (failed TOV), hyponatremia likely due to pain and improved with NaCl (per nephrology) and pain medications. BP meds and insulin coverage titrated for better control.  Patient stable for discharge to acute rehab. Followed by Vascular surgery, Podiatry. Recommend right 2nd digit amputation. Patient seen and examined      INTERVAL HPI/OVERNIGHT EVENTS:    Vital Signs Last 24 Hrs  T(C): 36.5, Max: 36.5 (05-11 @ 20:50)  T(F): 97.7, Max: 97.7 (05-11 @ 20:50)  HR: 72 (72 - 81)  BP: 117/70 (106/56 - 134/59)  BP(mean): --  RR: 18 (18 - 18)  SpO2: 96% (95% - 98%)  I&O's Detail                                10.3   5.6   )-----------( 205      ( 11 May 2017 17:53 )             32.5     11 May 2017 17:53    133    |  97     |  40.0   ----------------------------<  193    5.3     |  25.0   |  1.18     Ca    9.2        11 May 2017 17:53      PT/INR - ( 11 May 2017 17:53 )   PT: 12.1 sec;   INR: 1.10 ratio           CAPILLARY BLOOD GLUCOSE  114 (11 May 2017 20:50)  216 (11 May 2017 17:16)  114 (11 May 2017 08:34)          MEDICATIONS  (STANDING):  docusate sodium 100milliGRAM(s) Oral three times a day  ascorbic acid 500milliGRAM(s) Oral daily  multivitamin 1Tablet(s) Oral daily  clopidogrel Tablet 75milliGRAM(s) Oral daily  senna 2Tablet(s) Oral at bedtime  atorvastatin 40milliGRAM(s) Oral at bedtime  lisinopril 5milliGRAM(s) Oral daily  dextrose 5%. 1000milliLiter(s) IV Continuous <Continuous>  dextrose 50% Injectable 12.5Gram(s) IV Push once  dextrose 50% Injectable 25Gram(s) IV Push once  dextrose 50% Injectable 25Gram(s) IV Push once  ferrous    sulfate 325milliGRAM(s) Oral daily  folic acid 1milliGRAM(s) Oral daily  levothyroxine 88MICROGram(s) Oral daily  mirtazapine 7.5milliGRAM(s) Oral at bedtime  pantoprazole    Tablet 40milliGRAM(s) Oral before breakfast  polyethylene glycol 3350 17Gram(s) Oral daily  zinc sulfate 220milliGRAM(s) Oral daily  metoprolol 25milliGRAM(s) Oral every 12 hours  insulin lispro Injectable (HumaLOG) 2Unit(s) SubCutaneous three times a day before meals  insulin lispro (HumaLOG) corrective regimen sliding scale  SubCutaneous three times a day before meals  nystatin Powder 1Application(s) Topical two times a day  apixaban 5milliGRAM(s) Oral two times a day  gabapentin 100milliGRAM(s) Oral three times a day  cadexomer iodine Gel 1Application(s) Topical daily  insulin glargine Injectable (LANTUS) 15Unit(s) SubCutaneous at bedtime  tamsulosin 0.4milliGRAM(s) Oral at bedtime    MEDICATIONS  (PRN):  acetaminophen   Tablet 650milliGRAM(s) Oral every 6 hours PRN For Temp greater than 38 C (100.4 F)  acetaminophen   Tablet. 650milliGRAM(s) Oral every 6 hours PRN Mild Pain (1 - 3)  dextrose Gel 1Dose(s) Oral once PRN Blood Glucose LESS THAN 70 milliGRAM(s)/deciliter  glucagon  Injectable 1milliGRAM(s) IntraMuscular once PRN Glucose LESS THAN 70 milligrams/deciliter  bisacodyl Suppository 10milliGRAM(s) Rectal daily PRN Constipation  oxyCODONE  5 mG/acetaminophen 325 mG 2Tablet(s) Oral every 4 hours PRN Severe Pain (7 - 10)  oxyCODONE  5 mG/acetaminophen 325 mG 1Tablet(s) Oral every 4 hours PRN Moderate Pain (4 - 6)  ALPRAZolam 0.25milliGRAM(s) Oral at bedtime PRN anxiety      RADIOLOGY & ADDITIONAL TESTS: CC: Medical Clearance for Amputation of gangrenous toe    HPI: 85 yr old female with PMH CAD s/p stents x 2 , DM2, hypertension, hyperlipidemia, anxiety, significant PVD s/p multiple stents, paroxysmal atrial fibrillation on Eliquis admitted for hypertensive urgency post LE angiogram. 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 Hospital course complicated by urinary retention with UTI (failed TOV), hyponatremia likely due to pain and improved with NaCl (per nephrology) and pain medications. BP meds and insulin coverage titrated for better control.  Patient stable for discharge to acute rehab. Followed by Vascular surgery, Podiatry. Recommend right 2nd digit amputation. Patient seen and examined      INTERVAL HPI/OVERNIGHT EVENTS: No acute events overnight    Vital Signs Last 24 Hrs  T(C): 36.5, Max: 36.5 (05-11 @ 20:50)  T(F): 97.7, Max: 97.7 (05-11 @ 20:50)  HR: 72 (72 - 81)  BP: 117/70 (106/56 - 134/59)  BP(mean): --  RR: 18 (18 - 18)  SpO2: 96% (95% - 98%)  I&O's Detail      EKG: NSR first degree AV block, no acute ST-T wave changes (reviewed by me)                          10.3   5.6   )-----------( 205      ( 11 May 2017 17:53 )             32.5     11 May 2017 17:53    133    |  97     |  40.0   ----------------------------<  193    5.3     |  25.0   |  1.18     Ca    9.2        11 May 2017 17:53      PT/INR - ( 11 May 2017 17:53 )   PT: 12.1 sec;   INR: 1.10 ratio           CAPILLARY BLOOD GLUCOSE  114 (11 May 2017 20:50)  216 (11 May 2017 17:16)  114 (11 May 2017 08:34)          MEDICATIONS  (STANDING):  docusate sodium 100milliGRAM(s) Oral three times a day  ascorbic acid 500milliGRAM(s) Oral daily  multivitamin 1Tablet(s) Oral daily  clopidogrel Tablet 75milliGRAM(s) Oral daily  senna 2Tablet(s) Oral at bedtime  atorvastatin 40milliGRAM(s) Oral at bedtime  lisinopril 5milliGRAM(s) Oral daily  dextrose 5%. 1000milliLiter(s) IV Continuous <Continuous>  dextrose 50% Injectable 12.5Gram(s) IV Push once  dextrose 50% Injectable 25Gram(s) IV Push once  dextrose 50% Injectable 25Gram(s) IV Push once  ferrous    sulfate 325milliGRAM(s) Oral daily  folic acid 1milliGRAM(s) Oral daily  levothyroxine 88MICROGram(s) Oral daily  mirtazapine 7.5milliGRAM(s) Oral at bedtime  pantoprazole    Tablet 40milliGRAM(s) Oral before breakfast  polyethylene glycol 3350 17Gram(s) Oral daily  zinc sulfate 220milliGRAM(s) Oral daily  metoprolol 25milliGRAM(s) Oral every 12 hours  insulin lispro Injectable (HumaLOG) 2Unit(s) SubCutaneous three times a day before meals  insulin lispro (HumaLOG) corrective regimen sliding scale  SubCutaneous three times a day before meals  nystatin Powder 1Application(s) Topical two times a day  apixaban 5milliGRAM(s) Oral two times a day  gabapentin 100milliGRAM(s) Oral three times a day  cadexomer iodine Gel 1Application(s) Topical daily  insulin glargine Injectable (LANTUS) 15Unit(s) SubCutaneous at bedtime  tamsulosin 0.4milliGRAM(s) Oral at bedtime    MEDICATIONS  (PRN):  acetaminophen   Tablet 650milliGRAM(s) Oral every 6 hours PRN For Temp greater than 38 C (100.4 F)  acetaminophen   Tablet. 650milliGRAM(s) Oral every 6 hours PRN Mild Pain (1 - 3)  dextrose Gel 1Dose(s) Oral once PRN Blood Glucose LESS THAN 70 milliGRAM(s)/deciliter  glucagon  Injectable 1milliGRAM(s) IntraMuscular once PRN Glucose LESS THAN 70 milligrams/deciliter  bisacodyl Suppository 10milliGRAM(s) Rectal daily PRN Constipation  oxyCODONE  5 mG/acetaminophen 325 mG 2Tablet(s) Oral every 4 hours PRN Severe Pain (7 - 10)  oxyCODONE  5 mG/acetaminophen 325 mG 1Tablet(s) Oral every 4 hours PRN Moderate Pain (4 - 6)  ALPRAZolam 0.25milliGRAM(s) Oral at bedtime PRN anxiety      RADIOLOGY & ADDITIONAL TESTS:      ROS: Denies chest pain, SOB, dizziness, lightheadedness, nausea, vomiting, fever, chills    PHYSICAL EXAM:    GENERAL: NAD, well-groomed, well-developed  HEAD:  Atraumatic, Normocephalic  EYES: EOMI, PERRLA, conjunctiva and sclera clear  NECK: Supple, No JVD, Normal thyroid  NERVOUS SYSTEM:  Alert & Oriented X3, no focal deficit  CHEST/LUNG: CTA b/l ,  no  rales, rhonchi, wheezing, or rubs  HEART: Regular rate and rhythm; No murmurs, rubs, or gallops  ABDOMEN: Soft, Nontender, Nondistended; Bowel sounds present  EXTREMITIES:  L AKA , RLE Doppler pedal signals Stage 2 heel ulcer stable, dry gangrene of the dorsal distal second phalanx, black eschar over the 5th metatarsal on the plantar surface

## 2017-05-13 PROCEDURE — 99233 SBSQ HOSP IP/OBS HIGH 50: CPT

## 2017-05-13 PROCEDURE — 99232 SBSQ HOSP IP/OBS MODERATE 35: CPT | Mod: GC

## 2017-05-13 RX ORDER — ALPRAZOLAM 0.25 MG
0.25 TABLET ORAL AT BEDTIME
Qty: 0 | Refills: 0 | Status: DISCONTINUED | OUTPATIENT
Start: 2017-05-13 | End: 2017-05-16

## 2017-05-13 RX ADMIN — Medication 650 MILLIGRAM(S): at 21:29

## 2017-05-13 RX ADMIN — Medication 2 UNIT(S): at 17:20

## 2017-05-13 RX ADMIN — Medication 0.25 MILLIGRAM(S): at 22:08

## 2017-05-13 RX ADMIN — Medication 1 APPLICATION(S): at 12:31

## 2017-05-13 RX ADMIN — LISINOPRIL 5 MILLIGRAM(S): 2.5 TABLET ORAL at 06:39

## 2017-05-13 RX ADMIN — Medication 2: at 12:31

## 2017-05-13 RX ADMIN — Medication 500 MILLIGRAM(S): at 12:31

## 2017-05-13 RX ADMIN — INSULIN GLARGINE 15 UNIT(S): 100 INJECTION, SOLUTION SUBCUTANEOUS at 22:08

## 2017-05-13 RX ADMIN — Medication 100 MILLIGRAM(S): at 14:26

## 2017-05-13 RX ADMIN — Medication 1 TABLET(S): at 12:31

## 2017-05-13 RX ADMIN — TAMSULOSIN HYDROCHLORIDE 0.4 MILLIGRAM(S): 0.4 CAPSULE ORAL at 22:08

## 2017-05-13 RX ADMIN — Medication 100 MILLIGRAM(S): at 22:08

## 2017-05-13 RX ADMIN — NYSTATIN CREAM 1 APPLICATION(S): 100000 CREAM TOPICAL at 17:22

## 2017-05-13 RX ADMIN — GABAPENTIN 100 MILLIGRAM(S): 400 CAPSULE ORAL at 14:26

## 2017-05-13 RX ADMIN — Medication 325 MILLIGRAM(S): at 12:31

## 2017-05-13 RX ADMIN — Medication 25 MILLIGRAM(S): at 06:39

## 2017-05-13 RX ADMIN — NYSTATIN CREAM 1 APPLICATION(S): 100000 CREAM TOPICAL at 06:44

## 2017-05-13 RX ADMIN — Medication 2 UNIT(S): at 12:31

## 2017-05-13 RX ADMIN — Medication 88 MICROGRAM(S): at 06:39

## 2017-05-13 RX ADMIN — Medication 1: at 17:20

## 2017-05-13 RX ADMIN — ZINC SULFATE TAB 220 MG (50 MG ZINC EQUIVALENT) 220 MILLIGRAM(S): 220 (50 ZN) TAB at 12:31

## 2017-05-13 RX ADMIN — PANTOPRAZOLE SODIUM 40 MILLIGRAM(S): 20 TABLET, DELAYED RELEASE ORAL at 06:39

## 2017-05-13 RX ADMIN — ATORVASTATIN CALCIUM 40 MILLIGRAM(S): 80 TABLET, FILM COATED ORAL at 22:08

## 2017-05-13 RX ADMIN — GABAPENTIN 100 MILLIGRAM(S): 400 CAPSULE ORAL at 22:08

## 2017-05-13 RX ADMIN — Medication 650 MILLIGRAM(S): at 20:29

## 2017-05-13 RX ADMIN — Medication 1 MILLIGRAM(S): at 12:31

## 2017-05-13 RX ADMIN — Medication 25 MILLIGRAM(S): at 17:28

## 2017-05-13 RX ADMIN — MIRTAZAPINE 7.5 MILLIGRAM(S): 45 TABLET, ORALLY DISINTEGRATING ORAL at 22:08

## 2017-05-13 RX ADMIN — Medication 650 MILLIGRAM(S): at 09:39

## 2017-05-13 RX ADMIN — Medication 650 MILLIGRAM(S): at 09:40

## 2017-05-13 RX ADMIN — SENNA PLUS 2 TABLET(S): 8.6 TABLET ORAL at 22:07

## 2017-05-13 RX ADMIN — GABAPENTIN 100 MILLIGRAM(S): 400 CAPSULE ORAL at 06:39

## 2017-05-13 NOTE — PROGRESS NOTE ADULT - SUBJECTIVE AND OBJECTIVE BOX
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 Meyer. Subsequently admitted to acute rehab. Developed LLE post operative wound dehiscence, s/p left AKA and RLE angiogram on 4/14/17. Also s/p revascularization of RLE with angiogram and R SFA/POP angioplasty on 4/20/17. Hospital stay significant for: Completed course of cefazolin for UTI. Restarted on Abx for R foot second toe gangrene. Transfused two units PRBCs.    Interval history:   Continues to have intermittent shocking pain in right foot, some relief (about 2 hours) with percocet. Phantom pain left LE, intermittent. Had BM last night, no dysuria.     REVIEW OF SYSTEMS  [ x ] Constitutional WNL  [   ] Cardio WNL  [   ] Resp WNL  [   ] GI WNL  [    ]  WNL  [ x ] Heme WNL  [ x ] Endo WNL  [   ] Skin WNL  [   ] MSK WNL  [   ] Neuro WNL  [x  ] Cognitive WNL  [x  ] Psych WNL    Vital Signs Last 24 Hrs  T(C): 36.2, Max: 36.2 (05-13 @ 05:00)  T(F): 97.2, Max: 97.2 (05-13 @ 05:00)  HR: 70 (70 - 85)  BP: 118/62 (85/49 - 148/70)  RR: 16 (16 - 18)  SpO2: 96% (96% - 97%)    PHYSICAL EXAM  Constitutional - NAD, Comfortable  Chest - CTA bilaterally.   Cardiovascular - RRR, S1S2, No murmurs  Abdomen - BS+, Soft, NTND  Extremities - No C/C/E, No calf tenderness of RLE  Neurologic Exam -                    Cognitive - Awake, Alert, AAO to self, place, date, year, situation     Communication - Fluent, No dysarthria     Motor - No focal deficits                    LEFT    UE - ShAB 5/5, EF 5/5, EE 5/5, WE 5/5,  5/5                    RIGHT UE - ShAB 5/5, EF 5/5, EE 5/5, WE 5/5,  5/5                    LEFT    LE - HF wfl                    RIGHT LE - At least 3-4/5, limited by pain     Sensory - Intact to LT   Psychiatric - Mood stable, Affect WNL  Skin--LLE AKA incision with staples, c/d/i,no erythema, + stump .  Right 2nd toe gangrene and tip of great toe-stable. R calcaneus stage II pressure ulcer. . Black eschar over the 5th metatarsal on the plantar surface    Functional Progress:   Transfers: Supervision  Bed mobility: Min A  Toileting: Mod A  ADLs:  LE dressing: Mod A; UE dressing and grooming: Supervision    MEDICATIONS  (STANDING):  docusate sodium 100milliGRAM(s) Oral three times a day  ascorbic acid 500milliGRAM(s) Oral daily  multivitamin 1Tablet(s) Oral daily  senna 2Tablet(s) Oral at bedtime  atorvastatin 40milliGRAM(s) Oral at bedtime  lisinopril 5milliGRAM(s) Oral daily  dextrose 5%. 1000milliLiter(s) IV Continuous <Continuous>  dextrose 50% Injectable 12.5Gram(s) IV Push once  dextrose 50% Injectable 25Gram(s) IV Push once  dextrose 50% Injectable 25Gram(s) IV Push once  ferrous    sulfate 325milliGRAM(s) Oral daily  folic acid 1milliGRAM(s) Oral daily  levothyroxine 88MICROGram(s) Oral daily  mirtazapine 7.5milliGRAM(s) Oral at bedtime  pantoprazole    Tablet 40milliGRAM(s) Oral before breakfast  polyethylene glycol 3350 17Gram(s) Oral daily  zinc sulfate 220milliGRAM(s) Oral daily  metoprolol 25milliGRAM(s) Oral every 12 hours  insulin lispro Injectable (HumaLOG) 2Unit(s) SubCutaneous three times a day before meals  insulin lispro (HumaLOG) corrective regimen sliding scale  SubCutaneous three times a day before meals  nystatin Powder 1Application(s) Topical two times a day  gabapentin 100milliGRAM(s) Oral three times a day  cadexomer iodine Gel 1Application(s) Topical daily  insulin glargine Injectable (LANTUS) 15Unit(s) SubCutaneous at bedtime  tamsulosin 0.4milliGRAM(s) Oral at bedtime    MEDICATIONS  (PRN):  acetaminophen   Tablet 650milliGRAM(s) Oral every 6 hours PRN For Temp greater than 38 C (100.4 F)  acetaminophen   Tablet. 650milliGRAM(s) Oral every 6 hours PRN Mild Pain (1 - 3)  dextrose Gel 1Dose(s) Oral once PRN Blood Glucose LESS THAN 70 milliGRAM(s)/deciliter  glucagon  Injectable 1milliGRAM(s) IntraMuscular once PRN Glucose LESS THAN 70 milligrams/deciliter  bisacodyl Suppository 10milliGRAM(s) Rectal daily PRN Constipation  oxyCODONE  5 mG/acetaminophen 325 mG 2Tablet(s) Oral every 4 hours PRN Severe Pain (7 - 10)  oxyCODONE  5 mG/acetaminophen 325 mG 1Tablet(s) Oral every 4 hours PRN Moderate Pain (4 - 6)      RECENT LABS:                               10.2   6.6   )-----------( 224      ( 08 May 2017 07:43 )             31.8   05-08    138  |  100  |  29.0<H>  ----------------------------<  93  5.2   |  27.0  |  0.88    Ca    9.2      08 May 2017 07:43    CAPILLARY BLOOD GLUCOSE  114 (11 May 2017 08:34)  197 (10 May 2017 22:17)  221 (10 May 2017 16:31)      ASSESSMENT AND PLAN:  82 year old F with extensive PMH including PVD s/p multiple stents, 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 Irwin, s/p left AKA and RLE angiogram on 4/14/17. Also s/p revascularization of RLE with angiogram and R SFA/POP angioplasty on 4/20/17. Hospital stay significant for: Completed course of cefazolin for UTI. Restarted on Abx for R foot second toe gangrene which was completed. Transfused two units PRBCs. Presents with functional deficits.     Hypotension: Improved. Expect due to dehydration and possibly increase in Flomax dose-decrease from 0.8 to 0.4.  Cont to encourage PO intake, IVF if BP remains low. Other VSS.     PVD s/p L AKA: Residual limb- keep c/d/i.   given  Staples to be removed by surgeon    S/p RLE angio, R toe gangrene:Vascular following. Completed cefazolin course 5/4/17. Podiatry called for f/u: Xray of right 2nd toe negative, ordered iodosorb gel.  Right 2nd toe amputation pending next week with podiatry-Eliquis on hold. Appreciate vascular and podiatry recs.    R heel stage II ulcer:  Vascular follow up appreciated. . Daily betadine and dressing change.  Cont off-loading with PRAFO    Pain control: Percocet, gabapentin. tylenol.     Bowel regimen: Constipation improved. Senna, colace, miralax. Bisacodyl suppository prn.    Urinary retention: Improved. UA: mod LE, WBC 26-50, urine cx neg.  Flomax  0.8 mg-decrease to 0.4 due to hypotension. Bladder scan 136    DM II: BG 200s, Increase lantus 15 units 5/10, humalog 2-2-2, ISS AC.     HTN: BP stable. D/c Amlodipine 5/1, change metoprolol to 25 mg q12 per medicine recs on 5/1. Continue lisinopril    PAF: Eliquis-on hold due to pending surgery    CAD: Metoprolol 25 mg q12, lipitor, plavix    Acute blood loss anemia: Hb stable. FeSO4    Hypothyroidism: Synthroid    Appetite/mood: Remeron    GI/supplements: Protonix, zinc sulfate, MVI, folic acid    Anxiety: Xanax qhs prn    [facility] Program, 3 hours of daily therapy

## 2017-05-13 NOTE — PROGRESS NOTE ADULT - ASSESSMENT
85 yr old female with PMH CAD s/p stents, DM2, hypertension, hyperlipidemia, anxiety, significant PVD s/p multiple stents, paroxysmal atrial fibrillation on Eliquis admitted for hypertensive urgency post LE angiogram. 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 Hospital course complicated by urinary retention with UTI (failed TOV), hyponatremia likely due to pain and improved with NaCl (per nephrology) and pain medications. BP meds and insulin coverage titrated for better control.  Patient was   transferred  to acute rehab , pain poorly controlled , duragesic  patch discontinued due to lethargy  . Revaluated by vascular surgery , found to have  postoperative wound dehiscence. Had AKA now in rehab.  Pain well controlled now ,BG has been labile, improved now with present Tx .

## 2017-05-13 NOTE — PROGRESS NOTE ADULT - SUBJECTIVE AND OBJECTIVE BOX
Patient seen and examined ., c/o pain on off in the right leg /foot     no overnight events , denies  chest pain , dizziness , nausea     HPI: 85 yr old female with PMH CAD s/p stents x 2 , DM2, hypertension, hyperlipidemia, anxiety, significant PVD s/p multiple stents, paroxysmal atrial fibrillation on Eliquis admitted for hypertensive urgency post LE angiogram. 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 Hospital course complicated by urinary retention with UTI (failed TOV), hyponatremia likely due to pain and improved with NaCl (per nephrology) and pain medications. BP meds and insulin coverage titrated for better control.  Patient stable for discharge to acute rehab.                    Vital Signs Last 24 Hrs  T(C): 36.4, Max: 36.4 (05-13 @ 12:25)  T(F): 97.5, Max: 97.5 (05-13 @ 12:25)  HR: 84 (70 - 85)  BP: 101/62 (85/49 - 148/70)  BP(mean): --  RR: 18 (16 - 18)  SpO2: 99% (96% - 99%)    CAPILLARY BLOOD GLUCOSE  208 (13 May 2017 11:14)  85 (13 May 2017 08:05)  136 (12 May 2017 17:30)        GENERAL: NAD, well-groomed, well-developed  CHEST/LUNG: CTA b/l ,  no  rales, rhonchi, wheezing, or rubs  HEART: Regular rate and rhythm; No murmurs, rubs, or gallops  ABDOMEN: Soft, Nontender, Nondistended; Bowel sounds present  EXTREMITIES:  2+ Peripheral Pulses, No clubbing, cyanosis, or edema ,  L AKA  C/D/I , staples in place  SKIN: No rashes or lesions, pale color

## 2017-05-14 PROCEDURE — 99233 SBSQ HOSP IP/OBS HIGH 50: CPT

## 2017-05-14 PROCEDURE — 99232 SBSQ HOSP IP/OBS MODERATE 35: CPT | Mod: GC

## 2017-05-14 RX ORDER — ENOXAPARIN SODIUM 100 MG/ML
40 INJECTION SUBCUTANEOUS ONCE
Qty: 0 | Refills: 0 | Status: COMPLETED | OUTPATIENT
Start: 2017-05-14 | End: 2017-05-14

## 2017-05-14 RX ADMIN — Medication 3: at 16:54

## 2017-05-14 RX ADMIN — LISINOPRIL 5 MILLIGRAM(S): 2.5 TABLET ORAL at 06:29

## 2017-05-14 RX ADMIN — Medication 325 MILLIGRAM(S): at 12:30

## 2017-05-14 RX ADMIN — ZINC SULFATE TAB 220 MG (50 MG ZINC EQUIVALENT) 220 MILLIGRAM(S): 220 (50 ZN) TAB at 12:32

## 2017-05-14 RX ADMIN — Medication 2 UNIT(S): at 16:55

## 2017-05-14 RX ADMIN — NYSTATIN CREAM 1 APPLICATION(S): 100000 CREAM TOPICAL at 16:56

## 2017-05-14 RX ADMIN — Medication 1 MILLIGRAM(S): at 12:30

## 2017-05-14 RX ADMIN — NYSTATIN CREAM 1 APPLICATION(S): 100000 CREAM TOPICAL at 06:29

## 2017-05-14 RX ADMIN — Medication 25 MILLIGRAM(S): at 06:32

## 2017-05-14 RX ADMIN — Medication 100 MILLIGRAM(S): at 06:29

## 2017-05-14 RX ADMIN — Medication 1 APPLICATION(S): at 12:32

## 2017-05-14 RX ADMIN — Medication 100 MILLIGRAM(S): at 21:40

## 2017-05-14 RX ADMIN — Medication 2 UNIT(S): at 12:46

## 2017-05-14 RX ADMIN — Medication 2 UNIT(S): at 08:30

## 2017-05-14 RX ADMIN — ATORVASTATIN CALCIUM 40 MILLIGRAM(S): 80 TABLET, FILM COATED ORAL at 21:42

## 2017-05-14 RX ADMIN — ENOXAPARIN SODIUM 40 MILLIGRAM(S): 100 INJECTION SUBCUTANEOUS at 23:46

## 2017-05-14 RX ADMIN — SENNA PLUS 2 TABLET(S): 8.6 TABLET ORAL at 21:41

## 2017-05-14 RX ADMIN — Medication 0.25 MILLIGRAM(S): at 21:51

## 2017-05-14 RX ADMIN — Medication 500 MILLIGRAM(S): at 12:30

## 2017-05-14 RX ADMIN — GABAPENTIN 100 MILLIGRAM(S): 400 CAPSULE ORAL at 21:41

## 2017-05-14 RX ADMIN — GABAPENTIN 100 MILLIGRAM(S): 400 CAPSULE ORAL at 16:34

## 2017-05-14 RX ADMIN — MIRTAZAPINE 7.5 MILLIGRAM(S): 45 TABLET, ORALLY DISINTEGRATING ORAL at 21:42

## 2017-05-14 RX ADMIN — Medication 88 MICROGRAM(S): at 06:29

## 2017-05-14 RX ADMIN — Medication 1 TABLET(S): at 12:30

## 2017-05-14 RX ADMIN — TAMSULOSIN HYDROCHLORIDE 0.4 MILLIGRAM(S): 0.4 CAPSULE ORAL at 21:41

## 2017-05-14 RX ADMIN — Medication: at 12:42

## 2017-05-14 RX ADMIN — Medication 100 MILLIGRAM(S): at 16:33

## 2017-05-14 RX ADMIN — GABAPENTIN 100 MILLIGRAM(S): 400 CAPSULE ORAL at 06:29

## 2017-05-14 RX ADMIN — INSULIN GLARGINE 15 UNIT(S): 100 INJECTION, SOLUTION SUBCUTANEOUS at 21:46

## 2017-05-14 RX ADMIN — PANTOPRAZOLE SODIUM 40 MILLIGRAM(S): 20 TABLET, DELAYED RELEASE ORAL at 06:29

## 2017-05-14 NOTE — PROGRESS NOTE ADULT - SUBJECTIVE AND OBJECTIVE BOX
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 Meyer. Subsequently admitted to acute rehab. Developed LLE post operative wound dehiscence, s/p left AKA and RLE angiogram on 4/14/17. Also s/p revascularization of RLE with angiogram and R SFA/POP angioplasty on 4/20/17. Hospital stay significant for: Completed course of cefazolin for UTI. Restarted on Abx for R foot second toe gangrene. Transfused two units PRBCs.    Interval history:   Patient seen and examined. Continues to have intermittent shocking pain in right foot, especially the 3rd and 4th toes. Phantom pain left LE is intermittent. Scheduled for procedure this week for RLE.    REVIEW OF SYSTEMS  [ x ] Constitutional WNL  [   ] Cardio WNL  [   ] Resp WNL  [   ] GI WNL  [    ]  WNL  [ x ] Heme WNL  [ x ] Endo WNL  [   ] Skin WNL  [   ] MSK WNL  [   ] Neuro WNL  [x  ] Cognitive WNL  [x  ] Psych WNL    T(C): 36.1  T(F): 96.9, Max: 97.9 (05-13 @ 20:22)  HR: 83 (56 - 83)  BP: 102/58 (102/58 - 155/70)  RR:  (16 - 18)  SpO2:  (96% - 98%)    PHYSICAL EXAM  Constitutional - NAD, Comfortable  Chest - CTA bilaterally.   Cardiovascular - RRR, S1S2, No murmurs  Abdomen - BS+, Soft, NTND  Extremities - No C/C/E, No calf tenderness of RLE  Neurologic Exam -                    Cognitive - Awake, Alert, AAO to self, place, date, year, situation     Communication - Fluent, No dysarthria     Motor - No focal deficits                    LEFT    UE - ShAB 5/5, EF 5/5, EE 5/5, WE 5/5,  5/5                    RIGHT UE - ShAB 5/5, EF 5/5, EE 5/5, WE 5/5,  5/5                    LEFT    LE - HF wfl                    RIGHT LE - At least 3-4/5, limited by pain     Sensory - Intact to LT   Psychiatric - Mood stable, Affect WNL  Skin--LLE AKA incision with staples, c/d/i,no erythema, + stump .  Right 2nd toe gangrene and tip of great toe-stable. R calcaneus stage II pressure ulcer. Black eschar over the 5th metatarsal on the plantar surface    Functional Progress:   Transfers: Supervision  Bed mobility: Min A  Toileting: Mod A  ADLs:  LE dressing: Mod A; UE dressing and grooming: Supervision    MEDICATIONS  (STANDING):  docusate sodium 100milliGRAM(s) Oral three times a day  ascorbic acid 500milliGRAM(s) Oral daily  multivitamin 1Tablet(s) Oral daily  senna 2Tablet(s) Oral at bedtime  atorvastatin 40milliGRAM(s) Oral at bedtime  lisinopril 5milliGRAM(s) Oral daily  dextrose 5%. 1000milliLiter(s) IV Continuous <Continuous>  dextrose 50% Injectable 12.5Gram(s) IV Push once  dextrose 50% Injectable 25Gram(s) IV Push once  dextrose 50% Injectable 25Gram(s) IV Push once  ferrous    sulfate 325milliGRAM(s) Oral daily  folic acid 1milliGRAM(s) Oral daily  levothyroxine 88MICROGram(s) Oral daily  mirtazapine 7.5milliGRAM(s) Oral at bedtime  pantoprazole    Tablet 40milliGRAM(s) Oral before breakfast  polyethylene glycol 3350 17Gram(s) Oral daily  zinc sulfate 220milliGRAM(s) Oral daily  metoprolol 25milliGRAM(s) Oral every 12 hours  insulin lispro Injectable (HumaLOG) 2Unit(s) SubCutaneous three times a day before meals  insulin lispro (HumaLOG) corrective regimen sliding scale  SubCutaneous three times a day before meals  nystatin Powder 1Application(s) Topical two times a day  gabapentin 100milliGRAM(s) Oral three times a day  cadexomer iodine Gel 1Application(s) Topical daily  insulin glargine Injectable (LANTUS) 15Unit(s) SubCutaneous at bedtime  tamsulosin 0.4milliGRAM(s) Oral at bedtime  enoxaparin Injectable 40milliGRAM(s) SubCutaneous once    MEDICATIONS  (PRN):  acetaminophen   Tablet 650milliGRAM(s) Oral every 6 hours PRN For Temp greater than 38 C (100.4 F)  acetaminophen   Tablet. 650milliGRAM(s) Oral every 6 hours PRN Mild Pain (1 - 3)  dextrose Gel 1Dose(s) Oral once PRN Blood Glucose LESS THAN 70 milliGRAM(s)/deciliter  glucagon  Injectable 1milliGRAM(s) IntraMuscular once PRN Glucose LESS THAN 70 milligrams/deciliter  bisacodyl Suppository 10milliGRAM(s) Rectal daily PRN Constipation  oxyCODONE  5 mG/acetaminophen 325 mG 2Tablet(s) Oral every 4 hours PRN Severe Pain (7 - 10)  oxyCODONE  5 mG/acetaminophen 325 mG 1Tablet(s) Oral every 4 hours PRN Moderate Pain (4 - 6)  ALPRAZolam 0.25milliGRAM(s) Oral at bedtime PRN anxiety      RECENT LABS:                               10.2   6.6   )-----------( 224      ( 08 May 2017 07:43 )             31.8   05-08    138  |  100  |  29.0<H>  ----------------------------<  93  5.2   |  27.0  |  0.88    Ca    9.2      08 May 2017 07:43    CAPILLARY BLOOD GLUCOSE  114 (11 May 2017 08:34)  197 (10 May 2017 22:17)  221 (10 May 2017 16:31)      ASSESSMENT AND PLAN:  82 year old F with extensive PMH including PVD s/p multiple stents, 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 Irwin, s/p left AKA and RLE angiogram on 4/14/17. Also s/p revascularization of RLE with angiogram and R SFA/POP angioplasty on 4/20/17. Hospital stay significant for: Completed course of cefazolin for UTI. Restarted on Abx for R foot second toe gangrene which was completed. Transfused two units PRBCs. Presents with functional deficits.     Hypotension: Improved. Expect due to dehydration and possibly increase in Flomax dose-decrease from 0.8 to 0.4.  Cont to encourage PO intake, IVF if BP remains low. Other VSS.     PVD s/p L AKA: Residual limb- keep c/d/i.   given  Staples to be removed by surgeon    S/p RLE angio, R toe gangrene:Vascular following. Completed cefazolin course 5/4/17. Podiatry called for f/u: Xray of right 2nd toe negative, ordered iodosorb gel.  Right 2nd toe amputation pending next week with podiatry-Eliquis on hold. Appreciate vascular and podiatry recs.    R heel stage II ulcer:  Vascular follow up appreciated. . Daily betadine and dressing change.  Cont off-loading with PRAFO    Pain control: Percocet, gabapentin. tylenol.     Bowel regimen: Constipation improved. Senna, colace, miralax. Bisacodyl suppository prn.    Urinary retention: Improved. UA: mod LE, WBC 26-50, urine cx neg.  Flomax  0.8 mg-decrease to 0.4 due to hypotension. Bladder scan 136    DM II: BG 200s, Increase lantus 15 units 5/10, humalog 2-2-2, ISS AC.     HTN: BP stable. D/c Amlodipine 5/1, change metoprolol to 25 mg q12 per medicine recs on 5/1. Continue lisinopril    PAF: Eliquis-on hold due to pending surgery    CAD: Metoprolol 25 mg q12, lipitor, plavix    Acute blood loss anemia: Hb stable. FeSO4    Hypothyroidism: Synthroid    Appetite/mood: Remeron    GI/supplements: Protonix, zinc sulfate, MVI, folic acid    Anxiety: Xanax qhs prn    Comprehensive Rehab Program, 3 hours of daily therapy Subjective:  82 year old F with PMH CAD s/p stents x 2, DM2, HTN, HLD, anxiety, significant PVD s/p multiple stents, PAF on Eliquis who is 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 Meyer. Subsequently admitted to acute rehab. Developed LLE post operative wound dehiscence, s/p left AKA and RLE angiogram on 4/14/17. Also s/p revascularization of RLE with angiogram and R SFA/POP angioplasty on 4/20/17. Hospital stay significant for: Completed course of cefazolin for UTI. Restarted on Abx for R foot second toe gangrene. Transfused two units PRBCs.    Interval history:   Patient seen and examined. Continues to have intermittent shocking pain in right foot, especially the 3rd and 4th toes. Phantom pain left LE is intermittent. Scheduled for procedure this week for RLE.    REVIEW OF SYSTEMS  [ x ] Constitutional WNL  [   ] Cardio WNL  [   ] Resp WNL  [   ] GI WNL  [    ]  WNL  [ x ] Heme WNL  [ x ] Endo WNL  [   ] Skin WNL  [   ] MSK WNL  [   ] Neuro WNL  [x  ] Cognitive WNL  [x  ] Psych WNL    T(C): 36.1  T(F): 96.9, Max: 97.9 (05-13 @ 20:22)  HR: 83 (56 - 83)  BP: 102/58 (102/58 - 155/70)  RR:  (16 - 18)  SpO2:  (96% - 98%)    PHYSICAL EXAM  Constitutional - NAD, Comfortable  Chest - CTA bilaterally.   Cardiovascular - RRR, S1S2, No murmurs  Abdomen - BS+, Soft, NTND  Extremities - No C/C/E, No calf tenderness of RLE  Neurologic Exam -                    Cognitive - Awake, Alert, AAO to self, place, date, year, situation     Communication - Fluent, No dysarthria     Motor - No focal deficits                    LEFT    UE - ShAB 5/5, EF 5/5, EE 5/5, WE 5/5,  5/5                    RIGHT UE - ShAB 5/5, EF 5/5, EE 5/5, WE 5/5,  5/5                    LEFT    LE - HF wfl                    RIGHT LE - At least 3-4/5, limited by pain     Sensory - Intact to LT   Psychiatric - Mood stable, Affect WNL  Skin--LLE AKA incision with staples, c/d/i,no erythema, + stump .  Right 2nd toe gangrene and tip of great toe-stable. R calcaneus stage II pressure ulcer. Black eschar over the 5th metatarsal on the plantar surface    Functional Progress:   Transfers: Supervision  Bed mobility: Min A  Toileting: Mod A  ADLs:  LE dressing: Mod A; UE dressing and grooming: Supervision    MEDICATIONS  (STANDING):  docusate sodium 100milliGRAM(s) Oral three times a day  ascorbic acid 500milliGRAM(s) Oral daily  multivitamin 1Tablet(s) Oral daily  senna 2Tablet(s) Oral at bedtime  atorvastatin 40milliGRAM(s) Oral at bedtime  lisinopril 5milliGRAM(s) Oral daily  dextrose 5%. 1000milliLiter(s) IV Continuous <Continuous>  dextrose 50% Injectable 12.5Gram(s) IV Push once  dextrose 50% Injectable 25Gram(s) IV Push once  dextrose 50% Injectable 25Gram(s) IV Push once  ferrous    sulfate 325milliGRAM(s) Oral daily  folic acid 1milliGRAM(s) Oral daily  levothyroxine 88MICROGram(s) Oral daily  mirtazapine 7.5milliGRAM(s) Oral at bedtime  pantoprazole    Tablet 40milliGRAM(s) Oral before breakfast  polyethylene glycol 3350 17Gram(s) Oral daily  zinc sulfate 220milliGRAM(s) Oral daily  metoprolol 25milliGRAM(s) Oral every 12 hours  insulin lispro Injectable (HumaLOG) 2Unit(s) SubCutaneous three times a day before meals  insulin lispro (HumaLOG) corrective regimen sliding scale  SubCutaneous three times a day before meals  nystatin Powder 1Application(s) Topical two times a day  gabapentin 100milliGRAM(s) Oral three times a day  cadexomer iodine Gel 1Application(s) Topical daily  insulin glargine Injectable (LANTUS) 15Unit(s) SubCutaneous at bedtime  tamsulosin 0.4milliGRAM(s) Oral at bedtime  enoxaparin Injectable 40milliGRAM(s) SubCutaneous once    MEDICATIONS  (PRN):  acetaminophen   Tablet 650milliGRAM(s) Oral every 6 hours PRN For Temp greater than 38 C (100.4 F)  acetaminophen   Tablet. 650milliGRAM(s) Oral every 6 hours PRN Mild Pain (1 - 3)  dextrose Gel 1Dose(s) Oral once PRN Blood Glucose LESS THAN 70 milliGRAM(s)/deciliter  glucagon  Injectable 1milliGRAM(s) IntraMuscular once PRN Glucose LESS THAN 70 milligrams/deciliter  bisacodyl Suppository 10milliGRAM(s) Rectal daily PRN Constipation  oxyCODONE  5 mG/acetaminophen 325 mG 2Tablet(s) Oral every 4 hours PRN Severe Pain (7 - 10)  oxyCODONE  5 mG/acetaminophen 325 mG 1Tablet(s) Oral every 4 hours PRN Moderate Pain (4 - 6)  ALPRAZolam 0.25milliGRAM(s) Oral at bedtime PRN anxiety      RECENT LABS:                               10.2   6.6   )-----------( 224      ( 08 May 2017 07:43 )             31.8   05-08    138  |  100  |  29.0<H>  ----------------------------<  93  5.2   |  27.0  |  0.88    Ca    9.2      08 May 2017 07:43    CAPILLARY BLOOD GLUCOSE  231 (14 May 2017 12:40)  78 (14 May 2017 07:48)  139 (13 May 2017 21:58)  178 (13 May 2017 17:24)      ASSESSMENT AND PLAN:  82 year old F with extensive PMH including PVD s/p multiple stents, 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 Irwin, s/p left AKA and RLE angiogram on 4/14/17. Also s/p revascularization of RLE with angiogram and R SFA/POP angioplasty on 4/20/17. Hospital stay significant for: Completed course of cefazolin for UTI. Restarted on Abx for R foot second toe gangrene which was completed. Transfused two units PRBCs. Presents with functional deficits.     Hypotension: Improved. Cont to encourage PO intake, IVF if BP remains low.     PVD s/p L AKA: Residual limb- keep c/d/i.   given  Staples to be removed by surgeon    S/p RLE angio, R toe gangrene:Vascular following. Completed cefazolin course 5/4/17. Podiatry called for f/u: Xray of right 2nd toe negative, ordered iodosorb gel.  Right 2nd toe amputation pending next week with podiatry-Eliquis on hold. Appreciate vascular and podiatry recs.    R heel stage II ulcer:   Daily betadine and dressing change.  Cont off-loading with PRAFO    Pain control: Percocet, gabapentin. tylenol.     Bowel regimen: on Senna, colace, miralax. Bisacodyl suppository prn.    Urinary retention: Improved. UA: mod LE, WBC 26-50, urine cx neg.  Flomax  0.8 mg-decrease to 0.4 due to hypotension.     DM II: BG 200s, Increase lantus 15 units 5/10, humalog 2-2-2, ISS AC.     HTN: BP stable. D/c Amlodipine 5/1,metoprolol 25 mg q12 per medicine recs on 5/1. Continue lisinopril    PAF: Eliquis-on hold due to pending surgery    CAD: Metoprolol 25 mg q12, lipitor, plavix    Acute blood loss anemia: Hb stable. FeSO4    Hypothyroidism: Synthroid    Appetite/mood: Remeron    GI/supplements: Protonix, zinc sulfate, MVI, folic acid    Anxiety: Xanax qhs prn    Comprehensive Rehab Program, 3 hours of daily therapy

## 2017-05-14 NOTE — PROGRESS NOTE ADULT - SUBJECTIVE AND OBJECTIVE BOX
Patient seen and examined ., c/o pain on the right foot improving   denies constipation , nausea , chest pain or dizziness   discussed with Dr Soliz      HPI: 85 yr old female with PMH CAD s/p stents x 2 , DM2, hypertension, hyperlipidemia, anxiety, significant PVD s/p multiple stents, paroxysmal atrial fibrillation on Eliquis admitted for hypertensive urgency post LE angiogram. 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 Hospital course complicated by urinary retention with UTI (failed TOV), hyponatremia likely due to pain and improved with NaCl (per nephrology) and pain medications. BP meds and insulin coverage titrated for better control.  Patient stable for discharge to acute rehab.          Vital Signs Last 24 Hrs  T(C): 36.6, Max: 36.6 (05-13 @ 20:22)  T(F): 97.9, Max: 97.9 (05-13 @ 20:22)  HR: 69 (56 - 84)  BP: 152/73 (101/62 - 155/70)  BP(mean): --  RR: 18 (16 - 18)  SpO2: 96% (96% - 99%)      GENERAL: NAD, well-groomed, well-developed  CHEST/LUNG: CTA b/l ,  no  rales, rhonchi, wheezing, or rubs  HEART: Regular rate and rhythm; No murmurs, rubs, or gallops  ABDOMEN: Soft, Nontender, Nondistended; Bowel sounds present  EXTREMITIES:  2+ Peripheral Pulses, No clubbing, cyanosis, or edema ,  L AKA  C/D/I , staples in place  SKIN: No rashes or lesions, pale color

## 2017-05-15 LAB
ANION GAP SERPL CALC-SCNC: 9 MMOL/L — SIGNIFICANT CHANGE UP (ref 5–17)
BUN SERPL-MCNC: 43 MG/DL — HIGH (ref 8–20)
CALCIUM SERPL-MCNC: 8.9 MG/DL — SIGNIFICANT CHANGE UP (ref 8.6–10.2)
CHLORIDE SERPL-SCNC: 103 MMOL/L — SIGNIFICANT CHANGE UP (ref 98–107)
CO2 SERPL-SCNC: 25 MMOL/L — SIGNIFICANT CHANGE UP (ref 22–29)
CREAT SERPL-MCNC: 1.16 MG/DL — SIGNIFICANT CHANGE UP (ref 0.5–1.3)
GLUCOSE SERPL-MCNC: 64 MG/DL — LOW (ref 70–115)
HCT VFR BLD CALC: 32 % — LOW (ref 37–47)
HGB BLD-MCNC: 9.9 G/DL — LOW (ref 12–16)
MCHC RBC-ENTMCNC: 28.1 PG — SIGNIFICANT CHANGE UP (ref 27–31)
MCHC RBC-ENTMCNC: 30.9 G/DL — LOW (ref 32–36)
MCV RBC AUTO: 90.9 FL — SIGNIFICANT CHANGE UP (ref 81–99)
PLATELET # BLD AUTO: 195 K/UL — SIGNIFICANT CHANGE UP (ref 150–400)
POTASSIUM SERPL-MCNC: 4.7 MMOL/L — SIGNIFICANT CHANGE UP (ref 3.5–5.3)
POTASSIUM SERPL-SCNC: 4.7 MMOL/L — SIGNIFICANT CHANGE UP (ref 3.5–5.3)
RBC # BLD: 3.52 M/UL — LOW (ref 4.4–5.2)
RBC # FLD: 16.6 % — HIGH (ref 11–15.6)
SODIUM SERPL-SCNC: 137 MMOL/L — SIGNIFICANT CHANGE UP (ref 135–145)
WBC # BLD: 6.4 K/UL — SIGNIFICANT CHANGE UP (ref 4.8–10.8)
WBC # FLD AUTO: 6.4 K/UL — SIGNIFICANT CHANGE UP (ref 4.8–10.8)

## 2017-05-15 PROCEDURE — 99232 SBSQ HOSP IP/OBS MODERATE 35: CPT

## 2017-05-15 PROCEDURE — 99233 SBSQ HOSP IP/OBS HIGH 50: CPT

## 2017-05-15 RX ORDER — INSULIN LISPRO 100/ML
4 VIAL (ML) SUBCUTANEOUS
Qty: 0 | Refills: 0 | Status: DISCONTINUED | OUTPATIENT
Start: 2017-05-15 | End: 2017-05-16

## 2017-05-15 RX ORDER — INSULIN LISPRO 100/ML
2 VIAL (ML) SUBCUTANEOUS
Qty: 0 | Refills: 0 | Status: DISCONTINUED | OUTPATIENT
Start: 2017-05-15 | End: 2017-05-16

## 2017-05-15 RX ORDER — LISINOPRIL 2.5 MG/1
2.5 TABLET ORAL DAILY
Qty: 0 | Refills: 0 | Status: DISCONTINUED | OUTPATIENT
Start: 2017-05-15 | End: 2017-05-15

## 2017-05-15 RX ORDER — ENOXAPARIN SODIUM 100 MG/ML
40 INJECTION SUBCUTANEOUS DAILY
Qty: 0 | Refills: 0 | Status: DISCONTINUED | OUTPATIENT
Start: 2017-05-15 | End: 2017-05-16

## 2017-05-15 RX ORDER — INSULIN GLARGINE 100 [IU]/ML
12 INJECTION, SOLUTION SUBCUTANEOUS AT BEDTIME
Qty: 0 | Refills: 0 | Status: DISCONTINUED | OUTPATIENT
Start: 2017-05-15 | End: 2017-05-16

## 2017-05-15 RX ADMIN — TAMSULOSIN HYDROCHLORIDE 0.4 MILLIGRAM(S): 0.4 CAPSULE ORAL at 21:52

## 2017-05-15 RX ADMIN — LISINOPRIL 5 MILLIGRAM(S): 2.5 TABLET ORAL at 05:41

## 2017-05-15 RX ADMIN — Medication 1 TABLET(S): at 11:49

## 2017-05-15 RX ADMIN — Medication 100 MILLIGRAM(S): at 14:02

## 2017-05-15 RX ADMIN — Medication 4 UNIT(S): at 12:31

## 2017-05-15 RX ADMIN — ATORVASTATIN CALCIUM 40 MILLIGRAM(S): 80 TABLET, FILM COATED ORAL at 21:52

## 2017-05-15 RX ADMIN — ZINC SULFATE TAB 220 MG (50 MG ZINC EQUIVALENT) 220 MILLIGRAM(S): 220 (50 ZN) TAB at 11:49

## 2017-05-15 RX ADMIN — Medication 25 MILLIGRAM(S): at 05:41

## 2017-05-15 RX ADMIN — MIRTAZAPINE 7.5 MILLIGRAM(S): 45 TABLET, ORALLY DISINTEGRATING ORAL at 21:52

## 2017-05-15 RX ADMIN — Medication 500 MILLIGRAM(S): at 11:49

## 2017-05-15 RX ADMIN — INSULIN GLARGINE 12 UNIT(S): 100 INJECTION, SOLUTION SUBCUTANEOUS at 21:51

## 2017-05-15 RX ADMIN — GABAPENTIN 100 MILLIGRAM(S): 400 CAPSULE ORAL at 06:19

## 2017-05-15 RX ADMIN — Medication 2 UNIT(S): at 17:07

## 2017-05-15 RX ADMIN — Medication 100 MILLIGRAM(S): at 21:52

## 2017-05-15 RX ADMIN — POLYETHYLENE GLYCOL 3350 17 GRAM(S): 17 POWDER, FOR SOLUTION ORAL at 11:49

## 2017-05-15 RX ADMIN — Medication 1 MILLIGRAM(S): at 11:49

## 2017-05-15 RX ADMIN — Medication 2: at 11:49

## 2017-05-15 RX ADMIN — Medication 100 MILLIGRAM(S): at 05:41

## 2017-05-15 RX ADMIN — Medication 25 MILLIGRAM(S): at 17:07

## 2017-05-15 RX ADMIN — NYSTATIN CREAM 1 APPLICATION(S): 100000 CREAM TOPICAL at 17:08

## 2017-05-15 RX ADMIN — SENNA PLUS 2 TABLET(S): 8.6 TABLET ORAL at 21:52

## 2017-05-15 RX ADMIN — GABAPENTIN 100 MILLIGRAM(S): 400 CAPSULE ORAL at 14:02

## 2017-05-15 RX ADMIN — Medication 1 APPLICATION(S): at 12:32

## 2017-05-15 RX ADMIN — PANTOPRAZOLE SODIUM 40 MILLIGRAM(S): 20 TABLET, DELAYED RELEASE ORAL at 06:19

## 2017-05-15 RX ADMIN — Medication 325 MILLIGRAM(S): at 11:49

## 2017-05-15 RX ADMIN — NYSTATIN CREAM 1 APPLICATION(S): 100000 CREAM TOPICAL at 05:41

## 2017-05-15 RX ADMIN — GABAPENTIN 100 MILLIGRAM(S): 400 CAPSULE ORAL at 21:52

## 2017-05-15 RX ADMIN — ENOXAPARIN SODIUM 40 MILLIGRAM(S): 100 INJECTION SUBCUTANEOUS at 11:49

## 2017-05-15 RX ADMIN — Medication 88 MICROGRAM(S): at 05:41

## 2017-05-15 NOTE — PROGRESS NOTE ADULT - ASSESSMENT
85 yr old female with PMH CAD s/p stents x 2 , DM2, hypertension, hyperlipidemia, anxiety, significant PVD s/p multiple stents, paroxysmal atrial fibrillation on Eliquis admitted for hypertensive urgency post LE angiogram. 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 Hospital course complicated by urinary retention with UTI (failed TOV), hyponatremia likely due to pain and improved with NaCl (per nephrology) and pain medications.  Patient was stable for discharge to acute rehab. Followed by Vascular surgery, Podiatry. Patient was readmitted to medical service secondary to wound dehiscence and fever, underwent left AKA and RLE angioplasty.  Hospital coarse complicated by UTI, treated.  Patient remained stable and transferred to rehab.  Podiatry following and recommending right 2nd digit amputation.

## 2017-05-15 NOTE — PROGRESS NOTE ADULT - SUBJECTIVE AND OBJECTIVE BOX
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 Meyer. Subsequently admitted to acute rehab. Developed LLE post operative wound dehiscence, s/p left AKA and RLE angiogram on 4/14/17. Also s/p revascularization of RLE with angiogram and R SFA/POP angioplasty on 4/20/17. Hospital stay significant for: Completed course of cefazolin for UTI. Restarted on Abx for R foot second toe gangrene. Transfused two units PRBCs.    Interval history:   No acute events overnight. Continued pain in right 2nd toe, right heel. + BM. Denies dysuria.     REVIEW OF SYSTEMS  [ x ] Constitutional WNL  [   ] Cardio WNL  [   ] Resp WNL  [   ] GI WNL  [    ]  WNL  [ x ] Heme WNL  [ x ] Endo WNL  [   ] Skin WNL  [   ] MSK WNL  [   ] Neuro WNL  [x  ] Cognitive WNL  [x  ] Psych WNL    Vital Signs Last 24 Hrs  T(C): 36.2, Max: 36.6 (05-14 @ 21:38)  T(F): 97.2, Max: 97.8 (05-14 @ 21:38)  HR: 70 (70 - 87)  BP: 117/58 (95/53 - 117/58)  BP(mean): --  RR: 16 (16 - 18)  SpO2: 96% (95% - 97%)    PHYSICAL EXAM  Constitutional - NAD, Comfortable  Chest - CTA bilaterally.   Cardiovascular - RRR, S1S2, No murmurs  Abdomen - BS+, Soft, NTND  Extremities - No C/C/E, No calf tenderness of RLE  Neurologic Exam -                    Cognitive - Awake, Alert, AAO to self, place, date, year, situation     Communication - Fluent, No dysarthria     Motor - No focal deficits                    LEFT    UE - ShAB 5/5, EF 5/5, EE 5/5, WE 5/5,  5/5                    RIGHT UE - ShAB 5/5, EF 5/5, EE 5/5, WE 5/5,  5/5                    LEFT    LE - HF wfl                    RIGHT LE - At least 3-4/5, limited by pain     Sensory - Intact to LT   Psychiatric - Mood stable, Affect WNL  Skin--LLE AKA incision with staples, c/d/i,no erythema, + stump .  Right 2nd toe gangrene and tip of great toe-stable. R calcaneus stage II pressure ulcer. Black eschar over the 5th metatarsal on the plantar surface    Functional Progress:   Transfers: Supervision  Bed mobility: Min A  Toileting: Max A  ADLs:  LE dressing: Mod A; UE dressing and grooming: Supervision    MEDICATIONS  (STANDING):  docusate sodium 100milliGRAM(s) Oral three times a day  ascorbic acid 500milliGRAM(s) Oral daily  multivitamin 1Tablet(s) Oral daily  senna 2Tablet(s) Oral at bedtime  atorvastatin 40milliGRAM(s) Oral at bedtime  lisinopril 5milliGRAM(s) Oral daily  dextrose 5%. 1000milliLiter(s) IV Continuous <Continuous>  dextrose 50% Injectable 12.5Gram(s) IV Push once  dextrose 50% Injectable 25Gram(s) IV Push once  dextrose 50% Injectable 25Gram(s) IV Push once  ferrous    sulfate 325milliGRAM(s) Oral daily  folic acid 1milliGRAM(s) Oral daily  levothyroxine 88MICROGram(s) Oral daily  mirtazapine 7.5milliGRAM(s) Oral at bedtime  pantoprazole    Tablet 40milliGRAM(s) Oral before breakfast  polyethylene glycol 3350 17Gram(s) Oral daily  zinc sulfate 220milliGRAM(s) Oral daily  metoprolol 25milliGRAM(s) Oral every 12 hours  insulin lispro (HumaLOG) corrective regimen sliding scale  SubCutaneous three times a day before meals  nystatin Powder 1Application(s) Topical two times a day  gabapentin 100milliGRAM(s) Oral three times a day  cadexomer iodine Gel 1Application(s) Topical daily  tamsulosin 0.4milliGRAM(s) Oral at bedtime  insulin glargine Injectable (LANTUS) 12Unit(s) SubCutaneous at bedtime  insulin lispro Injectable (HumaLOG) 2Unit(s) SubCutaneous before breakfast  insulin lispro Injectable (HumaLOG) 4Unit(s) SubCutaneous before lunch  insulin lispro Injectable (HumaLOG) 2Unit(s) SubCutaneous before dinner    MEDICATIONS  (PRN):  acetaminophen   Tablet 650milliGRAM(s) Oral every 6 hours PRN For Temp greater than 38 C (100.4 F)  acetaminophen   Tablet. 650milliGRAM(s) Oral every 6 hours PRN Mild Pain (1 - 3)  dextrose Gel 1Dose(s) Oral once PRN Blood Glucose LESS THAN 70 milliGRAM(s)/deciliter  glucagon  Injectable 1milliGRAM(s) IntraMuscular once PRN Glucose LESS THAN 70 milligrams/deciliter  bisacodyl Suppository 10milliGRAM(s) Rectal daily PRN Constipation  oxyCODONE  5 mG/acetaminophen 325 mG 2Tablet(s) Oral every 4 hours PRN Severe Pain (7 - 10)  oxyCODONE  5 mG/acetaminophen 325 mG 1Tablet(s) Oral every 4 hours PRN Moderate Pain (4 - 6)  ALPRAZolam 0.25milliGRAM(s) Oral at bedtime PRN anxiety      RECENT LABS                    9.9    6.4   )-----------( 195      ( 15 May 2017 06:25 )             32.0     05-15    137  |  103  |  43.0<H>  ----------------------------<  64<L>  4.7   |  25.0  |  1.16    Ca    8.9      15 May 2017 06:25    CAPILLARY BLOOD GLUCOSE  73 (15 May 2017 08:30)  150 (14 May 2017 21:38)  277 (14 May 2017 16:33)  231 (14 May 2017 12:40)    ASSESSMENT AND PLAN:  82 year old F with extensive PMH including PVD s/p multiple stents, 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 Irwin, s/p left AKA and RLE angiogram on 4/14/17. Also s/p revascularization of RLE with angiogram and R SFA/POP angioplasty on 4/20/17. Hospital stay significant for: Completed course of cefazolin for UTI. Restarted on Abx for R foot second toe gangrene which was completed. Transfused two units PRBCs. Presents with functional deficits.     Hypotension: Improved, but with upward trend in BUN and Cr. Decrease lisinopril to 2.5mg. Consider overnight IVF. Encourage PO intake    PVD s/p L AKA: Residual limb- keep c/d/i.   given. Staples to be removed by surgeon    S/p RLE angio, R toe gangrene: Vascular following. Completed cefazolin course 5/4/17. Podiatry called for f/u: Xray of right 2nd toe negative, ordered iodosorb gel.  Right 2nd toe amputation this week with podiatry-Eliquis on hold. Appreciate vascular and podiatry recs.    R heel stage II ulcer:   Daily betadine and dressing change.  Cont off-loading with PRAFO    Pain control: Percocet, gabapentin. tylenol.     Bowel regimen: on Senna, colace, miralax. Bisacodyl suppository prn.    Urinary retention: Improved. UA: mod LE, WBC 26-50, urine cx neg.  Flomax  0.8 mg-decrease to 0.4 due to hypotension.     DM II: Hypoglycemic last two mornings. BG 200s lunch/dinner. Decrease lantus to 12 units, humalog 2-4-2,  ISS AC.     HTN: BP stable. D/c Amlodipine 5/1,metoprolol 25 mg q12 per medicine recs on 5/1. Decrease lisinopril 2.5 mg 5/15    PAF: Eliquis-on hold due to pending surgery    CAD: Metoprolol 25 mg q12, lipitor, plavix on hold for pending surgery    Acute blood loss anemia: Hb stable. FeSO4    Hypothyroidism: Synthroid    Appetite/mood: Remeron    GI/supplements: Protonix, zinc sulfate, MVI, folic acid    Anxiety: Xanax qhs prn    Comprehensive Rehab Program, 3 hours of daily therapy

## 2017-05-15 NOTE — PROGRESS NOTE ADULT - SUBJECTIVE AND OBJECTIVE BOX
S : 85y year old Female seen at bedside for Right 2nd digit gangrene.  Patient denies any pain in the area.      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 Meyer. Subsequently admitted to acute rehab. Developed LLE post operative wound dehiscence, s/p left AKA and RLE angiogram on 4/14/17. Also s/p revascularization of RLE with angiogram and R SFA/POP angioplasty on 4/20/17.       O:   General: Pleasant  female NAD & AOX3.    Examination noted on the the Right lower extremity:  Integument:  Skin warm, dry and supple.    Ulceration Right heel  - hyperkeratotic border, wound base Fibrogranular - edema, - aide-wound erythema, - purulence, - fluctuance, - tracking/tunneling, - probe to bone.  Ulceration Right 5th MPJ - hyperkeratotic border, wound base Fibrogranular,  - edema, - aide-wound erythema, - purulence, - fluctuance, - tracking/tunneling, - probe to bone.    Gangrene noted on the right 2nd digit: NO purulence, no fluctuance, no tracking/tunneling, - probe to bone.  Vascular: Dorsalis Pedis and Posterior Tibial pulses 1/4.  Neuro: Protective sensation absent to the level of the digits bilateral.    Deformity:  A: Right foot Heel and 5th met ulceration, and 2nd digit dry gangrene.      P:   Chart reviewed and Patient evaluated  Discussed diagnosis and treatment with patient  Applied dry sterile dressing with betadine  Discussed surgical management with patient  patient is will be scheduled for right 2nd digit amputation this week. Date/Time pending.  Medical clearance in Chart  Podiatry will f/u

## 2017-05-15 NOTE — PROGRESS NOTE ADULT - SUBJECTIVE AND OBJECTIVE BOX
CC: Hypotension; Following left AKA, RLE angiplasty    HPI: 85 yr old female with PMH CAD s/p stents x 2 , DM2, hypertension, hyperlipidemia, anxiety, significant PVD s/p multiple stents, paroxysmal atrial fibrillation on Eliquis admitted for hypertensive urgency post LE angiogram. 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 Hospital course complicated by urinary retention with UTI (failed TOV), hyponatremia likely due to pain and improved with NaCl (per nephrology) and pain medications.  Patient was stable for discharge to acute rehab. Followed by Vascular surgery, Podiatry. Patient was readmitted to medical service secondary to wound dehiscence and fever, underwent left AKA and RLE angioplasty.  Hospital coarse complicated by UTI, treated.  Patient remained stable and transferred to rehab.  Podiatry following and recommending right 2nd digit amputation.       INTERVAL HPI/OVERNIGHT EVENTS: Patient seen and examined lying in bed.  Patient complained of fatigue while sitting in the WC earlier.  SBP 70s.  Patient complains of right 2nd toe shooting pains, with some relief with pain medications.  Patient also reports that she has not been eating well.  Patient denies any headache, dizziness, SOB, CP, abdominal pain, nausea, vomiting, dysuria.  Other ROS reviewed and are negative.    Vital Signs Last 24 Hrs  T(C): 36.2, Max: 36.6 (05-14 @ 21:38)  T(F): 97.2, Max: 97.8 (05-14 @ 21:38)  HR: 70 (70 - 87)  BP: 117/58 (95/53 - 117/58)  BP(mean): --  RR: 16 (16 - 18)  SpO2: 96% (95% - 97%)  I&O's Detail    I & Os for current day (as of 15 May 2017 12:07)  =============================================  IN:    Total IN: 0 ml  ---------------------------------------------  OUT:    Voided: 1 ml    Total OUT: 1 ml  ---------------------------------------------  Total NET: -1 ml      PHYSICAL EXAM:  GENERAL: NAD, well-groomed, well-developed  HEAD:  Atraumatic, Normocephalic  EYES: EOMI, PERRLA, conjunctiva and sclera clear  NECK: Supple, No JVD, Normal thyroid  NERVOUS SYSTEM:  Alert & Oriented X3, Good concentration; Motor Strength 5/5 B/L upper and lower extremities  CHEST/LUNG: Clear to auscultation bilaterally; No rales, rhonchi, wheezing, or rubs  HEART: Regular rate and rhythm; No murmurs, rubs, or gallops  ABDOMEN: Soft, Nontender, Nondistended; Bowel sounds present  EXTREMITIES:  2+ Peripheral Pulses, No clubbing, cyanosis, or edema; Left AKA, Right LE with dressing in place.  SKIN: No rashes or lesions                            9.9    6.4   )-----------( 195      ( 15 May 2017 06:25 )             32.0     15 May 2017 06:25    137    |  103    |  43.0   ----------------------------<  64     4.7     |  25.0   |  1.16     Ca    8.9        15 May 2017 06:25        CAPILLARY BLOOD GLUCOSE  73 (15 May 2017 08:30)  150 (14 May 2017 21:38)  277 (14 May 2017 16:33)  231 (14 May 2017 12:40)          MEDICATIONS  (STANDING):  docusate sodium 100milliGRAM(s) Oral three times a day  ascorbic acid 500milliGRAM(s) Oral daily  multivitamin 1Tablet(s) Oral daily  senna 2Tablet(s) Oral at bedtime  atorvastatin 40milliGRAM(s) Oral at bedtime  dextrose 5%. 1000milliLiter(s) IV Continuous <Continuous>  dextrose 50% Injectable 12.5Gram(s) IV Push once  dextrose 50% Injectable 25Gram(s) IV Push once  dextrose 50% Injectable 25Gram(s) IV Push once  ferrous    sulfate 325milliGRAM(s) Oral daily  folic acid 1milliGRAM(s) Oral daily  levothyroxine 88MICROGram(s) Oral daily  mirtazapine 7.5milliGRAM(s) Oral at bedtime  pantoprazole    Tablet 40milliGRAM(s) Oral before breakfast  polyethylene glycol 3350 17Gram(s) Oral daily  zinc sulfate 220milliGRAM(s) Oral daily  metoprolol 25milliGRAM(s) Oral every 12 hours  insulin lispro (HumaLOG) corrective regimen sliding scale  SubCutaneous three times a day before meals  nystatin Powder 1Application(s) Topical two times a day  gabapentin 100milliGRAM(s) Oral three times a day  cadexomer iodine Gel 1Application(s) Topical daily  tamsulosin 0.4milliGRAM(s) Oral at bedtime  enoxaparin Injectable 40milliGRAM(s) SubCutaneous daily  insulin glargine Injectable (LANTUS) 12Unit(s) SubCutaneous at bedtime  insulin lispro Injectable (HumaLOG) 2Unit(s) SubCutaneous before breakfast  insulin lispro Injectable (HumaLOG) 4Unit(s) SubCutaneous before lunch  insulin lispro Injectable (HumaLOG) 2Unit(s) SubCutaneous before dinner  lisinopril 2.5milliGRAM(s) Oral daily    MEDICATIONS  (PRN):  acetaminophen   Tablet 650milliGRAM(s) Oral every 6 hours PRN For Temp greater than 38 C (100.4 F)  acetaminophen   Tablet. 650milliGRAM(s) Oral every 6 hours PRN Mild Pain (1 - 3)  dextrose Gel 1Dose(s) Oral once PRN Blood Glucose LESS THAN 70 milliGRAM(s)/deciliter  glucagon  Injectable 1milliGRAM(s) IntraMuscular once PRN Glucose LESS THAN 70 milligrams/deciliter  bisacodyl Suppository 10milliGRAM(s) Rectal daily PRN Constipation  oxyCODONE  5 mG/acetaminophen 325 mG 2Tablet(s) Oral every 4 hours PRN Severe Pain (7 - 10)  oxyCODONE  5 mG/acetaminophen 325 mG 1Tablet(s) Oral every 4 hours PRN Moderate Pain (4 - 6)  ALPRAZolam 0.25milliGRAM(s) Oral at bedtime PRN anxiety CC: Hypotension; Following left AKA, RLE angiplasty    HPI: 85 yr old female with PMH CAD s/p stents x 2 , DM2, hypertension, hyperlipidemia, anxiety, significant PVD s/p multiple stents, paroxysmal atrial fibrillation on Eliquis admitted for hypertensive urgency post LE angiogram. 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 Hospital course complicated by urinary retention with UTI (failed TOV), hyponatremia likely due to pain and improved with NaCl (per nephrology) and pain medications.  Patient was stable for discharge to acute rehab. Followed by Vascular surgery, Podiatry. Patient was readmitted to medical service secondary to wound dehiscence and fever, underwent left AKA and RLE angioplasty.  Hospital coarse complicated by UTI, treated.  Patient remained stable and transferred to rehab.  Podiatry following and recommending right 2nd digit amputation.       INTERVAL HPI/OVERNIGHT EVENTS: Patient seen and examined lying in bed.  Patient complained of fatigue while sitting in the WC earlier.  SBP 70s.  Patient complains of right 2nd toe shooting pains, with some relief with pain medications.  Patient also reports that she has not been eating well.  Patient denies any headache, dizziness, SOB, CP, abdominal pain, nausea, vomiting, dysuria.  Other ROS reviewed and are negative.    Vital Signs Last 24 Hrs  T(C): 36.2, Max: 36.6 (05-14 @ 21:38)  T(F): 97.2, Max: 97.8 (05-14 @ 21:38)  HR: 70 (70 - 87)  BP: 117/58 (95/53 - 117/58)  BP(mean): --  RR: 16 (16 - 18)  SpO2: 96% (95% - 97%)  I&O's Detail    I & Os for current day (as of 15 May 2017 12:07)  =============================================  IN:    Total IN: 0 ml  ---------------------------------------------  OUT:    Voided: 1 ml    Total OUT: 1 ml  ---------------------------------------------  Total NET: -1 ml      PHYSICAL EXAM:  GENERAL: NAD, well-groomed, well-developed  CHEST/LUNG: Clear to auscultation bilaterally; No rales, rhonchi, wheezing, or rubs  HEART: Regular rate and rhythm; No murmurs, rubs, or gallops  ABDOMEN: Soft, Nontender, Nondistended; Bowel sounds present  EXTREMITIES:  2+ Peripheral Pulses, No clubbing, cyanosis, or edema; Left AKA, Right LE with dressing in place.  SKIN: No rashes or lesions                            9.9    6.4   )-----------( 195      ( 15 May 2017 06:25 )             32.0     15 May 2017 06:25    137    |  103    |  43.0   ----------------------------<  64     4.7     |  25.0   |  1.16     Ca    8.9        15 May 2017 06:25        CAPILLARY BLOOD GLUCOSE  73 (15 May 2017 08:30)  150 (14 May 2017 21:38)  277 (14 May 2017 16:33)  231 (14 May 2017 12:40)          MEDICATIONS  (STANDING):  docusate sodium 100milliGRAM(s) Oral three times a day  ascorbic acid 500milliGRAM(s) Oral daily  multivitamin 1Tablet(s) Oral daily  senna 2Tablet(s) Oral at bedtime  atorvastatin 40milliGRAM(s) Oral at bedtime  dextrose 5%. 1000milliLiter(s) IV Continuous <Continuous>  dextrose 50% Injectable 12.5Gram(s) IV Push once  dextrose 50% Injectable 25Gram(s) IV Push once  dextrose 50% Injectable 25Gram(s) IV Push once  ferrous    sulfate 325milliGRAM(s) Oral daily  folic acid 1milliGRAM(s) Oral daily  levothyroxine 88MICROGram(s) Oral daily  mirtazapine 7.5milliGRAM(s) Oral at bedtime  pantoprazole    Tablet 40milliGRAM(s) Oral before breakfast  polyethylene glycol 3350 17Gram(s) Oral daily  zinc sulfate 220milliGRAM(s) Oral daily  metoprolol 25milliGRAM(s) Oral every 12 hours  insulin lispro (HumaLOG) corrective regimen sliding scale  SubCutaneous three times a day before meals  nystatin Powder 1Application(s) Topical two times a day  gabapentin 100milliGRAM(s) Oral three times a day  cadexomer iodine Gel 1Application(s) Topical daily  tamsulosin 0.4milliGRAM(s) Oral at bedtime  enoxaparin Injectable 40milliGRAM(s) SubCutaneous daily  insulin glargine Injectable (LANTUS) 12Unit(s) SubCutaneous at bedtime  insulin lispro Injectable (HumaLOG) 2Unit(s) SubCutaneous before breakfast  insulin lispro Injectable (HumaLOG) 4Unit(s) SubCutaneous before lunch  insulin lispro Injectable (HumaLOG) 2Unit(s) SubCutaneous before dinner  lisinopril 2.5milliGRAM(s) Oral daily    MEDICATIONS  (PRN):  acetaminophen   Tablet 650milliGRAM(s) Oral every 6 hours PRN For Temp greater than 38 C (100.4 F)  acetaminophen   Tablet. 650milliGRAM(s) Oral every 6 hours PRN Mild Pain (1 - 3)  dextrose Gel 1Dose(s) Oral once PRN Blood Glucose LESS THAN 70 milliGRAM(s)/deciliter  glucagon  Injectable 1milliGRAM(s) IntraMuscular once PRN Glucose LESS THAN 70 milligrams/deciliter  bisacodyl Suppository 10milliGRAM(s) Rectal daily PRN Constipation  oxyCODONE  5 mG/acetaminophen 325 mG 2Tablet(s) Oral every 4 hours PRN Severe Pain (7 - 10)  oxyCODONE  5 mG/acetaminophen 325 mG 1Tablet(s) Oral every 4 hours PRN Moderate Pain (4 - 6)  ALPRAZolam 0.25milliGRAM(s) Oral at bedtime PRN anxiety

## 2017-05-16 ENCOUNTER — INPATIENT (INPATIENT)
Facility: HOSPITAL | Age: 82
LOS: 0 days | Discharge: HOME | DRG: 256 | End: 2017-05-17
Attending: HOSPITALIST | Admitting: HOSPITALIST
Payer: COMMERCIAL

## 2017-05-16 ENCOUNTER — RESULT REVIEW (OUTPATIENT)
Age: 82
End: 2017-05-16

## 2017-05-16 VITALS
RESPIRATION RATE: 19 BRPM | OXYGEN SATURATION: 100 % | HEART RATE: 72 BPM | DIASTOLIC BLOOD PRESSURE: 52 MMHG | TEMPERATURE: 98 F | SYSTOLIC BLOOD PRESSURE: 134 MMHG

## 2017-05-16 VITALS
RESPIRATION RATE: 14 BRPM | DIASTOLIC BLOOD PRESSURE: 69 MMHG | SYSTOLIC BLOOD PRESSURE: 144 MMHG | OXYGEN SATURATION: 100 % | HEART RATE: 73 BPM

## 2017-05-16 DIAGNOSIS — Z98.62 PERIPHERAL VASCULAR ANGIOPLASTY STATUS: Chronic | ICD-10-CM

## 2017-05-16 DIAGNOSIS — Z90.49 ACQUIRED ABSENCE OF OTHER SPECIFIED PARTS OF DIGESTIVE TRACT: Chronic | ICD-10-CM

## 2017-05-16 DIAGNOSIS — I73.9 PERIPHERAL VASCULAR DISEASE, UNSPECIFIED: ICD-10-CM

## 2017-05-16 DIAGNOSIS — Z89.612 ACQUIRED ABSENCE OF LEFT LEG ABOVE KNEE: Chronic | ICD-10-CM

## 2017-05-16 PROCEDURE — 99233 SBSQ HOSP IP/OBS HIGH 50: CPT

## 2017-05-16 PROCEDURE — 99223 1ST HOSP IP/OBS HIGH 75: CPT

## 2017-05-16 PROCEDURE — 73630 X-RAY EXAM OF FOOT: CPT | Mod: 26,LT

## 2017-05-16 PROCEDURE — 88311 DECALCIFY TISSUE: CPT | Mod: 26

## 2017-05-16 PROCEDURE — 99239 HOSP IP/OBS DSCHRG MGMT >30: CPT

## 2017-05-16 PROCEDURE — 88305 TISSUE EXAM BY PATHOLOGIST: CPT | Mod: 26

## 2017-05-16 RX ORDER — INSULIN LISPRO 100/ML
2 VIAL (ML) SUBCUTANEOUS
Qty: 0 | Refills: 0 | COMMUNITY
Start: 2017-05-16

## 2017-05-16 RX ORDER — DEXTROSE 50 % IN WATER 50 %
25 SYRINGE (ML) INTRAVENOUS ONCE
Qty: 0 | Refills: 0 | Status: DISCONTINUED | OUTPATIENT
Start: 2017-05-16 | End: 2017-05-17

## 2017-05-16 RX ORDER — FERROUS SULFATE 325(65) MG
325 TABLET ORAL DAILY
Qty: 0 | Refills: 0 | Status: DISCONTINUED | OUTPATIENT
Start: 2017-05-16 | End: 2017-05-17

## 2017-05-16 RX ORDER — FENTANYL CITRATE 50 UG/ML
25 INJECTION INTRAVENOUS
Qty: 0 | Refills: 0 | Status: DISCONTINUED | OUTPATIENT
Start: 2017-05-16 | End: 2017-05-16

## 2017-05-16 RX ORDER — FOLIC ACID 0.8 MG
1 TABLET ORAL DAILY
Qty: 0 | Refills: 0 | Status: DISCONTINUED | OUTPATIENT
Start: 2017-05-16 | End: 2017-05-17

## 2017-05-16 RX ORDER — POLYETHYLENE GLYCOL 3350 17 G/17G
17 POWDER, FOR SOLUTION ORAL DAILY
Qty: 0 | Refills: 0 | Status: DISCONTINUED | OUTPATIENT
Start: 2017-05-16 | End: 2017-05-17

## 2017-05-16 RX ORDER — GABAPENTIN 400 MG/1
100 CAPSULE ORAL THREE TIMES A DAY
Qty: 0 | Refills: 0 | Status: DISCONTINUED | OUTPATIENT
Start: 2017-05-16 | End: 2017-05-17

## 2017-05-16 RX ORDER — SODIUM CHLORIDE 9 MG/ML
1000 INJECTION, SOLUTION INTRAVENOUS
Qty: 0 | Refills: 0 | Status: DISCONTINUED | OUTPATIENT
Start: 2017-05-16 | End: 2017-05-16

## 2017-05-16 RX ORDER — ALPRAZOLAM 0.25 MG
1 TABLET ORAL
Qty: 0 | Refills: 0 | COMMUNITY
Start: 2017-05-16

## 2017-05-16 RX ORDER — DEXTROSE 50 % IN WATER 50 %
12.5 SYRINGE (ML) INTRAVENOUS ONCE
Qty: 0 | Refills: 0 | Status: DISCONTINUED | OUTPATIENT
Start: 2017-05-16 | End: 2017-05-17

## 2017-05-16 RX ORDER — SODIUM CHLORIDE 9 MG/ML
1000 INJECTION, SOLUTION INTRAVENOUS
Qty: 0 | Refills: 0 | Status: DISCONTINUED | OUTPATIENT
Start: 2017-05-16 | End: 2017-05-17

## 2017-05-16 RX ORDER — MIRTAZAPINE 45 MG/1
1 TABLET, ORALLY DISINTEGRATING ORAL
Qty: 0 | Refills: 0 | COMMUNITY
Start: 2017-05-16

## 2017-05-16 RX ORDER — PANTOPRAZOLE SODIUM 20 MG/1
40 TABLET, DELAYED RELEASE ORAL
Qty: 0 | Refills: 0 | Status: DISCONTINUED | OUTPATIENT
Start: 2017-05-16 | End: 2017-05-17

## 2017-05-16 RX ORDER — FOLIC ACID 0.8 MG
1 TABLET ORAL
Qty: 0 | Refills: 0 | COMMUNITY
Start: 2017-05-16

## 2017-05-16 RX ORDER — METOPROLOL TARTRATE 50 MG
25 TABLET ORAL
Qty: 0 | Refills: 0 | Status: DISCONTINUED | OUTPATIENT
Start: 2017-05-16 | End: 2017-05-17

## 2017-05-16 RX ORDER — INSULIN LISPRO 100/ML
0 VIAL (ML) SUBCUTANEOUS
Qty: 0 | Refills: 0 | COMMUNITY
Start: 2017-05-16

## 2017-05-16 RX ORDER — POLYETHYLENE GLYCOL 3350 17 G/17G
17 POWDER, FOR SOLUTION ORAL
Qty: 0 | Refills: 0 | COMMUNITY
Start: 2017-05-16

## 2017-05-16 RX ORDER — DEXTROSE 50 % IN WATER 50 %
1 SYRINGE (ML) INTRAVENOUS ONCE
Qty: 0 | Refills: 0 | Status: DISCONTINUED | OUTPATIENT
Start: 2017-05-16 | End: 2017-05-17

## 2017-05-16 RX ORDER — ENOXAPARIN SODIUM 100 MG/ML
40 INJECTION SUBCUTANEOUS EVERY 24 HOURS
Qty: 0 | Refills: 0 | Status: DISCONTINUED | OUTPATIENT
Start: 2017-05-17 | End: 2017-05-17

## 2017-05-16 RX ORDER — MIRTAZAPINE 45 MG/1
7.5 TABLET, ORALLY DISINTEGRATING ORAL AT BEDTIME
Qty: 0 | Refills: 0 | Status: DISCONTINUED | OUTPATIENT
Start: 2017-05-16 | End: 2017-05-17

## 2017-05-16 RX ORDER — CLOPIDOGREL BISULFATE 75 MG/1
75 TABLET, FILM COATED ORAL DAILY
Qty: 0 | Refills: 0 | Status: DISCONTINUED | OUTPATIENT
Start: 2017-05-16 | End: 2017-05-17

## 2017-05-16 RX ORDER — INSULIN LISPRO 100/ML
VIAL (ML) SUBCUTANEOUS
Qty: 0 | Refills: 0 | Status: DISCONTINUED | OUTPATIENT
Start: 2017-05-16 | End: 2017-05-17

## 2017-05-16 RX ORDER — TAMSULOSIN HYDROCHLORIDE 0.4 MG/1
0.4 CAPSULE ORAL AT BEDTIME
Qty: 0 | Refills: 0 | Status: DISCONTINUED | OUTPATIENT
Start: 2017-05-16 | End: 2017-05-17

## 2017-05-16 RX ORDER — OXYCODONE HYDROCHLORIDE 5 MG/1
1 TABLET ORAL
Qty: 0 | Refills: 0 | COMMUNITY
Start: 2017-05-16

## 2017-05-16 RX ORDER — LEVOTHYROXINE SODIUM 125 MCG
88 TABLET ORAL DAILY
Qty: 0 | Refills: 0 | Status: DISCONTINUED | OUTPATIENT
Start: 2017-05-16 | End: 2017-05-17

## 2017-05-16 RX ORDER — INSULIN LISPRO 100/ML
2 VIAL (ML) SUBCUTANEOUS
Qty: 0 | Refills: 0 | Status: DISCONTINUED | OUTPATIENT
Start: 2017-05-16 | End: 2017-05-17

## 2017-05-16 RX ORDER — INSULIN LISPRO 100/ML
4 VIAL (ML) SUBCUTANEOUS
Qty: 0 | Refills: 0 | COMMUNITY
Start: 2017-05-16

## 2017-05-16 RX ORDER — OXYCODONE HYDROCHLORIDE 5 MG/1
2 TABLET ORAL
Qty: 0 | Refills: 0 | COMMUNITY
Start: 2017-05-16

## 2017-05-16 RX ORDER — LEVOTHYROXINE SODIUM 125 MCG
1 TABLET ORAL
Qty: 0 | Refills: 0 | COMMUNITY
Start: 2017-05-16

## 2017-05-16 RX ORDER — ACETAMINOPHEN 500 MG
2 TABLET ORAL
Qty: 0 | Refills: 0 | COMMUNITY
Start: 2017-05-16

## 2017-05-16 RX ORDER — ONDANSETRON 8 MG/1
4 TABLET, FILM COATED ORAL ONCE
Qty: 0 | Refills: 0 | Status: DISCONTINUED | OUTPATIENT
Start: 2017-05-16 | End: 2017-05-16

## 2017-05-16 RX ORDER — DOCUSATE SODIUM 100 MG
100 CAPSULE ORAL THREE TIMES A DAY
Qty: 0 | Refills: 0 | Status: DISCONTINUED | OUTPATIENT
Start: 2017-05-16 | End: 2017-05-17

## 2017-05-16 RX ORDER — ASCORBIC ACID 60 MG
1 TABLET,CHEWABLE ORAL
Qty: 0 | Refills: 0 | COMMUNITY
Start: 2017-05-16

## 2017-05-16 RX ORDER — PANTOPRAZOLE SODIUM 20 MG/1
1 TABLET, DELAYED RELEASE ORAL
Qty: 0 | Refills: 0 | COMMUNITY
Start: 2017-05-16

## 2017-05-16 RX ORDER — INSULIN LISPRO 100/ML
4 VIAL (ML) SUBCUTANEOUS
Qty: 0 | Refills: 0 | Status: DISCONTINUED | OUTPATIENT
Start: 2017-05-16 | End: 2017-05-17

## 2017-05-16 RX ORDER — ALPRAZOLAM 0.25 MG
0.25 TABLET ORAL AT BEDTIME
Qty: 0 | Refills: 0 | Status: DISCONTINUED | OUTPATIENT
Start: 2017-05-16 | End: 2017-05-17

## 2017-05-16 RX ORDER — ATORVASTATIN CALCIUM 80 MG/1
1 TABLET, FILM COATED ORAL
Qty: 0 | Refills: 0 | COMMUNITY
Start: 2017-05-16

## 2017-05-16 RX ORDER — INSULIN GLARGINE 100 [IU]/ML
12 INJECTION, SOLUTION SUBCUTANEOUS
Qty: 0 | Refills: 0 | COMMUNITY
Start: 2017-05-16

## 2017-05-16 RX ORDER — METOPROLOL TARTRATE 50 MG
1 TABLET ORAL
Qty: 0 | Refills: 0 | COMMUNITY
Start: 2017-05-16

## 2017-05-16 RX ORDER — GABAPENTIN 400 MG/1
1 CAPSULE ORAL
Qty: 0 | Refills: 0 | COMMUNITY
Start: 2017-05-16

## 2017-05-16 RX ORDER — INSULIN GLARGINE 100 [IU]/ML
12 INJECTION, SOLUTION SUBCUTANEOUS AT BEDTIME
Qty: 0 | Refills: 0 | Status: DISCONTINUED | OUTPATIENT
Start: 2017-05-16 | End: 2017-05-17

## 2017-05-16 RX ORDER — NYSTATIN CREAM 100000 [USP'U]/G
1 CREAM TOPICAL
Qty: 0 | Refills: 0 | Status: DISCONTINUED | OUTPATIENT
Start: 2017-05-16 | End: 2017-05-17

## 2017-05-16 RX ORDER — ASCORBIC ACID 60 MG
500 TABLET,CHEWABLE ORAL DAILY
Qty: 0 | Refills: 0 | Status: DISCONTINUED | OUTPATIENT
Start: 2017-05-16 | End: 2017-05-17

## 2017-05-16 RX ORDER — SENNA PLUS 8.6 MG/1
2 TABLET ORAL
Qty: 0 | Refills: 0 | COMMUNITY
Start: 2017-05-16

## 2017-05-16 RX ORDER — ZINC SULFATE TAB 220 MG (50 MG ZINC EQUIVALENT) 220 (50 ZN) MG
1 TAB ORAL
Qty: 0 | Refills: 0 | COMMUNITY
Start: 2017-05-16

## 2017-05-16 RX ORDER — ACETAMINOPHEN 500 MG
650 TABLET ORAL EVERY 6 HOURS
Qty: 0 | Refills: 0 | Status: DISCONTINUED | OUTPATIENT
Start: 2017-05-16 | End: 2017-05-17

## 2017-05-16 RX ORDER — SODIUM CHLORIDE 9 MG/ML
1000 INJECTION INTRAMUSCULAR; INTRAVENOUS; SUBCUTANEOUS
Qty: 0 | Refills: 0 | Status: DISCONTINUED | OUTPATIENT
Start: 2017-05-16 | End: 2017-05-16

## 2017-05-16 RX ORDER — TAMSULOSIN HYDROCHLORIDE 0.4 MG/1
1 CAPSULE ORAL
Qty: 0 | Refills: 0 | COMMUNITY
Start: 2017-05-16

## 2017-05-16 RX ORDER — ATORVASTATIN CALCIUM 80 MG/1
40 TABLET, FILM COATED ORAL AT BEDTIME
Qty: 0 | Refills: 0 | Status: DISCONTINUED | OUTPATIENT
Start: 2017-05-16 | End: 2017-05-17

## 2017-05-16 RX ORDER — FENTANYL CITRATE 50 UG/ML
50 INJECTION INTRAVENOUS
Qty: 0 | Refills: 0 | Status: DISCONTINUED | OUTPATIENT
Start: 2017-05-16 | End: 2017-05-16

## 2017-05-16 RX ORDER — SENNA PLUS 8.6 MG/1
2 TABLET ORAL AT BEDTIME
Qty: 0 | Refills: 0 | Status: DISCONTINUED | OUTPATIENT
Start: 2017-05-16 | End: 2017-05-17

## 2017-05-16 RX ORDER — DOCUSATE SODIUM 100 MG
1 CAPSULE ORAL
Qty: 0 | Refills: 0 | COMMUNITY
Start: 2017-05-16

## 2017-05-16 RX ORDER — GLUCAGON INJECTION, SOLUTION 0.5 MG/.1ML
1 INJECTION, SOLUTION SUBCUTANEOUS ONCE
Qty: 0 | Refills: 0 | Status: DISCONTINUED | OUTPATIENT
Start: 2017-05-16 | End: 2017-05-17

## 2017-05-16 RX ORDER — ZINC SULFATE TAB 220 MG (50 MG ZINC EQUIVALENT) 220 (50 ZN) MG
220 TAB ORAL DAILY
Qty: 0 | Refills: 0 | Status: DISCONTINUED | OUTPATIENT
Start: 2017-05-16 | End: 2017-05-17

## 2017-05-16 RX ADMIN — INSULIN GLARGINE 12 UNIT(S): 100 INJECTION, SOLUTION SUBCUTANEOUS at 21:01

## 2017-05-16 RX ADMIN — FENTANYL CITRATE 25 MICROGRAM(S): 50 INJECTION INTRAVENOUS at 15:50

## 2017-05-16 RX ADMIN — Medication 2 UNIT(S): at 18:18

## 2017-05-16 RX ADMIN — Medication 100 MILLIGRAM(S): at 21:01

## 2017-05-16 RX ADMIN — Medication 325 MILLIGRAM(S): at 18:19

## 2017-05-16 RX ADMIN — Medication 25 MILLIGRAM(S): at 18:20

## 2017-05-16 RX ADMIN — FENTANYL CITRATE 25 MICROGRAM(S): 50 INJECTION INTRAVENOUS at 13:15

## 2017-05-16 RX ADMIN — MIRTAZAPINE 7.5 MILLIGRAM(S): 45 TABLET, ORALLY DISINTEGRATING ORAL at 21:01

## 2017-05-16 RX ADMIN — SENNA PLUS 2 TABLET(S): 8.6 TABLET ORAL at 21:01

## 2017-05-16 RX ADMIN — Medication 500 MILLIGRAM(S): at 18:19

## 2017-05-16 RX ADMIN — NYSTATIN CREAM 1 APPLICATION(S): 100000 CREAM TOPICAL at 19:11

## 2017-05-16 RX ADMIN — GABAPENTIN 100 MILLIGRAM(S): 400 CAPSULE ORAL at 21:01

## 2017-05-16 RX ADMIN — FENTANYL CITRATE 25 MICROGRAM(S): 50 INJECTION INTRAVENOUS at 15:26

## 2017-05-16 RX ADMIN — ATORVASTATIN CALCIUM 40 MILLIGRAM(S): 80 TABLET, FILM COATED ORAL at 21:01

## 2017-05-16 RX ADMIN — Medication 1 TABLET(S): at 18:19

## 2017-05-16 RX ADMIN — Medication 8: at 18:18

## 2017-05-16 RX ADMIN — CLOPIDOGREL BISULFATE 75 MILLIGRAM(S): 75 TABLET, FILM COATED ORAL at 18:19

## 2017-05-16 RX ADMIN — Medication 0.25 MILLIGRAM(S): at 22:00

## 2017-05-16 RX ADMIN — Medication 1 MILLIGRAM(S): at 18:19

## 2017-05-16 RX ADMIN — FENTANYL CITRATE 25 MICROGRAM(S): 50 INJECTION INTRAVENOUS at 12:50

## 2017-05-16 RX ADMIN — SODIUM CHLORIDE 100 MILLILITER(S): 9 INJECTION, SOLUTION INTRAVENOUS at 14:57

## 2017-05-16 RX ADMIN — TAMSULOSIN HYDROCHLORIDE 0.4 MILLIGRAM(S): 0.4 CAPSULE ORAL at 21:01

## 2017-05-16 NOTE — PROGRESS NOTE ADULT - PROBLEM SELECTOR PLAN 1
s/p LLE amputation   continue pain medications rehab   RLE wound care for toe amputation secondary to gangren by podiatry, eliquis on hold  medically optimized for surgery
s/p Left AKA  continue pain medications and rehab   RLE wound care by podiatry, Right 2nd digit amputation scheduled for today secondary to gangrene.  Eliquis and Plavix on hold  medically optimized for surgery
s/p left BKA       Continue BID wound care  rehab program
s/p left BKA , continue rehab       Continue BID wound care  rehab program
- Hold Eliquis  - Toe amputation by Podiatry next week  - Will remove staples this weekend  - Will follow along
- PT  - wound care  - offloading  - Podiatry consult for possible second toe amp  - will follow along
- betadine paint to heel and toe  - continue PT  - will continue to monitor   - offloading
S/P left AKA- Vascular following  new black eschar on 5th metatarsal   s/p revascularization of RLE with angiogram and R SFA/POP angioplasty on 4/20/17  Wound Care
dry dressings to the right 2nd toe  bacitracin to the heel  stump   will follow along
pre meals humolog 2 units, continue sliding scale ordered additional to current regimen   lantus dose at night 10 units.  Dietary consult for review of preferred foods
s/p L AKA due to wound dehisence   Continue BID wound care  rehab program
s/p LLE amputation   continue pain medications rehab   RLE wound care
s/p Left AKA  continue pain medications and rehab   RLE wound care for toe amputation secondary to gangrene by podiatry, Eliquis and Plavix on hold  medically optimized for surgery
s/p left AKA ,   lantus sliding scale insulin coverage
stump  L AKA  Offloading and bacitracin to the right heel  Keep toes dry  Complete abx course  Will follow along
BG low in am and high during day, suggest decreasing Lantus and adding premeal  Dietary education to patient and family

## 2017-05-16 NOTE — PROGRESS NOTE ADULT - PROBLEM SELECTOR PROBLEM 1
PVD (peripheral vascular disease)
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

## 2017-05-16 NOTE — PROGRESS NOTE ADULT - PROBLEM SELECTOR PROBLEM 5
Anemia due to blood loss
Coronary artery disease involving native coronary artery of native heart without angina pectoris
Diabetes
Anemia due to blood loss
CAD (coronary artery disease)
Diabetes
Hypertension
PVD (peripheral vascular disease)
Coronary artery disease involving native coronary artery of native heart without angina pectoris

## 2017-05-16 NOTE — PROGRESS NOTE ADULT - PROBLEM SELECTOR PROBLEM 2
Constipation, unspecified constipation type
Essential hypertension
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
Anemia due to blood loss
Essential hypertension
PVD (peripheral vascular disease)
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
Essential hypertension

## 2017-05-16 NOTE — H&P ADULT - PROBLEM SELECTOR PLAN 1
s/p Left AKA and now s/p right 2nd toe amputation  continue pain medications and rehab   RLE wound care by podiatry.  Eliquis and Plavix on hold - will resume when ok with vascular. s/p Left AKA and now s/p right 2nd toe amputation  continue pain medications and rehab   RLE wound care by podiatry.  Eliquis and Plavix on hold - will resume when ok with vascular.  Discussed with the surgeon ok to start plavix, hold eliquis for few days

## 2017-05-16 NOTE — PROGRESS NOTE ADULT - PROBLEM SELECTOR PROBLEM 3
Anemia due to blood loss
CAD (coronary artery disease)
CAD (coronary artery disease)
Essential hypertension
CAD (coronary artery disease)
Anemia due to blood loss
CAD (coronary artery disease)
Constipation
Constipation, unspecified constipation type
Essential hypertension
PVD (peripheral vascular disease)

## 2017-05-16 NOTE — PROGRESS NOTE ADULT - PROBLEM SELECTOR PLAN 5
Hgb stable.  Monitor CBC.  Continue Iron.
labile , premeals humolog 2 units  lantus dose at night 10 units
stable no angina
Hgb stable.  Monitor CBC.  Continue Iron.
gangrene of right  toe on iv abx wound care, continue plavix
hypoglycemia improved , sliding scale lantus dose adjusted
stable no angina.
stable on current RX
Plavix/Statin/BB/ACE

## 2017-05-16 NOTE — PROGRESS NOTE ADULT - PROVIDER SPECIALTY LIST ADULT
Hospitalist
Podiatry
Podiatry
Rehab Medicine
Vascular Surgery
Podiatry

## 2017-05-16 NOTE — H&P ADULT - NSHPREVIEWOFSYSTEMS_GEN_ALL_CORE
REVIEW OF SYSTEMS:    CONSTITUTIONAL: No fever, weight loss, or fatigue  EYES: No eye pain, visual disturbances, or discharge  ENMT:  No difficulty hearing, tinnitus, vertigo; No sinus or throat pain  NECK: No pain or stiffness  RESPIRATORY: No cough, wheezing, chills or hemoptysis; No shortness of breath  CARDIOVASCULAR: No chest pain, palpitations, dizziness, or leg swelling  GASTROINTESTINAL: No abdominal or epigastric pain. No nausea or vomiting; No diarrhea or constipation.   GENITOURINARY: No dysuria, frequency, hematuria, or incontinence  NEUROLOGICAL: No headaches, memory loss, loss of strength, numbness, or tremors  SKIN: No itching, burning, rashes, or lesions   ENDOCRINE: No heat or cold intolerance; No hair loss  MUSCULOSKELETAL: No joint swelling; No muscle or back pain; Right foot pain, left AKA  PSYCHIATRIC: No depression, anxiety, mood swings, or difficulty sleeping

## 2017-05-16 NOTE — PROGRESS NOTE ADULT - ATTENDING COMMENTS
Agree w Dr. Tay's note.  Will continue to follow blood sugars.  PVRs ok (85 most recent)
Agree with above.  Cont comprehensive rehab, wound care, pain management, bowel/bladder care, dvt ppx
Agree with above.  Pt made significant progress in rehab.  Medically stable.  D/C to hospital.  Right second to amputation scheduled.  Plan for pt to return to assisted living once medically optimized with home care
Medically stable.  Cont PT/OT, wound care.  Check PVRs since Flomax dose reduced due to previous hypotension.  Vascular and podiatry to f/u
No new medical issues this morning,  Stable to continue current rehabilitation program.   Monitor glucose
Agree with above.  Continue PT/OT.  Add Lovenox 40 mg daily for dvt ppx; hold on day of surgery.  Cont pain management.  D/C planning
Agree with above.  Pt with urinary retention; UA + for mod LE, 26-50 WBCs and few bacteria.  F/U Urine C&S.  Cont to check PVRs and straight cath prn  Decrease neurontin from 300 tid to 200 tid with plan to wean to off as possible cause of retention.  Address constipation and minimize pain meds.  Cont Comprehensive rehab, wound care-change to betadine swabs to heel
Chart reviewed. Feels well. Right ankle dressing changed with nursing. Heel with area of erythema, and breakdown. 2nd toe dry gangrene +. no drainage.    2. Continue full rehab program.    3. Need to monitor blood sugars closely as still erratic
Chart reviewed. Patient reports phantom sensation and pain. discussed about current medication. Left AKA incision with staples +, clean  Right big toe with area of erythema.   2. DM- early morning with low normal Blood sugars. will reduce lantus to 10 units and monitor accuchecks.  Continue full rehab program.
Chart reviewed. Patient states that she needed assistance overnight for bowel routine and hence somewhat tired. Therapy focusing on sliding board transfers and WC mobility. Left AKA site: clean. Right foot with dry gangrene 2nd toe and heel ulcer. Has off loading boot and PRAFO in bed.  Vascular surgery f/u noted.   Medical f/u noted. Premeal insulin added. On lantus 10 units. Also BP noted to be elevated. will need to monitor closely as meds adjusted yesterday.   Continue full rehab program.
Medically stable , will no longer follow , please recall PRN .
c/o right foot toe pain intermittently. Denies any pain on left AKA site. [place] +, has  on   Awaiting OR for intervention of Right toes by podiatry. NOAC on hold
Agree with above.  Appreciate vascular surgery and podiatry f/u.  continue comprehensive rehab, wound care, dvt ppx, pain management
discussed with  podiatry surgery planned for thursday   family asking to get it sooner , PM&R team is aware

## 2017-05-16 NOTE — PROGRESS NOTE ADULT - SUBJECTIVE AND OBJECTIVE BOX
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 Meyer. Subsequently admitted to acute rehab. Developed LLE post operative wound dehiscence, s/p left AKA and RLE angiogram on 4/14/17. Also s/p revascularization of RLE with angiogram and R SFA/POP angioplasty on 4/20/17. Hospital stay significant for: Completed course of cefazolin for UTI. Restarted on Abx for R foot second toe gangrene. Transfused two units PRBCs.    Interval history:   No acute events overnight. Seen and examined. Anticipating right 2nd toe amputation/discharge. Pain controlled.    REVIEW OF SYSTEMS  [ x ] Constitutional WNL  [   ] Cardio WNL  [   ] Resp WNL  [   ] GI WNL  [    ]  WNL  [ x ] Heme WNL  [ x ] Endo WNL  [   ] Skin WNL  [   ] MSK WNL  [   ] Neuro WNL  [x  ] Cognitive WNL  [x  ] Psych WNL    Vital Signs Last 24 Hrs  T(C): 36.6, Max: 37.2 (05-16 @ 05:07)  T(F): 97.9, Max: 99 (05-16 @ 05:07)  HR: 100 (73 - 100)  BP: 134/52 (77/43 - 135/62)  BP(mean): --  RR: 16 (16 - 18)  SpO2: 100% (96% - 100%)    PHYSICAL EXAM  Constitutional - NAD, Comfortable  Chest - CTA bilaterally.   Cardiovascular - RRR, S1S2, No murmurs  Abdomen - BS+, Soft, NTND  Extremities - No C/C/E, No calf tenderness of RLE  Neurologic Exam -                    Cognitive - Awake, Alert, AAO to self, place, date, year, situation     Communication - Fluent, No dysarthria     Motor - No focal deficits                    LEFT    UE - ShAB 5/5, EF 5/5, EE 5/5, WE 5/5,  5/5                    RIGHT UE - ShAB 5/5, EF 5/5, EE 5/5, WE 5/5,  5/5                    LEFT    LE - HF wfl                    RIGHT LE - At least 3-4/5, limited by pain     Sensory - Intact to LT   Psychiatric - Mood stable, Affect WNL  Skin--LLE AKA incision with staples, c/d/i,no erythema, + stump .  Right 2nd toe gangrene and tip of great toe-stable. R calcaneus stage II pressure ulcer. Black eschar over the 5th metatarsal on the plantar surface    Functional Progress:   Transfers: Supervision  Bed mobility: Min A  Toileting: Max A  ADLs:  LE dressing: Mod A; UE dressing and grooming: Supervision    MEDICATIONS  (STANDING):  docusate sodium 100milliGRAM(s) Oral three times a day  ascorbic acid 500milliGRAM(s) Oral daily  multivitamin 1Tablet(s) Oral daily  senna 2Tablet(s) Oral at bedtime  atorvastatin 40milliGRAM(s) Oral at bedtime  dextrose 5%. 1000milliLiter(s) IV Continuous <Continuous>  dextrose 50% Injectable 12.5Gram(s) IV Push once  dextrose 50% Injectable 25Gram(s) IV Push once  dextrose 50% Injectable 25Gram(s) IV Push once  ferrous    sulfate 325milliGRAM(s) Oral daily  folic acid 1milliGRAM(s) Oral daily  levothyroxine 88MICROGram(s) Oral daily  mirtazapine 7.5milliGRAM(s) Oral at bedtime  pantoprazole    Tablet 40milliGRAM(s) Oral before breakfast  polyethylene glycol 3350 17Gram(s) Oral daily  zinc sulfate 220milliGRAM(s) Oral daily  metoprolol 25milliGRAM(s) Oral every 12 hours  insulin lispro (HumaLOG) corrective regimen sliding scale  SubCutaneous three times a day before meals  nystatin Powder 1Application(s) Topical two times a day  gabapentin 100milliGRAM(s) Oral three times a day  cadexomer iodine Gel 1Application(s) Topical daily  tamsulosin 0.4milliGRAM(s) Oral at bedtime  insulin glargine Injectable (LANTUS) 12Unit(s) SubCutaneous at bedtime  insulin lispro Injectable (HumaLOG) 2Unit(s) SubCutaneous before breakfast  insulin lispro Injectable (HumaLOG) 4Unit(s) SubCutaneous before lunch  insulin lispro Injectable (HumaLOG) 2Unit(s) SubCutaneous before dinner  sodium chloride 0.9%. 1000milliLiter(s) IV Continuous <Continuous>    MEDICATIONS  (PRN):  acetaminophen   Tablet 650milliGRAM(s) Oral every 6 hours PRN For Temp greater than 38 C (100.4 F)  acetaminophen   Tablet. 650milliGRAM(s) Oral every 6 hours PRN Mild Pain (1 - 3)  dextrose Gel 1Dose(s) Oral once PRN Blood Glucose LESS THAN 70 milliGRAM(s)/deciliter  glucagon  Injectable 1milliGRAM(s) IntraMuscular once PRN Glucose LESS THAN 70 milligrams/deciliter  bisacodyl Suppository 10milliGRAM(s) Rectal daily PRN Constipation  oxyCODONE  5 mG/acetaminophen 325 mG 2Tablet(s) Oral every 4 hours PRN Severe Pain (7 - 10)  oxyCODONE  5 mG/acetaminophen 325 mG 1Tablet(s) Oral every 4 hours PRN Moderate Pain (4 - 6)  ALPRAZolam 0.25milliGRAM(s) Oral at bedtime PRN anxiety      RECENT LABS                        9.9    6.4   )-----------( 195      ( 15 May 2017 06:25 )             32.0     05-15    137  |  103  |  43.0<H>  ----------------------------<  64<L>  4.7   |  25.0  |  1.16    Ca    8.9      15 May 2017 06:25    CAPILLARY BLOOD GLUCOSE  168 (16 May 2017 08:00)  176 (15 May 2017 21:48)  124 (15 May 2017 18:47)  226 (15 May 2017 13:33)    ASSESSMENT AND PLAN:  82 year old F with extensive PMH including PVD s/p multiple stents, 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 Irwin, s/p left AKA and RLE angiogram on 4/14/17. Also s/p revascularization of RLE with angiogram and R SFA/POP angioplasty on 4/20/17. Hospital stay significant for: Completed course of cefazolin for UTI. Restarted on Abx for R foot second toe gangrene which was completed. Transfused two units PRBCs. Presents with functional deficits.     Hypotension: Improved; upward trend in BUN and Cr 5/15. Decrease lisinopril to 2.5mg. IVF as NPO for procedure.    PVD s/p L AKA: Residual limb- keep c/d/i.   given. Staples to be removed by surgeon    S/p RLE angio, R toe gangrene: Vascular following. Completed cefazolin course 5/4/17. Podiatry called for f/u: Xray of right 2nd toe negative, ordered iodosorb gel.  Right 2nd toe amputation today. Eliquis and plavix on hold.    R heel stage II ulcer:   Daily betadine and dressing change.  Cont off-loading with PRAFO    Pain control: Percocet, gabapentin. tylenol.     Bowel regimen: on Senna, colace, miralax. Bisacodyl suppository prn.    Urinary retention: Improved. UA: mod LE, WBC 26-50, urine cx neg.  Flomax  0.8 mg-decrease to 0.4 due to hypotension.     DM II: BG more stable. Continue lantus 12 units, humalog 2-4-2,  ISS AC.     HTN: BP stable. D/c Amlodipine 5/1,metoprolol 25 mg q12 per medicine recs on 5/1. Decrease lisinopril 2.5 mg 5/15    PAF: Eliquis-on hold due to pending surgery    CAD: Metoprolol 25 mg q12, lipitor, plavix on hold for pending surgery    Acute blood loss anemia: Hb stable. 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 Meyer. Subsequently admitted to acute rehab. Developed LLE post operative wound dehiscence, s/p left AKA and RLE angiogram on 4/14/17. Also s/p revascularization of RLE with angiogram and R SFA/POP angioplasty on 4/20/17. Hospital stay significant for: Completed course of cefazolin for UTI. Restarted on Abx for R foot second toe gangrene. Transfused two units PRBCs.    Interval history:   No acute events overnight. Seen and examined. Anticipating right 2nd toe amputation/discharge. Pain controlled.    REVIEW OF SYSTEMS  [ x ] Constitutional WNL  [   ] Cardio WNL  [   ] Resp WNL  [   ] GI WNL  [    ]  WNL  [ x ] Heme WNL  [ x ] Endo WNL  [   ] Skin WNL  [   ] MSK WNL  [   ] Neuro WNL  [x  ] Cognitive WNL  [x  ] Psych WNL    Vital Signs Last 24 Hrs  T(C): 36.6, Max: 37.2 (05-16 @ 05:07)  T(F): 97.9, Max: 99 (05-16 @ 05:07)  HR: 100 (73 - 100)  BP: 134/52 (77/43 - 135/62)  BP(mean): --  RR: 16 (16 - 18)  SpO2: 100% (96% - 100%)    PHYSICAL EXAM  Constitutional - NAD, Comfortable  Chest - CTA bilaterally.   Cardiovascular - RRR, S1S2, No murmurs  Abdomen - BS+, Soft, NTND  Extremities - No C/C/E, No calf tenderness of RLE  Neurologic Exam -                    Cognitive - Awake, Alert, AAO to self, place, date, year, situation     Communication - Fluent, No dysarthria     Motor - No focal deficits                    LEFT    UE - ShAB 5/5, EF 5/5, EE 5/5, WE 5/5,  5/5                    RIGHT UE - ShAB 5/5, EF 5/5, EE 5/5, WE 5/5,  5/5                    LEFT    LE - HF wfl                    RIGHT LE - At least 3-4/5, limited by pain     Sensory - Intact to LT   Psychiatric - Mood stable, Affect WNL  Skin--LLE AKA incision with staples, c/d/i,no erythema, + stump .  Right 2nd toe gangrene and tip of great toe-stable. R calcaneus stage II pressure ulcer. Black eschar over the 5th metatarsal on the plantar surface    Functional Progress:   Transfers: Supervision  Bed mobility: Min A  Toileting: Max A  ADLs:  LE dressing: Mod A; UE dressing and grooming: Supervision    MEDICATIONS  (STANDING):  docusate sodium 100milliGRAM(s) Oral three times a day  ascorbic acid 500milliGRAM(s) Oral daily  multivitamin 1Tablet(s) Oral daily  senna 2Tablet(s) Oral at bedtime  atorvastatin 40milliGRAM(s) Oral at bedtime  dextrose 5%. 1000milliLiter(s) IV Continuous <Continuous>  dextrose 50% Injectable 12.5Gram(s) IV Push once  dextrose 50% Injectable 25Gram(s) IV Push once  dextrose 50% Injectable 25Gram(s) IV Push once  ferrous    sulfate 325milliGRAM(s) Oral daily  folic acid 1milliGRAM(s) Oral daily  levothyroxine 88MICROGram(s) Oral daily  mirtazapine 7.5milliGRAM(s) Oral at bedtime  pantoprazole    Tablet 40milliGRAM(s) Oral before breakfast  polyethylene glycol 3350 17Gram(s) Oral daily  zinc sulfate 220milliGRAM(s) Oral daily  metoprolol 25milliGRAM(s) Oral every 12 hours  insulin lispro (HumaLOG) corrective regimen sliding scale  SubCutaneous three times a day before meals  nystatin Powder 1Application(s) Topical two times a day  gabapentin 100milliGRAM(s) Oral three times a day  cadexomer iodine Gel 1Application(s) Topical daily  tamsulosin 0.4milliGRAM(s) Oral at bedtime  insulin glargine Injectable (LANTUS) 12Unit(s) SubCutaneous at bedtime  insulin lispro Injectable (HumaLOG) 2Unit(s) SubCutaneous before breakfast  insulin lispro Injectable (HumaLOG) 4Unit(s) SubCutaneous before lunch  insulin lispro Injectable (HumaLOG) 2Unit(s) SubCutaneous before dinner  sodium chloride 0.9%. 1000milliLiter(s) IV Continuous <Continuous>    MEDICATIONS  (PRN):  acetaminophen   Tablet 650milliGRAM(s) Oral every 6 hours PRN For Temp greater than 38 C (100.4 F)  acetaminophen   Tablet. 650milliGRAM(s) Oral every 6 hours PRN Mild Pain (1 - 3)  dextrose Gel 1Dose(s) Oral once PRN Blood Glucose LESS THAN 70 milliGRAM(s)/deciliter  glucagon  Injectable 1milliGRAM(s) IntraMuscular once PRN Glucose LESS THAN 70 milligrams/deciliter  bisacodyl Suppository 10milliGRAM(s) Rectal daily PRN Constipation  oxyCODONE  5 mG/acetaminophen 325 mG 2Tablet(s) Oral every 4 hours PRN Severe Pain (7 - 10)  oxyCODONE  5 mG/acetaminophen 325 mG 1Tablet(s) Oral every 4 hours PRN Moderate Pain (4 - 6)  ALPRAZolam 0.25milliGRAM(s) Oral at bedtime PRN anxiety      RECENT LABS                        9.9    6.4   )-----------( 195      ( 15 May 2017 06:25 )             32.0     05-15    137  |  103  |  43.0<H>  ----------------------------<  64<L>  4.7   |  25.0  |  1.16    Ca    8.9      15 May 2017 06:25    CAPILLARY BLOOD GLUCOSE  168 (16 May 2017 08:00)  176 (15 May 2017 21:48)  124 (15 May 2017 18:47)  226 (15 May 2017 13:33)    ASSESSMENT AND PLAN:  82 year old F with extensive PMH including PVD s/p multiple stents, 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 Irwin, s/p left AKA and RLE angiogram on 4/14/17. Also s/p revascularization of RLE with angiogram and R SFA/POP angioplasty on 4/20/17. Hospital stay significant for: Completed course of cefazolin for UTI. Restarted on Abx for R foot second toe gangrene which was completed. Transfused two units PRBCs. Presents with functional deficits.     Hypotension: Improved; upward trend in BUN and Cr 5/15. Decreased lisinopril to 2.5mg. IVF as NPO for procedure.    PVD s/p L AKA: Residual limb- keep c/d/i.   given. Staples to be removed by surgeon    S/p RLE angio, R toe gangrene: Vascular following. Completed cefazolin course 5/4/17. Podiatry called for f/u: Xray of right 2nd toe negative, ordered iodosorb gel.  Right 2nd toe amputation today. Eliquis and plavix on hold.    R heel stage II ulcer:   Daily betadine and dressing change.  Cont off-loading with PRAFO    Pain control: Percocet, gabapentin. tylenol.     Bowel regimen: on Senna, colace, miralax. Bisacodyl suppository prn.    Urinary retention: Improved. UA: mod LE, WBC 26-50, urine cx neg.  Flomax  0.8 mg-decreased to 0.4 due to hypotension.     DM II: BG more stable. Continue lantus 12 units, humalog 2-4-2,  ISS AC.     HTN: BP stable. D/c Amlodipine 5/1,metoprolol 25 mg q12 per medicine recs on 5/1. Decrease lisinopril 2.5 mg 5/15    PAF: Eliquis-on hold due to pending surgery    CAD: Metoprolol 25 mg q12, lipitor, plavix on hold for pending surgery    Acute blood loss anemia: Hb stable. FeSO4    Hypothyroidism: Synthroid    Appetite/mood: Remeron    GI/supplements: Protonix, zinc sulfate, MVI, folic acid    Anxiety: Xanax qhs prn    Stable for discharge today

## 2017-05-16 NOTE — H&P ADULT - PROBLEM SELECTOR PLAN 2
Continue sliding sale coverage.  Resume pre-meals and Lantus after OR. Continue sliding sale coverage.  Resume pre-meals  after OR when diet start   lantus will hold today had it last night , NPO still

## 2017-05-16 NOTE — H&P ADULT - PMH
CAD (coronary artery disease)    Diabetes    Hyperlipidemia    Hypertension    PAF (paroxysmal atrial fibrillation)    PVD (peripheral vascular disease)

## 2017-05-16 NOTE — PROGRESS NOTE ADULT - PROBLEM SELECTOR PROBLEM 4
Anemia due to blood loss
Coronary artery disease involving native coronary artery of native heart without angina pectoris
Essential hypertension
Anemia due to blood loss
Coronary artery disease involving native coronary artery of native heart without angina pectoris
Coronary artery disease involving native coronary artery of native heart without angina pectoris
Essential hypertension
Urinary retention
Anemia due to blood loss

## 2017-05-16 NOTE — PROGRESS NOTE ADULT - I WAS PHYSICALLY PRESENT FOR THE KEY PORTIONS OF THE EVALUATION AND MANAGEMENT (E/M) SERVICE PROVIDED.  I AGREE WITH THE ABOVE HISTORY, PHYSICAL, AND PLAN WHICH I HAVE REVIEWED AND EDITED WHERE APPROPRIATE
Statement Selected

## 2017-05-16 NOTE — H&P ADULT - NSHPLABSRESULTS_GEN_ALL_CORE
9.9    6.4   )-----------( 195      ( 15 May 2017 06:25 )             32.0     15 May 2017 06:25    137    |  103    |  43.0   ----------------------------<  64     4.7     |  25.0   |  1.16     Ca    8.9        15 May 2017 06:25        CAPILLARY BLOOD GLUCOSE  191 (16 May 2017 12:05)  168 (16 May 2017 08:00)  176 (15 May 2017 21:48)  124 (15 May 2017 18:47)  226 (15 May 2017 13:33)

## 2017-05-16 NOTE — H&P ADULT - NSHPPHYSICALEXAM_GEN_ALL_CORE
PHYSICAL EXAM:    GENERAL: NAD, well-groomed, well-developed  HEAD:  Atraumatic, Normocephalic  EYES: EOMI, PERRLA, conjunctiva and sclera clear  ENMT: No tonsillar erythema, exudates, or enlargement; Moist mucous membranes, Good dentition, No lesions  NECK: Supple, No JVD, Normal thyroid  NERVOUS SYSTEM:  Alert & Oriented X3, Good concentration; Motor Strength 5/5 B/L upper and lower extremities  CHEST/LUNG: Clear to auscultation bilaterally; No rales, rhonchi, wheezing, or rubs  HEART: Regular rate and rhythm; No murmurs, rubs, or gallops  ABDOMEN: Soft, Nontender, Nondistended; Bowel sounds present  EXTREMITIES:  2+ Peripheral Pulses, No clubbing, cyanosis, or edema; Left AKA; Right foot with dressing in place  SKIN: No rashes or lesions PHYSICAL EXAM:    GENERAL: NAD, well-groomed, well-developed  HEAD:  Atraumatic, Normocephalic  EYES: EOMI, PERRLA, conjunctiva and sclera clear  ENMT: No tonsillar erythema, exudates, or enlargement; Moist mucous membranes, Good dentition, No lesions  NECK: Supple, No JVD, Normal thyroid  NERVOUS SYSTEM:  Alert & Oriented X3, Good concentration; Motor Strength 5/5 B/L upper and lower extremities  CHEST/LUNG: Clear to auscultation bilaterally; No rales, rhonchi, wheezing, or rubs  HEART: Regular rate and rhythm; No murmurs, rubs, or gallops  ABDOMEN: Soft, Nontender, Nondistended; Bowel sounds present  EXTREMITIES:  2+ Peripheral Pulses, No clubbing, cyanosis, or edema; Left AKA; Right foot with dressing in place  SKIN: No rashes or lesions, pale color

## 2017-05-16 NOTE — PROGRESS NOTE ADULT - PROBLEM SELECTOR PLAN 2
Continue sliding sale coverage.  Resume pre-meals and Lantus after OR.
continue stool softener, laxative as needed, will add dulcolax tablet
controlled on current regimen lantus, premeals insulin sliding scale   when NPO for procedure hold lantus at pm
hypotension , off amlodipine now   cont metoprolol
BG improved control on current RX
Lantus decreased to 12units given FS 73 this am.    Continue pre-meals and sliding sale coverage.  when NPO for procedure hold lantus at pm
controlled on current regiemn
controlled, continue amlodipine and lisinopril and metoprolol
iron studies ordered, will continue iron supplement
S/P left AKA-staples to removed approximately 5/14?   s/p revascularization of RLE with angiogram and R SFA/POP angioplasty on 4/20/17  Wound Care
stop Norvasc  Give Lopressor 25 mg this am and resume q12 hr dosing with parameters  continue Lisinopril

## 2017-05-16 NOTE — PROGRESS NOTE ADULT - SUBJECTIVE AND OBJECTIVE BOX
CC: F/u Hypotension, s/p left AKA, RLE angioplasty.  Medical clearance for Right 2nd digit amputation scheduled for today    HPI: 85 yr old female with PMH CAD s/p stents x 2 , DM2, hypertension, hyperlipidemia, anxiety, significant PVD s/p multiple stents, paroxysmal atrial fibrillation on Eliquis admitted for hypertensive urgency post LE angiogram. 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 Hospital course complicated by urinary retention with UTI (failed TOV), hyponatremia likely due to pain and improved with NaCl (per nephrology) and pain medications.  Patient was stable for discharge to acute rehab. Followed by Vascular surgery, Podiatry. Patient was readmitted to medical service secondary to wound dehiscence and fever, underwent left AKA and RLE angioplasty.  Hospital coarse complicated by UTI, treated.  Patient remained stable and transferred to rehab.  Podiatry following and recommending right 2nd digit amputation.     INTERVAL HPI/OVERNIGHT EVENTS: Patient seen and examined lying in bed.  Pain controlled. BP improved.  Patient states she feels "dry."  Patient denies any headache, dizziness, SOB, CP, abdominal pain, nausea, vomiting, dysuria, diarrhea, constipation.  Last BM 5/15/17.  Other ROS reviewed and are negative.    Vital Signs Last 24 Hrs  T(C): 37.2, Max: 37.2 (05-16 @ 05:07)  T(F): 99, Max: 99 (05-16 @ 05:07)  HR: 82 (73 - 82)  BP: 135/62 (77/43 - 135/62)  BP(mean): --  RR: 16 (16 - 18)  SpO2: 97% (96% - 98%)  I&O's Detail  I & Os for 24h ending 16 May 2017 07:00  =============================================  IN:    Total IN: 0 ml  ---------------------------------------------  OUT:    Voided: 1 ml    Total OUT: 1 ml  ---------------------------------------------  Total NET: -1 ml    I & Os for current day (as of 16 May 2017 10:05)  =============================================  IN:    Total IN: 0 ml  ---------------------------------------------  OUT:    Voided: 1 ml    Total OUT: 1 ml  ---------------------------------------------  Total NET: -1 ml      PHYSICAL EXAM:  GENERAL: NAD, well-groomed, well-developed  HEAD:  Atraumatic, Normocephalic  EYES: EOMI, PERRLA, conjunctiva and sclera clear  NECK: Supple, No JVD, Normal thyroid  NERVOUS SYSTEM:  Alert & Oriented X3, Good concentration; Motor Strength 5/5 B/L upper and lower extremities  CHEST/LUNG: Clear to auscultation bilaterally; No rales, rhonchi, wheezing, or rubs  HEART: Regular rate and rhythm; No murmurs, rubs, or gallops  ABDOMEN: Soft, Nontender, Nondistended; Bowel sounds present  EXTREMITIES:  2+ Peripheral Pulses, No clubbing, cyanosis, or edema; Left AKA; Right LE dressing in place.  SKIN: No rashes or lesions                            9.9    6.4   )-----------( 195      ( 15 May 2017 06:25 )             32.0     15 May 2017 06:25    137    |  103    |  43.0   ----------------------------<  64     4.7     |  25.0   |  1.16     Ca    8.9        15 May 2017 06:25        CAPILLARY BLOOD GLUCOSE  168 (16 May 2017 08:00)  176 (15 May 2017 21:48)  124 (15 May 2017 18:47)  226 (15 May 2017 13:33)          MEDICATIONS  (STANDING):  docusate sodium 100milliGRAM(s) Oral three times a day  ascorbic acid 500milliGRAM(s) Oral daily  multivitamin 1Tablet(s) Oral daily  senna 2Tablet(s) Oral at bedtime  atorvastatin 40milliGRAM(s) Oral at bedtime  dextrose 5%. 1000milliLiter(s) IV Continuous <Continuous>  dextrose 50% Injectable 12.5Gram(s) IV Push once  dextrose 50% Injectable 25Gram(s) IV Push once  dextrose 50% Injectable 25Gram(s) IV Push once  ferrous    sulfate 325milliGRAM(s) Oral daily  folic acid 1milliGRAM(s) Oral daily  levothyroxine 88MICROGram(s) Oral daily  mirtazapine 7.5milliGRAM(s) Oral at bedtime  pantoprazole    Tablet 40milliGRAM(s) Oral before breakfast  polyethylene glycol 3350 17Gram(s) Oral daily  zinc sulfate 220milliGRAM(s) Oral daily  metoprolol 25milliGRAM(s) Oral every 12 hours  insulin lispro (HumaLOG) corrective regimen sliding scale  SubCutaneous three times a day before meals  nystatin Powder 1Application(s) Topical two times a day  gabapentin 100milliGRAM(s) Oral three times a day  cadexomer iodine Gel 1Application(s) Topical daily  tamsulosin 0.4milliGRAM(s) Oral at bedtime  insulin glargine Injectable (LANTUS) 12Unit(s) SubCutaneous at bedtime  insulin lispro Injectable (HumaLOG) 2Unit(s) SubCutaneous before breakfast  insulin lispro Injectable (HumaLOG) 4Unit(s) SubCutaneous before lunch  insulin lispro Injectable (HumaLOG) 2Unit(s) SubCutaneous before dinner  sodium chloride 0.9%. 1000milliLiter(s) IV Continuous <Continuous>    MEDICATIONS  (PRN):  acetaminophen   Tablet 650milliGRAM(s) Oral every 6 hours PRN For Temp greater than 38 C (100.4 F)  acetaminophen   Tablet. 650milliGRAM(s) Oral every 6 hours PRN Mild Pain (1 - 3)  dextrose Gel 1Dose(s) Oral once PRN Blood Glucose LESS THAN 70 milliGRAM(s)/deciliter  glucagon  Injectable 1milliGRAM(s) IntraMuscular once PRN Glucose LESS THAN 70 milligrams/deciliter  bisacodyl Suppository 10milliGRAM(s) Rectal daily PRN Constipation  oxyCODONE  5 mG/acetaminophen 325 mG 2Tablet(s) Oral every 4 hours PRN Severe Pain (7 - 10)  oxyCODONE  5 mG/acetaminophen 325 mG 1Tablet(s) Oral every 4 hours PRN Moderate Pain (4 - 6)  ALPRAZolam 0.25milliGRAM(s) Oral at bedtime PRN anxiety

## 2017-05-16 NOTE — H&P ADULT - HISTORY OF PRESENT ILLNESS
85 yr old female with PMH CAD s/p stents x 2 , DM2, hypertension, hyperlipidemia, anxiety, significant PVD s/p multiple stents, paroxysmal atrial fibrillation on Eliquis admitted for hypertensive urgency post LE angiogram. 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 Hospital course complicated by urinary retention with UTI (failed TOV), hyponatremia likely due to pain and improved with NaCl (per nephrology) and pain medications.  Patient was stable for discharge to acute rehab. Followed by Vascular surgery, Podiatry. Patient was readmitted to medical service secondary to wound dehiscence and fever, underwent left AKA and RLE angioplasty.  Hospital coarse complicated by UTI, treated.  Patient remained stable and transferred to rehab.  Podiatry following and recommending right 2nd digit amputation. Patient transferred to medical service for OR, s/p right 2nd toe amputation secondary to gangrene. 85 yr old female with PMH CAD s/p stents x 2 , DM2, hypertension, hyperlipidemia, anxiety, significant PVD s/p multiple stents, paroxysmal atrial fibrillation on Eliquis admitted for hypertensive urgency post LE angiogram. 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 Hospital course complicated by urinary retention with UTI (failed TOV), hyponatremia likely due to pain and improved with NaCl (per nephrology) and pain medications.  Patient was stable for discharge to acute rehab. Followed by Vascular surgery, Podiatry. Patient was readmitted to medical service secondary to wound dehiscence and fever, underwent left AKA and RLE angioplasty.  Hospital coarse complicated by UTI, treated.  Patient remained stable and transferred to rehab.  Podiatry following and recommending right 2nd digit amputation. Patient transferred to medical service for OR, s/p right 2nd toe amputation secondary to gangrene.Seen post op , c/o back pain no dyspnea, chest pain or nausea .

## 2017-05-16 NOTE — H&P ADULT - PROBLEM SELECTOR PLAN 3
stable continue current medications  Plavix on hold - will resume when ok with vascular. stable continue current medications ,will resume plavix maddie per vascular

## 2017-05-16 NOTE — H&P ADULT - ASSESSMENT
85 yr old female with PMH CAD s/p stents x 2 , DM2, hypertension, hyperlipidemia, anxiety, significant PVD s/p multiple stents, paroxysmal atrial fibrillation on Eliquis admitted for hypertensive urgency post LE angiogram. 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 Hospital course complicated by urinary retention with UTI (failed TOV), hyponatremia likely due to pain and improved with NaCl (per nephrology) and pain medications.  Patient was stable for discharge to acute rehab. Followed by Vascular surgery, Podiatry. Patient was readmitted to medical service secondary to wound dehiscence and fever, underwent left AKA and RLE angioplasty.  Hospital coarse complicated by UTI, treated.  Patient remained stable and transferred to rehab.  Podiatry following and recommending right 2nd digit amputation. Patient transferred to medicine service for right 2nd toe amputation.

## 2017-05-16 NOTE — H&P ADULT - NSHPSOCIALHISTORY_GEN_ALL_CORE
Lives in Assisted Living  Tobacco - Former smoker - Quit 30years ago  ETOH - Denies  Drug use - Denies

## 2017-05-16 NOTE — H&P ADULT - PROBLEM SELECTOR PROBLEM 2
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

## 2017-05-16 NOTE — PROGRESS NOTE ADULT - PROBLEM SELECTOR PLAN 4
Hypotension improved.  Continue to hold Lisinopril for now.  Resume when BP improves.  Monitor BP.
controlled on current therapy
iron supplement  daily
stable no angina
Symptomatic hypotension this am.  Will discontinue Lisinopril for now.  Monitor BP.  If continues to be hypotensive, would consider to lower Metoprolol to 12.5mg PO BID.
continue plavix, metoprolol
controlled
increase flomax to 0.8  continue bladderscan and startight cath for >300  maintain bowel rx to prevent constipation
iron supplement  monitor  labs
stable no angina
Venofer

## 2017-05-17 ENCOUNTER — TRANSCRIPTION ENCOUNTER (OUTPATIENT)
Age: 82
End: 2017-05-17

## 2017-05-17 VITALS
SYSTOLIC BLOOD PRESSURE: 144 MMHG | TEMPERATURE: 98 F | HEART RATE: 80 BPM | DIASTOLIC BLOOD PRESSURE: 70 MMHG | RESPIRATION RATE: 18 BRPM

## 2017-05-17 LAB
ANION GAP SERPL CALC-SCNC: 8 MMOL/L — SIGNIFICANT CHANGE UP (ref 5–17)
BASOPHILS # BLD AUTO: 0 K/UL — SIGNIFICANT CHANGE UP (ref 0–0.2)
BASOPHILS NFR BLD AUTO: 0.2 % — SIGNIFICANT CHANGE UP (ref 0–2)
BUN SERPL-MCNC: 28 MG/DL — HIGH (ref 8–20)
CALCIUM SERPL-MCNC: 9.5 MG/DL — SIGNIFICANT CHANGE UP (ref 8.6–10.2)
CHLORIDE SERPL-SCNC: 104 MMOL/L — SIGNIFICANT CHANGE UP (ref 98–107)
CO2 SERPL-SCNC: 26 MMOL/L — SIGNIFICANT CHANGE UP (ref 22–29)
CREAT SERPL-MCNC: 0.79 MG/DL — SIGNIFICANT CHANGE UP (ref 0.5–1.3)
EOSINOPHIL # BLD AUTO: 0.6 K/UL — HIGH (ref 0–0.5)
EOSINOPHIL NFR BLD AUTO: 13.1 % — HIGH (ref 0–6)
GLUCOSE SERPL-MCNC: 81 MG/DL — SIGNIFICANT CHANGE UP (ref 70–115)
HCT VFR BLD CALC: 28.8 % — LOW (ref 37–47)
HGB BLD-MCNC: 9.2 G/DL — LOW (ref 12–16)
LYMPHOCYTES # BLD AUTO: 1 K/UL — SIGNIFICANT CHANGE UP (ref 1–4.8)
LYMPHOCYTES # BLD AUTO: 21.3 % — SIGNIFICANT CHANGE UP (ref 20–55)
MAGNESIUM SERPL-MCNC: 1.7 MG/DL — SIGNIFICANT CHANGE UP (ref 1.6–2.6)
MCHC RBC-ENTMCNC: 28.2 PG — SIGNIFICANT CHANGE UP (ref 27–31)
MCHC RBC-ENTMCNC: 31.9 G/DL — LOW (ref 32–36)
MCV RBC AUTO: 88.3 FL — SIGNIFICANT CHANGE UP (ref 81–99)
MONOCYTES # BLD AUTO: 0.4 K/UL — SIGNIFICANT CHANGE UP (ref 0–0.8)
MONOCYTES NFR BLD AUTO: 9.5 % — SIGNIFICANT CHANGE UP (ref 3–10)
NEUTROPHILS # BLD AUTO: 2.6 K/UL — SIGNIFICANT CHANGE UP (ref 1.8–8)
NEUTROPHILS NFR BLD AUTO: 55.7 % — SIGNIFICANT CHANGE UP (ref 37–73)
PHOSPHATE SERPL-MCNC: 3.5 MG/DL — SIGNIFICANT CHANGE UP (ref 2.4–4.7)
PLATELET # BLD AUTO: 166 K/UL — SIGNIFICANT CHANGE UP (ref 150–400)
POTASSIUM SERPL-MCNC: 4.6 MMOL/L — SIGNIFICANT CHANGE UP (ref 3.5–5.3)
POTASSIUM SERPL-SCNC: 4.6 MMOL/L — SIGNIFICANT CHANGE UP (ref 3.5–5.3)
RBC # BLD: 3.26 M/UL — LOW (ref 4.4–5.2)
RBC # FLD: 16.6 % — HIGH (ref 11–15.6)
SODIUM SERPL-SCNC: 138 MMOL/L — SIGNIFICANT CHANGE UP (ref 135–145)
WBC # BLD: 4.6 K/UL — LOW (ref 4.8–10.8)
WBC # FLD AUTO: 4.6 K/UL — LOW (ref 4.8–10.8)

## 2017-05-17 PROCEDURE — 97542 WHEELCHAIR MNGMENT TRAINING: CPT

## 2017-05-17 PROCEDURE — 99239 HOSP IP/OBS DSCHRG MGMT >30: CPT

## 2017-05-17 PROCEDURE — 97110 THERAPEUTIC EXERCISES: CPT

## 2017-05-17 PROCEDURE — 93005 ELECTROCARDIOGRAM TRACING: CPT

## 2017-05-17 PROCEDURE — 86900 BLOOD TYPING SEROLOGIC ABO: CPT

## 2017-05-17 PROCEDURE — 97167 OT EVAL HIGH COMPLEX 60 MIN: CPT

## 2017-05-17 PROCEDURE — 86901 BLOOD TYPING SEROLOGIC RH(D): CPT

## 2017-05-17 PROCEDURE — 84466 ASSAY OF TRANSFERRIN: CPT

## 2017-05-17 PROCEDURE — 80048 BASIC METABOLIC PNL TOTAL CA: CPT

## 2017-05-17 PROCEDURE — 86850 RBC ANTIBODY SCREEN: CPT

## 2017-05-17 PROCEDURE — 81001 URINALYSIS AUTO W/SCOPE: CPT

## 2017-05-17 PROCEDURE — 97112 NEUROMUSCULAR REEDUCATION: CPT

## 2017-05-17 PROCEDURE — 83550 IRON BINDING TEST: CPT

## 2017-05-17 PROCEDURE — 85027 COMPLETE CBC AUTOMATED: CPT

## 2017-05-17 PROCEDURE — 83735 ASSAY OF MAGNESIUM: CPT

## 2017-05-17 PROCEDURE — 97530 THERAPEUTIC ACTIVITIES: CPT

## 2017-05-17 PROCEDURE — 84134 ASSAY OF PREALBUMIN: CPT

## 2017-05-17 PROCEDURE — 87086 URINE CULTURE/COLONY COUNT: CPT

## 2017-05-17 PROCEDURE — 97116 GAIT TRAINING THERAPY: CPT

## 2017-05-17 PROCEDURE — 88305 TISSUE EXAM BY PATHOLOGIST: CPT

## 2017-05-17 PROCEDURE — 97535 SELF CARE MNGMENT TRAINING: CPT

## 2017-05-17 PROCEDURE — 85610 PROTHROMBIN TIME: CPT

## 2017-05-17 PROCEDURE — 36415 COLL VENOUS BLD VENIPUNCTURE: CPT

## 2017-05-17 PROCEDURE — 80053 COMPREHEN METABOLIC PANEL: CPT

## 2017-05-17 PROCEDURE — 73630 X-RAY EXAM OF FOOT: CPT

## 2017-05-17 PROCEDURE — 88311 DECALCIFY TISSUE: CPT

## 2017-05-17 PROCEDURE — 97163 PT EVAL HIGH COMPLEX 45 MIN: CPT

## 2017-05-17 PROCEDURE — 84100 ASSAY OF PHOSPHORUS: CPT

## 2017-05-17 RX ORDER — SENNA PLUS 8.6 MG/1
2 TABLET ORAL
Qty: 60 | Refills: 0 | OUTPATIENT
Start: 2017-05-17 | End: 2017-06-16

## 2017-05-17 RX ORDER — INSULIN LISPRO 100/ML
2 VIAL (ML) SUBCUTANEOUS
Qty: 1 | Refills: 0 | OUTPATIENT
Start: 2017-05-17 | End: 2017-06-16

## 2017-05-17 RX ORDER — ATORVASTATIN CALCIUM 80 MG/1
1 TABLET, FILM COATED ORAL
Qty: 30 | Refills: 0 | OUTPATIENT
Start: 2017-05-17 | End: 2017-06-16

## 2017-05-17 RX ORDER — GABAPENTIN 400 MG/1
1 CAPSULE ORAL
Qty: 90 | Refills: 0 | OUTPATIENT
Start: 2017-05-17 | End: 2017-06-16

## 2017-05-17 RX ORDER — TAMSULOSIN HYDROCHLORIDE 0.4 MG/1
1 CAPSULE ORAL
Qty: 30 | Refills: 0 | OUTPATIENT
Start: 2017-05-17 | End: 2017-06-16

## 2017-05-17 RX ORDER — LEVOTHYROXINE SODIUM 125 MCG
1 TABLET ORAL
Qty: 30 | Refills: 0 | OUTPATIENT
Start: 2017-05-17 | End: 2017-06-16

## 2017-05-17 RX ORDER — METOPROLOL TARTRATE 50 MG
1 TABLET ORAL
Qty: 60 | Refills: 0 | OUTPATIENT
Start: 2017-05-17 | End: 2017-06-16

## 2017-05-17 RX ORDER — OXYCODONE HYDROCHLORIDE 5 MG/1
1 TABLET ORAL
Qty: 30 | Refills: 0 | OUTPATIENT
Start: 2017-05-17 | End: 2017-05-22

## 2017-05-17 RX ORDER — ALPRAZOLAM 0.25 MG
1 TABLET ORAL
Qty: 15 | Refills: 0 | OUTPATIENT
Start: 2017-05-17 | End: 2017-06-01

## 2017-05-17 RX ORDER — FERROUS FUMARATE 350(115)MG
1 TABLET ORAL
Qty: 30 | Refills: 0 | OUTPATIENT
Start: 2017-05-17 | End: 2017-06-16

## 2017-05-17 RX ORDER — ALPRAZOLAM 0.25 MG
1 TABLET ORAL
Qty: 30 | Refills: 0 | OUTPATIENT
Start: 2017-05-17 | End: 2017-06-16

## 2017-05-17 RX ORDER — PANTOPRAZOLE SODIUM 20 MG/1
1 TABLET, DELAYED RELEASE ORAL
Qty: 30 | Refills: 0 | OUTPATIENT
Start: 2017-05-17 | End: 2017-06-16

## 2017-05-17 RX ORDER — ASCORBIC ACID 60 MG
1 TABLET,CHEWABLE ORAL
Qty: 30 | Refills: 0 | OUTPATIENT
Start: 2017-05-17 | End: 2017-06-16

## 2017-05-17 RX ORDER — FOLIC ACID 0.8 MG
1 TABLET ORAL
Qty: 30 | Refills: 0 | OUTPATIENT
Start: 2017-05-17 | End: 2017-06-16

## 2017-05-17 RX ORDER — MIRTAZAPINE 45 MG/1
1 TABLET, ORALLY DISINTEGRATING ORAL
Qty: 0 | Refills: 0 | COMMUNITY
Start: 2017-05-17

## 2017-05-17 RX ORDER — DOCUSATE SODIUM 100 MG
1 CAPSULE ORAL
Qty: 90 | Refills: 0 | OUTPATIENT
Start: 2017-05-17 | End: 2017-06-16

## 2017-05-17 RX ORDER — MIRTAZAPINE 45 MG/1
1 TABLET, ORALLY DISINTEGRATING ORAL
Qty: 30 | Refills: 0 | OUTPATIENT
Start: 2017-05-17 | End: 2017-06-16

## 2017-05-17 RX ORDER — POLYETHYLENE GLYCOL 3350 17 G/17G
17 POWDER, FOR SOLUTION ORAL
Qty: 510 | Refills: 0 | OUTPATIENT
Start: 2017-05-17 | End: 2017-06-16

## 2017-05-17 RX ORDER — CLOPIDOGREL BISULFATE 75 MG/1
1 TABLET, FILM COATED ORAL
Qty: 30 | Refills: 0 | OUTPATIENT
Start: 2017-05-17 | End: 2017-06-16

## 2017-05-17 RX ADMIN — Medication 325 MILLIGRAM(S): at 12:56

## 2017-05-17 RX ADMIN — ZINC SULFATE TAB 220 MG (50 MG ZINC EQUIVALENT) 220 MILLIGRAM(S): 220 (50 ZN) TAB at 12:52

## 2017-05-17 RX ADMIN — Medication 1 MILLIGRAM(S): at 12:56

## 2017-05-17 RX ADMIN — ENOXAPARIN SODIUM 40 MILLIGRAM(S): 100 INJECTION SUBCUTANEOUS at 06:20

## 2017-05-17 RX ADMIN — Medication 25 MILLIGRAM(S): at 06:20

## 2017-05-17 RX ADMIN — Medication 100 MILLIGRAM(S): at 06:20

## 2017-05-17 RX ADMIN — GABAPENTIN 100 MILLIGRAM(S): 400 CAPSULE ORAL at 06:20

## 2017-05-17 RX ADMIN — Medication 1 TABLET(S): at 12:50

## 2017-05-17 RX ADMIN — CLOPIDOGREL BISULFATE 75 MILLIGRAM(S): 75 TABLET, FILM COATED ORAL at 12:50

## 2017-05-17 RX ADMIN — Medication 4: at 12:34

## 2017-05-17 RX ADMIN — Medication 4 UNIT(S): at 12:34

## 2017-05-17 RX ADMIN — GABAPENTIN 100 MILLIGRAM(S): 400 CAPSULE ORAL at 13:01

## 2017-05-17 RX ADMIN — NYSTATIN CREAM 1 APPLICATION(S): 100000 CREAM TOPICAL at 06:20

## 2017-05-17 RX ADMIN — Medication 500 MILLIGRAM(S): at 12:50

## 2017-05-17 RX ADMIN — PANTOPRAZOLE SODIUM 40 MILLIGRAM(S): 20 TABLET, DELAYED RELEASE ORAL at 06:20

## 2017-05-17 RX ADMIN — Medication 88 MICROGRAM(S): at 06:21

## 2017-05-17 NOTE — DISCHARGE NOTE ADULT - PATIENT PORTAL LINK FT
“You can access the Follow[facility] Patient Portal, offered by [facility], by registering with the following website: [email or web address]”

## 2017-05-17 NOTE — DISCHARGE NOTE ADULT - PLAN OF CARE
s/p toe amputation continue wound care, follow up with surgeon Dr Gloria   heel weight bearing on right foot continue plavix, restart eliquis in 2 days eliquis on hold due to surgery ;maddie to restart on friday evening per surgeon metoprolol, statin ferrous sulfate , monitor cbc

## 2017-05-17 NOTE — DISCHARGE NOTE ADULT - SECONDARY DIAGNOSIS.
PVD (peripheral vascular disease) PAF (paroxysmal atrial fibrillation) S/P AKA (above knee amputation) unilateral, left Diabetes Coronary artery disease involving native coronary artery of native heart without angina pectoris Anemia

## 2017-05-17 NOTE — DISCHARGE NOTE ADULT - MEDICATION SUMMARY - MEDICATIONS TO TAKE
I will START or STAY ON the medications listed below when I get home from the hospital:    acetaminophen-oxycodone 325 mg-5 mg oral tablet  -- 1 tab(s) by mouth every 4 hours, As needed, Moderate Pain (4 - 6)  -- Indication: For Pain    tamsulosin 0.4 mg oral capsule  -- 1 cap(s) by mouth once a day (at bedtime)  -- Indication: For urinary retention     apixaban 5 mg oral tablet  -- 1 tab(s) by mouth 2 times a day   start on  friday may 19 at night   -- Indication: For anticoagulation     gabapentin 100 mg oral capsule  -- 1 cap(s) by mouth 3 times a day  -- Indication: For neuropathy    mirtazapine 7.5 mg oral tablet  -- 1 tab(s) by mouth once a day (at bedtime)  -- Indication: For depression     insulin lispro 100 units/mL subcutaneous solution  -- 2 unit(s) subcutaneous 3 times a day (before meals) ; 2 Unit(s) if Glucose 151 - 200 4 Unit(s) if Glucose 201 - 250 6 Unit(s) if Glucose 251 - 300 8 Unit(s) if Glucose 301 - 350 10 Unit(s) if Glucose 351 - 400 12 Unit(s) if Glucose Greater Than 400  -- Indication: For diabetes    atorvastatin 40 mg oral tablet  -- 1 tab(s) by mouth once a day (at bedtime)  -- Indication: For hyperlipidemia    clopidogrel 75 mg oral tablet  -- 1 tab(s) by mouth once a day  -- Indication: For Peripheral vascular disease    ALPRAZolam 0.25 mg oral tablet  -- 1 tab(s) by mouth once a day (at bedtime), As needed, anxiety MDD:2  -- Indication: For anxiety    metoprolol tartrate 25 mg oral tablet  -- 1 tab(s) by mouth every 12 hours  -- Indication: For htn    Ferretts Iron 325 mg (106 mg elemental iron) oral tablet  -- 1 tab(s) by mouth once a day  -- May discolor urine or feces.    -- Indication: For anemia    bisacodyl 10 mg rectal suppository  -- 1 suppository(ies) rectally once a day, As needed, Constipation  -- Indication: For constipation     polyethylene glycol 3350 oral powder for reconstitution  -- 17 gram(s) by mouth once a day as needed for constipation   -- Indication: For constipation     docusate sodium 100 mg oral capsule  -- 1 cap(s) by mouth 3 times a day  -- Indication: For stool softener    senna oral tablet  -- 2 tab(s) by mouth once a day (at bedtime)  -- Indication: For stool softener    pantoprazole 40 mg oral delayed release tablet  -- 1 tab(s) by mouth once a day (before a meal)  -- Indication: For GERD    levothyroxine 88 mcg (0.088 mg) oral tablet  -- 1 tab(s) by mouth once a day  -- Indication: For hypothyroidsm     Multiple Vitamins oral tablet  -- 1 tab(s) by mouth once a day  -- Indication: For supplement     ascorbic acid 500 mg oral tablet  -- 1 tab(s) by mouth once a day  -- Indication: For supplement     folic acid 1 mg oral tablet  -- 1 tab(s) by mouth once a day  -- Indication: For supplement

## 2017-05-17 NOTE — DISCHARGE NOTE ADULT - HOSPITAL COURSE
85 yr old female with PMH CAD s/p stents x 2 , DM2, hypertension, hyperlipidemia, anxiety, significant PVD s/p multiple stents, paroxysmal atrial fibrillation on Eliquis admitted for hypertensive urgency post LE angiogram. 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 Hospital course complicated by urinary retention with UTI (failed TOV), hyponatremia likely due to pain and improved with NaCl (per nephrology) and pain medications.  Patient was stable for discharge to acute rehab. Followed by Vascular surgery, Podiatry. Patient was readmitted to medical service secondary to wound dehiscence and fever, underwent left AKA and RLE angioplasty.  Hospital coarse complicated by UTI, treated.  Patient remained stable and transferred to rehab.  Podiatry following and recommending right 2nd digit amputation. Patient transferred to medical service for OR, s/p right 2nd toe amputation secondary to gangrene. Seen post op , c/o back pain no dyspnea, chest pain or nausea . She did well post op , pain controlled with percocet  Surgery follow up discussed with DR Dc barillas for discharge to  assisted  living with Pt   to follow up with surgeon on friday , heel to weight bearing on the right foot .  Discussed with  and CCC   spend 40 min in total

## 2017-05-17 NOTE — PROGRESS NOTE ADULT - SUBJECTIVE AND OBJECTIVE BOX
POD# 1 s/p right 2nd toe amputation. Pain is controlled.    Vital Signs Last 24 Hrs  T(C): 36.7, Max: 36.8 (05-16 @ 12:05)  T(F): 98, Max: 98.2 (05-16 @ 12:05)  HR: 90 (70 - 91)  BP: 136/68 (130/46 - 176/70)  BP(mean): --  RR: 18 (12 - 19)  SpO2: 92% (92% - 100%)    PE:  R toe amp site clean, intact, minimal oozing, no strikethrough  heel with healing pressure ulcer.

## 2017-05-17 NOTE — PROGRESS NOTE ADULT - ASSESSMENT
Stable     - d/c today to assisted living  - heel touch weight bearing  - Pain control  - f/u in 2 days this friday 5/19/17

## 2017-05-17 NOTE — DISCHARGE NOTE ADULT - CARE PROVIDER_API CALL
Uyen Mckeon), Surgery; Vascular Surgery  [address] [place] [address]  Phone: [phone]  Fax: [phone]

## 2017-05-17 NOTE — DISCHARGE NOTE ADULT - CARE PLAN
Principal Discharge DX:	Toe gangrene  Goal:	s/p toe amputation  Instructions for follow-up, activity and diet:	continue wound care, follow up with surgeon Dr Gloria   heel weight bearing on right foot  Secondary Diagnosis:	PVD (peripheral vascular disease)  Instructions for follow-up, activity and diet:	continue plavix, restart eliquis in 2 days  Secondary Diagnosis:	PAF (paroxysmal atrial fibrillation)  Instructions for follow-up, activity and diet:	eliquis on hold due to surgery ;maddie to restart on friday evening per surgeon  Secondary Diagnosis:	S/P AKA (above knee amputation) unilateral, left  Secondary Diagnosis:	Diabetes  Secondary Diagnosis:	Coronary artery disease involving native coronary artery of native heart without angina pectoris  Instructions for follow-up, activity and diet:	metoprolol, statin  Secondary Diagnosis:	Anemia  Instructions for follow-up, activity and diet:	ferrous sulfate , monitor cbc

## 2017-07-29 ENCOUNTER — INPATIENT (INPATIENT)
Facility: HOSPITAL | Age: 82
LOS: 2 days | Discharge: NURSING, REHAB OR LONG-TERM CARE | DRG: 378 | End: 2017-08-01
Attending: INTERNAL MEDICINE | Admitting: EMERGENCY MEDICINE
Payer: COMMERCIAL

## 2017-07-29 VITALS
SYSTOLIC BLOOD PRESSURE: 157 MMHG | RESPIRATION RATE: 18 BRPM | HEIGHT: 65 IN | WEIGHT: 149.91 LBS | HEART RATE: 94 BPM | TEMPERATURE: 98 F | DIASTOLIC BLOOD PRESSURE: 79 MMHG | OXYGEN SATURATION: 100 %

## 2017-07-29 DIAGNOSIS — K92.1 MELENA: ICD-10-CM

## 2017-07-29 DIAGNOSIS — K62.5 HEMORRHAGE OF ANUS AND RECTUM: ICD-10-CM

## 2017-07-29 DIAGNOSIS — I25.10 ATHEROSCLEROTIC HEART DISEASE OF NATIVE CORONARY ARTERY WITHOUT ANGINA PECTORIS: ICD-10-CM

## 2017-07-29 DIAGNOSIS — Z90.49 ACQUIRED ABSENCE OF OTHER SPECIFIED PARTS OF DIGESTIVE TRACT: Chronic | ICD-10-CM

## 2017-07-29 DIAGNOSIS — Z89.612 ACQUIRED ABSENCE OF LEFT LEG ABOVE KNEE: Chronic | ICD-10-CM

## 2017-07-29 DIAGNOSIS — Z98.62 PERIPHERAL VASCULAR ANGIOPLASTY STATUS: Chronic | ICD-10-CM

## 2017-07-29 DIAGNOSIS — I73.9 PERIPHERAL VASCULAR DISEASE, UNSPECIFIED: ICD-10-CM

## 2017-07-29 DIAGNOSIS — D50.0 IRON DEFICIENCY ANEMIA SECONDARY TO BLOOD LOSS (CHRONIC): ICD-10-CM

## 2017-07-29 DIAGNOSIS — E11.9 TYPE 2 DIABETES MELLITUS WITHOUT COMPLICATIONS: ICD-10-CM

## 2017-07-29 LAB
ALBUMIN SERPL ELPH-MCNC: 3.9 G/DL — SIGNIFICANT CHANGE UP (ref 3.3–5.2)
ALP SERPL-CCNC: 78 U/L — SIGNIFICANT CHANGE UP (ref 40–120)
ALT FLD-CCNC: 9 U/L — SIGNIFICANT CHANGE UP
ANION GAP SERPL CALC-SCNC: 11 MMOL/L — SIGNIFICANT CHANGE UP (ref 5–17)
APTT BLD: 22.1 SEC — LOW (ref 27.5–37.4)
APTT BLD: 27.9 SEC — SIGNIFICANT CHANGE UP (ref 27.5–37.4)
AST SERPL-CCNC: 10 U/L — SIGNIFICANT CHANGE UP
BASOPHILS # BLD AUTO: 0 K/UL — SIGNIFICANT CHANGE UP (ref 0–0.2)
BASOPHILS # BLD AUTO: 0 K/UL — SIGNIFICANT CHANGE UP (ref 0–0.2)
BASOPHILS NFR BLD AUTO: 0.2 % — SIGNIFICANT CHANGE UP (ref 0–2)
BASOPHILS NFR BLD AUTO: 0.3 % — SIGNIFICANT CHANGE UP (ref 0–2)
BILIRUB SERPL-MCNC: 0.3 MG/DL — LOW (ref 0.4–2)
BLD GP AB SCN SERPL QL: SIGNIFICANT CHANGE UP
BUN SERPL-MCNC: 24 MG/DL — HIGH (ref 8–20)
CALCIUM SERPL-MCNC: 9.1 MG/DL — SIGNIFICANT CHANGE UP (ref 8.6–10.2)
CHLORIDE SERPL-SCNC: 99 MMOL/L — SIGNIFICANT CHANGE UP (ref 98–107)
CO2 SERPL-SCNC: 25 MMOL/L — SIGNIFICANT CHANGE UP (ref 22–29)
CREAT SERPL-MCNC: 0.94 MG/DL — SIGNIFICANT CHANGE UP (ref 0.5–1.3)
EOSINOPHIL # BLD AUTO: 0 K/UL — SIGNIFICANT CHANGE UP (ref 0–0.5)
EOSINOPHIL # BLD AUTO: 0.2 K/UL — SIGNIFICANT CHANGE UP (ref 0–0.5)
EOSINOPHIL NFR BLD AUTO: 0.4 % — SIGNIFICANT CHANGE UP (ref 0–6)
EOSINOPHIL NFR BLD AUTO: 2.4 % — SIGNIFICANT CHANGE UP (ref 0–6)
GLUCOSE SERPL-MCNC: 315 MG/DL — HIGH (ref 70–115)
HCT VFR BLD CALC: 23.9 % — LOW (ref 37–47)
HCT VFR BLD CALC: 25.6 % — LOW (ref 37–47)
HCT VFR BLD CALC: 31.7 % — LOW (ref 37–47)
HGB BLD-MCNC: 10.2 G/DL — LOW (ref 12–16)
HGB BLD-MCNC: 7.7 G/DL — LOW (ref 12–16)
HGB BLD-MCNC: 8.5 G/DL — LOW (ref 12–16)
INR BLD: 0.96 RATIO — SIGNIFICANT CHANGE UP (ref 0.88–1.16)
INR BLD: 1.06 RATIO — SIGNIFICANT CHANGE UP (ref 0.88–1.16)
LYMPHOCYTES # BLD AUTO: 1 K/UL — SIGNIFICANT CHANGE UP (ref 1–4.8)
LYMPHOCYTES # BLD AUTO: 1.1 K/UL — SIGNIFICANT CHANGE UP (ref 1–4.8)
LYMPHOCYTES # BLD AUTO: 13.8 % — LOW (ref 20–55)
LYMPHOCYTES # BLD AUTO: 15.4 % — LOW (ref 20–55)
MCHC RBC-ENTMCNC: 29.1 PG — SIGNIFICANT CHANGE UP (ref 27–31)
MCHC RBC-ENTMCNC: 29.2 PG — SIGNIFICANT CHANGE UP (ref 27–31)
MCHC RBC-ENTMCNC: 32.2 G/DL — SIGNIFICANT CHANGE UP (ref 32–36)
MCHC RBC-ENTMCNC: 32.2 G/DL — SIGNIFICANT CHANGE UP (ref 32–36)
MCV RBC AUTO: 90.5 FL — SIGNIFICANT CHANGE UP (ref 81–99)
MCV RBC AUTO: 90.6 FL — SIGNIFICANT CHANGE UP (ref 81–99)
MONOCYTES # BLD AUTO: 0.3 K/UL — SIGNIFICANT CHANGE UP (ref 0–0.8)
MONOCYTES # BLD AUTO: 0.5 K/UL — SIGNIFICANT CHANGE UP (ref 0–0.8)
MONOCYTES NFR BLD AUTO: 3.4 % — SIGNIFICANT CHANGE UP (ref 3–10)
MONOCYTES NFR BLD AUTO: 7.4 % — SIGNIFICANT CHANGE UP (ref 3–10)
NEUTROPHILS # BLD AUTO: 4.8 K/UL — SIGNIFICANT CHANGE UP (ref 1.8–8)
NEUTROPHILS # BLD AUTO: 6.5 K/UL — SIGNIFICANT CHANGE UP (ref 1.8–8)
NEUTROPHILS NFR BLD AUTO: 74.6 % — HIGH (ref 37–73)
NEUTROPHILS NFR BLD AUTO: 81.8 % — HIGH (ref 37–73)
PLATELET # BLD AUTO: 188 K/UL — SIGNIFICANT CHANGE UP (ref 150–400)
PLATELET # BLD AUTO: 235 K/UL — SIGNIFICANT CHANGE UP (ref 150–400)
POTASSIUM SERPL-MCNC: 5.2 MMOL/L — SIGNIFICANT CHANGE UP (ref 3.5–5.3)
POTASSIUM SERPL-SCNC: 5.2 MMOL/L — SIGNIFICANT CHANGE UP (ref 3.5–5.3)
PROT SERPL-MCNC: 5.8 G/DL — LOW (ref 6.6–8.7)
PROTHROM AB SERPL-ACNC: 10.6 SEC — SIGNIFICANT CHANGE UP (ref 9.8–12.7)
PROTHROM AB SERPL-ACNC: 11.7 SEC — SIGNIFICANT CHANGE UP (ref 9.8–12.7)
RBC # BLD: 2.64 M/UL — LOW (ref 4.4–5.2)
RBC # BLD: 3.5 M/UL — LOW (ref 4.4–5.2)
RBC # FLD: 13.3 % — SIGNIFICANT CHANGE UP (ref 11–15.6)
RBC # FLD: 13.5 % — SIGNIFICANT CHANGE UP (ref 11–15.6)
SODIUM SERPL-SCNC: 135 MMOL/L — SIGNIFICANT CHANGE UP (ref 135–145)
TYPE + AB SCN PNL BLD: SIGNIFICANT CHANGE UP
WBC # BLD: 6.4 K/UL — SIGNIFICANT CHANGE UP (ref 4.8–10.8)
WBC # BLD: 7.9 K/UL — SIGNIFICANT CHANGE UP (ref 4.8–10.8)
WBC # FLD AUTO: 6.4 K/UL — SIGNIFICANT CHANGE UP (ref 4.8–10.8)
WBC # FLD AUTO: 7.9 K/UL — SIGNIFICANT CHANGE UP (ref 4.8–10.8)

## 2017-07-29 PROCEDURE — 36245 INS CATH ABD/L-EXT ART 1ST: CPT | Mod: RT

## 2017-07-29 PROCEDURE — 99291 CRITICAL CARE FIRST HOUR: CPT

## 2017-07-29 PROCEDURE — 99223 1ST HOSP IP/OBS HIGH 75: CPT

## 2017-07-29 PROCEDURE — 75726 ARTERY X-RAYS ABDOMEN: CPT | Mod: 26

## 2017-07-29 PROCEDURE — 74174 CTA ABD&PLVS W/CONTRAST: CPT | Mod: 26

## 2017-07-29 RX ORDER — DEXTROSE 50 % IN WATER 50 %
50 SYRINGE (ML) INTRAVENOUS
Qty: 0 | Refills: 0 | Status: DISCONTINUED | OUTPATIENT
Start: 2017-07-29 | End: 2017-07-31

## 2017-07-29 RX ORDER — PANTOPRAZOLE SODIUM 20 MG/1
40 TABLET, DELAYED RELEASE ORAL DAILY
Qty: 0 | Refills: 0 | Status: DISCONTINUED | OUTPATIENT
Start: 2017-07-30 | End: 2017-08-01

## 2017-07-29 RX ORDER — ONDANSETRON 8 MG/1
8 TABLET, FILM COATED ORAL ONCE
Qty: 0 | Refills: 0 | Status: COMPLETED | OUTPATIENT
Start: 2017-07-29 | End: 2017-07-29

## 2017-07-29 RX ORDER — INSULIN HUMAN 100 [IU]/ML
4 INJECTION, SOLUTION SUBCUTANEOUS
Qty: 100 | Refills: 0 | Status: DISCONTINUED | OUTPATIENT
Start: 2017-07-29 | End: 2017-07-29

## 2017-07-29 RX ORDER — ONDANSETRON 8 MG/1
4 TABLET, FILM COATED ORAL ONCE
Qty: 0 | Refills: 0 | Status: COMPLETED | OUTPATIENT
Start: 2017-07-29 | End: 2017-07-29

## 2017-07-29 RX ORDER — SODIUM CHLORIDE 9 MG/ML
1000 INJECTION, SOLUTION INTRAVENOUS
Qty: 0 | Refills: 0 | Status: DISCONTINUED | OUTPATIENT
Start: 2017-07-29 | End: 2017-07-30

## 2017-07-29 RX ORDER — ACETAMINOPHEN 500 MG
1000 TABLET ORAL ONCE
Qty: 0 | Refills: 0 | Status: COMPLETED | OUTPATIENT
Start: 2017-07-29 | End: 2017-07-29

## 2017-07-29 RX ORDER — INSULIN HUMAN 100 [IU]/ML
3 INJECTION, SOLUTION SUBCUTANEOUS
Qty: 100 | Refills: 0 | Status: DISCONTINUED | OUTPATIENT
Start: 2017-07-29 | End: 2017-07-30

## 2017-07-29 RX ORDER — METOCLOPRAMIDE HCL 10 MG
5 TABLET ORAL ONCE
Qty: 0 | Refills: 0 | Status: COMPLETED | OUTPATIENT
Start: 2017-07-29 | End: 2017-07-29

## 2017-07-29 RX ORDER — LEVOTHYROXINE SODIUM 125 MCG
44 TABLET ORAL DAILY
Qty: 0 | Refills: 0 | Status: DISCONTINUED | OUTPATIENT
Start: 2017-07-29 | End: 2017-07-30

## 2017-07-29 RX ORDER — NICARDIPINE HYDROCHLORIDE 30 MG/1
5 CAPSULE, EXTENDED RELEASE ORAL
Qty: 40 | Refills: 0 | Status: DISCONTINUED | OUTPATIENT
Start: 2017-07-29 | End: 2017-07-30

## 2017-07-29 RX ORDER — PANTOPRAZOLE SODIUM 20 MG/1
8 TABLET, DELAYED RELEASE ORAL
Qty: 80 | Refills: 0 | Status: DISCONTINUED | OUTPATIENT
Start: 2017-07-29 | End: 2017-07-29

## 2017-07-29 RX ORDER — PROTHROMBIN COMPLEX CONCENTRATE (HUMAN) 25.5; 16.5; 24; 22; 22; 26 [IU]/ML; [IU]/ML; [IU]/ML; [IU]/ML; [IU]/ML; [IU]/ML
1500 POWDER, FOR SOLUTION INTRAVENOUS ONCE
Qty: 1500 | Refills: 0 | Status: COMPLETED | OUTPATIENT
Start: 2017-07-29 | End: 2017-07-29

## 2017-07-29 RX ORDER — PANTOPRAZOLE SODIUM 20 MG/1
40 TABLET, DELAYED RELEASE ORAL ONCE
Qty: 0 | Refills: 0 | Status: COMPLETED | OUTPATIENT
Start: 2017-07-29 | End: 2017-07-29

## 2017-07-29 RX ORDER — SODIUM CHLORIDE 9 MG/ML
3 INJECTION INTRAMUSCULAR; INTRAVENOUS; SUBCUTANEOUS EVERY 8 HOURS
Qty: 0 | Refills: 0 | Status: DISCONTINUED | OUTPATIENT
Start: 2017-07-29 | End: 2017-07-30

## 2017-07-29 RX ADMIN — PANTOPRAZOLE SODIUM 40 MILLIGRAM(S): 20 TABLET, DELAYED RELEASE ORAL at 08:46

## 2017-07-29 RX ADMIN — NICARDIPINE HYDROCHLORIDE 25 MG/HR: 30 CAPSULE, EXTENDED RELEASE ORAL at 18:06

## 2017-07-29 RX ADMIN — ONDANSETRON 8 MILLIGRAM(S): 8 TABLET, FILM COATED ORAL at 18:28

## 2017-07-29 RX ADMIN — INSULIN HUMAN 4 UNIT(S)/HR: 100 INJECTION, SOLUTION SUBCUTANEOUS at 18:26

## 2017-07-29 RX ADMIN — PROTHROMBIN COMPLEX CONCENTRATE (HUMAN) 400 INTERNATIONAL UNIT(S): 25.5; 16.5; 24; 22; 22; 26 POWDER, FOR SOLUTION INTRAVENOUS at 09:11

## 2017-07-29 RX ADMIN — Medication 5 MILLIGRAM(S): at 14:35

## 2017-07-29 RX ADMIN — SODIUM CHLORIDE 3 MILLILITER(S): 9 INJECTION INTRAMUSCULAR; INTRAVENOUS; SUBCUTANEOUS at 14:35

## 2017-07-29 RX ADMIN — SODIUM CHLORIDE 3 MILLILITER(S): 9 INJECTION INTRAMUSCULAR; INTRAVENOUS; SUBCUTANEOUS at 22:58

## 2017-07-29 RX ADMIN — PANTOPRAZOLE SODIUM 10 MG/HR: 20 TABLET, DELAYED RELEASE ORAL at 08:47

## 2017-07-29 RX ADMIN — ONDANSETRON 4 MILLIGRAM(S): 8 TABLET, FILM COATED ORAL at 10:44

## 2017-07-29 RX ADMIN — Medication 1000 MILLIGRAM(S): at 14:35

## 2017-07-29 RX ADMIN — Medication 400 MILLIGRAM(S): at 14:35

## 2017-07-29 NOTE — CONSULT NOTE ADULT - SUBJECTIVE AND OBJECTIVE BOX
Patient is a 85y old  Female who presents with a chief complaint of painless hematochezia.    HPI: 84 y/o with h/o PAD and a Fib on Eloquis, ASA and Plavix presents with painless hematochezia with blood clots which started @ 0300 this AM. The pt. resides at an assisted living facility and was transferred here from there this AM. She states that she last had a colonoscopy done roughly 5 years ago which showed only diverticulosis. She was here @ [facility] from March up until May of this year for PAD requiring AKA of her LLE and toe amputations of her right foot.      REVIEW OF SYSTEMS:  Constitutional: No fever, weight loss or fatigue  ENMT:  No difficulty hearing, tinnitus, vertigo; No sinus or throat pain  Respiratory: No cough, wheezing, chills or hemoptysis  Cardiovascular: No chest pain, palpitations, dizziness or leg swelling  Gastrointestinal: No abdominal or epigastric pain. No nausea, vomiting or hematemesis; Painless large volume hematochezia.  Skin: No itching, burning, rashes or lesions   Musculoskeletal: No joint pain or swelling; No muscle, back or extremity pain  Patient has no cardiopulmonary, peripheral vascular, musculoskeletal, dermatological, neurological, gynecological or psychological symptoms or complaints at this time    PAST MEDICAL & SURGICAL HISTORY:  PAF (paroxysmal atrial fibrillation)  Hyperlipidemia  Hypertension  Diabetes  PVD (peripheral vascular disease)  CAD (coronary artery disease)  S/P AKA (above knee amputation) unilateral, left  History of cholecystectomy  H/O angioplasty  Cholecysrtectomy  5 C-sections          FAMILY HISTORY:  No pertinent family history in first degree relatives      SOCIAL HISTORY:  Smoking Status: [ ] Current, [ ] Former, [ ] Never  Pack Years:    MEDICATIONS:  MEDICATIONS  (STANDING):  sodium chloride 0.9% lock flush 3 milliLiter(s) IV Push every 8 hours  prothrombin complex concentrate IVPB (KCENTRA) 1500 International Unit(s) IV Intermittent once  pantoprazole Infusion 8 mG/Hr (10 mL/Hr) IV Continuous <Continuous> Pt is on ASA and Plavix and Eloquis as OPT.    MEDICATIONS  (PRN):      Allergies    Benadryl (Other)  Demerol HCl (Other)    Intolerances        Vital Signs Last 24 Hrs  T(C): 36.4 (29 Jul 2017 07:30), Max: 36.5 (29 Jul 2017 06:10)  T(F): 97.6 (29 Jul 2017 07:30), Max: 97.7 (29 Jul 2017 06:10)  HR: 96 (29 Jul 2017 07:30) (94 - 96)  BP: 124/60 (29 Jul 2017 07:30) (124/60 - 157/79)  BP(mean): --  RR: 18 (29 Jul 2017 07:30) (18 - 18)  SpO2: 96% (29 Jul 2017 07:30) (96% - 100%)        PHYSICAL EXAM:    General: Well developed; well nourished; in no acute distress  HEENT: MMM, conjunctiva and sclera clear  Lungs: Clear bilaterally.  Cor: RRR S1, S2 only  Gastrointestinal: Soft, non-tender non-distended; Normal bowel sounds; No rebound or guarding or HSM.  JOSELINE: Poor tone. Blood clots and BRB in rectal vault. No stool or masses felt.  Extremities: Normal range of motion, No clubbing, cyanosis or edema  Neurological: Alert and oriented x3  Skin: Warm and dry. No obvious rash      LABS:                        10.2   6.4   )-----------( 188      ( 29 Jul 2017 06:46 )             31.7     07-29    135  |  99  |  24.0<H>  ----------------------------<  315<H>  5.2   |  25.0  |  0.94    Ca    9.1      29 Jul 2017 06:46    TPro  5.8<L>  /  Alb  3.9  /  TBili  0.3<L>  /  DBili  x   /  AST  10  /  ALT  9   /  AlkPhos  78  07-29          RADIOLOGY & ADDITIONAL STUDIES:

## 2017-07-29 NOTE — ED ADULT TRIAGE NOTE - CHIEF COMPLAINT QUOTE
patient states that she has diverticulitis and is on blood thinners and has been having diahrrea for about one month. patient resents with rectal bleeding at this time. patient states that she has diverticulitis and is on blood thinners and has been having diahrrea for about one month. patient resents with rectal bleeding at this time. patient complained of nausea and received 4mg zofran orally

## 2017-07-29 NOTE — CONSULT NOTE ADULT - PROBLEM SELECTOR RECOMMENDATION 9
Admit to ICU. CT Angio of the abdomen and pelvis ordered to try amd localize bleeding site. Surgical consult. Agree with KCentra. Would also give platelets. NPO. Cardiology evaluation and clearance for eventual colonoscopy whioch may need to be done urgently if pt. continues to actively bleed. IV Pantoprazole for stress prophylaxsis. Keep Hb greater than 8 grams. Admit to ICU. CT Angio of the abdomen and pelvis ordered to try amd localize bleeding site. Surgical consult. Agree with KCentra. Would also give platelets. NPO. Cardiology evaluation and clearance for eventual colonoscopy which may need to be done urgently if pt. continues to actively bleed. IV Pantoprazole for stress prophylaxsis. IV platelets also recommended to reverse Plavix effect. Keep Hb greater than 8 grams.

## 2017-07-29 NOTE — ED ADULT NURSE NOTE - CHIEF COMPLAINT QUOTE
patient states that she has diverticulitis and is on blood thinners and has been having diahrrea for about one month. patient resents with rectal bleeding at this time. patient complained of nausea and received 4mg zofran orally

## 2017-07-29 NOTE — H&P ADULT - HISTORY OF PRESENT ILLNESS
84F with Pmhx of HTN, CAD, DM, PVD, who had resnt hospitalizations for L eft AKA. Recovered and was DC to [facility]. She presented this morning after awakening to bloody diarrhea. She denies any Add. pain, nausea or vomiting. She had been taking Eliquis, ASA and Plavix. Upon arrival in [facility] ER her hgb was 10. While in the ER she was noted to have multiple active dark bloody stools. Her HR was 90 and BP in the 120s.      She has continued to have active bleeding and was given K-centra and Platelets. GI was called and evaluated her. She was placed on a Protonix infusion and a CT Angiogram was ordered. She notes a colonoscopy about 3+ years ago that was unremarkable with the exception of some diverticulosis.

## 2017-07-29 NOTE — CONSULT NOTE ADULT - SUBJECTIVE AND OBJECTIVE BOX
84F with Pmhx of HTN, CAD, DM, PVD, Afib who had recent hospitalization for L eft AKA. Patient reported bloody diarrhea this AM. She denies any abdominal pain, nausea or vomiting. She had been taking Eliquis, ASA and Plavix. Upon arrival in the ER her hgb was 10. While in the ER she was noted to have multiple active dark bloody stools. Her HR was 90 and BP in the 120s.      She has continued to have active bleeding and was given K-centra and Platelets. GI was called and evaluated her. She was placed on a Protonix infusion and a CT Angiogram was performed that showed a blush from sigmoid at site of diverticular disease. She notes a colonoscopy about 3+ years ago that was unremarkable with the exception of some diverticulosis.      ROS: negative except above. 84F with Pmhx of HTN, CAD, DM, PVD, Afib who had recent hospitalization for L eft AKA. Patient reported bloody diarrhea this AM. She denies any abdominal pain, nausea or vomiting. She had been taking Eliquis, ASA and Plavix. Upon arrival in the ER her hgb was 10. While in the ER she was noted to have multiple active dark bloody stools. Her HR was 90 and BP in the 120s.      She has continued to have active bleeding and was given K-centra and Platelets. GI was called and evaluated her. She was placed on a Protonix infusion and a CT Angiogram was performed that showed a blush from sigmoid at site of diverticular disease. She had a colonoscopy about 3+ years ago that was unremarkable with the exception of some diverticulosis.      ROS: negative except above.   Past Medical History:  CAD (coronary artery disease)    Diabetes    Hyperlipidemia    Hypertension    PAF (paroxysmal atrial fibrillation)    PVD (peripheral vascular disease).    Past Surgical History:  H/O angioplasty    History of cholecystectomy    S/P AKA (above knee amputation) unilateral, left.    Family History:  No pertinent family history in first degree relatives.    Social History:  Social History (marital status, living situation, occupation, tobacco use, alcohol and drug use, and sexual history): Non smoker, No ETOH	  PE:  NAD AAOx3  Irregular rhytm, tachycardia  CTAB  abdomen: soft NT ND.

## 2017-07-29 NOTE — ED ADULT NURSE REASSESSMENT NOTE - NS ED NURSE REASSESS COMMENT FT1
blood done sl inserted spoke with dr rebolledo
Patient A&OX3. Kcentra in progress. no adverse reaction noted. VSS. CM in place NSR.
Patient A&OX3. Denies any pain or discomfort at this time. active rectal bleeding noted at this time. PTT/INR was send to lab. Dr Mendoza aware.

## 2017-07-29 NOTE — CONSULT NOTE ADULT - ATTENDING COMMENTS
Colonic bleed identified on CTA.  For IR for angio/embolization.  Coagulopathy is being corrected,  Surgery standing by.

## 2017-07-29 NOTE — CONSULT NOTE ADULT - PROBLEM SELECTOR RECOMMENDATION 9
No need for further intervention at this moment.  Trend H/H transfuse as needed per primary team   FU GI and we would recommend IR for possible embolization.  No need for surgical intervention at this time.   Surgery will continue to follow up.

## 2017-07-29 NOTE — ED ADULT NURSE NOTE - OBJECTIVE STATEMENT
Patient A&OX3. c/o rectal bleeding at this time. active bleeding noted at this time. Patient states that she has diverticulitis and is on blood thinners and has been having diarrhea for about one month. Patient A&OX3. c/o rectal bleeding at this time. active bleeding noted at this time. Dr Rajput aware. Patient states that she has diverticulitis and is on blood thinners and has been having diarrhea for about one month. VSS. CM in place. Afib. clear BBS. abd soft, nondistended and nontender.

## 2017-07-29 NOTE — PROVIDER CONTACT NOTE (EICU) - SITUATION
84F PMH HTN, CAD, a-fib on AC with eliquis, DM, PVD, s/p recent hospitalization for left AKA. Presents from rehab with bloody diarrhea. Continued to have hematochezia in ER. Given K-centra and Platelets. CT Angiogram with active bleeding noted near descending colon diverticulum. Hb 10.2 Admitted to ICU for acute lower GI bleed, hematochezia.

## 2017-07-29 NOTE — H&P ADULT - ASSESSMENT
Impression/Plan:    1) Acute blood Loss anemia: Related to Rectal bleeding: Will give K-Centra, given the history of Eliquis. No indication for FFP at this time, platelets given by the ER. Serial H/H, transfuse if Hgb < 8. Protonix Infusion, Gi evaluated and will follow, Will need colonoscopy.    2) Rectal Bleeding: Likely diverticular in nature, although will need colonoscopy to formally evaluate. GI ordered CTA, will follow-up results; Hold AS/Plavix/Eliquis at this point     3) HTN: Controlled at this point will resume BP meds as clinically indicated. Will ask Cardiology to give clearance for colonoscopy.    4) DM: will follow glucoses and manage with ISS    5) Afib: Chronic and not compromising her at this point. currently rate controlled. Will resume beta blockade as BP allows. Need to hold off on anticoagulation.    A total 40 mins was spent evaluating and treating this critical patient including speaking to the family and consultants.

## 2017-07-29 NOTE — ED PROVIDER NOTE - CRITICAL CARE PROVIDED
interpretation of diagnostic studies/consultation with other physicians/consult w/ pt's family directly relating to pts condition/additional history taking/direct patient care (not related to procedure)/documentation

## 2017-07-29 NOTE — H&P ADULT - ATTENDING COMMENTS
I independently evaluated this patient and agree with the above.  At this point her hemodynamics are stable -- for the past 5 hours -110, -120.  She continues to have hematochezia.  CTA en route upstairs demonstrated active extravasation in the descending colon next to a diverticulum.  We consulted general surgery and IR.  Dr Padron will be taking her to the IR suite for attempted embolization at 1530 today.  I updated the daughter and patient at bedside, all questions answered.

## 2017-07-29 NOTE — ED PROVIDER NOTE - PROGRESS NOTE DETAILS
pt had a very large bowel movement which was all bright red blood with clotsand she will be transfused and given ffp and platelets(pt on plavix and asa} and admitted to the ICU

## 2017-07-30 DIAGNOSIS — Z01.810 ENCOUNTER FOR PREPROCEDURAL CARDIOVASCULAR EXAMINATION: ICD-10-CM

## 2017-07-30 DIAGNOSIS — R73.9 HYPERGLYCEMIA, UNSPECIFIED: ICD-10-CM

## 2017-07-30 DIAGNOSIS — I10 ESSENTIAL (PRIMARY) HYPERTENSION: ICD-10-CM

## 2017-07-30 DIAGNOSIS — I48.0 PAROXYSMAL ATRIAL FIBRILLATION: ICD-10-CM

## 2017-07-30 LAB
ANION GAP SERPL CALC-SCNC: 16 MMOL/L — SIGNIFICANT CHANGE UP (ref 5–17)
BUN SERPL-MCNC: 26 MG/DL — HIGH (ref 8–20)
CALCIUM SERPL-MCNC: 8.3 MG/DL — LOW (ref 8.6–10.2)
CHLORIDE SERPL-SCNC: 104 MMOL/L — SIGNIFICANT CHANGE UP (ref 98–107)
CO2 SERPL-SCNC: 21 MMOL/L — LOW (ref 22–29)
CREAT SERPL-MCNC: 0.92 MG/DL — SIGNIFICANT CHANGE UP (ref 0.5–1.3)
GLUCOSE SERPL-MCNC: 201 MG/DL — HIGH (ref 70–115)
HBA1C BLD-MCNC: 8.4 % — HIGH (ref 4–5.6)
HCT VFR BLD CALC: 21.4 % — LOW (ref 37–47)
HCT VFR BLD CALC: 22.6 % — LOW (ref 37–47)
HCT VFR BLD CALC: 22.7 % — LOW (ref 37–47)
HGB BLD-MCNC: 7.1 G/DL — LOW (ref 12–16)
HGB BLD-MCNC: 7.5 G/DL — LOW (ref 12–16)
HGB BLD-MCNC: 7.6 G/DL — LOW (ref 12–16)
MAGNESIUM SERPL-MCNC: 1.5 MG/DL — LOW (ref 1.6–2.6)
MCHC RBC-ENTMCNC: 29.8 PG — SIGNIFICANT CHANGE UP (ref 27–31)
MCHC RBC-ENTMCNC: 33.2 G/DL — SIGNIFICANT CHANGE UP (ref 32–36)
MCV RBC AUTO: 89.9 FL — SIGNIFICANT CHANGE UP (ref 81–99)
PHOSPHATE SERPL-MCNC: 3.2 MG/DL — SIGNIFICANT CHANGE UP (ref 2.4–4.7)
PLATELET # BLD AUTO: 205 K/UL — SIGNIFICANT CHANGE UP (ref 150–400)
POTASSIUM SERPL-MCNC: 4.4 MMOL/L — SIGNIFICANT CHANGE UP (ref 3.5–5.3)
POTASSIUM SERPL-SCNC: 4.4 MMOL/L — SIGNIFICANT CHANGE UP (ref 3.5–5.3)
RBC # BLD: 2.38 M/UL — LOW (ref 4.4–5.2)
RBC # FLD: 13.6 % — SIGNIFICANT CHANGE UP (ref 11–15.6)
SODIUM SERPL-SCNC: 141 MMOL/L — SIGNIFICANT CHANGE UP (ref 135–145)
WBC # BLD: 7.5 K/UL — SIGNIFICANT CHANGE UP (ref 4.8–10.8)
WBC # FLD AUTO: 7.5 K/UL — SIGNIFICANT CHANGE UP (ref 4.8–10.8)

## 2017-07-30 PROCEDURE — 93010 ELECTROCARDIOGRAM REPORT: CPT

## 2017-07-30 PROCEDURE — 99233 SBSQ HOSP IP/OBS HIGH 50: CPT

## 2017-07-30 PROCEDURE — 99222 1ST HOSP IP/OBS MODERATE 55: CPT

## 2017-07-30 RX ORDER — LEVOTHYROXINE SODIUM 125 MCG
88 TABLET ORAL DAILY
Qty: 0 | Refills: 0 | Status: DISCONTINUED | OUTPATIENT
Start: 2017-07-30 | End: 2017-08-01

## 2017-07-30 RX ORDER — INSULIN LISPRO 100/ML
VIAL (ML) SUBCUTANEOUS
Qty: 0 | Refills: 0 | Status: DISCONTINUED | OUTPATIENT
Start: 2017-07-30 | End: 2017-07-31

## 2017-07-30 RX ORDER — SODIUM CHLORIDE 9 MG/ML
1000 INJECTION, SOLUTION INTRAVENOUS
Qty: 0 | Refills: 0 | Status: DISCONTINUED | OUTPATIENT
Start: 2017-07-30 | End: 2017-07-30

## 2017-07-30 RX ORDER — SOD SULF/SODIUM/NAHCO3/KCL/PEG
1000 SOLUTION, RECONSTITUTED, ORAL ORAL EVERY 4 HOURS
Qty: 0 | Refills: 0 | Status: COMPLETED | OUTPATIENT
Start: 2017-07-30 | End: 2017-07-30

## 2017-07-30 RX ORDER — INSULIN LISPRO 100/ML
VIAL (ML) SUBCUTANEOUS
Qty: 0 | Refills: 0 | Status: DISCONTINUED | OUTPATIENT
Start: 2017-07-30 | End: 2017-07-30

## 2017-07-30 RX ORDER — GABAPENTIN 400 MG/1
100 CAPSULE ORAL THREE TIMES A DAY
Qty: 0 | Refills: 0 | Status: DISCONTINUED | OUTPATIENT
Start: 2017-07-30 | End: 2017-08-01

## 2017-07-30 RX ORDER — MAGNESIUM SULFATE 500 MG/ML
1 VIAL (ML) INJECTION ONCE
Qty: 0 | Refills: 0 | Status: COMPLETED | OUTPATIENT
Start: 2017-07-30 | End: 2017-07-30

## 2017-07-30 RX ORDER — TAMSULOSIN HYDROCHLORIDE 0.4 MG/1
0.4 CAPSULE ORAL AT BEDTIME
Qty: 0 | Refills: 0 | Status: DISCONTINUED | OUTPATIENT
Start: 2017-07-30 | End: 2017-08-01

## 2017-07-30 RX ORDER — METOPROLOL TARTRATE 50 MG
25 TABLET ORAL
Qty: 0 | Refills: 0 | Status: DISCONTINUED | OUTPATIENT
Start: 2017-07-30 | End: 2017-08-01

## 2017-07-30 RX ORDER — DEXTROSE 50 % IN WATER 50 %
50 SYRINGE (ML) INTRAVENOUS
Qty: 0 | Refills: 0 | Status: COMPLETED | OUTPATIENT
Start: 2017-07-30 | End: 2017-07-30

## 2017-07-30 RX ORDER — INSULIN LISPRO 100/ML
VIAL (ML) SUBCUTANEOUS EVERY 6 HOURS
Qty: 0 | Refills: 0 | Status: DISCONTINUED | OUTPATIENT
Start: 2017-07-30 | End: 2017-07-30

## 2017-07-30 RX ORDER — ATORVASTATIN CALCIUM 80 MG/1
40 TABLET, FILM COATED ORAL AT BEDTIME
Qty: 0 | Refills: 0 | Status: DISCONTINUED | OUTPATIENT
Start: 2017-07-30 | End: 2017-08-01

## 2017-07-30 RX ADMIN — NICARDIPINE HYDROCHLORIDE 25 MG/HR: 30 CAPSULE, EXTENDED RELEASE ORAL at 07:37

## 2017-07-30 RX ADMIN — Medication 1000 MILLILITER(S): at 19:30

## 2017-07-30 RX ADMIN — Medication 100 GRAM(S): at 09:07

## 2017-07-30 RX ADMIN — Medication 1000 MILLILITER(S): at 23:45

## 2017-07-30 RX ADMIN — NICARDIPINE HYDROCHLORIDE 25 MG/HR: 30 CAPSULE, EXTENDED RELEASE ORAL at 00:00

## 2017-07-30 RX ADMIN — Medication 8: at 17:03

## 2017-07-30 RX ADMIN — Medication 2: at 06:30

## 2017-07-30 RX ADMIN — Medication 2: at 04:32

## 2017-07-30 RX ADMIN — Medication 2: at 10:34

## 2017-07-30 RX ADMIN — TAMSULOSIN HYDROCHLORIDE 0.4 MILLIGRAM(S): 0.4 CAPSULE ORAL at 21:55

## 2017-07-30 RX ADMIN — GABAPENTIN 100 MILLIGRAM(S): 400 CAPSULE ORAL at 17:01

## 2017-07-30 RX ADMIN — SODIUM CHLORIDE 3 MILLILITER(S): 9 INJECTION INTRAMUSCULAR; INTRAVENOUS; SUBCUTANEOUS at 06:37

## 2017-07-30 RX ADMIN — ATORVASTATIN CALCIUM 40 MILLIGRAM(S): 80 TABLET, FILM COATED ORAL at 21:55

## 2017-07-30 RX ADMIN — PANTOPRAZOLE SODIUM 40 MILLIGRAM(S): 20 TABLET, DELAYED RELEASE ORAL at 12:49

## 2017-07-30 RX ADMIN — Medication 88 MICROGRAM(S): at 17:02

## 2017-07-30 RX ADMIN — SODIUM CHLORIDE 50 MILLILITER(S): 9 INJECTION, SOLUTION INTRAVENOUS at 01:56

## 2017-07-30 RX ADMIN — Medication 25 MILLIGRAM(S): at 17:06

## 2017-07-30 RX ADMIN — Medication 50 MILLILITER(S): at 01:16

## 2017-07-30 RX ADMIN — GABAPENTIN 100 MILLIGRAM(S): 400 CAPSULE ORAL at 21:55

## 2017-07-30 RX ADMIN — Medication 44 MICROGRAM(S): at 06:31

## 2017-07-30 NOTE — CONSULT NOTE ADULT - SUBJECTIVE AND OBJECTIVE BOX
[place] [facility]                                                        Office: [address] [address]                                                       Telephone: [phone]. Fax:[phone]      CC: Rectal bleeding.     HPI: Patient is a  85y Female with history of CAD, PAD, PAF presenting with symptoms of rectal bleeding. Patient planned for colonoscopy. History of PAF. Started on AC with Xarelto and then changed to Eliquis. Now with GI bleeding. Underwent embolization by IR.   History of CAD with PCI in 1999. No further cardiac intervention. Follows up with cardiology and has been noted to be stable in the recent past.   Has undergone multiple procedures in the recent past including lower extremity amputation and has tolerated well.     PAST MEDICAL & SURGICAL HISTORY:  PAF (paroxysmal atrial fibrillation)  Hyperlipidemia  Hypertension  Diabetes  PVD (peripheral vascular disease)  CAD (coronary artery disease)  S/P AKA (above knee amputation) unilateral, left  History of cholecystectomy  H/O angioplasty    FAMILY HISTORY:  No pertinent family history in first degree relatives    SOCIAL HISTORY: no EtOH, drugs or tobacco    MEDICATIONS  (STANDING):  dextrose 50% Injectable 50 milliLiter(s) IV Push every 15 minutes  pantoprazole  Injectable 40 milliGRAM(s) IV Push daily  polyethylene glycol/electrolyte Solution 1000 milliLiter(s) Oral every 4 hours  metoprolol 25 milliGRAM(s) Oral two times a day  tamsulosin 0.4 milliGRAM(s) Oral at bedtime  gabapentin 100 milliGRAM(s) Oral three times a day  atorvastatin 40 milliGRAM(s) Oral at bedtime  levothyroxine 88 MICROGram(s) Oral daily  insulin lispro (HumaLOG) corrective regimen sliding scale   SubCutaneous three times a day before meals    ROS: All others negative    PHYSICAL EXAM:  Vital Signs Last 24 Hrs  T(C): 36.6 (30 Jul 2017 12:00), Max: 37.2 (30 Jul 2017 04:00)  T(F): 97.9 (30 Jul 2017 12:00), Max: 99 (30 Jul 2017 04:00)  HR: 98 (30 Jul 2017 17:00) (91 - 124)  BP: 169/68 (30 Jul 2017 17:00) (101/58 - 169/68)  BP(mean): 98 (30 Jul 2017 17:00) (72 - 98)  RR: 22 (30 Jul 2017 17:00) (15 - 26)  SpO2: 98% (30 Jul 2017 17:00) (89% - 99%)  I&O's Summary    29 Jul 2017 07:01  -  30 Jul 2017 07:00  --------------------------------------------------------  IN: 1516 mL / OUT: 1150 mL / NET: 366 mL      Appearance: Normal	  HEENT:   Normal oral mucosa, PERRL, EOMI	  Lymphatic: No lymphadenopathy  Cardiovascular: Normal S1 S2, No JVD, No murmurs, No edema  Respiratory: Lungs clear to auscultation	  Psychiatry: A & O x 3, Mood & affect appropriate  Gastrointestinal:  Soft, Non-tender, + BS	  Skin: No rashes, No ecchymoses, No cyanosis  Neurologic: Non-focal  Extremities: Left leg amputation.     ECG:Sinus with 1st degree.   LABS:                        7.5    x     )-----------( x        ( 30 Jul 2017 12:44 )             22.7     07-30    141  |  104  |  26.0<H>  ----------------------------<  201<H>  4.4   |  21.0<L>  |  0.92    Ca    8.3<L>      30 Jul 2017 05:32  Phos  3.2     07-30  Mg     1.5     07-30    TPro  5.8<L>  /  Alb  3.9  /  TBili  0.3<L>  /  DBili  x   /  AST  10  /  ALT  9   /  AlkPhos  78  07-29    PT/INR - ( 29 Jul 2017 11:31 )   PT: 10.6 sec;   INR: 0.96 ratio         PTT - ( 29 Jul 2017 11:31 )  PTT:22.1 sec      RADIOLOGY & ADDITIONAL STUDIES:

## 2017-07-30 NOTE — PROGRESS NOTE ADULT - PROBLEM SELECTOR PLAN 1
Continues to bleed, but not captured at the time of angiography.  Appreciate IR, GI, and general surgery input.  q8h H/H; will transfuse if HgB < 7.0 g/dL per TRICC evidence unless hemodynamics change or increase in rate of hemorrhage.

## 2017-07-30 NOTE — PROGRESS NOTE ADULT - ASSESSMENT
Acute anemia from diverticular hemorrhage, slowed when angiography performed so no intervention performed.

## 2017-07-30 NOTE — PROGRESS NOTE ADULT - PROBLEM SELECTOR PLAN 1
Likely diverticular. Cardiology evaluation and clearance for colonoscopy tomorrow. Clear liquids and Moviprep ordered for today Keep Hb greater than 7 grams. Case and management d/w Dr. Mcfadden this AM.

## 2017-07-30 NOTE — CONSULT NOTE ADULT - PROBLEM SELECTOR RECOMMENDATION 9
Patient with no active cardiac symptoms, normal EKG, no recent cardiac intervention. Has tolerated anesthesia well for other procedures. Patient low risk for planned Gi procedures. Optimized from cardiac standpoint.   AFib: Paroxysmal. Once episode 1 year prior. Hold off on AC at this time. Patient will follow up with primary cardiologist for further assessment for AC.

## 2017-07-31 ENCOUNTER — RESULT REVIEW (OUTPATIENT)
Age: 82
End: 2017-07-31

## 2017-07-31 ENCOUNTER — TRANSCRIPTION ENCOUNTER (OUTPATIENT)
Age: 82
End: 2017-07-31

## 2017-07-31 LAB
ANION GAP SERPL CALC-SCNC: 13 MMOL/L — SIGNIFICANT CHANGE UP (ref 5–17)
BASOPHILS # BLD AUTO: 0 K/UL — SIGNIFICANT CHANGE UP (ref 0–0.2)
BASOPHILS NFR BLD AUTO: 0.2 % — SIGNIFICANT CHANGE UP (ref 0–2)
BUN SERPL-MCNC: 16 MG/DL — SIGNIFICANT CHANGE UP (ref 8–20)
CALCIUM SERPL-MCNC: 8.5 MG/DL — LOW (ref 8.6–10.2)
CHLORIDE SERPL-SCNC: 106 MMOL/L — SIGNIFICANT CHANGE UP (ref 98–107)
CO2 SERPL-SCNC: 22 MMOL/L — SIGNIFICANT CHANGE UP (ref 22–29)
CREAT SERPL-MCNC: 0.8 MG/DL — SIGNIFICANT CHANGE UP (ref 0.5–1.3)
EOSINOPHIL # BLD AUTO: 0.2 K/UL — SIGNIFICANT CHANGE UP (ref 0–0.5)
EOSINOPHIL NFR BLD AUTO: 4 % — SIGNIFICANT CHANGE UP (ref 0–6)
GLUCOSE SERPL-MCNC: 304 MG/DL — HIGH (ref 70–115)
HCT VFR BLD CALC: 22.2 % — LOW (ref 37–47)
HGB BLD-MCNC: 7.4 G/DL — LOW (ref 12–16)
INR BLD: 1.07 RATIO — SIGNIFICANT CHANGE UP (ref 0.88–1.16)
LYMPHOCYTES # BLD AUTO: 0.9 K/UL — LOW (ref 1–4.8)
LYMPHOCYTES # BLD AUTO: 20.7 % — SIGNIFICANT CHANGE UP (ref 20–55)
MAGNESIUM SERPL-MCNC: 1.7 MG/DL — SIGNIFICANT CHANGE UP (ref 1.6–2.6)
MCHC RBC-ENTMCNC: 30.1 PG — SIGNIFICANT CHANGE UP (ref 27–31)
MCHC RBC-ENTMCNC: 33.3 G/DL — SIGNIFICANT CHANGE UP (ref 32–36)
MCV RBC AUTO: 90.2 FL — SIGNIFICANT CHANGE UP (ref 81–99)
MONOCYTES # BLD AUTO: 0.3 K/UL — SIGNIFICANT CHANGE UP (ref 0–0.8)
MONOCYTES NFR BLD AUTO: 7 % — SIGNIFICANT CHANGE UP (ref 3–10)
NEUTROPHILS # BLD AUTO: 2.9 K/UL — SIGNIFICANT CHANGE UP (ref 1.8–8)
NEUTROPHILS NFR BLD AUTO: 67.6 % — SIGNIFICANT CHANGE UP (ref 37–73)
PLATELET # BLD AUTO: 175 K/UL — SIGNIFICANT CHANGE UP (ref 150–400)
POTASSIUM SERPL-MCNC: 4.5 MMOL/L — SIGNIFICANT CHANGE UP (ref 3.5–5.3)
POTASSIUM SERPL-SCNC: 4.5 MMOL/L — SIGNIFICANT CHANGE UP (ref 3.5–5.3)
PROTHROM AB SERPL-ACNC: 11.8 SEC — SIGNIFICANT CHANGE UP (ref 9.8–12.7)
RBC # BLD: 2.46 M/UL — LOW (ref 4.4–5.2)
RBC # FLD: 14.4 % — SIGNIFICANT CHANGE UP (ref 11–15.6)
SODIUM SERPL-SCNC: 141 MMOL/L — SIGNIFICANT CHANGE UP (ref 135–145)
TSH SERPL-MCNC: 1.97 UIU/ML — SIGNIFICANT CHANGE UP (ref 0.27–4.2)
WBC # BLD: 4.3 K/UL — LOW (ref 4.8–10.8)
WBC # FLD AUTO: 4.3 K/UL — LOW (ref 4.8–10.8)

## 2017-07-31 PROCEDURE — 99233 SBSQ HOSP IP/OBS HIGH 50: CPT

## 2017-07-31 PROCEDURE — 45385 COLONOSCOPY W/LESION REMOVAL: CPT

## 2017-07-31 PROCEDURE — 88305 TISSUE EXAM BY PATHOLOGIST: CPT | Mod: 26

## 2017-07-31 RX ORDER — INSULIN GLARGINE 100 [IU]/ML
15 INJECTION, SOLUTION SUBCUTANEOUS AT BEDTIME
Qty: 0 | Refills: 0 | Status: DISCONTINUED | OUTPATIENT
Start: 2017-07-31 | End: 2017-08-01

## 2017-07-31 RX ORDER — INSULIN LISPRO 100/ML
VIAL (ML) SUBCUTANEOUS
Qty: 0 | Refills: 0 | Status: DISCONTINUED | OUTPATIENT
Start: 2017-07-31 | End: 2017-08-01

## 2017-07-31 RX ORDER — DEXTROSE 50 % IN WATER 50 %
12.5 SYRINGE (ML) INTRAVENOUS ONCE
Qty: 0 | Refills: 0 | Status: DISCONTINUED | OUTPATIENT
Start: 2017-07-31 | End: 2017-08-01

## 2017-07-31 RX ORDER — INSULIN LISPRO 100/ML
VIAL (ML) SUBCUTANEOUS AT BEDTIME
Qty: 0 | Refills: 0 | Status: DISCONTINUED | OUTPATIENT
Start: 2017-07-31 | End: 2017-08-01

## 2017-07-31 RX ORDER — INSULIN LISPRO 100/ML
3 VIAL (ML) SUBCUTANEOUS
Qty: 0 | Refills: 0 | Status: DISCONTINUED | OUTPATIENT
Start: 2017-07-31 | End: 2017-08-01

## 2017-07-31 RX ORDER — SODIUM CHLORIDE 9 MG/ML
1000 INJECTION, SOLUTION INTRAVENOUS
Qty: 0 | Refills: 0 | Status: DISCONTINUED | OUTPATIENT
Start: 2017-07-31 | End: 2017-08-01

## 2017-07-31 RX ORDER — DEXTROSE 50 % IN WATER 50 %
1 SYRINGE (ML) INTRAVENOUS ONCE
Qty: 0 | Refills: 0 | Status: DISCONTINUED | OUTPATIENT
Start: 2017-07-31 | End: 2017-08-01

## 2017-07-31 RX ORDER — DEXTROSE 50 % IN WATER 50 %
25 SYRINGE (ML) INTRAVENOUS ONCE
Qty: 0 | Refills: 0 | Status: DISCONTINUED | OUTPATIENT
Start: 2017-07-31 | End: 2017-08-01

## 2017-07-31 RX ORDER — ALPRAZOLAM 0.25 MG
0.25 TABLET ORAL ONCE
Qty: 0 | Refills: 0 | Status: DISCONTINUED | OUTPATIENT
Start: 2017-07-31 | End: 2017-07-31

## 2017-07-31 RX ORDER — GLUCAGON INJECTION, SOLUTION 0.5 MG/.1ML
1 INJECTION, SOLUTION SUBCUTANEOUS ONCE
Qty: 0 | Refills: 0 | Status: DISCONTINUED | OUTPATIENT
Start: 2017-07-31 | End: 2017-08-01

## 2017-07-31 RX ADMIN — Medication 0.25 MILLIGRAM(S): at 23:15

## 2017-07-31 RX ADMIN — Medication 8: at 08:43

## 2017-07-31 RX ADMIN — Medication 25 MILLIGRAM(S): at 06:14

## 2017-07-31 RX ADMIN — Medication 4: at 10:54

## 2017-07-31 RX ADMIN — Medication 2: at 22:42

## 2017-07-31 RX ADMIN — GABAPENTIN 100 MILLIGRAM(S): 400 CAPSULE ORAL at 22:41

## 2017-07-31 RX ADMIN — ATORVASTATIN CALCIUM 40 MILLIGRAM(S): 80 TABLET, FILM COATED ORAL at 22:41

## 2017-07-31 RX ADMIN — Medication 88 MICROGRAM(S): at 06:14

## 2017-07-31 RX ADMIN — INSULIN GLARGINE 15 UNIT(S): 100 INJECTION, SOLUTION SUBCUTANEOUS at 22:41

## 2017-07-31 RX ADMIN — Medication 3 UNIT(S): at 18:14

## 2017-07-31 RX ADMIN — TAMSULOSIN HYDROCHLORIDE 0.4 MILLIGRAM(S): 0.4 CAPSULE ORAL at 22:41

## 2017-07-31 RX ADMIN — GABAPENTIN 100 MILLIGRAM(S): 400 CAPSULE ORAL at 15:48

## 2017-07-31 RX ADMIN — PANTOPRAZOLE SODIUM 40 MILLIGRAM(S): 20 TABLET, DELAYED RELEASE ORAL at 10:55

## 2017-07-31 RX ADMIN — Medication 25 MILLIGRAM(S): at 18:14

## 2017-07-31 RX ADMIN — GABAPENTIN 100 MILLIGRAM(S): 400 CAPSULE ORAL at 06:14

## 2017-07-31 NOTE — BRIEF OPERATIVE NOTE - PROCEDURE
Angiogram visceral  07/29/2017  GINETTE  Active  [email or web address]
Colonoscopy with polypectomy  07/31/2017    Active  [email or web address]

## 2017-07-31 NOTE — BRIEF OPERATIVE NOTE - OPERATION/FINDINGS
negative for bleed. right fem access closed with angioseal due to hypertension and eliquis
Extensive sigmoid colon diverticulosis;  No SRH.  Cecal diverticulum. Cecal polyp;  Internal hemorrhoids; external hemorrhoids, both inactive.

## 2017-07-31 NOTE — BRIEF OPERATIVE NOTE - COMMENTS
pt clinically stopped bleeding since CTA per daughter
OK to restart high fiber diet, DASH with CHO restrictions.  Hold on restart of anticoagulation x 5 days.  Call for results.  OK for DC home today from [facility].

## 2017-07-31 NOTE — BRIEF OPERATIVE NOTE - PRE-OP DX
Gastrointestinal hemorrhage associated with intestinal diverticulosis  07/29/2017    Active  Jack Padron
Hematochezia  07/31/2017    Active  August Barraza

## 2017-07-31 NOTE — PHYSICAL THERAPY INITIAL EVALUATION ADULT - ADDITIONAL COMMENTS
Pt lives at assisted living. No steps to navigate. Pt was 1 person supervision to [facility] PTA using a slide board for transfers, pt has been non ambulatory since AKA. Pt owns slide board, RW, Hospital bed and Shower chair.

## 2017-07-31 NOTE — BRIEF OPERATIVE NOTE - POST-OP DX
Gastrointestinal hemorrhage associated with intestinal diverticulosis  07/29/2017    Active  Jack Padron
Diverticulosis of large intestine with hemorrhage  07/31/2017    Active  Madi, August  External hemorrhoids without complication  07/31/2017    Active  Madi, August  Internal hemorrhoids without complication  07/31/2017    Active  Madi, August  Polyp of colon, unspecified part of colon, unspecified type  07/31/2017    Active  Madi, August

## 2017-07-31 NOTE — PHYSICAL THERAPY INITIAL EVALUATION ADULT - CRITERIA FOR SKILLED THERAPEUTIC INTERVENTIONS
Return to assisted living with Aide assist for transfers and Home PT to improve strength, restore ambulatory status with Prosthetic limb/anticipated discharge recommendation

## 2017-07-31 NOTE — PROGRESS NOTE ADULT - ASSESSMENT
85 year old female acute anemia from diverticular hemorrhage, slowed when angiography performed so no intervention performed. Colonoscopy 7/31

## 2017-08-01 ENCOUNTER — TRANSCRIPTION ENCOUNTER (OUTPATIENT)
Age: 82
End: 2017-08-01

## 2017-08-01 VITALS — HEART RATE: 83 BPM | DIASTOLIC BLOOD PRESSURE: 78 MMHG | SYSTOLIC BLOOD PRESSURE: 179 MMHG | TEMPERATURE: 98 F

## 2017-08-01 DIAGNOSIS — K57.31 DIVERTICULOSIS OF LARGE INTESTINE WITHOUT PERFORATION OR ABSCESS WITH BLEEDING: ICD-10-CM

## 2017-08-01 LAB
ANION GAP SERPL CALC-SCNC: 12 MMOL/L — SIGNIFICANT CHANGE UP (ref 5–17)
BUN SERPL-MCNC: 15 MG/DL — SIGNIFICANT CHANGE UP (ref 8–20)
CALCIUM SERPL-MCNC: 8.5 MG/DL — LOW (ref 8.6–10.2)
CHLORIDE SERPL-SCNC: 103 MMOL/L — SIGNIFICANT CHANGE UP (ref 98–107)
CO2 SERPL-SCNC: 24 MMOL/L — SIGNIFICANT CHANGE UP (ref 22–29)
CREAT SERPL-MCNC: 0.92 MG/DL — SIGNIFICANT CHANGE UP (ref 0.5–1.3)
GLUCOSE SERPL-MCNC: 187 MG/DL — HIGH (ref 70–115)
HCT VFR BLD CALC: 22.4 % — LOW (ref 37–47)
HGB BLD-MCNC: 7.3 G/DL — LOW (ref 12–16)
MCHC RBC-ENTMCNC: 29.9 PG — SIGNIFICANT CHANGE UP (ref 27–31)
MCHC RBC-ENTMCNC: 32.6 G/DL — SIGNIFICANT CHANGE UP (ref 32–36)
MCV RBC AUTO: 91.8 FL — SIGNIFICANT CHANGE UP (ref 81–99)
PLATELET # BLD AUTO: 185 K/UL — SIGNIFICANT CHANGE UP (ref 150–400)
POTASSIUM SERPL-MCNC: 4 MMOL/L — SIGNIFICANT CHANGE UP (ref 3.5–5.3)
POTASSIUM SERPL-SCNC: 4 MMOL/L — SIGNIFICANT CHANGE UP (ref 3.5–5.3)
RBC # BLD: 2.44 M/UL — LOW (ref 4.4–5.2)
RBC # FLD: 13.4 % — SIGNIFICANT CHANGE UP (ref 11–15.6)
SODIUM SERPL-SCNC: 139 MMOL/L — SIGNIFICANT CHANGE UP (ref 135–145)
SURGICAL PATHOLOGY FINAL REPORT - CH: SIGNIFICANT CHANGE UP
WBC # BLD: 5.2 K/UL — SIGNIFICANT CHANGE UP (ref 4.8–10.8)
WBC # FLD AUTO: 5.2 K/UL — SIGNIFICANT CHANGE UP (ref 4.8–10.8)

## 2017-08-01 PROCEDURE — 84443 ASSAY THYROID STIM HORMONE: CPT

## 2017-08-01 PROCEDURE — 85610 PROTHROMBIN TIME: CPT

## 2017-08-01 PROCEDURE — 76942 ECHO GUIDE FOR BIOPSY: CPT

## 2017-08-01 PROCEDURE — 86900 BLOOD TYPING SEROLOGIC ABO: CPT

## 2017-08-01 PROCEDURE — 86901 BLOOD TYPING SEROLOGIC RH(D): CPT

## 2017-08-01 PROCEDURE — 96374 THER/PROPH/DIAG INJ IV PUSH: CPT | Mod: XU

## 2017-08-01 PROCEDURE — 84100 ASSAY OF PHOSPHORUS: CPT

## 2017-08-01 PROCEDURE — 36430 TRANSFUSION BLD/BLD COMPNT: CPT

## 2017-08-01 PROCEDURE — 86920 COMPATIBILITY TEST SPIN: CPT

## 2017-08-01 PROCEDURE — 88305 TISSUE EXAM BY PATHOLOGIST: CPT

## 2017-08-01 PROCEDURE — 96375 TX/PRO/DX INJ NEW DRUG ADDON: CPT

## 2017-08-01 PROCEDURE — P9037: CPT

## 2017-08-01 PROCEDURE — 83036 HEMOGLOBIN GLYCOSYLATED A1C: CPT

## 2017-08-01 PROCEDURE — 74174 CTA ABD&PLVS W/CONTRAST: CPT

## 2017-08-01 PROCEDURE — 93005 ELECTROCARDIOGRAM TRACING: CPT

## 2017-08-01 PROCEDURE — 85018 HEMOGLOBIN: CPT

## 2017-08-01 PROCEDURE — 75894 X-RAYS TRANSCATH THERAPY: CPT

## 2017-08-01 PROCEDURE — C1760: CPT

## 2017-08-01 PROCEDURE — 99285 EMERGENCY DEPT VISIT HI MDM: CPT | Mod: 25

## 2017-08-01 PROCEDURE — 97163 PT EVAL HIGH COMPLEX 45 MIN: CPT

## 2017-08-01 PROCEDURE — P9016: CPT

## 2017-08-01 PROCEDURE — 86850 RBC ANTIBODY SCREEN: CPT

## 2017-08-01 PROCEDURE — 99239 HOSP IP/OBS DSCHRG MGMT >30: CPT

## 2017-08-01 PROCEDURE — 85027 COMPLETE CBC AUTOMATED: CPT

## 2017-08-01 PROCEDURE — 85730 THROMBOPLASTIN TIME PARTIAL: CPT

## 2017-08-01 PROCEDURE — 99232 SBSQ HOSP IP/OBS MODERATE 35: CPT

## 2017-08-01 PROCEDURE — 83735 ASSAY OF MAGNESIUM: CPT

## 2017-08-01 PROCEDURE — 80053 COMPREHEN METABOLIC PANEL: CPT

## 2017-08-01 PROCEDURE — 36415 COLL VENOUS BLD VENIPUNCTURE: CPT

## 2017-08-01 PROCEDURE — 80048 BASIC METABOLIC PNL TOTAL CA: CPT

## 2017-08-01 RX ORDER — CLOPIDOGREL BISULFATE 75 MG/1
1 TABLET, FILM COATED ORAL
Qty: 30 | Refills: 0 | OUTPATIENT
Start: 2017-08-01 | End: 2017-08-31

## 2017-08-01 RX ORDER — INSULIN LISPRO 100/ML
4 VIAL (ML) SUBCUTANEOUS
Qty: 1 | Refills: 0 | OUTPATIENT
Start: 2017-08-01 | End: 2017-08-31

## 2017-08-01 RX ORDER — INSULIN GLARGINE 100 [IU]/ML
20 INJECTION, SOLUTION SUBCUTANEOUS
Qty: 0 | Refills: 0 | COMMUNITY
Start: 2017-08-01

## 2017-08-01 RX ADMIN — Medication 25 MILLIGRAM(S): at 06:27

## 2017-08-01 RX ADMIN — PANTOPRAZOLE SODIUM 40 MILLIGRAM(S): 20 TABLET, DELAYED RELEASE ORAL at 13:18

## 2017-08-01 RX ADMIN — GABAPENTIN 100 MILLIGRAM(S): 400 CAPSULE ORAL at 13:18

## 2017-08-01 RX ADMIN — GABAPENTIN 100 MILLIGRAM(S): 400 CAPSULE ORAL at 06:27

## 2017-08-01 RX ADMIN — Medication 3 UNIT(S): at 13:19

## 2017-08-01 RX ADMIN — Medication 4: at 09:11

## 2017-08-01 RX ADMIN — Medication 88 MICROGRAM(S): at 06:27

## 2017-08-01 RX ADMIN — Medication 4: at 13:19

## 2017-08-01 RX ADMIN — Medication 3 UNIT(S): at 09:11

## 2017-08-01 NOTE — PROGRESS NOTE ADULT - PROBLEM SELECTOR PLAN 1
-Cleared for discharge from surgery point of view  -Follow up with GI as outpatient  -If patient re-bleeds, would suggest repeat colonoscopy and possible IR for angioembolization  -Will sign off, call with questions

## 2017-08-01 NOTE — DISCHARGE NOTE ADULT - PLAN OF CARE
Prevent further episodes Stop anticoagulation. Follow up with a physician. Supportive care. Prevent further cardiac episodes Continue cardiac medications. No Anticoagulation. Follow up with physician. Tolerable Glycemia Gently increase Lantus to 20 units and avoid hypoglycemia. Continue statin Monitor BP Continue antihypertensive medication. Now in sinus rhythm Supportive care Stop anticoagulation. Follow up with a physician. Supportive care.  Resume plavix and watch for GI bleeding.

## 2017-08-01 NOTE — PROGRESS NOTE ADULT - PROBLEM SELECTOR PROBLEM 7
PAF (paroxysmal atrial fibrillation)

## 2017-08-01 NOTE — DISCHARGE NOTE ADULT - PATIENT PORTAL LINK FT
“You can access the [employer] Patient Portal, offered by [facility], by registering with the following website: [email or web address]”

## 2017-08-01 NOTE — PROGRESS NOTE ADULT - PROBLEM SELECTOR PROBLEM 5
PVD (peripheral vascular disease)

## 2017-08-01 NOTE — PROGRESS NOTE ADULT - SUBJECTIVE AND OBJECTIVE BOX
INTERVAL HPI/OVERNIGHT EVENTS:  Patient seen and examined  She went for colonoscopy yesterday, which identified diverticulosis with stigmata of bleeding, no active bleeding identified  Denies any abdominal pain, N/V  Denies any dizziness  tolerated diet  Afebrile, HD well    MEDICATIONS  (STANDING):  pantoprazole  Injectable 40 milliGRAM(s) IV Push daily  metoprolol 25 milliGRAM(s) Oral two times a day  tamsulosin 0.4 milliGRAM(s) Oral at bedtime  gabapentin 100 milliGRAM(s) Oral three times a day  atorvastatin 40 milliGRAM(s) Oral at bedtime  levothyroxine 88 MICROGram(s) Oral daily  insulin glargine Injectable (LANTUS) 15 Unit(s) SubCutaneous at bedtime  insulin lispro Injectable (HumaLOG) 3 Unit(s) SubCutaneous three times a day before meals  insulin lispro (HumaLOG) corrective regimen sliding scale   SubCutaneous three times a day before meals  insulin lispro (HumaLOG) corrective regimen sliding scale   SubCutaneous at bedtime  dextrose 5%. 1000 milliLiter(s) (50 mL/Hr) IV Continuous <Continuous>  dextrose 50% Injectable 12.5 Gram(s) IV Push once  dextrose 50% Injectable 25 Gram(s) IV Push once  dextrose 50% Injectable 25 Gram(s) IV Push once    MEDICATIONS  (PRN):  dextrose Gel 1 Dose(s) Oral once PRN Blood Glucose LESS THAN 70 milliGRAM(s)/deciliter  glucagon  Injectable 1 milliGRAM(s) IntraMuscular once PRN Glucose LESS THAN 70 milligrams/deciliter      Vital Signs Last 24 Hrs  T(C): 36.7 (01 Aug 2017 09:00), Max: 36.7 (01 Aug 2017 06:21)  T(F): 98.1 (01 Aug 2017 09:00), Max: 98.1 (01 Aug 2017 09:00)  HR: 80 (01 Aug 2017 09:00) (73 - 88)  BP: 160/50 (01 Aug 2017 09:00) (156/52 - 170/58)  BP(mean): --  RR: 16 (01 Aug 2017 09:00) (16 - 16)  SpO2: 98% (01 Aug 2017 09:00) (98% - 98%)    Physical Exam:    Neurological:  No sensory/motor deficits    Respiratory: Breath Sounds equal & clear to auscultation, no accessory muscle use    Cardiovascular: Regular rate & rhythm, normal S1, S2; no murmurs, gallops or rubs    Gastrointestinal: Soft, non-tender, normal bowel sounds    Vascular: Equal and normal pulses: 2+ peripheral pulses throughout    Musculoskeletal: No joint pain, swelling or deformity; no limitation of movement    Skin: No rashes      I&O's Detail    31 Jul 2017 07:01  -  01 Aug 2017 07:00  --------------------------------------------------------  IN:  Total IN: 0 mL    OUT:    Voided: 300 mL  Total OUT: 300 mL    Total NET: -300 mL      01 Aug 2017 07:01  -  01 Aug 2017 14:09  --------------------------------------------------------  IN:    Oral Fluid: 240 mL  Total IN: 240 mL    OUT:    Voided: 325 mL  Total OUT: 325 mL    Total NET: -85 mL          LABS:                        7.3    5.2   )-----------( 185      ( 01 Aug 2017 10:10 )             22.4     08-01    139  |  103  |  15.0  ----------------------------<  187<H>  4.0   |  24.0  |  0.92    Ca    8.5<L>      01 Aug 2017 10:10  Mg     1.7     07-31      PT/INR - ( 31 Jul 2017 05:31 )   PT: 11.8 sec;   INR: 1.07 ratio               RADIOLOGY & ADDITIONAL STUDIES:
Patient is a 85y old  Female who presents with a chief complaint of Rectal Bleeding (29 Jul 2017 10:29)      BRIEF HOSPITAL COURSE: Diverticular hemorrhage on eliquis, seen on CTA    Events last 24 hours: IR -- had stopped bleeding by the time she was in the suite.  She had active hemorrhage about an hour prior, but stopped spontaneously.  Hemodynamics -- hypertensive (on nicardipine to manage) -- with hemoglobin drop this AM to 7.1 g/dL    PAST MEDICAL & SURGICAL HISTORY:  PAF (paroxysmal atrial fibrillation)  Hyperlipidemia  Hypertension  Diabetes  PVD (peripheral vascular disease)  CAD (coronary artery disease)  S/P AKA (above knee amputation) unilateral, left  History of cholecystectomy  H/O angioplasty      Review of Systems:  CONSTITUTIONAL: No fever, chills, or fatigue  EYES: No eye pain, visual disturbances, or discharge  ENMT:  No difficulty hearing, tinnitus, vertigo; No sinus or throat pain  NECK: No pain or stiffness  RESPIRATORY: No cough, wheezing, chills or hemoptysis; No shortness of breath  CARDIOVASCULAR: No chest pain, palpitations, dizziness, or leg swelling  GASTROINTESTINAL: No abdominal or epigastric pain. No nausea, vomiting, or hematemesis; No diarrhea or constipation. No melena or hematochezia.  GENITOURINARY: No dysuria, frequency, hematuria, or incontinence  NEUROLOGICAL: No headaches, memory loss, loss of strength, numbness, or tremors  SKIN: No itching, burning, rashes, or lesions   MUSCULOSKELETAL: No joint pain or swelling; No muscle, back, or extremity pain  PSYCHIATRIC: No depression, anxiety, mood swings, or difficulty sleeping      Medications:    niCARdipine Infusion 5 mG/Hr IV Continuous <Continuous>            pantoprazole  Injectable 40 milliGRAM(s) IV Push daily      levothyroxine Injectable 44 MICROGram(s) IV Push daily  dextrose 50% Injectable 50 milliLiter(s) IV Push every 15 minutes  insulin lispro (HumaLOG) corrective regimen sliding scale   SubCutaneous every 2 hours    multiple electrolytes Injection Type 1 1000 milliLiter(s) IV Continuous <Continuous>  magnesium sulfate  IVPB 1 Gram(s) IV Intermittent once        sodium chloride 0.9% lock flush 3 milliLiter(s) IV Push every 8 hours          ICU Vital Signs Last 24 Hrs  T(C): 37.2 (30 Jul 2017 04:00), Max: 37.2 (30 Jul 2017 04:00)  T(F): 99 (30 Jul 2017 04:00), Max: 99 (30 Jul 2017 04:00)  HR: 97 (30 Jul 2017 07:00) (85 - 124)  BP: 126/59 (30 Jul 2017 07:00) (108/75 - 201/85)  BP(mean): 84 (30 Jul 2017 07:00) (78 - 122)  ABP: --  ABP(mean): --  RR: 17 (30 Jul 2017 07:00) (12 - 26)  SpO2: 99% (30 Jul 2017 07:00) (89% - 100%)          I&O's Detail    29 Jul 2017 07:01  -  30 Jul 2017 07:00  --------------------------------------------------------  IN:    insulin Infusion: 16 mL    insulin Infusion: 12 mL    insulin Infusion: 3 mL    multiple electrolytes Injection Type 1: 50 mL    multiple electrolytes Injection Type 1multiple electrolytes Injection Type 1: 500 mL    niCARdipine Infusion: 375 mL    Packed Red Blood Cells: 321 mL    pantoprazole Infusion: 20 mL    Platelets - Single Donor: 219 mL  Total IN: 1516 mL    OUT:    Voided: 1150 mL  Total OUT: 1150 mL    Total NET: 366 mL            LABS:                        7.1    7.5   )-----------( 205      ( 30 Jul 2017 05:32 )             21.4     07-30    141  |  104  |  26.0<H>  ----------------------------<  201<H>  4.4   |  21.0<L>  |  0.92    Ca    8.3<L>      30 Jul 2017 05:32  Phos  3.2     07-30  Mg     1.5     07-30    TPro  5.8<L>  /  Alb  3.9  /  TBili  0.3<L>  /  DBili  x   /  AST  10  /  ALT  9   /  AlkPhos  78  07-29          CAPILLARY BLOOD GLUCOSE  186 (30 Jul 2017 06:00)        PT/INR - ( 29 Jul 2017 11:31 )   PT: 10.6 sec;   INR: 0.96 ratio         PTT - ( 29 Jul 2017 11:31 )  PTT:22.1 sec    CULTURES:      Physical Examination:    General: No acute distress.  Resting comfortably, arouses easily.  Pale.    HEENT: Pupils equal, reactive to light.  Symmetric.  Pale conjunctiva    PULM: Clear to auscultation bilaterally, no significant sputum production    CVS: Regular rate and rhythm, no murmurs, rubs, or gallops    ABD: Soft, nondistended, nontender, normoactive bowel sounds, no masses    EXT: No edema, nontender.  L AKA well healed    SKIN: Warm and well perfused, no rashes noted.    NEURO: sleeping but easily arousable and nonfocal
Pt seen and examined Status post colonoscopy with polypectomy yesterday (see Brief Operative Note from yesterday). Pt. with a probable diverticular hemorrhage.    MEDICATIONS:  MEDICATIONS  (STANDING):  pantoprazole  Injectable 40 milliGRAM(s) IV Push daily  metoprolol 25 milliGRAM(s) Oral two times a day  tamsulosin 0.4 milliGRAM(s) Oral at bedtime  gabapentin 100 milliGRAM(s) Oral three times a day  atorvastatin 40 milliGRAM(s) Oral at bedtime  levothyroxine 88 MICROGram(s) Oral daily  insulin glargine Injectable (LANTUS) 15 Unit(s) SubCutaneous at bedtime  insulin lispro Injectable (HumaLOG) 3 Unit(s) SubCutaneous three times a day before meals  insulin lispro (HumaLOG) corrective regimen sliding scale   SubCutaneous three times a day before meals  insulin lispro (HumaLOG) corrective regimen sliding scale   SubCutaneous at bedtime  dextrose 5%. 1000 milliLiter(s) (50 mL/Hr) IV Continuous <Continuous>  dextrose 50% Injectable 12.5 Gram(s) IV Push once  dextrose 50% Injectable 25 Gram(s) IV Push once  dextrose 50% Injectable 25 Gram(s) IV Push once    MEDICATIONS  (PRN):  dextrose Gel 1 Dose(s) Oral once PRN Blood Glucose LESS THAN 70 milliGRAM(s)/deciliter  glucagon  Injectable 1 milliGRAM(s) IntraMuscular once PRN Glucose LESS THAN 70 milligrams/deciliter      Allergies    Benadryl (Other)  Demerol HCl (Other)    Intolerances        Vital Signs Last 24 Hrs  T(C): 36.7 (01 Aug 2017 06:21), Max: 36.8 (31 Jul 2017 07:00)  T(F): 98 (01 Aug 2017 06:21), Max: 98.2 (31 Jul 2017 07:00)  HR: 88 (01 Aug 2017 06:21) (73 - 88)  BP: 170/52 (01 Aug 2017 06:21) (156/52 - 190/62)  BP(mean): --  RR: 16 (01 Aug 2017 06:21) (16 - 18)  SpO2: 98% (01 Aug 2017 06:21) (98% - 99%)    07-30 @ 07:01  -  07-31 @ 07:00  --------------------------------------------------------  IN: 0 mL / OUT: 3 mL / NET: -3 mL    07-31 @ 07:01  -  08-01 @ 06:57  --------------------------------------------------------  IN: 0 mL / OUT: 300 mL / NET: -300 mL        PHYSICAL EXAM:    General: Well developed; well nourished; in no acute distress  HEENT: MMM, conjunctiva pink and sclera anicteric.  Lungs: clear to auscultation and percussion.  Cor: RRR S1, S2 only.  Gastrointestinal: Abdomen: Soft non-tender non-distended; Normal bowel sounds; No hepatosplenomegaly  Extremities: Left AKA  Skin: Warm and dry. No obvious rash  Neuro: Pt. a + o x 3    LABS:      CBC Full  -  ( 31 Jul 2017 05:31 )  WBC Count : 4.3 K/uL  Hemoglobin : 7.4 g/dL  Hematocrit : 22.2 %  Platelet Count - Automated : 175 K/uL  Mean Cell Volume : 90.2 fl  Mean Cell Hemoglobin : 30.1 pg  Mean Cell Hemoglobin Concentration : 33.3 g/dL  Auto Neutrophil # : 2.9 K/uL  Auto Lymphocyte # : 0.9 K/uL  Auto Monocyte # : 0.3 K/uL  Auto Eosinophil # : 0.2 K/uL  Auto Basophil # : 0.0 K/uL  Auto Neutrophil % : 67.6 %  Auto Lymphocyte % : 20.7 %  Auto Monocyte % : 7.0 %  Auto Eosinophil % : 4.0 %  Auto Basophil % : 0.2 %    07-31    141  |  106  |  16.0  ----------------------------<  304<H>  4.5   |  22.0  |  0.80    Ca    8.5<L>      31 Jul 2017 05:31  Mg     1.7     07-31      PT/INR - ( 31 Jul 2017 05:31 )   PT: 11.8 sec;   INR: 1.07 ratio                           RADIOLOGY & ADDITIONAL STUDIES (The following images were personally reviewed):
Pt seen and examined. By the time she had her angiogram yesterday afternoon she had stopped bleeding. and she has had no further bloody BM's or BM's of any type. Hb this AM down to 7.1 grams w/o signs of active bleeding.      MEDICATIONS:  MEDICATIONS  (STANDING):  sodium chloride 0.9% lock flush 3 milliLiter(s) IV Push every 8 hours  levothyroxine Injectable 44 MICROGram(s) IV Push daily  dextrose 50% Injectable 50 milliLiter(s) IV Push every 15 minutes  pantoprazole  Injectable 40 milliGRAM(s) IV Push daily  niCARdipine Infusion 5 mG/Hr (25 mL/Hr) IV Continuous <Continuous>  insulin lispro (HumaLOG) corrective regimen sliding scale   SubCutaneous every 2 hours  multiple electrolytes Injection Type 1 1000 milliLiter(s) (50 mL/Hr) IV Continuous <Continuous>    MEDICATIONS  (PRN):      Allergies    Benadryl (Other)  Demerol HCl (Other)    Intolerances        Vital Signs Last 24 Hrs  T(C): 36.4 (30 Jul 2017 08:00), Max: 37.2 (30 Jul 2017 04:00)  T(F): 97.6 (30 Jul 2017 08:00), Max: 99 (30 Jul 2017 04:00)  HR: 93 (30 Jul 2017 09:00) (85 - 124)  BP: 123/58 (30 Jul 2017 09:00) (108/75 - 201/85)  BP(mean): 84 (30 Jul 2017 09:00) (78 - 122)  RR: 18 (30 Jul 2017 09:00) (12 - 26)  SpO2: 95% (30 Jul 2017 09:00) (89% - 100%)    07-29 @ 07:01  -  07-30 @ 07:00  --------------------------------------------------------  IN: 1516 mL / OUT: 1150 mL / NET: 366 mL        PHYSICAL EXAM:    General: Well developed; well nourished; in no acute distress  HEENT: MMM, conjunctiva pale and sclera anicteric.  Gastrointestinal: Abdomen: Soft non-tender non-distended; Normal bowel sounds; No hepatosplenomegaly  no surgical scars.   Extremities: LLE AKA  Skin: Warm and dry. No obvious rash  Neuro: Pt. a + o x 3    LABS:      CBC Full  -  ( 30 Jul 2017 05:32 )  WBC Count : 7.5 K/uL  Hemoglobin : 7.1 g/dL  Hematocrit : 21.4 %  Platelet Count - Automated : 205 K/uL  Mean Cell Volume : 89.9 fl  Mean Cell Hemoglobin : 29.8 pg  Mean Cell Hemoglobin Concentration : 33.2 g/dL  Auto Neutrophil # : x  Auto Lymphocyte # : x  Auto Monocyte # : x  Auto Eosinophil # : x  Auto Basophil # : x  Auto Neutrophil % : x  Auto Lymphocyte % : x  Auto Monocyte % : x  Auto Eosinophil % : x  Auto Basophil % : x    07-30    141  |  104  |  26.0<H>  ----------------------------<  201<H>  4.4   |  21.0<L>  |  0.92    Ca    8.3<L>      30 Jul 2017 05:32  Phos  3.2     07-30  Mg     1.5     07-30    TPro  5.8<L>  /  Alb  3.9  /  TBili  0.3<L>  /  DBili  x   /  AST  10  /  ALT  9   /  AlkPhos  78  07-29    PT/INR - ( 29 Jul 2017 11:31 )   PT: 10.6 sec;   INR: 0.96 ratio         PTT - ( 29 Jul 2017 11:31 )  PTT:22.1 sec                  RADIOLOGY & ADDITIONAL STUDIES (The following images were personally reviewed):
YELITZA LÓPEZ     Chief Complaint: Patient is a 85y old  Female who presents with a chief complaint of Rectal Bleeding (29 Jul 2017 10:29)      PAST MEDICAL & SURGICAL HISTORY:  PAF (paroxysmal atrial fibrillation)  Hyperlipidemia  Hypertension  Diabetes  PVD (peripheral vascular disease)  CAD (coronary artery disease)  S/P AKA (above knee amputation) unilateral, left  History of cholecystectomy  H/O angioplasty      HPI/OVERNIGHT EVENTS: Patient in no distress. She is stable for transfer to rehab. No further GI bleeding.    MEDICATIONS  (STANDING):  pantoprazole  Injectable 40 milliGRAM(s) IV Push daily  metoprolol 25 milliGRAM(s) Oral two times a day  tamsulosin 0.4 milliGRAM(s) Oral at bedtime  gabapentin 100 milliGRAM(s) Oral three times a day  atorvastatin 40 milliGRAM(s) Oral at bedtime  levothyroxine 88 MICROGram(s) Oral daily  insulin glargine Injectable (LANTUS) 15 Unit(s) SubCutaneous at bedtime  insulin lispro Injectable (HumaLOG) 3 Unit(s) SubCutaneous three times a day before meals  insulin lispro (HumaLOG) corrective regimen sliding scale   SubCutaneous three times a day before meals  insulin lispro (HumaLOG) corrective regimen sliding scale   SubCutaneous at bedtime  dextrose 5%. 1000 milliLiter(s) (50 mL/Hr) IV Continuous <Continuous>  dextrose 50% Injectable 12.5 Gram(s) IV Push once  dextrose 50% Injectable 25 Gram(s) IV Push once  dextrose 50% Injectable 25 Gram(s) IV Push once      Vital Signs Last 24 Hrs  T(C): 36.7 (01 Aug 2017 09:00), Max: 36.7 (01 Aug 2017 06:21)  T(F): 98.1 (01 Aug 2017 09:00), Max: 98.1 (01 Aug 2017 09:00)  HR: 80 (01 Aug 2017 09:00) (73 - 88)  BP: 160/50 (01 Aug 2017 09:00) (156/52 - 170/58)  BP(mean): --  RR: 16 (01 Aug 2017 09:00) (16 - 16)  SpO2: 98% (01 Aug 2017 09:00) (98% - 98%)    PHYSICAL EXAM:  Constitutional: NAD, well-groomed, well-developed  HEENT: PERRLA, EOMI, Normal Hearing, MMM  Neck: No LAD, No JVD  Back: Normal spine flexure, No CVA tenderness  Respiratory: CTAB Cardiovascular: S1 and S2, RRR, no M/G/R  Gastrointestinal: BS+, soft, NT/ND  Extremities: No peripheral edema  Vascular: 2+ peripheral pulses  Neurological: A/O x 3, no focal deficits  Psychiatric: Normal mood, normal affect  Musculoskeletal: 5/5 strength b/l upper and lower extremities  Skin: No rashes    CAPILLARY BLOOD GLUCOSE  205 (01 Aug 2017 09:00)  278 (31 Jul 2017 22:37)  132 (31 Jul 2017 17:00)  213 (31 Jul 2017 11:00)  328 (31 Jul 2017 08:00)  253 (30 Jul 2017 22:36)  167 (30 Jul 2017 10:00)  109 (30 Jul 2017 09:00)  186 (30 Jul 2017 06:00)  195 (30 Jul 2017 04:00)  174 (30 Jul 2017 02:00)  194 (30 Jul 2017 01:30)  67 (30 Jul 2017 01:00)  106 (30 Jul 2017 00:00)  203 (29 Jul 2017 22:00)  286 (29 Jul 2017 21:00)  337 (29 Jul 2017 20:00)  403 (29 Jul 2017 18:00)  370 (29 Jul 2017 14:20)    LABS:                        7.3    5.2   )-----------( 185      ( 01 Aug 2017 10:10 )             22.4     08-01    139  |  103  |  15.0  ----------------------------<  187<H>  4.0   |  24.0  |  0.92    Ca    8.5<L>      01 Aug 2017 10:10  Mg     1.7     07-31      PT/INR - ( 31 Jul 2017 05:31 )   PT: 11.8 sec;   INR: 1.07 ratio               RADIOLOGY & ADDITIONAL TESTS:
YELITZA LÓPEZ     Chief Complaint: Patient is a 85y old  Female who presents with a chief complaint of Rectal Bleeding (29 Jul 2017 10:29)      PAST MEDICAL & SURGICAL HISTORY:  PAF (paroxysmal atrial fibrillation)  Hyperlipidemia  Hypertension  Diabetes  PVD (peripheral vascular disease)  CAD (coronary artery disease)  S/P AKA (above knee amputation) unilateral, left  History of cholecystectomy  H/O angioplasty      HPI/OVERNIGHT EVENTS: Patient sitting up without complaints. Daughter is at bedside.    MEDICATIONS  (STANDING):  dextrose 50% Injectable 50 milliLiter(s) IV Push every 15 minutes  pantoprazole  Injectable 40 milliGRAM(s) IV Push daily  insulin lispro (HumaLOG) corrective regimen sliding scale   SubCutaneous every 2 hours  polyethylene glycol/electrolyte Solution 1000 milliLiter(s) Oral every 4 hours  metoprolol 25 milliGRAM(s) Oral two times a day  tamsulosin 0.4 milliGRAM(s) Oral at bedtime  gabapentin 100 milliGRAM(s) Oral three times a day  atorvastatin 40 milliGRAM(s) Oral at bedtime  levothyroxine 88 MICROGram(s) Oral daily      Vital Signs Last 24 Hrs  T(C): 36.6 (30 Jul 2017 12:00), Max: 37.2 (30 Jul 2017 04:00)  T(F): 97.9 (30 Jul 2017 12:00), Max: 99 (30 Jul 2017 04:00)  HR: 93 (30 Jul 2017 14:00) (91 - 124)  BP: 150/67 (30 Jul 2017 14:00) (112/76 - 166/60)  BP(mean): 97 (30 Jul 2017 14:00) (78 - 97)  RR: 18 (30 Jul 2017 14:00) (15 - 26)  SpO2: 95% (30 Jul 2017 14:00) (89% - 99%)    PHYSICAL EXAM:  Constitutional: NAD, well-groomed, well-developed  HEENT: PERRLA, EOMI, Normal Hearing, MMM  Neck: No LAD, No JVD  Back: Normal spine flexure, No CVA tenderness  Respiratory: CTAB Cardiovascular: S1 and S2, RRR, no M/G/R  Gastrointestinal: BS+, soft, NT/ND  Extremities: left bka.  Vascular: 2+ peripheral pulses  Neurological: A/O x 3, no focal deficits     CAPILLARY BLOOD GLUCOSE  167 (30 Jul 2017 10:00)  109 (30 Jul 2017 09:00)  186 (30 Jul 2017 06:00)  195 (30 Jul 2017 04:00)  174 (30 Jul 2017 02:00)  194 (30 Jul 2017 01:30)  67 (30 Jul 2017 01:00)  106 (30 Jul 2017 00:00)  203 (29 Jul 2017 22:00)  286 (29 Jul 2017 21:00)  337 (29 Jul 2017 20:00)  403 (29 Jul 2017 18:00)  370 (29 Jul 2017 14:20)    LABS:                        7.5    x     )-----------( x        ( 30 Jul 2017 12:44 )             22.7     07-30    141  |  104  |  26.0<H>  ----------------------------<  201<H>  4.4   |  21.0<L>  |  0.92    Ca    8.3<L>      30 Jul 2017 05:32  Phos  3.2     07-30  Mg     1.5     07-30    TPro  5.8<L>  /  Alb  3.9  /  TBili  0.3<L>  /  DBili  x   /  AST  10  /  ALT  9   /  AlkPhos  78  07-29    PT/INR - ( 29 Jul 2017 11:31 )   PT: 10.6 sec;   INR: 0.96 ratio         PTT - ( 29 Jul 2017 11:31 )  PTT:22.1 sec      RADIOLOGY & ADDITIONAL TESTS:
YELITZA LÓPEZ     Chief Complaint: Patient is a 85y old  Female who presents with a chief complaint of Rectal Bleeding (29 Jul 2017 10:29)      PAST MEDICAL & SURGICAL HISTORY:  PAF (paroxysmal atrial fibrillation)  Hyperlipidemia  Hypertension  Diabetes  PVD (peripheral vascular disease)  CAD (coronary artery disease)  S/P AKA (above knee amputation) unilateral, left  History of cholecystectomy  H/O angioplasty      HPI/OVERNIGHT EVENTS: Patient status post colonoscopy. I spoke to Dr Saab. Patient is hyperglycemic.    MEDICATIONS  (STANDING):  pantoprazole  Injectable 40 milliGRAM(s) IV Push daily  metoprolol 25 milliGRAM(s) Oral two times a day  tamsulosin 0.4 milliGRAM(s) Oral at bedtime  gabapentin 100 milliGRAM(s) Oral three times a day  atorvastatin 40 milliGRAM(s) Oral at bedtime  levothyroxine 88 MICROGram(s) Oral daily  insulin glargine Injectable (LANTUS) 15 Unit(s) SubCutaneous at bedtime  insulin lispro Injectable (HumaLOG) 3 Unit(s) SubCutaneous three times a day before meals  insulin lispro (HumaLOG) corrective regimen sliding scale   SubCutaneous three times a day before meals  insulin lispro (HumaLOG) corrective regimen sliding scale   SubCutaneous at bedtime  dextrose 5%. 1000 milliLiter(s) (50 mL/Hr) IV Continuous <Continuous>  dextrose 50% Injectable 12.5 Gram(s) IV Push once  dextrose 50% Injectable 25 Gram(s) IV Push once  dextrose 50% Injectable 25 Gram(s) IV Push once      Vital Signs Last 24 Hrs  T(C): 36.8 (31 Jul 2017 07:00), Max: 36.8 (31 Jul 2017 07:00)  T(F): 98.2 (31 Jul 2017 07:00), Max: 98.2 (31 Jul 2017 07:00)  HR: 78 (31 Jul 2017 08:48) (78 - 101)  BP: 176/72 (31 Jul 2017 08:48) (101/58 - 190/62)  BP(mean): 98 (30 Jul 2017 17:00) (72 - 98)  RR: 16 (31 Jul 2017 08:48) (16 - 22)  SpO2: 98% (31 Jul 2017 08:48) (95% - 98%)    PHYSICAL EXAM:  Constitutional: NAD, well-groomed, well-developed  HEENT: PERRLA, EOMI, Normal Hearing, MMM  Neck: No LAD, No JVD  Back: Normal spine flexure, No CVA tenderness  Respiratory: CTAB Cardiovascular: S1 and S2, RRR, no M/G/R  Gastrointestinal: BS+, soft, NT/ND  Extremities: No peripheral edema  Vascular: 2+ peripheral pulses  Neurological: A/O x 3, no focal deficits  Psychiatric: Normal mood, normal affect  Musculoskeletal: 5/5 strength b/l upper and lower extremities  Skin: No rashes    CAPILLARY BLOOD GLUCOSE  328 (31 Jul 2017 08:00)  253 (30 Jul 2017 22:36)  167 (30 Jul 2017 10:00)  109 (30 Jul 2017 09:00)  186 (30 Jul 2017 06:00)  195 (30 Jul 2017 04:00)  174 (30 Jul 2017 02:00)  194 (30 Jul 2017 01:30)  67 (30 Jul 2017 01:00)  106 (30 Jul 2017 00:00)  203 (29 Jul 2017 22:00)  286 (29 Jul 2017 21:00)  337 (29 Jul 2017 20:00)  403 (29 Jul 2017 18:00)  370 (29 Jul 2017 14:20)    LABS:                        7.4    4.3   )-----------( 175      ( 31 Jul 2017 05:31 )             22.2     07-31    141  |  106  |  16.0  ----------------------------<  304<H>  4.5   |  22.0  |  0.80    Ca    8.5<L>      31 Jul 2017 05:31  Phos  3.2     07-30  Mg     1.7     07-31      PT/INR - ( 31 Jul 2017 05:31 )   PT: 11.8 sec;   INR: 1.07 ratio               RADIOLOGY & ADDITIONAL TESTS:

## 2017-08-01 NOTE — DISCHARGE NOTE ADULT - MEDICATION SUMMARY - MEDICATIONS TO STOP TAKING
I will STOP taking the medications listed below when I get home from the hospital:    apixaban 5 mg oral tablet  -- 1 tab(s) by mouth 2 times a day   start on  friday may 19 at night    clopidogrel 75 mg oral tablet  -- 1 tab(s) by mouth once a day    Ferretts Iron 325 mg (106 mg elemental iron) oral tablet  -- 1 tab(s) by mouth once a day  -- May discolor urine or feces.    ascorbic acid 500 mg oral tablet  -- 1 tab(s) by mouth once a day    folic acid 1 mg oral tablet  -- 1 tab(s) by mouth once a day    acetaminophen-oxycodone 325 mg-5 mg oral tablet  -- 1 tab(s) by mouth every 4 hours, As needed, Moderate Pain (4 - 6) MDD:4    ALPRAZolam 0.25 mg oral tablet  -- 1 tab(s) by mouth once a day (at bedtime), As needed, anxiety MDD:2

## 2017-08-01 NOTE — DISCHARGE NOTE ADULT - MEDICATION SUMMARY - MEDICATIONS TO TAKE
I will START or STAY ON the medications listed below when I get home from the hospital:    tamsulosin 0.4 mg oral capsule  -- 1 cap(s) by mouth once a day (at bedtime)  -- Indication: For Urinary frequency    gabapentin 100 mg oral capsule  -- 1 cap(s) by mouth 3 times a day  -- Indication: For Neuropathy    mirtazapine 7.5 mg oral tablet  -- 1 tab(s) by mouth once a day (at bedtime)  -- Indication: For Insomnia    insulin glargine  -- 20 unit(s) subcutaneous once a day (at bedtime)  -- Indication: For Diabetes    HumaLOG 100 units/mL subcutaneous solution  -- 4 unit(s) subcutaneous 3 times a day before meals  -- Do not drink alcoholic beverages when taking this medication.  It is very important that you take or use this exactly as directed.  Do not skip doses or discontinue unless directed by your doctor.  Keep in refrigerator.  Do not freeze.    -- Indication: For Diabetes    atorvastatin 40 mg oral tablet  -- 1 tab(s) by mouth once a day (at bedtime)  -- Indication: For Hyperlipidemia    metoprolol tartrate 25 mg oral tablet  -- 1 tab(s) by mouth every 12 hours  -- Indication: For HTN    docusate sodium 100 mg oral capsule  -- 1 cap(s) by mouth 3 times a day  -- Indication: For Constipation    senna oral tablet  -- 2 tab(s) by mouth once a day (at bedtime)  -- Indication: For Constipation    polyethylene glycol 3350 oral powder for reconstitution  -- 17 gram(s) by mouth once a day as needed for constipation  -- Indication: For Constipation    pantoprazole 40 mg oral delayed release tablet  -- 1 tab(s) by mouth once a day (before a meal)  -- Indication: For GERD    levothyroxine 88 mcg (0.088 mg) oral tablet  -- 1 tab(s) by mouth once a day  -- Indication: For Hypothyroid I will START or STAY ON the medications listed below when I get home from the hospital:    tamsulosin 0.4 mg oral capsule  -- 1 cap(s) by mouth once a day (at bedtime)  -- Indication: For Urinary frequency    gabapentin 100 mg oral capsule  -- 1 cap(s) by mouth 3 times a day  -- Indication: For Neuropathy    mirtazapine 7.5 mg oral tablet  -- 1 tab(s) by mouth once a day (at bedtime)  -- Indication: For Insomnia    insulin glargine  -- 20 unit(s) subcutaneous once a day (at bedtime)  -- Indication: For Diabetes    HumaLOG 100 units/mL subcutaneous solution  -- 4 unit(s) subcutaneous 3 times a day before meals  -- Do not drink alcoholic beverages when taking this medication.  It is very important that you take or use this exactly as directed.  Do not skip doses or discontinue unless directed by your doctor.  Keep in refrigerator.  Do not freeze.    -- Indication: For Diabetes    atorvastatin 40 mg oral tablet  -- 1 tab(s) by mouth once a day (at bedtime)  -- Indication: For Hyperlipidemia    Plavix 75 mg oral tablet  -- 1 tab(s) by mouth once a day  -- Indication: For PVD (peripheral vascular disease)    metoprolol tartrate 25 mg oral tablet  -- 1 tab(s) by mouth every 12 hours  -- Indication: For HTN    docusate sodium 100 mg oral capsule  -- 1 cap(s) by mouth 3 times a day  -- Indication: For Constipation    senna oral tablet  -- 2 tab(s) by mouth once a day (at bedtime)  -- Indication: For Constipation    polyethylene glycol 3350 oral powder for reconstitution  -- 17 gram(s) by mouth once a day as needed for constipation  -- Indication: For Constipation    pantoprazole 40 mg oral delayed release tablet  -- 1 tab(s) by mouth once a day (before a meal)  -- Indication: For GERD    levothyroxine 88 mcg (0.088 mg) oral tablet  -- 1 tab(s) by mouth once a day  -- Indication: For Hypothyroid

## 2017-08-01 NOTE — DISCHARGE NOTE ADULT - HOSPITAL COURSE
85 year old female acute anemia from diverticular hemorrhage, slowed when angiography performed so no intervention performed. Colonoscopy 7/31 8/1 No further bleeding. Stable for discharge.    Problem/Plan - 1:  ·  Problem: Blood loss anemia.  Plan: Monitor hgb. Anticoagulation discontinued  Colonoscopy performed.     Problem/Plan - 2:  ·  Problem: Hematochezia.  Plan: As above. 7/31 Resolved.     Problem/Plan - 3:  ·  Problem: Hypertension.  Plan: Mild hypertension.     Problem/Plan - 4:  ·  Problem: Hyperglycemia.  Plan: Tolerable glycemia  Mild hyperglycemia, Patient does not require tight control..     Problem/Plan - 5:  ·  Problem: PVD (peripheral vascular disease).  Plan: AKA.     Problem/Plan - 6:  Problem: CAD (coronary artery disease). Plan: No ischemia at this time.    Problem/Plan - 7:  ·  Problem: PAF (paroxysmal atrial fibrillation).  Plan: No AF noted here.  Hold AC. 85 year old female acute anemia from diverticular hemorrhage, slowed when angiography performed so no intervention performed. Colonoscopy 7/31 8/1 No further bleeding. Stable for discharge.    Problem/Plan - 1:  ·  Problem: Blood loss anemia.  Plan: Monitor hgb. Anticoagulation discontinued  Colonoscopy performed.   Resume Plavix and watch for GI Bleed    Problem/Plan - 2:  ·  Problem: Hematochezia.  Plan: As above. 7/31 Resolved.     Problem/Plan - 3:  ·  Problem: Hypertension.  Plan: Mild hypertension.     Problem/Plan - 4:  ·  Problem: Hyperglycemia.  Plan: Tolerable glycemia  Mild hyperglycemia, Patient does not require tight control..     Problem/Plan - 5:  ·  Problem: PVD (peripheral vascular disease).  Plan: AKA.     Problem/Plan - 6:  Problem: CAD (coronary artery disease). Plan: No ischemia at this time.    Problem/Plan - 7:  ·  Problem: PAF (paroxysmal atrial fibrillation).  Plan: No AF noted here.  Hold AC.

## 2017-08-01 NOTE — PROGRESS NOTE ADULT - PROBLEM SELECTOR PLAN 1
Pt. with a resolved diverticular hemorrhage. Can re-feed pt. if not already done and send home if today's Hb OK. Can follow colon polyp pathology results as OPT. Pt. with a resolved diverticular hemorrhage. Can re-feed pt. if not already done and send home if today's Hb OK. Can follow colon polyp pathology results as OPT. Agree with not restarting Eloquis.

## 2017-08-01 NOTE — PROGRESS NOTE ADULT - PROBLEM SELECTOR PROBLEM 6
CAD (coronary artery disease)

## 2017-08-01 NOTE — PROGRESS NOTE ADULT - PROBLEM SELECTOR PLAN 7
No AF noted here.  Hold AC

## 2017-08-01 NOTE — DISCHARGE NOTE ADULT - MEDICATION SUMMARY - MEDICATIONS TO CHANGE
I will SWITCH the dose or number of times a day I take the medications listed below when I get home from the hospital:    insulin lispro 100 units/mL subcutaneous solution  -- 2 unit(s) subcutaneous 3 times a day (before meals) ; 2 Unit(s) if Glucose 151 - 200 4 Unit(s) if Glucose 201 - 250 6 Unit(s) if Glucose 251 - 300 8 Unit(s) if Glucose 301 - 350 10 Unit(s) if Glucose 351 - 400 12 Unit(s) if Glucose Greater Than 400    HumaLOG 100 units/mL subcutaneous solution  -- 2 unit(s) subcutaneous 3 times a day before meals  -- Do not drink alcoholic beverages when taking this medication.  It is very important that you take or use this exactly as directed.  Do not skip doses or discontinue unless directed by your doctor.  Keep in refrigerator.  Do not freeze.

## 2017-08-01 NOTE — PROGRESS NOTE ADULT - PROBLEM SELECTOR PLAN 3
Mild hypertension
Using nicardipine for now to control.  Will add back antihypertensives once bleeding stabilizes.

## 2017-08-01 NOTE — DISCHARGE NOTE ADULT - CARE PLAN
Principal Discharge DX:	Gastrointestinal hemorrhage with melena  Goal:	Prevent further episodes  Instructions for follow-up, activity and diet:	Stop anticoagulation. Follow up with a physician. Supportive care.  Secondary Diagnosis:	Coronary artery disease involving native coronary artery of native heart without angina pectoris  Goal:	Prevent further cardiac episodes  Instructions for follow-up, activity and diet:	Continue cardiac medications. No Anticoagulation. Follow up with physician.  Secondary Diagnosis:	Type 2 diabetes mellitus with other neurologic complication  Goal:	Tolerable Glycemia  Instructions for follow-up, activity and diet:	Gently increase Lantus to 20 units and avoid hypoglycemia.  Secondary Diagnosis:	Mixed hyperlipidemia  Instructions for follow-up, activity and diet:	Continue statin  Secondary Diagnosis:	Essential hypertension  Goal:	Monitor BP  Instructions for follow-up, activity and diet:	Continue antihypertensive medication.  Secondary Diagnosis:	PAF (paroxysmal atrial fibrillation)  Instructions for follow-up, activity and diet:	Now in sinus rhythm  Secondary Diagnosis:	S/P AKA (above knee amputation) unilateral, left  Instructions for follow-up, activity and diet:	Supportive care Principal Discharge DX:	Gastrointestinal hemorrhage with melena  Goal:	Prevent further episodes  Instructions for follow-up, activity and diet:	Stop anticoagulation. Follow up with a physician. Supportive care.  Resume plavix and watch for GI bleeding.  Secondary Diagnosis:	Coronary artery disease involving native coronary artery of native heart without angina pectoris  Goal:	Prevent further cardiac episodes  Instructions for follow-up, activity and diet:	Continue cardiac medications. No Anticoagulation. Follow up with physician.  Secondary Diagnosis:	Type 2 diabetes mellitus with other neurologic complication  Goal:	Tolerable Glycemia  Instructions for follow-up, activity and diet:	Gently increase Lantus to 20 units and avoid hypoglycemia.  Secondary Diagnosis:	Mixed hyperlipidemia  Instructions for follow-up, activity and diet:	Continue statin  Secondary Diagnosis:	Essential hypertension  Goal:	Monitor BP  Instructions for follow-up, activity and diet:	Continue antihypertensive medication.  Secondary Diagnosis:	PAF (paroxysmal atrial fibrillation)  Instructions for follow-up, activity and diet:	Now in sinus rhythm  Secondary Diagnosis:	S/P AKA (above knee amputation) unilateral, left  Instructions for follow-up, activity and diet:	Supportive care

## 2017-08-01 NOTE — PROGRESS NOTE ADULT - PROBLEM SELECTOR PROBLEM 1
Blood loss anemia
Rectal bleeding
Rectal bleeding
Blood loss anemia

## 2017-08-01 NOTE — PROGRESS NOTE ADULT - PROBLEM SELECTOR PLAN 6
No ischemia at this time.

## 2017-08-01 NOTE — DISCHARGE NOTE ADULT - CARE PROVIDER_API CALL
August Barraza), Gastroenterology; Internal Medicine  [address] [place] [address]  Phone: [phone]  Fax: [phone]

## 2017-08-01 NOTE — DISCHARGE NOTE ADULT - SECONDARY DIAGNOSIS.
Coronary artery disease involving native coronary artery of native heart without angina pectoris Type 2 diabetes mellitus with other neurologic complication Mixed hyperlipidemia Essential hypertension PAF (paroxysmal atrial fibrillation) S/P AKA (above knee amputation) unilateral, left

## 2017-08-01 NOTE — PROGRESS NOTE ADULT - ASSESSMENT
85 year old female acute anemia from diverticular hemorrhage, slowed when angiography performed so no intervention performed. Colonoscopy 7/31 85 year old female acute anemia from diverticular hemorrhage, slowed when angiography performed so no intervention performed. Colonoscopy 7/31 8/1 No further bleeding. Stable for discharge.

## 2017-08-24 ENCOUNTER — EMERGENCY (EMERGENCY)
Facility: HOSPITAL | Age: 82
LOS: 1 days | Discharge: HOME | End: 2017-08-24
Attending: EMERGENCY MEDICINE
Payer: COMMERCIAL

## 2017-08-24 VITALS
DIASTOLIC BLOOD PRESSURE: 78 MMHG | RESPIRATION RATE: 18 BRPM | OXYGEN SATURATION: 94 % | SYSTOLIC BLOOD PRESSURE: 176 MMHG | WEIGHT: 154.1 LBS | TEMPERATURE: 98 F | HEART RATE: 84 BPM | HEIGHT: 66 IN

## 2017-08-24 DIAGNOSIS — Z98.62 PERIPHERAL VASCULAR ANGIOPLASTY STATUS: Chronic | ICD-10-CM

## 2017-08-24 DIAGNOSIS — Z89.612 ACQUIRED ABSENCE OF LEFT LEG ABOVE KNEE: Chronic | ICD-10-CM

## 2017-08-24 DIAGNOSIS — Z90.49 ACQUIRED ABSENCE OF OTHER SPECIFIED PARTS OF DIGESTIVE TRACT: Chronic | ICD-10-CM

## 2017-08-24 LAB
ALBUMIN SERPL ELPH-MCNC: 4 G/DL — SIGNIFICANT CHANGE UP (ref 3.3–5.2)
ALP SERPL-CCNC: 96 U/L — SIGNIFICANT CHANGE UP (ref 40–120)
ALT FLD-CCNC: 15 U/L — SIGNIFICANT CHANGE UP
ANION GAP SERPL CALC-SCNC: 14 MMOL/L — SIGNIFICANT CHANGE UP (ref 5–17)
ANISOCYTOSIS BLD QL: SLIGHT — SIGNIFICANT CHANGE UP
AST SERPL-CCNC: 12 U/L — SIGNIFICANT CHANGE UP
BASOPHILS # BLD AUTO: 0 K/UL — SIGNIFICANT CHANGE UP (ref 0–0.2)
BASOPHILS NFR BLD AUTO: 0.2 % — SIGNIFICANT CHANGE UP (ref 0–2)
BILIRUB SERPL-MCNC: 0.2 MG/DL — LOW (ref 0.4–2)
BUN SERPL-MCNC: 21 MG/DL — HIGH (ref 8–20)
CALCIUM SERPL-MCNC: 9.9 MG/DL — SIGNIFICANT CHANGE UP (ref 8.6–10.2)
CHLORIDE SERPL-SCNC: 101 MMOL/L — SIGNIFICANT CHANGE UP (ref 98–107)
CO2 SERPL-SCNC: 24 MMOL/L — SIGNIFICANT CHANGE UP (ref 22–29)
CREAT SERPL-MCNC: 0.81 MG/DL — SIGNIFICANT CHANGE UP (ref 0.5–1.3)
EOSINOPHIL # BLD AUTO: 0.2 K/UL — SIGNIFICANT CHANGE UP (ref 0–0.5)
EOSINOPHIL NFR BLD AUTO: 3.4 % — SIGNIFICANT CHANGE UP (ref 0–6)
GLUCOSE SERPL-MCNC: 216 MG/DL — HIGH (ref 70–115)
HCT VFR BLD CALC: 28.5 % — LOW (ref 37–47)
HGB BLD-MCNC: 9.2 G/DL — LOW (ref 12–16)
HYPOCHROMIA BLD QL: SLIGHT — SIGNIFICANT CHANGE UP
LYMPHOCYTES # BLD AUTO: 0.8 K/UL — LOW (ref 1–4.8)
LYMPHOCYTES # BLD AUTO: 13.4 % — LOW (ref 20–55)
MACROCYTES BLD QL: SLIGHT — SIGNIFICANT CHANGE UP
MCHC RBC-ENTMCNC: 30.1 PG — SIGNIFICANT CHANGE UP (ref 27–31)
MCHC RBC-ENTMCNC: 32.3 G/DL — SIGNIFICANT CHANGE UP (ref 32–36)
MCV RBC AUTO: 93.1 FL — SIGNIFICANT CHANGE UP (ref 81–99)
MICROCYTES BLD QL: SLIGHT — SIGNIFICANT CHANGE UP
MONOCYTES # BLD AUTO: 0.4 K/UL — SIGNIFICANT CHANGE UP (ref 0–0.8)
MONOCYTES NFR BLD AUTO: 6.5 % — SIGNIFICANT CHANGE UP (ref 3–10)
NEUTROPHILS # BLD AUTO: 4.3 K/UL — SIGNIFICANT CHANGE UP (ref 1.8–8)
NEUTROPHILS NFR BLD AUTO: 76.3 % — HIGH (ref 37–73)
OVALOCYTES BLD QL SMEAR: SLIGHT — SIGNIFICANT CHANGE UP
PLAT MORPH BLD: NORMAL — SIGNIFICANT CHANGE UP
PLATELET # BLD AUTO: 237 K/UL — SIGNIFICANT CHANGE UP (ref 150–400)
POIKILOCYTOSIS BLD QL AUTO: SLIGHT — SIGNIFICANT CHANGE UP
POTASSIUM SERPL-MCNC: 4.8 MMOL/L — SIGNIFICANT CHANGE UP (ref 3.5–5.3)
POTASSIUM SERPL-SCNC: 4.8 MMOL/L — SIGNIFICANT CHANGE UP (ref 3.5–5.3)
PROT SERPL-MCNC: 6.3 G/DL — LOW (ref 6.6–8.7)
RBC # BLD: 3.06 M/UL — LOW (ref 4.4–5.2)
RBC # FLD: 13.1 % — SIGNIFICANT CHANGE UP (ref 11–15.6)
RBC BLD AUTO: ABNORMAL
SODIUM SERPL-SCNC: 139 MMOL/L — SIGNIFICANT CHANGE UP (ref 135–145)
TROPONIN T SERPL-MCNC: 0.01 NG/ML — SIGNIFICANT CHANGE UP (ref 0–0.06)
WBC # BLD: 5.7 K/UL — SIGNIFICANT CHANGE UP (ref 4.8–10.8)
WBC # FLD AUTO: 5.7 K/UL — SIGNIFICANT CHANGE UP (ref 4.8–10.8)

## 2017-08-24 PROCEDURE — 93010 ELECTROCARDIOGRAM REPORT: CPT

## 2017-08-24 PROCEDURE — 99285 EMERGENCY DEPT VISIT HI MDM: CPT

## 2017-08-24 NOTE — ED ADULT NURSE NOTE - OBJECTIVE STATEMENT
Received patient in A15R, lying in bed a&ox3, able to make needs known. Received patient in A15R, lying in bed a&ox3, able to make needs known. Patient came from [facility] complaining of pelvic pain and unable to urinate for hours. Noted with L AKA. Patient denies any nausea, vomiting, chest pain, sob or dizziness.

## 2017-08-24 NOTE — ED ADULT TRIAGE NOTE - CHIEF COMPLAINT QUOTE
From [facility] c/o pelvic pain and unable to urinate X a couple of hours.  Nausea and pain started yesterday.

## 2017-08-24 NOTE — ED PROVIDER NOTE - PROGRESS NOTE DETAILS
distended bladder on bedside sono will place juarez--ct abd to r/o pelvic mass as cause of obstruction no pelvic nmass on ct--will try a trial of void--if tolerates dc on abx to follow up with urology--if unable to tolerate dc with juarez and urology follow up with abx for uti Pt. received from Dr. Perez. Pt. was observed. Pt. was able to urinate. Pt. will be discharged back to her facility. Pt. noted to have elevated BP. Pt. is asymptomatic. Pt. missed her night dose of metoprol(25mg). Pt. will be medicated. I spoke to Emperatriz(nurse at the assisted facility) and explained to her, pt's vital signs. She has agreed to accept the patient. Pt. will be re-evaluated upon arrival to her facility.

## 2017-08-24 NOTE — ED PROVIDER NOTE - MEDICAL DECISION MAKING DETAILS
possibly urinary retention will fu bladder scan possible juarez; ua to r/o uti; however given recent hx of gi bleed will f/u cbc bmp to check hgn electrolytes ekg and 1 trop as +nausea; if no relieve sp juarez possible ct to ro sbo, bladder mass---reassess possibly urinary retention will fu bladder scan possible juarez ct to r/o mass/stricture-- ua to r/o uti; however given recent hx of gi bleed will f/u cbc bmp to check hgn electrolytes ekg and 1 trop as  +nausea---reassess

## 2017-08-24 NOTE — ED PROVIDER NOTE - ABDOMINAL EXAM
soft , +bs vertical scar + distention to suprapubic area mild discomfort to suprapubic area; no rebound/guarding no cva ttp

## 2017-08-24 NOTE — ED PROVIDER NOTE - OBJECTIVE STATEMENT
86yo F hx of htn cad dm on plavix p/w difficulty urinating x 1 day. now with suprapubic discomfort. last was able to pee 3 hrs ago but only a little came out. denies burning or hematuria. notes that has had diarrhea yesterday (non-bloody). +nausea today. Denies f/c/v/cp/sob/palpitations/ cough/rash/headache/dizziness/c/hematuria. states that was in the ED 2 weeks ago for gi bleed, had a colonoscopy that had a polyp that was sent for biopsy and restarted plavix. denies any rectal bleeding at this time no black stools

## 2017-08-25 VITALS — SYSTOLIC BLOOD PRESSURE: 194 MMHG | DIASTOLIC BLOOD PRESSURE: 82 MMHG | HEART RATE: 91 BPM

## 2017-08-25 LAB
APPEARANCE UR: CLEAR — SIGNIFICANT CHANGE UP
BACTERIA # UR AUTO: ABNORMAL
BILIRUB UR-MCNC: NEGATIVE — SIGNIFICANT CHANGE UP
COLOR SPEC: YELLOW — SIGNIFICANT CHANGE UP
DIFF PNL FLD: NEGATIVE — SIGNIFICANT CHANGE UP
EPI CELLS # UR: SIGNIFICANT CHANGE UP
GLUCOSE UR QL: 1000 MG/DL
KETONES UR-MCNC: ABNORMAL
LEUKOCYTE ESTERASE UR-ACNC: ABNORMAL
NITRITE UR-MCNC: POSITIVE
PH UR: 5 — SIGNIFICANT CHANGE UP (ref 5–8)
PROT UR-MCNC: 30 MG/DL
RBC CASTS # UR COMP ASSIST: SIGNIFICANT CHANGE UP /HPF (ref 0–4)
SP GR SPEC: 1.01 — SIGNIFICANT CHANGE UP (ref 1.01–1.02)
UROBILINOGEN FLD QL: NEGATIVE MG/DL — SIGNIFICANT CHANGE UP
WBC UR QL: SIGNIFICANT CHANGE UP

## 2017-08-25 PROCEDURE — 80053 COMPREHEN METABOLIC PANEL: CPT

## 2017-08-25 PROCEDURE — 93005 ELECTROCARDIOGRAM TRACING: CPT

## 2017-08-25 PROCEDURE — 36415 COLL VENOUS BLD VENIPUNCTURE: CPT

## 2017-08-25 PROCEDURE — 84484 ASSAY OF TROPONIN QUANT: CPT

## 2017-08-25 PROCEDURE — 87086 URINE CULTURE/COLONY COUNT: CPT

## 2017-08-25 PROCEDURE — 99284 EMERGENCY DEPT VISIT MOD MDM: CPT | Mod: 25

## 2017-08-25 PROCEDURE — 85027 COMPLETE CBC AUTOMATED: CPT

## 2017-08-25 PROCEDURE — 81001 URINALYSIS AUTO W/SCOPE: CPT

## 2017-08-25 PROCEDURE — 74177 CT ABD & PELVIS W/CONTRAST: CPT | Mod: 26

## 2017-08-25 PROCEDURE — 87186 SC STD MICRODIL/AGAR DIL: CPT

## 2017-08-25 PROCEDURE — 96374 THER/PROPH/DIAG INJ IV PUSH: CPT | Mod: XU

## 2017-08-25 PROCEDURE — 74177 CT ABD & PELVIS W/CONTRAST: CPT

## 2017-08-25 RX ORDER — SODIUM CHLORIDE 9 MG/ML
500 INJECTION INTRAMUSCULAR; INTRAVENOUS; SUBCUTANEOUS ONCE
Qty: 0 | Refills: 0 | Status: COMPLETED | OUTPATIENT
Start: 2017-08-25 | End: 2017-08-25

## 2017-08-25 RX ORDER — CEFTRIAXONE 500 MG/1
1 INJECTION, POWDER, FOR SOLUTION INTRAMUSCULAR; INTRAVENOUS ONCE
Qty: 0 | Refills: 0 | Status: COMPLETED | OUTPATIENT
Start: 2017-08-25 | End: 2017-08-25

## 2017-08-25 RX ORDER — METOPROLOL TARTRATE 50 MG
25 TABLET ORAL ONCE
Qty: 0 | Refills: 0 | Status: COMPLETED | OUTPATIENT
Start: 2017-08-25 | End: 2017-08-25

## 2017-08-25 RX ORDER — CEPHALEXIN 500 MG
1 CAPSULE ORAL
Qty: 28 | Refills: 0
Start: 2017-08-25 | End: 2017-09-01

## 2017-08-25 RX ADMIN — Medication 25 MILLIGRAM(S): at 06:11

## 2017-08-25 RX ADMIN — SODIUM CHLORIDE 1500 MILLILITER(S): 9 INJECTION INTRAMUSCULAR; INTRAVENOUS; SUBCUTANEOUS at 03:01

## 2017-08-25 RX ADMIN — CEFTRIAXONE 100 GRAM(S): 500 INJECTION, POWDER, FOR SOLUTION INTRAMUSCULAR; INTRAVENOUS at 01:15

## 2017-08-25 NOTE — ED ADULT NURSE REASSESSMENT NOTE - NS ED NURSE REASSESS COMMENT FT1
As per Dr. Ohara verbal order, Patient's indwelling juarez catheter removed with 1300 cc output. Po fluids given to patient.

## 2017-09-09 NOTE — PROVIDER CONTACT NOTE (EICU) - ASSESSMENT
- monitor serial CBC  - pRBC transfusion as needed  - IVF hydration  - NPO  - GI/surgical eval  - hold anticoagulation/antiplatelet therapy
I will STOP taking the medications listed below when I get home from the hospital:    doxycycline hyclate 100 mg oral capsule  -- 1 cap(s) by mouth 2 times a day  -- Avoid prolonged or excessive exposure to direct and/or artificial sunlight while taking this medication.  Do not take this drug if you are pregnant.  Finish all this medication unless otherwise directed by prescriber.  Medication should be taken with plenty of water.

## 2018-01-01 NOTE — PROGRESS NOTE ADULT - PROBLEM SELECTOR PLAN 1
Bacitracin to the left 2nd toe daily  Ancef x 10 days  Keep stump covered  d/c to inpatient rehab once bed opens up Detailed exam

## 2018-01-02 NOTE — PROGRESS NOTE ADULT - SUBJECTIVE AND OBJECTIVE BOX
Letter completed. Nydia called and made aware that it is ready for  at [employer].    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 Meyer. Subsequently admitted to acute rehab. Developed LLE post operative wound dehiscence, s/p left AKA and RLE angiogram on 4/14/17. Also s/p revascularization of RLE with angiogram and R SFA/POP angioplasty on 4/20/17. Hospital stay significant for: Completed course of cefazolin for UTI. Restarted on Abx for R foot second toe gangrene. Transfused two units PRBCs.    Interval history: No acute events overnight. Seen and examined. Reports poor sleep, unhappy that she was awoken around 5 am. Reports continued LLE phantom pains in ankle. Right heel pain somewhat improved. Reports stools small and hard. Denies dizziness or lightheadedness.    REVIEW OF SYSTEMS  [ x ] Constitutional WNL  [   ] Cardio WNL  [   ] Resp WNL  [   ] GI WNL  [    ]  WNL  [ x ] Heme WNL  [ x ] Endo WNL  [   ] Skin WNL  [   ] MSK WNL  [   ] Neuro WNL  [x  ] Cognitive WNL  [x  ] Psych WNL    VITAL SIGNS:  ICU Vital Signs Last 24 Hrs  T(C): 36.9, Max: 36.9 (05-01 @ 04:59)  T(F): 98.4, Max: 98.4 (05-01 @ 04:59)  HR: 107 (72 - 107)  BP: 110/61 (92/51 - 110/61)  BP(mean): --  ABP: --  ABP(mean): --  RR: 16 (16 - 18)  SpO2: 99% (94% - 99%)    PHYSICAL EXAM  Constitutional - NAD, Comfortable  Chest - CTA bilaterally, No wheeze, No rhonchi, No crackles  Cardiovascular - RRR, S1S2, No murmurs  Abdomen - BS+, Soft, NTND  Extremities - No C/C/E, No calf tenderness of RLE  Neurologic Exam -                    Cognitive - Awake, Alert, AAO to self, place, date, year, situation     Communication - Fluent, No dysarthria     Motor - No focal deficits                    LEFT    UE - ShAB 5/5, EF 5/5, EE 5/5, WE 5/5,  5/5                    RIGHT UE - ShAB 5/5, EF 5/5, EE 5/5, WE 5/5,  5/5                    LEFT    LE - N/A                    RIGHT LE - At least 3-4/5, limited by pain     Sensory - Intact to LT   Psychiatric - Mood stable, Affect WNL  Skin--LLE AKA incision with staples, c/d/i, Right 2nd toe gangrene on dorsum of toe. R calcaneus stage II pressure ulcer.     Functional Progress:   Transfer: Sliding board, mod A x 1; Toilet transfer--max A x 2  Toileting: Max A  LE dressing: Mod A     RECENT LABS:                                9.7    6.0   )-----------( 259      ( 01 May 2017 07:14 )             31.1     05-01    139  |  103  |  34.0<H>  ----------------------------<  99  5.0   |  24.0  |  0.94    Ca    8.8      01 May 2017 07:14  Mg     2.0     05-01    MEDICATIONS  (STANDING):  docusate sodium 100milliGRAM(s) Oral three times a day  ascorbic acid 500milliGRAM(s) Oral daily  multivitamin 1Tablet(s) Oral daily  apixaban 5milliGRAM(s) Oral two times a day  clopidogrel Tablet 75milliGRAM(s) Oral daily  nystatin Powder 1Application(s) Topical two times a day  gabapentin 300milliGRAM(s) Oral every 8 hours  senna 2Tablet(s) Oral at bedtime  atorvastatin 40milliGRAM(s) Oral at bedtime  lisinopril 5milliGRAM(s) Oral daily  insulin glargine Injectable (LANTUS) 20Unit(s) SubCutaneous at bedtime  insulin lispro (HumaLOG) corrective regimen sliding scale  SubCutaneous Before meals and at bedtime  dextrose 5%. 1000milliLiter(s) IV Continuous <Continuous>  dextrose 50% Injectable 12.5Gram(s) IV Push once  dextrose 50% Injectable 25Gram(s) IV Push once  dextrose 50% Injectable 25Gram(s) IV Push once  ferrous    sulfate 325milliGRAM(s) Oral daily  folic acid 1milliGRAM(s) Oral daily  levothyroxine 88MICROGram(s) Oral daily  mirtazapine 7.5milliGRAM(s) Oral at bedtime  pantoprazole    Tablet 40milliGRAM(s) Oral before breakfast  polyethylene glycol 3350 17Gram(s) Oral daily  tamsulosin 0.4milliGRAM(s) Oral at bedtime  zinc sulfate 220milliGRAM(s) Oral daily  ceFAZolin   IVPB 1000milliGRAM(s) IV Intermittent every 8 hours  dibucaine 1% Ointment 1Application(s) Topical three times a day  BACItracin   Ointment 1Application(s) Topical daily  iron sucrose IVPB 200milliGRAM(s) IV Intermittent daily  metoprolol 25milliGRAM(s) Oral every 12 hours  lactulose Syrup 20Gram(s) Oral every other day    MEDICATIONS  (PRN):  acetaminophen   Tablet 650milliGRAM(s) Oral every 6 hours PRN For Temp greater than 38 C (100.4 F)  acetaminophen   Tablet. 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 Meyer, s/p left AKA and RLE angiogram on 4/14/17. Also s/p revascularization of RLE with angiogram and R SFA/POP angioplasty on 4/20/17. Hospital stay significant for: Completed course of cefazolin for UTI. Restarted on Abx for R foot second toe gangrene. Transfused two units PRBCs. Presents with functional deficits.     PVD s/p L AKA: Residual limb--daily dressing changes, ACE wrap, keep c/d/i    S/p RLE angio, R toe gangrene: Dry sterile dressing daily, Vascular following. Cefazolin until 5/4/17    R heel stage II ulcer: Bacitracin, daily dressing change. Vascular follow up appreciated.     Pain control: Percocet, gabapentin--consider inc dosage if continued phantom pains, tylenol.     DM II: Hypoglycemic 70s in AM, with elevated meal time BG--Change lantus to 10 units, add humalog 4-4-4, ISS. Monitor BG.    HTN: BP 90s-100s. D/c Amlodipine, change metoprolol to 25 mg q12 per medicine recs. Continue lisinopril    PAF: Eliquis    CAD: Metoprolol--change to 25 mg q12, lipitor, plavix    Bowel regimen: + constipation. Senna, colace, miralax. Add lactulose.     Acute blood loss anemia: Hgb stable. FeSO4    Hypothyroidism: Synthroid    Urinary retention: Voiding well on Flomax. Check PVR    Appetite/mood: Remeron    GI/supplements: Protonix, zinc sulfate, MVI, folic acid    Anxiety: Add Xanax qhs prn, patient to go outside in wheelchair with family.    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 Meyer. Subsequently admitted to acute rehab. Developed LLE post operative wound dehiscence, s/p left AKA and RLE angiogram on 4/14/17. Also s/p revascularization of RLE with angiogram and R SFA/POP angioplasty on 4/20/17. Hospital stay significant for: Completed course of cefazolin for UTI. Restarted on Abx for R foot second toe gangrene. Transfused two units PRBCs.    Interval history: No acute events overnight. Seen and examined. Reports poor sleep, unhappy that she was awoken around 5 am. Reports continued LLE phantom pains in ankle. Right heel pain somewhat improved. Reports stools small and hard. Denies dizziness or lightheadedness.    REVIEW OF SYSTEMS  [ x ] Constitutional WNL  [   ] Cardio WNL  [   ] Resp WNL  [   ] GI WNL  [    ]  WNL  [ x ] Heme WNL  [ x ] Endo WNL  [   ] Skin WNL  [   ] MSK WNL  [   ] Neuro WNL  [x  ] Cognitive WNL  [x  ] Psych WNL    VITAL SIGNS:  ICU Vital Signs Last 24 Hrs  T(C): 36.9, Max: 36.9 (05-01 @ 04:59)  T(F): 98.4, Max: 98.4 (05-01 @ 04:59)  HR: 107 (72 - 107)  BP: 110/61 (92/51 - 110/61)  BP(mean): --  ABP: --  ABP(mean): --  RR: 16 (16 - 18)  SpO2: 99% (94% - 99%)    PHYSICAL EXAM  Constitutional - NAD, Comfortable  Chest - CTA bilaterally, No wheeze, No rhonchi, No crackles  Cardiovascular - RRR, S1S2, No murmurs  Abdomen - BS+, Soft, NTND  Extremities - No C/C/E, No calf tenderness of RLE  Neurologic Exam -                    Cognitive - Awake, Alert, AAO to self, place, date, year, situation     Communication - Fluent, No dysarthria     Motor - No focal deficits                    LEFT    UE - ShAB 5/5, EF 5/5, EE 5/5, WE 5/5,  5/5                    RIGHT UE - ShAB 5/5, EF 5/5, EE 5/5, WE 5/5,  5/5                    LEFT    LE - HF wfl                    RIGHT LE - At least 3-4/5, limited by pain     Sensory - Intact to LT   Psychiatric - Mood stable, Affect WNL  Skin--LLE AKA incision with staples, c/d/i, Right 2nd toe gangrene on dorsum of toe. R calcaneus stage II pressure ulcer.     Functional Progress:   Transfer: Sliding board, mod A x 1; Toilet transfer--max A x 2  Toileting: Max A  LE dressing: Mod A     RECENT LABS:                                9.7    6.0   )-----------( 259      ( 01 May 2017 07:14 )             31.1     05-01    139  |  103  |  34.0<H>  ----------------------------<  99  5.0   |  24.0  |  0.94    Ca    8.8      01 May 2017 07:14  Mg     2.0     05-01    MEDICATIONS  (STANDING):  docusate sodium 100milliGRAM(s) Oral three times a day  ascorbic acid 500milliGRAM(s) Oral daily  multivitamin 1Tablet(s) Oral daily  apixaban 5milliGRAM(s) Oral two times a day  clopidogrel Tablet 75milliGRAM(s) Oral daily  nystatin Powder 1Application(s) Topical two times a day  gabapentin 300milliGRAM(s) Oral every 8 hours  senna 2Tablet(s) Oral at bedtime  atorvastatin 40milliGRAM(s) Oral at bedtime  lisinopril 5milliGRAM(s) Oral daily  insulin glargine Injectable (LANTUS) 20Unit(s) SubCutaneous at bedtime  insulin lispro (HumaLOG) corrective regimen sliding scale  SubCutaneous Before meals and at bedtime  dextrose 5%. 1000milliLiter(s) IV Continuous <Continuous>  dextrose 50% Injectable 12.5Gram(s) IV Push once  dextrose 50% Injectable 25Gram(s) IV Push once  dextrose 50% Injectable 25Gram(s) IV Push once  ferrous    sulfate 325milliGRAM(s) Oral daily  folic acid 1milliGRAM(s) Oral daily  levothyroxine 88MICROGram(s) Oral daily  mirtazapine 7.5milliGRAM(s) Oral at bedtime  pantoprazole    Tablet 40milliGRAM(s) Oral before breakfast  polyethylene glycol 3350 17Gram(s) Oral daily  tamsulosin 0.4milliGRAM(s) Oral at bedtime  zinc sulfate 220milliGRAM(s) Oral daily  ceFAZolin   IVPB 1000milliGRAM(s) IV Intermittent every 8 hours  dibucaine 1% Ointment 1Application(s) Topical three times a day  BACItracin   Ointment 1Application(s) Topical daily  iron sucrose IVPB 200milliGRAM(s) IV Intermittent daily  metoprolol 25milliGRAM(s) Oral every 12 hours  lactulose Syrup 20Gram(s) Oral every other day    MEDICATIONS  (PRN):  acetaminophen   Tablet 650milliGRAM(s) Oral every 6 hours PRN For Temp greater than 38 C (100.4 F)  acetaminophen   Tablet. 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 Meyer, s/p left AKA and RLE angiogram on 4/14/17. Also s/p revascularization of RLE with angiogram and R SFA/POP angioplasty on 4/20/17. Hospital stay significant for: Completed course of cefazolin for UTI. Restarted on Abx for R foot second toe gangrene. Transfused two units PRBCs. Presents with functional deficits.     PVD s/p L AKA: Residual limb--daily dressing changes, ACE wrap, keep c/d/i    S/p RLE angio, R toe gangrene: Dry sterile dressing daily, Vascular following. Cefazolin until 5/4/17    R heel stage II ulcer: Bacitracin, daily dressing change. Vascular follow up appreciated.     Pain control: Percocet, gabapentin--consider inc dosage if continued phantom pains, tylenol.     DM II: Hypoglycemic 70s in AM, with elevated meal time BG--Change lantus to 10 units, add humalog 4-4-4, ISS. Monitor BG.    HTN: BP 90s-100s. D/c Amlodipine, change metoprolol to 25 mg q12 per medicine recs. Continue lisinopril    PAF: Eliquis    CAD: Metoprolol--change to 25 mg q12, lipitor, plavix    Bowel regimen: + constipation. Senna, colace, miralax. Add lactulose.     Acute blood loss anemia: Hgb stable. FeSO4    Hypothyroidism: Synthroid    Urinary retention: Voiding well on Flomax. Check PVR    Appetite/mood: Remeron    GI/supplements: Protonix, zinc sulfate, MVI, folic acid    Anxiety: Add Xanax qhs prn, patient to go outside in wheelchair with family.    Comprehensive Rehab Program, 3 hours of daily therapy

## 2018-01-31 NOTE — PROGRESS NOTE ADULT - SUBJECTIVE AND OBJECTIVE BOX
s/p L BKA transferred to rehab.    Vital Signs Last 24 Hrs  T(C): 36.5, Max: 36.5 (03-29 @ 11:57)  T(F): 97.7, Max: 97.7 (03-29 @ 11:57)  HR: 86 (84 - 86)  BP: 110/40 (110/40 - 160/52)  BP(mean): --  RR: 16 (16 - 16)  SpO2: 96% (96% - 96%)    PE:   L BKA stump with serous drainage centrally otherwise intact. (4) no limitation

## 2018-04-12 ENCOUNTER — APPOINTMENT (OUTPATIENT)
Dept: INTERNAL MEDICINE | Facility: CLINIC | Age: 83
End: 2018-04-12

## 2019-02-01 NOTE — PROGRESS NOTE ADULT - PROVIDER SPECIALTY LIST ADULT
Anesthesia
Hospitalist
Nephrology
Palliative Care
Rehab Medicine
Surgery
Vascular Surgery
[facility]   MD ROMEL BENNETT MD RUORU WONG, PA    TEL:  OFFICE: [phone]  DR LOBATO CELL: [phone]  CARON WHITE CELL: [phone]  DR. HARTLEY CELL: [phone]    RENAL FOLLOW UP NOTE  --------------------------------------------------------------------------------  HPI:      Pt seen and examined at bedside.   Agitated overnight, given haladol    PAST HISTORY  --------------------------------------------------------------------------------  No significant changes to PMH, PSH, FHx, SHx, unless otherwise noted    ALLERGIES & MEDICATIONS  --------------------------------------------------------------------------------  Allergies    Compazine (Dystonic RXN)  erythromycin (Other)    Intolerances    Augmentin (Stomach Upset)    Standing Inpatient Medications  artificial  tears Solution 1 Drop(s) Both EYES daily  gabapentin 100 milliGRAM(s) Oral daily  haloperidol    Injectable 0.25 milliGRAM(s) IV Push once  heparin  Injectable 5000 Unit(s) SubCutaneous every 12 hours  levothyroxine 125 MICROGram(s) Oral daily  multivitamin/minerals 1 Tablet(s) Oral daily  pantoprazole    Tablet 40 milliGRAM(s) Oral before breakfast  sodium chloride 0.45% 1000 milliLiter(s) IV Continuous <Continuous>    PRN Inpatient Medications  acetaminophen   Tablet .. 650 milliGRAM(s) Oral every 6 hours PRN  cholestyramine Powder (Sugar-Free) 4 Gram(s) Oral daily PRN  oxyCODONE    IR 5 milliGRAM(s) Oral every 6 hours PRN      REVIEW OF SYSTEMS  --------------------------------------------------------------------------------  General: no fever  MSK: no edema     VITALS/PHYSICAL EXAM  --------------------------------------------------------------------------------  T(C): 36.7 (02-01-19 @ 04:26), Max: 37 (01-31-19 @ 13:32)  HR: 104 (02-01-19 @ 04:26) (88 - 104)  BP: 138/71 (02-01-19 @ 04:26) (138/71 - 149/66)  RR: 18 (02-01-19 @ 04:26) (18 - 18)  SpO2: 95% (02-01-19 @ 04:26) (95% - 99%)  Wt(kg): --        01-31-19 @ 07:01  -  02-01-19 @ 07:00  --------------------------------------------------------  IN: 2256.5 mL / OUT: 1950 mL / NET: 306.5 mL      Physical Exam:  	Gen: NAD  	HEENT: MMM  	Pulm: CTA B/L  	CV: S1S2  	Abd: Soft, +BS  	Ext: No LE edema B/L                      Neuro: sedated   	Skin: Warm and Dry   	    LABS/STUDIES  --------------------------------------------------------------------------------              9.1    11.1  >-----------<  279      [02-01-19 @ 06:42]              28.7     136  |  104  |  44  ----------------------------<  107      [02-01-19 @ 06:40]  4.9   |  19  |  3.17        Ca     8.2     [02-01-19 @ 06:40]          CK 62      [02-01-19 @ 06:40]    Creatinine Trend:  SCr 3.17 [02-01 @ 06:40]  SCr 3.12 [01-31 @ 07:56]  SCr 3.08 [01-30 @ 13:14]  SCr 2.85 [01-29 @ 20:53]  SCr 2.94 [01-29 @ 16:40]    Urinalysis - [01-29-19 @ 23:22]      Color  / Appearance  / SG  / pH       Gluc  / Ketone   / Bili  / Urobili        Blood  / Protein  / Leuk Est  / Nitrite       RBC 4 / WBC 11 / Hyaline 2 / Gran  / Sq Epi  / Non Sq Epi 2 / Bacteria Negative      Iron 15, TIBC 207, %sat 7      [11-19-18 @ 12:44]  Ferritin 517      [11-19-18 @ 12:44]  TSH 4.60      [11-19-18 @ 12:44]
Surgery

## 2019-07-09 NOTE — PHYSICAL THERAPY INITIAL EVALUATION ADULT - MUSCLE TONE ASSESSMENT, REHAB EVAL
July 9, 2019      Lauren Adams PA-C  [address] [place] [address]  [address] [place] [id number]           [facility] - Rheumatology  [address]  [place] [address]  Phone: [phone]  Fax: [phone]          Patient: Monique Trujillo   MR Number: [id number]   YOB: 1949   Date of Visit: 7/9/2019       Dear Lauren Adams:    Thank you for referring Monique Trujillo to me for evaluation. Attached you will find relevant portions of my assessment and plan of care.    If you have questions, please do not hesitate to call me. I look forward to following Monique Trujillo along with you.    Sincerely,    David Taylor MD    Enclosure  CC:  No Recipients    If you would like to receive this communication electronically, please contact [email or web address] or [phone] to request more information on [employer] Link access.    For providers and/or their staff who would like to refer a patient to [facility], please contact us through our one-stop-shop provider referral line, [facility], at [phone].    If you feel you have received this communication in error or would no longer like to receive these types of communications, please e-mail [email or web address]         
normal
normal

## 2019-08-14 NOTE — PROGRESS NOTE ADULT - ATTENDING COMMENTS
Discussed plan of care with RN and daughter at bedside. Ftsg Text: The defect edges were debeveled with a #15 scalpel blade.  Given the location of the defect, shape of the defect and the proximity to free margins a full thickness skin graft was deemed most appropriate.  Using a sterile surgical marker, the primary defect shape was transferred to the donor site. The area thus outlined was incised deep to adipose tissue with a #15 scalpel blade.  The harvested graft was then trimmed of adipose tissue until only dermis and epidermis was left.  The skin margins of the secondary defect were undermined to an appropriate distance in all directions utilizing iris scissors.  The secondary defect was closed with interrupted buried subcutaneous sutures.  The skin edges were then re-apposed with running  sutures.  The skin graft was then placed in the primary defect and oriented appropriately.

## 2019-09-07 ENCOUNTER — INPATIENT (INPATIENT)
Facility: HOSPITAL | Age: 84
LOS: 10 days | Discharge: NURSING, REHAB OR LONG-TERM CARE | DRG: 338 | End: 2019-09-18
Attending: SURGERY | Admitting: SURGERY
Payer: COMMERCIAL

## 2019-09-07 ENCOUNTER — TRANSCRIPTION ENCOUNTER (OUTPATIENT)
Age: 84
End: 2019-09-07

## 2019-09-07 VITALS
RESPIRATION RATE: 18 BRPM | DIASTOLIC BLOOD PRESSURE: 57 MMHG | OXYGEN SATURATION: 98 % | TEMPERATURE: 98 F | SYSTOLIC BLOOD PRESSURE: 97 MMHG | HEART RATE: 77 BPM

## 2019-09-07 DIAGNOSIS — Z90.49 ACQUIRED ABSENCE OF OTHER SPECIFIED PARTS OF DIGESTIVE TRACT: Chronic | ICD-10-CM

## 2019-09-07 DIAGNOSIS — Z89.612 ACQUIRED ABSENCE OF LEFT LEG ABOVE KNEE: Chronic | ICD-10-CM

## 2019-09-07 DIAGNOSIS — Z98.62 PERIPHERAL VASCULAR ANGIOPLASTY STATUS: Chronic | ICD-10-CM

## 2019-09-07 PROCEDURE — 99285 EMERGENCY DEPT VISIT HI MDM: CPT

## 2019-09-07 PROCEDURE — 71045 X-RAY EXAM CHEST 1 VIEW: CPT | Mod: 26

## 2019-09-07 RX ORDER — SODIUM CHLORIDE 9 MG/ML
2000 INJECTION INTRAMUSCULAR; INTRAVENOUS; SUBCUTANEOUS ONCE
Refills: 0 | Status: COMPLETED | OUTPATIENT
Start: 2019-09-07 | End: 2019-09-07

## 2019-09-07 RX ORDER — ONDANSETRON 8 MG/1
4 TABLET, FILM COATED ORAL ONCE
Refills: 0 | Status: COMPLETED | OUTPATIENT
Start: 2019-09-07 | End: 2019-09-07

## 2019-09-07 NOTE — ED ADULT NURSE NOTE - NSFALLRSKHARMRISK_ED_ALL_ED
Patient presents with:  Pain: APN assessment - sick call    Pt is here for a sick call; pt reports increased pain with breathing and inability to breath in deeply.  He was placed on levaquin 2 weeks ago for suspected pneumonia; completed treatment last Frid yes

## 2019-09-07 NOTE — ED PROVIDER NOTE - CARE PLAN
Principal Discharge DX:	Appendicitis with perforation  Secondary Diagnosis:	Acute renal failure, unspecified acute renal failure type

## 2019-09-07 NOTE — ED PROVIDER NOTE - ENMT, MLM
Airway patent, Dry mucous membranes. Throat has no vesicles, no oropharyngeal exudates and uvula is midline.

## 2019-09-07 NOTE — ED ADULT NURSE NOTE - NSFALLRSKUNASSIST_ED_ALL_ED
States lt fingers tingly, 2+ radial pulse, wiggles fingers w/o difficulty, anesthesia paged, Dr. Christie Hurtado to see. no

## 2019-09-07 NOTE — ED ADULT TRIAGE NOTE - CHIEF COMPLAINT QUOTE
patient states that she cant eat or drink anything x4 days.  also has complaints of abdominal pain, RLQ pain upon palpation.

## 2019-09-07 NOTE — ED ADULT NURSE NOTE - CHPI ED NUR SYMPTOMS NEG
no abdominal distension/no burning urination/no hematuria/no diarrhea/no blood in stool/no chills/no fever/no dysuria

## 2019-09-07 NOTE — ED ADULT NURSE NOTE - OBJECTIVE STATEMENT
Pt complaining she has not had anything to eat for 4 days. She states she may be dehydrated from not eating.

## 2019-09-07 NOTE — ED PROVIDER NOTE - OBJECTIVE STATEMENT
88 y/o F pt with PMHx of DM, HLD, HTN, CAD, PVD, and PAF presents to the ED c/o RLQ abd pain with associated nausea that began 4 days ago. Pt reports that has been unable to eat or drink for the past 4 days secondary to nausea and vomiting. Pt currently reports that her mouth feels very dry. She notes that she is normally in assisted living, but is not normally on oxygen. Pt noted to have L above knee amputation that she says is due to DM. No further acute complaints at this time.

## 2019-09-08 ENCOUNTER — RESULT REVIEW (OUTPATIENT)
Age: 84
End: 2019-09-08

## 2019-09-08 DIAGNOSIS — K35.32 ACUTE APPENDICITIS WITH PERFORATION, LOCALIZED PERITONITIS, AND GANGRENE, WITHOUT ABSCESS: ICD-10-CM

## 2019-09-08 PROBLEM — I48.0 PAROXYSMAL ATRIAL FIBRILLATION: Chronic | Status: ACTIVE | Noted: 2017-05-16

## 2019-09-08 LAB
ALBUMIN SERPL ELPH-MCNC: 3 G/DL — LOW (ref 3.3–5.2)
ALP SERPL-CCNC: 82 U/L — SIGNIFICANT CHANGE UP (ref 40–120)
ALT FLD-CCNC: 30 U/L — SIGNIFICANT CHANGE UP
ANION GAP SERPL CALC-SCNC: 15 MMOL/L — SIGNIFICANT CHANGE UP (ref 5–17)
ANION GAP SERPL CALC-SCNC: 16 MMOL/L — SIGNIFICANT CHANGE UP (ref 5–17)
APPEARANCE UR: CLEAR — SIGNIFICANT CHANGE UP
APTT BLD: 27.8 SEC — SIGNIFICANT CHANGE UP (ref 27.5–36.3)
AST SERPL-CCNC: 30 U/L — SIGNIFICANT CHANGE UP
BACTERIA # UR AUTO: ABNORMAL
BASOPHILS # BLD AUTO: 0.02 K/UL — SIGNIFICANT CHANGE UP (ref 0–0.2)
BASOPHILS # BLD AUTO: 0.02 K/UL — SIGNIFICANT CHANGE UP (ref 0–0.2)
BASOPHILS NFR BLD AUTO: 0.2 % — SIGNIFICANT CHANGE UP (ref 0–2)
BASOPHILS NFR BLD AUTO: 0.2 % — SIGNIFICANT CHANGE UP (ref 0–2)
BILIRUB SERPL-MCNC: 0.3 MG/DL — LOW (ref 0.4–2)
BILIRUB UR-MCNC: NEGATIVE — SIGNIFICANT CHANGE UP
BUN SERPL-MCNC: 62 MG/DL — HIGH (ref 8–20)
BUN SERPL-MCNC: 62 MG/DL — HIGH (ref 8–20)
CALCIUM SERPL-MCNC: 7.6 MG/DL — LOW (ref 8.6–10.2)
CALCIUM SERPL-MCNC: 7.7 MG/DL — LOW (ref 8.6–10.2)
CHLORIDE SERPL-SCNC: 94 MMOL/L — LOW (ref 98–107)
CHLORIDE SERPL-SCNC: 97 MMOL/L — LOW (ref 98–107)
CO2 SERPL-SCNC: 20 MMOL/L — LOW (ref 22–29)
CO2 SERPL-SCNC: 22 MMOL/L — SIGNIFICANT CHANGE UP (ref 22–29)
COLOR SPEC: YELLOW — SIGNIFICANT CHANGE UP
CREAT SERPL-MCNC: 3.79 MG/DL — HIGH (ref 0.5–1.3)
CREAT SERPL-MCNC: 4.07 MG/DL — HIGH (ref 0.5–1.3)
DIFF PNL FLD: NEGATIVE — SIGNIFICANT CHANGE UP
EOSINOPHIL # BLD AUTO: 0.16 K/UL — SIGNIFICANT CHANGE UP (ref 0–0.5)
EOSINOPHIL # BLD AUTO: 0.17 K/UL — SIGNIFICANT CHANGE UP (ref 0–0.5)
EOSINOPHIL NFR BLD AUTO: 1.8 % — SIGNIFICANT CHANGE UP (ref 0–6)
EOSINOPHIL NFR BLD AUTO: 2.1 % — SIGNIFICANT CHANGE UP (ref 0–6)
EPI CELLS # UR: NEGATIVE — SIGNIFICANT CHANGE UP
GLUCOSE BLDC GLUCOMTR-MCNC: 147 MG/DL — HIGH (ref 70–99)
GLUCOSE BLDC GLUCOMTR-MCNC: 155 MG/DL — HIGH (ref 70–99)
GLUCOSE BLDC GLUCOMTR-MCNC: 166 MG/DL — HIGH (ref 70–99)
GLUCOSE BLDC GLUCOMTR-MCNC: 167 MG/DL — HIGH (ref 70–99)
GLUCOSE SERPL-MCNC: 172 MG/DL — HIGH (ref 70–115)
GLUCOSE SERPL-MCNC: 178 MG/DL — HIGH (ref 70–115)
GLUCOSE UR QL: NEGATIVE MG/DL — SIGNIFICANT CHANGE UP
HCT VFR BLD CALC: 26.3 % — LOW (ref 34.5–45)
HCT VFR BLD CALC: 27.1 % — LOW (ref 34.5–45)
HGB BLD-MCNC: 8.1 G/DL — LOW (ref 11.5–15.5)
HGB BLD-MCNC: 8.6 G/DL — LOW (ref 11.5–15.5)
IMM GRANULOCYTES NFR BLD AUTO: 1 % — SIGNIFICANT CHANGE UP (ref 0–1.5)
IMM GRANULOCYTES NFR BLD AUTO: 1.1 % — SIGNIFICANT CHANGE UP (ref 0–1.5)
INR BLD: 1.03 RATIO — SIGNIFICANT CHANGE UP (ref 0.88–1.16)
KETONES UR-MCNC: NEGATIVE — SIGNIFICANT CHANGE UP
LACTATE BLDV-MCNC: 0.4 MMOL/L — LOW (ref 0.5–2)
LEUKOCYTE ESTERASE UR-ACNC: NEGATIVE — SIGNIFICANT CHANGE UP
LYMPHOCYTES # BLD AUTO: 0.53 K/UL — LOW (ref 1–3.3)
LYMPHOCYTES # BLD AUTO: 0.68 K/UL — LOW (ref 1–3.3)
LYMPHOCYTES # BLD AUTO: 6.6 % — LOW (ref 13–44)
LYMPHOCYTES # BLD AUTO: 7.8 % — LOW (ref 13–44)
MAGNESIUM SERPL-MCNC: 2.3 MG/DL — SIGNIFICANT CHANGE UP (ref 1.6–2.6)
MCHC RBC-ENTMCNC: 28.1 PG — SIGNIFICANT CHANGE UP (ref 27–34)
MCHC RBC-ENTMCNC: 28.4 PG — SIGNIFICANT CHANGE UP (ref 27–34)
MCHC RBC-ENTMCNC: 30.8 GM/DL — LOW (ref 32–36)
MCHC RBC-ENTMCNC: 31.7 GM/DL — LOW (ref 32–36)
MCV RBC AUTO: 88.6 FL — SIGNIFICANT CHANGE UP (ref 80–100)
MCV RBC AUTO: 92.3 FL — SIGNIFICANT CHANGE UP (ref 80–100)
MONOCYTES # BLD AUTO: 0.64 K/UL — SIGNIFICANT CHANGE UP (ref 0–0.9)
MONOCYTES # BLD AUTO: 0.68 K/UL — SIGNIFICANT CHANGE UP (ref 0–0.9)
MONOCYTES NFR BLD AUTO: 7.3 % — SIGNIFICANT CHANGE UP (ref 2–14)
MONOCYTES NFR BLD AUTO: 8.4 % — SIGNIFICANT CHANGE UP (ref 2–14)
NEUTROPHILS # BLD AUTO: 6.6 K/UL — SIGNIFICANT CHANGE UP (ref 1.8–7.4)
NEUTROPHILS # BLD AUTO: 7.15 K/UL — SIGNIFICANT CHANGE UP (ref 1.8–7.4)
NEUTROPHILS NFR BLD AUTO: 81.7 % — HIGH (ref 43–77)
NEUTROPHILS NFR BLD AUTO: 81.8 % — HIGH (ref 43–77)
NITRITE UR-MCNC: NEGATIVE — SIGNIFICANT CHANGE UP
PH UR: 5 — SIGNIFICANT CHANGE UP (ref 5–8)
PHOSPHATE SERPL-MCNC: 5.7 MG/DL — HIGH (ref 2.4–4.7)
PLATELET # BLD AUTO: 186 K/UL — SIGNIFICANT CHANGE UP (ref 150–400)
PLATELET # BLD AUTO: 209 K/UL — SIGNIFICANT CHANGE UP (ref 150–400)
POTASSIUM SERPL-MCNC: 4.5 MMOL/L — SIGNIFICANT CHANGE UP (ref 3.5–5.3)
POTASSIUM SERPL-MCNC: 4.6 MMOL/L — SIGNIFICANT CHANGE UP (ref 3.5–5.3)
POTASSIUM SERPL-SCNC: 4.5 MMOL/L — SIGNIFICANT CHANGE UP (ref 3.5–5.3)
POTASSIUM SERPL-SCNC: 4.6 MMOL/L — SIGNIFICANT CHANGE UP (ref 3.5–5.3)
PROT SERPL-MCNC: 5.3 G/DL — LOW (ref 6.6–8.7)
PROT UR-MCNC: 30 MG/DL
PROTHROM AB SERPL-ACNC: 11.8 SEC — SIGNIFICANT CHANGE UP (ref 10–12.9)
RBC # BLD: 2.85 M/UL — LOW (ref 3.8–5.2)
RBC # BLD: 3.06 M/UL — LOW (ref 3.8–5.2)
RBC # FLD: 14.6 % — HIGH (ref 10.3–14.5)
RBC # FLD: 15 % — HIGH (ref 10.3–14.5)
RBC CASTS # UR COMP ASSIST: SIGNIFICANT CHANGE UP /HPF (ref 0–4)
SODIUM SERPL-SCNC: 132 MMOL/L — LOW (ref 135–145)
SODIUM SERPL-SCNC: 132 MMOL/L — LOW (ref 135–145)
SP GR SPEC: 1.02 — SIGNIFICANT CHANGE UP (ref 1.01–1.02)
UROBILINOGEN FLD QL: NEGATIVE MG/DL — SIGNIFICANT CHANGE UP
WBC # BLD: 8.08 K/UL — SIGNIFICANT CHANGE UP (ref 3.8–10.5)
WBC # BLD: 8.75 K/UL — SIGNIFICANT CHANGE UP (ref 3.8–10.5)
WBC # FLD AUTO: 8.08 K/UL — SIGNIFICANT CHANGE UP (ref 3.8–10.5)
WBC # FLD AUTO: 8.75 K/UL — SIGNIFICANT CHANGE UP (ref 3.8–10.5)
WBC UR QL: SIGNIFICANT CHANGE UP

## 2019-09-08 PROCEDURE — 88304 TISSUE EXAM BY PATHOLOGIST: CPT | Mod: 26

## 2019-09-08 PROCEDURE — 99024 POSTOP FOLLOW-UP VISIT: CPT

## 2019-09-08 PROCEDURE — 93010 ELECTROCARDIOGRAM REPORT: CPT

## 2019-09-08 PROCEDURE — 76775 US EXAM ABDO BACK WALL LIM: CPT | Mod: 26

## 2019-09-08 PROCEDURE — 74176 CT ABD & PELVIS W/O CONTRAST: CPT | Mod: 26

## 2019-09-08 RX ORDER — SENNA PLUS 8.6 MG/1
2 TABLET ORAL AT BEDTIME
Refills: 0 | Status: DISCONTINUED | OUTPATIENT
Start: 2019-09-08 | End: 2019-09-14

## 2019-09-08 RX ORDER — PIPERACILLIN AND TAZOBACTAM 4; .5 G/20ML; G/20ML
2.25 INJECTION, POWDER, LYOPHILIZED, FOR SOLUTION INTRAVENOUS ONCE
Refills: 0 | Status: COMPLETED | OUTPATIENT
Start: 2019-09-08 | End: 2019-09-08

## 2019-09-08 RX ORDER — INSULIN LISPRO 100/ML
VIAL (ML) SUBCUTANEOUS
Refills: 0 | Status: DISCONTINUED | OUTPATIENT
Start: 2019-09-08 | End: 2019-09-11

## 2019-09-08 RX ORDER — ALPRAZOLAM 0.25 MG
0.25 TABLET ORAL DAILY
Refills: 0 | Status: DISCONTINUED | OUTPATIENT
Start: 2019-09-08 | End: 2019-09-11

## 2019-09-08 RX ORDER — PIPERACILLIN AND TAZOBACTAM 4; .5 G/20ML; G/20ML
3.38 INJECTION, POWDER, LYOPHILIZED, FOR SOLUTION INTRAVENOUS EVERY 12 HOURS
Refills: 0 | Status: COMPLETED | OUTPATIENT
Start: 2019-09-08 | End: 2019-09-12

## 2019-09-08 RX ORDER — MIRTAZAPINE 45 MG/1
15 TABLET, ORALLY DISINTEGRATING ORAL DAILY
Refills: 0 | Status: DISCONTINUED | OUTPATIENT
Start: 2019-09-08 | End: 2019-09-14

## 2019-09-08 RX ORDER — DEXTROSE 50 % IN WATER 50 %
15 SYRINGE (ML) INTRAVENOUS ONCE
Refills: 0 | Status: DISCONTINUED | OUTPATIENT
Start: 2019-09-08 | End: 2019-09-14

## 2019-09-08 RX ORDER — DOCUSATE SODIUM 100 MG
100 CAPSULE ORAL EVERY 8 HOURS
Refills: 0 | Status: DISCONTINUED | OUTPATIENT
Start: 2019-09-08 | End: 2019-09-14

## 2019-09-08 RX ORDER — LEVOTHYROXINE SODIUM 125 MCG
88 TABLET ORAL DAILY
Refills: 0 | Status: DISCONTINUED | OUTPATIENT
Start: 2019-09-08 | End: 2019-09-08

## 2019-09-08 RX ORDER — ATORVASTATIN CALCIUM 80 MG/1
40 TABLET, FILM COATED ORAL DAILY
Refills: 0 | Status: DISCONTINUED | OUTPATIENT
Start: 2019-09-08 | End: 2019-09-14

## 2019-09-08 RX ORDER — LEVOTHYROXINE SODIUM 125 MCG
88 TABLET ORAL DAILY
Refills: 0 | Status: DISCONTINUED | OUTPATIENT
Start: 2019-09-08 | End: 2019-09-14

## 2019-09-08 RX ORDER — GABAPENTIN 400 MG/1
100 CAPSULE ORAL THREE TIMES A DAY
Refills: 0 | Status: DISCONTINUED | OUTPATIENT
Start: 2019-09-08 | End: 2019-09-14

## 2019-09-08 RX ORDER — SODIUM CHLORIDE 9 MG/ML
1000 INJECTION, SOLUTION INTRAVENOUS ONCE
Refills: 0 | Status: COMPLETED | OUTPATIENT
Start: 2019-09-08 | End: 2019-09-08

## 2019-09-08 RX ORDER — SODIUM CHLORIDE 9 MG/ML
1000 INJECTION, SOLUTION INTRAVENOUS
Refills: 0 | Status: DISCONTINUED | OUTPATIENT
Start: 2019-09-08 | End: 2019-09-10

## 2019-09-08 RX ORDER — PIPERACILLIN AND TAZOBACTAM 4; .5 G/20ML; G/20ML
2.25 INJECTION, POWDER, LYOPHILIZED, FOR SOLUTION INTRAVENOUS ONCE
Refills: 0 | Status: DISCONTINUED | OUTPATIENT
Start: 2019-09-08 | End: 2019-09-08

## 2019-09-08 RX ORDER — METOPROLOL TARTRATE 50 MG
25 TABLET ORAL
Refills: 0 | Status: DISCONTINUED | OUTPATIENT
Start: 2019-09-08 | End: 2019-09-14

## 2019-09-08 RX ORDER — SODIUM CHLORIDE 9 MG/ML
1000 INJECTION INTRAMUSCULAR; INTRAVENOUS; SUBCUTANEOUS
Refills: 0 | Status: DISCONTINUED | OUTPATIENT
Start: 2019-09-08 | End: 2019-09-08

## 2019-09-08 RX ORDER — DEXTROSE 50 % IN WATER 50 %
25 SYRINGE (ML) INTRAVENOUS ONCE
Refills: 0 | Status: DISCONTINUED | OUTPATIENT
Start: 2019-09-08 | End: 2019-09-14

## 2019-09-08 RX ORDER — SODIUM CHLORIDE 9 MG/ML
1000 INJECTION, SOLUTION INTRAVENOUS
Refills: 0 | Status: DISCONTINUED | OUTPATIENT
Start: 2019-09-08 | End: 2019-09-08

## 2019-09-08 RX ORDER — TAMSULOSIN HYDROCHLORIDE 0.4 MG/1
0.4 CAPSULE ORAL AT BEDTIME
Refills: 0 | Status: DISCONTINUED | OUTPATIENT
Start: 2019-09-08 | End: 2019-09-14

## 2019-09-08 RX ORDER — HEPARIN SODIUM 5000 [USP'U]/ML
5000 INJECTION INTRAVENOUS; SUBCUTANEOUS EVERY 8 HOURS
Refills: 0 | Status: DISCONTINUED | OUTPATIENT
Start: 2019-09-08 | End: 2019-09-14

## 2019-09-08 RX ORDER — CLOPIDOGREL BISULFATE 75 MG/1
75 TABLET, FILM COATED ORAL DAILY
Refills: 0 | Status: DISCONTINUED | OUTPATIENT
Start: 2019-09-08 | End: 2019-09-14

## 2019-09-08 RX ORDER — HUMAN INSULIN 100 [IU]/ML
10 INJECTION, SUSPENSION SUBCUTANEOUS AT BEDTIME
Refills: 0 | Status: DISCONTINUED | OUTPATIENT
Start: 2019-09-08 | End: 2019-09-12

## 2019-09-08 RX ORDER — PIPERACILLIN AND TAZOBACTAM 4; .5 G/20ML; G/20ML
2.25 INJECTION, POWDER, LYOPHILIZED, FOR SOLUTION INTRAVENOUS EVERY 8 HOURS
Refills: 0 | Status: DISCONTINUED | OUTPATIENT
Start: 2019-09-08 | End: 2019-09-08

## 2019-09-08 RX ORDER — DEXTROSE 50 % IN WATER 50 %
12.5 SYRINGE (ML) INTRAVENOUS ONCE
Refills: 0 | Status: DISCONTINUED | OUTPATIENT
Start: 2019-09-08 | End: 2019-09-14

## 2019-09-08 RX ORDER — SODIUM CHLORIDE 9 MG/ML
1000 INJECTION, SOLUTION INTRAVENOUS
Refills: 0 | Status: DISCONTINUED | OUTPATIENT
Start: 2019-09-08 | End: 2019-09-14

## 2019-09-08 RX ORDER — GLUCAGON INJECTION, SOLUTION 0.5 MG/.1ML
1 INJECTION, SOLUTION SUBCUTANEOUS ONCE
Refills: 0 | Status: DISCONTINUED | OUTPATIENT
Start: 2019-09-08 | End: 2019-09-14

## 2019-09-08 RX ORDER — PANTOPRAZOLE SODIUM 20 MG/1
40 TABLET, DELAYED RELEASE ORAL
Refills: 0 | Status: DISCONTINUED | OUTPATIENT
Start: 2019-09-08 | End: 2019-09-14

## 2019-09-08 RX ADMIN — GABAPENTIN 100 MILLIGRAM(S): 400 CAPSULE ORAL at 22:07

## 2019-09-08 RX ADMIN — PIPERACILLIN AND TAZOBACTAM 200 GRAM(S): 4; .5 INJECTION, POWDER, LYOPHILIZED, FOR SOLUTION INTRAVENOUS at 06:50

## 2019-09-08 RX ADMIN — GABAPENTIN 100 MILLIGRAM(S): 400 CAPSULE ORAL at 06:49

## 2019-09-08 RX ADMIN — SENNA PLUS 2 TABLET(S): 8.6 TABLET ORAL at 22:07

## 2019-09-08 RX ADMIN — SODIUM CHLORIDE 125 MILLILITER(S): 9 INJECTION, SOLUTION INTRAVENOUS at 16:10

## 2019-09-08 RX ADMIN — TAMSULOSIN HYDROCHLORIDE 0.4 MILLIGRAM(S): 0.4 CAPSULE ORAL at 23:27

## 2019-09-08 RX ADMIN — SODIUM CHLORIDE 2000 MILLILITER(S): 9 INJECTION INTRAMUSCULAR; INTRAVENOUS; SUBCUTANEOUS at 00:54

## 2019-09-08 RX ADMIN — PIPERACILLIN AND TAZOBACTAM 25 GRAM(S): 4; .5 INJECTION, POWDER, LYOPHILIZED, FOR SOLUTION INTRAVENOUS at 18:16

## 2019-09-08 RX ADMIN — SODIUM CHLORIDE 1000 MILLILITER(S): 9 INJECTION, SOLUTION INTRAVENOUS at 22:27

## 2019-09-08 RX ADMIN — HUMAN INSULIN 10 UNIT(S): 100 INJECTION, SUSPENSION SUBCUTANEOUS at 23:52

## 2019-09-08 RX ADMIN — Medication 100 MILLIGRAM(S): at 06:49

## 2019-09-08 RX ADMIN — Medication 88 MICROGRAM(S): at 06:49

## 2019-09-08 RX ADMIN — HEPARIN SODIUM 5000 UNIT(S): 5000 INJECTION INTRAVENOUS; SUBCUTANEOUS at 22:07

## 2019-09-08 RX ADMIN — HEPARIN SODIUM 5000 UNIT(S): 5000 INJECTION INTRAVENOUS; SUBCUTANEOUS at 06:50

## 2019-09-08 RX ADMIN — PANTOPRAZOLE SODIUM 40 MILLIGRAM(S): 20 TABLET, DELAYED RELEASE ORAL at 06:49

## 2019-09-08 RX ADMIN — SODIUM CHLORIDE 125 MILLILITER(S): 9 INJECTION, SOLUTION INTRAVENOUS at 23:27

## 2019-09-08 RX ADMIN — Medication 2: at 12:15

## 2019-09-08 NOTE — BRIEF OPERATIVE NOTE - OPERATION/FINDINGS
gangrenous appendicitis, w/ surrounding purulence surrounding R paracolic gutter and liver   appendectomy performed, coupious irrigation and EL left in R paracolic space

## 2019-09-08 NOTE — BRIEF OPERATIVE NOTE - NSICDXBRIEFPOSTOP_GEN_ALL_CORE_FT
POST-OP DIAGNOSIS:  Acute appendicitis with perforation, localized peritonitis, and gangrene 08-Sep-2019 15:33:22  Oumar Medellin

## 2019-09-08 NOTE — H&P ADULT - NSICDXPASTSURGICALHX_GEN_ALL_CORE_FT
PAST SURGICAL HISTORY:  H/O angioplasty     History of cholecystectomy     S/P AKA (above knee amputation) unilateral, left

## 2019-09-08 NOTE — H&P ADULT - NSICDXPASTMEDICALHX_GEN_ALL_CORE_FT
PAST MEDICAL HISTORY:  CAD (coronary artery disease)     Diabetes     Hyperlipidemia     Hypertension     PAF (paroxysmal atrial fibrillation)     PVD (peripheral vascular disease)

## 2019-09-08 NOTE — H&P ADULT - HISTORY OF PRESENT ILLNESS
87yF with pmhx of DM, HLD, HTN, CAD, PVD, and PAF on plavix presenting to the ED with a 4 day history of RLQ pain. Patient states that she has not been able to eat much since and that it's associated with N/V. Pt has not had a bowel movement in a couple of a days. Otherwise denies f, cp, sob.

## 2019-09-08 NOTE — H&P ADULT - NSHPPHYSICALEXAM_GEN_ALL_CORE
gen: nad, a&ox3  cv: irregular irregular  resp: nonlabored breathing  gi: soft, nd, tender to RLQ without signs of rebound or guarding. No signs of peritonitis  msk: no c/c/e gen: nad, a&ox3  cv: irregular irregular  resp: nonlabored breathing  gi: soft, nd, tender to RLQ without signs of rebound or guarding. No signs of peritonitis  msk: no c/c/e. L AKA

## 2019-09-08 NOTE — H&P ADULT - ASSESSMENT
87yoF with a pmhx of DM, HLD, HTN, CAD, PVD, and PAF on Plavix with a perforated appendicitis  - NPO/IVF  - SULEIMAN  - IV Zosyn  - Admit to ACS under Dr. Valverde  - Restart all home meds 87yoF with a pmhx of DM, HLD, HTN, CAD, PVD, and PAF on Plavix with a perforated appendicitis and MAGI  - NPO/IVF  - SULEIMAN  - IV Zosyn  - Admit to ACS under Dr. Valverde  - Restart all home meds

## 2019-09-08 NOTE — ED ADULT NURSE REASSESSMENT NOTE - NS ED NURSE REASSESS COMMENT FT1
Pt is resting in bed. Pt denies pain, N/V/D. Pt VSS. Pt in NAD at this time. Pt moves all extremities equal and bilateral. Plan of care and wait time explained. Pt awaiting bed. Safety maintained.

## 2019-09-09 LAB
ALBUMIN SERPL ELPH-MCNC: 2.6 G/DL — LOW (ref 3.3–5.2)
ALP SERPL-CCNC: 73 U/L — SIGNIFICANT CHANGE UP (ref 40–120)
ALT FLD-CCNC: 27 U/L — SIGNIFICANT CHANGE UP
ANION GAP SERPL CALC-SCNC: 14 MMOL/L — SIGNIFICANT CHANGE UP (ref 5–17)
ANION GAP SERPL CALC-SCNC: 16 MMOL/L — SIGNIFICANT CHANGE UP (ref 5–17)
AST SERPL-CCNC: 31 U/L — SIGNIFICANT CHANGE UP
BASOPHILS # BLD AUTO: 0.02 K/UL — SIGNIFICANT CHANGE UP (ref 0–0.2)
BASOPHILS NFR BLD AUTO: 0.1 % — SIGNIFICANT CHANGE UP (ref 0–2)
BILIRUB SERPL-MCNC: <0.2 MG/DL — LOW (ref 0.4–2)
BUN SERPL-MCNC: 56 MG/DL — HIGH (ref 8–20)
BUN SERPL-MCNC: 59 MG/DL — HIGH (ref 8–20)
CALCIUM SERPL-MCNC: 7.6 MG/DL — LOW (ref 8.6–10.2)
CALCIUM SERPL-MCNC: 7.9 MG/DL — LOW (ref 8.6–10.2)
CHLORIDE SERPL-SCNC: 97 MMOL/L — LOW (ref 98–107)
CHLORIDE SERPL-SCNC: 99 MMOL/L — SIGNIFICANT CHANGE UP (ref 98–107)
CO2 SERPL-SCNC: 21 MMOL/L — LOW (ref 22–29)
CO2 SERPL-SCNC: 21 MMOL/L — LOW (ref 22–29)
CREAT SERPL-MCNC: 3.42 MG/DL — HIGH (ref 0.5–1.3)
CREAT SERPL-MCNC: 3.71 MG/DL — HIGH (ref 0.5–1.3)
CULTURE RESULTS: NO GROWTH — SIGNIFICANT CHANGE UP
EOSINOPHIL # BLD AUTO: 0.01 K/UL — SIGNIFICANT CHANGE UP (ref 0–0.5)
EOSINOPHIL NFR BLD AUTO: 0.1 % — SIGNIFICANT CHANGE UP (ref 0–6)
GLUCOSE BLDC GLUCOMTR-MCNC: 107 MG/DL — HIGH (ref 70–99)
GLUCOSE BLDC GLUCOMTR-MCNC: 114 MG/DL — HIGH (ref 70–99)
GLUCOSE BLDC GLUCOMTR-MCNC: 120 MG/DL — HIGH (ref 70–99)
GLUCOSE BLDC GLUCOMTR-MCNC: 142 MG/DL — HIGH (ref 70–99)
GLUCOSE SERPL-MCNC: 144 MG/DL — HIGH (ref 70–115)
GLUCOSE SERPL-MCNC: 99 MG/DL — SIGNIFICANT CHANGE UP (ref 70–115)
GRAM STN FLD: SIGNIFICANT CHANGE UP
HCT VFR BLD CALC: 27.7 % — LOW (ref 34.5–45)
HGB BLD-MCNC: 8.5 G/DL — LOW (ref 11.5–15.5)
IMM GRANULOCYTES NFR BLD AUTO: 1.3 % — SIGNIFICANT CHANGE UP (ref 0–1.5)
LYMPHOCYTES # BLD AUTO: 0.45 K/UL — LOW (ref 1–3.3)
LYMPHOCYTES # BLD AUTO: 3.3 % — LOW (ref 13–44)
MAGNESIUM SERPL-MCNC: 2.2 MG/DL — SIGNIFICANT CHANGE UP (ref 1.6–2.6)
MAGNESIUM SERPL-MCNC: 2.3 MG/DL — SIGNIFICANT CHANGE UP (ref 1.6–2.6)
MCHC RBC-ENTMCNC: 28.1 PG — SIGNIFICANT CHANGE UP (ref 27–34)
MCHC RBC-ENTMCNC: 30.7 GM/DL — LOW (ref 32–36)
MCV RBC AUTO: 91.4 FL — SIGNIFICANT CHANGE UP (ref 80–100)
MONOCYTES # BLD AUTO: 0.8 K/UL — SIGNIFICANT CHANGE UP (ref 0–0.9)
MONOCYTES NFR BLD AUTO: 5.8 % — SIGNIFICANT CHANGE UP (ref 2–14)
NEUTROPHILS # BLD AUTO: 12.27 K/UL — HIGH (ref 1.8–7.4)
NEUTROPHILS NFR BLD AUTO: 89.4 % — HIGH (ref 43–77)
PHOSPHATE SERPL-MCNC: 6.3 MG/DL — HIGH (ref 2.4–4.7)
PHOSPHATE SERPL-MCNC: 7.5 MG/DL — HIGH (ref 2.4–4.7)
PLATELET # BLD AUTO: 235 K/UL — SIGNIFICANT CHANGE UP (ref 150–400)
POTASSIUM SERPL-MCNC: 4.6 MMOL/L — SIGNIFICANT CHANGE UP (ref 3.5–5.3)
POTASSIUM SERPL-MCNC: 4.9 MMOL/L — SIGNIFICANT CHANGE UP (ref 3.5–5.3)
POTASSIUM SERPL-SCNC: 4.6 MMOL/L — SIGNIFICANT CHANGE UP (ref 3.5–5.3)
POTASSIUM SERPL-SCNC: 4.9 MMOL/L — SIGNIFICANT CHANGE UP (ref 3.5–5.3)
PROT SERPL-MCNC: 4.9 G/DL — LOW (ref 6.6–8.7)
RBC # BLD: 3.03 M/UL — LOW (ref 3.8–5.2)
RBC # FLD: 15.2 % — HIGH (ref 10.3–14.5)
SODIUM SERPL-SCNC: 134 MMOL/L — LOW (ref 135–145)
SODIUM SERPL-SCNC: 134 MMOL/L — LOW (ref 135–145)
SPECIMEN SOURCE: SIGNIFICANT CHANGE UP
SPECIMEN SOURCE: SIGNIFICANT CHANGE UP
WBC # BLD: 13.73 K/UL — HIGH (ref 3.8–10.5)
WBC # FLD AUTO: 13.73 K/UL — HIGH (ref 3.8–10.5)

## 2019-09-09 PROCEDURE — 99024 POSTOP FOLLOW-UP VISIT: CPT

## 2019-09-09 PROCEDURE — 71045 X-RAY EXAM CHEST 1 VIEW: CPT | Mod: 26,76

## 2019-09-09 RX ORDER — SODIUM CHLORIDE 9 MG/ML
500 INJECTION, SOLUTION INTRAVENOUS ONCE
Refills: 0 | Status: COMPLETED | OUTPATIENT
Start: 2019-09-09 | End: 2019-09-09

## 2019-09-09 RX ORDER — SODIUM CHLORIDE 9 MG/ML
1000 INJECTION, SOLUTION INTRAVENOUS ONCE
Refills: 0 | Status: COMPLETED | OUTPATIENT
Start: 2019-09-09 | End: 2019-09-09

## 2019-09-09 RX ORDER — ALBUMIN HUMAN 25 %
100 VIAL (ML) INTRAVENOUS EVERY 6 HOURS
Refills: 0 | Status: COMPLETED | OUTPATIENT
Start: 2019-09-09 | End: 2019-09-10

## 2019-09-09 RX ORDER — ACETAMINOPHEN 500 MG
1000 TABLET ORAL ONCE
Refills: 0 | Status: COMPLETED | OUTPATIENT
Start: 2019-09-09 | End: 2019-09-09

## 2019-09-09 RX ADMIN — HEPARIN SODIUM 5000 UNIT(S): 5000 INJECTION INTRAVENOUS; SUBCUTANEOUS at 05:20

## 2019-09-09 RX ADMIN — PIPERACILLIN AND TAZOBACTAM 25 GRAM(S): 4; .5 INJECTION, POWDER, LYOPHILIZED, FOR SOLUTION INTRAVENOUS at 17:09

## 2019-09-09 RX ADMIN — SENNA PLUS 2 TABLET(S): 8.6 TABLET ORAL at 21:07

## 2019-09-09 RX ADMIN — SODIUM CHLORIDE 500 MILLILITER(S): 9 INJECTION, SOLUTION INTRAVENOUS at 05:53

## 2019-09-09 RX ADMIN — Medication 400 MILLIGRAM(S): at 11:14

## 2019-09-09 RX ADMIN — HEPARIN SODIUM 5000 UNIT(S): 5000 INJECTION INTRAVENOUS; SUBCUTANEOUS at 13:14

## 2019-09-09 RX ADMIN — GABAPENTIN 100 MILLIGRAM(S): 400 CAPSULE ORAL at 21:07

## 2019-09-09 RX ADMIN — GABAPENTIN 100 MILLIGRAM(S): 400 CAPSULE ORAL at 05:19

## 2019-09-09 RX ADMIN — HEPARIN SODIUM 5000 UNIT(S): 5000 INJECTION INTRAVENOUS; SUBCUTANEOUS at 21:07

## 2019-09-09 RX ADMIN — PIPERACILLIN AND TAZOBACTAM 25 GRAM(S): 4; .5 INJECTION, POWDER, LYOPHILIZED, FOR SOLUTION INTRAVENOUS at 05:19

## 2019-09-09 RX ADMIN — PANTOPRAZOLE SODIUM 40 MILLIGRAM(S): 20 TABLET, DELAYED RELEASE ORAL at 05:19

## 2019-09-09 RX ADMIN — Medication 25 MILLIGRAM(S): at 17:08

## 2019-09-09 RX ADMIN — Medication 88 MICROGRAM(S): at 05:20

## 2019-09-09 RX ADMIN — CLOPIDOGREL BISULFATE 75 MILLIGRAM(S): 75 TABLET, FILM COATED ORAL at 13:12

## 2019-09-09 RX ADMIN — Medication 0.25 MILLIGRAM(S): at 21:06

## 2019-09-09 RX ADMIN — Medication 50 MILLILITER(S): at 17:10

## 2019-09-09 RX ADMIN — SODIUM CHLORIDE 1000 MILLILITER(S): 9 INJECTION, SOLUTION INTRAVENOUS at 02:16

## 2019-09-09 RX ADMIN — Medication 100 MILLIGRAM(S): at 13:14

## 2019-09-09 RX ADMIN — ATORVASTATIN CALCIUM 40 MILLIGRAM(S): 80 TABLET, FILM COATED ORAL at 13:16

## 2019-09-09 RX ADMIN — SODIUM CHLORIDE 125 MILLILITER(S): 9 INJECTION, SOLUTION INTRAVENOUS at 13:12

## 2019-09-09 RX ADMIN — Medication 25 MILLIGRAM(S): at 05:20

## 2019-09-09 RX ADMIN — Medication 1000 MILLIGRAM(S): at 11:44

## 2019-09-09 RX ADMIN — MIRTAZAPINE 15 MILLIGRAM(S): 45 TABLET, ORALLY DISINTEGRATING ORAL at 13:14

## 2019-09-09 RX ADMIN — Medication 50 MILLILITER(S): at 12:14

## 2019-09-09 RX ADMIN — SODIUM CHLORIDE 125 MILLILITER(S): 9 INJECTION, SOLUTION INTRAVENOUS at 05:20

## 2019-09-09 RX ADMIN — GABAPENTIN 100 MILLIGRAM(S): 400 CAPSULE ORAL at 13:19

## 2019-09-09 NOTE — PROGRESS NOTE ADULT - ASSESSMENT
Ms. Fletcher is a 87 YOF w/ a PMHx s/f IDDMII, HLD, HTN, CAD, PVD, and PAF on Plavix who initially presented to the ED on 9/8 with a 4 day history of RLQ pain.  She was diagnosed with acute appendicitis, brought to the OR and found to have acute appendicitis with gangrene and copious purulence extending throughout the Right paracolic gutter & the liver.  Will require SICU monitoring currently.    General: Patient DNR/DNI per daughter who is power of /HCP - daughter is Binta Dorado & will bring in official paperwork tomorrow morning for chart    Neurological: Patient A&O x4, GCS 15, will monitor for mental status changes     Neck: supple, FROM     Pulmonary: 88% on 4L NC; will monitor respiratory status; will repeat CXR - patient with pre-op CXR c/f atelectasis vs PNA; will monitor for fever     Cardiovascular: Now NSR; patient w/ Afib - will need home medications restarted    Gastrointestinal: NGT in L nare to LIWS - will monitor output; R EL drain in place with serosanguinous output     Genitourinary: Bhatt in place; monitor UOP closely     ID: Zosyn x4 days; monitor for fever    Endo: ISS w/ FSBS    Tubes/Lines: NGT to LIWS, PIV    Extremities: L well-healed AKA, right-sided SCD in place    Skin: Intact; nurses to monitor condition of skin at each shift     Musculoskeletal: L AKA - healed Ms. Fletcher is a 87 YOF w/ a PMHx s/f IDDMII, HLD, HTN, CAD, PVD, and PAF on Plavix who initially presented to the ED on 9/8 with a 4 day history of RLQ pain.  She was diagnosed with acute appendicitis, brought to the OR and found to have acute appendicitis with gangrene and copious purulence extending throughout the Right paracolic gutter & the liver.    General: Patient DNR/DNI per daughter who is power of /HCP - daughter is Binta Dorado & will bring in official paperwork tomorrow morning for chart    Neurological: Patient A&O x4, GCS 15, will monitor for mental status changes     Neck: supple, FROM     Pulmonary: Continue to monitor respiratory status.  Pulmonary toilet, OOB to chair, IS    Cardiovascular: Now NSR; patient w/ Afib - will need home medications restarted    Gastrointestinal: NGT repositioned and now below diaphram in stomach.  Minimal output at this time.  Will maintain x 24 hrs and reassess.  OK to give meds.  Continue serial abdominal exams and monitor bowel function    Genitourinary:  MAGI:  Creat 4-->3.8-->3.7.  POC US with >50% variability.  Albumin 25 G q6h x 24.  Continue maintenance fluid.  Bhatt in place for strict IandOs    ID: Zosyn x4 days; monitor for fever.  High risk for abscess formation    Endo: ISS w/ FSBS    Tubes/Lines: NGT to LCWS, PIV    Extremities: L well-healed AKA, right-sided SCD in place    Skin: Intact; nurses to monitor condition of skin at each shift     Dispo:  SICU

## 2019-09-09 NOTE — PROGRESS NOTE ADULT - SUBJECTIVE AND OBJECTIVE BOX
INTERVAL HPI/OVERNIGHT EVENTS:    Pt taken to OR for perforated appendicitis.  Pt was extubated post op.  Resuscitated with 3 L of IVF post op for oliguria.  UOP 10-25 cc overnight.  Creatinine 4 --> 3.8 --> 3.7.    Pt is moaning this morning but states only has mild pain.  Alert and oriented x 4.  IV tylenol given with improvement of symptoms.  POC ultrasound performed and noted to have IVC with with >50% variability.   Written for Albumin q6h x 4 doses.  No output noted from NGT.  Chest XR confirms distal tip in esophagus.  Advanced and position confirmed on XR.  Minimal output once positioned correctly.       MEDICATIONS  (STANDING):  albumin human 25% IVPB 100 milliLiter(s) IV Intermittent every 6 hours  atorvastatin Oral Tab/Cap - Peds 40 milliGRAM(s) Oral daily  clopidogrel Tablet 75 milliGRAM(s) Oral daily  dextrose 5%. 1000 milliLiter(s) (50 mL/Hr) IV Continuous <Continuous>  dextrose 50% Injectable 12.5 Gram(s) IV Push once  dextrose 50% Injectable 25 Gram(s) IV Push once  dextrose 50% Injectable 25 Gram(s) IV Push once  docusate sodium 100 milliGRAM(s) Oral every 8 hours  gabapentin 100 milliGRAM(s) Oral three times a day  heparin  Injectable 5000 Unit(s) SubCutaneous every 8 hours  insulin lispro (HumaLOG) corrective regimen sliding scale   SubCutaneous three times a day before meals  insulin NPH human recombinant 10 Unit(s) SubCutaneous at bedtime  levothyroxine 88 MICROGram(s) Oral daily  metoprolol tartrate 25 milliGRAM(s) Oral two times a day  mirtazapine 15 milliGRAM(s) Oral daily  multiple electrolytes Injection Type 1 1000 milliLiter(s) (125 mL/Hr) IV Continuous <Continuous>  pantoprazole    Tablet 40 milliGRAM(s) Oral before breakfast  piperacillin/tazobactam IVPB.. 3.375 Gram(s) IV Intermittent every 12 hours  senna 2 Tablet(s) Oral at bedtime  tamsulosin 0.4 milliGRAM(s) Oral at bedtime    MEDICATIONS  (PRN):  ALPRAZolam 0.25 milliGRAM(s) Oral daily PRN agitation  dextrose 40% Gel 15 Gram(s) Oral once PRN Blood Glucose LESS THAN 70 milliGRAM(s)/deciliter  glucagon  Injectable 1 milliGRAM(s) IntraMuscular once PRN Glucose LESS THAN 70 milligrams/deciliter      Drug Dosing Weight  Height (cm): 147.3 (08 Sep 2019 17:00)  Weight (kg): 84 (08 Sep 2019 17:00)  BMI (kg/m2): 38.7 (08 Sep 2019 17:00)  BSA (m2): 1.76 (08 Sep 2019 17:00)        PAST MEDICAL & SURGICAL HISTORY:  PAF (paroxysmal atrial fibrillation)  Hyperlipidemia  Hypertension  Diabetes  PVD (peripheral vascular disease)  CAD (coronary artery disease)  S/P AKA (above knee amputation) unilateral, left  History of cholecystectomy  H/O angioplasty      ICU Vital Signs Last 24 Hrs  T(C): 36.7 (09 Sep 2019 12:00), Max: 37.1 (08 Sep 2019 23:54)  T(F): 98 (09 Sep 2019 12:00), Max: 98.7 (08 Sep 2019 23:54)  HR: 78 (09 Sep 2019 14:00) (72 - 90)  BP: 146/67 (09 Sep 2019 14:00) (96/52 - 146/67)  BP(mean): 96 (09 Sep 2019 14:00) (72 - 96)  ABP: --  ABP(mean): --  RR: 15 (09 Sep 2019 14:00) (13 - 27)  SpO2: 99% (09 Sep 2019 14:00) (90% - 99%)          I&O's Detail    08 Sep 2019 07:01  -  09 Sep 2019 07:00  --------------------------------------------------------  IN:    Lactated Ringers IV Bolus: 2500 mL    multiple electrolytes Injection Type 1: 1875 mL    Solution: 100 mL  Total IN: 4475 mL    OUT:    Bulb: 300 mL    Indwelling Catheter - Urethral: 420 mL    Voided: 375 mL  Total OUT: 1095 mL    Total NET: 3380 mL      09 Sep 2019 07:01  -  09 Sep 2019 16:20  --------------------------------------------------------  IN:    multiple electrolytes Injection Type 1: 750 mL    Oral Fluid: 60 mL    Solution: 50 mL    Solution: 100 mL    Solution: 100 mL  Total IN: 1060 mL    OUT:    Bulb: 200 mL    Indwelling Catheter - Urethral: 135 mL  Total OUT: 335 mL    Total NET: 725 mL              Physical Exam:    Neurological:  GCS 15.  No sensory/motor deficits    HEENT: PERRLA, EOMI, no drainage or redness    Neck: No bruits; no thyromegaly or nodules,  No JVD    Back: Normal spine flexure, No CVA tenderness, No deformity or limitation of movement    Respiratory: Breath Sounds equal & clear to auscultation, no accessory muscle use    Cardiovascular: Regular rate & rhythm, normal S1, S2; no murmurs, gallops or rubs    Gastrointestinal: EL in place to RLQ with serous fluid.  Serous drainage around drain site.  No purulent drainage noted.  Soft.  Incisional tenderness.      Extremities: +1 pedal edema bilateral.  No cyanosis, clubbing     Vascular: Equal and normal pulses: 2+ peripheral pulses throughout        LABS:  CBC Full  -  ( 09 Sep 2019 05:11 )  WBC Count : 13.73 K/uL  RBC Count : 3.03 M/uL  Hemoglobin : 8.5 g/dL  Hematocrit : 27.7 %  Platelet Count - Automated : 235 K/uL  Mean Cell Volume : 91.4 fl  Mean Cell Hemoglobin : 28.1 pg  Mean Cell Hemoglobin Concentration : 30.7 gm/dL  Auto Neutrophil # : 12.27 K/uL  Auto Lymphocyte # : 0.45 K/uL  Auto Monocyte # : 0.80 K/uL  Auto Eosinophil # : 0.01 K/uL  Auto Basophil # : 0.02 K/uL  Auto Neutrophil % : 89.4 %  Auto Lymphocyte % : 3.3 %  Auto Monocyte % : 5.8 %  Auto Eosinophil % : 0.1 %  Auto Basophil % : 0.1 %        134<L>  |  99  |  59.0<H>  ----------------------------<  144<H>  4.9   |  21.0<L>  |  3.71<H>    Ca    7.6<L>      09 Sep 2019 05:11  Phos  7.5       Mg     2.2         TPro  4.9<L>  /  Alb  2.6<L>  /  TBili  <0.2<L>  /  DBili  x   /  AST  31  /  ALT  27  /  AlkPhos  73  -    PT/INR - ( 08 Sep 2019 00:27 )   PT: 11.8 sec;   INR: 1.03 ratio         PTT - ( 08 Sep 2019 00:27 )  PTT:27.8 sec  Urinalysis Basic - ( 08 Sep 2019 10:09 )    Color: Yellow / Appearance: Clear / S.020 / pH: x  Gluc: x / Ketone: Negative  / Bili: Negative / Urobili: Negative mg/dL   Blood: x / Protein: 30 mg/dL / Nitrite: Negative   Leuk Esterase: Negative / RBC: 0-2 /HPF / WBC 0-2   Sq Epi: x / Non Sq Epi: Negative / Bacteria: Occasional

## 2019-09-10 LAB
ACETONE SERPL-MCNC: ABNORMAL
ALBUMIN SERPL ELPH-MCNC: 3.2 G/DL — LOW (ref 3.3–5.2)
ALBUMIN SERPL ELPH-MCNC: 3.6 G/DL — SIGNIFICANT CHANGE UP (ref 3.3–5.2)
ALP SERPL-CCNC: 50 U/L — SIGNIFICANT CHANGE UP (ref 40–120)
ALP SERPL-CCNC: 59 U/L — SIGNIFICANT CHANGE UP (ref 40–120)
ALT FLD-CCNC: 15 U/L — SIGNIFICANT CHANGE UP
ALT FLD-CCNC: 19 U/L — SIGNIFICANT CHANGE UP
ANION GAP SERPL CALC-SCNC: 18 MMOL/L — HIGH (ref 5–17)
ANION GAP SERPL CALC-SCNC: 18 MMOL/L — HIGH (ref 5–17)
ANION GAP SERPL CALC-SCNC: 19 MMOL/L — HIGH (ref 5–17)
APTT BLD: 38.5 SEC — HIGH (ref 27.5–36.3)
AST SERPL-CCNC: 17 U/L — SIGNIFICANT CHANGE UP
AST SERPL-CCNC: 26 U/L — SIGNIFICANT CHANGE UP
BASOPHILS # BLD AUTO: 0.02 K/UL — SIGNIFICANT CHANGE UP (ref 0–0.2)
BASOPHILS # BLD AUTO: 0.03 K/UL — SIGNIFICANT CHANGE UP (ref 0–0.2)
BASOPHILS NFR BLD AUTO: 0.2 % — SIGNIFICANT CHANGE UP (ref 0–2)
BASOPHILS NFR BLD AUTO: 0.3 % — SIGNIFICANT CHANGE UP (ref 0–2)
BILIRUB SERPL-MCNC: 0.3 MG/DL — LOW (ref 0.4–2)
BILIRUB SERPL-MCNC: 0.3 MG/DL — LOW (ref 0.4–2)
BUN SERPL-MCNC: 54 MG/DL — HIGH (ref 8–20)
BUN SERPL-MCNC: 58 MG/DL — HIGH (ref 8–20)
BUN SERPL-MCNC: 60 MG/DL — HIGH (ref 8–20)
CALCIUM SERPL-MCNC: 7.9 MG/DL — LOW (ref 8.6–10.2)
CALCIUM SERPL-MCNC: 8.1 MG/DL — LOW (ref 8.6–10.2)
CALCIUM SERPL-MCNC: 8.6 MG/DL — SIGNIFICANT CHANGE UP (ref 8.6–10.2)
CHLORIDE SERPL-SCNC: 100 MMOL/L — SIGNIFICANT CHANGE UP (ref 98–107)
CHLORIDE SERPL-SCNC: 97 MMOL/L — LOW (ref 98–107)
CHLORIDE SERPL-SCNC: 98 MMOL/L — SIGNIFICANT CHANGE UP (ref 98–107)
CO2 SERPL-SCNC: 19 MMOL/L — LOW (ref 22–29)
CO2 SERPL-SCNC: 20 MMOL/L — LOW (ref 22–29)
CO2 SERPL-SCNC: 20 MMOL/L — LOW (ref 22–29)
CREAT SERPL-MCNC: 3.51 MG/DL — HIGH (ref 0.5–1.3)
CREAT SERPL-MCNC: 3.73 MG/DL — HIGH (ref 0.5–1.3)
CREAT SERPL-MCNC: 3.75 MG/DL — HIGH (ref 0.5–1.3)
EOSINOPHIL # BLD AUTO: 0.38 K/UL — SIGNIFICANT CHANGE UP (ref 0–0.5)
EOSINOPHIL # BLD AUTO: 0.39 K/UL — SIGNIFICANT CHANGE UP (ref 0–0.5)
EOSINOPHIL NFR BLD AUTO: 3.8 % — SIGNIFICANT CHANGE UP (ref 0–6)
EOSINOPHIL NFR BLD AUTO: 4.3 % — SIGNIFICANT CHANGE UP (ref 0–6)
GAS PNL BLDA: SIGNIFICANT CHANGE UP
GLUCOSE BLDC GLUCOMTR-MCNC: 127 MG/DL — HIGH (ref 70–99)
GLUCOSE BLDC GLUCOMTR-MCNC: 224 MG/DL — HIGH (ref 70–99)
GLUCOSE SERPL-MCNC: 185 MG/DL — HIGH (ref 70–115)
GLUCOSE SERPL-MCNC: 222 MG/DL — HIGH (ref 70–115)
GLUCOSE SERPL-MCNC: 96 MG/DL — SIGNIFICANT CHANGE UP (ref 70–115)
HCT VFR BLD CALC: 24.5 % — LOW (ref 34.5–45)
HCT VFR BLD CALC: 25.5 % — LOW (ref 34.5–45)
HGB BLD-MCNC: 7.6 G/DL — LOW (ref 11.5–15.5)
HGB BLD-MCNC: 8 G/DL — LOW (ref 11.5–15.5)
IMM GRANULOCYTES NFR BLD AUTO: 1.7 % — HIGH (ref 0–1.5)
IMM GRANULOCYTES NFR BLD AUTO: 2 % — HIGH (ref 0–1.5)
INR BLD: 1.85 RATIO — HIGH (ref 0.88–1.16)
LACTATE SERPL-SCNC: 0.5 MMOL/L — SIGNIFICANT CHANGE UP (ref 0.5–2)
LYMPHOCYTES # BLD AUTO: 0.53 K/UL — LOW (ref 1–3.3)
LYMPHOCYTES # BLD AUTO: 0.6 K/UL — LOW (ref 1–3.3)
LYMPHOCYTES # BLD AUTO: 5.3 % — LOW (ref 13–44)
LYMPHOCYTES # BLD AUTO: 6.6 % — LOW (ref 13–44)
MAGNESIUM SERPL-MCNC: 2.4 MG/DL — SIGNIFICANT CHANGE UP (ref 1.6–2.6)
MAGNESIUM SERPL-MCNC: 2.4 MG/DL — SIGNIFICANT CHANGE UP (ref 1.6–2.6)
MCHC RBC-ENTMCNC: 27.5 PG — SIGNIFICANT CHANGE UP (ref 27–34)
MCHC RBC-ENTMCNC: 27.8 PG — SIGNIFICANT CHANGE UP (ref 27–34)
MCHC RBC-ENTMCNC: 31 GM/DL — LOW (ref 32–36)
MCHC RBC-ENTMCNC: 31.4 GM/DL — LOW (ref 32–36)
MCV RBC AUTO: 88.5 FL — SIGNIFICANT CHANGE UP (ref 80–100)
MCV RBC AUTO: 88.8 FL — SIGNIFICANT CHANGE UP (ref 80–100)
MONOCYTES # BLD AUTO: 0.42 K/UL — SIGNIFICANT CHANGE UP (ref 0–0.9)
MONOCYTES # BLD AUTO: 0.51 K/UL — SIGNIFICANT CHANGE UP (ref 0–0.9)
MONOCYTES NFR BLD AUTO: 4.6 % — SIGNIFICANT CHANGE UP (ref 2–14)
MONOCYTES NFR BLD AUTO: 5.1 % — SIGNIFICANT CHANGE UP (ref 2–14)
NEUTROPHILS # BLD AUTO: 7.51 K/UL — HIGH (ref 1.8–7.4)
NEUTROPHILS # BLD AUTO: 8.37 K/UL — HIGH (ref 1.8–7.4)
NEUTROPHILS NFR BLD AUTO: 82.3 % — HIGH (ref 43–77)
NEUTROPHILS NFR BLD AUTO: 83.8 % — HIGH (ref 43–77)
PHOSPHATE SERPL-MCNC: 6 MG/DL — HIGH (ref 2.4–4.7)
PHOSPHATE SERPL-MCNC: 6 MG/DL — HIGH (ref 2.4–4.7)
PLATELET # BLD AUTO: 227 K/UL — SIGNIFICANT CHANGE UP (ref 150–400)
PLATELET # BLD AUTO: 234 K/UL — SIGNIFICANT CHANGE UP (ref 150–400)
POTASSIUM SERPL-MCNC: 3.7 MMOL/L — SIGNIFICANT CHANGE UP (ref 3.5–5.3)
POTASSIUM SERPL-MCNC: 4 MMOL/L — SIGNIFICANT CHANGE UP (ref 3.5–5.3)
POTASSIUM SERPL-MCNC: 4.5 MMOL/L — SIGNIFICANT CHANGE UP (ref 3.5–5.3)
POTASSIUM SERPL-SCNC: 3.7 MMOL/L — SIGNIFICANT CHANGE UP (ref 3.5–5.3)
POTASSIUM SERPL-SCNC: 4 MMOL/L — SIGNIFICANT CHANGE UP (ref 3.5–5.3)
POTASSIUM SERPL-SCNC: 4.5 MMOL/L — SIGNIFICANT CHANGE UP (ref 3.5–5.3)
PROT SERPL-MCNC: 5.1 G/DL — LOW (ref 6.6–8.7)
PROT SERPL-MCNC: 5.6 G/DL — LOW (ref 6.6–8.7)
PROTHROM AB SERPL-ACNC: 21.7 SEC — HIGH (ref 10–12.9)
RBC # BLD: 2.76 M/UL — LOW (ref 3.8–5.2)
RBC # BLD: 2.88 M/UL — LOW (ref 3.8–5.2)
RBC # FLD: 15.2 % — HIGH (ref 10.3–14.5)
RBC # FLD: 15.7 % — HIGH (ref 10.3–14.5)
SODIUM SERPL-SCNC: 135 MMOL/L — SIGNIFICANT CHANGE UP (ref 135–145)
SODIUM SERPL-SCNC: 136 MMOL/L — SIGNIFICANT CHANGE UP (ref 135–145)
SODIUM SERPL-SCNC: 138 MMOL/L — SIGNIFICANT CHANGE UP (ref 135–145)
WBC # BLD: 9.12 K/UL — SIGNIFICANT CHANGE UP (ref 3.8–10.5)
WBC # BLD: 9.99 K/UL — SIGNIFICANT CHANGE UP (ref 3.8–10.5)
WBC # FLD AUTO: 9.12 K/UL — SIGNIFICANT CHANGE UP (ref 3.8–10.5)
WBC # FLD AUTO: 9.99 K/UL — SIGNIFICANT CHANGE UP (ref 3.8–10.5)

## 2019-09-10 PROCEDURE — 99024 POSTOP FOLLOW-UP VISIT: CPT

## 2019-09-10 RX ORDER — CALCIUM GLUCONATE 100 MG/ML
1 VIAL (ML) INTRAVENOUS ONCE
Refills: 0 | Status: COMPLETED | OUTPATIENT
Start: 2019-09-10 | End: 2019-09-10

## 2019-09-10 RX ORDER — ALBUMIN HUMAN 25 %
250 VIAL (ML) INTRAVENOUS EVERY 4 HOURS
Refills: 0 | Status: COMPLETED | OUTPATIENT
Start: 2019-09-10 | End: 2019-09-11

## 2019-09-10 RX ORDER — ALBUMIN HUMAN 25 %
250 VIAL (ML) INTRAVENOUS
Refills: 0 | Status: COMPLETED | OUTPATIENT
Start: 2019-09-10 | End: 2019-09-10

## 2019-09-10 RX ADMIN — GABAPENTIN 100 MILLIGRAM(S): 400 CAPSULE ORAL at 21:47

## 2019-09-10 RX ADMIN — Medication 125 MILLILITER(S): at 15:41

## 2019-09-10 RX ADMIN — CLOPIDOGREL BISULFATE 75 MILLIGRAM(S): 75 TABLET, FILM COATED ORAL at 11:35

## 2019-09-10 RX ADMIN — MIRTAZAPINE 15 MILLIGRAM(S): 45 TABLET, ORALLY DISINTEGRATING ORAL at 11:35

## 2019-09-10 RX ADMIN — Medication 50 MILLILITER(S): at 06:10

## 2019-09-10 RX ADMIN — HEPARIN SODIUM 5000 UNIT(S): 5000 INJECTION INTRAVENOUS; SUBCUTANEOUS at 21:41

## 2019-09-10 RX ADMIN — Medication 50 MILLILITER(S): at 01:09

## 2019-09-10 RX ADMIN — Medication 2: at 11:35

## 2019-09-10 RX ADMIN — HEPARIN SODIUM 5000 UNIT(S): 5000 INJECTION INTRAVENOUS; SUBCUTANEOUS at 07:31

## 2019-09-10 RX ADMIN — Medication 125 MILLILITER(S): at 07:47

## 2019-09-10 RX ADMIN — GABAPENTIN 100 MILLIGRAM(S): 400 CAPSULE ORAL at 07:31

## 2019-09-10 RX ADMIN — HUMAN INSULIN 10 UNIT(S): 100 INJECTION, SUSPENSION SUBCUTANEOUS at 22:50

## 2019-09-10 RX ADMIN — Medication 4: at 16:31

## 2019-09-10 RX ADMIN — ATORVASTATIN CALCIUM 40 MILLIGRAM(S): 80 TABLET, FILM COATED ORAL at 11:35

## 2019-09-10 RX ADMIN — SENNA PLUS 2 TABLET(S): 8.6 TABLET ORAL at 21:42

## 2019-09-10 RX ADMIN — PIPERACILLIN AND TAZOBACTAM 25 GRAM(S): 4; .5 INJECTION, POWDER, LYOPHILIZED, FOR SOLUTION INTRAVENOUS at 17:18

## 2019-09-10 RX ADMIN — Medication 100 MILLIGRAM(S): at 07:30

## 2019-09-10 RX ADMIN — HEPARIN SODIUM 5000 UNIT(S): 5000 INJECTION INTRAVENOUS; SUBCUTANEOUS at 14:10

## 2019-09-10 RX ADMIN — PANTOPRAZOLE SODIUM 40 MILLIGRAM(S): 20 TABLET, DELAYED RELEASE ORAL at 07:30

## 2019-09-10 RX ADMIN — PIPERACILLIN AND TAZOBACTAM 25 GRAM(S): 4; .5 INJECTION, POWDER, LYOPHILIZED, FOR SOLUTION INTRAVENOUS at 06:30

## 2019-09-10 RX ADMIN — Medication 125 MILLILITER(S): at 10:59

## 2019-09-10 RX ADMIN — Medication 88 MICROGRAM(S): at 07:32

## 2019-09-10 RX ADMIN — Medication 25 MILLIGRAM(S): at 07:32

## 2019-09-10 RX ADMIN — Medication 100 MILLIGRAM(S): at 21:41

## 2019-09-10 RX ADMIN — Medication 125 MILLILITER(S): at 21:47

## 2019-09-10 RX ADMIN — Medication 200 GRAM(S): at 18:37

## 2019-09-10 RX ADMIN — TAMSULOSIN HYDROCHLORIDE 0.4 MILLIGRAM(S): 0.4 CAPSULE ORAL at 21:41

## 2019-09-10 RX ADMIN — Medication 125 MILLILITER(S): at 14:10

## 2019-09-10 NOTE — PHYSICAL THERAPY INITIAL EVALUATION ADULT - ACTIVE RANGE OF MOTION EXAMINATION, REHAB EVAL
deficits as listed below/R ankle WFL, Right knee unable to extend past 20 degrees flexion. AROM of reji UEs noted

## 2019-09-10 NOTE — PHYSICAL THERAPY INITIAL EVALUATION ADULT - ORIENTATION, REHAB EVAL
unable to assess unable to arouse pt for longer than 3 seconds, initially did not respond to sternal rub, later responded to verbal questions./unable to assess

## 2019-09-10 NOTE — PHYSICAL THERAPY INITIAL EVALUATION ADULT - DIAGNOSIS, PT EVAL
Difficulty walking secondary to severe functional decline impaired functional mobility due to generalized weakness

## 2019-09-10 NOTE — PHYSICAL THERAPY INITIAL EVALUATION ADULT - PASSIVE RANGE OF MOTION EXAMINATION, REHAB EVAL
Left UE Passive ROM was WFL (within functional limits)/Right UE Passive ROM was WFL (within functional limits)/deficits as listed below

## 2019-09-10 NOTE — PROGRESS NOTE ADULT - SUBJECTIVE AND OBJECTIVE BOX
SICU Progress Note    Last 24hrs: Patient NGT coiled in throat; removed overnight. Patient tolerated clears.   Pain controlled. Delirious overnight but redirectable  Patient being bolused with albumin.    ICU Vital Signs Last 24 Hrs  T(C): 36.6 (10 Sep 2019 08:00), Max: 37.1 (09 Sep 2019 16:00)  T(F): 97.8 (10 Sep 2019 08:00), Max: 98.8 (09 Sep 2019 16:00)  HR: 91 (10 Sep 2019 07:) (72 - 95)  BP: 147/65 (10 Sep 2019 07:00) (112/54 - 168/75)  BP(mean): 94 (10 Sep 2019 07:) (78 - 108)  RR: 24 (10 Sep 2019 07:) (13 - 32)  SpO2: 93% (10 Sep 2019 07:00) (87% - 99%)      I&O's Detail    09 Sep 2019 07:01  -  10 Sep 2019 07:00  --------------------------------------------------------  IN:    multiple electrolytes Injection Type 1: 3000 mL    Oral Fluid: 60 mL    Solution: 100 mL    Solution: 350 mL    Solution: 175 mL  Total IN: 3685 mL    OUT:    Bulb: 545 mL    Indwelling Catheter - Urethral: 625 mL  Total OUT: 1170 mL    Total NET: 2515 mL      10 Sep 2019 07:01  -  10 Sep 2019 08:04  --------------------------------------------------------  IN:    Solution: 50 mL  Total IN: 50 mL    OUT:  Total OUT: 0 mL    Total NET: 50 mL        MEDICATIONS  (STANDING):  albumin human  5% IVPB 250 milliLiter(s) IV Intermittent every 2 hours  atorvastatin Oral Tab/Cap - Peds 40 milliGRAM(s) Oral daily  clopidogrel Tablet 75 milliGRAM(s) Oral daily  dextrose 5%. 1000 milliLiter(s) (50 mL/Hr) IV Continuous <Continuous>  dextrose 50% Injectable 12.5 Gram(s) IV Push once  dextrose 50% Injectable 25 Gram(s) IV Push once  dextrose 50% Injectable 25 Gram(s) IV Push once  docusate sodium 100 milliGRAM(s) Oral every 8 hours  gabapentin 100 milliGRAM(s) Oral three times a day  heparin  Injectable 5000 Unit(s) SubCutaneous every 8 hours  insulin lispro (HumaLOG) corrective regimen sliding scale   SubCutaneous three times a day before meals  insulin NPH human recombinant 10 Unit(s) SubCutaneous at bedtime  levothyroxine 88 MICROGram(s) Oral daily  metoprolol tartrate 25 milliGRAM(s) Oral two times a day  mirtazapine 15 milliGRAM(s) Oral daily  multiple electrolytes Injection Type 1 1000 milliLiter(s) (125 mL/Hr) IV Continuous <Continuous>  pantoprazole    Tablet 40 milliGRAM(s) Oral before breakfast  piperacillin/tazobactam IVPB.. 3.375 Gram(s) IV Intermittent every 12 hours  senna 2 Tablet(s) Oral at bedtime  tamsulosin 0.4 milliGRAM(s) Oral at bedtime    MEDICATIONS  (PRN):  ALPRAZolam 0.25 milliGRAM(s) Oral daily PRN agitation  dextrose 40% Gel 15 Gram(s) Oral once PRN Blood Glucose LESS THAN 70 milliGRAM(s)/deciliter  glucagon  Injectable 1 milliGRAM(s) IntraMuscular once PRN Glucose LESS THAN 70 milligrams/deciliter      Physical Exam:    Neurological:  GCS 15.  No sensory/motor deficits. Delirious but redirectable.    HEENT: PERRLA, EOMI, no drainage or redness    Neck: No bruits; no thyromegaly or nodules,  No JVD    Back: Normal spine flexure, No CVA tenderness, No deformity or limitation of movement    Respiratory: No IWOB     Cardiovascular: Regular rate & rhythm, normal S1, S2; no murmurs, gallops or rubs    Gastrointestinal: EL in place to RLQ with serous fluid.  Serous drainage around drain site.  No purulent drainage noted.  Soft.  Incisional tenderness.      Extremities: +1 pedal edema bilateral.  No cyanosis, clubbing     Vascular: Equal and normal pulses: 2+ peripheral pulses throughout          LABS:  CBC Full  -  ( 10 Sep 2019 04:33 )  WBC Count : 9.99 K/uL  RBC Count : 2.88 M/uL  Hemoglobin : 8.0 g/dL  Hematocrit : 25.5 %  Platelet Count - Automated : 234 K/uL  Mean Cell Volume : 88.5 fl  Mean Cell Hemoglobin : 27.8 pg  Mean Cell Hemoglobin Concentration : 31.4 gm/dL  Auto Neutrophil # : 8.37 K/uL  Auto Lymphocyte # : 0.53 K/uL  Auto Monocyte # : 0.51 K/uL  Auto Eosinophil # : 0.38 K/uL  Auto Basophil # : 0.03 K/uL  Auto Neutrophil % : 83.8 %  Auto Lymphocyte % : 5.3 %  Auto Monocyte % : 5.1 %  Auto Eosinophil % : 3.8 %  Auto Basophil % : 0.3 %    09-10    135  |  97<L>  |  54.0<H>  ----------------------------<  96  4.5   |  19.0<L>  |  3.51<H>    Ca    7.9<L>      10 Sep 2019 04:33  Phos  6.0     09-10  Mg     2.4     09-10    TPro  5.1<L>  /  Alb  3.2<L>  /  TBili  0.3<L>  /  DBili  x   /  AST  26  /  ALT  19  /  AlkPhos  59  -10      Urinalysis Basic - ( 08 Sep 2019 10:09 )    Color: Yellow / Appearance: Clear / S.020 / pH: x  Gluc: x / Ketone: Negative  / Bili: Negative / Urobili: Negative mg/dL   Blood: x / Protein: 30 mg/dL / Nitrite: Negative   Leuk Esterase: Negative / RBC: 0-2 /HPF / WBC 0-2   Sq Epi: x / Non Sq Epi: Negative / Bacteria: Occasional      RECENT CULTURES:   .Surgical Swab Appendix XXXX   Few WBC's  Few Gram Variable Rods XXXX     .Urine XXXX XXXX   No growth        LIVER FUNCTIONS - ( 10 Sep 2019 04:33 )  Alb: 3.2 g/dL / Pro: 5.1 g/dL / ALK PHOS: 59 U/L / ALT: 19 U/L / AST: 26 U/L / GGT: x

## 2019-09-10 NOTE — PHYSICAL THERAPY INITIAL EVALUATION ADULT - GENERAL OBSERVATIONS, REHAB EVAL
Pt sleeping, responds to verbal commands with moans and unintelligible speech, responds to commands to squeeze right hand consistently, lifts arms. Opens eyes once briefly. Pt sitting in chair with blossom lift pad. Pt sleeping, responds to verbal commands with moans and unintelligible speech, responds to commands to squeeze right hand consistently, lifts arms. Opens eyes once briefly.

## 2019-09-10 NOTE — DIETITIAN INITIAL EVALUATION ADULT. - MALNUTRITION
Severe-acute  NFPE: muscle mass loss in temple/shoulder +fluid accumulation in R leg and B/L upper extremities Severe-acute

## 2019-09-10 NOTE — PROGRESS NOTE ADULT - ASSESSMENT
Ms. Fletcher is a 87 YOF w/ a PMHx s/f IDDMII, HLD, HTN, CAD, PVD, and PAF on Plavix who initially presented to the ED on 9/8 with a 4 day history of RLQ pain.  She was diagnosed with acute appendicitis, brought to the OR and found to have acute appendicitis with gangrene and copious purulence extending throughout the right paracolic gutter & the liver.    Neurological: Patient A&O x4, GCS 15, will monitor for mental status changes     Pulmonary: Continue to monitor respiratory status.  Pulmonary toilet, OOB to chair, IS    Cardiovascular: Now NSR; patient w/ Afib - will need home medications restarted    Gastrointestinal: NGT removed overnight. Will maintain x 24 hrs and reassess.  OK to give PO medications. Will determine plan for enteral nutrition today; TFs versus PO.  Continue serial abdominal exams and monitor bowel function.      Genitourinary:  MAGI: Creatinine continues to be elevated; may be a new baseline - last normal creatine recorded was in 2017. Attending feels patient w/ severe protein-duncan malnutrition - will continue Albumin 25 G q6h x 24.  Continue maintenance fluid & CLD PO.  Bhatt in place for strict I and Os    ID: Continue Zosyn; monitor for fever.  VERY high risk for abscess formation.    Endo: ISS w/ FSBS    Tubes/Lines: Bhatt, PIV    Extremities: L well-healed AKA, right-sided SCD in place    Skin: Intact; nurses to monitor condition of skin at each shift     Dispo:  SICU care

## 2019-09-10 NOTE — PHYSICAL THERAPY INITIAL EVALUATION ADULT - PRECAUTIONS/LIMITATIONS, REHAB EVAL
fall precautions/oxygen therapy device and L/min cardiac precautions/fall precautions/oxygen therapy device and L/min

## 2019-09-10 NOTE — PHYSICAL THERAPY INITIAL EVALUATION ADULT - ADDITIONAL COMMENTS
Per medical records pt was living at an assisted living facility and used a WC as primary mode of transportation. At this time it is unclear as to how much assist the resident needed for transferring. Per medical records pt was living at an assisted living facility and used a WC as primary mode of transportation. Per nurse, pt was able to transfer from/to WC by herself and wheel herself in WC short distances. Had aide 4 hours a day.

## 2019-09-10 NOTE — DIETITIAN INITIAL EVALUATION ADULT. - ETIOLOGY
Related to inadequate protein-energy intake with altered GI function in setting of  Acute appendicitis with perforation, localized peritonitis, and gangrene, s/p lap appendectomy

## 2019-09-10 NOTE — CHART NOTE - NSCHARTNOTEFT_GEN_A_CORE
Upon Nutritional Assessment by the Registered Dietitian your patient was determined to meet criteria / has evidence of the following diagnosis/diagnoses:          [ ]  Mild Protein Calorie Malnutrition        [ ]  Moderate Protein Calorie Malnutrition        [x ] Severe Protein Calorie Malnutrition        [ ] Unspecified Protein Calorie Malnutrition        [ ] Underweight / BMI <19        [ ] Morbid Obesity / BMI > 40      Findings as based on:  •  Comprehensive nutrition assessment and consultation  •  Calorie counts (nutrient intake analysis)  •  Food acceptance and intake status from observations by staff  •  Follow up  •  Patient education  •  Intervention secondary to interdisciplinary rounds  •   concerns      Treatment:    The following diet has been recommended:    Diabetishield and Gelatein TID     PROVIDER Section:     By signing this assessment you are acknowledging and agree with the diagnosis/diagnoses assigned by the Registered Dietitian    Comments:

## 2019-09-10 NOTE — DIETITIAN INITIAL EVALUATION ADULT. - PERTINENT LABORATORY DATA
09-10 Na135 mmol/L Glu 96 mg/dL K+ 4.5 mmol/L Cr  3.51 mg/dL<H> BUN 54.0 mg/dL<H> Phos 6.0 mg/dL<H> Alb 3.2 g/dL<L> PAB n/a     n/a  HgbA1C

## 2019-09-10 NOTE — DIETITIAN INITIAL EVALUATION ADULT. - OTHER INFO
Ms. Fletcher is a 87 YOF w/ a PMHx s/f IDDMII, HLD, HTN, CAD, PVD, and PAF on Plavix who initially presented to the ED on 9/8 with a 4 day history of RLQ pain. She was diagnosed with acute appendicitis, brought to the OR and found to have acute appendicitis with gangrene and copious purulence extending throughout the right paracolic gutter & the liver, s/p appendectomy. Spoke with pt and pt's daughter at bedside. Daughter reports pt has been having "stomach issues" all summer, alternating bouts of Nausea/constipation/vomiting. Daughter reports pt eats mostly Carbohydrates, limited amounts of protein and has noticed swelling in leg and B/L upper extremities. Daughter reports was unable to tell she was losing wt because of all fluid accumulation. NFPE conducted. Pt now started on clear liquids, tolerating small sips of clears, encouraged high protein jello and Diabetishield. HBV protein foods reviewed and encouraged.

## 2019-09-11 LAB
-  AMIKACIN: SIGNIFICANT CHANGE UP
-  AMPICILLIN/SULBACTAM: SIGNIFICANT CHANGE UP
-  AMPICILLIN: SIGNIFICANT CHANGE UP
-  AZTREONAM: SIGNIFICANT CHANGE UP
-  CEFAZOLIN: SIGNIFICANT CHANGE UP
-  CEFEPIME: SIGNIFICANT CHANGE UP
-  CEFOXITIN: SIGNIFICANT CHANGE UP
-  CEFTRIAXONE: SIGNIFICANT CHANGE UP
-  CIPROFLOXACIN: SIGNIFICANT CHANGE UP
-  ERTAPENEM: SIGNIFICANT CHANGE UP
-  GENTAMICIN: SIGNIFICANT CHANGE UP
-  IMIPENEM: SIGNIFICANT CHANGE UP
-  LEVOFLOXACIN: SIGNIFICANT CHANGE UP
-  MEROPENEM: SIGNIFICANT CHANGE UP
-  PIPERACILLIN/TAZOBACTAM: SIGNIFICANT CHANGE UP
-  TOBRAMYCIN: SIGNIFICANT CHANGE UP
-  TRIMETHOPRIM/SULFAMETHOXAZOLE: SIGNIFICANT CHANGE UP
ANION GAP SERPL CALC-SCNC: 16 MMOL/L — SIGNIFICANT CHANGE UP (ref 5–17)
ANION GAP SERPL CALC-SCNC: 17 MMOL/L — SIGNIFICANT CHANGE UP (ref 5–17)
APTT BLD: 37.1 SEC — HIGH (ref 27.5–36.3)
BASOPHILS # BLD AUTO: 0.02 K/UL — SIGNIFICANT CHANGE UP (ref 0–0.2)
BASOPHILS NFR BLD AUTO: 0.2 % — SIGNIFICANT CHANGE UP (ref 0–2)
BLD GP AB SCN SERPL QL: SIGNIFICANT CHANGE UP
BUN SERPL-MCNC: 57 MG/DL — HIGH (ref 8–20)
BUN SERPL-MCNC: 58 MG/DL — HIGH (ref 8–20)
CALCIUM SERPL-MCNC: 8 MG/DL — LOW (ref 8.6–10.2)
CALCIUM SERPL-MCNC: 8.5 MG/DL — LOW (ref 8.6–10.2)
CHLORIDE SERPL-SCNC: 100 MMOL/L — SIGNIFICANT CHANGE UP (ref 98–107)
CHLORIDE SERPL-SCNC: 99 MMOL/L — SIGNIFICANT CHANGE UP (ref 98–107)
CO2 SERPL-SCNC: 20 MMOL/L — LOW (ref 22–29)
CO2 SERPL-SCNC: 22 MMOL/L — SIGNIFICANT CHANGE UP (ref 22–29)
CREAT SERPL-MCNC: 3.66 MG/DL — HIGH (ref 0.5–1.3)
CREAT SERPL-MCNC: 3.74 MG/DL — HIGH (ref 0.5–1.3)
EOSINOPHIL # BLD AUTO: 0.43 K/UL — SIGNIFICANT CHANGE UP (ref 0–0.5)
EOSINOPHIL NFR BLD AUTO: 4.9 % — SIGNIFICANT CHANGE UP (ref 0–6)
GLUCOSE BLDC GLUCOMTR-MCNC: 252 MG/DL — HIGH (ref 70–99)
GLUCOSE BLDC GLUCOMTR-MCNC: 254 MG/DL — HIGH (ref 70–99)
GLUCOSE SERPL-MCNC: 159 MG/DL — HIGH (ref 70–115)
GLUCOSE SERPL-MCNC: 205 MG/DL — HIGH (ref 70–115)
HCT VFR BLD CALC: 23.3 % — LOW (ref 34.5–45)
HCT VFR BLD CALC: 25.8 % — LOW (ref 34.5–45)
HGB BLD-MCNC: 7.4 G/DL — LOW (ref 11.5–15.5)
HGB BLD-MCNC: 8.2 G/DL — LOW (ref 11.5–15.5)
IMM GRANULOCYTES NFR BLD AUTO: 1.9 % — HIGH (ref 0–1.5)
INR BLD: 1.69 RATIO — HIGH (ref 0.88–1.16)
LYMPHOCYTES # BLD AUTO: 0.59 K/UL — LOW (ref 1–3.3)
LYMPHOCYTES # BLD AUTO: 6.7 % — LOW (ref 13–44)
MAGNESIUM SERPL-MCNC: 2.3 MG/DL — SIGNIFICANT CHANGE UP (ref 1.6–2.6)
MCHC RBC-ENTMCNC: 28.2 PG — SIGNIFICANT CHANGE UP (ref 27–34)
MCHC RBC-ENTMCNC: 28.6 PG — SIGNIFICANT CHANGE UP (ref 27–34)
MCHC RBC-ENTMCNC: 31.8 GM/DL — LOW (ref 32–36)
MCHC RBC-ENTMCNC: 31.8 GM/DL — LOW (ref 32–36)
MCV RBC AUTO: 88.9 FL — SIGNIFICANT CHANGE UP (ref 80–100)
MCV RBC AUTO: 89.9 FL — SIGNIFICANT CHANGE UP (ref 80–100)
METHOD TYPE: SIGNIFICANT CHANGE UP
MONOCYTES # BLD AUTO: 0.41 K/UL — SIGNIFICANT CHANGE UP (ref 0–0.9)
MONOCYTES NFR BLD AUTO: 4.7 % — SIGNIFICANT CHANGE UP (ref 2–14)
NEUTROPHILS # BLD AUTO: 7.17 K/UL — SIGNIFICANT CHANGE UP (ref 1.8–7.4)
NEUTROPHILS NFR BLD AUTO: 81.6 % — HIGH (ref 43–77)
PHOSPHATE SERPL-MCNC: 5.9 MG/DL — HIGH (ref 2.4–4.7)
PLATELET # BLD AUTO: 195 K/UL — SIGNIFICANT CHANGE UP (ref 150–400)
PLATELET # BLD AUTO: 209 K/UL — SIGNIFICANT CHANGE UP (ref 150–400)
POTASSIUM SERPL-MCNC: 3.7 MMOL/L — SIGNIFICANT CHANGE UP (ref 3.5–5.3)
POTASSIUM SERPL-MCNC: 4.2 MMOL/L — SIGNIFICANT CHANGE UP (ref 3.5–5.3)
POTASSIUM SERPL-SCNC: 3.7 MMOL/L — SIGNIFICANT CHANGE UP (ref 3.5–5.3)
POTASSIUM SERPL-SCNC: 4.2 MMOL/L — SIGNIFICANT CHANGE UP (ref 3.5–5.3)
PROTHROM AB SERPL-ACNC: 19.7 SEC — HIGH (ref 10–12.9)
RBC # BLD: 2.62 M/UL — LOW (ref 3.8–5.2)
RBC # BLD: 2.87 M/UL — LOW (ref 3.8–5.2)
RBC # FLD: 15.6 % — HIGH (ref 10.3–14.5)
RBC # FLD: 15.7 % — HIGH (ref 10.3–14.5)
SODIUM SERPL-SCNC: 136 MMOL/L — SIGNIFICANT CHANGE UP (ref 135–145)
SODIUM SERPL-SCNC: 138 MMOL/L — SIGNIFICANT CHANGE UP (ref 135–145)
SURGICAL PATHOLOGY STUDY: SIGNIFICANT CHANGE UP
WBC # BLD: 8.4 K/UL — SIGNIFICANT CHANGE UP (ref 3.8–10.5)
WBC # BLD: 8.79 K/UL — SIGNIFICANT CHANGE UP (ref 3.8–10.5)
WBC # FLD AUTO: 8.4 K/UL — SIGNIFICANT CHANGE UP (ref 3.8–10.5)
WBC # FLD AUTO: 8.79 K/UL — SIGNIFICANT CHANGE UP (ref 3.8–10.5)

## 2019-09-11 PROCEDURE — 99232 SBSQ HOSP IP/OBS MODERATE 35: CPT

## 2019-09-11 PROCEDURE — 99497 ADVNCD CARE PLAN 30 MIN: CPT

## 2019-09-11 PROCEDURE — 71045 X-RAY EXAM CHEST 1 VIEW: CPT | Mod: 26

## 2019-09-11 RX ORDER — POTASSIUM CHLORIDE 20 MEQ
40 PACKET (EA) ORAL ONCE
Refills: 0 | Status: COMPLETED | OUTPATIENT
Start: 2019-09-11 | End: 2019-09-11

## 2019-09-11 RX ORDER — POTASSIUM CHLORIDE 20 MEQ
30 PACKET (EA) ORAL ONCE
Refills: 0 | Status: DISCONTINUED | OUTPATIENT
Start: 2019-09-11 | End: 2019-09-11

## 2019-09-11 RX ORDER — LANOLIN ALCOHOL/MO/W.PET/CERES
3 CREAM (GRAM) TOPICAL AT BEDTIME
Refills: 0 | Status: DISCONTINUED | OUTPATIENT
Start: 2019-09-11 | End: 2019-09-14

## 2019-09-11 RX ORDER — IPRATROPIUM/ALBUTEROL SULFATE 18-103MCG
3 AEROSOL WITH ADAPTER (GRAM) INHALATION EVERY 6 HOURS
Refills: 0 | Status: DISCONTINUED | OUTPATIENT
Start: 2019-09-11 | End: 2019-09-12

## 2019-09-11 RX ORDER — CHLORHEXIDINE GLUCONATE 213 G/1000ML
1 SOLUTION TOPICAL DAILY
Refills: 0 | Status: DISCONTINUED | OUTPATIENT
Start: 2019-09-11 | End: 2019-09-14

## 2019-09-11 RX ORDER — INSULIN LISPRO 100/ML
VIAL (ML) SUBCUTANEOUS
Refills: 0 | Status: DISCONTINUED | OUTPATIENT
Start: 2019-09-11 | End: 2019-09-12

## 2019-09-11 RX ORDER — FUROSEMIDE 40 MG
10 TABLET ORAL ONCE
Refills: 0 | Status: COMPLETED | OUTPATIENT
Start: 2019-09-11 | End: 2019-09-11

## 2019-09-11 RX ORDER — METOPROLOL TARTRATE 50 MG
5 TABLET ORAL ONCE
Refills: 0 | Status: COMPLETED | OUTPATIENT
Start: 2019-09-11 | End: 2019-09-11

## 2019-09-11 RX ORDER — ALBUTEROL 90 UG/1
1 AEROSOL, METERED ORAL EVERY 4 HOURS
Refills: 0 | Status: DISCONTINUED | OUTPATIENT
Start: 2019-09-11 | End: 2019-09-14

## 2019-09-11 RX ORDER — SODIUM CHLORIDE 9 MG/ML
1000 INJECTION, SOLUTION INTRAVENOUS ONCE
Refills: 0 | Status: COMPLETED | OUTPATIENT
Start: 2019-09-11 | End: 2019-09-11

## 2019-09-11 RX ORDER — TIOTROPIUM BROMIDE 18 UG/1
1 CAPSULE ORAL; RESPIRATORY (INHALATION) DAILY
Refills: 0 | Status: DISCONTINUED | OUTPATIENT
Start: 2019-09-11 | End: 2019-09-14

## 2019-09-11 RX ADMIN — Medication 10 MILLIGRAM(S): at 14:38

## 2019-09-11 RX ADMIN — MIRTAZAPINE 15 MILLIGRAM(S): 45 TABLET, ORALLY DISINTEGRATING ORAL at 11:32

## 2019-09-11 RX ADMIN — Medication 125 MILLILITER(S): at 07:38

## 2019-09-11 RX ADMIN — GABAPENTIN 100 MILLIGRAM(S): 400 CAPSULE ORAL at 05:24

## 2019-09-11 RX ADMIN — HEPARIN SODIUM 5000 UNIT(S): 5000 INJECTION INTRAVENOUS; SUBCUTANEOUS at 22:09

## 2019-09-11 RX ADMIN — SODIUM CHLORIDE 2000 MILLILITER(S): 9 INJECTION, SOLUTION INTRAVENOUS at 01:00

## 2019-09-11 RX ADMIN — HEPARIN SODIUM 5000 UNIT(S): 5000 INJECTION INTRAVENOUS; SUBCUTANEOUS at 13:48

## 2019-09-11 RX ADMIN — Medication 100 MILLIGRAM(S): at 05:22

## 2019-09-11 RX ADMIN — Medication 125 MILLILITER(S): at 14:50

## 2019-09-11 RX ADMIN — PIPERACILLIN AND TAZOBACTAM 25 GRAM(S): 4; .5 INJECTION, POWDER, LYOPHILIZED, FOR SOLUTION INTRAVENOUS at 05:26

## 2019-09-11 RX ADMIN — Medication 125 MILLILITER(S): at 10:17

## 2019-09-11 RX ADMIN — Medication 3 MILLILITER(S): at 15:24

## 2019-09-11 RX ADMIN — Medication 40 MILLIEQUIVALENT(S): at 05:25

## 2019-09-11 RX ADMIN — Medication 125 MILLILITER(S): at 17:31

## 2019-09-11 RX ADMIN — PANTOPRAZOLE SODIUM 40 MILLIGRAM(S): 20 TABLET, DELAYED RELEASE ORAL at 05:25

## 2019-09-11 RX ADMIN — Medication 10: at 22:23

## 2019-09-11 RX ADMIN — HEPARIN SODIUM 5000 UNIT(S): 5000 INJECTION INTRAVENOUS; SUBCUTANEOUS at 05:22

## 2019-09-11 RX ADMIN — Medication 4: at 08:08

## 2019-09-11 RX ADMIN — Medication 6: at 17:31

## 2019-09-11 RX ADMIN — Medication 5 MILLIGRAM(S): at 14:09

## 2019-09-11 RX ADMIN — GABAPENTIN 100 MILLIGRAM(S): 400 CAPSULE ORAL at 11:32

## 2019-09-11 RX ADMIN — Medication 25 MILLIGRAM(S): at 05:22

## 2019-09-11 RX ADMIN — ATORVASTATIN CALCIUM 40 MILLIGRAM(S): 80 TABLET, FILM COATED ORAL at 11:32

## 2019-09-11 RX ADMIN — CHLORHEXIDINE GLUCONATE 1 APPLICATION(S): 213 SOLUTION TOPICAL at 22:08

## 2019-09-11 RX ADMIN — Medication 25 MILLIGRAM(S): at 17:31

## 2019-09-11 RX ADMIN — CLOPIDOGREL BISULFATE 75 MILLIGRAM(S): 75 TABLET, FILM COATED ORAL at 11:31

## 2019-09-11 RX ADMIN — Medication 3 MILLIGRAM(S): at 22:09

## 2019-09-11 RX ADMIN — Medication 125 MILLILITER(S): at 03:00

## 2019-09-11 RX ADMIN — Medication 4: at 11:32

## 2019-09-11 RX ADMIN — PIPERACILLIN AND TAZOBACTAM 25 GRAM(S): 4; .5 INJECTION, POWDER, LYOPHILIZED, FOR SOLUTION INTRAVENOUS at 17:30

## 2019-09-11 RX ADMIN — Medication 3 MILLILITER(S): at 20:49

## 2019-09-11 RX ADMIN — TAMSULOSIN HYDROCHLORIDE 0.4 MILLIGRAM(S): 0.4 CAPSULE ORAL at 22:09

## 2019-09-11 RX ADMIN — HUMAN INSULIN 10 UNIT(S): 100 INJECTION, SUSPENSION SUBCUTANEOUS at 22:23

## 2019-09-11 RX ADMIN — Medication 88 MICROGRAM(S): at 05:22

## 2019-09-11 NOTE — PROGRESS NOTE ADULT - ASSESSMENT
A/p:  87 YOF w/ a PMHx s/f IDDMII, HLD, HTN, CAD, PVD, and PAF on Plavix POD 3 from lap appendectomy for perforated appendix, MAGI,      Neurological: CAM positive likely secondary to ICU delirium and uremia. Patient A&O x4, GCS 15.  Adequate sleep wake cycle - added melatonin. Avoid deliriogenic meds. Held xanax consider restarting when improves.     Pulmonary: Continue to monitor respiratory status.  Pulmonary toilet, OOB to chair, IS    Cardiovascular: PAF; all home meds on board. ECHO pending 9/11. Cont hemodynamic monitoring     Gastrointestinal: Malnourished; likely advance diet if okay with surgical team. Cont to keep up with losses from EL ~500 per shift, consider removal of drain.     Genitourinary:  Bhatt - cont strict i/o's.  MAGI secondary to hypovolemia, requiring continued resuscitation and assessment. Lean towards colloid resuscitation when possible. Metabolic acidosis secondary to uremia and starvation ketosis also improving.  UOP borderline - consider consulting nephrology     ID: Continue Zosyn 9/12 post op.     Endo: ISS w/ FSBS    Tubes/Lines: Bhatt, PIV    Extremities: L well-healed AKA, right-sided SCD in place    Skin: Frequent turning while in bed, oob to chair during day time     Dispo:  SICU care

## 2019-09-11 NOTE — PROGRESS NOTE ADULT - SUBJECTIVE AND OBJECTIVE BOX
24 HOUR EVENTS: Patient continues to be confused but redirectable overnight.  VSS.  EL output with ascites >600 for 24hrs.  IVC US w/ >50% variations, hyperdynamic heart, unclear EF. No B lines seen.  UOP marginal throughout day shift yesterday and overnight.  Given 1L bolus and albumin x 12hrs.   Reevaluation of IVC still w/ >40-50% variations.  Given 4 FFP for continued colloid resuscitation  UOP and MAGI continue to slightly improve.  Echo pending for this AM to evaluate cardiac function.     SUBJECTIVE/ROS:  [x ] A ten-point review of systems was otherwise negative except as noted.  [ ] Due to altered mental status/intubation, subjective information were not able to be obtained from the patient. History was obtained, to the extent possible, from review of the chart and collateral sources of information.      NEURO  RASS:  0--1   GCS:  14   CAM ICU: pos  Exam: No focal neuro deficits, RODRIGUEZ, delirious   Meds: ALPRAZolam 0.25 milliGRAM(s) Oral daily PRN agitation  gabapentin 100 milliGRAM(s) Oral three times a day  mirtazapine 15 milliGRAM(s) Oral daily    [x] Adequacy of sedation and pain control has been assessed and adjusted      RESPIRATORY  RR: 23 (09-11-19 @ 03:00) (15 - 45)  SpO2: 94% (09-11-19 @ 03:00) (87% - 95%)  Wt(kg): --  Exam: unlabored, diminished at b/l bases  Mechanical Ventilation:   ABG - ( 10 Sep 2019 17:15 )  pH: 7.28  /  pCO2: 44    /  pO2: 62    / HCO3: 20    / Base Excess: -5.6  /  SaO2: 91      Lactate: x                [ ] Extubation Readiness Assessed  Meds:       CARDIOVASCULAR  HR: 83 (09-11-19 @ 03:00) (75 - 96)  BP: 114/57 (09-11-19 @ 03:00) (110/54 - 170/79)  BP(mean): 81 (09-11-19 @ 03:00) (78 - 127)  ABP: --  ABP(mean): --  Wt(kg): --  CVP(cm H2O): --      Exam: NSR   Cardiac Rhythm: RRR  Perfusion     [ x]Adequate   [ ]Inadequate  Mentation   [ x]Normal       [ ]Reduced  Extremities  [x ]Warm         [ ]Cool  Volume Status [ ]Hypervolemic [x ]Euvolemic [ ]Hypovolemic  Meds: metoprolol tartrate 25 milliGRAM(s) Oral two times a day  tamsulosin 0.4 milliGRAM(s) Oral at bedtime        GI/NUTRITION  Exam: obese, soft, nttp, steri strips in place, EL w/ serous straw colored fluid   Diet: CLD   Meds: docusate sodium 100 milliGRAM(s) Oral every 8 hours  pantoprazole    Tablet 40 milliGRAM(s) Oral before breakfast  senna 2 Tablet(s) Oral at bedtime      GENITOURINARY  I&O's Detail    09-09 @ 07:01  -  09-10 @ 07:00  --------------------------------------------------------  IN:    multiple electrolytes Injection Type 1multiple electrolytes Injection Type 1: 3000 mL    Oral Fluid: 60 mL    Solution: 350 mL    Solution: 175 mL    Solution: 100 mL  Total IN: 3685 mL    OUT:    Bulb: 545 mL    Indwelling Catheter - Urethral: 625 mL  Total OUT: 1170 mL    Total NET: 2515 mL      09-10 @ 07:01 - 09-11 @ 05:19  --------------------------------------------------------  IN:    multiple electrolytes Injection Type 1 Bolus: 999 mL    Oral Fluid: 315 mL    Plasma: 563 mL    Solution: 100 mL    Solution: 175 mL    Solution: 1300 mL  Total IN: 3452 mL    OUT:    Bulb: 675 mL    Indwelling Catheter - Urethral: 485 mL  Total OUT: 1160 mL    Total NET: 2292 mL          09-11    136  |  99  |  57.0<H>  ----------------------------<  159<H>  3.7   |  20.0<L>  |  3.66<H>    Ca    8.0<L>      11 Sep 2019 01:43  Phos  5.9     09-11  Mg     2.3     09-11    TPro  5.6<L>  /  Alb  3.6  /  TBili  0.3<L>  /  DBili  x   /  AST  17  /  ALT  15  /  AlkPhos  50  09-10    [x ] Bhatt catheter, indication: critically ill, strict i/o's  Meds: albumin human  5% IVPB 250 milliLiter(s) IV Intermittent every 4 hours  dextrose 5%. 1000 milliLiter(s) IV Continuous <Continuous>  multiple electrolytes Injection Type 1 Bolus 1000 milliLiter(s) IV Bolus once  potassium chloride   Powder 40 milliEquivalent(s) Oral once    Physical Exam:    Neurological:  Frail woman. GCS 15.  No sensory/motor deficits. Delirious but redirectable.    Back: Normal spine flexure, No CVA tenderness, No deformity or limitation of movement    Respiratory: unlabored, diminished b/l     Cardiovascular: Regular rate & rhythm, normal S1, S2; no murmurs, gallops or rubs    Gastrointestinal: EL in place to RLQ with serous fluid.  Serous drainage around drain site.  No purulent drainage noted.  Soft.  Incisional tenderness.      Extremities: +1 pedal edema bilateral.  No cyanosis, clubbing     Vascular: Equal and normal pulses: 2+ peripheral pulses throughout          HEMATOLOGIC  Meds: clopidogrel Tablet 75 milliGRAM(s) Oral daily  heparin  Injectable 5000 Unit(s) SubCutaneous every 8 hours    [x] VTE Prophylaxis                        7.4    8.79  )-----------( 209      ( 11 Sep 2019 01:43 )             23.3     PT/INR - ( 10 Sep 2019 20:19 )   PT: 21.7 sec;   INR: 1.85 ratio         PTT - ( 10 Sep 2019 20:19 )  PTT:38.5 sec  Transfusion     [ ] PRBC   [ ] Platelets   [ ] FFP   [ ] Cryoprecipitate      INFECTIOUS DISEASES  T(C): 37.1 (09-10-19 @ 23:41), Max: 37.1 (09-10-19 @ 23:41)  Wt(kg): --  WBC Count: 8.79 K/uL (09-11 @ 01:43)  WBC Count: 9.12 K/uL (09-10 @ 20:19)    Recent Cultures:  Specimen Source: .Surgical Swab Appendix, 09-09 @ 13:50; Results   Moderate Gram Negative Rods Identification and susceptibility to follow.  Culture in progress; Gram Stain:   Few WBC's  Few Gram Variable Rods; Organism: --  Specimen Source: .Urine, 09-08 @ 10:08; Results   No growth; Gram Stain: --; Organism: --    Meds: piperacillin/tazobactam IVPB.. 3.375 Gram(s) IV Intermittent every 12 hours        ENDOCRINE  Capillary Blood Glucose    Meds: atorvastatin Oral Tab/Cap - Peds 40 milliGRAM(s) Oral daily  dextrose 40% Gel 15 Gram(s) Oral once PRN  dextrose 50% Injectable 12.5 Gram(s) IV Push once  dextrose 50% Injectable 25 Gram(s) IV Push once  dextrose 50% Injectable 25 Gram(s) IV Push once  glucagon  Injectable 1 milliGRAM(s) IntraMuscular once PRN  insulin lispro (HumaLOG) corrective regimen sliding scale   SubCutaneous three times a day before meals  insulin NPH human recombinant 10 Unit(s) SubCutaneous at bedtime  levothyroxine 88 MICROGram(s) Oral daily        ACCESS DEVICES:  [ ] Peripheral IV  [ ] Central Venous Line	[ ] R	[ ] L	[ ] IJ	[ ] Fem	[ ] SC	Placed:   [ ] Arterial Line		[ ] R	[ ] L	[ ] Fem	[ ] Rad	[ ] Ax	Placed:   [ ] PICC:					[ ] Mediport  [ ] Urinary Catheter, Date Placed:   [ ] Necessity of urinary, arterial, and venous catheters discussed    OTHER MEDICATIONS:      CODE STATUS: Yes      IMAGING:

## 2019-09-12 LAB
-  AMIKACIN: SIGNIFICANT CHANGE UP
-  AMPICILLIN/SULBACTAM: SIGNIFICANT CHANGE UP
-  AMPICILLIN: SIGNIFICANT CHANGE UP
-  AZTREONAM: SIGNIFICANT CHANGE UP
-  CEFAZOLIN: SIGNIFICANT CHANGE UP
-  CEFEPIME: SIGNIFICANT CHANGE UP
-  CEFOXITIN: SIGNIFICANT CHANGE UP
-  CEFTRIAXONE: SIGNIFICANT CHANGE UP
-  CIPROFLOXACIN: SIGNIFICANT CHANGE UP
-  ERTAPENEM: SIGNIFICANT CHANGE UP
-  GENTAMICIN: SIGNIFICANT CHANGE UP
-  IMIPENEM: SIGNIFICANT CHANGE UP
-  LEVOFLOXACIN: SIGNIFICANT CHANGE UP
-  MEROPENEM: SIGNIFICANT CHANGE UP
-  PIPERACILLIN/TAZOBACTAM: SIGNIFICANT CHANGE UP
-  TOBRAMYCIN: SIGNIFICANT CHANGE UP
-  TRIMETHOPRIM/SULFAMETHOXAZOLE: SIGNIFICANT CHANGE UP
ANION GAP SERPL CALC-SCNC: 18 MMOL/L — HIGH (ref 5–17)
ANISOCYTOSIS BLD QL: SLIGHT — SIGNIFICANT CHANGE UP
BASOPHILS # BLD AUTO: 0 K/UL — SIGNIFICANT CHANGE UP (ref 0–0.2)
BASOPHILS NFR BLD AUTO: 0 % — SIGNIFICANT CHANGE UP (ref 0–2)
BUN SERPL-MCNC: 59 MG/DL — HIGH (ref 8–20)
CALCIUM SERPL-MCNC: 8.4 MG/DL — LOW (ref 8.6–10.2)
CHLORIDE SERPL-SCNC: 103 MMOL/L — SIGNIFICANT CHANGE UP (ref 98–107)
CO2 SERPL-SCNC: 19 MMOL/L — LOW (ref 22–29)
CREAT SERPL-MCNC: 3.56 MG/DL — HIGH (ref 0.5–1.3)
EOSINOPHIL # BLD AUTO: 0.54 K/UL — HIGH (ref 0–0.5)
EOSINOPHIL NFR BLD AUTO: 6.1 % — HIGH (ref 0–6)
GAS PNL BLDA: SIGNIFICANT CHANGE UP
GIANT PLATELETS BLD QL SMEAR: PRESENT — SIGNIFICANT CHANGE UP
GLUCOSE BLDC GLUCOMTR-MCNC: 199 MG/DL — HIGH (ref 70–99)
GLUCOSE BLDC GLUCOMTR-MCNC: 280 MG/DL — HIGH (ref 70–99)
GLUCOSE SERPL-MCNC: 160 MG/DL — HIGH (ref 70–115)
HCT VFR BLD CALC: 26 % — LOW (ref 34.5–45)
HGB BLD-MCNC: 8.1 G/DL — LOW (ref 11.5–15.5)
LYMPHOCYTES # BLD AUTO: 0.92 K/UL — LOW (ref 1–3.3)
LYMPHOCYTES # BLD AUTO: 10.4 % — LOW (ref 13–44)
MACROCYTES BLD QL: SLIGHT — SIGNIFICANT CHANGE UP
MAGNESIUM SERPL-MCNC: 2.1 MG/DL — SIGNIFICANT CHANGE UP (ref 1.6–2.6)
MANUAL SMEAR VERIFICATION: SIGNIFICANT CHANGE UP
MCHC RBC-ENTMCNC: 28 PG — SIGNIFICANT CHANGE UP (ref 27–34)
MCHC RBC-ENTMCNC: 31.2 GM/DL — LOW (ref 32–36)
MCV RBC AUTO: 90 FL — SIGNIFICANT CHANGE UP (ref 80–100)
METHOD TYPE: SIGNIFICANT CHANGE UP
METHOD TYPE: SIGNIFICANT CHANGE UP
MONOCYTES # BLD AUTO: 0.23 K/UL — SIGNIFICANT CHANGE UP (ref 0–0.9)
MONOCYTES NFR BLD AUTO: 2.6 % — SIGNIFICANT CHANGE UP (ref 2–14)
NEUTROPHILS # BLD AUTO: 7.14 K/UL — SIGNIFICANT CHANGE UP (ref 1.8–7.4)
NEUTROPHILS NFR BLD AUTO: 80.9 % — HIGH (ref 43–77)
PHOSPHATE SERPL-MCNC: 6 MG/DL — HIGH (ref 2.4–4.7)
PLAT MORPH BLD: NORMAL — SIGNIFICANT CHANGE UP
PLATELET # BLD AUTO: 203 K/UL — SIGNIFICANT CHANGE UP (ref 150–400)
POIKILOCYTOSIS BLD QL AUTO: SIGNIFICANT CHANGE UP
POTASSIUM SERPL-MCNC: 3.9 MMOL/L — SIGNIFICANT CHANGE UP (ref 3.5–5.3)
POTASSIUM SERPL-SCNC: 3.9 MMOL/L — SIGNIFICANT CHANGE UP (ref 3.5–5.3)
RBC # BLD: 2.89 M/UL — LOW (ref 3.8–5.2)
RBC # FLD: 15.7 % — HIGH (ref 10.3–14.5)
RBC BLD AUTO: ABNORMAL
SODIUM SERPL-SCNC: 140 MMOL/L — SIGNIFICANT CHANGE UP (ref 135–145)
TROPONIN T SERPL-MCNC: 0.04 NG/ML — SIGNIFICANT CHANGE UP (ref 0–0.06)
WBC # BLD: 8.83 K/UL — SIGNIFICANT CHANGE UP (ref 3.8–10.5)
WBC # FLD AUTO: 8.83 K/UL — SIGNIFICANT CHANGE UP (ref 3.8–10.5)

## 2019-09-12 PROCEDURE — 71045 X-RAY EXAM CHEST 1 VIEW: CPT | Mod: 26

## 2019-09-12 PROCEDURE — 99497 ADVNCD CARE PLAN 30 MIN: CPT | Mod: 25

## 2019-09-12 PROCEDURE — 99233 SBSQ HOSP IP/OBS HIGH 50: CPT

## 2019-09-12 PROCEDURE — 99223 1ST HOSP IP/OBS HIGH 75: CPT

## 2019-09-12 PROCEDURE — 93010 ELECTROCARDIOGRAM REPORT: CPT

## 2019-09-12 RX ORDER — FUROSEMIDE 40 MG
40 TABLET ORAL ONCE
Refills: 0 | Status: COMPLETED | OUTPATIENT
Start: 2019-09-12 | End: 2019-09-12

## 2019-09-12 RX ORDER — DILTIAZEM HCL 120 MG
5 CAPSULE, EXT RELEASE 24 HR ORAL
Qty: 125 | Refills: 0 | Status: DISCONTINUED | OUTPATIENT
Start: 2019-09-12 | End: 2019-09-12

## 2019-09-12 RX ORDER — ONDANSETRON 8 MG/1
4 TABLET, FILM COATED ORAL ONCE
Refills: 0 | Status: COMPLETED | OUTPATIENT
Start: 2019-09-12 | End: 2019-09-12

## 2019-09-12 RX ORDER — ALBUMIN HUMAN 25 %
100 VIAL (ML) INTRAVENOUS EVERY 6 HOURS
Refills: 0 | Status: COMPLETED | OUTPATIENT
Start: 2019-09-12 | End: 2019-09-13

## 2019-09-12 RX ORDER — DILTIAZEM HCL 120 MG
10 CAPSULE, EXT RELEASE 24 HR ORAL ONCE
Refills: 0 | Status: COMPLETED | OUTPATIENT
Start: 2019-09-12 | End: 2019-09-12

## 2019-09-12 RX ORDER — METOPROLOL TARTRATE 50 MG
5 TABLET ORAL ONCE
Refills: 0 | Status: COMPLETED | OUTPATIENT
Start: 2019-09-12 | End: 2019-09-12

## 2019-09-12 RX ORDER — ACETAMINOPHEN 500 MG
1000 TABLET ORAL ONCE
Refills: 0 | Status: COMPLETED | OUTPATIENT
Start: 2019-09-12 | End: 2019-09-12

## 2019-09-12 RX ORDER — DILTIAZEM HCL 120 MG
21 CAPSULE, EXT RELEASE 24 HR ORAL ONCE
Refills: 0 | Status: COMPLETED | OUTPATIENT
Start: 2019-09-12 | End: 2019-09-12

## 2019-09-12 RX ORDER — FUROSEMIDE 40 MG
5 TABLET ORAL
Qty: 500 | Refills: 0 | Status: DISCONTINUED | OUTPATIENT
Start: 2019-09-12 | End: 2019-09-13

## 2019-09-12 RX ORDER — IPRATROPIUM/ALBUTEROL SULFATE 18-103MCG
3 AEROSOL WITH ADAPTER (GRAM) INHALATION
Refills: 0 | Status: DISCONTINUED | OUTPATIENT
Start: 2019-09-12 | End: 2019-09-14

## 2019-09-12 RX ORDER — INSULIN GLARGINE 100 [IU]/ML
10 INJECTION, SOLUTION SUBCUTANEOUS AT BEDTIME
Refills: 0 | Status: DISCONTINUED | OUTPATIENT
Start: 2019-09-12 | End: 2019-09-12

## 2019-09-12 RX ORDER — FUROSEMIDE 40 MG
20 TABLET ORAL ONCE
Refills: 0 | Status: COMPLETED | OUTPATIENT
Start: 2019-09-12 | End: 2019-09-12

## 2019-09-12 RX ORDER — DILTIAZEM HCL 120 MG
29 CAPSULE, EXT RELEASE 24 HR ORAL ONCE
Refills: 0 | Status: COMPLETED | OUTPATIENT
Start: 2019-09-12 | End: 2019-09-12

## 2019-09-12 RX ADMIN — Medication 110 MILLIGRAM(S): at 13:30

## 2019-09-12 RX ADMIN — Medication 4: at 08:27

## 2019-09-12 RX ADMIN — Medication 1000 MILLIGRAM(S): at 06:20

## 2019-09-12 RX ADMIN — CHLORHEXIDINE GLUCONATE 1 APPLICATION(S): 213 SOLUTION TOPICAL at 11:57

## 2019-09-12 RX ADMIN — HEPARIN SODIUM 5000 UNIT(S): 5000 INJECTION INTRAVENOUS; SUBCUTANEOUS at 15:11

## 2019-09-12 RX ADMIN — Medication 50 MILLILITER(S): at 17:54

## 2019-09-12 RX ADMIN — Medication 20 MILLIGRAM(S): at 05:46

## 2019-09-12 RX ADMIN — Medication 5 MG/HR: at 02:06

## 2019-09-12 RX ADMIN — Medication 50 MILLILITER(S): at 23:42

## 2019-09-12 RX ADMIN — HEPARIN SODIUM 5000 UNIT(S): 5000 INJECTION INTRAVENOUS; SUBCUTANEOUS at 05:28

## 2019-09-12 RX ADMIN — Medication 88 MICROGRAM(S): at 05:28

## 2019-09-12 RX ADMIN — Medication 29 MILLIGRAM(S): at 03:48

## 2019-09-12 RX ADMIN — HEPARIN SODIUM 5000 UNIT(S): 5000 INJECTION INTRAVENOUS; SUBCUTANEOUS at 21:51

## 2019-09-12 RX ADMIN — Medication 5 MILLIGRAM(S): at 01:02

## 2019-09-12 RX ADMIN — Medication 25 MILLIGRAM(S): at 05:28

## 2019-09-12 RX ADMIN — Medication 2.5 MG/HR: at 18:25

## 2019-09-12 RX ADMIN — Medication 400 MILLIGRAM(S): at 06:02

## 2019-09-12 RX ADMIN — Medication 3 MILLILITER(S): at 01:48

## 2019-09-12 RX ADMIN — PANTOPRAZOLE SODIUM 40 MILLIGRAM(S): 20 TABLET, DELAYED RELEASE ORAL at 05:46

## 2019-09-12 RX ADMIN — Medication 21 MILLIGRAM(S): at 01:40

## 2019-09-12 RX ADMIN — Medication 40 MILLIGRAM(S): at 17:53

## 2019-09-12 RX ADMIN — ONDANSETRON 4 MILLIGRAM(S): 8 TABLET, FILM COATED ORAL at 13:10

## 2019-09-12 RX ADMIN — TAMSULOSIN HYDROCHLORIDE 0.4 MILLIGRAM(S): 0.4 CAPSULE ORAL at 22:46

## 2019-09-12 RX ADMIN — Medication 3 MILLIGRAM(S): at 21:52

## 2019-09-12 RX ADMIN — Medication 10 MILLIGRAM(S): at 13:11

## 2019-09-12 RX ADMIN — Medication 10: at 13:09

## 2019-09-12 RX ADMIN — PIPERACILLIN AND TAZOBACTAM 25 GRAM(S): 4; .5 INJECTION, POWDER, LYOPHILIZED, FOR SOLUTION INTRAVENOUS at 05:29

## 2019-09-12 NOTE — CONSULT NOTE ADULT - SUBJECTIVE AND OBJECTIVE BOX
PALLIATIVE CONSULT    CC: Patient is a 87y old  Female who presents with a chief complaint of perforated appendicitis (10 Sep 2019 08:04)    HPI:  Mrs. Fletcher is a 87 year old female with PMHx of DM2, HLD, HTN, CAD, PVD, PAF, s/p Lt AKA (due to DM) admitted on 9/8 for RLQ pain, found to have perforated acute appendicitis. Emergently taken for laparoscopic appendectomy, found to be gangrenous with abd washout of surrounding purulence; EL drain placed. Course complicated by acute blood loss anemia,  anasarca/fluid overload, acute respiratory failure on HFNC, intermittent confusion, coagulopathy, severe protein calorie malnutrition, rapid afib with rvr on Cardizem gtt, lico/atn, fever, acute emesis today with possible need for NGT. Palliative consulted for support and goc. Directives confirmed by SICU team with HCP dtr BENITA Judd DNR/I completed this admission.     She is a poor historian and above info from chart, staff and dtr. Pt lethargic at time of visit, wakes to verbal stimuli, answers simple questions. She denied any pain or generalized discomfort.      Present Symptoms:   Dyspnea: Yes HFNC   Nausea/Vomiting:  No  Anxiety:   No  Depression:  No  Fatigue: Yes   Loss of appetite: Yes    Pain: Comfortable          Limited ROS due to confusion/lethargy.    PERTINENT PMH REVIEWED: Yes     PAST MEDICAL & SURGICAL HISTORY:  PAF (paroxysmal atrial fibrillation)  Hyperlipidemia  Hypertension  Diabetes  PVD (peripheral vascular disease)  CAD (coronary artery disease)  S/P AKA (above knee amputation) unilateral, left  History of cholecystectomy  H/O angioplasty      SOCIAL HISTORY:    Admitted from:  home  [facility] ,   - lives with   - children:    Baseline ADLs (prior to admission):  Dependent   Karnofsky:  20%    Surrogate/HCP/Guardian:   - HCP in chart, designated to daughter Binta Dorado [phone]     ADVANCE DIRECTIVES: DNR/IBENITA in chart  DNR YES NO  Completed on:                     MOLST  YES NO   Completed on:  Living Will  YES NO   Completed on:    FAMILY HISTORY:  No pertinent family history in first degree relatives  Unable to obtain due to encephalopathy.     Allergies    Benadryl (Other)  Demerol HCl (Other)    Intolerances        MEDICATIONS  (STANDING):  albumin human 25% IVPB 100 milliLiter(s) IV Intermittent every 6 hours  ALBUTerol    90 MICROgram(s) HFA Inhaler 1 Puff(s) Inhalation every 4 hours  atorvastatin Oral Tab/Cap - Peds 40 milliGRAM(s) Oral daily  chlorhexidine 2% Cloths 1 Application(s) Topical daily  clopidogrel Tablet 75 milliGRAM(s) Oral daily  dextrose 5%. 1000 milliLiter(s) (50 mL/Hr) IV Continuous <Continuous>  dextrose 50% Injectable 12.5 Gram(s) IV Push once  dextrose 50% Injectable 25 Gram(s) IV Push once  dextrose 50% Injectable 25 Gram(s) IV Push once  diltiazem Infusion 5 mG/Hr (5 mL/Hr) IV Continuous <Continuous>  docusate sodium 100 milliGRAM(s) Oral every 8 hours  furosemide   Injectable 40 milliGRAM(s) IV Push once  furosemide Infusion 5 mG/Hr (2.5 mL/Hr) IV Continuous <Continuous>  gabapentin 100 milliGRAM(s) Oral daily  heparin  Injectable 5000 Unit(s) SubCutaneous every 8 hours  insulin glargine Injectable (LANTUS) 10 Unit(s) SubCutaneous at bedtime  insulin lispro (HumaLOG) corrective regimen sliding scale   SubCutaneous three times a day before meals  levothyroxine 88 MICROGram(s) Oral daily  melatonin 3 milliGRAM(s) Oral at bedtime  metoprolol tartrate 25 milliGRAM(s) Oral two times a day  mirtazapine 15 milliGRAM(s) Oral daily  pantoprazole    Tablet 40 milliGRAM(s) Oral before breakfast  senna 2 Tablet(s) Oral at bedtime  tamsulosin 0.4 milliGRAM(s) Oral at bedtime  tiotropium 18 MICROgram(s) Capsule 1 Capsule(s) Inhalation daily    MEDICATIONS  (PRN):  ALBUTerol/ipratropium for Nebulization 3 milliLiter(s) Nebulizer every 3 hours PRN Shortness of Breath and/or Wheezing  dextrose 40% Gel 15 Gram(s) Oral once PRN Blood Glucose LESS THAN 70 milliGRAM(s)/deciliter  glucagon  Injectable 1 milliGRAM(s) IntraMuscular once PRN Glucose LESS THAN 70 milligrams/deciliter      PHYSICAL EXAM:    Vital Signs Last 24 Hrs  T(C): 37.7 (12 Sep 2019 12:00), Max: 39 (12 Sep 2019 04:04)  T(F): 99.9 (12 Sep 2019 12:00), Max: 102.2 (12 Sep 2019 04:04)  HR: 75 (12 Sep 2019 14:00) (75 - 146)  BP: 118/59 (12 Sep 2019 13:00) (84/63 - 180/81)  BP(mean): 81 (12 Sep 2019 13:00) (68 - 120)  RR: 18 (12 Sep 2019 14:00) (18 - 45)  SpO2: 96% (12 Sep 2019 14:00) (89% - 99%)    General: obese. Resting comfortably. No acute distress.   HEENT: MMM   Lungs: coarse breath sounds. +HFNC 35%. non-labored.   CV: +s1/s2. irregular irregular  GI:+ bowel sound. abdomen soft, non-tender, obese +EL drain, dressing over surgical site  MSK: Generalized anasaraca. weakness. WC bound. +Lt AKA    Neuro: nonfocal. lethargic. Minimally Interactive. Poor concentration.    Skin: warm and dry.      LABS:                        8.1    8.83  )-----------( 203      ( 12 Sep 2019 02:04 )             26.0     09-12    140  |  103  |  59.0<H>  ----------------------------<  160<H>  3.9   |  19.0<L>  |  3.56<H>    Ca    8.4<L>      12 Sep 2019 02:04  Phos  6.0     09-12  Mg     2.1     09-12    TPro  5.6<L>  /  Alb  3.6  /  TBili  0.3<L>  /  DBili  x   /  AST  17  /  ALT  15  /  AlkPhos  50  09-10    PT/INR - ( 11 Sep 2019 16:34 )   PT: 19.7 sec;   INR: 1.69 ratio         PTT - ( 11 Sep 2019 16:34 )  PTT:37.1 sec      RADIOLOGY & ADDITIONAL STUDIES:      CT A/P 9/8/19  IMPRESSION:   Acute perforated appendicitis. Limited evaluation for abscess without intravenous contrast.     CXR 9/7/19  IMPRESSION: Airspace opacity seen in the right lower lobe which may   represent atelectasis versus pneumonia.    Renal US 9/8/19  IMPRESSION:  Examination limited secondary to overlying bowel gas.  No hydronephrosis.    CXR 9/9/19  IMPRESSION: NG tube tip in distal third of the esophagus. Cardiomegaly No   evidence of active chest disease.    CXR 9/12/19  IMPRESSION: A LEFT lower lobe retrocardiac airspace consolidation with   persistent RIGHT perihilar and linear opacity/infiltrate.      Thank you for the opportunity to assist with the care of this patient.   [facility] [phone].
SICU Admit Note    HPI: Ms. Fletcher is a 87 YOF w/ a PMHx s/f IDDMII, HLD, HTN, CAD, PVD, and PAF on Plavix who initially presented to the ED on  with a 4 day history of RLQ pain.  She was diagnosed with acute appendicitis, brought to the OR and found to have acute appendicitis with gangrene and copious purulence extending throughout the Right paracolic gutter & the liver.  Patient admitted to the SICU for monitoring and IV abx.  Pre-op CXR with concern for severe atelectasis versus pneumonia.    PMH:   PAF (paroxysmal atrial fibrillation) [Active]  Hyperlipidemia [Active]  Hypertension [Active]  Diabetes [Active]  PVD (peripheral vascular disease) [Active]  CAD (coronary artery disease) [Active]      PSH:   Acute appendicitis with perforation, localized peritonitis, and gangrene (K35.32) [Active]  Acute appendicitis (K35.80) [Active]  Appendicitis with perforation (K35.32) [Active]  Laparoscopic appendectomy (42027) [Active]  S/P AKA (above knee amputation) unilateral, left (Z89.612) [Active]  History of cholecystectomy (Z90.49) [Active]  H/O angioplasty (Z98.62) [Active]      Home Medications:  ascorbic acid 500 mg oral tablet: 1 tab(s) orally once a day  bisacodyl 10 mg rectal suppository: 1 suppository(ies) rectal once a day, As Needed  diclofenac 3% topical gel: Apply topically to affected area   Fergon:  orally   ferrous sulfate 325 mg (65 mg elemental iron) oral tablet: 1 tab(s) orally once a day  Flomax 0.4 mg oral capsule: 1 cap(s) orally once a day  folic acid 1 mg oral tablet: 1 tab(s) orally once a day  gabapentin 100 mg oral tablet:  orally   Imodium 2 mg oral capsule:  orally   Keflex 500 mg oral capsule: 1 cap(s) orally 4 times a day  Levemir FlexTouch 100 units/mL subcutaneous solution: 18 unit(s) subcutaneous once a day (at bedtime)  Lipitor 40 mg oral tablet: 1 tab(s) orally once a day  metoprolol tartrate 25 mg oral tablet: 1 tab(s) orally 2 times a day  MiraLax oral powder for reconstitution:  orally   multivitamin:     NovoLOG FlexPen 100 units/mL subcutaneous solution:  subcutaneous   Plavix 75 mg oral tablet: 1 tab(s) orally once a day  PreserVision AREDS 2 oral capsule: 1 cap(s) orally once a day  Protonix 40 mg oral delayed release tablet: 1 tab(s) orally once a day  Remeron 15 mg oral tablet: 1 tab(s) orally once a day (at bedtime)  Synthroid 88 mcg (0.088 mg) oral tablet: 1 tab(s) orally once a day  Tylenol 325 mg oral tablet:  orally   Xanax 0.25 mg oral tablet:  orally , As Needed - for allergy symptoms, for agitation   zinc sulfate 220 mg oral tablet: 1 tab(s) orally 3 times a day  Zofran ODT 4 mg oral tablet, disintegratin tab(s) orally 3 times a day  Zoloft 50 mg oral tablet: 1 tab(s) orally once a day      ICU Vital Signs Last 24 Hrs  T(C): 36.7 (08 Sep 2019 09:55), Max: 36.9 (07 Sep 2019 22:51)  T(F): 98 (08 Sep 2019 09:55), Max: 98.4 (07 Sep 2019 22:51)  HR: 78 (08 Sep 2019 09:55) (76 - 78)  BP: 120/55 (08 Sep 2019 09:55) (95/57 - 120/55)  RR: 18 (08 Sep 2019 09:55) (18 - 18)  SpO2: 94% (08 Sep 2019 09:55) (94% - 98%)      I&O's Detail    08 Sep 2019 07:01  -  08 Sep 2019 16:05  --------------------------------------------------------  IN:  Total IN: 0 mL    OUT:    Voided: 375 mL  Total OUT: 375 mL    Total NET: -375 mL      MEDICATIONS  (STANDING):  atorvastatin Oral Tab/Cap - Peds 40 milliGRAM(s) Oral daily  clopidogrel Tablet 75 milliGRAM(s) Oral daily  dextrose 5%. 1000 milliLiter(s) (50 mL/Hr) IV Continuous <Continuous>  dextrose 50% Injectable 12.5 Gram(s) IV Push once  dextrose 50% Injectable 25 Gram(s) IV Push once  dextrose 50% Injectable 25 Gram(s) IV Push once  docusate sodium 100 milliGRAM(s) Oral every 8 hours  gabapentin 100 milliGRAM(s) Oral three times a day  heparin  Injectable 5000 Unit(s) SubCutaneous every 8 hours  insulin lispro (HumaLOG) corrective regimen sliding scale   SubCutaneous three times a day before meals  insulin NPH human recombinant 10 Unit(s) SubCutaneous at bedtime  levothyroxine 88 MICROGram(s) Oral daily  metoprolol tartrate 25 milliGRAM(s) Oral two times a day  mirtazapine 15 milliGRAM(s) Oral daily  multiple electrolytes Injection Type 1 1000 milliLiter(s) (125 mL/Hr) IV Continuous <Continuous>  pantoprazole    Tablet 40 milliGRAM(s) Oral before breakfast  piperacillin/tazobactam IVPB.. 2.25 Gram(s) IV Intermittent every 8 hours  senna 2 Tablet(s) Oral at bedtime  tamsulosin 0.4 milliGRAM(s) Oral at bedtime    MEDICATIONS  (PRN):  ALPRAZolam 0.25 milliGRAM(s) Oral daily PRN agitation  dextrose 40% Gel 15 Gram(s) Oral once PRN Blood Glucose LESS THAN 70 milliGRAM(s)/deciliter  glucagon  Injectable 1 milliGRAM(s) IntraMuscular once PRN Glucose LESS THAN 70 milligrams/deciliter      PHYSICAL EXAM:    Gen: WN AA well-groomed elderly female, resting in bed, lethargic but follows commands & arousable; family members at bedside including daughter - states also A&OX4 at baseline at home     Eyes: EOMI    Neurological: GCS 15    ENMT:NGT in L nare    Neck: supple, FROM     Pulmonary: 88% on 4L NC    Cardiovascular: HR irregularly irregular    Gastrointestinal: Soft, slight per-incisional TTP, mildly distended, no signs of peritonitis, R EL drain in place with serosanguinous     Genitourinary: Bhatt in place     Extremities: L well-healed AKA, right-sided SCD in place    Skin: Intact    Musculoskeletal: L AKA      LABS:  CBC Full  -  ( 08 Sep 2019 11:19 )  WBC Count : 8.75 K/uL  RBC Count : 2.85 M/uL  Hemoglobin : 8.1 g/dL  Hematocrit : 26.3 %  Platelet Count - Automated : 186 K/uL  Mean Cell Volume : 92.3 fl  Mean Cell Hemoglobin : 28.4 pg  Mean Cell Hemoglobin Concentration : 30.8 gm/dL  Auto Neutrophil # : 7.15 K/uL  Auto Lymphocyte # : 0.68 K/uL  Auto Monocyte # : 0.64 K/uL  Auto Eosinophil # : 0.16 K/uL  Auto Basophil # : 0.02 K/uL  Auto Neutrophil % : 81.8 %  Auto Lymphocyte % : 7.8 %  Auto Monocyte % : 7.3 %  Auto Eosinophil % : 1.8 %  Auto Basophil % : 0.2 %        132<L>  |  97<L>  |  62.0<H>  ----------------------------<  172<H>  4.5   |  20.0<L>  |  3.79<H>    Ca    7.6<L>      08 Sep 2019 11:19  Phos  5.7       Mg     2.3         TPro  5.3<L>  /  Alb  3.0<L>  /  TBili  0.3<L>  /  DBili  x   /  AST  30  /  ALT  30  /  AlkPhos  82      PT/INR - ( 08 Sep 2019 00:27 )   PT: 11.8 sec;   INR: 1.03 ratio         PTT - ( 08 Sep 2019 00:27 )  PTT:27.8 sec  Urinalysis Basic - ( 08 Sep 2019 10:09 )    Color: Yellow / Appearance: Clear / S.020 / pH: x  Gluc: x / Ketone: Negative  / Bili: Negative / Urobili: Negative mg/dL   Blood: x / Protein: 30 mg/dL / Nitrite: Negative   Leuk Esterase: Negative / RBC: 0-2 /HPF / WBC 0-2   Sq Epi: x / Non Sq Epi: Negative / Bacteria: Occasional    LIVER FUNCTIONS - ( 08 Sep 2019 00:27 )  Alb: 3.0 g/dL / Pro: 5.3 g/dL / ALK PHOS: 82 U/L / ALT: 30 U/L / AST: 30 U/L / GGT: x

## 2019-09-12 NOTE — CONSULT NOTE ADULT - ASSESSMENT
A/P:    Ms. Fletcher is a 87 YOF w/ a PMHx s/f IDDMII, HLD, HTN, CAD, PVD, and PAF on Plavix who initially presented to the ED on 9/8 with a 4 day history of RLQ pain.  She was diagnosed with acute appendicitis, brought to the OR and found to have acute appendicitis with gangrene and copious purulence extending throughout the Right paracolic gutter & the liver.  Will require SICU monitoring currently.    General: Patient DNR/DNI per daughter who is power of /HCP - daughter is Binta Dorado & will bring in official paperwork tomorrow morning for chart    Neurological: Patient A&O x4, GCS 15, will monitor for mental status changes     Neck: supple, FROM     Pulmonary: 88% on 4L NC; will monitor respiratory status; will repeat CXR - patient with pre-op CXR c/f atelectasis vs PNA; will monitor for fever     Cardiovascular: Now NSR; patient w/ Afib - will need home medications restarted    Gastrointestinal: NGT in L nare to LIWS - will monitor output; R EL drain in place with serosanguinous output     Genitourinary: Bhatt in place; monitor UOP closely     ID: Zosyn x4 days; monitor for fever    Endo: ISS w/ FSBS    Tubes/Lines: NGT to LIWS, PIV    Extremities: L well-healed AKA, right-sided SCD in place    Skin: Intact; nurses to monitor condition of skin at each shift     Musculoskeletal: L AKA - healed
86 y/o female with multiple comorbidities found to have acute perforated appendicitis s/p emergent laparoscopic appendectomy with abdominal washout. Complicated hospital course including acute anemia, anasarca, acute respiratory failure on HFNC, severe protein malnutrition, magi/atn, rapid afib with rvr and confusion. Palliative consulted for support and goc given multiorgan involvement and overall very guarded prognosis.     PLAN    Perforated Appendicitis-Gangrenous  - s/p appendectomy with abd washout, EL drain, to complete antibiotic today  - mgnt per SICU    Post-Op Pain  - comfortable, if pt worsens consider APAP 1g PRN, PO dilaudid 1-2 mg PRN or IV dialudid 0.5 mg PRN if unable to take orally in setting of renal dysfunction    Acute Respiratory Failure  - on HFNC 35%, wean as tolerated, bronchodilators, nebs  - likely poor candidate for BIPAP if pt is lethargic/somnolent  - DNR/I     Nausea  - reported episode this AM, consider IV ondansetron 4 mg PRN    Acute Delirium  - likely multifactorial given hospitalization, infection, pharmacologic  - supportive care, frequent reorientation, sleep hygiene    Afib with RVR  - rate controlled on diltiazem infusion    MAGI/ATN/Generalized Anasaraca  - IV albumin, lasix infusion    Palliative Care Encounter  Palliative SW and I met with pt's dtr/HCP Binta to introduce role of palliative care. Pt lethargic and unable to participate in meaningful conversation. Binta express good understanding of underlying comorbidities and now acute issues with multiorgan involvement from being on HFNC, worsening kidney function, rentention of fluid, acute infection, heart arrythmia and now altered mentation. She is realistic and aware overall prognosis is very poor. She does not want her mother to suffer or endure any pain. We spoke about options including comfort care in detail with focusing on quality of life, optimizing sx/pain and allowing naturally progression of disease process and avoiding any further aggressive/invasive interventions. I also explained that we would try to get pt off the HFNC to a nasal cannula using pharmacologics should family opt for CMO. Hawa needs to discuss with rest of family before making any final decisions. Psychosocial support provided. At this time will continue current mgnt and support family/team.

## 2019-09-12 NOTE — CHART NOTE - NSCHARTNOTEFT_GEN_A_CORE
Pt began to vomit.      Appears to be maintaining airway.  Possible aspiration.  Increase in O2 requirement.  Will place NG tube to prevent further vomiting. KUB with placement.

## 2019-09-12 NOTE — CONSULT NOTE ADULT - ATTENDING COMMENTS
D/W dtr ZAYDA Judd Dr., RN    >25 min spent on above goc discussion
I have seen and examined the patient on arrival to ICU  s/p lap appendectomy with gross perforation and peritonitis.  She is HD normal at this time  extubated and  somnolent yet arousable.  extubated in OR  Family reinstated a DNR/DNI form.  Avoid narcotics,   zosyn.  NGT  follow renal functions.

## 2019-09-12 NOTE — CHART NOTE - NSCHARTNOTEFT_GEN_A_CORE
Nutrition note:    Calorie count in progress: 2/3 meals recorded. Pt consumed approximately 300kcal, 10gm protein with assistance and encouragement from staff and family. Today pt is much more lethargic with extremely poor PO intake, Calorie count remains in progress x 24 hours, RD to follow up with further results and Recommendations.

## 2019-09-12 NOTE — PROGRESS NOTE ADULT - SUBJECTIVE AND OBJECTIVE BOX
INTERVAL HPI/OVERNIGHT EVENTS/SUBJECTIVE:  Pt transfused 1U PRBC's during day shift with lasix after for respiratory distress.  Overnight, pt back into Afib with RVR, Metoprolol x1 dose without resolve and decreased BP.  Cardizem given with slowing of HR and placed on Cardizem gtt.  Pt then spikes temp to 102.  Ofirmev given with resolve of temp and improvement of of HR.  Worsening work of breathing, Lasix given.  CXR with overload.  Pt is a DNR/DNI and very lethargic, placed on high loretta NC.      ICU Vital Signs Last 24 Hrs  T(C): 39 (12 Sep 2019 04:04), Max: 39 (12 Sep 2019 04:04)  T(F): 102.2 (12 Sep 2019 04:04), Max: 102.2 (12 Sep 2019 04:04)  HR: 87 (12 Sep 2019 05:15) (72 - 146)  BP: 170/68 (12 Sep 2019 05:15) (84/63 - 192/84)  BP(mean): 98 (12 Sep 2019 05:15) (68 - 134)  ABP: --  ABP(mean): --  RR: 45 (12 Sep 2019 05:15) (18 - 45)  SpO2: 98% (12 Sep 2019 05:15) (90% - 100%)      I&O's Detail    10 Sep 2019 07:01  -  11 Sep 2019 07:00  --------------------------------------------------------  IN:    multiple electrolytes Injection Type 1 Bolus: 999 mL    Oral Fluid: 635 mL    Plasma: 869 mL    Solution: 100 mL    Solution: 1675 mL    Solution: 250 mL  Total IN: 4528 mL    OUT:    Bulb: 775 mL    Indwelling Catheter - Urethral: 535 mL  Total OUT: 1310 mL    Total NET: 3218 mL      11 Sep 2019 07:01  -  12 Sep 2019 05:48  --------------------------------------------------------  IN:    diltiazem Infusion: 60 mL    Packed Red Blood Cells: 282 mL    Plasma: 320 mL    Solution: 250 mL    Solution: 125 mL    Solution: 500 mL  Total IN: 1537 mL    OUT:    Bulb: 550 mL    Indwelling Catheter - Urethral: 687 mL  Total OUT: 1237 mL    Total NET: 300 mL            ABG - ( 12 Sep 2019 05:42 )  pH, Arterial: 7.30  pH, Blood: x     /  pCO2: 41    /  pO2: 91    / HCO3: 20    / Base Excess: -5.8  /  SaO2: 97                  MEDICATIONS  (STANDING):  acetaminophen  IVPB .. 1000 milliGRAM(s) IV Intermittent once  ALBUTerol    90 MICROgram(s) HFA Inhaler 1 Puff(s) Inhalation every 4 hours  ALBUTerol/ipratropium for Nebulization 3 milliLiter(s) Nebulizer every 6 hours  atorvastatin Oral Tab/Cap - Peds 40 milliGRAM(s) Oral daily  chlorhexidine 2% Cloths 1 Application(s) Topical daily  clopidogrel Tablet 75 milliGRAM(s) Oral daily  dextrose 5%. 1000 milliLiter(s) (50 mL/Hr) IV Continuous <Continuous>  dextrose 50% Injectable 12.5 Gram(s) IV Push once  dextrose 50% Injectable 25 Gram(s) IV Push once  dextrose 50% Injectable 25 Gram(s) IV Push once  diltiazem Infusion 5 mG/Hr (5 mL/Hr) IV Continuous <Continuous>  docusate sodium 100 milliGRAM(s) Oral every 8 hours  gabapentin 100 milliGRAM(s) Oral daily  heparin  Injectable 5000 Unit(s) SubCutaneous every 8 hours  insulin glargine Injectable (LANTUS) 10 Unit(s) SubCutaneous at bedtime  insulin lispro (HumaLOG) corrective regimen sliding scale   SubCutaneous three times a day before meals  levothyroxine 88 MICROGram(s) Oral daily  melatonin 3 milliGRAM(s) Oral at bedtime  metoprolol tartrate 25 milliGRAM(s) Oral two times a day  mirtazapine 15 milliGRAM(s) Oral daily  pantoprazole    Tablet 40 milliGRAM(s) Oral before breakfast  senna 2 Tablet(s) Oral at bedtime  tamsulosin 0.4 milliGRAM(s) Oral at bedtime  tiotropium 18 MICROgram(s) Capsule 1 Capsule(s) Inhalation daily    MEDICATIONS  (PRN):  dextrose 40% Gel 15 Gram(s) Oral once PRN Blood Glucose LESS THAN 70 milliGRAM(s)/deciliter  glucagon  Injectable 1 milliGRAM(s) IntraMuscular once PRN Glucose LESS THAN 70 milligrams/deciliter      NUTRITION/IVF: CLD/IVL    CENTRAL LINE:  No    CUNNINGHAM:  Yes    A-LINE: No         PHYSICAL EXAM:    Gen:  Increased work of breathing.      Eyes:  Pupils 4mm round and reactive BL    Neurological:  Lethargic, arouses to voice w/ incomprehensible sounds at times    ENMT:  Dry mucous membranes    Neck:  No JVD, trachea midline    Pulmonary:  Coarse crackles noted BL bases with inspiratory wheezes at BL apices    Cardiovascular:  Afib, rate controlled currently    Gastrointestinal:  Softly distended.  No rebound or guarding    Genitourinary:  Cunningham    Extremities:  ANasarca    Skin:  Pink, warm, dry    Musculoskeletal:  AFROM x4          LABS:  CBC Full  -  ( 12 Sep 2019 02:04 )  WBC Count : 8.83 K/uL  RBC Count : 2.89 M/uL  Hemoglobin : 8.1 g/dL  Hematocrit : 26.0 %  Platelet Count - Automated : 203 K/uL  Mean Cell Volume : 90.0 fl  Mean Cell Hemoglobin : 28.0 pg  Mean Cell Hemoglobin Concentration : 31.2 gm/dL  Auto Neutrophil # : 7.14 K/uL  Auto Lymphocyte # : 0.92 K/uL  Auto Monocyte # : 0.23 K/uL  Auto Eosinophil # : 0.54 K/uL  Auto Basophil # : 0.00 K/uL  Auto Neutrophil % : 80.9 %  Auto Lymphocyte % : 10.4 %  Auto Monocyte % : 2.6 %  Auto Eosinophil % : 6.1 %  Auto Basophil % : 0.0 %    09-12    140  |  103  |  59.0<H>  ----------------------------<  160<H>  3.9   |  19.0<L>  |  3.56<H>    Ca    8.4<L>      12 Sep 2019 02:04  Phos  6.0     09-12  Mg     2.1     09-12    TPro  5.6<L>  /  Alb  3.6  /  TBili  0.3<L>  /  DBili  x   /  AST  17  /  ALT  15  /  AlkPhos  50  09-10    PT/INR - ( 11 Sep 2019 16:34 )   PT: 19.7 sec;   INR: 1.69 ratio         PTT - ( 11 Sep 2019 16:34 )  PTT:37.1 sec    RECENT CULTURES:  09-09 .Surgical Swab Appendix Escherichia coli   Few WBC's  Few Gram Variable Rods   Numerous Escherichia coli  Few Streptococcus species Identification and susceptibility to follow.  Few Gram Negative Rods Identification and susceptibility to follow.  Culture in progress    09-08 .Urine XXXX XXXX   No growth        LIVER FUNCTIONS - ( 10 Sep 2019 20:19 )  Alb: 3.6 g/dL / Pro: 5.6 g/dL / ALK PHOS: 50 U/L / ALT: 15 U/L / AST: 17 U/L / GGT: x           CARDIAC MARKERS ( 12 Sep 2019 02:04 )  x     / 0.04 ng/mL / x     / x     / x          CAPILLARY BLOOD GLUCOSE      RADIOLOGY & ADDITIONAL STUDIES:    ASSESSMENT/PLAN:  87yFemale presenting with: perforated Appendicitis POD 4 lap AP w/ abd washout, MAGI, likely 2/2 to ATN, rapid afib with RVR    Neuro:  Limit narcotics as able to prevent delirium.  Maintain sleep wake cycle, avoid deliriogenic meds    CV:  Afib with RVR, rate controlled once fever broke and on Cardizem gtt; Cardizem gtt held.  Limit IVF.      Pulm:  High loretta NC for increased WOB and concern for pt's decreased mentation with using BIPAP.    ABG - ( 12 Sep 2019 05:42 )  pH, Arterial: 7.30  pH, Blood: x     /  pCO2: 41    /  pO2: 91    / HCO3: 20    / Base Excess: -5.8  /  SaO2: 97        Lasix given for increased work of breathing and overload on CXR this am.  May still need BIPAP as pt is a DNR/DNI.    GI/Nutrition:  CLD, would make NPO for now given pt's mentation and aspiration risk.  IVL    /Renal:  Cunningham for strict I/O's w/ Lasix    ID:  Zosyn ends today    Lines/Tubes:  PIV, Nova    Endo:  No issues    Skin:  Monitor drain output    Proph:  SQH    Dispo:  Continue with current SICU care as pt w/ tenuous respiratory status and is high risk for respiratory decline.       Critical care time spent: 45 minutes of critical care time spent providing medical care for patient's acute illness/conditions that impairs at least one vital organ system and/or poses a high risk of imminent or life threatening deterioration in the patient's condition. It includes time spent evaluating and treating the patient's acute illness as well as time spent reviewing labs, radiology, discussing goals of care with patient and/or patient's family, and discussing the case with a multidisciplinary team in an effort to prevent further life threatening deterioration or end organ damage. This time is independent of any procedures performed.       CRITICAL CARE TIME SPENT: INTERVAL HPI/OVERNIGHT EVENTS/SUBJECTIVE:  Pt transfused 1U PRBC's during day shift with lasix after for respiratory distress.  Overnight, pt back into Afib with RVR, Metoprolol x1 dose without resolve and decreased BP.  Cardizem given with slowing of HR and placed on Cardizem gtt.  Pt then spikes temp to 102.  Ofirmev given with resolve of temp and improvement of of HR.  Worsening work of breathing, Lasix given.  CXR with overload.  Pt is a DNR/DNI and very lethargic, placed on high loretta NC.      ICU Vital Signs Last 24 Hrs  T(C): 39 (12 Sep 2019 04:04), Max: 39 (12 Sep 2019 04:04)  T(F): 102.2 (12 Sep 2019 04:04), Max: 102.2 (12 Sep 2019 04:04)  HR: 87 (12 Sep 2019 05:15) (72 - 146)  BP: 170/68 (12 Sep 2019 05:15) (84/63 - 192/84)  BP(mean): 98 (12 Sep 2019 05:15) (68 - 134)  ABP: --  ABP(mean): --  RR: 45 (12 Sep 2019 05:15) (18 - 45)  SpO2: 98% (12 Sep 2019 05:15) (90% - 100%)      I&O's Detail    10 Sep 2019 07:01  -  11 Sep 2019 07:00  --------------------------------------------------------  IN:    multiple electrolytes Injection Type 1 Bolus: 999 mL    Oral Fluid: 635 mL    Plasma: 869 mL    Solution: 100 mL    Solution: 1675 mL    Solution: 250 mL  Total IN: 4528 mL    OUT:    Bulb: 775 mL    Indwelling Catheter - Urethral: 535 mL  Total OUT: 1310 mL    Total NET: 3218 mL      11 Sep 2019 07:01  -  12 Sep 2019 05:48  --------------------------------------------------------  IN:    diltiazem Infusion: 60 mL    Packed Red Blood Cells: 282 mL    Plasma: 320 mL    Solution: 250 mL    Solution: 125 mL    Solution: 500 mL  Total IN: 1537 mL    OUT:    Bulb: 550 mL    Indwelling Catheter - Urethral: 687 mL  Total OUT: 1237 mL    Total NET: 300 mL            ABG - ( 12 Sep 2019 05:42 )  pH, Arterial: 7.30  pH, Blood: x     /  pCO2: 41    /  pO2: 91    / HCO3: 20    / Base Excess: -5.8  /  SaO2: 97                  MEDICATIONS  (STANDING):  acetaminophen  IVPB .. 1000 milliGRAM(s) IV Intermittent once  ALBUTerol    90 MICROgram(s) HFA Inhaler 1 Puff(s) Inhalation every 4 hours  ALBUTerol/ipratropium for Nebulization 3 milliLiter(s) Nebulizer every 6 hours  atorvastatin Oral Tab/Cap - Peds 40 milliGRAM(s) Oral daily  chlorhexidine 2% Cloths 1 Application(s) Topical daily  clopidogrel Tablet 75 milliGRAM(s) Oral daily  dextrose 5%. 1000 milliLiter(s) (50 mL/Hr) IV Continuous <Continuous>  dextrose 50% Injectable 12.5 Gram(s) IV Push once  dextrose 50% Injectable 25 Gram(s) IV Push once  dextrose 50% Injectable 25 Gram(s) IV Push once  diltiazem Infusion 5 mG/Hr (5 mL/Hr) IV Continuous <Continuous>  docusate sodium 100 milliGRAM(s) Oral every 8 hours  gabapentin 100 milliGRAM(s) Oral daily  heparin  Injectable 5000 Unit(s) SubCutaneous every 8 hours  insulin glargine Injectable (LANTUS) 10 Unit(s) SubCutaneous at bedtime  insulin lispro (HumaLOG) corrective regimen sliding scale   SubCutaneous three times a day before meals  levothyroxine 88 MICROGram(s) Oral daily  melatonin 3 milliGRAM(s) Oral at bedtime  metoprolol tartrate 25 milliGRAM(s) Oral two times a day  mirtazapine 15 milliGRAM(s) Oral daily  pantoprazole    Tablet 40 milliGRAM(s) Oral before breakfast  senna 2 Tablet(s) Oral at bedtime  tamsulosin 0.4 milliGRAM(s) Oral at bedtime  tiotropium 18 MICROgram(s) Capsule 1 Capsule(s) Inhalation daily    MEDICATIONS  (PRN):  dextrose 40% Gel 15 Gram(s) Oral once PRN Blood Glucose LESS THAN 70 milliGRAM(s)/deciliter  glucagon  Injectable 1 milliGRAM(s) IntraMuscular once PRN Glucose LESS THAN 70 milligrams/deciliter      NUTRITION/IVF: CLD/IVL    CENTRAL LINE:  No    CUNNINGHAM:  Yes    A-LINE: No         PHYSICAL EXAM:    Gen:  Increased work of breathing.      Eyes:  Pupils 4mm round and reactive BL    Neurological:  Lethargic, arouses to voice w/ incomprehensible sounds at times    ENMT:  Dry mucous membranes    Neck:  No JVD, trachea midline    Pulmonary:  Coarse crackles noted BL bases with inspiratory wheezes at BL apices    Cardiovascular:  Afib, rate controlled currently    Gastrointestinal:  Softly distended.  No rebound or guarding    Genitourinary:  Cunningham    Extremities:  ANasarca    Skin:  Pink, warm, dry    Musculoskeletal:  AFROM x4          LABS:  CBC Full  -  ( 12 Sep 2019 02:04 )  WBC Count : 8.83 K/uL  RBC Count : 2.89 M/uL  Hemoglobin : 8.1 g/dL  Hematocrit : 26.0 %  Platelet Count - Automated : 203 K/uL  Mean Cell Volume : 90.0 fl  Mean Cell Hemoglobin : 28.0 pg  Mean Cell Hemoglobin Concentration : 31.2 gm/dL  Auto Neutrophil # : 7.14 K/uL  Auto Lymphocyte # : 0.92 K/uL  Auto Monocyte # : 0.23 K/uL  Auto Eosinophil # : 0.54 K/uL  Auto Basophil # : 0.00 K/uL  Auto Neutrophil % : 80.9 %  Auto Lymphocyte % : 10.4 %  Auto Monocyte % : 2.6 %  Auto Eosinophil % : 6.1 %  Auto Basophil % : 0.0 %    09-12    140  |  103  |  59.0<H>  ----------------------------<  160<H>  3.9   |  19.0<L>  |  3.56<H>    Ca    8.4<L>      12 Sep 2019 02:04  Phos  6.0     09-12  Mg     2.1     09-12    TPro  5.6<L>  /  Alb  3.6  /  TBili  0.3<L>  /  DBili  x   /  AST  17  /  ALT  15  /  AlkPhos  50  09-10    PT/INR - ( 11 Sep 2019 16:34 )   PT: 19.7 sec;   INR: 1.69 ratio         PTT - ( 11 Sep 2019 16:34 )  PTT:37.1 sec    RECENT CULTURES:  09-09 .Surgical Swab Appendix Escherichia coli   Few WBC's  Few Gram Variable Rods   Numerous Escherichia coli  Few Streptococcus species Identification and susceptibility to follow.  Few Gram Negative Rods Identification and susceptibility to follow.  Culture in progress    09-08 .Urine XXXX XXXX   No growth        LIVER FUNCTIONS - ( 10 Sep 2019 20:19 )  Alb: 3.6 g/dL / Pro: 5.6 g/dL / ALK PHOS: 50 U/L / ALT: 15 U/L / AST: 17 U/L / GGT: x           CARDIAC MARKERS ( 12 Sep 2019 02:04 )  x     / 0.04 ng/mL / x     / x     / x          CAPILLARY BLOOD GLUCOSE      RADIOLOGY & ADDITIONAL STUDIES:    ASSESSMENT/PLAN:  87yFemale presenting with: perforated Appendicitis POD 4 lap AP w/ abd washout, MAGI, likely 2/2 to ATN, rapid afib with RVR    Neuro:  Limit narcotics as able to prevent delirium.  Maintain sleep wake cycle, avoid deliriogenic meds    CV:  Afib with RVR, rate controlled once fever broke and on Cardizem gtt; Cardizem gtt held.  Limit IVF.      Pulm:  High loretta NC for increased WOB and concern for pt's decreased mentation with using BIPAP.    ABG - ( 12 Sep 2019 05:42 )  pH, Arterial: 7.30  pH, Blood: x     /  pCO2: 41    /  pO2: 91    / HCO3: 20    / Base Excess: -5.8  /  SaO2: 97        Lasix given for increased work of breathing and overload on CXR this am.  May still need BIPAP as pt is a DNR/DNI.    GI/Nutrition:  CLD, would make NPO for now given pt's mentation and aspiration risk.  IVL    /Renal:  Cunningham for strict I/O's w/ Lasix.  Hyperphosphemetia, may need dialysis at some point if pt's goals of care match    ID:  Zosyn ends today    Lines/Tubes:  PIV, Cunningham    Endo:  No issues    Skin:  Monitor drain output    Proph:  SQH    Dispo:  Continue with current SICU care as pt w/ tenuous respiratory status and is high risk for respiratory decline.       Critical care time spent: 45 minutes of critical care time spent providing medical care for patient's acute illness/conditions that impairs at least one vital organ system and/or poses a high risk of imminent or life threatening deterioration in the patient's condition. It includes time spent evaluating and treating the patient's acute illness as well as time spent reviewing labs, radiology, discussing goals of care with patient and/or patient's family, and discussing the case with a multidisciplinary team in an effort to prevent further life threatening deterioration or end organ damage. This time is independent of any procedures performed.       CRITICAL CARE TIME SPENT:

## 2019-09-12 NOTE — CHART NOTE - NSCHARTNOTEFT_GEN_A_CORE
Called by nurse for pt in rapid afib.  HR to 140.  Hemodynamically normal at that time with /66.  Metoprolol 5mg IVP given once without resolve.    Repeat BP 84/63.  At pt's bedside, no acute distress.  Responds to voice.  Cardizem given .25mg/kg.  BP prior to administration 93/73.  HR slowed to  with improved BP to 121/58.    ICU Vital Signs Last 24 Hrs  T(C): 37.4 (12 Sep 2019 00:03), Max: 37.4 (12 Sep 2019 00:03)  T(F): 99.3 (12 Sep 2019 00:03), Max: 99.3 (12 Sep 2019 00:03)  HR: 140 (12 Sep 2019 01:23) (72 - 140)  BP: 84/63 (12 Sep 2019 01:23) (84/63 - 192/84)  BP(mean): 68 (12 Sep 2019 01:23) (68 - 134)  ABP: --  ABP(mean): --  RR: 32 (12 Sep 2019 01:23) (16 - 42)  SpO2: 92% (12 Sep 2019 01:23) (87% - 100%)      Pt had received FFP and IVF overnight, with additional PRBC's today for decreased UOP.  Lasix given after colloid resuscitation.  UOP during my shift has been about 35. Called by nurse for pt in rapid afib.  HR to 140.  Hemodynamically normal at that time with /66.  Metoprolol 5mg IVP given once without resolve.    Repeat BP 84/63.  At pt's bedside, no acute distress.  Responds to voice.  Cardizem given .25mg/kg.  BP prior to administration 93/73.  HR slowed to  with improved BP to 121/58.  Pt had received FFP and IVF overnight, with additional PRBC's today for decreased UOP.  Lasix given after colloid resuscitation.  UOP during my shift has been about 35.  Afib with RVR likely 2/2  to fluid resuscitation post op.    Hr improved, however remains paroxysmal.  D/W Dr Mccray.  Will add Cardizem gtt for rate control, and will monitor for Lasix need.    AM labs and CXR pending.      ICU Vital Signs Last 24 Hrs  T(C): 37.4 (12 Sep 2019 00:03), Max: 37.4 (12 Sep 2019 00:03)  T(F): 99.3 (12 Sep 2019 00:03), Max: 99.3 (12 Sep 2019 00:03)  HR: 140 (12 Sep 2019 01:23) (72 - 140)  BP: 84/63 (12 Sep 2019 01:23) (84/63 - 192/84)  BP(mean): 68 (12 Sep 2019 01:23) (68 - 134)  ABP: --  ABP(mean): --  RR: 32 (12 Sep 2019 01:23) (16 - 42)  SpO2: 92% (12 Sep 2019 01:23) (87% - 100%)

## 2019-09-13 LAB
-  CEFTRIAXONE: SIGNIFICANT CHANGE UP
-  CLINDAMYCIN: SIGNIFICANT CHANGE UP
-  ERYTHROMYCIN: SIGNIFICANT CHANGE UP
-  PENICILLIN: SIGNIFICANT CHANGE UP
-  VANCOMYCIN: SIGNIFICANT CHANGE UP
CULTURE RESULTS: SIGNIFICANT CHANGE UP
METHOD TYPE: SIGNIFICANT CHANGE UP
ORGANISM # SPEC MICROSCOPIC CNT: SIGNIFICANT CHANGE UP
SPECIMEN SOURCE: SIGNIFICANT CHANGE UP

## 2019-09-13 PROCEDURE — 99291 CRITICAL CARE FIRST HOUR: CPT

## 2019-09-13 PROCEDURE — 99233 SBSQ HOSP IP/OBS HIGH 50: CPT

## 2019-09-13 PROCEDURE — 93970 EXTREMITY STUDY: CPT | Mod: 26

## 2019-09-13 RX ORDER — DILTIAZEM HCL 120 MG
15 CAPSULE, EXT RELEASE 24 HR ORAL ONCE
Refills: 0 | Status: COMPLETED | OUTPATIENT
Start: 2019-09-13 | End: 2019-09-13

## 2019-09-13 RX ORDER — DILTIAZEM HCL 120 MG
5 CAPSULE, EXT RELEASE 24 HR ORAL
Qty: 125 | Refills: 0 | Status: DISCONTINUED | OUTPATIENT
Start: 2019-09-13 | End: 2019-09-14

## 2019-09-13 RX ORDER — DILTIAZEM HCL 120 MG
20 CAPSULE, EXT RELEASE 24 HR ORAL ONCE
Refills: 0 | Status: COMPLETED | OUTPATIENT
Start: 2019-09-13 | End: 2019-09-13

## 2019-09-13 RX ORDER — ALBUMIN HUMAN 25 %
100 VIAL (ML) INTRAVENOUS EVERY 6 HOURS
Refills: 0 | Status: COMPLETED | OUTPATIENT
Start: 2019-09-13 | End: 2019-09-14

## 2019-09-13 RX ORDER — MORPHINE SULFATE 50 MG/1
2 CAPSULE, EXTENDED RELEASE ORAL ONCE
Refills: 0 | Status: DISCONTINUED | OUTPATIENT
Start: 2019-09-13 | End: 2019-09-13

## 2019-09-13 RX ORDER — MAGNESIUM SULFATE 500 MG/ML
2 VIAL (ML) INJECTION ONCE
Refills: 0 | Status: COMPLETED | OUTPATIENT
Start: 2019-09-13 | End: 2019-09-13

## 2019-09-13 RX ORDER — FUROSEMIDE 40 MG
10 TABLET ORAL
Qty: 500 | Refills: 0 | Status: DISCONTINUED | OUTPATIENT
Start: 2019-09-13 | End: 2019-09-14

## 2019-09-13 RX ORDER — HYDROMORPHONE HYDROCHLORIDE 2 MG/ML
0.25 INJECTION INTRAMUSCULAR; INTRAVENOUS; SUBCUTANEOUS
Refills: 0 | Status: DISCONTINUED | OUTPATIENT
Start: 2019-09-13 | End: 2019-09-14

## 2019-09-13 RX ADMIN — ATORVASTATIN CALCIUM 40 MILLIGRAM(S): 80 TABLET, FILM COATED ORAL at 11:56

## 2019-09-13 RX ADMIN — GABAPENTIN 100 MILLIGRAM(S): 400 CAPSULE ORAL at 11:56

## 2019-09-13 RX ADMIN — MORPHINE SULFATE 2 MILLIGRAM(S): 50 CAPSULE, EXTENDED RELEASE ORAL at 10:13

## 2019-09-13 RX ADMIN — Medication 5 MG/HR: at 17:11

## 2019-09-13 RX ADMIN — HEPARIN SODIUM 5000 UNIT(S): 5000 INJECTION INTRAVENOUS; SUBCUTANEOUS at 06:00

## 2019-09-13 RX ADMIN — Medication 5 MG/HR: at 11:00

## 2019-09-13 RX ADMIN — CHLORHEXIDINE GLUCONATE 1 APPLICATION(S): 213 SOLUTION TOPICAL at 11:56

## 2019-09-13 RX ADMIN — Medication 50 MILLILITER(S): at 06:01

## 2019-09-13 RX ADMIN — MIRTAZAPINE 15 MILLIGRAM(S): 45 TABLET, ORALLY DISINTEGRATING ORAL at 11:55

## 2019-09-13 RX ADMIN — Medication 25 MILLIGRAM(S): at 06:00

## 2019-09-13 RX ADMIN — Medication 88 MICROGRAM(S): at 06:01

## 2019-09-13 RX ADMIN — CLOPIDOGREL BISULFATE 75 MILLIGRAM(S): 75 TABLET, FILM COATED ORAL at 11:55

## 2019-09-13 RX ADMIN — HYDROMORPHONE HYDROCHLORIDE 0.25 MILLIGRAM(S): 2 INJECTION INTRAMUSCULAR; INTRAVENOUS; SUBCUTANEOUS at 13:11

## 2019-09-13 RX ADMIN — HEPARIN SODIUM 5000 UNIT(S): 5000 INJECTION INTRAVENOUS; SUBCUTANEOUS at 22:15

## 2019-09-13 RX ADMIN — Medication 50 MILLILITER(S): at 10:57

## 2019-09-13 RX ADMIN — HYDROMORPHONE HYDROCHLORIDE 0.25 MILLIGRAM(S): 2 INJECTION INTRAMUSCULAR; INTRAVENOUS; SUBCUTANEOUS at 13:26

## 2019-09-13 RX ADMIN — Medication 25 MILLIGRAM(S): at 17:13

## 2019-09-13 RX ADMIN — Medication 50 GRAM(S): at 09:27

## 2019-09-13 RX ADMIN — Medication 15 MILLIGRAM(S): at 13:24

## 2019-09-13 RX ADMIN — MORPHINE SULFATE 2 MILLIGRAM(S): 50 CAPSULE, EXTENDED RELEASE ORAL at 10:28

## 2019-09-13 RX ADMIN — Medication 20 MILLIGRAM(S): at 10:10

## 2019-09-13 RX ADMIN — Medication 50 MILLILITER(S): at 17:25

## 2019-09-13 RX ADMIN — HEPARIN SODIUM 5000 UNIT(S): 5000 INJECTION INTRAVENOUS; SUBCUTANEOUS at 13:52

## 2019-09-13 RX ADMIN — PANTOPRAZOLE SODIUM 40 MILLIGRAM(S): 20 TABLET, DELAYED RELEASE ORAL at 08:14

## 2019-09-13 RX ADMIN — Medication 15 MILLIGRAM(S): at 09:23

## 2019-09-13 NOTE — PROGRESS NOTE ADULT - ASSESSMENT
88 y/o female with multiple comorbidities found to have acute perforated appendicitis s/p emergent laparoscopic appendectomy with abdominal washout. Complicated hospital course including acute anemia, anasarca, acute respiratory failure on HFNC, severe protein malnutrition, magi/atn, rapid afib with rvr and confusion. Remains in multiple organ failure. Increased O2 requirement on HFNC. Family has opted for a conservative limited intervention approach at this time with no blood draws, no NGT; they still want to continue diltiazem infusion and Lasix gtt.     PLAN    Perforated Appendicitis-Gangrenous  - s/p appendectomy with abd washout, EL drain, s/p antibiotic   - mgnt per SICU    Post-Op Pain  - comfortable, on IV dialudid PRN     Acute Respiratory Failure  - remains on HFNC, increased O2 requirement at 50% now, bronchodilators, nebs  - DNR/I     Nausea  - no subsequent episodes since yesterday    Acute Delirium  - mentation improved today  - supportive care, frequent reorientation, sleep hygiene    Afib with RVR  - remains on diltiazem infusion    MAGI/ATN/Generalized Anasaraca  - IV albumin, lasix infusion    Severe Protein Calorie Malnutrition   - poor nutritional intake, likely contributing to anasarca and coagulopathy  - family declined NGT  - poor prognosis    Palliative Care Encounter  Remains in mulitorgan failure with minimal improvement except that she is more awake and encephalopathy improving. Overall prognosis remains poor. SW and I met with dtr Binta who verbalized decision for comfort measures. However, in discussing with family and primary team, family does not appear to be fully on a comfort measure approach as they still want limited interventions including IV lasix, cardizem. They requested no NGT, blood draws, fingersticks.  I attempted to clarify comfort care including de-escalating medication that are not changing underlying disease processes but rather prolonging her suffering/dying process. At this time, they wanted to continue the mgnt as it stands and optimize sx/pain that should develop.

## 2019-09-13 NOTE — CHART NOTE - NSCHARTNOTEFT_GEN_A_CORE
Nutrition note: Calorie count was in progress, however aware pt had an episode of vomiting and likely aspiration given decreased mentation, remains lethargic and dependant on high flow nasal canula. Goals of care conversations had with palliative and the team, at this time there is no escalation of care per family wishes. Aware family does not want NG placed. RD to remain available and will follow up as needed.

## 2019-09-13 NOTE — PATIENT PROFILE ADULT - SPECIFY WHICH ONES ARE ON CHART
Health Care Proxy (HCP)/Power of  (POA) for Healthcare Issues/Do Not Resuscitate (DNR)/Medical Orders for Life-Sustaining Treatment (MOLST)

## 2019-09-13 NOTE — PROGRESS NOTE ADULT - ASSESSMENT
87y Female with perforated appendicitis complicated by acute kidney injury, hypoxic respiratory failure, metabolic encephalopathy  neuro: continue current meds, if any further decline will focus on symptom management.  Pulm: continue high flow for now, remains DNI, would not attempt bipap/cpap due to lethargy  Card: off cardizem gtt, sinus, continue to monitor  GI: npo for now family refused NGT as it would cause pain.   Renal: on lasix gtt negative 1L in 24 H, continue for now.  ID: finished course i=of IV abx  ppx: dvt chemo ppx  Dispo: will continue to monitor in ICU for now. Prognosis is poor and family has realistic expectations. we will continue to force diuresis in hopes to decrease fluid overload and improve pulmonary fxn however given overall inflammatory state and recent aspiration this may be a lofty goal. If she exhibits signs of distress we cj proceed to goals of full comfort care.

## 2019-09-13 NOTE — PROGRESS NOTE ADULT - SUBJECTIVE AND OBJECTIVE BOX
HISTORY  87y Female with perforated appendicitis complicated by acute kidney injury, hypoxic respiratory failure, metabolic encephalopathy    24 HOUR EVENTS: Emesis yesterday and likely aspiration given decreased mentation, remains lethargic and dependant on high flow nasal canula. Goals of care conversations had with palliative and the team, at this time there is no escalation of care.    SUBJECTIVE/ROS:  [ ] A ten-point review of systems was otherwise negative except as noted.  [ x] Due to altered mental status/intubation, subjective information were not able to be obtained from the patient. History was obtained, to the extent possible, from review of the chart and collateral sources of information.      NEURO  RASS: -1    GCS: 4,3,6    CAM ICU: +  Exam: Lethargic but will follow simple verbal commands when prompted  Meds: gabapentin 100 milliGRAM(s) Oral daily  melatonin 3 milliGRAM(s) Oral at bedtime  mirtazapine 15 milliGRAM(s) Oral daily    [x] Adequacy of sedation and pain control has been assessed and adjusted      RESPIRATORY  RR: 19 (09-13-19 @ 06:00) (16 - 31)  SpO2: 98% (09-13-19 @ 06:00) (89% - 100%)  Wt(kg): --  Exam: coarse bilaterally  Mechanical Ventilation:   ABG - ( 12 Sep 2019 05:42 )  pH: 7.30  /  pCO2: 41    /  pO2: 91    / HCO3: 20    / Base Excess: -5.8  /  SaO2: 97      Lactate: x                [ ] Extubation Readiness Assessed  Meds: ALBUTerol    90 MICROgram(s) HFA Inhaler 1 Puff(s) Inhalation every 4 hours  ALBUTerol/ipratropium for Nebulization 3 milliLiter(s) Nebulizer every 3 hours PRN Shortness of Breath and/or Wheezing  tiotropium 18 MICROgram(s) Capsule 1 Capsule(s) Inhalation daily        CARDIOVASCULAR  HR: 84 (09-13-19 @ 06:00) (73 - 144)  BP: 180/74 (09-13-19 @ 06:00) (118/59 - 180/74)  BP(mean): 107 (09-13-19 @ 06:00) (81 - 121)  ABP: --  ABP(mean): --  Wt(kg): --  CVP(cm H2O): --      Exam: s1s2  Cardiac Rhythm: sinus at time of exam, PAF   Perfusion     [x ]Adequate   [ ]Inadequate  Mentation   [ ]Normal       [ x]Reduced  Extremities  [x ]Warm         [ ]Cool  Volume Status [ ]Hypervolemic [x ]Euvolemic [ ]Hypovolemic  Meds: furosemide Infusion 5 mG/Hr IV Continuous <Continuous>  metoprolol tartrate 25 milliGRAM(s) Oral two times a day  tamsulosin 0.4 milliGRAM(s) Oral at bedtime        GI/NUTRITION  Exam: soft nt nd  Diet: npo  Meds: docusate sodium 100 milliGRAM(s) Oral every 8 hours  pantoprazole    Tablet 40 milliGRAM(s) Oral before breakfast  senna 2 Tablet(s) Oral at bedtime      GENITOURINARY  I&O's Detail    09-12 @ 07:01  -  09-13 @ 07:00  --------------------------------------------------------  IN:    diltiazem Infusion: 10 mL    furosemide Infusion: 32.5 mL    Solution: 100 mL    Solution: 75 mL  Total IN: 217.5 mL    OUT:    Bulb: 485 mL    Indwelling Catheter - Urethral: 815 mL  Total OUT: 1300 mL    Total NET: -1082.5 mL          09-12    140  |  103  |  59.0<H>  ----------------------------<  160<H>  3.9   |  19.0<L>  |  3.56<H>    Ca    8.4<L>      12 Sep 2019 02:04  Phos  6.0     09-12  Mg     2.1     09-12      [x ] Bhatt catheter, indication: N/A  Meds: albumin human 25% IVPB 100 milliLiter(s) IV Intermittent every 6 hours  dextrose 5%. 1000 milliLiter(s) IV Continuous <Continuous>        HEMATOLOGIC  Meds: clopidogrel Tablet 75 milliGRAM(s) Oral daily  heparin  Injectable 5000 Unit(s) SubCutaneous every 8 hours    [x] VTE Prophylaxis                        8.1    8.83  )-----------( 203      ( 12 Sep 2019 02:04 )             26.0     PT/INR - ( 11 Sep 2019 16:34 )   PT: 19.7 sec;   INR: 1.69 ratio         PTT - ( 11 Sep 2019 16:34 )  PTT:37.1 sec  Transfusion     [ ] PRBC   [ ] Platelets   [ ] FFP   [ ] Cryoprecipitate      INFECTIOUS DISEASES  T(C): 37.5 (09-13-19 @ 04:00), Max: 38 (09-12-19 @ 08:00)  Wt(kg): --    Recent Cultures:  Specimen Source: .Surgical Swab Appendix, 09-09 @ 13:50; Results   Numerous Escherichia coli  Few Streptococcus milleri, viridans group Susceptibility to follow.  Few Klebsiella pneumoniae  Few Clostridium perfringens (anaerobic organism)  Culture in progress; Gram Stain:   Few WBC's  Few Gram Variable Rods; Organism: Escherichia coli  Klebsiella pneumoniae  Clostridium perfringens  Specimen Source: .Urine, 09-08 @ 10:08; Results   No growth; Gram Stain: --; Organism: --    Meds:       ENDOCRINE  Capillary Blood Glucose    Meds: atorvastatin Oral Tab/Cap - Peds 40 milliGRAM(s) Oral daily  dextrose 40% Gel 15 Gram(s) Oral once PRN  dextrose 50% Injectable 12.5 Gram(s) IV Push once  dextrose 50% Injectable 25 Gram(s) IV Push once  dextrose 50% Injectable 25 Gram(s) IV Push once  glucagon  Injectable 1 milliGRAM(s) IntraMuscular once PRN  levothyroxine 88 MICROGram(s) Oral daily        ACCESS DEVICES:  [ x] Peripheral IV  [ ] Central Venous Line	[ ] R	[ ] L	[ ] IJ	[ ] Fem	[ ] SC	Placed:   [ ] Arterial Line		[ ] R	[ ] L	[ ] Fem	[ ] Rad	[ ] Ax	Placed:   [ ] PICC:					[ ] Mediport  [ ] Urinary Catheter, Date Placed:   [ ] Necessity of urinary, arterial, and venous catheters discussed    OTHER MEDICATIONS:  chlorhexidine 2% Cloths 1 Application(s) Topical daily      CODE STATUS: dnr, dni

## 2019-09-13 NOTE — PROGRESS NOTE ADULT - SUBJECTIVE AND OBJECTIVE BOX
FOLLOW UP VISIT    INTERVAL HPI/OVERNIGHT EVENTS:  Seen and examined with Palliative SW. Pt is more awake today, able to engage in some conversation. She had c/o back pain near buttock, improved after getting morphine. Remains on HFNC, increased O2 requirement at 50% (35% yesterday).     Present Symptoms:   Dyspnea: on HFNC  Nausea/Vomiting:  No  Anxiety:   No  Fatigue: Yes   Loss of appetite: Yes  Pain: none at time of visit  +dry mouth  +generalized edema/anasarca    MEDICATIONS  (STANDING):  albumin human 25% IVPB 100 milliLiter(s) IV Intermittent every 6 hours  ALBUTerol    90 MICROgram(s) HFA Inhaler 1 Puff(s) Inhalation every 4 hours  atorvastatin Oral Tab/Cap - Peds 40 milliGRAM(s) Oral daily  chlorhexidine 2% Cloths 1 Application(s) Topical daily  clopidogrel Tablet 75 milliGRAM(s) Oral daily  dextrose 5%. 1000 milliLiter(s) (50 mL/Hr) IV Continuous <Continuous>  dextrose 50% Injectable 12.5 Gram(s) IV Push once  dextrose 50% Injectable 25 Gram(s) IV Push once  dextrose 50% Injectable 25 Gram(s) IV Push once  diltiazem Infusion 5 mG/Hr (5 mL/Hr) IV Continuous <Continuous>  docusate sodium 100 milliGRAM(s) Oral every 8 hours  furosemide Infusion 5 mG/Hr (2.5 mL/Hr) IV Continuous <Continuous>  gabapentin 100 milliGRAM(s) Oral daily  heparin  Injectable 5000 Unit(s) SubCutaneous every 8 hours  levothyroxine 88 MICROGram(s) Oral daily  melatonin 3 milliGRAM(s) Oral at bedtime  metoprolol tartrate 25 milliGRAM(s) Oral two times a day  mirtazapine 15 milliGRAM(s) Oral daily  pantoprazole    Tablet 40 milliGRAM(s) Oral before breakfast  senna 2 Tablet(s) Oral at bedtime  tamsulosin 0.4 milliGRAM(s) Oral at bedtime  tiotropium 18 MICROgram(s) Capsule 1 Capsule(s) Inhalation daily    MEDICATIONS  (PRN):  ALBUTerol/ipratropium for Nebulization 3 milliLiter(s) Nebulizer every 3 hours PRN Shortness of Breath and/or Wheezing  dextrose 40% Gel 15 Gram(s) Oral once PRN Blood Glucose LESS THAN 70 milliGRAM(s)/deciliter  glucagon  Injectable 1 milliGRAM(s) IntraMuscular once PRN Glucose LESS THAN 70 milligrams/deciliter  HYDROmorphone  Injectable 0.25 milliGRAM(s) IV Push every 3 hours PRN Moderate Pain (4 - 6)  HYDROmorphone  Injectable 0.25 milliGRAM(s) IV Push every 2 hours PRN Severe Pain (7 - 10)      PHYSICAL EXAM:    Vital Signs Last 24 Hrs  T(C): 37.3 (13 Sep 2019 12:00), Max: 37.5 (13 Sep 2019 04:00)  T(F): 99.2 (13 Sep 2019 12:00), Max: 99.5 (13 Sep 2019 04:00)  HR: 107 (13 Sep 2019 14:00) (73 - 124)  BP: 133/70 (13 Sep 2019 14:00) (117/55 - 181/73)  BP(mean): 95 (13 Sep 2019 14:00) (76 - 121)  RR: 23 (13 Sep 2019 14:00) (16 - 27)  SpO2: 89% (13 Sep 2019 14:00) (50% - 100%)    General: obese.   No acute distress.   HEENT: mucous membrane dry   Lungs: coarse breath sounds. +HFNC 50%. non-labored.   CV: +s1/s2. irregular irregular  GI:+ bowel sound. abdomen soft, non-tender, obese +EL drain  MSK: Generalized anasaraca. weakness. WC bound. +Lt AKA    Neuro: nonfocal. awake and alert. Interactive. Answer simple questions  Skin: warm and dry.        LABS:                          8.1    8.83  )-----------( 203      ( 12 Sep 2019 02:04 )             26.0     09-12    140  |  103  |  59.0<H>  ----------------------------<  160<H>  3.9   |  19.0<L>  |  3.56<H>    Ca    8.4<L>      12 Sep 2019 02:04  Phos  6.0     09-12  Mg     2.1     09-12      PT/INR - ( 11 Sep 2019 16:34 )   PT: 19.7 sec;   INR: 1.69 ratio         PTT - ( 11 Sep 2019 16:34 )  PTT:37.1 sec    RADIOLOGY & ADDITIONAL STUDIES:      CXR 9/12/19  FINDINGS:   The lungs  show RIGHT perihilar and LEFT lower lobe airspace   consolidation obscuring LEFT diaphragmatic contour. Small effusions   cannot be excluded. Upper lobes clear.    The  heart is enlarged in transverse diameter. No hilar mass. Trachea midline.  Visualized osseous structures are intact.    IMPRESSION: A LEFT lower lobe retrocardiac airspace consolidation with   persistent RIGHT perihilar and linear opacity/infiltrate.    ADVANCE DIRECTIVES: DNR/I, MOLST  DNR YES NO  Completed on:                     MOLST  YES NO   Completed on:  Living Will  YES NO   Completed on:

## 2019-09-14 PROCEDURE — 99231 SBSQ HOSP IP/OBS SF/LOW 25: CPT

## 2019-09-14 RX ORDER — KETOROLAC TROMETHAMINE 30 MG/ML
15 SYRINGE (ML) INJECTION EVERY 6 HOURS
Refills: 0 | Status: DISCONTINUED | OUTPATIENT
Start: 2019-09-14 | End: 2019-09-16

## 2019-09-14 RX ORDER — INFLUENZA VIRUS VACCINE 15; 15; 15; 15 UG/.5ML; UG/.5ML; UG/.5ML; UG/.5ML
0.5 SUSPENSION INTRAMUSCULAR ONCE
Refills: 0 | Status: DISCONTINUED | OUTPATIENT
Start: 2019-09-14 | End: 2019-09-16

## 2019-09-14 RX ORDER — HYDROMORPHONE HYDROCHLORIDE 2 MG/ML
0.5 INJECTION INTRAMUSCULAR; INTRAVENOUS; SUBCUTANEOUS
Refills: 0 | Status: DISCONTINUED | OUTPATIENT
Start: 2019-09-14 | End: 2019-09-18

## 2019-09-14 RX ADMIN — Medication 50 MILLILITER(S): at 00:19

## 2019-09-14 RX ADMIN — HYDROMORPHONE HYDROCHLORIDE 0.25 MILLIGRAM(S): 2 INJECTION INTRAMUSCULAR; INTRAVENOUS; SUBCUTANEOUS at 09:41

## 2019-09-14 RX ADMIN — HYDROMORPHONE HYDROCHLORIDE 0.25 MILLIGRAM(S): 2 INJECTION INTRAMUSCULAR; INTRAVENOUS; SUBCUTANEOUS at 09:56

## 2019-09-14 RX ADMIN — Medication 88 MICROGRAM(S): at 06:45

## 2019-09-14 RX ADMIN — Medication 100 MILLIGRAM(S): at 06:46

## 2019-09-14 RX ADMIN — Medication 0.5 MILLIGRAM(S): at 11:05

## 2019-09-14 RX ADMIN — TAMSULOSIN HYDROCHLORIDE 0.4 MILLIGRAM(S): 0.4 CAPSULE ORAL at 03:17

## 2019-09-14 RX ADMIN — Medication 50 MILLILITER(S): at 07:51

## 2019-09-14 RX ADMIN — HEPARIN SODIUM 5000 UNIT(S): 5000 INJECTION INTRAVENOUS; SUBCUTANEOUS at 06:45

## 2019-09-14 RX ADMIN — PANTOPRAZOLE SODIUM 40 MILLIGRAM(S): 20 TABLET, DELAYED RELEASE ORAL at 06:47

## 2019-09-14 RX ADMIN — HYDROMORPHONE HYDROCHLORIDE 0.5 MILLIGRAM(S): 2 INJECTION INTRAMUSCULAR; INTRAVENOUS; SUBCUTANEOUS at 12:05

## 2019-09-14 RX ADMIN — HYDROMORPHONE HYDROCHLORIDE 0.5 MILLIGRAM(S): 2 INJECTION INTRAMUSCULAR; INTRAVENOUS; SUBCUTANEOUS at 12:20

## 2019-09-14 RX ADMIN — Medication 25 MILLIGRAM(S): at 06:45

## 2019-09-14 NOTE — CHART NOTE - NSCHARTNOTEFT_GEN_A_CORE
SICU Downgrade : Patient transitioned to comfort care only prior to downgrade. Bhatt catheter in place with some output. Patient was slightly arousable from sleep. Denies she is in any pain.    Vital Signs Last 24 Hrs  T(C): 36.8 (14 Sep 2019 16:00), Max: 37.6 (14 Sep 2019 04:00)  T(F): 98.3 (14 Sep 2019 16:00), Max: 99.7 (14 Sep 2019 04:00)  HR: 80 (14 Sep 2019 16:00) (63 - 80)  BP: 162/72 (14 Sep 2019 08:00) (137/61 - 165/72)  BP(mean): 103 (14 Sep 2019 08:00) (88 - 104)  RR: 31 (14 Sep 2019 16:00) (16 - 31)  SpO2: 89% (14 Sep 2019 16:00) (89% - 96%)    I&O's Detail    13 Sep 2019 07:01  -  14 Sep 2019 07:00  --------------------------------------------------------  IN:    diltiazem Infusion: 185 mL    furosemide Infusion: 50 mL    furosemide Infusion: 10 mL    furosemide Infusion: 20 mL    Solution: 100 mL    Solution: 50 mL    Solution: 200 mL  Total IN: 615 mL    OUT:    Bulb: 415 mL    Indwelling Catheter - Urethral: 1255 mL  Total OUT: 1670 mL    Total NET: -1055 mL      14 Sep 2019 07:01  -  14 Sep 2019 22:15  --------------------------------------------------------  IN:    furosemide Infusion: 5 mL    Solution: 100 mL  Total IN: 105 mL    OUT:    Bulb: 275 mL    Indwelling Catheter - Urethral: 855 mL  Total OUT: 1130 mL    Total NET: -1025 mL      Constitutional: patient resting comfortably in bed  Neuro: responds to voice with mostly incomprehensible words  Respiratory: Labored respirations, on NC  Cardiovascular: Afib  Gastrointestinal: Abdomen soft, non-tender, slightly distended, no rebound tenderness / guarding  : Bhatt in place      MEDICATIONS  (STANDING):  influenza   Vaccine 0.5 milliLiter(s) IntraMuscular once    MEDICATIONS  (PRN):  HYDROmorphone  Injectable 0.5 milliGRAM(s) IV Push every 2 hours PRN pain or dyspnea  ketorolac   Injectable 15 milliGRAM(s) IV Push every 6 hours PRN Temp > 100.4F  LORazepam   Injectable 0.5 milliGRAM(s) IV Push every 2 hours PRN Anxiety    87y Female with perforated appendicitis complicated by acute kidney injury, hypoxic respiratory failure, metabolic encephalopathy, multi system organ failure. On Comfort measures only    -Pain control PRN  -Bhatt in place for comfort  -Palliative discussion: comfort measures only

## 2019-09-14 NOTE — PROGRESS NOTE ADULT - SUBJECTIVE AND OBJECTIVE BOX
Pt's family has decided to move forward with comfort measures only. They have discussed the management options with the surgical team as well as palliative previously and have now chosen comfort care only. They understand what that entails have are agreeable. Orders changed to reflect the new goal of care. Attending aware.

## 2019-09-14 NOTE — PROGRESS NOTE ADULT - ASSESSMENT
87y Female with perforated appendicitis complicated by acute kidney injury, hypoxic respiratory failure, metabolic encephalopathy, multi system organ failure, goals of care discussion comfort vs. withdrawal   neuro: Encephlopathy- continue to control pain, if declines will contact family and full comfort care   Pulm: Continue High flow - no increasing requirements overnight.  Symptomatic relief.   Card: Currently off cardizem- PAF. Will continue to monitor. Pressure remains stable.   GI: NPO/ consider comfort feeds if patient wants.   Renal: Lasix gtt at 10, continues to be responsive.  Will need to assess lytes and kidney function; will discuss with family   ID: None  ppx: dvt chemo ppx  Dispo: will continue to monitor in ICU for now. Prognosis is poor and family has realistic expectations. Continued talk with family about care moving forward

## 2019-09-14 NOTE — PROGRESS NOTE ADULT - SUBJECTIVE AND OBJECTIVE BOX
24 HOUR EVENTS: Patient weaned off cardizem gtt, VSS.  Patient remains on high flow NC 40-45%.  Patient remained on lasix gtt, increased dose with adequate response 50-60/hr.  Patient tolerating well.  Remains confused and states "let me die".  Continued family discussion today about goals of care and comfort measures 9/14.  Family refusing blood draws at this time.     SUBJECTIVE/ROS:  [ ] A ten-point review of systems was otherwise negative except as noted.  [x ] Due to altered mental status/intubation, subjective information were not able to be obtained from the patient. History was obtained, to the extent possible, from review of the chart and collateral sources of information.      NEURO  RASS:     GCS:     CAM ICU:  Exam: see below  Meds: gabapentin 100 milliGRAM(s) Oral daily  HYDROmorphone  Injectable 0.25 milliGRAM(s) IV Push every 3 hours PRN Moderate Pain (4 - 6)  HYDROmorphone  Injectable 0.25 milliGRAM(s) IV Push every 2 hours PRN Severe Pain (7 - 10)  melatonin 3 milliGRAM(s) Oral at bedtime  mirtazapine 15 milliGRAM(s) Oral daily    [x] Adequacy of sedation and pain control has been assessed and adjusted      RESPIRATORY  RR: 16 (09-14-19 @ 02:00) (16 - 31)  SpO2: 95% (09-14-19 @ 02:00) (50% - 98%)  Wt(kg): --  Exam: unlabored, diminished, +coarse at b/l bases   Mechanical Ventilation:   ABG - ( 12 Sep 2019 05:42 )  pH: 7.30  /  pCO2: 41    /  pO2: 91    / HCO3: 20    / Base Excess: -5.8  /  SaO2: 97      Lactate: x                [ ] Extubation Readiness Assessed  Meds: ALBUTerol    90 MICROgram(s) HFA Inhaler 1 Puff(s) Inhalation every 4 hours  ALBUTerol/ipratropium for Nebulization 3 milliLiter(s) Nebulizer every 3 hours PRN Shortness of Breath and/or Wheezing  tiotropium 18 MICROgram(s) Capsule 1 Capsule(s) Inhalation daily        CARDIOVASCULAR  HR: 63 (09-14-19 @ 02:00) (63 - 124)  BP: 137/61 (09-14-19 @ 01:00) (117/55 - 181/73)  BP(mean): 88 (09-14-19 @ 01:00) (76 - 107)  ABP: --  ABP(mean): --  Wt(kg): --  CVP(cm H2O): --      Exam: Afib  Cardiac Rhythm: Afib  Perfusion     [ ]Adequate   [ ]Inadequate  Mentation   [ ]Normal       [ ]Reduced  Extremities  [ ]Warm         [ ]Cool  Volume Status [ ]Hypervolemic [ ]Euvolemic [ ]Hypovolemic  Meds: furosemide Infusion 10 mG/Hr IV Continuous <Continuous>  metoprolol tartrate 25 milliGRAM(s) Oral two times a day  tamsulosin 0.4 milliGRAM(s) Oral at bedtime        GI/NUTRITION  Exam: softly distended, ttp over incision sites, EL in place   Diet: NPO   Meds: docusate sodium 100 milliGRAM(s) Oral every 8 hours  pantoprazole    Tablet 40 milliGRAM(s) Oral before breakfast  senna 2 Tablet(s) Oral at bedtime      GENITOURINARY  I&O's Detail    09-12 @ 07:01  -  09-13 @ 07:00  --------------------------------------------------------  IN:    diltiazem Infusion: 10 mL    furosemide Infusion: 35 mL    Solution: 100 mL    Solution: 75 mL  Total IN: 220 mL    OUT:    Bulb: 485 mL    Indwelling Catheter - Urethral: 815 mL  Total OUT: 1300 mL    Total NET: -1080 mL      09-13 @ 07:01  -  09-14 @ 05:26  --------------------------------------------------------  IN:    diltiazem Infusion: 185 mL    furosemide Infusion: 10 mL    furosemide Infusion: 35 mL    furosemide Infusion: 20 mL    Solution: 50 mL    Solution: 200 mL  Total IN: 500 mL    OUT:    Bulb: 315 mL    Indwelling Catheter - Urethral: 1120 mL  Total OUT: 1435 mL    Total NET: -935 mL                [x ] Bhatt catheter, indication: comfort   Meds: albumin human 25% IVPB 100 milliLiter(s) IV Intermittent every 6 hours  dextrose 5%. 1000 milliLiter(s) IV Continuous <Continuous>    PHYSICAL EXAM:    Gen:  NAD, restless at times     Eyes:  Pupils 4mm round and reactive BL    Neurological:  Lethargic, arouses to voice w/ incomprehensible sounds at times    ENMT:  Dry mucous membranes    Neck:  No JVD, trachea midline    Pulmonary:  Coarse crackles noted BL bases     Cardiovascular:  Afib, rate controlled currently    Gastrointestinal:  Softly distended.  No rebound or guarding    Genitourinary:  Bhatt    Extremities:  ANasarca    Skin:  Pink, warm, dry    Musculoskeletal:  AFROM x4              HEMATOLOGIC  Meds: clopidogrel Tablet 75 milliGRAM(s) Oral daily  heparin  Injectable 5000 Unit(s) SubCutaneous every 8 hours    [x] VTE Prophylaxis      Transfusion     [ ] PRBC   [ ] Platelets   [ ] FFP   [ ] Cryoprecipitate      INFECTIOUS DISEASES  T(C): 37.4 (09-14-19 @ 00:00), Max: 37.4 (09-13-19 @ 08:00)  Wt(kg): --    Recent Cultures:  Specimen Source: .Surgical Swab Appendix, 09-09 @ 13:50; Results   Numerous Escherichia coli  Few Streptococcus milleri, viridans group  Few Klebsiella pneumoniae  Few Clostridium perfringens (anaerobic organism); Gram Stain:   Few WBC's  Few Gram Variable Rods; Organism: Escherichia coli  Streptococcus milleri, viridans group  Klebsiella pneumoniae  Clostridium perfringens  Specimen Source: .Urine, 09-08 @ 10:08; Results   No growth; Gram Stain: --; Organism: --    Meds:       ENDOCRINE  Capillary Blood Glucose    Meds: atorvastatin Oral Tab/Cap - Peds 40 milliGRAM(s) Oral daily  dextrose 40% Gel 15 Gram(s) Oral once PRN  dextrose 50% Injectable 12.5 Gram(s) IV Push once  dextrose 50% Injectable 25 Gram(s) IV Push once  dextrose 50% Injectable 25 Gram(s) IV Push once  glucagon  Injectable 1 milliGRAM(s) IntraMuscular once PRN  levothyroxine 88 MICROGram(s) Oral daily        ACCESS DEVICES:  [ ] Peripheral IV  [ ] Central Venous Line	[ ] R	[ ] L	[ ] IJ	[ ] Fem	[ ] SC	Placed:   [ ] Arterial Line		[ ] R	[ ] L	[ ] Fem	[ ] Rad	[ ] Ax	Placed:   [ ] PICC:					[ ] Mediport  [ ] Urinary Catheter, Date Placed:   [ ] Necessity of urinary, arterial, and venous catheters discussed    OTHER MEDICATIONS:  chlorhexidine 2% Cloths 1 Application(s) Topical daily      CODE STATUS: Yes      IMAGING:

## 2019-09-15 PROCEDURE — 99024 POSTOP FOLLOW-UP VISIT: CPT

## 2019-09-15 RX ADMIN — HYDROMORPHONE HYDROCHLORIDE 0.5 MILLIGRAM(S): 2 INJECTION INTRAMUSCULAR; INTRAVENOUS; SUBCUTANEOUS at 22:02

## 2019-09-15 RX ADMIN — HYDROMORPHONE HYDROCHLORIDE 0.5 MILLIGRAM(S): 2 INJECTION INTRAMUSCULAR; INTRAVENOUS; SUBCUTANEOUS at 22:25

## 2019-09-15 RX ADMIN — Medication 0.5 MILLIGRAM(S): at 13:35

## 2019-09-15 NOTE — PROGRESS NOTE ADULT - ASSESSMENT
87y Female with perforated appendicitis complicated by acute kidney injury, hypoxic respiratory failure, metabolic encephalopathy, multi system organ failure. On Comfort measures only    -Pain control PRN  -Bhatt in place for comfort  -Palliative discussion: comfort measures only.

## 2019-09-15 NOTE — PROGRESS NOTE ADULT - SUBJECTIVE AND OBJECTIVE BOX
HPI/OVERNIGHT EVENTS: No acute events overnight. Patient on comfort measures only per Palliative care discussion completed yesterday 9/14. She denies she is in pain.     Vital Signs Last 24 Hrs  T(C): 36.8 (14 Sep 2019 16:00), Max: 37.6 (14 Sep 2019 04:00)  T(F): 98.3 (14 Sep 2019 16:00), Max: 99.7 (14 Sep 2019 04:00)  HR: 80 (14 Sep 2019 16:00) (63 - 80)  BP: 162/72 (14 Sep 2019 08:00) (144/65 - 165/72)  BP(mean): 103 (14 Sep 2019 08:00) (93 - 104)  RR: 31 (14 Sep 2019 16:00) (16 - 31)  SpO2: 89% (14 Sep 2019 16:00) (89% - 96%)    I&O's Detail    13 Sep 2019 07:01  -  14 Sep 2019 07:00  --------------------------------------------------------  IN:    diltiazem Infusion: 185 mL    furosemide Infusion: 50 mL    furosemide Infusion: 10 mL    furosemide Infusion: 20 mL    Solution: 100 mL    Solution: 50 mL    Solution: 200 mL  Total IN: 615 mL    OUT:    Bulb: 415 mL    Indwelling Catheter - Urethral: 1255 mL  Total OUT: 1670 mL    Total NET: -1055 mL      14 Sep 2019 07:01  -  15 Sep 2019 01:56  --------------------------------------------------------  IN:    furosemide Infusion: 5 mL    Solution: 100 mL  Total IN: 105 mL    OUT:    Bulb: 375 mL    Indwelling Catheter - Urethral: 855 mL  Total OUT: 1230 mL    Total NET: -1125 mL      Constitutional: patient resting comfortably in bed  Neuro: responds to voice with mostly incomprehensible words  Respiratory: Labored respirations, on NC  Cardiovascular: Afib  Gastrointestinal: Abdomen soft, non-tender, slightly distended, no rebound tenderness / guarding  : Bhatt in place      MEDICATIONS  (STANDING):  influenza   Vaccine 0.5 milliLiter(s) IntraMuscular once    MEDICATIONS  (PRN):  HYDROmorphone  Injectable 0.5 milliGRAM(s) IV Push every 2 hours PRN pain or dyspnea  ketorolac   Injectable 15 milliGRAM(s) IV Push every 6 hours PRN Temp > 100.4F  LORazepam   Injectable 0.5 milliGRAM(s) IV Push every 2 hours PRN Anxiety

## 2019-09-16 PROCEDURE — 99232 SBSQ HOSP IP/OBS MODERATE 35: CPT

## 2019-09-16 PROCEDURE — 99024 POSTOP FOLLOW-UP VISIT: CPT

## 2019-09-16 RX ORDER — MORPHINE SULFATE 50 MG/1
1 CAPSULE, EXTENDED RELEASE ORAL
Qty: 100 | Refills: 0 | Status: DISCONTINUED | OUTPATIENT
Start: 2019-09-16 | End: 2019-09-16

## 2019-09-16 NOTE — PROGRESS NOTE ADULT - SUBJECTIVE AND OBJECTIVE BOX
FOLLOW UP VISIT    INTERVAL HPI/OVERNIGHT EVENTS:  Chart reviewed. Pt now on full comfort measures. EL drain removed. Pt off HFNC, on nasal cannula. She is comfortable, denies any pain or discomfort. She is confused, disorganized thought process. RN at bedside to assist with her breakfast.     Present Symptoms:   Dyspnea:  No   Nausea/Vomiting:  No  Anxiety:  Yes    Fatigue: Yes    Loss of appetite: Yes    Pain: No    Limited ROS due to confusion.     MEDICATIONS  (STANDING):    MEDICATIONS  (PRN):  HYDROmorphone  Injectable 0.5 milliGRAM(s) IV Push every 2 hours PRN pain or dyspnea  LORazepam   Injectable 0.5 milliGRAM(s) IV Push every 2 hours PRN Anxiety      PHYSICAL EXAM:    Vital Signs Last 24 Hrs  T(C): 36.7 (16 Sep 2019 07:48), Max: 36.9 (15 Sep 2019 22:00)  T(F): 98 (16 Sep 2019 07:48), Max: 98.5 (15 Sep 2019 22:00)  HR: 82 (16 Sep 2019 07:48) (82 - 92)  BP: 158/78 (15 Sep 2019 22:00) (158/78 - 158/78)  BP(mean): --  RR: 18 (16 Sep 2019 07:48) (18 - 18)  SpO2: 97% (16 Sep 2019 07:48) (96% - 97%)    General: obese.  No acute distress.   HEENT: mucous membrane dry   Lungs: comfortable. O2NC. non-labored.   CV: +s1/s2. irregular irregular  GI:+ bowel sound. abdomen soft, non-tender, obese   MSK: Generalized anasaraca. weakness.  +Lt AKA    Neuro: nonfocal. awake and confused.    Skin: warm and dry.      LABS: reviewed  RADIOLOGY & ADDITIONAL STUDIES: Reviewed, no new recent studies    ADVANCE DIRECTIVES: DNR/I, MOLST

## 2019-09-16 NOTE — CHART NOTE - NSCHARTNOTEFT_GEN_A_CORE
Source: Patient     Current Diet: Regular     Patient reports not hungry -- little PO intake continues     PO intake:  < 25%- feeding assistance     Current Weight:   (9/13) 90kg  (9/11) 91kg    % Weight Change  -- 2+ generalized edema     Pertinent Medications: MEDICATIONS  (STANDING):    MEDICATIONS  (PRN):  HYDROmorphone  Injectable 0.5 milliGRAM(s) IV Push every 2 hours PRN pain or dyspnea  LORazepam   Injectable 0.5 milliGRAM(s) IV Push every 2 hours PRN Anxiety    Pertinent Labs: BUN 59, Cr 3.56, Glu 160 , Phos 6    Skin: Left AKA  -- intact     Nutrition focused physical exam previously conducted - found signs of malnutrition [ ]absent [x ]present    Subcutaneous fat loss: [ ] Orbital fat pads region, [ ]Buccal fat region, [ ]Triceps region,  [ ]Ribs region    Muscle wasting: [ x]Temples region, [ ]Clavicle region, [ x]Shoulder region, [ ]Scapula region, [ ]Interosseous region,  [ ]thigh region, [ ]Calf region    Estimated Needs:   [ x] no change since previous assessment  [ ] recalculated:     Current Nutrition Diagnosis: Malnutrition Severe-acute Related to inadequate protein-energy intake with altered GI function in setting of  Acute appendicitis with perforation, localized peritonitis, and gangrene, s/p lap appendectomy as evidenced by meeting less then 50% est needs > 7 days, NFPE and + fluid accumulation.     Noted-- Pt on comfort measures only, followed by palliative.   Family doesn't want NG tube.     Recommendations:  1. Rx Ensure TID -- encourage PO intake, provide assistance      Monitoring and Evaluation:   [ x] PO intake [ x] Tolerance to diet prescription [X] Weights  [X] Follow up per protocol [X] Labs:

## 2019-09-16 NOTE — GOALS OF CARE CONVERSATION - PERSONAL ADVANCE DIRECTIVE - NS PRO AD PATIENT TYPE ON CHART
Health Care Proxy (HCP)/Do Not Resuscitate (DNR)/Medical Orders for Life-Sustaining Treatment (MOLST)/Power of  (POA) for Healthcare Issues

## 2019-09-16 NOTE — PROGRESS NOTE ADULT - SUBJECTIVE AND OBJECTIVE BOX
HPI/OVERNIGHT EVENTS: No acute events overnight. Patient comfort cares per palliative care conversation 9/14. EL drain removed yesterday am. patient AVSS.     MEDICATIONS  (STANDING):  influenza   Vaccine 0.5 milliLiter(s) IntraMuscular once    MEDICATIONS  (PRN):  HYDROmorphone  Injectable 0.5 milliGRAM(s) IV Push every 2 hours PRN pain or dyspnea  ketorolac   Injectable 15 milliGRAM(s) IV Push every 6 hours PRN Temp > 100.4F  LORazepam   Injectable 0.5 milliGRAM(s) IV Push every 2 hours PRN Anxiety      Vital Signs Last 24 Hrs  T(C): 36.9 (15 Sep 2019 22:00), Max: 36.9 (15 Sep 2019 08:34)  T(F): 98.5 (15 Sep 2019 22:00), Max: 98.5 (15 Sep 2019 22:00)  HR: 92 (15 Sep 2019 22:00) (84 - 92)  BP: 158/78 (15 Sep 2019 22:00) (116/80 - 158/78)  BP(mean): --  RR: 18 (15 Sep 2019 22:00) (18 - 18)  SpO2: 96% (15 Sep 2019 22:00) (96% - 98%)    Constitutional: patient resting comfortably in bed  Neuro: responds to voice with mostly incomprehensible words  Respiratory: Labored respirations, on NC  Cardiovascular: Afib  Gastrointestinal: Abdomen soft, non-tender, slightly distended, no rebound tenderness / guarding  : Nova in place      I&O's Detail    14 Sep 2019 07:01  -  15 Sep 2019 07:00  --------------------------------------------------------  IN:    furosemide Infusion: 5 mL    Solution: 100 mL  Total IN: 105 mL    OUT:    Bulb: 500 mL    Indwelling Catheter - Urethral: 1105 mL  Total OUT: 1605 mL    Total NET: -1500 mL      15 Sep 2019 07:01  -  16 Sep 2019 03:20  --------------------------------------------------------  IN:  Total IN: 0 mL    OUT:    Bulb: 100 mL    Indwelling Catheter - Urethral: 1150 mL  Total OUT: 1250 mL    Total NET: -1250 mL          LABS:

## 2019-09-16 NOTE — PROGRESS NOTE ADULT - ASSESSMENT
86 y/o female with multiple comorbidities found to have acute perforated appendicitis s/p emergent laparoscopic appendectomy with abdominal washout. Complicated hospital course including acute anemia, anasarca, acute respiratory failure on HFNC, severe protein malnutrition, lico/atn, rapid afib with rvr and confusion. Multiple goc discussion last week with family given pt's overall poor prognosis given debility, comorbidities and multiorgan failure. Over the weekend, family opted for transition to full comfort measures. Pt is awake and comfortable, confused.      PLAN    Perforated Appendicitis-Gangrenous  - s/p appendectomy with abd washout and EL drain removed, s/p antibiotic     Pain  - comfortable, c/w IV dialudid 0.5 mg PRN     Acute Respiratory Failure  - off HFNC, comfortable on O2NC  - DNR/I     Anxiety/Acute Delirium  - intermittent confusion and anxiety  - supportive care, IV ativan 0.5 mg PRN    Afib with RVR  - off diltiazem infusion    Severe Protein Calorie Malnutrition   - allow pleasure feeds as tolerated    Palliative Care Encounter  Pt with multiorgan failure with overall poor prognosis, now on comfort measures. Family will be here later today, will meet with them in hopes to introduce hospice services.

## 2019-09-17 PROCEDURE — 71045 X-RAY EXAM CHEST 1 VIEW: CPT | Mod: 26

## 2019-09-17 PROCEDURE — 99024 POSTOP FOLLOW-UP VISIT: CPT

## 2019-09-17 PROCEDURE — 99232 SBSQ HOSP IP/OBS MODERATE 35: CPT

## 2019-09-17 RX ORDER — HYDROMORPHONE HYDROCHLORIDE 2 MG/ML
0.5 INJECTION INTRAMUSCULAR; INTRAVENOUS; SUBCUTANEOUS EVERY 12 HOURS
Refills: 0 | Status: DISCONTINUED | OUTPATIENT
Start: 2019-09-17 | End: 2019-09-17

## 2019-09-17 RX ORDER — ROBINUL 0.2 MG/ML
0.1 INJECTION INTRAMUSCULAR; INTRAVENOUS EVERY 8 HOURS
Refills: 0 | Status: DISCONTINUED | OUTPATIENT
Start: 2019-09-17 | End: 2019-09-18

## 2019-09-17 RX ORDER — HYDROMORPHONE HYDROCHLORIDE 2 MG/ML
0.5 INJECTION INTRAMUSCULAR; INTRAVENOUS; SUBCUTANEOUS EVERY 8 HOURS
Refills: 0 | Status: DISCONTINUED | OUTPATIENT
Start: 2019-09-17 | End: 2019-09-18

## 2019-09-17 RX ADMIN — Medication 200 MILLIGRAM(S): at 13:59

## 2019-09-17 RX ADMIN — HYDROMORPHONE HYDROCHLORIDE 0.5 MILLIGRAM(S): 2 INJECTION INTRAMUSCULAR; INTRAVENOUS; SUBCUTANEOUS at 16:28

## 2019-09-17 RX ADMIN — Medication 200 MILLIGRAM(S): at 22:02

## 2019-09-17 RX ADMIN — HYDROMORPHONE HYDROCHLORIDE 0.5 MILLIGRAM(S): 2 INJECTION INTRAMUSCULAR; INTRAVENOUS; SUBCUTANEOUS at 22:02

## 2019-09-17 RX ADMIN — ROBINUL 0.1 MILLIGRAM(S): 0.2 INJECTION INTRAMUSCULAR; INTRAVENOUS at 22:02

## 2019-09-17 RX ADMIN — HYDROMORPHONE HYDROCHLORIDE 0.5 MILLIGRAM(S): 2 INJECTION INTRAMUSCULAR; INTRAVENOUS; SUBCUTANEOUS at 22:28

## 2019-09-17 RX ADMIN — Medication 0.5 MILLIGRAM(S): at 00:10

## 2019-09-17 RX ADMIN — HYDROMORPHONE HYDROCHLORIDE 0.5 MILLIGRAM(S): 2 INJECTION INTRAMUSCULAR; INTRAVENOUS; SUBCUTANEOUS at 17:10

## 2019-09-17 NOTE — PROGRESS NOTE ADULT - ASSESSMENT
88 y/o female with multiple comorbidities found to have acute perforated appendicitis s/p emergent laparoscopic appendectomy with abdominal washout. Complicated hospital course including acute anemia, anasarca, acute respiratory failure on HFNC, severe protein malnutrition, magi/atn, rapid afib with rvr and confusion. Multiple goc discussion last week with family given pt's overall poor prognosis given debility, comorbidities and multiorgan failure. Over the weekend, family opted for transition to full comfort measures. Today, pt appears more fatigued, delayed response time compared to previous day, continues to have minimal oral intake.    PLAN    Perforated Appendicitis-Gangrenous  - s/p appendectomy with abd washout and EL drain removed, s/p antibiotic     Pain  - comfortable, c/w IV dialudid 0.5 mg PRN     Dyspnea/Acute Respiratory Failure  - previously on HFNC, notable tachypnea and work of breathing on exam especially with prolong conversation, increased O2 requirement to 3.5L  - pt is poor advocate for herself, will add dilaudid 0.5 mg q12H to monitor for sx improvement   - DNR/I     Anxiety/Acute Delirium  - intermittent, c/w supportive care, IV ativan 0.5 mg PRN    Afib with RVR  - cmo, diltiazem infusion discontinued    ATN/MAGI/Ansaraca  - cmo, lasix gtt discontinued    Severe Protein Calorie Malnutrition   - minimal intake, allow pleasure feeds as tolerated    Palliative Care Encounter  Pt on comfort measures. Pt appears to be rallying in past 48 hours since transitioned to comfort measures. Today, more fatigued and tachypnea, will adjust IV dilaudid and oxygen to improve sx control. Pt with high potential of rapid clinical deterioration and would benefit from inpatient hospice unit for further sx optimizations.

## 2019-09-17 NOTE — GOALS OF CARE CONVERSATION - PERSONAL ADVANCE DIRECTIVE - NS PRO AD PATIENT TYPE ON CHART
Do Not Resuscitate (DNR)/Health Care Proxy (HCP)/Power of  (POA) for Healthcare Issues/Medical Orders for Life-Sustaining Treatment (MOLST)

## 2019-09-17 NOTE — PROGRESS NOTE ADULT - ASSESSMENT
87y Female with perforated appendicitis complicated by acute kidney injury, hypoxic respiratory failure, metabolic encephalopathy, multi system organ failure. On Comfort measures only    -Pain control PRN  -Bhatt in place for comfort  -Palliative discussion: comfort measures only.  -Dispo: Hospice planning

## 2019-09-17 NOTE — PROGRESS NOTE ADULT - SUBJECTIVE AND OBJECTIVE BOX
FOLLOW UP VISIT    INTERVAL HPI/OVERNIGHT EVENTS:  No acute events overnight per RN. Pt remains on comfort measures. Appears to be more drowsy today. Hospice RNs also present.     Present Symptoms:   Dyspnea:  No per pt, however, noted to be tachypneic on observation.    Nausea/Vomiting:  No  Anxiety:  no   Fatigue: Yes    Loss of appetite: Yes, had few bites of breakfast per pt  Pain: No    Limited ROS.   +forgetful and intermittent confusion  +cough with minimal sputum production    MEDICATIONS  (STANDING):    MEDICATIONS  (PRN):  HYDROmorphone  Injectable 0.5 milliGRAM(s) IV Push every 2 hours PRN pain or dyspnea  LORazepam   Injectable 0.5 milliGRAM(s) IV Push every 2 hours PRN Anxiety      PHYSICAL EXAM:    Vital Signs Last 24 Hrs  T(C): 36.7 (16 Sep 2019 07:48), Max: 36.9 (15 Sep 2019 22:00)  T(F): 98 (16 Sep 2019 07:48), Max: 98.5 (15 Sep 2019 22:00)  HR: 82 (16 Sep 2019 07:48) (82 - 92)  BP: 158/78 (15 Sep 2019 22:00) (158/78 - 158/78)  BP(mean): --  RR: 18 (16 Sep 2019 07:48) (18 - 18)  SpO2: 97% (16 Sep 2019 07:48) (96% - 97%)    General: frail. No acute distress.   HEENT: mucous membrane dry   Lungs: tachypneic on exam with RR counted 24bpm. O2NC 2.5 L   CV: +s1/s2. irregular irregular  GI:+ bowel sound. abdomen soft, non-tender, obese   MSK: Generalized anasaraca. weakness.  +Lt AKA    Neuro: nonfocal. awake and forgetful. answer simple questions  Skin: warm and dry.  pale    LABS: reviewed  RADIOLOGY & ADDITIONAL STUDIES: Reviewed, no new recent studies    ADVANCE DIRECTIVES: DNR/I, MOLST

## 2019-09-17 NOTE — PROGRESS NOTE ADULT - SUBJECTIVE AND OBJECTIVE BOX
HPI/OVERNIGHT EVENTS:  No acute events overnight. Patient comfort cares per palliative care conversation. Plan for hospice. All vitals stable.     MEDICATIONS  (STANDING):    MEDICATIONS  (PRN):  HYDROmorphone  Injectable 0.5 milliGRAM(s) IV Push every 2 hours PRN pain or dyspnea  LORazepam   Injectable 0.5 milliGRAM(s) IV Push every 2 hours PRN Anxiety      Vital Signs Last 24 Hrs  T(C): --  T(F): --  HR: --  BP: --  BP(mean): --  RR: --  SpO2: --    Constitutional: patient resting comfortably in bed  Neuro: responds to voice with mostly incomprehensible words  Respiratory: nonlabored breathing, on NC  Cardiovascular: Afib, reg rate        I&O's Detail    16 Sep 2019 07:01  -  17 Sep 2019 07:00  --------------------------------------------------------  IN:    Oral Fluid: 120 mL  Total IN: 120 mL    OUT:    Indwelling Catheter - Urethral: 2150 mL  Total OUT: 2150 mL    Total NET: -2030 mL

## 2019-09-18 ENCOUNTER — TRANSCRIPTION ENCOUNTER (OUTPATIENT)
Age: 84
End: 2019-09-18

## 2019-09-18 VITALS — OXYGEN SATURATION: 96 % | TEMPERATURE: 98 F | RESPIRATION RATE: 18 BRPM | HEART RATE: 84 BPM

## 2019-09-18 PROCEDURE — 88304 TISSUE EXAM BY PATHOLOGIST: CPT

## 2019-09-18 PROCEDURE — 86901 BLOOD TYPING SEROLOGIC RH(D): CPT

## 2019-09-18 PROCEDURE — 80048 BASIC METABOLIC PNL TOTAL CA: CPT

## 2019-09-18 PROCEDURE — 84484 ASSAY OF TROPONIN QUANT: CPT

## 2019-09-18 PROCEDURE — 86900 BLOOD TYPING SEROLOGIC ABO: CPT

## 2019-09-18 PROCEDURE — P9016: CPT

## 2019-09-18 PROCEDURE — 36430 TRANSFUSION BLD/BLD COMPNT: CPT

## 2019-09-18 PROCEDURE — P9045: CPT

## 2019-09-18 PROCEDURE — 82962 GLUCOSE BLOOD TEST: CPT

## 2019-09-18 PROCEDURE — 82009 KETONE BODYS QUAL: CPT

## 2019-09-18 PROCEDURE — 85730 THROMBOPLASTIN TIME PARTIAL: CPT

## 2019-09-18 PROCEDURE — 87070 CULTURE OTHR SPECIMN AEROBIC: CPT

## 2019-09-18 PROCEDURE — 74176 CT ABD & PELVIS W/O CONTRAST: CPT

## 2019-09-18 PROCEDURE — 87186 SC STD MICRODIL/AGAR DIL: CPT

## 2019-09-18 PROCEDURE — 86923 COMPATIBILITY TEST ELECTRIC: CPT

## 2019-09-18 PROCEDURE — 93005 ELECTROCARDIOGRAM TRACING: CPT

## 2019-09-18 PROCEDURE — P9047: CPT

## 2019-09-18 PROCEDURE — 85014 HEMATOCRIT: CPT

## 2019-09-18 PROCEDURE — 93306 TTE W/DOPPLER COMPLETE: CPT

## 2019-09-18 PROCEDURE — 99285 EMERGENCY DEPT VISIT HI MDM: CPT

## 2019-09-18 PROCEDURE — 84295 ASSAY OF SERUM SODIUM: CPT

## 2019-09-18 PROCEDURE — 86850 RBC ANTIBODY SCREEN: CPT

## 2019-09-18 PROCEDURE — 83605 ASSAY OF LACTIC ACID: CPT

## 2019-09-18 PROCEDURE — 71045 X-RAY EXAM CHEST 1 VIEW: CPT

## 2019-09-18 PROCEDURE — 99238 HOSP IP/OBS DSCHRG MGMT 30/<: CPT

## 2019-09-18 PROCEDURE — 87075 CULTR BACTERIA EXCEPT BLOOD: CPT

## 2019-09-18 PROCEDURE — 84100 ASSAY OF PHOSPHORUS: CPT

## 2019-09-18 PROCEDURE — 93970 EXTREMITY STUDY: CPT

## 2019-09-18 PROCEDURE — 36600 WITHDRAWAL OF ARTERIAL BLOOD: CPT

## 2019-09-18 PROCEDURE — 94760 N-INVAS EAR/PLS OXIMETRY 1: CPT

## 2019-09-18 PROCEDURE — 36415 COLL VENOUS BLD VENIPUNCTURE: CPT

## 2019-09-18 PROCEDURE — 99232 SBSQ HOSP IP/OBS MODERATE 35: CPT

## 2019-09-18 PROCEDURE — P9059: CPT

## 2019-09-18 PROCEDURE — 82330 ASSAY OF CALCIUM: CPT

## 2019-09-18 PROCEDURE — 82803 BLOOD GASES ANY COMBINATION: CPT

## 2019-09-18 PROCEDURE — 82947 ASSAY GLUCOSE BLOOD QUANT: CPT

## 2019-09-18 PROCEDURE — 76775 US EXAM ABDO BACK WALL LIM: CPT

## 2019-09-18 PROCEDURE — 87086 URINE CULTURE/COLONY COUNT: CPT

## 2019-09-18 PROCEDURE — 83735 ASSAY OF MAGNESIUM: CPT

## 2019-09-18 PROCEDURE — 85610 PROTHROMBIN TIME: CPT

## 2019-09-18 PROCEDURE — 97163 PT EVAL HIGH COMPLEX 45 MIN: CPT

## 2019-09-18 PROCEDURE — 94640 AIRWAY INHALATION TREATMENT: CPT

## 2019-09-18 PROCEDURE — 80053 COMPREHEN METABOLIC PANEL: CPT

## 2019-09-18 PROCEDURE — 84132 ASSAY OF SERUM POTASSIUM: CPT

## 2019-09-18 PROCEDURE — 85027 COMPLETE CBC AUTOMATED: CPT

## 2019-09-18 PROCEDURE — 82435 ASSAY OF BLOOD CHLORIDE: CPT

## 2019-09-18 PROCEDURE — 81001 URINALYSIS AUTO W/SCOPE: CPT

## 2019-09-18 RX ORDER — INSULIN DETEMIR 100/ML (3)
18 INSULIN PEN (ML) SUBCUTANEOUS
Qty: 0 | Refills: 0 | DISCHARGE

## 2019-09-18 RX ORDER — LEVOTHYROXINE SODIUM 125 MCG
1 TABLET ORAL
Qty: 0 | Refills: 0 | DISCHARGE

## 2019-09-18 RX ORDER — CLOPIDOGREL BISULFATE 75 MG/1
1 TABLET, FILM COATED ORAL
Qty: 0 | Refills: 0 | DISCHARGE

## 2019-09-18 RX ORDER — ONDANSETRON 8 MG/1
1 TABLET, FILM COATED ORAL
Qty: 0 | Refills: 0 | DISCHARGE

## 2019-09-18 RX ORDER — ALPRAZOLAM 0.25 MG
0 TABLET ORAL
Qty: 0 | Refills: 0 | DISCHARGE

## 2019-09-18 RX ORDER — ASCORBIC ACID 60 MG
1 TABLET,CHEWABLE ORAL
Qty: 0 | Refills: 0 | DISCHARGE

## 2019-09-18 RX ORDER — LOPERAMIDE HCL 2 MG
0 TABLET ORAL
Qty: 0 | Refills: 0 | DISCHARGE

## 2019-09-18 RX ORDER — FERROUS GLUCONATE 100 %
0 POWDER (GRAM) MISCELLANEOUS
Qty: 0 | Refills: 0 | DISCHARGE

## 2019-09-18 RX ORDER — MIRTAZAPINE 45 MG/1
1 TABLET, ORALLY DISINTEGRATING ORAL
Qty: 0 | Refills: 0 | DISCHARGE

## 2019-09-18 RX ORDER — MULTIVIT-MIN/FERROUS GLUCONATE 9 MG/15 ML
1 LIQUID (ML) ORAL
Qty: 0 | Refills: 0 | DISCHARGE

## 2019-09-18 RX ORDER — HYDROMORPHONE HYDROCHLORIDE 2 MG/ML
0.5 INJECTION INTRAMUSCULAR; INTRAVENOUS; SUBCUTANEOUS
Qty: 0 | Refills: 0 | DISCHARGE
Start: 2019-09-18

## 2019-09-18 RX ORDER — DICLOFENAC SODIUM 30 MG/G
0 GEL TOPICAL
Qty: 0 | Refills: 0 | DISCHARGE

## 2019-09-18 RX ORDER — POLYETHYLENE GLYCOL 3350 17 G/17G
0 POWDER, FOR SOLUTION ORAL
Qty: 0 | Refills: 0 | DISCHARGE

## 2019-09-18 RX ORDER — ROBINUL 0.2 MG/ML
0.1 INJECTION INTRAMUSCULAR; INTRAVENOUS
Qty: 0 | Refills: 0 | DISCHARGE
Start: 2019-09-18

## 2019-09-18 RX ORDER — SERTRALINE 25 MG/1
1 TABLET, FILM COATED ORAL
Qty: 0 | Refills: 0 | DISCHARGE

## 2019-09-18 RX ORDER — INSULIN ASPART 100 [IU]/ML
0 INJECTION, SOLUTION SUBCUTANEOUS
Qty: 0 | Refills: 0 | DISCHARGE

## 2019-09-18 RX ORDER — PANTOPRAZOLE SODIUM 20 MG/1
1 TABLET, DELAYED RELEASE ORAL
Qty: 0 | Refills: 0 | DISCHARGE

## 2019-09-18 RX ORDER — GABAPENTIN 400 MG/1
0 CAPSULE ORAL
Qty: 0 | Refills: 0 | DISCHARGE

## 2019-09-18 RX ORDER — TAMSULOSIN HYDROCHLORIDE 0.4 MG/1
1 CAPSULE ORAL
Qty: 0 | Refills: 0 | DISCHARGE

## 2019-09-18 RX ORDER — FOLIC ACID 0.8 MG
1 TABLET ORAL
Qty: 0 | Refills: 0 | DISCHARGE

## 2019-09-18 RX ORDER — METOPROLOL TARTRATE 50 MG
1 TABLET ORAL
Qty: 0 | Refills: 0 | DISCHARGE

## 2019-09-18 RX ORDER — FERROUS SULFATE 325(65) MG
1 TABLET ORAL
Qty: 0 | Refills: 0 | DISCHARGE

## 2019-09-18 RX ORDER — ATORVASTATIN CALCIUM 80 MG/1
1 TABLET, FILM COATED ORAL
Qty: 0 | Refills: 0 | DISCHARGE

## 2019-09-18 RX ORDER — ZINC SULFATE TAB 220 MG (50 MG ZINC EQUIVALENT) 220 (50 ZN) MG
1 TAB ORAL
Qty: 0 | Refills: 0 | DISCHARGE

## 2019-09-18 RX ORDER — ACETAMINOPHEN 500 MG
0 TABLET ORAL
Qty: 0 | Refills: 0 | DISCHARGE

## 2019-09-18 RX ADMIN — ROBINUL 0.1 MILLIGRAM(S): 0.2 INJECTION INTRAMUSCULAR; INTRAVENOUS at 06:06

## 2019-09-18 RX ADMIN — HYDROMORPHONE HYDROCHLORIDE 0.5 MILLIGRAM(S): 2 INJECTION INTRAMUSCULAR; INTRAVENOUS; SUBCUTANEOUS at 10:09

## 2019-09-18 RX ADMIN — HYDROMORPHONE HYDROCHLORIDE 0.5 MILLIGRAM(S): 2 INJECTION INTRAMUSCULAR; INTRAVENOUS; SUBCUTANEOUS at 06:06

## 2019-09-18 RX ADMIN — HYDROMORPHONE HYDROCHLORIDE 0.5 MILLIGRAM(S): 2 INJECTION INTRAMUSCULAR; INTRAVENOUS; SUBCUTANEOUS at 14:16

## 2019-09-18 RX ADMIN — Medication 0.5 MILLIGRAM(S): at 11:25

## 2019-09-18 RX ADMIN — HYDROMORPHONE HYDROCHLORIDE 0.5 MILLIGRAM(S): 2 INJECTION INTRAMUSCULAR; INTRAVENOUS; SUBCUTANEOUS at 13:25

## 2019-09-18 RX ADMIN — ROBINUL 0.1 MILLIGRAM(S): 0.2 INJECTION INTRAMUSCULAR; INTRAVENOUS at 13:25

## 2019-09-18 RX ADMIN — Medication 200 MILLIGRAM(S): at 06:06

## 2019-09-18 RX ADMIN — HYDROMORPHONE HYDROCHLORIDE 0.5 MILLIGRAM(S): 2 INJECTION INTRAMUSCULAR; INTRAVENOUS; SUBCUTANEOUS at 10:48

## 2019-09-18 NOTE — PROGRESS NOTE ADULT - ASSESSMENT
86 y/o female with multiple comorbidities found to have acute perforated appendicitis s/p emergent laparoscopic appendectomy with abdominal washout. Complicated hospital course including acute anemia, anasarca, acute respiratory failure on HFNC, severe protein malnutrition, lico/atn, rapid afib with rvr and confusion. Multiple goc discussion last week with family given pt's overall poor prognosis given debility, comorbidities and multiorgan failure. Over the weekend, family opted for transition to full comfort measures. CXR yesterday with worsening CHF exacerbation and effusions. Her breathing improved with increased O2 and IV dilaudid. Met with family today, pt approved for hospice and consent signed; tentative discharge this afternoon.     PLAN    Perforated Appendicitis-Gangrenous  - s/p appendectomy with abd washout and EL drain removed, s/p antibiotic     Pain  - comfortable, c/w IV dialudid 0.5 mg PRN     Dyspnea  - tachypnea improved with addition of IV dialudid atc and increased O2 supplement yesterday  - IV dilaudid 0.5 mg q8H ATC + PRNs    Cough  - intermittent, pt does not like taste of cough syrup, will change to Guaifenesin  mg q12H     Anxiety/Acute Delirium  - c/w supportive care, IV ativan 0.5 mg PRN    Severe Protein Calorie Malnutrition   - minimal intake, allow pleasure feeds as tolerated    Palliative Care Encounter  Pt remains on comfort measures. Approved for hospice and consent signed by alternate proxy Jasmine with hospice RN. Pt in agreement with plan to inpatient hospice unit for further sx management. Likely D/C this afternoon.

## 2019-09-18 NOTE — PROGRESS NOTE ADULT - SUBJECTIVE AND OBJECTIVE BOX
FOLLOW UP VISIT    INTERVAL HPI/OVERNIGHT EVENTS:  Seen and examined with hospice RN Magaly. Pt did not sleep well overnight due to cough. Breathing comfortably. Daughter Jasmine at bedside.   CXR yesterday with worsening congestion and effusion.     Present Symptoms:   Dyspnea:  No    Nausea/Vomiting:  No  Anxiety:  no   Fatigue: Yes    Loss of appetite: Yes   Pain: No    Limited ROS.   +intermittent confusion  +cough   +dry mouth    MEDICATIONS  (STANDING):  glycopyrrolate Injectable 0.1 milliGRAM(s) IV Push every 8 hours  guaiFENesin  milliGRAM(s) Oral every 12 hours  HYDROmorphone  Injectable 0.5 milliGRAM(s) IV Push every 8 hours    MEDICATIONS  (PRN):  HYDROmorphone  Injectable 0.5 milliGRAM(s) IV Push every 2 hours PRN pain or dyspnea  LORazepam   Injectable 0.5 milliGRAM(s) IV Push every 2 hours PRN Anxiety    PHYSICAL EXAM:  Vital Signs Last 24 Hrs  T(C): 36.8 (18 Sep 2019 07:36), Max: 36.8 (18 Sep 2019 07:36)  T(F): 98.2 (18 Sep 2019 07:36), Max: 98.2 (18 Sep 2019 07:36)  HR: 84 (18 Sep 2019 07:36) (84 - 84)  BP: --  BP(mean): --  RR: 18 (18 Sep 2019 07:36) (18 - 18)  SpO2: 96% (18 Sep 2019 07:36) (96% - 96%)    General: frail. No acute distress.   HEENT: mucous membrane dry   Lungs: comfortable. O2NC 3.5 L   CV: +s1/s2. irregular irregular  GI:+ bowel sound. abdomen soft, non-tender, obese   MSK: Generalized anasaraca. weakness.  +Lt AKA    Neuro: nonfocal. awake but fatigued. answers simple questions  Skin: warm and dry.  pale    LABS: reviewed  RADIOLOGY & ADDITIONAL STUDIES: Reviewed, no new recent studies    ADVANCE DIRECTIVES: DNR/I, MOLST

## 2019-09-18 NOTE — DISCHARGE NOTE PROVIDER - HOSPITAL COURSE
Admission HPI: 87yF with pmhx of DM, HLD, HTN, CAD, PVD, and PAF on plavix presenting to the ED with a 4 day history of RLQ pain. Patient states that she has not been able to eat much since and that it's associated with N/V. Pt has not had a bowel movement in a couple of a days. Otherwise denies f, cp, sob.        Hospital Course: CT AP on admission showed acute perforated appendicitis. Patient was admitted to the ACS service. Taken to the OR on 9/8 for lap appendectomy. Intraop findings: gangrenous appendicitis, w/ surrounding purulence surrounding R paracolic gutter and liver appendectomy performed, coupious irrigation and EL left in R paracolic space. Patient transferred to SICU post-operatively. Kept on IV abx. Post-op course complicated by acute renal failure, hypoxic respiratory failure, severe protein malnutrition, ileus, acute blood loss anemia, rapid afib, metabolic encephalopathy, & aspiration event. Palliative care consulted and patient & patient's daughter (HCP) & goals of care discussed. Decision made to proceed with comfort care measures. Patient will be transferred to hospice at this time.         Length of time preparing discharge > 30 minutes

## 2019-09-18 NOTE — DISCHARGE NOTE PROVIDER - NSDCCPCAREPLAN_GEN_ALL_CORE_FT
PRINCIPAL DISCHARGE DIAGNOSIS  Diagnosis: Appendicitis with perforation  Assessment and Plan of Treatment: If there are any questions / concerns about your hospital course, contact information for your surgeon Dr. Valverde has been provided on your discharge paperwork. You are being discharged to a hospice facility. Medications to help with comfort will be prescribed by the providers at your facility.      SECONDARY DISCHARGE DIAGNOSES  Diagnosis: Acute renal failure, unspecified acute renal failure type  Assessment and Plan of Treatment:     Diagnosis: Atrial fibrillation  Assessment and Plan of Treatment:     Diagnosis: Sepsis with metabolic encephalopathy  Assessment and Plan of Treatment:     Diagnosis: Sepsis with acute hypoxic respiratory failure  Assessment and Plan of Treatment:     Diagnosis: Severe protein-calorie malnutrition  Assessment and Plan of Treatment:     Diagnosis: Postoperative ileus  Assessment and Plan of Treatment: PRINCIPAL DISCHARGE DIAGNOSIS  Diagnosis: Appendicitis with perforation  Assessment and Plan of Treatment: You are being discharged to a hospice facility. If there are any questions / concerns about your hospital course, contact information for your surgeon Dr. Valverde has been provided on your discharge paperwork. Medications to help with comfort will be prescribed by the providers at your facility - the medications that are on your discharge paperwork are the current medications being recieved at the hospital prior to discharge.      SECONDARY DISCHARGE DIAGNOSES  Diagnosis: Postoperative ileus  Assessment and Plan of Treatment:     Diagnosis: Sepsis with metabolic encephalopathy  Assessment and Plan of Treatment:     Diagnosis: Sepsis with acute hypoxic respiratory failure  Assessment and Plan of Treatment:     Diagnosis: Atrial fibrillation  Assessment and Plan of Treatment:     Diagnosis: Severe protein-calorie malnutrition  Assessment and Plan of Treatment:     Diagnosis: Acute renal failure, unspecified acute renal failure type  Assessment and Plan of Treatment:

## 2019-09-18 NOTE — DISCHARGE NOTE NURSING/CASE MANAGEMENT/SOCIAL WORK - PATIENT PORTAL LINK FT
You can access the FollowMyHealth Patient Portal offered by [facility] by registering at the following website: [email or web address]. By joining [email or web address] FollowMyHealth portal, you will also be able to view your health information using other applications (apps) compatible with our system.

## 2019-09-18 NOTE — GOALS OF CARE CONVERSATION - PERSONAL ADVANCE DIRECTIVE - NS PRO AD PATIENT TYPE
Do Not Resuscitate (DNR)/Health Care Proxy (HCP)
Do Not Resuscitate (DNR)/Health Care Proxy (HCP)/Living Will
Do Not Resuscitate (DNR)/Health Care Proxy (HCP)/Living Will
Living Will/Do Not Resuscitate (DNR)/Health Care Proxy (HCP)

## 2019-09-18 NOTE — PROGRESS NOTE ADULT - ASSESSMENT
A/P: 87y Female with perforated appendicitis complicated by acute kidney injury, hypoxic respiratory failure, metabolic encephalopathy, multi system organ failure. On Comfort measures only    -Pain control PRN  -Bhatt in place for comfort  -Palliative discussion: comfort measures only.  -Dispo: Hospice planning

## 2019-09-18 NOTE — PROGRESS NOTE ADULT - ATTENDING COMMENTS
D/W pt, dtr Hawa and Pat, ZAYDA Wilson
D/W pt, dtr, RN, hospice RN Magaly Zamorano
D/W pt, RN, hospice RN Kendrick
D/W pt, trauma Surgery resident Dr. Vogt, RN
Seen and examined.      NAD  somnolent  RRR  non labored resp,now on 35% high loretta oxygen.  mildly distended abd.  Large serous drainage in EL.      A fib RVR overnight.  Now back in sinus.  Responded to transfusion yesterday, H&H stable.  INR still elevated.  IVC with large variability with resp.  Hypovolemic.  Remains severely anasarcic with large ascites.  Will given colloid volume, FFP and albumin, lasix infusion.  Vit K for nutritional coagulapathy.  PO intake suboptimal. Will continue to push PO intake but likely will need to replace NG tube for feeding.  Fever overngight, not unexpected given dx and course.  Will hold to plan for stopping abx and follow clinical condition.  Hyperglycemia.  Lantus,  SSI for now. If continues to be uncontrolled will consider drip.   Poor prognosis given severe protein calorie malnutrition and other chronic pre morbid conditions.  Palliative care consult.
Seen and examined.    Anasarca, coagulapathy from malnutrition.   Acute blood loss anemia.  Intravascular hypovolemia in context of total body overload.  FFP, PRBC.  Lasix if develops acute pulm edema and resp compromise.  Severe protein calorie malnutrition.  Advance diet.  Calorie count.  Consider NG tube placement and feedings if cannot maintain necessary calories orally.
Seen and examined.    NAD  awake and alert  tachypneic.  High flow NC via NC.  40%.    Irregular rapid hr  softly distended abd.  anasarca    Refused NG tube yesterday for ileus.  Also request minimize blood draws needle sticks.  Not ready to transition to full comfort measures yet though.  No vomiting overnight,  mild increase in 02 requirement.  Back in rapid a -fib.  Restart Cardizem drip.  Continue lasix drip.  Albumin infusions.  Family understands risks of major electrolyte disturbances given current therapy and will consider allowing lab draw tomorrow.  Understands risks of hyperglycemia.  Do not want finger sticks.  Pt c/o thirst today.  Failing bedside dysphagia screen at this time.  Discussed comfort PO vs NG vs continued NPO risks vs benefits.  Will allow PO water under supervision for now, understanding potential risks of aspiration.  Reevaluate throughout day.  Prognosis poor, w/o adequate nutrition do not expect recovery.  Have advised family of this and they understand.  Continue to readdress goals of care. palliative following.      35 minutes of critical care time spent providing medical care for patient's acute illness/conditions that impairs at least one vital organ system and/or poses a high risk of imminent or life threatening deterioration in the patient's condition. It includes time spent evaluating and treating the patient's acute illness as well as time spent reviewing labs, radiology, discussing goals of care with patient and/or patient's family, and discussing the case with a multidisciplinary team in an effort to prevent further life threatening deterioration or end organ damage. This time is independent of any procedures performed.
Seen and examined.    NAD  somnolent  non labored resp    Family has requested full transition to comfort measure.  lasix, cardizem stopped.  Dilaudid and benzos as needed.  glycopyrolate prn.  transfer to private room.  Maintain juarez for comfort.  If awakens PO as desired.  Dr. Valverde informed.
Seen and examined.    NAD  RRR  non labored resp  distended abd  moderate difuse edema.    Acute anuric renal failure with ATN.  IVC ultrasound with large resp variability.  Hypovolemic.  Likely severe protein calorie malnutrition - will bolus with albumin. KUB shows NG tube in esophagus.  Advanced.  Cont suction for post op ileus.  Will begin tube feeds as pt progresses.  Abx.
Seen and examined.    NAD  awake, disoriented  RRR  non labored resp.   abd softly distended.   moderate anasarca.     UOP improving.  IVC ultrasound with continued large variability.   Cont resuscitation with albumin.  Worsening increased gap metabolic acidosis.  Small ketones, likely starvation ketoacidosis.  gap mostly acute uremia.  Trial liquid diet.  If does not tolerate add dextrose to IV fluids.

## 2019-09-18 NOTE — PROGRESS NOTE ADULT - SUBJECTIVE AND OBJECTIVE BOX
INTERVAL HPI/OVERNIGHT EVENTS: no acute events over night    SUBJECTIVE: 	Patient seen at bedside and found in no acute distress. Patient states she has mild discomfort of her lower back but otherwise feels well. She looks forward to leaving the hospital, and is awaiting bed at outside facility. She denies n/v, fevers/chills, sob, chest pain. Overall feeling well with no complaints or concerns.      MEDICATIONS  (STANDING):  glycopyrrolate Injectable 0.1 milliGRAM(s) IV Push every 8 hours  HYDROmorphone  Injectable 0.5 milliGRAM(s) IV Push every 8 hours    MEDICATIONS  (PRN):  guaiFENesin   Syrup  (Sugar-Free) 200 milliGRAM(s) Oral every 6 hours PRN Cough  HYDROmorphone  Injectable 0.5 milliGRAM(s) IV Push every 2 hours PRN pain or dyspnea  LORazepam   Injectable 0.5 milliGRAM(s) IV Push every 2 hours PRN Anxiety      Vital Signs Last 24 Hrs  T(C): --  T(F): --  HR: --  BP: --  BP(mean): --  RR: --  SpO2: --    PE  Gen: resting comfortably in bed  Pulm: no increased wob  Neuro: patient with appropriate conversation      I&O's Detail    16 Sep 2019 07:01  -  17 Sep 2019 07:00  --------------------------------------------------------  IN:    Oral Fluid: 120 mL  Total IN: 120 mL    OUT:    Indwelling Catheter - Urethral: 2150 mL  Total OUT: 2150 mL    Total NET: -2030 mL      17 Sep 2019 07:01  -  18 Sep 2019 01:06  --------------------------------------------------------  IN:  Total IN: 0 mL    OUT:    Indwelling Catheter - Urethral: 300 mL  Total OUT: 300 mL    Total NET: -300 mL          LABS:                RADIOLOGY & ADDITIONAL STUDIES:

## 2019-09-18 NOTE — GOALS OF CARE CONVERSATION - PERSONAL ADVANCE DIRECTIVE - CONVERSATION DETAILS
SW and palliative physician met with patient and patients dgt binta who is HCP. Support offered and family decisions discussed. Binta reports that her and siblings spoke last night and are proceeding with comfort care measures. palliative care physician attempted to explore all current medical treatments that are still being done since some not in line with full comfort measures. Family has accepted morphine medication and request to start reducing high loretta as well don't want labs and finger sticks but still have maintained other treatments. Further discussion  to be had with family. Patient unlike yesterday was more engaging and discussed life events with reminisce.
Sw and palliative physician met with patients dgt HCP to provide support in coping with multiple medical issues and educate regarding dx, prognosis . Palliative care clarified dgts questions regarding comfort care and symptom measures offered. Patients dgt HCP will discuss with other siblings and notify team with final decision. Dgt did verbalize that she does not want patient to suffer and is realistic and aware that multi organs involved at this point.
/[facility] RN, together with Dr. Chetna Zuleta, met with patient again this morning and later this afternoon. Patient is more symptomatic today than yesterday - patient dyspnic on 3 liters/minute oxygen via nasal cannula. Patient also has a cough. Chest x-ray done - showed congestive heart failure.     Writer spoke to Artemio Vidal, GARY / Dr. Katty Snowden at the [facility] - based on new symptoms, patient is approved for transfer to the [facility] once a bed becomes available.      and Dr. Zuleta met with patient's daughter, Lizbet and notified her of same. Daughter Lizbet also consulted with STACEY Hanson at [place], who is scheduled to perform a hospice assessment on patient tomorrow at 11:00 a.m. All parties agreed that we would see how patient does overnight and proceed from there. 3 [facility] Concepcion Barnhart notified of same - will continue to follow.     Magaly Zamorano RN  [phone]
Patient was approved for the [facility] and a bed is available today. Writer/[facility] RN informed patient and daughter Jasmine of same; daughter Jasmine signed hospice consents on patient's behalf. Writer ordered ambulance for 2pm pickup today. Floor RN, Dr. Valverde and case management informed.     Magaly Zamorano, RN  [phone]
Pt had prior MOLST.  Not present on chart.  New form filled out, signed and placed on chart.  DNR/DNI.  Otherwise aggressive treatment.
Referral received from Dr. Chetna Zuleta of the Palliative Care Team. Writer/[facility] RN met with patient, patient's two daughters and granddaughter at bedside. Received patient sitting up in bed, eating spaghetti and broccoli for lunch. Patient was alert and able to participate in hospice conversation. Patient expressed interest in going to [facility] prior to going back to her assisted living facility.     Patient does not appear to qualify for home hospice services at this time. However, writer will re-evaluate patient on Tuesday and monitor any changes in status. Dr. Zuleta notified of same. Will continue to follow.    Magaly Zamorano, RN  [phone]

## 2019-09-18 NOTE — DISCHARGE NOTE PROVIDER - CARE PROVIDER_API CALL
Spencer Valverde (MD)  Surgery  [address], [address]  [place] [address]  Phone: [phone]  Fax: [phone]  Follow Up Time: Routine

## 2019-09-18 NOTE — PROGRESS NOTE ADULT - PROVIDER SPECIALTY LIST ADULT
Palliative Care
Palliative Care
SICU
Surgery
SICU
Palliative Care
Palliative Care

## 2020-02-18 NOTE — PRE-OP CHECKLIST - PATIENT SENT AT
What Type Of Note Output Would You Prefer (Optional)?: Standard Output
Hpi Title: Evaluation of Skin Lesions
How Severe Are Your Spot(S)?: mild
Have Your Spot(S) Been Treated In The Past?: has not been treated
20-Apr-2017 13:01
14-Apr-2017 09:59

## 2020-06-25 NOTE — PROGRESS NOTE ADULT - SUBJECTIVE AND OBJECTIVE BOX
ED NEPHROLOGY INTERVAL HPI/OVERNIGHT EVENTS: No new issues    MEDICATIONS  (STANDING):  amLODIPine   Tablet 10milliGRAM(s) Oral daily  gabapentin 600milliGRAM(s) Oral at bedtime  atorvastatin 40milliGRAM(s) Oral at bedtime  folic acid 1milliGRAM(s) Oral daily  metoprolol 50milliGRAM(s) Oral every 12 hours  pantoprazole    Tablet 40milliGRAM(s) Oral before breakfast  levothyroxine 75MICROGram(s) Oral daily  multivitamin/minerals 1Tablet(s) Oral daily  multivitamin 1Tablet(s) Oral daily  ferrous    sulfate 325milliGRAM(s) Oral every 12 hours  apixaban 2.5milliGRAM(s) Oral two times a day  insulin glargine Injectable (LANTUS) 22Unit(s) SubCutaneous at bedtime  insulin lispro Injectable (HumaLOG) 5Unit(s) SubCutaneous before breakfast  insulin lispro Injectable (HumaLOG) 5Unit(s) SubCutaneous before lunch  insulin lispro Injectable (HumaLOG) 5Unit(s) SubCutaneous before dinner  insulin lispro (HumaLOG) corrective regimen sliding scale  SubCutaneous three times a day before meals  dextrose 5%. 1000milliLiter(s) IV Continuous <Continuous>  dextrose 50% Injectable 12.5Gram(s) IV Push once  dextrose 50% Injectable 25Gram(s) IV Push once  dextrose 50% Injectable 25Gram(s) IV Push once  gabapentin 300milliGRAM(s) Oral <User Schedule>  gabapentin 200milliGRAM(s) Oral <User Schedule>  cefTRIAXone   IVPB  IV Intermittent     MEDICATIONS  (PRN):  acetaminophen   Tablet 650milliGRAM(s) Oral every 6 hours PRN For Temp greater than 38 C (100.4 F)  ondansetron Injectable 4milliGRAM(s) IV Push every 6 hours PRN Nausea  dextrose Gel 1Dose(s) Oral once PRN Blood Glucose LESS THAN 70 milliGRAM(s)/deciliter  glucagon  Injectable 1milliGRAM(s) IntraMuscular once PRN Glucose LESS THAN 70 milligrams/deciliter  ALPRAZolam 0.5milliGRAM(s) Oral every 6 hours PRN anxiety  oxyCODONE IR 7.5milliGRAM(s) Oral every 6 hours PRN Severe Pain (7 - 10)  oxyCODONE IR 5milliGRAM(s) Oral every 6 hours PRN Moderate Pain (4 - 6)      Allergies    Benadryl (Other)  Demerol HCl (Other)    Intolerances        Vital Signs Last 24 Hrs  T(C): 36.5, Max: 36.7 ( @ 18:00)  T(F): 97.7, Max: 98 ( @ 18:00)  HR: 89 (77 - 89)  BP: 132/54 (132/48 - 142/50)  BP(mean): --  RR: 16 (16 - 18)  SpO2: 97% (93% - 98%)  Daily     Daily Weight in k (24 Mar 2017 05:46)    PHYSICAL EXAM:    GENERAL: appears chronically ill  HEAD:    EYES:   ENMT:   NECK: veins flat  NERVOUS SYSTEM:    CHEST/LUNG: no rhonchi  HEART: no gallop  ABDOMEN: not tender  EXTREMITIES:  left BKA with wrap, no edema right leg  LYMPH:   SKIN: no rash, pale    LABS:                        9.8    13.0  )-----------( 438      ( 24 Mar 2017 11:44 )             30.5     23 Mar 2017 05:17    132    |  95     |  13.0   ----------------------------<  174    4.2     |  25.0   |  0.83     Ca    8.4        23 Mar 2017 05:17  Phos  3.1       23 Mar 2017 05:17  Mg     1.9       23 Mar 2017 05:17        Urinalysis Basic - ( 22 Mar 2017 16:40 )    Color: Yellow / Appearance: Clear / S.005 / pH: x  Gluc: x / Ketone: Negative  / Bili: Negative / Urobili: Negative mg/dL   Blood: x / Protein: 100 mg/dL / Nitrite: Positive   Leuk Esterase: Moderate / RBC: 11-25 /HPF / WBC 3-5   Sq Epi: x / Non Sq Epi: Occasional / Bacteria: Occasional              RADIOLOGY & ADDITIONAL TESTS:

## 2020-11-30 NOTE — PROGRESS NOTE ADULT - SUBJECTIVE AND OBJECTIVE BOX
Patient is a 85y old  Female who presents with a chief complaint of left leg pain (29 Mar 2017 21:00)  She is scheduled to have a LEFT ABOVE THE KNEE AMPUTATION, RIGHT LOWER EXTREMITY   ANGIOGRAM, POSSIBLE ANGIOPLASTY, POSSIBLE STENTING WITH IMAGE.      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 1000milliLiter(s) IV Continuous <Continuous>  dextrose 50% Injectable 12.5Gram(s) IV Push once  dextrose 50% Injectable 25Gram(s) IV Push once  dextrose 50% Injectable 25Gram(s) IV Push once  atorvastatin 40milliGRAM(s) Oral at bedtime  amLODIPine   Tablet 10milliGRAM(s) Oral daily  ferrous    sulfate 325milliGRAM(s) Oral daily  pantoprazole    Tablet 40milliGRAM(s) Oral before breakfast  folic acid 1milliGRAM(s) Oral daily  zinc sulfate 220milliGRAM(s) Oral daily  ascorbic acid 500milliGRAM(s) Oral daily  metoprolol 50milliGRAM(s) Oral two times a day  lidocaine   Patch 1Patch Transdermal daily  docusate sodium 100milliGRAM(s) Oral two times a day  mirtazapine 7.5milliGRAM(s) Oral at bedtime  polyethylene glycol 3350 17Gram(s) Oral daily  gabapentin 200milliGRAM(s) Oral <User Schedule>  gabapentin 300milliGRAM(s) Oral <User Schedule>  nystatin Powder 1Application(s) Topical two times a day  levothyroxine 88MICROGram(s) Oral daily  ALPRAZolam 0.25milliGRAM(s) Oral <User Schedule>  insulin glargine Injectable (LANTUS) 12Unit(s) SubCutaneous at bedtime  lisinopril 2.5milliGRAM(s) Oral daily  ceFAZolin   IVPB 2000milliGRAM(s) IV Intermittent every 12 hours  oxyCODONE ER Tablet 10milliGRAM(s) Oral every 12 hours  dibucaine 1% Ointment 1Application(s) Topical three times a day  sodium chloride 0.9%. 1000milliLiter(s) IV Continuous <Continuous>  enoxaparin Injectable 75milliGRAM(s) SubCutaneous two times a day    MEDICATIONS  (PRN):  acetaminophen   Tablet 650milliGRAM(s) Oral every 6 hours PRN For Temp greater than 38 C (100.4 F)  aluminum hydroxide/magnesium hydroxide/simethicone Suspension 30milliLiter(s) Oral every 4 hours PRN Dyspepsia  dextrose Gel 1Dose(s) Oral once PRN Blood Glucose LESS THAN 70 milliGRAM(s)/deciLiter  glucagon  Injectable 1milliGRAM(s) IntraMuscular once PRN Glucose <70 milliGRAM(s)/deciLiter  ondansetron   Disintegrating Tablet 4milliGRAM(s) Oral every 8 hours PRN Nausea and/or Vomiting  senna 2Tablet(s) Oral at bedtime PRN Constipation  bisacodyl 5milliGRAM(s) Oral every 12 hours PRN Constipation  acetaminophen   Tablet 650milliGRAM(s) Oral every 6 hours PRN moderate pain  ALPRAZolam 0.25milliGRAM(s) Oral two times a day PRN anxiety  traMADol 25milliGRAM(s) Oral every 3 hours PRN Moderate Pain (4 - 6)  traMADol 50milliGRAM(s) Oral every 6 hours PRN Severe Pain (7 - 10)      Allergies:  Benadryl (makes her agitated)                  Demerol HCl (nausea and vomiting)    SOCIAL HISTORY:  The patient doesn't drink alcohol or use illicit drugs.  She quit smoking 30 years ago                          10.0   6.7   )-----------( 279      ( 2017 06:37 )             31.1     04-12    133<L>  |  95<L>  |  20.0  ----------------------------<  206<H>  4.2   |  25.0  |  0.92    Ca    9.0      2017 06:53    EK/10/2017  Diagnosis Line Normal sinus rhythm  Normal ECG       TT ECHO:  ECHO TRANSTHORACIC   -  Mar 10 2017      Summary:   1. Normal biventricular systolic function. Left ventricular ejection        fraction, by visual estimation, is 60 to 65%.   2. There is mild concentric left ventricular hypertrophy.   3. Spectral Doppler shows impaired relaxation pattern of left        ventricular myocardial filling (Grade I diastolic dysfunction).   4. Mild tricuspidregurgitation.   5. Estimated PA systolic pressure = 32 mmHg (presuming RA pressure = 3         mmHg).   6. Aortic sclerosis without evidence for stenosis.    CHEST SINGLE VIEW FRONTAL   -   2017    FINDINGS:  LUNGS/PLEURA: Small left pleural effusion with basilar atelectasis or   pneumonia.    ASA # = 3  Mallampati # =  4  (teeth are implants not removable) - Pharm VTE ppx with Lovenox 40mg sq daily    IMPROVE VTE Individual Risk Assessment          RISK                                                          Points  [  ] Previous VTE                                                3  [  ] Thrombophilia                                             2  [  ] Lower limb paralysis                                   2        (unable to hold up >15 seconds)    [  ] Current Cancer                                             2         (within 6 months)  [  ] Immobilization > 24 hrs                              1  [  ] ICU/CCU stay > 24 hours                             1  [  ] Age > 60                                                         1    IMPROVE VTE Score: 0

## 2021-01-28 NOTE — H&P PST ADULT - CONSTITUTIONAL DETAILS
Detail Level: Simple
Comment: Improved since last visit after completing course of Prednisone for unrelated reason. Will continue Elidel cream and begin OTC Sarna lotion.
well-developed/well-groomed/no distress

## 2021-04-12 NOTE — H&P ADULT - NSHPPOADEEPVENOUSTHROMB_GEN_A_CORE
PT DAILY TREATMENT NOTE - [facility] 2-15    Patient Name: Jenni Ek  Date:2021  : 1996  [x]  Patient  Verified  Payor: [facility] MEDICAID / Plan: [facility] [address] [facility] CCCP / Product Type: Managed Care Medicaid /    In time:815 A  Out time:910 A  Total Treatment Time (min): 55  Total Timed Codes (min): 45   Visit #:  2    Treatment Area: Cervicalgia [M54.2]    SUBJECTIVE  Pain Level (0-10 scale): 2  Any medication changes, allergies to medications, adverse drug reactions, diagnosis change, or new procedure performed?: [x] No    [] Yes (see summary sheet for update)  Subjective functional status/changes:     Pt reports compliance to HEP. Got new shoes. Has been walking 10 min daily. OBJECTIVE    Modality rationale: decrease inflammation to improve the patients ability to perform ADL's. Min Type Additional Details   10 [x]  Ice     []  Heat Position: supine, lindsey LE's supported    Location: lindsey knees     [x] Skin assessment post-treatment:  [x]intact []redness- no adverse reaction    []redness  adverse reaction:     45 min Therapeutic Exercise:  [x] See flow sheet :   Rationale: increase strength, improve coordination, improve balance, and increase proprioception to improve the patients ability to perform ADL's            With   [] TE   [] manual Patient Education: [x] Review HEP    [] Progressed/Changed HEP based on:   [] positioning   [] body mechanics   [] transfers   [] heat/ice application    [] other:      Other Objective/Functional Measures:       Pain Level (0-10 scale) post treatment: 0    ASSESSMENT/Changes in Function:     Patient will continue to benefit from skilled PT services to modify and progress therapeutic interventions, address functional mobility deficits, address strength deficits, analyze and address soft tissue restrictions, analyze and cue movement patterns, and analyze and modify body mechanics/ergonomics to attain remaining goals.             Progress towards goals / Updated goals:  Pt tolerated all ex's well today, with no inc pain. Slight discomfort noted with SLR on right, but as pt continued, hip muscles warmed up and pt no longer had pain. PLAN  []  Upgrade activities as tolerated     []  Continue plan of care  []  Update interventions per flow sheet       []  Discharge due to:_  []  Other:_      Jonathan Tan.  Bossman, PT 4/12/2021 no

## 2021-06-07 NOTE — ED ADULT NURSE NOTE - NS ED PATIENT SAFETY CONCERN
----- Message from Quique Oropeza) MD Mirna sent at 3/25/2020  6:32 PM CDT -----  Regarding: RE: 4-1 new to you appt  Postpone for 3 months, get echo first  ----- Message -----  From: Kecia Padilla RN  Sent: 3/25/2020   1:22 PM CDT  To: Quique Oropeza) MD Mirna  Subject: 4-1 new to you appt                              Please review pt chart - sched new to you 4-1 - pt seen by [facility] 4-23-19 who recommended 1yr f/u after echo which has no been done - can appt be postponed or changed to phone visit?  mg      
Noted - message sent to sched with update.  mg  
No

## 2021-06-24 NOTE — PATIENT PROFILE ADULT. - ASSIST WITH
Location Indication Override (Is Already Calculated Based On Selected Body Location): Area M walking/standing/toileting

## 2021-07-06 NOTE — PATIENT PROFILE ADULT. - PRO MENTAL HEALTH SX RECENT
Pt's power of  Annette states that she is returning the call and wants to know what it is about . Annette can be reached at  [phone] . Annette also wants a call back to discuss pt's falls and memory issues and parkinson's.     none

## 2021-07-23 NOTE — PRE-OP CHECKLIST - BP NONINVASIVE SYSTOLIC (MM HG)
Patient returned to ED and was reactivated in the tracker. Patient stated that she went outside when she was being called. Patient shows no signs of acute distress, air way is patent, ambulatory in triage and acting appropriate at this time.    134

## 2021-09-15 NOTE — H&P ADULT - NSCORESITESY/N_GEN_A_CORE_RD
Electrophysiology Brief Post-Op Note    I have personally seen and examined the patient.  I agree with the history and physical which I have reviewed and noted any changes below.  09-15-21 @ 07:08    PRE-OP DIAGNOSIS: CHF    POST-OP DIAGNOSIS: CHF    PROCEDURE: generator change     Vendor Representative was present for clinical support.    Physician: Kleber  Assistant: None    ESTIMATED BLOOD LOSS:   5    mL    ANESTHESIA TYPE:  [  ]General Anesthesia  [ X ] Sedation  [  X] Local/Regional    CONDITION  [  ] Critical  [  ] Serious  [  ]Fair  [  X]Good      SPECIMENS REMOVED (IF APPLICABLE):  ICD    IMPLANTS (IF APPLICABLE)  ICD    FINDINGS: none    COMPLICATIONS: none     [facility] OF C[facility]  [facility]  [facility] 3-4 weeks  - remote monitor                    No

## 2021-09-29 NOTE — PATIENT PROFILE ADULT. - ALCOHOL USE HISTORY SINGLE SELECT
Patient to be transferred to [facility] from: [place]    RN-RN completed with: Suzi POPE    Legal Considerations (Voluntary or ED): Voluntary    Medications Given at transferring facility: Sodium Chloride    Legal Guardian, if applicable: n/a    Consent to Treat Signed and Received at [facility]: yes    Additional Information provided during RN-RN report (NOT a chart review): Pt went to ED seeking clonazepam detox, alcohol negative, UDS negative, pregnancy negative, hx anxiety/ depression/ chronic pain/ GERD/ hypertension, A + O x 4, cooperative.    Accepting Psychiatrist Name: Dr. Boyd    Medication Reconciliation addressed. no    Is the patient or anyone in their household experiencing any COVID symptoms?no    Is the patient or anyone in their household currently under quarantine?no    Has the patient or anyone in their household tested positive for COVID recently?no    If patient was declined from another facility, why?no    Is the patient receiving a PCR COVID test prior to transfer?yes, negative test result    Is the patient vaccinated?no    If rest of screening is negative and patient is not receiving PCR test prior to transfer, they will need it upon arrival to [facility].  If patient has a covid positive test but is asymptomatic for at least 10 days since positive covid test, then on the 11th day, they may transfer to [facility].     
telepsych assessment completed. calll out to triage Md. All final recommendations for care and disposition are made by emergency dept attending.    
yes...

## 2021-11-16 NOTE — PATIENT PROFILE ADULT. - HEALTHCARE INFORMATION NEEDED, PROFILE
Simple Simulation Afterword Text Will Be Included With Simple Simulations (Indications............): The patient had a complete consultation regarding all applicable modalities for the treatment of their skin cancer and based on a variety of factors including the type of tumor, size, and location, the relevant medical history as well as local tissue factors, the functional status of the individual, the ability to perform necessary postoperative wound instructions and the need for simultaneous treatments as well as overall wound healing status, it was determined that the patient would begin radiation therapy treatment for skin cancer.  A full simulation and treatment device design was performed including the determination and formulation of appropriate simple and complex devices including lead shield of 0.762 mm thickness to form molded customized shielding to specifically correlate with the lesion size including treatment margin.  The custom lead shield is adequate to accommodate the appropriate applicator and provide adequate shielding around the treatment site.  The specific field applicator, shields, and devices both simple and complex as well as the specific patient setup is outlined below.  The patient was given a full consent for superficial radiation to both verbally and in writing and the full determination of patient's eligibility for treatment and selection is outlined on the patient eligibility and treatment selection form.  The specific superficial radiotherapy prescription was determined and was documented on the superficial radiotherapy prescription form.  A treatment calculation was also performed and documented on the treatment calculation form.  Based on the prescription, the patient was scheduled for a series of fractional treatments. none

## 2022-05-19 NOTE — OCCUPATIONAL THERAPY INITIAL EVALUATION ADULT - LEVEL OF INDEPENDENCE:TOILET, OT EVAL
What Type Of Note Output Would You Prefer (Optional)?: Bullet Format
How Severe Are Your Spot(S)?: severe
Have Your Spot(S) Been Treated In The Past?: has been treated
Hpi Title: Evaluation of a Skin Lesion
Additional History: The abscess was drained about a week ago per patient.
maximum assist (25% patients effort)

## 2022-06-03 NOTE — PROGRESS NOTE ADULT - ASSESSMENT
[] : Resident 84 y/o female with ischemic tissue loss LLE. [FreeTextEntry3] : saccadic eye movement suggestive of vertigo

## 2022-07-19 NOTE — ED PROVIDER NOTE - OBJECTIVE STATEMENT
85 year old female with multiple medical problems including afib for which she is on apixaban presents with rectal bleeding. pt was in her usoh until 3am when she developed the bleeding with no pain
Opt out

## 2022-10-14 NOTE — PROGRESS NOTE ADULT - PROBLEM/PLAN-2
DISPLAY PLAN FREE TEXT
<-- Click to add NO pertinent Family History

## 2023-02-27 NOTE — DISCHARGE NOTE ADULT - CARE PROVIDER_API CALL
Problem: Infection  Goal: Absence of Infection Signs and Symptoms  Outcome: Ongoing, Progressing     Problem: Adult Inpatient Plan of Care  Goal: Plan of Care Review  Outcome: Ongoing, Progressing  Goal: Patient-Specific Goal (Individualized)  Outcome: Ongoing, Progressing  Goal: Absence of Hospital-Acquired Illness or Injury  Outcome: Ongoing, Progressing  Goal: Optimal Comfort and Wellbeing  Outcome: Ongoing, Progressing  Goal: Readiness for Transition of Care  Outcome: Ongoing, Progressing     Problem: Adjustment to Illness (Sepsis/Septic Shock)  Goal: Optimal Coping  Outcome: Ongoing, Progressing     Problem: Bleeding (Sepsis/Septic Shock)  Goal: Absence of Bleeding  Outcome: Ongoing, Progressing     Problem: Glycemic Control Impaired (Sepsis/Septic Shock)  Goal: Blood Glucose Level Within Desired Range  Outcome: Ongoing, Progressing     Problem: Infection Progression (Sepsis/Septic Shock)  Goal: Absence of Infection Signs and Symptoms  Outcome: Ongoing, Progressing     Problem: Nutrition Impaired (Sepsis/Septic Shock)  Goal: Optimal Nutrition Intake  Outcome: Ongoing, Progressing     Problem: Skin Injury Risk Increased  Goal: Skin Health and Integrity  Outcome: Ongoing, Progressing     Problem: Fall Injury Risk  Goal: Absence of Fall and Fall-Related Injury  Outcome: Ongoing, Progressing     Problem: Malnutrition  Goal: Improved Nutritional Intake  Outcome: Ongoing, Progressing     Problem: Fluid Imbalance (Pneumonia)  Goal: Fluid Balance  Outcome: Ongoing, Progressing     Problem: Infection (Pneumonia)  Goal: Resolution of Infection Signs and Symptoms  Outcome: Ongoing, Progressing      Martínez Kothari), Surgery  [address] [place] [address]  Phone: [phone]  Fax: [phone]    Uyen Mckeon), Surgery; Vascular Surgery  [address] [place] [address]  Phone: [phone]  Fax: [phone]    PCP - Dr. Ahn,   Phone: (   )    -  Fax: (   )    - Martínez Kothari), Surgery  [address] [place] [address]  Phone: [phone]  Fax: [phone]    Uyen Mckeon), Surgery; Vascular Surgery  [address] [place] [address]  Phone: [phone]  Fax: [phone]    PCP - Dr. Ahn,   Phone: (   )    -  Fax: (   )    -    Wai Payne), Infectious Disease; Internal Medicine  [address] [place] [address]  Phone: [phone]  Fax: [phone]

## 2023-04-18 NOTE — PATIENT PROFILE ADULT. - TYPE OF ADMISSION, PATIENT PROFILE
Quality 402: Tobacco Use And Help With Quitting Among Adolescents: Patient screened for tobacco and is an ex-smoker
Detail Level: Detailed
Emergent/ED
Quality 110: Preventive Care And Screening: Influenza Immunization: Influenza Immunization Administered during Influenza season
Quality 111:Pneumonia Vaccination Status For Older Adults: Pneumococcal vaccine (PPSV23) administered on or after patient’s 60th birthday and before the end of the measurement period
Quality 226: Preventive Care And Screening: Tobacco Use: Screening And Cessation Intervention: Patient screened for tobacco use and is an ex/non-smoker

## 2023-06-30 NOTE — ED ADULT TRIAGE NOTE - NS ED NURSE BANDS TYPE
Name band; No further overt bleeding reported and Hgb appears to be hovering at 9-10s. IR consult appreciated; no immediate plans for embolization, but will have low threshold if concerns for recurrent bleeding. Continue high dose PPI and closely monitor for evidence of recurrent bleeding. If recurrent bleeding, limited GI interventions remain given size of ulcer and recent cautery utilization. Additional recommendations as above.

## 2023-08-26 NOTE — DIETITIAN INITIAL EVALUATION ADULT. - PERTINENT MEDS FT
"Subjective:      Patient ID: Leticia Piña is a 71 y.o. female.    Chief Complaint: Follow-up (Pt is complaining of insomnia. Also here for blood pressure recheck. )      HPI  Here for follow up of medical problems.  BP at home 145-157/60s.  Says took propranolol 20mg the other night and next morning was very groggy.  Up 24#, attribs to mirtaz.  Sleeping best now on ativan/trazodone.  No more cold feeling feet.  To have stress test and ABIs per Cards.  Per Dentist, dev episodes much lessened with Jacy dental device about 6 months ago.      Updated/ annual due 10/23:  HM: 10/22 fluvax, 3/21 covid vaccines, 6/19 HAV, 11/19 [id number], 3/17 booster [id number], 12/22 [id number], 6/21 TDaP, 4/13 zoster, 4/22 Shingrix #2, 3/23 now on drug holiday rep 2y, 1/23 EGD, 3/23 Cscope rep 3y, 2/23 MMG/me, 11/17 Gyn Dr. Jimenez, 3/17 HCV neg.   10/22 Cologuard negative.     Review of Systems   Constitutional:  Negative for chills, diaphoresis and fever.   Respiratory:  Negative for cough and shortness of breath.    Cardiovascular:  Negative for chest pain, palpitations and leg swelling.   Gastrointestinal:  Negative for blood in stool, constipation, diarrhea, nausea and vomiting.   Genitourinary:  Negative for dysuria, frequency and hematuria.   Psychiatric/Behavioral:  The patient is not nervous/anxious.          Objective:   BP (!) 144/60 (BP Location: Left arm)   Pulse 64   Temp 98.3 °F (36.8 °C) (Oral)   Ht 5' 5" (1.651 m)   Wt 76.7 kg (169 lb 1.6 oz)   SpO2 96%   BMI 28.14 kg/m²     Physical Exam  Constitutional:       Appearance: She is well-developed.   Neck:      Thyroid: No thyroid mass.      Vascular: No carotid bruit.   Cardiovascular:      Rate and Rhythm: Normal rate and regular rhythm.      Heart sounds: No murmur heard.     No friction rub. No gallop.   Pulmonary:      Effort: Pulmonary effort is normal.      Breath sounds: Normal breath sounds. No wheezing or rales.   Abdominal:      General: Bowel sounds " are normal.      Palpations: Abdomen is soft. There is no mass.      Tenderness: There is no abdominal tenderness.   Musculoskeletal:      Cervical back: Neck supple.   Lymphadenopathy:      Cervical: No cervical adenopathy.   Neurological:      Mental Status: She is alert and oriented to person, place, and time.             Assessment:       1. Essential hypertension    2. Recurrent major depressive disorder, in partial remission    3. Rheumatoid arthritis involving multiple sites with positive rheumatoid factor    4. Primary insomnia          Plan:     Essential hypertension-cont ARB/hctz, add low dose amlodipine.  No propranolol due to sluggishness.  -     amLODIPine (NORVASC) 2.5 MG tablet; Take 1 tablet (2.5 mg total) by mouth once daily.  Dispense: 30 tablet; Refill: 11    Recurrent major depressive disorder, in partial remission- doing well, cont SNRI/wellbutrin.    Rheumatoid arthritis involving multiple sites with positive rheumatoid factor- no pain on rinvoq/plaquenil.    Primary insomnia- cont ativan/trazodone.    RTC 1mo.     Vit C, MVI, Zinc, Protonix, Lantus, Humalog, FeSO4, Lipitor, Remeron

## 2023-10-02 NOTE — PROGRESS NOTE ADULT - ASSESSMENT
Assessment: 85 yr old female with PMH CAD s/p stents x 2 , DM2, hypertension, hyperlipidemia, anxiety, significant PVD s/p multiple stents, paroxysmal atrial fibrillation on Eliquis admitted for hypertensive urgency post LE angiogram. 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 Hospital course complicated by urinary retention with UTI (failed TOV), hyponatremia likely due to pain and improved with NaCl (per nephrology) and pain medications. BP meds and insulin coverage titrated for better control.  Patient stable for discharge to acute rehab. While in rehab, patient found to have postoperative L LE wound dehiscence and complaints of R foot pain, evaluated by vascular and scheduled for Left AKA and RLE angiogram 4/14/17. Seen by Cardiology and cleared.  Patient had fever 101 last night.  Pancultured. Patient being followed by ID, will continue antibiotics and follow up cultures.  Patient complains of LLE discomfort with drainage from BKA site.  Patient also has right foot ulceration and eschar.     inpatient for AKA due to wound dihescnece of BKA    Problem/Plan - 1:  ·  Problem: Postoperative wound dehiscence, initial encounter.  Plan:  Left AKA 4/14/17  and R LE angioplasty with Dr. Mckeon, Pain is well control, patient is getting cefazolin, blood cultures negative, seen by ID, will continue with current antibiotics.       Problem/Plan - 2:  ·  Problem: Fever.  Plan: Urine c/s Enterococcus, bld c/s has been negative, patient is on cefazolin, CXR - read LLL small pleural effusion vs PNA. Doubt PNA, patient has no cough or SOB, will continue monitor, patient has been seen by ID.     Problem/Plan - 3:  ·  Problem: PVD (peripheral vascular disease).  Plan: As above, s/p AKA, wound dressings, followed by Vascular surgery.     Problem/Plan - 4:  ·  Problem: Type 2 diabetes mellitus with hypoglycemia without coma, with long-term current use of insulin.  Plan: Continue Lantus 20units and sliding scale coverage, will continue to monitor glucose.     Problem/Plan - 5:  ·  Problem: PAF (paroxysmal atrial fibrillation).  Plan: Rate controlled.  Eliquis on hold, to assess when to resume  eliquis and plavix. will discuss with vascular surgery.     Problem/Plan - 6:  Problem: CAD (coronary artery disease). Plan: Continue ASA, Metoprolol, Lisinopril, and Lipitor, Plavix on hold.  Will need to discuss with cardiology and vascular surgeon when to resume plavix.     Problem/Plan - 7:  ·  Problem: Acute blood loss anemia.  Plan: Asymptomatic. S/P 2 units of PRBC to keep Hg > 10  .  Monitor CBC.  Continue Iron, H&H has been stable.     DVT prophylaxis: will resume eliquis. Cheek Interpolation Flap Text: In order to maintain the form and function of the airway, and to maintain the alar groove, a two-stage interpolation axial flap and staged reconstruction was planned for closure.  A telfa template was made of the defect.  This was then used to outline the cheek interpolation flap.  The donor area for the pedicle flap was then anesthetized.  The flap was incised through the skin and subcutaneous tissue down to the layer of the underlying musculature.  The flap was carefully incised within the deep plane to maintain its blood supply. The pedicle was then rotated into position and sutured.  \\n\\nTo close the donor-site defect on the cheek, an advancement flap was created by wide undermining laterally in the subcutaneous plane. After hemostasis was obtained, this flap was advanced medially and sutured in a layered fashion.  The portion of the advancement flap near the pedicle of the interpolation flap was closed with care, so as not to constrict the vasculature of the pedicle.   \\n\\nOnce the flaps were sutured into place, adequate blood supply was confirmed with blanching and refill.  The pedicle was wrapped with xeroform gauze and dressed with telfa and gauze bandage to ensure continued blood supply and protect the attached pedicle.

## 2024-01-18 NOTE — DISCHARGE NOTE ADULT - NS AS DC FOLLOWUP STROKE INST
Body mass index is 34.12 kg/m².  For BMI 25-30: recommend behavior modifications  Recommend intensive, multicomponent, behavioral interventions: Goal BMI <30.  -Goal is to exercise at least 5 times a week for 30 minutes per day.   -Stand more each day.   -Increase exercise: Start with 10 minutes daily and build up to 60-90 minutes/day with moderate activity  -Reduce caloric intake to about 1500 kcal/day  -Avoid soda, simple sugars, excessive rice, potatoes or bread. Limit fast foods and fried foods.  -Choose complex carbs in moderation (example: green vegetables, beans, oatmeal). Eat plenty of fresh fruits and vegetables with lean meats daily.  -Do not skip meals. Eat a balanced portion size.  -Avoid fad diets. Consider long-term healthy life style changes.      Smoking Cessation

## 2024-02-26 NOTE — ED ADULT NURSE NOTE - DISCHARGE DATE/TIME
How Severe Is Your Skin Lesion?: mild
normal mood with appropriate affect
Have Your Skin Lesions Been Treated?: not been treated
Is This A New Presentation, Or A Follow-Up?: Skin Lesions
25-Aug-2017 06:12

## 2024-03-08 NOTE — PHYSICAL THERAPY INITIAL EVALUATION ADULT - PATIENT PROFILE REVIEW, REHAB EVAL
yes [Chaperone Present] : A chaperone was present in the examining room during all aspects of the physical examination [No Acute Distress] : in no acute distress [Well developed] : well developed [Well Nourished] : ~L well nourished [Oriented x3] : oriented to person, place, and time [Normal Memory] : ~T memory was ~L impaired [Normal Mood/Affect] : mood and affect are normal [Respirations regular] : ~T respiratory rate was regular [No Edema] : ~T edema was not present [Vulvar Atrophy] : vulvar atrophy [Atrophy] : atrophy [Attentuated] : perineal body was attenuated [Normal] : normal [FreeTextEntry4] : No active erosions/bleeding

## 2024-03-16 NOTE — PROGRESS NOTE ADULT - SUBJECTIVE AND OBJECTIVE BOX
S/P L BKA for ischemic tissue loss. Pt c/o fantom pain in the L toes. Pt also complains of right heel pain.    Vital Signs Last 24 Hrs  T(C): 36.8, Max: 39.1 (03-21 @ 18:25)  T(F): 98.3, Max: 102.4 (03-21 @ 18:25)  HR: 85 (83 - 91)  BP: 144/71 (142/52 - 170/62)  BP(mean): --  RR: 18 (14 - 18)  SpO2: 97% (92% - 97%)                          9.0    10.1  )-----------( 312      ( 22 Mar 2017 06:11 )             27.5   22 Mar 2017 06:11    132    |  96     |  15.0   ----------------------------<  155    4.4     |  24.0   |  0.81     Ca    8.8        22 Mar 2017 06:11  Phos  3.1       22 Mar 2017 06:11  Mg     1.9       22 Mar 2017 06:11    PE   Awake and less confused today  Left BKA stump C/D/I  Right heel with small area of pressure injury no

## 2024-11-28 NOTE — ED PROVIDER NOTE - CROS ED ROS STATEMENT
- NPWT dressing changed to left ischial and right ischial wounds, < 50 cm ².   - Black foam removed. No residual foam noted in wound bed.   - Hydrocolloid applied to the inferior periwounds of both ischial wounds to protect denuded skin.   - 2 piece(s) of black foam used. 1 long piece simplace to right wound bed and bridged to the right flank above the right hip. 1 long piece black foam to left ischium to the right flank above the right hip.   - Resumed continuous suction at 125 mm Hg, no leaks noted.    Updated orders faxed to [facility] via Right Fax.      all other ROS negative except as per HPI

## 2024-12-30 NOTE — PROGRESS NOTE ADULT - PROBLEM SELECTOR PLAN 8
Spoke to patient.  Gave her message  that US of Pelvis order has been placed    Will start Ceftriaxone. D/c juarez, repeat UA and urine culture, given recent cultures negative. If fails void trial again will need juarez reinserted.

## 2025-07-16 NOTE — PATIENT PROFILE ADULT. - ABILITY TO HEAR (WITH HEARING AID OR HEARING APPLIANCE IF NORMALLY USED):
Mildly to Moderately Impaired: difficulty hearing in some environments or speaker may need to increase volume or speak distinctly well-groomed/no distress